# Patient Record
Sex: MALE | Race: WHITE | NOT HISPANIC OR LATINO | Employment: OTHER | RURAL
[De-identification: names, ages, dates, MRNs, and addresses within clinical notes are randomized per-mention and may not be internally consistent; named-entity substitution may affect disease eponyms.]

---

## 2017-10-16 ENCOUNTER — TRANSCRIBE ORDERS (OUTPATIENT)
Dept: PHYSICAL THERAPY | Facility: HOSPITAL | Age: 72
End: 2017-10-16

## 2017-10-16 DIAGNOSIS — R29.6 FALLS FREQUENTLY: Primary | ICD-10-CM

## 2017-10-16 DIAGNOSIS — R26.89 BALANCE PROBLEM: ICD-10-CM

## 2017-10-17 ENCOUNTER — TRANSCRIBE ORDERS (OUTPATIENT)
Dept: OCCUPATIONAL THERAPY | Facility: HOSPITAL | Age: 72
End: 2017-10-17

## 2017-10-17 DIAGNOSIS — R41.9 COGNITIVE COMPLAINTS: ICD-10-CM

## 2017-10-17 DIAGNOSIS — R63.39 FEEDING PROBLEM: Primary | ICD-10-CM

## 2017-10-17 DIAGNOSIS — R29.6 FALLS FREQUENTLY: ICD-10-CM

## 2017-10-17 DIAGNOSIS — R25.1 TREMOR: ICD-10-CM

## 2017-10-24 ENCOUNTER — HOSPITAL ENCOUNTER (OUTPATIENT)
Dept: PHYSICAL THERAPY | Facility: HOSPITAL | Age: 72
Setting detail: THERAPIES SERIES
Discharge: HOME | End: 2017-10-24

## 2017-10-24 ENCOUNTER — APPOINTMENT (OUTPATIENT)
Dept: PHYSICAL THERAPY | Facility: HOSPITAL | Age: 72
End: 2017-10-24

## 2017-10-24 ENCOUNTER — HOSPITAL ENCOUNTER (OUTPATIENT)
Dept: OCCUPATIONAL THERAPY | Facility: HOSPITAL | Age: 72
Setting detail: THERAPIES SERIES
Discharge: HOME | End: 2017-10-24

## 2017-10-24 DIAGNOSIS — IMO0002 LACK OF COORDINATION DUE TO STROKE: ICD-10-CM

## 2017-10-24 DIAGNOSIS — R53.1 WEAKNESS GENERALIZED: ICD-10-CM

## 2017-10-24 DIAGNOSIS — I63.9 CEREBROVASCULAR ACCIDENT (CVA) WITH INVOLVEMENT OF RIGHT SIDE OF BODY (HCC): Primary | ICD-10-CM

## 2017-10-24 DIAGNOSIS — R26.9 ABNORMALITY OF GAIT AND MOBILITY: Primary | ICD-10-CM

## 2017-10-24 PROCEDURE — G8978 MOBILITY CURRENT STATUS: HCPCS | Performed by: PHYSICAL THERAPIST

## 2017-10-24 PROCEDURE — G8985 CARRY GOAL STATUS: HCPCS | Performed by: OCCUPATIONAL THERAPIST

## 2017-10-24 PROCEDURE — G8979 MOBILITY GOAL STATUS: HCPCS | Performed by: PHYSICAL THERAPIST

## 2017-10-24 PROCEDURE — 97166 OT EVAL MOD COMPLEX 45 MIN: CPT | Performed by: OCCUPATIONAL THERAPIST

## 2017-10-24 PROCEDURE — 97162 PT EVAL MOD COMPLEX 30 MIN: CPT | Performed by: PHYSICAL THERAPIST

## 2017-10-24 PROCEDURE — G8984 CARRY CURRENT STATUS: HCPCS | Performed by: OCCUPATIONAL THERAPIST

## 2017-10-24 NOTE — THERAPY EVALUATION
"    Outpatient Physical Therapy Neuro Initial Evaluation  [facility]     Patient Name: Travis Patel  : 1945  MRN: [id number]  Today's Date: 10/24/2017      Visit Date: 10/24/2017    There is no problem list on file for this patient.       Past Medical History:   Diagnosis Date   • Hernia of abdominal wall    • Myocardial infarction 2017   • Sleep apnea    • Stroke 2017        Past Surgical History:   Procedure Laterality Date   • COLOSTOMY  2001    x 3 months   • CORONARY ARTERY BYPASS GRAFT         • HERNIA REPAIR      abdominal hernia x 3; poor healing with multiple revisions   • PACEMAKER IMPLANTATION  2017    w/ defibrillator   • ROTATOR CUFF REPAIR Right 2005         Visit Dx:     ICD-10-CM ICD-9-CM   1. Abnormality of gait and mobility R26.9 781.2             Patient History       10/24/17 1500 10/24/17 1400       History    Chief Complaint Balance Problems;Difficulty Walking;Difficulty with daily activities;Falls/history of falls;Fatigue/poor endurance;Muscle weakness;Pain  -TR Balance Problems;Difficulty Walking;Falls/history of falls  -TB     Type of Pain Other pain   \"tailbone\" pain  -TR      Date Current Problem(s) Began 17  -TR 17  -TB     Brief Description of Current Complaint Pt had a heart attack and CVA in 2017. He completed 2 weeks of rehab in a SNF and cardiac rehab. Since that time he has returned home with his spouse and has had multiple falls per week since 2017. He has recently noticed tremors in R UE and occasionally in L UE affecting his self feeding and daily tasks. He requires Min A at times for ADL and mobility. His spouse is his main caregiver. He was referred to OT to address his UE weakness and tremors.   -TR Pt had a heart attack and CVA in 2017. He completed 2 weeks of rehab in a SNF and cardiac rehab. Since that time he has returned home with his spouse and has had multiple falls per week since 2017. He has recently " noticed tremors in R UE and occasionally in L UE affecting his self feeding and daily tasks. He requires Min A at times for ADL and mobility. His spouse is his main caregiver. He was referred to OT to address his UE weakness and tremors.   -TB     Previous treatment for THIS PROBLEM Rehabilitation  -TR Rehabilitation  -TB     Patient/Caregiver Goals Relieve pain;Return to prior level of function;Improve mobility;Improve strength;Know what to do to help the symptoms  -TR Return to prior level of function;Improve mobility;Know what to do to help the symptoms  -TB     Current Tobacco Use No  -TR      Smoking Status Former smoker  -TR      Patient's Rating of General Health Fair  -TR Fair  -TB     Hand Dominance right-handed  -TR      Occupation/sports/leisure activities Retired  -TR      Patient seeing anyone else for problem(s)? Yes  -TR Yes  -TB     How has patient tried to help current problem? Therapy in SNF following CVA. No recent therapy.   -TR      What clinical tests have you had for this problem? --   Unknown  -TR      Pain     Pain Location Coccyx  -TR Coccyx  -TB     Pain at Present 3  -TR 3  -TB     Pain at Best 3  -TR 3  -TB     Pain at Worst 10  -TR 10  -TB     Pain Frequency Constant/continuous  -TR Constant/continuous  -TB     Pain Description Stabbing  -TR Stabbing  -TB     What Performance Factors Make the Current Problem(s) WORSE? Riding in car.   -TR      What Performance Factors Make the Current Problem(s) BETTER? Movement, standing.   -TR      Is your sleep disturbed? Yes  -TR      Is medication used to assist with sleep? Unspecified  -TR      Total hours of sleep per night Unspecified  -TR      Difficulties with ADL's? Frequent falls, difficulty with dressing and clothing fasteners, impaired self feeding.   -TR      Difficulties with recreational activities? Unable to complete yard work.   -TR      Fall Risk Assessment    Any falls in the past year: Yes  -TR      Number of falls reported in the  last 12 months --   One per week or more.   -TR      Factors that contributed to the fall: Tripped;Lost balance;Low light;Fatigue;Didn't use assistive device  -TR      Does patient have a fear of falling Yes (comment)  -TR      Services    Prior Rehab/Home Health Experiences Yes  -TR Yes  -TB     When was the prior experience with Rehab/Home Health Feb. 2017  -TR      Where was the prior experience with Rehab/Home Health SNF in [place].   -TR      Are you currently receiving Home Health services No  -TR      Do you plan to receive Home Health services in the near future No  -TR      Daily Activities    Primary Language English  -TR English  -TB     How does patient learn best? Listening;Demonstration  -TR Listening;Demonstration  -TB     Teaching needs identified Home Exercise Program;Management of Condition;Falls Prevention;Home Safety  -TR Home Exercise Program;Management of Condition;Falls Prevention  -TB     Patient is concerned about/has problems with Coordination;Difficulty with self care (i.e. bathing, dressing, toileting:;Flexibility;Grasping objects lifting;Performing home management (household chores, shopping, care of dependents);Performing job responsibilities/community activities (work, school,;Performing sports, recreation, and play activities;Reaching over head;Repetitive movements of the hand, arm, shoulder;Standing;Transfers (getting out of a chair, bed);Walking;Writing/grasping items with hand(s)  -TR Performing home management (household chores, shopping, care of dependents)  -TB     Does patient have problems with the following? Depression  -TR Depression  -TB     Barriers to learning Communication;Cognitive   Aphasia  -TR Communication   expressive aphasia  -TB     Action taken for identified issues Will educate spouse.   -TR      Recommended Referrals Speech Therapy  -TR Speech Therapy  -TB     Pt Participated in POC and Goals Yes  -TR Yes  -TB     Safety    Are you being hurt, hit, or  "frightened by anyone at home or in your life? No  -TR No  -TB     Are you being neglected by a caregiver No  -TR No  -TB       User Key  (r) = Recorded By, (t) = Taken By, (c) = Cosigned By    Initials Name Provider Type    TB Geovanny Kruse, PT Physical Therapist    TR Loretta Hastings, OTR/L Occupational Therapist             Hand Therapy (last 24 hours)      Hand Eval       10/24/17 1600          Subjective Comments    Subjective Comments Pt displayed aphasia during tx. Spouse assisted with answering questions.   -TR      Subjective Pain    Able to rate subjective pain? yes  -TR      Pre-Treatment Pain Level 3  -TR      Post-Treatment Pain Level 3  -TR      Subjective Pain Comment \"tailbone\" pain  -TR      Hand ROM Tested?    Hand ROM Tested? --   B hand AROM WFL  -TR      Hand  Strength     Strength Affected Side Bilateral  -TR       Strength Right    Right  Test 1 47  -TR      Right  Test 2 44  -TR      Right  Test 3 40  -TR       Strength Average Right 43.67  -TR       Strength Left    Left  Test 1 43  -TR      Left  Test 2 43  -TR      Left  Test 3 52  -TR       Strength Average Left 46  -TR      Pinch Strength    Affected Side Bilateral  -TR      Right Hand Strength - Pinch (lbs)    Lateral 12 lbs  -TR      Three Jaw Edi 11 lbs  -TR      Tip Pinch - Index Finger 8 lbs  -TR      Left Hand Strength - Pinch (lbs)    Lateral 12 lbs  -TR      Three Jaw Edi 11 lbs  -TR      Tip Pinch - Index Finger 7 lbs  -TR        User Key  (r) = Recorded By, (t) = Taken By, (c) = Cosigned By    Initials Name Provider Type    TR Loretta Hastings, OTR/L Occupational Therapist                PT Neuro       10/24/17 1400          Home Living    Living Arrangements house  -TB      Home Accessibility stairs (1 railing present);grab bars present (bathtub)  -TB      Number of Stairs to Enter Home 5  -TB      Stair Railings at Home outside, present on right side  -TB      Home " Equipment Rollator  -TB      Living Environment Comment recommended a SC  -TB      Cognition    Orientation Level Oriented X4  -TB      Safety Judgment Decreased awareness of need for assistance  -TB      Comments tends to move before his limbs can catch up  -TB      Sensation    Light Touch Partial deficits in the RLE  -TB      DTR- Lower Quarter Clearing    Patellar tendon (L2-4) Bilateral:;2- Normal response   slightly elevated on right  -TB      MMT (Manual Muscle Testing)    General MMT Assessment Detail right LE 5/5 except right hip grossly 4-/5  -TB      Bed Mobility, Assessment/Treatment    Bed Mobility, Roll Left, [place] independent  -TB      Bed Mob, Supine to Sit, [place] contact guard assist  -TB      Bed Mob, Sit to Supine, [place] contact guard assist  -TB      Transfers    Transfers, Sit-Stand [place] upper extremity support  -TB      Gait Assessment/Treatment    Gait, Comment walks with trendelenberg on right midstance with increased lateral lurch yanci; improved some with SC on left; tends to look down at feet  -TB      Balance-Based Torso-Weighting Assessment Performed?    Balance-Based Torso-Weighting Assessment (Trigger) Yes  -TB        User Key  (r) = Recorded By, (t) = Taken By, (c) = Cosigned By    Initials Name Provider Type    TB Geovanny Kruse, RHONA Physical Therapist                  Vestibular Eval       10/24/17 1400          Static    Position Feet together ([place])  -TB      Surface Hard  -TB      Performance Eyes open;Eyes closed  -TB      Level of Assistance Assist Stand-by  -TB      Duration 30 sec  -TB      Standing Static Balance Comments has increased sway but no loss of balance  -TB        User Key  (r) = Recorded By, (t) = Taken By, (c) = Cosigned By    Initials Name Provider Type    TB Geovanny Kruse PT Physical Therapist                  Therapy Education       10/24/17 1600 10/24/17 1400       Therapy Education    Education Details Education provided on  fall prevention, home safety and being more active at home. Will initiate an UE HEP during next tx.   -TR right SLS; gait with SC  -TB     Given Symptoms/condition management;Fall prevention and home safety  -TR Symptoms/condition management;Fall prevention and home safety  -TB     Program New  -TR New  -TB     How Provided Demonstration;Verbal  -TR Demonstration;Verbal  -TB     Provided to Patient;Caregiver   Spouse  -TR Patient;Caregiver   Spouse  -TB     Level of Understanding Verbalized;Demonstrated  -TR Verbalized;Demonstrated  -TB       User Key  (r) = Recorded By, (t) = Taken By, (c) = Cosigned By    Initials Name Provider Type    TB Geovanny Kruse, PT Physical Therapist    TR Loretta Hastings, OTR/L Occupational Therapist                PT OP Goals       10/24/17 1400       PT Short Term Goals    STG Date to Achieve 11/14/17  -TB     STG 1 Improve yanci hip ext to 10 deg passive ext  -TB     STG 1 Progress New  -TB     STG 2 Able to walk with SC in midline with no trendelenberg without looking down  -TB     STG 2 Progress New  -TB     Long Term Goals    LTG Date to Achieve 12/05/17  -TB     LTG 1 No falls for 3 weeks  -TB     LTG 1 Progress New  -TB     LTG 2 Able to SLS on right x 10 secs with no trendelenberg  -TB     LTG 2 Progress New  -TB     LTG 3 Able to walk household distances without asst device with good wt shifting and core stability  -TB     LTG 3 Progress New  -TB     LTG 4 Able to walk short community distances (20 min) with SC without over-fatigue and heart rate in safe range  -TB     LTG 4 Progress New  -TB     LTG 5 Independent with HEP for right hip stability and balance.   -TB     LTG 5 Progress New  -TB     Time Calculation    PT Goal Re-Cert Due Date 11/23/17  -TB       User Key  (r) = Recorded By, (t) = Taken By, (c) = Cosigned By    Initials Name Provider Type    TB Geovanny Kruse, PT Physical Therapist                PT Assessment/Plan       10/24/17 1627 10/24/17 1400    PT  Assessment    Functional Limitations  Decreased safety during functional activities;Impaired gait;Impaired locomotion;Limitation in home management;Performance in leisure activities  -TB    Impairments  Posture;Range of motion;Impaired flexibility;Joint mobility;Pain;Muscle strength;Gait;Endurance  -TB    Assessment Comments  He is doing well on many areas. His right leg is strong except for his right hip which is weak and it shows in his gait with a trendelenberg in midstance. He tends to avoid his right leg which makes his right knee more likely to buckle. We will need to consider his heart damage and not over-fatigue him. His midline orientation is good to if we can improve his stability, his gait should progress well. I would recommend he see SLP for his expressive aphasia.  -TB    Please refer to paper survey for additional self-reported information  Yes  -TB    Rehab Potential  Excellent  -TB    Patient/caregiver participated in establishment of treatment plan and goals  Yes  -TB    Patient would benefit from skilled therapy intervention  Yes  -TB    PT Plan    PT Frequency  2x/week  -TB    Predicted Duration of Therapy Intervention (days/wks) 8 weeks  -TR 6 weeks  -TB    Planned CPT's?  PT EVAL MOD COMPLELITY: 29394;PT THER PROC EA 15 MIN: 62527;PT THER ACT EA 15 MIN: 02961;PT MANUAL THERAPY EA 15 MIN: 06345;PT NEUROMUSC RE-EDUCATION EA 15 MIN: 00358;PT GAIT TRAINING EA 15 MIN: 28980  -TB    PT Plan Comments  We will focus on hip flexibility and hip stabiliity and slowly rebuild his gait technique to emphasize posture and proper wt shifting.   -TB      User Key  (r) = Recorded By, (t) = Taken By, (c) = Cosigned By    Initials Name Provider Type    TB Geovanny Kruse, PT Physical Therapist    TR Loretta Hastings, OTR/L Occupational Therapist                   Exercises       10/24/17 1600          Subjective Comments    Subjective Comments Pt displayed aphasia during tx. Spouse assisted with answering  "questions.   -TR      Subjective Pain    Able to rate subjective pain? yes  -TR      Pre-Treatment Pain Level 3  -TR      Post-Treatment Pain Level 3  -TR      Subjective Pain Comment \"tailbone\" pain  -TR        User Key  (r) = Recorded By, (t) = Taken By, (c) = Cosigned By    Initials Name Provider Type    TR Loretta Hastings, OTR/L Occupational Therapist                            Outcome Measures       10/24/17 1600 10/24/17 1400       9 Hole Peg    9-Hole Peg Left 34.56   B hands impaired for pts age.   -TR      9-Hole Peg Right 30.72  -TR      Quick DASH    Open a tight or new jar. 5  -TR      Do heavy household chores (e.g., wash walls, wash floors) 5  -TR      Carry a shopping bag or briefcase 5  -TR      Wash your back 1  -TR      Use a knife to cut food 2  -TR      Recreational activities in which you take some force or impact through your arm, should or hand (e.g. golf, hammering, tennis, etc.) 4  -TR      During the past week, to what extent has your arm, shoulder, or hand problem interfered with your normal social activites with family, friends, neighbors or groups? 4  -TR      During the past week, were you limited in your work or other regular daily activities as a result of your arm, shoulder or hand problem? 4  -TR      Arm, Shoulder, or hand pain 1  -TR      Tingling (pins and needles) in your arm, shoulder, or hand 1  -TR      During the past week, how much difficulty have you had sleeping because of the pain in your arm, shoulder or hand? 2  -TR      Number of Questions Answered 11  -TR      Quick DASH Score 52.27  -TR      Quick Dash Comments CK  -TR      Tinetti Assessment    Tinetti Assessment  yes  -TB     Sitting Balance  1  -TB     Arises  1  -TB     Attempts to Rise  1  -TB     Immediate Standing Balance (first 5 sec)  1  -TB     Standing Balance  1  -TB     Sternal Nudge (feet close together)  1  -TB     Eyes Closed (feet close together)  1  -TB     Turning 360 Degrees- Steps  1  -TB     " "Turning 360 Degrees- Steadiness  0  -TB     Sitting Down  1  -TB     Tinetti Balance Score  9  -TB     Gait Initiation (immediate after told \"go\")  0  -TB     Step Length- Right Swing  1  -TB     Step Length- Left Swing  0  -TB     Foot Clearance- Right Foot  1  -TB     Foot Clearance- Left Foot  0  -TB     Step Symmetry  0  -TB     Step Continuity  0  -TB     Path (excursion)  1  -TB     Trunk  0  -TB     Base of Support  0  -TB     Gait Score  3  -TB     Tinetti Total Score  12  -TB     Functional Assessment    Outcome Measure Options 9 Hole Peg;Quick DASH  -TR Tinetti  -TB       User Key  (r) = Recorded By, (t) = Taken By, (c) = Cosigned By    Initials Name Provider Type    TB Geovanny Kruse, PT Physical Therapist    TR Loretta Hastings OTR/L Occupational Therapist          Time Calculation:   Start Time: 1400  Stop Time: 1500  Time Calculation (min): 60 min     Therapy Charges for Today     Code Description Service Date Service Provider Modifiers Qty    55230965208 HC PT MOBILITY CURRENT 10/24/2017 Geovanny Kruse, PT GP, CK 1    17426869218 HC PT MOBILITY PROJECTED 10/24/2017 Geovanny Kruse, PT GP, CI 1    [id number] HC PT EVAL MOD COMPLEXITY 4 10/24/2017 Geovanny Kruse, PT GP 1          PT G-Codes  Outcome Measure Options: Tinetti  Score: 12  Functional Limitation: Mobility: Walking and moving around  Mobility: Walking and Moving Around Current Status (): At least 40 percent but less than 60 percent impaired, limited or restricted  Mobility: Walking and Moving Around Goal Status (): At least 1 percent but less than 20 percent impaired, limited or restricted         Geovanny Kruse, PT  10/24/2017       "

## 2017-10-25 NOTE — THERAPY EVALUATION
" Outpatient Occupational Therapy Neuro Initial Evaluation  [facility]     Patient Name: Travis Patel  : 1945  MRN: [id number]  Today's Date: 10/25/2017      Visit Date: 10/24/2017    There is no problem list on file for this patient.       Past Medical History:   Diagnosis Date   • Hernia of abdominal wall    • Myocardial infarction 2017   • Sleep apnea    • Stroke 2017        Past Surgical History:   Procedure Laterality Date   • COLOSTOMY  2001    x 3 months   • CORONARY ARTERY BYPASS GRAFT         • HERNIA REPAIR      abdominal hernia x 3; poor healing with multiple revisions   • PACEMAKER IMPLANTATION  2017    w/ defibrillator   • ROTATOR CUFF REPAIR Right 2005         Visit Dx:      ICD-10-CM ICD-9-CM   1. Cerebrovascular accident (CVA) with involvement of right side of body I63.9 434.91   2. Weakness generalized R53.1 780.79   3. Lack of coordination due to stroke I63.9 434.91    R27.9 781.3             Patient History       10/24/17 1500       History    Chief Complaint Balance Problems;Difficulty Walking;Difficulty with daily activities;Falls/history of falls;Fatigue/poor endurance;Muscle weakness;Pain  -TR     Type of Pain Other pain   \"tailbone\" pain  -TR     Date Current Problem(s) Began 17  -TR     Brief Description of Current Complaint Pt had a heart attack and CVA in 2017. He completed 2 weeks of rehab in a SNF and cardiac rehab. Since that time he has returned home with his spouse and has had multiple falls per week since 2017. He has recently noticed tremors in R UE and occasionally in L UE affecting his self feeding and daily tasks. He requires Min A at times for ADL and mobility. His spouse is his main caregiver. He was referred to OT to address his UE weakness and tremors.   -TR     Previous treatment for THIS PROBLEM Rehabilitation  -TR     Patient/Caregiver Goals Relieve pain;Return to prior level of function;Improve mobility;Improve strength;Know what " to do to help the symptoms  -TR     Current Tobacco Use No  -TR     Smoking Status Former smoker  -TR     Patient's Rating of General Health Fair  -TR     Hand Dominance right-handed  -TR     Occupation/sports/leisure activities Retired  -TR     Patient seeing anyone else for problem(s)? Yes  -TR     How has patient tried to help current problem? Therapy in SNF following CVA. No recent therapy.   -TR     What clinical tests have you had for this problem? --   Unknown  -TR     Pain     Pain Location Coccyx  -TR     Pain at Present 3  -TR     Pain at Best 3  -TR     Pain at Worst 10  -TR     Pain Frequency Constant/continuous  -TR     Pain Description Stabbing  -TR     What Performance Factors Make the Current Problem(s) WORSE? Riding in car.   -TR     What Performance Factors Make the Current Problem(s) BETTER? Movement, standing.   -TR     Is your sleep disturbed? Yes  -TR     Is medication used to assist with sleep? Unspecified  -TR     Total hours of sleep per night Unspecified  -TR     Difficulties with ADL's? Frequent falls, difficulty with dressing and clothing fasteners, impaired self feeding.   -TR     Difficulties with recreational activities? Unable to complete yard work.   -TR     Fall Risk Assessment    Any falls in the past year: Yes  -TR     Number of falls reported in the last 12 months --   One per week or more.   -TR     Factors that contributed to the fall: Tripped;Lost balance;Low light;Fatigue;Didn't use assistive device  -TR     Does patient have a fear of falling Yes (comment)  -TR     Services    Prior Rehab/Home Health Experiences Yes  -TR     When was the prior experience with Rehab/Home Health Feb. 2017  -TR     Where was the prior experience with Rehab/Home Health SNF in [place].   -TR     Are you currently receiving Home Health services No  -TR     Do you plan to receive Home Health services in the near future No  -TR     Daily Activities    Primary Language English  -TR     How does  "patient learn best? Listening;Demonstration  -TR     Teaching needs identified Home Exercise Program;Management of Condition;Falls Prevention;Home Safety  -TR     Patient is concerned about/has problems with Coordination;Difficulty with self care (i.e. bathing, dressing, toileting:;Flexibility;Grasping objects lifting;Performing home management (household chores, shopping, care of dependents);Performing job responsibilities/community activities (work, school,;Performing sports, recreation, and play activities;Reaching over head;Repetitive movements of the hand, arm, shoulder;Standing;Transfers (getting out of a chair, bed);Walking;Writing/grasping items with hand(s)  -TR     Does patient have problems with the following? Depression  -TR     Barriers to learning Communication;Cognitive   Aphasia  -TR     Action taken for identified issues Will educate spouse.   -TR     Recommended Referrals Speech Therapy  -TR     Pt Participated in POC and Goals Yes  -TR     Safety    Are you being hurt, hit, or frightened by anyone at home or in your life? No  -TR     Are you being neglected by a caregiver No  -TR       User Key  (r) = Recorded By, (t) = Taken By, (c) = Cosigned By    Initials Name Provider Type    TB Geovanny Kruse, PT Physical Therapist    TR Loretta Hastings OTR/L Occupational Therapist                OT Neuro       10/24/17 1600          Precautions and Contraindications    Precautions/Limitations fall precautions  -TR      Subjective Pain    Able to rate subjective pain? yes  -TR      Pre-Treatment Pain Level 3  -TR      Post-Treatment Pain Level 3  -TR      Subjective Pain Comment \"tailbone\" pain  -TR      Home Living    Living Arrangements house  -TR      Home Accessibility stairs to enter home;stairs (1 railing present);grab bars present (bathtub)  -TR      Number of Stairs to Enter Home 5  -TR      Stair Railings at Home outside, present on right side  -TR      Home Equipment Rollator  -TR      Living " Environment Comment Planning to purchase a shower seat and ask MD for an order for a straight cane.   -TR      Vision- Basic    Current Vision No visual deficits   Per pt.   -TR      Vision - Complex Assessment    Ocular Range of Motion WFL  -TR      Head Position WDL  -TR      Tracking Able to track stimulus in all quads without difficulty  -TR      Acuity Able to read employee name badge without difficulty  -TR      Cognitive Assessment/Intervention    Current Cognitive/Communication Assessment impaired  -TR      Orientation Status oriented to;person;place;situation;disoriented to;time   Cues for month and year  -TR      Follows Commands/Answers Questions able to follow multi-step instructions;50% of the time;needs repetition;needs cueing;needs increased time  -TR      Personal Safety decreased awareness, need for assist;decreased awareness, need for safety  -TR      Personal Safety Interventions gait belt;fall prevention program maintained;supervised activity  -TR      Cognition Comments Spouse seeking a speech therapy referral from Dr. Calvillo. Reports pt has an appointment with him on 10/25.   -TR      Sensation    Sensation WNL? WFL  -TR      Light Touch No apparent deficits  -TR      Sharp/Dull No apparent deficits  -TR      Proprioception    Proprioception Difficult to fully assess due to cognitive and speech impairments.   -TR      Perception    Inattention/Neglect Appears intact   Plan to test in more detail during next session.   -TR      Coordination    Coordination Observations Intentional Tremor   B UE  -TR      Intentional Tremor Bilteral:;Yes  -TR      Involuntary Movements Bilteral:;Yes  -TR      Coordination Tests Finger to nose eyes open;Rapid Alternating   Difficulty noted with thumb finding task due to directions.   -TR      Rapid Alternating Bilteral:;Intact  -TR      Finger to Nose Eyes Open Bilteral:;Intact  -TR      Gross Motor Training    Gross Motor Skill, Impairments Detail Tremor type  movement noted in B UE with reaching and MMT.   -TR      ROM (Range of Motion)    General ROM upper extremity range of motion deficits identified  -TR      Left Shoulder    Flexion AROM Deficit 170  -TR      ABduction AROM Deficit 127  -TR      Right Shoulder    Flexion AROM Deficit 127  -TR      ABduction AROM Deficit 125  -TR      General UE Assessment    ROM shoulder, left: UE ROM deficit;shoulder, right: UE ROM deficit;wrist, left: UE ROM deficit;wrist, right: UE ROM deficit  -TR      Left Wrist    Flexion AROM Deficit 54  -TR      Extension AROM Deficit 73  -TR      Right Wrist    Flexion AROM Deficit 50  -TR      Extension AROM Deficit 70  -TR      MMT (Manual Muscle Testing)    General MMT Assessment Detail R UE 4/5 within available ROM. L UE 4+/5 within available ROM.   -TR      Tone    UE Tone bilateral:;WNL for age  -TR      Bed Mobility    Bed Mobility, Comment Mod Independent  -TR      Transfers    Transfer, Comment SBA to CGA  -TR      Functional Mobility    Functional Mobility- Comment SBA to CGA  -TR      ADL Assessment/Intervention    ADL's Assessed? Grooming;Upper Body Dressing;Lower Body Dressing  -TR      Upper Body Dressing Assessment/Training    UB Dressing Assess/Train, Comment Occasional Min A  -TR      Lower Body Dressing Assessment/Training    LB Dressing Assess/Train, Comment Occasional Min A  -TR      Grooming Assessment/Training    Grooming Assess/Train, Comment Occasional Min A per spouse.   -TR      Balance Skills Training    Sitting-Level of Assistance Independent  -TR      Sitting-Balance Support Feet supported;No upper extremity supported  -TR      Standing-Level of Assistance Close supervision;Contact guard  -TR      Static Standing Balance Support No upper extremity supported  -TR      Gait Balance-Level of Assistance Contact guard;Minimum assistance  -TR      Gait Balance Support No upper extremity supported  -TR        User Key  (r) = Recorded By, (t) = Taken By, (c) = Cosigned  "By    Initials Name Provider Type    TR Loretta Hastings OTR/L Occupational Therapist             Hand Therapy (last 24 hours)      Hand Eval       10/24/17 1600          Subjective Comments    Subjective Comments Pt displayed aphasia during tx. Spouse assisted with answering questions.   -TR      Subjective Pain    Able to rate subjective pain? yes  -TR      Pre-Treatment Pain Level 3  -TR      Post-Treatment Pain Level 3  -TR      Subjective Pain Comment \"tailbone\" pain  -TR      Hand ROM Tested?    Hand ROM Tested? --   B hand AROM WFL  -TR      Hand  Strength     Strength Affected Side Bilateral  -TR       Strength Right    Right  Test 1 47  -TR      Right  Test 2 44  -TR      Right  Test 3 40  -TR       Strength Average Right 43.67  -TR       Strength Left    Left  Test 1 43  -TR      Left  Test 2 43  -TR      Left  Test 3 52  -TR       Strength Average Left 46  -TR      Pinch Strength    Affected Side Bilateral  -TR      Right Hand Strength - Pinch (lbs)    Lateral 12 lbs  -TR      Three Jaw Edi 11 lbs  -TR      Tip Pinch - Index Finger 8 lbs  -TR      Left Hand Strength - Pinch (lbs)    Lateral 12 lbs  -TR      Three Jaw Edi 11 lbs  -TR      Tip Pinch - Index Finger 7 lbs  -TR        User Key  (r) = Recorded By, (t) = Taken By, (c) = Cosigned By    Initials Name Provider Type    TR NASIMA Hernandez/L Occupational Therapist                    Therapy Education       10/24/17 1600       Therapy Education    Education Details Education provided on fall prevention, home safety and being more active at home. Will initiate an UE HEP during next tx.   -TR     Given Symptoms/condition management;Fall prevention and home safety  -TR     Program New  -TR     How Provided Demonstration;Verbal  -TR     Provided to Patient;Caregiver   Spouse  -TR     Level of Understanding Verbalized;Demonstrated  -TR       User Key  (r) = Recorded By, (t) = Taken By, (c) = " Cosigned By    Initials Name Provider Type    TB Geovanny Kruse, PT Physical Therapist    TR Loretta Hastings, OTR/L Occupational Therapist                OT Goals       10/24/17 1600       OT Short Term Goals    STG Date to Achieve 11/23/17  -TR     STG 1 Pt will be independent with daily completion of an UE HEP to address ROM, strength and coordination deficits.   -TR     STG 1 Progress New  -TR     STG 2 Pt will increase B hand  strength to 50# for improved grasp and reduced dropping of items.   -TR     STG 2 Progress New  -TR     STG 3 Pt will improve B hand FMC to complete the 9 hole peg assessment in 27 seconds or less.  -TR     STG 3 Progress New  -TR     STG 4 Pt will display a functional activity tolerance of 15 minutes with no recovery periods required to improve IADL performance.   -TR     STG 4 Progress New  -TR     Long Term Goals    LTG Date to Achieve 11/23/17  -TR     LTG 1 Pt will complete all dressing and grooming tasks with Modified [place] and good safety awareness.   -TR     LTG 1 Progress New  -TR     LTG 2 Pt will report no falls for 3 tx sessions in a row to demonstrate improved safety with ADL and mobility.   -TR     LTG 2 Progress New  -TR     LTG 3 Pt will complete a mock check writing task with 100% legibility.   -TR     LTG 3 Progress New  -TR     LTG 4 Pt will improve R shoulder flexion and abduction AROM to 150 degrees for improved performance with ADL and IADL tasks.   -TR     LTG 4 Progress New  -TR     Time Calculation    OT Goal Re-Cert Due Date 11/23/17  -TR       User Key  (r) = Recorded By, (t) = Taken By, (c) = Cosigned By    Initials Name Provider Type    TR Loretta Hastings, OTR/L Occupational Therapist                OT Assessment/Plan       10/24/17 1627       Functional Limitations Decreased safety during functional activities;Impaired locomotion;Limitation in home management;Limitations in community activities;Limitations in functional capacity and  performance;Performance in leisure activities;Performance in self-care ADL  -TR    Impairments Balance;Cognition;Coordination;Dexterity;Endurance;Gait;Impaired attention;Impaired flexibility;Impaired muscle endurance;Impaired muscle power;Locomotion;Muscle strength;Pain;Range of motion;Posture  -TR    Assessment Comments This pt displays a great decline in his functional ability and ADL performance since his CVA in Feb. He has had no OT intervention for several months and reports weekly falls at home and requires caregiver assist for dressing, grooming and mobility. He has impaired R UE AROM and strength. B hand strength and coordination is impaired for the pts age and he has poor endurance. He is unable to safely perform any IADL tasks is standing due to risk for falls. Skilled OT is necessary to address these deficits to improve the pts safety and independence, and decrease caregiver burden. This pt displays good rehab potential based on intact R UE sensation, ability to follow commands and strong caregiver support.   -TR    Please refer to paper survey for additional self-reported information Yes  -TR    OT Diagnosis CVA R side affected, generalized weakness, lack of coordination due to stroke.   -TR    OT Rehab Potential Good  -TR    Patient/caregiver participated in establishment of treatment plan and goals Yes  -TR    Patient would benefit from skilled therapy intervention Yes  -TR       OT Frequency 2x/week  -TR    Predicted Duration of Therapy Intervention (days/wks) 8 weeks  -TR    Planned CPT's? OT EVAL MOD COMPLEXITY: 85064;OT THER ACT EA 15 MIN: [id number];OT THER PROC EA 15 MIN: 07898QV;OT NEUROMUSC RE EDUCATION EA 15 MIN: 36722;OT SELF CARE/MGMT/TRAIN 15 MIN: 81212;OT ORTHOTIC MGMT/TRAIN EA 15 MIN: 01287;OT MANUAL THERAPY EA 15 MIN: 62308  -TR    Planned Therapy Interventions (Optional Details) balance training;bed mobility training;home exercise program;manual therapy techniques;motor coordination  training;neuromuscular re-education;patient/family education;ROM (Range of Motion);strengthening;stretching;transfer training   ADL training  -TR    OT Plan Comments Plan to address HEP during next tx session and complete caregiver education.   -TR      User Key  (r) = Recorded By, (t) = Taken By, (c) = Cosigned By    Initials Name Provider Type    TB Geovanny Kruse, PT Physical Therapist    TR Loretta Hastings, OTR/L Occupational Therapist                        Outcome Measures       10/24/17 1600 10/24/17 1400       9 Hole Peg    9-Hole Peg Left 34.56   B hands impaired for pts age.   -TR      9-Hole Peg Right 30.72  -TR      Quick DASH    Open a tight or new jar. 5  -TR      Do heavy household chores (e.g., wash walls, wash floors) 5  -TR      Carry a shopping bag or briefcase 5  -TR      Wash your back 1  -TR      Use a knife to cut food 2  -TR      Recreational activities in which you take some force or impact through your arm, should or hand (e.g. golf, hammering, tennis, etc.) 4  -TR      During the past week, to what extent has your arm, shoulder, or hand problem interfered with your normal social activites with family, friends, neighbors or groups? 4  -TR      During the past week, were you limited in your work or other regular daily activities as a result of your arm, shoulder or hand problem? 4  -TR      Arm, Shoulder, or hand pain 1  -TR      Tingling (pins and needles) in your arm, shoulder, or hand 1  -TR      During the past week, how much difficulty have you had sleeping because of the pain in your arm, shoulder or hand? 2  -TR      Number of Questions Answered 11  -TR      Quick DASH Score 52.27  -TR      Quick Dash Comments CK  -TR      Tinetti Assessment    Tinetti Assessment  yes  -TB     Sitting Balance  1  -TB     Arises  1  -TB     Attempts to Rise  1  -TB     Immediate Standing Balance (first 5 sec)  1  -TB     Standing Balance  1  -TB     Sternal Nudge (feet close together)  1  -TB      "Eyes Closed (feet close together)  1  -TB     Turning 360 Degrees- Steps  1  -TB     Turning 360 Degrees- Steadiness  0  -TB     Sitting Down  1  -TB     Tinetti Balance Score  9  -TB     Gait Initiation (immediate after told \"go\")  0  -TB     Step Length- Right Swing  1  -TB     Step Length- Left Swing  0  -TB     Foot Clearance- Right Foot  1  -TB     Foot Clearance- Left Foot  0  -TB     Step Symmetry  0  -TB     Step Continuity  0  -TB     Path (excursion)  1  -TB     Trunk  0  -TB     Base of Support  0  -TB     Gait Score  3  -TB     Tinetti Total Score  12  -TB     Functional Assessment    Outcome Measure Options 9 Hole Peg;Quick DASH  -TR Tinetti  -TB       User Key  (r) = Recorded By, (t) = Taken By, (c) = Cosigned By    Initials Name Provider Type    TB Geovanny Kruse, PT Physical Therapist    TR NASIMA Hernandez/L Occupational Therapist            Time Calculation:   OT Start Time: 1502  OT Stop Time: 1547  OT Time Calculation (min): 45 min     Therapy Charges for Today     Code Description Service Date Service Provider Modifiers Qty    23471444100 HC OT CARRY MOV HAND OBJ CURRENT 10/24/2017 NASIMA Hernandez/L GO, CK 1    32989230485 HC OT CARRY MOV HAND OBJ PROJECTED 10/24/2017 NASIMA Hernandze/L GO, CJ 1    31659923284 HC OT EVAL MOD COMPLEXITY 3 10/24/2017 NASIMA Hernandez/L GO 1          OT G-codes  OT Professional Judgement Used?: Yes  OT Functional Scales Options: Quick DASH  Score: 52.27% impaired  Functional Limitation: Carrying, moving and handling objects  Carrying, Moving and Handling Objects Current Status (): At least 40 percent but less than 60 percent impaired, limited or restricted  Carrying, Moving and Handling Objects Goal Status (): At least 20 percent but less than 40 percent impaired, limited or restricted          NASIMA He/L  10/25/2017  "

## 2017-10-31 ENCOUNTER — HOSPITAL ENCOUNTER (OUTPATIENT)
Dept: PHYSICAL THERAPY | Facility: HOSPITAL | Age: 72
Setting detail: THERAPIES SERIES
Discharge: HOME | End: 2017-10-31

## 2017-10-31 ENCOUNTER — HOSPITAL ENCOUNTER (OUTPATIENT)
Dept: OCCUPATIONAL THERAPY | Facility: HOSPITAL | Age: 72
Setting detail: THERAPIES SERIES
Discharge: HOME | End: 2017-10-31

## 2017-10-31 DIAGNOSIS — R26.9 ABNORMALITY OF GAIT AND MOBILITY: Primary | ICD-10-CM

## 2017-10-31 DIAGNOSIS — IMO0002 LACK OF COORDINATION DUE TO STROKE: ICD-10-CM

## 2017-10-31 DIAGNOSIS — R53.1 WEAKNESS GENERALIZED: ICD-10-CM

## 2017-10-31 DIAGNOSIS — I63.9 CEREBROVASCULAR ACCIDENT (CVA) WITH INVOLVEMENT OF RIGHT SIDE OF BODY (HCC): Primary | ICD-10-CM

## 2017-10-31 PROCEDURE — 97112 NEUROMUSCULAR REEDUCATION: CPT

## 2017-10-31 PROCEDURE — 97110 THERAPEUTIC EXERCISES: CPT

## 2017-10-31 PROCEDURE — 97110 THERAPEUTIC EXERCISES: CPT | Performed by: OCCUPATIONAL THERAPIST

## 2017-10-31 PROCEDURE — 97530 THERAPEUTIC ACTIVITIES: CPT | Performed by: OCCUPATIONAL THERAPIST

## 2017-11-03 ENCOUNTER — HOSPITAL ENCOUNTER (OUTPATIENT)
Dept: OCCUPATIONAL THERAPY | Facility: HOSPITAL | Age: 72
Setting detail: THERAPIES SERIES
Discharge: HOME | End: 2017-11-03

## 2017-11-03 ENCOUNTER — HOSPITAL ENCOUNTER (OUTPATIENT)
Dept: PHYSICAL THERAPY | Facility: HOSPITAL | Age: 72
Setting detail: THERAPIES SERIES
Discharge: HOME | End: 2017-11-03

## 2017-11-03 DIAGNOSIS — R53.1 WEAKNESS GENERALIZED: ICD-10-CM

## 2017-11-03 DIAGNOSIS — I63.9 CEREBROVASCULAR ACCIDENT (CVA) WITH INVOLVEMENT OF RIGHT SIDE OF BODY (HCC): Primary | ICD-10-CM

## 2017-11-03 DIAGNOSIS — IMO0002 LACK OF COORDINATION DUE TO STROKE: ICD-10-CM

## 2017-11-03 DIAGNOSIS — R26.9 ABNORMALITY OF GAIT AND MOBILITY: Primary | ICD-10-CM

## 2017-11-03 PROCEDURE — 97110 THERAPEUTIC EXERCISES: CPT

## 2017-11-03 NOTE — THERAPY TREATMENT NOTE
Outpatient Physical Therapy Neuro Treatment Note  [facility]     Patient Name: Travis Patel  : 1945  MRN: [id number]  Today's Date: 11/3/2017      Visit Date: 2017    Visit Dx:    ICD-10-CM ICD-9-CM   1. Abnormality of gait and mobility R26.9 781.2       There is no problem list on file for this patient.                            PT Assessment/Plan       17 1305       PT Assessment    Assessment Comments Patient demonstrated decreased comprehension of commands today and needed max cueing and repetition for all activities today.  He did have increased pain into his tailbone today, but only with certain movements.  His gait continues to be unsteady and puts him at an increased risk of falls.  He was educated on the use of straight cane today, however he needed max cueing and had difficulty comprehending the sequencing.  Therefore, we will continue to work with patient to increase his safety with gait and practice using the assistive device.    -BREANN     PT Plan    PT Plan Comments Continue to work on his safety and balance with gait.  Continue to practice using SPC to improve his balance.  -BREANN       User Key  (r) = Recorded By, (t) = Taken By, (c) = Cosigned By    Initials Name Provider Type    BREANN Moore PTA Physical Therapy Assistant                     Exercises       17 1305          Subjective Comments    Subjective Comments Patient reports his tailbone has been hurting him since he had a fall about a week ago.  He reports some days it is better and other days it is hurting worse.  He denies any falls or near falls.  -BREANN      Subjective Pain    Able to rate subjective pain? yes  -BREANN      Pre-Treatment Pain Level 8  -BREANN      Post-Treatment Pain Level 7  -BREANN      Subjective Pain Comment tailbone pain  -BREANN      Exercise 1    Exercise Name 1 clam shells  -BREANN      Cueing 1 Verbal;Tactile;Demo  -BREANN      Sets 1 2  -BREANN      Reps 1 10  -BREANN      Additional Comments no pain into  tailbone  -BREANN      Exercise 2    Exercise Name 2 hooklying BKFO  -BREANN      Cueing 2 Verbal;Tactile  -BREANN      Sets 2 1  -BREANN      Reps 2 3  -BREANN      Additional Comments he had increased LEs tremors, therefore stopped  -BREANN      Exercise 3    Exercise Name 3 seated hip ER  -BREANN      Cueing 3 Verbal;Tactile  -BREANN      Sets 3 2  -BREANN      Reps 3 10  -BREANN      Additional Comments yellow band and progressed to red band  -BREANN      Exercise 4    Exercise Name 4 // bars NBOS EO/EC  -BREANN      Cueing 4 Verbal;Tactile  -BREANN      Time (Minutes) 4 1 minute each  -BREANN      Additional Comments CGA  -BREANN      Exercise 5    Exercise Name 5 // bars M/L weightshifting  -BREANN      Cueing 5 Verbal  -BREANN      Time (Minutes) 5 1 minute  -BREANN      Additional Comments CGA  -BREANN      Exercise 6    Exercise Name 6 // bars AP weightshifting in staggered stance  -BREANN      Cueing 6 Verbal  -BREANN      Time (Minutes) 6 1 minute each leg forward position  -BREANN      Additional Comments CGA  -BREANN      Exercise 7    Exercise Name 7 // bars shoulder width stance on blue therafoam EO/EC  -BREANN      Cueing 7 Verbal  -BREANN      Time (Minutes) 7 30seconds each  -BREANN      Additional Comments CGA  -BREANN      Exercise 8    Exercise Name 8 Practiced ambulating with SPC, however patient needed max cueing and was having difficulty comprehending sequencing.  -BREANN      Time (Minutes) 8 5  -BREANN      Additional Comments CAG/min A  -BREANN      Exercise 9    Exercise Name 9 Walked with patient out to his car.  -BREANN      Additional Comments CGA  -BREANN        User Key  (r) = Recorded By, (t) = Taken By, (c) = Cosigned By    Initials Name Provider Type    BREANN Moore, PTA Physical Therapy Assistant                              PT OP Goals       11/03/17 1305       PT Short Term Goals    STG Date to Achieve 11/14/17  -BREANN     STG 1 Improve yanci hip ext to 10 deg passive ext  -BREANN     STG 1 Progress Ongoing  -BREANN     STG 2 Able to walk with SC in midline with no trendelenberg without looking down  -BREANN      STG 2 Progress Ongoing  -BREANN     STG 2 Progress Comments His family is still waiting on getting prescription from Doctor. However, worked with patient with SPC today in clinic and he required max cues for sequencing  -BREANN     Long Term Goals    LTG Date to Achieve 12/05/17  -BREANN     LTG 1 No falls for 3 weeks  -BREANN     LTG 1 Progress Ongoing  -BREANN     LTG 2 Able to SLS on right x 10 secs with no trendelenberg  -BREANN     LTG 2 Progress New  -BREANN     LTG 3 Able to walk household distances without asst device with good wt shifting and core stability  -BREANN     LTG 3 Progress Ongoing  -BREANN     LTG 4 Able to walk short community distances (20 min) with SC without over-fatigue and heart rate in safe range  -BREANN     LTG 4 Progress Ongoing  -BREANN     LTG 5 Independent with HEP for right hip stability and balance.   -BREANN     LTG 5 Progress Ongoing  -BREANN     Time Calculation    PT Goal Re-Cert Due Date 11/23/17  -BREANN       User Key  (r) = Recorded By, (t) = Taken By, (c) = Cosigned By    Initials Name Provider Type    BREANN Moore PTA Physical Therapy Assistant                Therapy Education       11/03/17 1706 11/03/17 1400       Therapy Education    Education Details Importance of safety during gait to avoid falls  -BREANN added in  strengthening exercises.   -TR (r) SURYAJ (t) TR (c)     Given HEP  -BREANN HEP;Symptoms/condition management;Posture/body mechanics  -TR (r) JJ (t) TR (c)     Program Reinforced  -BREANN New  -TR (r) JJ (t) TR (c)     How Provided Verbal  -BREANN Verbal;Demonstration  -TR (r) YASSINE (t) TR (c)     Provided to Patient   Family  -BREANN Patient;Caregiver  -TR (r) JJ (t) TR (c)     Level of Understanding Verbalized  -BREANN Teach back education performed;Verbalized;Demonstrated  -TR (r) JJ (t) TR (c)       User Key  (r) = Recorded By, (t) = Taken By, (c) = Cosigned By    Initials Name Provider Type    BREANN Moore PTA Physical Therapy Assistant    SARAHY Hastings, OTR/L Occupational Therapist    YASSINE Jones,  OT Student OT Student                Time Calculation:   Start Time: 1305  Stop Time: 1350  Time Calculation (min): 45 min  Total Timed Code Minutes- PT: 45 minute(s)     Therapy Charges for Today     Code Description Service Date Service Provider Modifiers Qty    45949200476 HC PT THER PROC EA 15 MIN 11/3/2017 Rigoberto Moore, PTA GP 3                    Rigoberto Moore, PTA  11/3/2017

## 2017-11-03 NOTE — THERAPY TREATMENT NOTE
Outpatient Occupational Therapy Neuro Treatment Note  [facility]     Patient Name: Travis Patel  : 1945  MRN: [id number]  Today's Date: 11/3/2017       Visit Date: 2017    There is no problem list on file for this patient.       Past Medical History:   Diagnosis Date   • Hernia of abdominal wall    • Myocardial infarction 2017   • Sleep apnea    • Stroke 2017        Past Surgical History:   Procedure Laterality Date   • COLOSTOMY  2001    x 3 months   • CORONARY ARTERY BYPASS GRAFT         • HERNIA REPAIR      abdominal hernia x 3; poor healing with multiple revisions   • PACEMAKER IMPLANTATION  2017    w/ defibrillator   • ROTATOR CUFF REPAIR Right          Visit Dx:    ICD-10-CM ICD-9-CM   1. Cerebrovascular accident (CVA) with involvement of right side of body I63.9 434.91   2. Weakness generalized R53.1 780.79   3. Lack of coordination due to stroke I63.9 434.91    R27.9 781.3                         OT Assessment/Plan       17 1400       OT Assessment    Assessment Comments (P)  UE AROM HEP was reviewed with patient and caregiver education completed. Pt and spouse reported completion of HEP and no new concerns.  strengthening with ball added to HEP. Pt completed BUE strengthening and  strength. Pt required Mod to Max tactile and v.c for UE strengthening and extra time was required for task completion due to his cognitive deficits.   -JJ     OT Plan    OT Plan Comments (P)  Plan to address UE coordination and endurance.   -       User Key  (r) = Recorded By, (t) = Taken By, (c) = Cosigned By    Initials Name Provider Type    YASSINE Jones, OT Student OT Student                OT Goals       17 1432 17 1400    OT Short Term Goals    STG Date to Achieve  (P)  17  -J    STG 1  (P)  Pt will be independent with daily completion of an UE HEP to address ROM, strength and coordination deficits.   -JJ    STG 1 Progress  (P)  Ongoing  -    STG  1 Progress Comments  (P)  HEP reviewed.  strengthening with ball added to HEP with pt and caregiver education.   -JJ    STG 2  (P)  Pt will increase B hand  strength to 50# for improved grasp and reduced dropping of items.   -JJ    STG 2 Progress  (P)  Ongoing  -JJ    STG 2 Progress Comments  (P)   strengthening completed with velcro board and 7# digi-flex.   -JJ    STG 3  (P)  Pt will improve B hand FMC to complete the 9 hole peg assessment in 27 seconds or less.  -JJ    STG 3 Progress  (P)  Ongoing  -JJ    STG 4  (P)  Pt will display a functional activity tolerance of 15 minutes with no recovery periods required to improve IADL performance.   -JJ    STG 4 Progress  (P)  Ongoing  -J    Long Term Goals    LTG Date to Achieve  (P)  11/23/17  -JJ    LTG 1  (P)  Pt will complete all dressing and grooming tasks with Modified [place] and good safety awareness.   -JJ    LTG 1 Progress  (P)  Ongoing  -JJ    LTG 2  (P)  Pt will report no falls for 3 tx sessions in a row to demonstrate improved safety with ADL and mobility.   -JJ    LTG 2 Progress  (P)  Ongoing  -JJ    LTG 2 Progress Comments  (P)  Pt reported no new falls.   -JJ    LTG 3  (P)  Pt will complete a mock check writing task with 100% legibility.   -JJ    LTG 3 Progress  (P)  Ongoing  -JJ    LTG 4  (P)  Pt will improve R shoulder flexion and abduction AROM to 150 degrees for improved performance with ADL and IADL tasks.   -JJ    LTG 4 Progress  (P)  Ongoing  -JJ    LTG 4 Progress Comments  (P)  AROM completed.   -JJ    Time Calculation    OT Goal Re-Cert Due Date (P)  11/23/17  -JJ (P)  11/23/17  -JJ      User Key  (r) = Recorded By, (t) = Taken By, (c) = Cosigned By    Initials Name Provider Type    YASSINE Jones, OT Student OT Student                Therapy Education       11/03/17 1400          Therapy Education    Education Details (P)  added in  strengthening exercises.   -JJ      Given (P)  HEP;Symptoms/condition  "management;Posture/body mechanics  -JJ      Program (P)  New  -JJ      How Provided (P)  Verbal;Demonstration  -JJ      Provided to (P)  Patient;Caregiver  -JJ      Level of Understanding (P)  Teach back education performed;Verbalized;Demonstrated  -JJ        User Key  (r) = Recorded By, (t) = Taken By, (c) = Cosigned By    Initials Name Provider Type    YASSINE Jones, OT Student OT Student                    OT Exercises       11/03/17 1400          Subjective Comments    Subjective Comments (P)  Pt and spouse both reported completion of HEP. Pt reports feeling \"foggy\" since his stroke.   -JJ      Subjective Pain    Able to rate subjective pain? (P)  yes  -JJ      Pre-Treatment Pain Level (P)  4  -JJ      Post-Treatment Pain Level (P)  4  -JJ      Subjective Pain Comment (P)  \"tailbone\"  -JJ      Exercise 1    Exercise Name 1 (P)  B UE AROM was completed while seated in chair in all planes at the shoulder, elbow and wrist with 2# hand weight 20 reps x 1 set. Pt was unable to complete B elbow extension with 2# hand weight, therefore no weights were used. Pt. fatigued quickly as treatment progressed. Mod to Max tactile and v.c. required for proper technique. Pt. attempted to teachback some of the exercises but became confused when trying to explain them. Extra time was required to complete HEP due to pts. cognitive deficits.   -JJ      Exercise 2    Exercise Name 2 (P)  B  strengthening completed with 7# digi-flex. Pt. completed 20 reps x 1 set with min v.c. required for proper technique. Pt participated in velcro board task for LUE distal strengthening. Wrist flexion/extension, radial/ulnar deviation, and forearm supination/pronation 3 reps x 1 set completed. Tremors noted in L hand after completion of strengthening tasks.   -JJ        User Key  (r) = Recorded By, (t) = Taken By, (c) = Cosigned By    Initials Name Provider Type    YASSINE Jones, OT Student OT Student                    Time " Calculation:   OT Start Time: (P) 1223  OT Stop Time: (P) 1305  OT Time Calculation (min): (P) 42 min  Total Timed Code Minutes- OT: (P) 42 minute(s)     Therapy Charges for Today     Code Description Service Date Service Provider Modifiers Qty    10183632452  OT THER PROC EA 15 MIN 11/3/2017 Kimberly Jones, OT Student GO 3                    Kimberly Jones OT Student  11/3/2017

## 2017-11-07 ENCOUNTER — HOSPITAL ENCOUNTER (OUTPATIENT)
Dept: PHYSICAL THERAPY | Facility: HOSPITAL | Age: 72
Setting detail: THERAPIES SERIES
Discharge: HOME | End: 2017-11-07

## 2017-11-07 DIAGNOSIS — R26.9 ABNORMALITY OF GAIT AND MOBILITY: Primary | ICD-10-CM

## 2017-11-07 PROCEDURE — 97110 THERAPEUTIC EXERCISES: CPT

## 2017-11-07 PROCEDURE — 97112 NEUROMUSCULAR REEDUCATION: CPT

## 2017-11-07 NOTE — THERAPY TREATMENT NOTE
Outpatient Physical Therapy Neuro Treatment Note  [facility]     Patient Name: Travis Patel  : 1945  MRN: [id number]  Today's Date: 2017      Visit Date: 2017    Visit Dx:    ICD-10-CM ICD-9-CM   1. Abnormality of gait and mobility R26.9 781.2       There is no problem list on file for this patient.                            PT Assessment/Plan       17 1701       PT Assessment    Assessment Comments He was able to follow simple commands 50-75% of the time today with repetition and cues to remain on task. He tends to favor anterior wt shift somewhat to the left during midline/proprioception activities today. With EC he requires Yoly in order to remain erect on uneven surfaces and still struggles with SLS and coordination of UE and LE w/ use of SPC.   -TC     PT Plan    PT Plan Comments Continue to address core and hip activation and promote carry over to balance training and gait training with SPC.   -TC       User Key  (r) = Recorded By, (t) = Taken By, (c) = Cosigned By    Initials Name Provider Type    TC Claudia Cespedes, PT DPT Physical Therapist                     Exercises       17 1345          Subjective Comments    Subjective Comments His tailbone is painful today but they deny any falls or near falls. Still have not been able to get a cane yet.   -TC      Subjective Pain    Able to rate subjective pain? yes  -TC      Pre-Treatment Pain Level 9  -TC      Exercise 1    Exercise Name 1 SLFO resisted  -TC      Cueing 1 Verbal;Tactile;Demo  -TC      Sets 1 2  -TC      Reps 1 10  -TC      Additional Comments yellow band  -TC      Exercise 2    Exercise Name 2 bridges  -TC      Cueing 2 Verbal;Tactile;Demo  -TC      Sets 2 2  -TC      Reps 2 10  -TC      Exercise 3    Exercise Name 3 // bars NBOs on foam  -TC      Cueing 3 Verbal;Tactile;Demo  -TC      Exercise 4    Exercise Name 4 --  -TC      Cueing 4 --  -TC      Exercise 5    Exercise Name 5 // bars rocker board AP and  ML   Eo/EC, Yoly w/ EC  -TC      Cueing 5 Tactile  -TC      Sets 5 3  -TC      Time (Seconds) 5 30s-1 min ea way  -TC      Exercise 6    Exercise Name 6 // bars foam mini squats; required max cues for posterior wt shift  -TC      Cueing 6 Tactile  -TC      Sets 6 2  -TC      Reps 6 5  -TC      Exercise 7    Exercise Name 7 Practiced ambulating with SPC, however patient needed max cueing and was having difficulty comprehending sequencing.  -TC      Time (Minutes) 7 5  -TC      Exercise 8    Exercise Name 8 // bars on foam; attempted alt toe taps, but unable to properly wt shift at this time for safe SLS  -TC      Cueing 8 Demo  -TC      Time (Minutes) 8 2  -TC        User Key  (r) = Recorded By, (t) = Taken By, (c) = Cosigned By    Initials Name Provider Type    TC Claudia Cespedes, PT DPT Physical Therapist                              PT OP Goals       11/07/17 1345       PT Short Term Goals    STG Date to Achieve 11/14/17  -TC     STG 1 Improve yanci hip ext to 10 deg passive ext  -TC     STG 1 Progress Ongoing  -TC     STG 2 Able to walk with SC in midline with no trendelenberg without looking down  -TC     STG 2 Progress Ongoing  -TC     Long Term Goals    LTG Date to Achieve 12/05/17  -TC     LTG 1 No falls for 3 weeks  -TC     LTG 1 Progress Ongoing  -TC     LTG 1 Progress Comments no falls or near falls per pt and wife over the weekend  -TC     LTG 2 Able to SLS on right x 10 secs with no trendelenberg  -TC     LTG 2 Progress Ongoing  -TC     LTG 3 Able to walk household distances without asst device with good wt shifting and core stability  -TC     LTG 3 Progress Ongoing  -TC     LTG 4 Able to walk short community distances (20 min) with SC without over-fatigue and heart rate in safe range  -TC     LTG 4 Progress Ongoing  -TC     LTG 5 Independent with HEP for right hip stability and balance.   -TC     LTG 5 Progress Ongoing  -TC     Time Calculation    PT Goal Re-Cert Due Date 11/23/17  -TC       User Key   (r) = Recorded By, (t) = Taken By, (c) = Cosigned By    Initials Name Provider Type    TC Claudia Cespedes PT DPT Physical Therapist                Therapy Education       11/07/17 1700          Therapy Education    Education Details balance exercises to continue, may add core/hip strengthening next few sessions  -TC      Given HEP;Symptoms/condition management;Posture/body mechanics  -TC      Program Reinforced  -TC      How Provided Verbal;Demonstration  -TC      Provided to Patient;Caregiver  -TC      Level of Understanding Teach back education performed;Verbalized;Demonstrated  -TC        User Key  (r) = Recorded By, (t) = Taken By, (c) = Cosigned By    Initials Name Provider Type    TC Claudia Cespedes, PT DPT Physical Therapist                Time Calculation:   Start Time: 1345  Stop Time: 1434  Time Calculation (min): 49 min  Total Timed Code Minutes- PT: 49 minute(s)     Therapy Charges for Today     Code Description Service Date Service Provider Modifiers Qty    87159664913 HC PT THER PROC EA 15 MIN 11/7/2017 Claudia Cespedes, PT DPT GP 1    96696727199 HC PT NEUROMUSC RE EDUCATION EA 15 MIN 11/7/2017 Claudia Cespedes, PT DPT GP 2                    Claudia Cespedes, PT DPT  11/7/2017

## 2017-11-09 ENCOUNTER — HOSPITAL ENCOUNTER (OUTPATIENT)
Dept: PHYSICAL THERAPY | Facility: HOSPITAL | Age: 72
Setting detail: THERAPIES SERIES
End: 2017-11-09

## 2017-11-10 ENCOUNTER — APPOINTMENT (OUTPATIENT)
Dept: PHYSICAL THERAPY | Facility: HOSPITAL | Age: 72
End: 2017-11-10

## 2017-11-10 ENCOUNTER — APPOINTMENT (OUTPATIENT)
Dept: OCCUPATIONAL THERAPY | Facility: HOSPITAL | Age: 72
End: 2017-11-10

## 2017-11-14 ENCOUNTER — HOSPITAL ENCOUNTER (OUTPATIENT)
Dept: PHYSICAL THERAPY | Facility: HOSPITAL | Age: 72
Setting detail: THERAPIES SERIES
End: 2017-11-14

## 2017-11-14 ENCOUNTER — APPOINTMENT (OUTPATIENT)
Dept: OCCUPATIONAL THERAPY | Facility: HOSPITAL | Age: 72
End: 2017-11-14

## 2017-11-15 ENCOUNTER — TRANSCRIBE ORDERS (OUTPATIENT)
Dept: SPEECH THERAPY | Facility: HOSPITAL | Age: 72
End: 2017-11-15

## 2017-11-15 DIAGNOSIS — I67.82 CHRONIC CEREBRAL ISCHEMIA: Primary | ICD-10-CM

## 2017-11-16 ENCOUNTER — APPOINTMENT (OUTPATIENT)
Dept: OCCUPATIONAL THERAPY | Facility: HOSPITAL | Age: 72
End: 2017-11-16

## 2017-11-16 ENCOUNTER — APPOINTMENT (OUTPATIENT)
Dept: PHYSICAL THERAPY | Facility: HOSPITAL | Age: 72
End: 2017-11-16

## 2017-11-16 ENCOUNTER — DOCUMENTATION (OUTPATIENT)
Dept: OCCUPATIONAL THERAPY | Facility: HOSPITAL | Age: 72
End: 2017-11-16

## 2017-11-16 DIAGNOSIS — R53.1 WEAKNESS GENERALIZED: ICD-10-CM

## 2017-11-16 DIAGNOSIS — I63.9 CEREBROVASCULAR ACCIDENT (CVA) WITH INVOLVEMENT OF RIGHT SIDE OF BODY (HCC): Primary | ICD-10-CM

## 2017-11-16 DIAGNOSIS — IMO0002 LACK OF COORDINATION DUE TO STROKE: ICD-10-CM

## 2017-11-16 PROCEDURE — G8985 CARRY GOAL STATUS: HCPCS | Performed by: OCCUPATIONAL THERAPIST

## 2017-11-16 PROCEDURE — G8986 CARRY D/C STATUS: HCPCS | Performed by: OCCUPATIONAL THERAPIST

## 2017-11-16 NOTE — THERAPY DISCHARGE NOTE
Outpatient Occupational Therapy Discharge Summary         Patient Name: Travis Patel  : 1945  MRN: [id number]    Today's Date: 2017      Visit Date: 2017            OT Goals       17 1300       OT Short Term Goals    STG Date to Achieve 17  -TR     STG 1 Pt will be independent with daily completion of an UE HEP to address ROM, strength and coordination deficits.   -TR     STG 1 Progress Not Met  -TR     STG 2 Pt will increase B hand  strength to 50# for improved grasp and reduced dropping of items.   -TR     STG 2 Progress Not Met  -TR     STG 3 Pt will improve B hand FMC to complete the 9 hole peg assessment in 27 seconds or less.  -TR     STG 3 Progress Not Met  -TR     STG 4 Pt will display a functional activity tolerance of 15 minutes with no recovery periods required to improve IADL performance.   -TR     STG 4 Progress Not Met  -TR     Long Term Goals    LTG Date to Achieve 17  -TR     LTG 1 Pt will complete all dressing and grooming tasks with Modified [place] and good safety awareness.   -TR     LTG 1 Progress Not Met  -TR     LTG 2 Pt will report no falls for 3 tx sessions in a row to demonstrate improved safety with ADL and mobility.   -TR     LTG 2 Progress Not Met  -TR     LTG 3 Pt will complete a mock check writing task with 100% legibility.   -TR     LTG 3 Progress Not Met  -TR     LTG 4 Pt will improve R shoulder flexion and abduction AROM to 150 degrees for improved performance with ADL and IADL tasks.   -TR     LTG 4 Progress Not Met  -TR       User Key  (r) = Recorded By, (t) = Taken By, (c) = Cosigned By    Initials Name Provider Type    SARAHY Hastings, OTR/L Occupational Therapist           OP OT Discharge Summary  Date of Discharge: 17  Reason for Discharge: Unable to participate  Outcomes Achieved: Refer to plan of care for updates on goals achieved  Discharge Destination: Home health services  Discharge Instructions: D/C per pt request  due to recent hospitilization and pt receiving home health services.       Time Calculation:     No treatment completed. D/C summary only.         OT G-codes  OT Professional Judgement Used?: Yes  OT Functional Scales Options: Quick DASH  Score: 52.27% impaired  Functional Limitation: Carrying, moving and handling objects  Carrying, Moving and Handling Objects Goal Status (): At least 20 percent but less than 40 percent impaired, limited or restricted  Carrying, Moving and Handling Objects Discharge Status (): At least 40 percent but less than 60 percent impaired, limited or restricted           Loretta Hastings, OTR/L   11/16/2017

## 2017-11-21 ENCOUNTER — HOSPITAL ENCOUNTER (OUTPATIENT)
Dept: PHYSICAL THERAPY | Facility: HOSPITAL | Age: 72
Setting detail: THERAPIES SERIES
End: 2017-11-21

## 2017-11-21 ENCOUNTER — APPOINTMENT (OUTPATIENT)
Dept: OCCUPATIONAL THERAPY | Facility: HOSPITAL | Age: 72
End: 2017-11-21

## 2017-11-28 ENCOUNTER — APPOINTMENT (OUTPATIENT)
Dept: OCCUPATIONAL THERAPY | Facility: HOSPITAL | Age: 72
End: 2017-11-28

## 2017-11-28 ENCOUNTER — APPOINTMENT (OUTPATIENT)
Dept: PHYSICAL THERAPY | Facility: HOSPITAL | Age: 72
End: 2017-11-28

## 2017-11-30 ENCOUNTER — APPOINTMENT (OUTPATIENT)
Dept: OCCUPATIONAL THERAPY | Facility: HOSPITAL | Age: 72
End: 2017-11-30

## 2017-11-30 ENCOUNTER — APPOINTMENT (OUTPATIENT)
Dept: PHYSICAL THERAPY | Facility: HOSPITAL | Age: 72
End: 2017-11-30

## 2017-11-30 ENCOUNTER — APPOINTMENT (OUTPATIENT)
Dept: SPEECH THERAPY | Facility: HOSPITAL | Age: 72
End: 2017-11-30

## 2017-12-05 ENCOUNTER — APPOINTMENT (OUTPATIENT)
Dept: OCCUPATIONAL THERAPY | Facility: HOSPITAL | Age: 72
End: 2017-12-05

## 2017-12-05 ENCOUNTER — APPOINTMENT (OUTPATIENT)
Dept: PHYSICAL THERAPY | Facility: HOSPITAL | Age: 72
End: 2017-12-05

## 2017-12-05 ENCOUNTER — TRANSCRIBE ORDERS (OUTPATIENT)
Dept: PHYSICAL THERAPY | Facility: HOSPITAL | Age: 72
End: 2017-12-05

## 2017-12-05 DIAGNOSIS — I25.10 DISEASE OF CARDIOVASCULAR SYSTEM: Primary | ICD-10-CM

## 2017-12-05 DIAGNOSIS — I50.40 COMBINED SYSTOLIC AND DIASTOLIC CONGESTIVE HEART FAILURE, UNSPECIFIED CONGESTIVE HEART FAILURE CHRONICITY: ICD-10-CM

## 2017-12-07 ENCOUNTER — APPOINTMENT (OUTPATIENT)
Dept: OCCUPATIONAL THERAPY | Facility: HOSPITAL | Age: 72
End: 2017-12-07

## 2017-12-07 ENCOUNTER — APPOINTMENT (OUTPATIENT)
Dept: PHYSICAL THERAPY | Facility: HOSPITAL | Age: 72
End: 2017-12-07

## 2017-12-12 ENCOUNTER — DOCUMENTATION (OUTPATIENT)
Dept: PHYSICAL THERAPY | Facility: HOSPITAL | Age: 72
End: 2017-12-12

## 2017-12-12 DIAGNOSIS — R26.9 ABNORMALITY OF GAIT AND MOBILITY: Primary | ICD-10-CM

## 2017-12-12 NOTE — THERAPY DISCHARGE NOTE
Outpatient Physical Therapy Discharge Summary         Patient Name: Travis Patel  : 1945  MRN: [id number]    Today's Date: 2017    Visit Dx:    ICD-10-CM ICD-9-CM   1. Abnormality of gait and mobility R26.9 781.2             PT OP Goals       17 0833       PT Short Term Goals    STG Date to Achieve 17  -TC     STG 1 Improve yanci hip ext to 10 deg passive ext  -TC     STG 1 Progress Not Met  -TC     STG 2 Able to walk with SC in midline with no trendelenberg without looking down  -TC     STG 2 Progress Not Met  -TC     Long Term Goals    LTG Date to Achieve 17  -TC     LTG 1 No falls for 3 weeks  -TC     LTG 1 Progress Not Met  -TC     LTG 2 Able to SLS on right x 10 secs with no trendelenberg  -TC     LTG 2 Progress Not Met  -TC     LTG 3 Able to walk household distances without asst device with good wt shifting and core stability  -TC     LTG 3 Progress Not Met  -TC     LTG 4 Able to walk short community distances (20 min) with SC without over-fatigue and heart rate in safe range  -TC     LTG 4 Progress Not Met  -TC     LTG 5 Independent with HEP for right hip stability and balance.   -TC     LTG 5 Progress Not Met  -TC       User Key  (r) = Recorded By, (t) = Taken By, (c) = Cosigned By    Initials Name Provider Type    TC Claudia Cespedes, PT DPT Physical Therapist          OP PT Discharge Summary  Date of Discharge: 17  Reason for Discharge: Change in medical status  Outcomes Achieved: Patient able to partially acheive established goals  Discharge Destination: Home with home program  Discharge Instructions: Pt was admitted into the hospital since he was last seen which was over 1 month ago. We were treating him s/p a CVA with our focus being primarily on his coordination, balance, and dynamic gait. We will discharge him at this time due to change in medical status.       Time Calculation:                    Claudia Cespedes PT DPT  2017

## 2017-12-19 ENCOUNTER — HOSPITAL ENCOUNTER (OUTPATIENT)
Dept: OCCUPATIONAL THERAPY | Facility: HOSPITAL | Age: 72
Setting detail: THERAPIES SERIES
Discharge: HOME | End: 2017-12-19

## 2017-12-19 ENCOUNTER — HOSPITAL ENCOUNTER (OUTPATIENT)
Dept: PHYSICAL THERAPY | Facility: HOSPITAL | Age: 72
Setting detail: THERAPIES SERIES
Discharge: HOME | End: 2017-12-19

## 2017-12-19 DIAGNOSIS — I63.9 CEREBROVASCULAR ACCIDENT (CVA) WITH INVOLVEMENT OF RIGHT SIDE OF BODY (HCC): Primary | ICD-10-CM

## 2017-12-19 DIAGNOSIS — R26.9 ABNORMALITY OF GAIT AND MOBILITY: Primary | ICD-10-CM

## 2017-12-19 PROCEDURE — 97161 PT EVAL LOW COMPLEX 20 MIN: CPT

## 2017-12-19 PROCEDURE — G8978 MOBILITY CURRENT STATUS: HCPCS

## 2017-12-19 PROCEDURE — G8979 MOBILITY GOAL STATUS: HCPCS

## 2017-12-19 PROCEDURE — G8984 CARRY CURRENT STATUS: HCPCS | Performed by: OCCUPATIONAL THERAPIST

## 2017-12-19 PROCEDURE — G8986 CARRY D/C STATUS: HCPCS | Performed by: OCCUPATIONAL THERAPIST

## 2017-12-19 PROCEDURE — G8985 CARRY GOAL STATUS: HCPCS | Performed by: OCCUPATIONAL THERAPIST

## 2017-12-19 PROCEDURE — 97165 OT EVAL LOW COMPLEX 30 MIN: CPT | Performed by: OCCUPATIONAL THERAPIST

## 2017-12-19 NOTE — THERAPY DISCHARGE NOTE
"Outpatient Occupational Therapy Neuro Initial Evaluation/Discharge  [facility]     Patient Name: Travis Patel  : 1945  MRN: [id number]  Today's Date: 2017      Visit Date: 2017    There is no problem list on file for this patient.       Past Medical History:   Diagnosis Date   • Hernia of abdominal wall    • Myocardial infarction 2017   • Sleep apnea    • Stroke 2017        Past Surgical History:   Procedure Laterality Date   • COLOSTOMY  2001    x 3 months   • CORONARY ARTERY BYPASS GRAFT         • HERNIA REPAIR      abdominal hernia x 3; poor healing with multiple revisions   • PACEMAKER IMPLANTATION  2017    w/ defibrillator   • ROTATOR CUFF REPAIR Right          Visit Dx:    ICD-10-CM ICD-9-CM   1. Cerebrovascular accident (CVA) with involvement of right side of body I63.9 434.91             Patient History       17 1400       History    Chief Complaint Falls/history of falls;Fatigue/poor endurance;Pain  -TR     Type of Pain Shoulder pain  -TR     Date Current Problem(s) Began 17  -TR     Brief Description of Current Complaint Pt had a CVA in 2017. He was evaluated by this therapist 10/24/17 due to multiple falls,UE tremors and a decline in self care. He was D/C from OT in November due to being hospitilized and planning to start home health therapy. Pt reports he did not have home health and 3 weeks ago his strength and balance began to improve. He has not fallen in 3 weeks and no longer requires assist for ADL. His goal is to \"build his body back up.\"  -TR     Previous treatment for THIS PROBLEM Rehabilitation  -TR     Patient/Caregiver Goals Relieve pain;Return to prior level of function;Improve mobility;Improve strength;Know what to do to help the symptoms  -TR     Current Tobacco Use No  -TR     Smoking Status Former smoker  -TR     Patient's Rating of General Health Good  -TR     Hand Dominance right-handed  -TR     Occupation/sports/leisure activities " Retired  -TR     Patient seeing anyone else for problem(s)? Yes  -TR     How has patient tried to help current problem? Therapy in SNF following CVA.   -TR     What clinical tests have you had for this problem? --   Unknown  -TR     Pain     Pain Location Shoulder  -TR     Pain at Present 3  -TR     Pain at Best 3  -TR     Pain at Worst 7  -TR     Pain Frequency Constant/continuous  -TR     Pain Description Pressure;Tightness  -TR     What Performance Factors Make the Current Problem(s) WORSE? Use of R UE.  -TR     What Performance Factors Make the Current Problem(s) BETTER? Rest.   -TR     Is your sleep disturbed? No  -TR     Difficulties with ADL's? Independent with all ADL per pt.   -TR     Difficulties with recreational activities? Has not attempted yard work recently.   -TR     Fall Risk Assessment    Any falls in the past year: Yes  -TR     Number of falls reported in the last 12 months 5   5 falls in the past 6 months per pt.   -TR     Factors that contributed to the fall: Tripped;Lost balance;Low light;Fatigue;Didn't use assistive device  -TR     Does patient have a fear of falling No  -TR     Services    Prior Rehab/Home Health Experiences Yes  -TR     When was the prior experience with Rehab/Home Health Feb. 2017  -TR     Where was the prior experience with Rehab/Home Health SNF in [place].   -TR     Are you currently receiving Home Health services No  -TR     Do you plan to receive Home Health services in the near future No  -TR     Daily Activities    Primary Language English  -TR     How does patient learn best? Listening;Demonstration  -TR     Teaching needs identified Home Exercise Program  -TR     Patient is concerned about/has problems with Repetitive movements of the hand, arm, shoulder  -TR     Does patient have problems with the following? Depression  -TR     Barriers to learning None  -TR     Action taken for identified issues --  -TR     Recommended Referrals --  -TR     Pt Participated in  POC and Goals Yes  -TR     Safety    Are you being hurt, hit, or frightened by anyone at home or in your life? No  -TR     Are you being neglected by a caregiver No  -TR       User Key  (r) = Recorded By, (t) = Taken By, (c) = Cosigned By    Initials Name Provider Type    TC Claudia Cespedes, PT DPT Physical Therapist    TR Loretta Hastings, OTR/L Occupational Therapist                OT Neuro       12/19/17 1400          Subjective Pain    Able to rate subjective pain? yes  -TR      Pre-Treatment Pain Level 3  -TR      Post-Treatment Pain Level 3  -TR      Subjective Pain Comment Chronic R shoulder pain.   -TR      Home Living    Living Arrangements house  -TR      Home Accessibility stairs to enter home;stairs (1 railing present);grab bars present (bathtub)  -TR      Number of Stairs to Enter Home 5  -TR      Stair Railings at Home outside, present on right side  -TR      Home Equipment Rollator   Shower chair  -TR      Vision- Basic    Current Vision No visual deficits  -TR      Vision - Complex Assessment    Additional Comments Star cancellation, Satinder's test, Line Bisection test and clock drawing task all WNL. No signs of neglect or inattention.   -TR      Cognitive Assessment/Intervention    Current Cognitive/Communication Assessment functional  -TR      Orientation Status oriented x 4  -TR      Follows Commands/Answers Questions able to follow multi-step instructions;100% of the time  -TR      Personal Safety WNL/WFL  -TR      Personal Safety Interventions fall prevention program maintained  -TR      Cognition Comments No speech difficulty noted.   -TR      Sensation    Sensation WNL? WFL  -TR      Light Touch No apparent deficits  -TR      Sharp/Dull No apparent deficits  -TR      Proprioception    Proprioception WNL  -TR      Perception    Perception WNL? WNL  -TR      Inattention/Neglect Appears intact  -TR      Coordination    Other Coordination Observations No tremors noted. Handwriting WFL.   -TR       Coordination Tests Rapid Alternating;Finger to nose eyes closed   Thumb finding task WNL.   -TR      Rapid Alternating Bilteral:;Intact  -TR      Finger to Nose Eyes Closed Bilteral:;Intact  -TR      Gross Motor Training    Gross Motor Skill, Impairments Detail WNL.   -TR      Left Shoulder    Flexion AROM Deficit WFL  -TR      ABduction AROM Deficit WFL  -TR      Right Shoulder    Flexion AROM Deficit 135   Reports chronic rotator cuff tear  -TR      ABduction AROM Deficit 123  -TR      General UE Assessment    ROM Detail All AROM WFL except R shoulder.   -TR      MMT (Manual Muscle Testing)    General MMT Assessment Detail R UE 4/5 within available ROM. L UE 5/5.   -TR      Tone    UE Tone bilateral:;WNL for age  -TR      Bed Mobility    Bed Mobility, Comment Independent  -TR      Transfers    Transfer, Comment Independent  -TR      Functional Mobility    Functional Mobility- Comment Independent  -TR      ADL Assessment/Intervention    ADL's Assessed? --   Independent with all ADL.   -TR        User Key  (r) = Recorded By, (t) = Taken By, (c) = Cosigned By    Initials Name Provider Type    TR Loretta Hastings, OTR/L Occupational Therapist             Hand Therapy (last 24 hours)      Hand Eval       12/19/17 1400          Subjective Pain    Able to rate subjective pain? yes  -TR      Pre-Treatment Pain Level 3  -TR      Post-Treatment Pain Level 3  -TR      Subjective Pain Comment Chronic R shoulder pain.   -TR      Hand ROM Tested?    Hand ROM Tested? --   B hand AROM WFL.   -TR      Hand  Strength     Strength Affected Side Bilateral  -TR       Strength Right    Right  Test 1 61  -TR      Right  Test 2 60  -TR       Strength Average Right 60.5  -TR       Strength Left    Left  Test 1 50  -TR      Left  Test 2 55  -TR       Strength Average Left 52.5  -TR      Pinch Strength    Affected Side Bilateral  -TR      Right Hand Strength - Pinch (lbs)    Lateral 14 lbs  -TR       Three Jaw Edi 12 lbs  -TR      Tip Pinch - Index Finger 10 lbs  -TR      Left Hand Strength - Pinch (lbs)    Lateral 14 lbs  -TR      Three Jaw Edi 17 lbs  -TR      Tip Pinch - Index Finger 10 lbs  -TR        User Key  (r) = Recorded By, (t) = Taken By, (c) = Cosigned By    Initials Name Provider Type    TR Loretta Hastings, OTR/L Occupational Therapist              Therapy Education  Education Details: An additional handout of pts HEP was provided. Pt reports no concerns with UE AROM HEP. A foam ball was issued to pt and he completed B hand  and pinch strengthening using foam with no concerns.   Given: HEP, Symptoms/condition management  Program: Reinforced  How Provided: Verbal, Demonstration, Written  Provided to: Patient  Level of Understanding: Verbalized, Demonstrated              OT Assessment/Plan       12/19/17 1400       OT Assessment    Impairments Balance;Impaired muscle endurance;Pain  -TR     Assessment Comments OT Eval completed. Pt is now independent with all ADL tasks and driving. No deficits noted with UE strength, coordination or sensation. Pt is at his baseline with shoulder AROM and has chronic R shoulder pain. He no longer has UE tremors and has not dropped items in several weeks. He reports he has not fallen in the past 3 weeks. HEP was progressed and pt reports no concerns. OT will d/c with HEP in place. Advised pt to contact this therapist if he has any concerns with HEP. Skilled intervention is not required.   -TR     Please refer to paper survey for additional self-reported information Yes  -TR     OT Diagnosis CVA R side affected.   -TR     OT Rehab Potential Excellent  -TR     Patient/caregiver participated in establishment of treatment plan and goals Yes  -TR     Patient would benefit from skilled therapy intervention No  -TR     OT Plan    OT Frequency One time visit  -TR     Predicted Duration of Therapy Intervention (days/wks) Eval Only  -TR     Planned CPT's? OT EVAL LOW  COMPLEXITY: 94316  -TR     Planned Therapy Interventions (Optional Details) patient/family education  -TR     OT Plan Comments Eval only. Recommend pt continue daily completion of current UE HEP.   -TR       User Key  (r) = Recorded By, (t) = Taken By, (c) = Cosigned By    Initials Name Provider Type    TR Loretta Hastings OTR/L Occupational Therapist                   Outcome Measure Options: 9 Hole Peg, Quick DASH  9 Hole Peg  9-Hole Peg Left: 20.65  9-Hole Peg Right: 22.35  Quick DASH  Open a tight or new jar.: Mild Difficulty  Do heavy household chores (e.g., wash walls, wash floors): Mild Difficulty  Carry a shopping bag or briefcase: No Difficulty  Wash your back: No Difficulty  Use a knife to cut food: No Difficulty  Recreational activities in which you take some force or impact through your arm, should or hand (e.g. golf, hammering, tennis, etc.): Mild Difficulty  During the past week, to what extent has your arm, shoulder, or hand problem interfered with your normal social activites with family, friends, neighbors or groups?: Not at all  During the past week, were you limited in your work or other regular daily activities as a result of your arm, shoulder or hand problem?: Slightly Limited  Arm, Shoulder, or hand pain: Moderate  Tingling (pins and needles) in your arm, shoulder, or hand: None  During the past week, how much difficulty have you had sleeping because of the pain in your arm, shoulder or hand?: No difficulty  Number of Questions Answered: 11  Quick DASH Score: 13.64  Quick Dash Comments: CI         Time Calculation:   OT Start Time: 1320  OT Stop Time: 1400  OT Time Calculation (min): 40 min       Therapy Charges for Today     Code Description Service Date Service Provider Modifiers Qty    92125112088 HC OT CARRY MOV HAND OBJ CURRENT 12/19/2017 Loretta Hastings OTR/L GO, CI 1    87069583909 HC OT CARRY MOV HAND OBJ PROJECTED 12/19/2017 Loretta Hastings OTR/L GO, CI 1    [id number] HC OT  CARRY MOV HAND OBJ DISCHARGE 12/19/2017 Loretta Hastings OTR/L GO, CI 1    [id number] HC OT EVAL LOW COMPLEXITY 3 12/19/2017 NASIMA Hernandez/L GO, KX 1          OT G-codes  OT Professional Judgement Used?: Yes  OT Functional Scales Options: Quick DASH  Score: 13.64% impaired  Functional Limitation: Carrying, moving and handling objects  Carrying, Moving and Handling Objects Current Status (): At least 1 percent but less than 20 percent impaired, limited or restricted  Carrying, Moving and Handling Objects Goal Status (): At least 1 percent but less than 20 percent impaired, limited or restricted  Carrying, Moving and Handling Objects Discharge Status (): At least 1 percent but less than 20 percent impaired, limited or restricted     OP OT Discharge Summary  Date of Discharge: 12/19/17  Reason for Discharge: Independent, At baseline function  Outcomes Achieved: Able to achieve all goals within established timeline  Discharge Destination: Home with home program, Home with caregiver assist  Discharge Instructions: Eval only due to pt being independent with all ADL tasks and at his baseline level of function.       NASIMA He/KAVYA  12/19/2017

## 2017-12-19 NOTE — THERAPY EVALUATION
"    .Outpatient Physical Therapy Neuro Initial Evaluation  [facility]     Patient Name: Travis Patel  : 1945  MRN: [id number]  Today's Date: 2017      Visit Date: 2017    There is no problem list on file for this patient.       Past Medical History:   Diagnosis Date   • Hernia of abdominal wall    • Myocardial infarction 2017   • Sleep apnea    • Stroke 2017        Past Surgical History:   Procedure Laterality Date   • COLOSTOMY  2001    x 3 months   • CORONARY ARTERY BYPASS GRAFT         • HERNIA REPAIR      abdominal hernia x 3; poor healing with multiple revisions   • PACEMAKER IMPLANTATION      w/ defibrillator   • ROTATOR CUFF REPAIR Right 2005         Visit Dx:     ICD-10-CM ICD-9-CM   1. Abnormality of gait and mobility R26.9 781.2             Patient History       17 1405 17 1400       History    Chief Complaint Balance Problems  -TC Falls/history of falls;Fatigue/poor endurance;Pain  -TR     Type of Pain  Shoulder pain  -TR     Date Current Problem(s) Began  17  -TR     Brief Description of Current Complaint Pt reported that he fell here and since that time he went into the hospital. He spent about a week at the hospital and he said that. Still having dizzy and lightheaded spells when going from sitting to standing.    -TC Pt had a CVA in 2017. He was evaluated by this therapist 10/24/17 due to multiple falls,UE tremors and a decline in self care. He was D/C from OT in November due to being hospitilized and planning to start home health therapy. Pt reports he did not have home health and 3 weeks ago his strength and balance began to improve. He has not fallen in 3 weeks and no longer requires assist for ADL. His goal is to \"build his body back up.\"  -TR     Previous treatment for THIS PROBLEM Rehabilitation  -TC Rehabilitation  -TR     Patient/Caregiver Goals  Relieve pain;Return to prior level of function;Improve mobility;Improve strength;Know " what to do to help the symptoms  -TR     Current Tobacco Use  No  -TR     Smoking Status  Former smoker  -TR     Patient's Rating of General Health  Good  -TR     Hand Dominance  right-handed  -TR     Occupation/sports/leisure activities  Retired  -TR     Patient seeing anyone else for problem(s)?  Yes  -TR     How has patient tried to help current problem?  Therapy in SNF following CVA.   -TR     What clinical tests have you had for this problem?  --   Unknown  -TR     Pain     Pain Location Coccyx  -TC Shoulder  -TR     Pain at Present 0  -TC 3  -TR     Pain at Best 3  -TC 3  -TR     Pain at Worst 7  -TC 7  -TR     Pain Frequency Intermittent  -TC Constant/continuous  -TR     Pain Description  Pressure;Tightness  -TR     What Performance Factors Make the Current Problem(s) WORSE?  Use of R UE.  -TR     What Performance Factors Make the Current Problem(s) BETTER?  Rest.   -TR     Is your sleep disturbed?  No  -TR     Difficulties with ADL's?  Independent with all ADL per pt.   -TR     Difficulties with recreational activities? yard work is still difficult  -TC Has not attempted yard work recently.   -TR     Fall Risk Assessment    Any falls in the past year: Yes  -TC Yes  -TR     Number of falls reported in the last 12 months  5   5 falls in the past 6 months per pt.   -TR     Factors that contributed to the fall:  Tripped;Lost balance;Low light;Fatigue;Didn't use assistive device  -TR     Does patient have a fear of falling  No  -TR     Services    Prior Rehab/Home Health Experiences  Yes  -TR     When was the prior experience with Rehab/Home Health  Feb. 2017  -TR     Where was the prior experience with Rehab/Home Health  SNF in [place].   -TR     Are you currently receiving Home Health services  No  -TR     Do you plan to receive Home Health services in the near future  No  -TR     Daily Activities    Primary Language  English  -TR     How does patient learn best?  Listening;Demonstration  -TR     Teaching  needs identified  Home Exercise Program  -TR     Patient is concerned about/has problems with  Repetitive movements of the hand, arm, shoulder  -TR     Does patient have problems with the following?  Depression  -TR     Barriers to learning  None  -TR     Action taken for identified issues  --  -TR     Recommended Referrals  --  -TR     Pt Participated in POC and Goals  Yes  -TR     Safety    Are you being hurt, hit, or frightened by anyone at home or in your life?  No  -TR     Are you being neglected by a caregiver  No  -TR       User Key  (r) = Recorded By, (t) = Taken By, (c) = Cosigned By    Initials Name Provider Type    TC Claudia Cespedes, PT DPT Physical Therapist    TR Loretta Hastings, OTR/L Occupational Therapist             Hand Therapy (last 24 hours)      Hand Eval       12/19/17 1400          Subjective Pain    Able to rate subjective pain? yes  -TR      Pre-Treatment Pain Level 3  -TR      Post-Treatment Pain Level 3  -TR      Subjective Pain Comment Chronic R shoulder pain.   -TR      Hand ROM Tested?    Hand ROM Tested? --   B hand AROM WFL.   -TR      Hand  Strength     Strength Affected Side Bilateral  -TR       Strength Right    Right  Test 1 61  -TR      Right  Test 2 60  -TR       Strength Average Right 60.5  -TR       Strength Left    Left  Test 1 50  -TR      Left  Test 2 55  -TR       Strength Average Left 52.5  -TR      Pinch Strength    Affected Side Bilateral  -TR      Right Hand Strength - Pinch (lbs)    Lateral 14 lbs  -TR      Three Jaw Edi 12 lbs  -TR      Tip Pinch - Index Finger 10 lbs  -TR      Left Hand Strength - Pinch (lbs)    Lateral 14 lbs  -TR      Three Jaw Edi 17 lbs  -TR      Tip Pinch - Index Finger 10 lbs  -TR        User Key  (r) = Recorded By, (t) = Taken By, (c) = Cosigned By    Initials Name Provider Type    TR Loretta Hastings, OTR/L Occupational Therapist                PT Neuro       12/19/17 1400          Home  Living    Living Arrangements house  -TC      Home Accessibility stairs to enter home;stairs (1 railing present);grab bars present (bathtub)   pt also reports he has a ramp and actually difficulty with  -TC      Number of Stairs to Enter Home 5  -TC      Stair Railings at Home outside, present on right side  -TC      Home Equipment Rollator   Shower chair  -TC      Living Environment Comment He has not been using an AD since the hospital stay and his cognition and balance/abilities have improved. He has even been cleared to drive  -TC      Cognition    Orientation Level Oriented X4  -TC      Safety Judgment --   intermittent impulsivitiy  -TC      Deficits Fully aware of deficits  -TC      Sensation    Light Touch --   no deficits noted today upon testing   -TC      Proprioception    Proprioception slightly slower to react but intact  -TC      DTR- Lower Quarter Clearing    Patellar tendon (L2-4) 2- Normal response  -TC      Achilles tendon (S1-2) 2- Normal response  -TC      DTR- Upper Quarter Clearing    Biceps (C5/6) 2- Normal response  -TC      Brachioradialis (C6) 2- Normal response  -TC      Triceps (C7) 2- Normal response  -TC      Posture/Observations    Alignment Options Thoracic kyphosis  -TC      Thoracic Kyphosis Moderate  -TC      Coordination    Coordination Tests Rapid Alternating;Finger to nose eyes open;Finger to nose eyes closed  -TC      Rapid Alternating Intact  -TC      Finger to Nose Eyes Open Intact  -TC      Finger to Nose Eyes Closed Impaired;Right:   slow to react and slight overshooting noted   -TC      Gross Motor Coordination Training    Gross Motor Skill, Impairments Detail heel to shin WNLs just slower to perform on the RLE  -TC      ROM (Range of Motion)    General ROM Detail BLE WNLs except B hip extension limited by 25%  -TC      MMT (Manual Muscle Testing)    General MMT Assessment Detail B hip extension 4-/5, R hip abduction 3+/5 Lt 4-/5; R quad 3+ to 4-/5; lower abdominals 3+/5   -TC      Gait Assessment/Treatment    Gait, Comment Pt ambualtes with slight trendelenberg on right midstance with increased lateral lurch yanci, tends to veer to the right and look down towards the ground.   -TC      Stairs Assessment/Treatment    Stairs, Comment Pt able to perform with step through pattern and use of R HR but he did demonstrate one LOB he was able to self correct. Slightly unsteady.   -TC      Balance Skills Training    Standing-Level of Assistance Contact guard  -TC      Static Standing Balance Support parallel bars  -TC      Standing-Balance Activities Weight Shift A-P;Weight Shift R-L;Tandem Stance;Single Limb Stance  -TC      Gait Balance-Level of Assistance Contact guard;Minimum assistance  -TC      Gait Balance Activities scanning environment R/L;stepping over object;tandem   Yoly required with tandem and scanning to R; LOB to R  -TC      SLS 12s on R; >20s on L before LOB  -TC      Rhomberg good balance EO on firm and uneven surfaces.   -TC      Sharpened Rhomberg >30s with mild sway  -TC        User Key  (r) = Recorded By, (t) = Taken By, (c) = Cosigned By    Initials Name Provider Type    TC Claudia Cespedes, PT DPT Physical Therapist                  Therapy Education  Education Details: posture, breathing techniques, avoid holding breath, tandem in doorway EO; POC  Given: HEP, Symptoms/condition management, Posture/body mechanics  Program: Reinforced  How Provided: Verbal  Provided to: Patient  Level of Understanding: Teach back education performed, Verbalized, Demonstrated          PT OP Goals       12/19/17 1400       PT Short Term Goals    STG Date to Achieve 01/09/18  -TC     STG 1 Improve yanci hip ext to 10 deg passive ext  -TC     STG 1 Progress New  -TC     STG 2 Able to walk in midline with no trendelenberg without looking down and avoid veering to the Left.   -TC     STG 2 Progress New  -TC     Long Term Goals    LTG Date to Achieve 01/30/18  -TC     LTG 1 No falls for 3 weeks   -TC     LTG 1 Progress New  -TC     LTG 2 Able to SLS on right x 20 secs with no trendelenberg  -TC     LTG 2 Progress New  -TC     LTG 3 Able to walk for >30 mins without an AD with good wt shifting and core stability and no SOB  -TC     LTG 3 Progress New  -TC     LTG 4 Able to walk short community distances (20 min) with heart rate in safe range  -TC     LTG 4 Progress New  -TC     LTG 5 Independent with HEP for right hip stability and balance.   -TC     LTG 5 Progress New  -TC     LTG 6 Illustrate improved hip extension and abduction strength to 4/5 especially on the R side.   -TC     LTG 6 Progress New  -TC     Time Calculation    PT Goal Re-Cert Due Date 01/19/18  -TC       User Key  (r) = Recorded By, (t) = Taken By, (c) = Cosigned By    Initials Name Provider Type    TC Claudia Cespedes, PT DPT Physical Therapist                PT Assessment/Plan       12/19/17 1703 12/19/17 1400    PT Assessment    Functional Limitations Decreased safety during functional activities;Impaired gait;Impaired locomotion;Limitation in home management;Performance in leisure activities  -TC     Impairments Posture;Range of motion;Impaired flexibility;Joint mobility;Pain;Muscle strength;Gait;Endurance  -TC     Assessment Comments Patient returns to us s/p 4 weeks after a fall here and a change in cognitive status. He was in the hospital for a few days and he reports that upon his return home, after a few days he all of a sudden felt like his old self. Upon assessment today, he demonstrates significant improvements in strength, coordination, muscle control and balance along with cognition. Although he is improved, his fall risk continues to be high due to his poor balance strategies and proximal weakness coupled with deconditioned cardiopulmonary system. We will focus primarily on regaining functional strength, balance retraining and working to improve his aerobic capacitiy all in order to improve his functional ability. Thank you  for this referral.   -TC     Please refer to paper survey for additional self-reported information Yes  -TC     Rehab Potential Good  -TC     Patient/caregiver participated in establishment of treatment plan and goals Yes  -TC     Patient would benefit from skilled therapy intervention Yes  -TC     PT Plan    PT Frequency 2x/week;1x/week  -TC     Predicted Duration of Therapy Intervention (days/wks) 4-6 weeks   -TC Eval Only  -TR    Planned CPT's? PT EVAL LOW COMPLEXITY: 00658;PT THER PROC EA 15 MIN: 26174;PT THER ACT EA 15 MIN: 33786;PT GAIT TRAINING EA 15 MIN: 20472;PT MANUAL THERAPY EA 15 MIN: 93215;PT NEUROMUSC RE-EDUCATION EA 15 MIN: 46765  -TC     Physical Therapy Interventions (Optional Details) balance training;home exercise program;gait training;motor coordination training;ROM (Range of Motion);stair training;strengthening;postural re-education;patient/family education;stretching;taping  -TC     PT Plan Comments We will work to address his proximal weakness R>L, balance training and gait training with progression towards functional strength and functional static and dynamic balance challenges. He really wishes to return to safely being able to perform yardwork.   -TC       User Key  (r) = Recorded By, (t) = Taken By, (c) = Cosigned By    Initials Name Provider Type    TC Claudia Cespedes, PT DPT Physical Therapist    TR Loretta Hastings, OTR/L Occupational Therapist                   Exercises       12/19/17 1400          Subjective Pain    Able to rate subjective pain? yes  -TR      Pre-Treatment Pain Level 3  -TR      Post-Treatment Pain Level 3  -TR      Subjective Pain Comment Chronic R shoulder pain.   -TR        User Key  (r) = Recorded By, (t) = Taken By, (c) = Cosigned By    Initials Name Provider Type    TR Loretta Hastings, OTR/L Occupational Therapist                      Outcome Measure Options: Tinetti  Tinetti Assessment  Tinetti Assessment: yes  Sitting Balance: Steady,safe  Arises: Able  "in 1 attempt  Attempts to Rise: Able in 1 attempt  Immediate Standing Balance (first 5 sec): Steady without support  Standing Balance: Steady, stance > 4 inch JOSE & requires support  Sternal Nudge (feet close together): Steady  Eyes Closed (feet close together): Unsteady  Turning 360 Degrees- Steps: Discontinuous steps  Turning 360 Degrees- Steadiness: Unsteady (staggers, grabs)  Sitting Down: Safe, smooth motion  Tinetti Balance Score: 12  Gait Initiation (immediate after told \"go\"): No hesitancy  Step Length- Right Swing: Right swing foot passes Left stance leg  Step Length- Left Swing: Left swing foot passes Right  Foot Clearance- Right Foot: Right foot does not completely clear floor  Foot Clearance- Left Foot: Left foot completely clears floor  Step Symmetry: Right and Left step length unequal  Step Continuity: Stop/discontinuity between steps  Path (excursion): Mild/moderate deviation or use of aid  Trunk: No sway but knee or trunk flexion or spread arms while walking  Base of Support: Heels apart  Gait Score: 6  Tinetti Total Score: 18    Time Calculation:   Start Time: 1400  Stop Time: 1440  Time Calculation (min): 40 min     Therapy Charges for Today     Code Description Service Date Service Provider Modifiers Qty    11844706089 HC PT EVAL LOW COMPLEXITY 3 12/19/2017 Claudia Cespedes, PT DPT GP 1    56171624181 HC PT MOBILITY CURRENT 12/19/2017 Claudia Cespedes, PT DPT GP, CJ 1    80588987243 HC PT MOBILITY PROJECTED 12/19/2017 Claudia Cespedes, PT DPT GP, CI 1          PT G-Codes  Outcome Measure Options: Tinetti  Score: 18  Functional Limitation: Mobility: Walking and moving around  Mobility: Walking and Moving Around Current Status (): At least 20 percent but less than 40 percent impaired, limited or restricted  Mobility: Walking and Moving Around Goal Status (): At least 1 percent but less than 20 percent impaired, limited or restricted         Claudia Cespedes, PT " DPT  12/19/2017

## 2018-02-06 ENCOUNTER — DOCUMENTATION (OUTPATIENT)
Dept: PHYSICAL THERAPY | Facility: HOSPITAL | Age: 73
End: 2018-02-06

## 2018-02-06 DIAGNOSIS — R26.9 ABNORMALITY OF GAIT AND MOBILITY: Primary | ICD-10-CM

## 2018-02-06 NOTE — THERAPY DISCHARGE NOTE
Outpatient Physical Therapy Discharge Summary         Patient Name: Travis Patel  : 1945  MRN: [id number]    Today's Date: 2018    Visit Dx:    ICD-10-CM ICD-9-CM   1. Abnormality of gait and mobility R26.9 781.2             PT OP Goals       18 1248       PT Short Term Goals    STG Date to Achieve 18  -KR     STG 1 Improve yanci hip ext to 10 deg passive ext  -KR     STG 1 Progress Not Met  -KR     STG 2 Able to walk in midline with no trendelenberg without looking down and avoid veering to the Left.   -KR     STG 2 Progress Not Met  -KR     Long Term Goals    LTG Date to Achieve 18  -KR     LTG 1 No falls for 3 weeks  -KR     LTG 1 Progress Not Met  -KR     LTG 2 Able to SLS on right x 20 secs with no trendelenberg  -KR     LTG 2 Progress Not Met  -KR     LTG 3 Able to walk for >30 mins without an AD with good wt shifting and core stability and no SOB  -KR     LTG 3 Progress Not Met  -KR     LTG 4 Able to walk short community distances (20 min) with heart rate in safe range  -KR     LTG 4 Progress Not Met  -KR     LTG 5 Independent with HEP for right hip stability and balance.   -KR     LTG 5 Progress Not Met  -KR     LTG 6 Illustrate improved hip extension and abduction strength to 4/5 especially on the R side.   -KR     LTG 6 Progress New  -KR       User Key  (r) = Recorded By, (t) = Taken By, (c) = Cosigned By    Initials Name Provider Type    PASCUAL Delgadillo PT DPT Physical Therapist          OP PT Discharge Summary  Date of Discharge: 18  Reason for Discharge: Unable to participate  Outcomes Achieved: Unable to make functional progress toward goals at this time  Discharge Destination: Home with home program  Discharge Instructions: Pt was evaluated on 2017. He did not return for any follow up visits, so we were unable to make any progress towards goals.       Time Calculation:                    Nargis Delgadillo PT DPT  2018

## 2018-05-10 ENCOUNTER — TELEPHONE (OUTPATIENT)
Dept: CARDIOLOGY | Age: 73
End: 2018-05-10

## 2018-05-11 ENCOUNTER — TELEPHONE (OUTPATIENT)
Dept: CARDIOLOGY | Age: 73
End: 2018-05-11

## 2018-05-15 ENCOUNTER — TELEPHONE (OUTPATIENT)
Dept: CARDIOLOGY | Age: 73
End: 2018-05-15

## 2018-07-11 ENCOUNTER — HOSPITAL ENCOUNTER (EMERGENCY)
Age: 73
Discharge: HOME | End: 2018-07-11
Attending: EMERGENCY MEDICINE
Payer: MEDICARE

## 2018-07-11 ENCOUNTER — APPOINTMENT (OUTPATIENT)
Dept: CT IMAGING | Age: 73
End: 2018-07-11
Payer: MEDICARE

## 2018-07-11 VITALS
OXYGEN SATURATION: 97 % | HEART RATE: 82 BPM | SYSTOLIC BLOOD PRESSURE: 116 MMHG | TEMPERATURE: 98.2 F | DIASTOLIC BLOOD PRESSURE: 78 MMHG | BODY MASS INDEX: 25.9 KG/M2 | HEIGHT: 67 IN | RESPIRATION RATE: 15 BRPM | WEIGHT: 165 LBS

## 2018-07-11 DIAGNOSIS — F41.9 ANXIETY: ICD-10-CM

## 2018-07-11 DIAGNOSIS — F32.A DEPRESSION, UNSPECIFIED DEPRESSION TYPE: Primary | ICD-10-CM

## 2018-07-11 DIAGNOSIS — R63.0 DECREASED APPETITE: ICD-10-CM

## 2018-07-11 LAB
ACETAMINOPHEN LEVEL: <15 UG/ML
ALBUMIN SERPL-MCNC: 4.3 G/DL (ref 3.5–5.2)
ALP BLD-CCNC: 129 U/L (ref 40–130)
ALT SERPL-CCNC: 39 U/L (ref 5–41)
AMPHETAMINE SCREEN, URINE: NEGATIVE
ANION GAP SERPL CALCULATED.3IONS-SCNC: 16 MMOL/L (ref 7–19)
AST SERPL-CCNC: 96 U/L (ref 5–40)
BARBITURATE SCREEN URINE: NEGATIVE
BASOPHILS ABSOLUTE: 0 K/UL (ref 0–0.2)
BASOPHILS RELATIVE PERCENT: 0.2 % (ref 0–1)
BENZODIAZEPINE SCREEN, URINE: NEGATIVE
BILIRUB SERPL-MCNC: 1 MG/DL (ref 0.2–1.2)
BILIRUBIN URINE: NEGATIVE
BLOOD, URINE: NEGATIVE
BUN BLDV-MCNC: 29 MG/DL (ref 8–23)
CALCIUM SERPL-MCNC: 9.6 MG/DL (ref 8.8–10.2)
CANNABINOID SCREEN URINE: NEGATIVE
CHLORIDE BLD-SCNC: 96 MMOL/L (ref 98–111)
CLARITY: CLEAR
CO2: 25 MMOL/L (ref 22–29)
COCAINE METABOLITE SCREEN URINE: NEGATIVE
COLOR: YELLOW
CREAT SERPL-MCNC: 1.4 MG/DL (ref 0.5–1.2)
EOSINOPHILS ABSOLUTE: 0 K/UL (ref 0–0.6)
EOSINOPHILS RELATIVE PERCENT: 0.3 % (ref 0–5)
ETHANOL: <10 MG/DL (ref 0–0.08)
GFR NON-AFRICAN AMERICAN: 50
GLUCOSE BLD-MCNC: 137 MG/DL (ref 74–109)
GLUCOSE URINE: NEGATIVE MG/DL
HCT VFR BLD CALC: 46.3 % (ref 42–52)
HEMOGLOBIN: 15.8 G/DL (ref 14–18)
KETONES, URINE: 15 MG/DL
LEUKOCYTE ESTERASE, URINE: NEGATIVE
LITHIUM LEVEL: 0.1 MMOL/L (ref 0.6–1.2)
LYMPHOCYTES ABSOLUTE: 1.2 K/UL (ref 1.1–4.5)
LYMPHOCYTES RELATIVE PERCENT: 12.6 % (ref 20–40)
Lab: NORMAL
MCH RBC QN AUTO: 29.3 PG (ref 27–31)
MCHC RBC AUTO-ENTMCNC: 34.1 G/DL (ref 33–37)
MCV RBC AUTO: 85.9 FL (ref 80–94)
MONOCYTES ABSOLUTE: 0.9 K/UL (ref 0–0.9)
MONOCYTES RELATIVE PERCENT: 9.2 % (ref 0–10)
NEUTROPHILS ABSOLUTE: 7.6 K/UL (ref 1.5–7.5)
NEUTROPHILS RELATIVE PERCENT: 77.3 % (ref 50–65)
NITRITE, URINE: NEGATIVE
OPIATE SCREEN URINE: NEGATIVE
PDW BLD-RTO: 14.1 % (ref 11.5–14.5)
PH UA: 6
PLATELET # BLD: 214 K/UL (ref 130–400)
PMV BLD AUTO: 10.8 FL (ref 9.4–12.4)
POTASSIUM SERPL-SCNC: 3.9 MMOL/L (ref 3.5–5)
PROTEIN UA: NEGATIVE MG/DL
RBC # BLD: 5.39 M/UL (ref 4.7–6.1)
SALICYLATE, SERUM: <3 MG/DL (ref 3–10)
SODIUM BLD-SCNC: 137 MMOL/L (ref 136–145)
SPECIFIC GRAVITY UA: 1.02
TOTAL PROTEIN: 7.2 G/DL (ref 6.6–8.7)
TSH SERPL DL<=0.05 MIU/L-ACNC: 1.04 UIU/ML (ref 0.27–4.2)
URINE REFLEX TO CULTURE: ABNORMAL
UROBILINOGEN, URINE: 0.2 E.U./DL
WBC # BLD: 9.9 K/UL (ref 4.8–10.8)

## 2018-07-11 PROCEDURE — 85025 COMPLETE CBC W/AUTO DIFF WBC: CPT

## 2018-07-11 PROCEDURE — 2580000003 HC RX 258: Performed by: EMERGENCY MEDICINE

## 2018-07-11 PROCEDURE — 80178 ASSAY OF LITHIUM: CPT

## 2018-07-11 PROCEDURE — 81003 URINALYSIS AUTO W/O SCOPE: CPT

## 2018-07-11 PROCEDURE — 70450 CT HEAD/BRAIN W/O DYE: CPT

## 2018-07-11 PROCEDURE — 99284 EMERGENCY DEPT VISIT MOD MDM: CPT

## 2018-07-11 PROCEDURE — 80053 COMPREHEN METABOLIC PANEL: CPT

## 2018-07-11 PROCEDURE — 99284 EMERGENCY DEPT VISIT MOD MDM: CPT | Performed by: EMERGENCY MEDICINE

## 2018-07-11 PROCEDURE — 74177 CT ABD & PELVIS W/CONTRAST: CPT

## 2018-07-11 PROCEDURE — 80307 DRUG TEST PRSMV CHEM ANLYZR: CPT

## 2018-07-11 PROCEDURE — G0480 DRUG TEST DEF 1-7 CLASSES: HCPCS

## 2018-07-11 PROCEDURE — 84443 ASSAY THYROID STIM HORMONE: CPT

## 2018-07-11 PROCEDURE — 36415 COLL VENOUS BLD VENIPUNCTURE: CPT

## 2018-07-11 PROCEDURE — 6360000004 HC RX CONTRAST MEDICATION: Performed by: EMERGENCY MEDICINE

## 2018-07-11 RX ORDER — CLOPIDOGREL BISULFATE 75 MG/1
75 TABLET ORAL DAILY
COMMUNITY

## 2018-07-11 RX ORDER — DIVALPROEX SODIUM 500 MG/1
500 TABLET, EXTENDED RELEASE ORAL 2 TIMES DAILY
COMMUNITY

## 2018-07-11 RX ORDER — ATORVASTATIN CALCIUM 80 MG/1
80 TABLET, FILM COATED ORAL NIGHTLY
COMMUNITY

## 2018-07-11 RX ORDER — METOPROLOL SUCCINATE 25 MG/1
12.5 TABLET, EXTENDED RELEASE ORAL DAILY
COMMUNITY

## 2018-07-11 RX ORDER — 0.9 % SODIUM CHLORIDE 0.9 %
500 INTRAVENOUS SOLUTION INTRAVENOUS ONCE
Status: COMPLETED | OUTPATIENT
Start: 2018-07-11 | End: 2018-07-11

## 2018-07-11 RX ORDER — BUSPIRONE HYDROCHLORIDE 10 MG/1
10 TABLET ORAL 2 TIMES DAILY
COMMUNITY

## 2018-07-11 RX ORDER — RANITIDINE 150 MG/1
150 CAPSULE ORAL 2 TIMES DAILY
COMMUNITY

## 2018-07-11 RX ORDER — BUMETANIDE 1 MG/1
2 TABLET ORAL 2 TIMES DAILY
COMMUNITY

## 2018-07-11 RX ORDER — SPIRONOLACTONE 25 MG/1
25 TABLET ORAL DAILY
COMMUNITY

## 2018-07-11 RX ORDER — DULOXETIN HYDROCHLORIDE 60 MG/1
60 CAPSULE, DELAYED RELEASE ORAL DAILY
COMMUNITY

## 2018-07-11 RX ORDER — LISINOPRIL 2.5 MG/1
2.5 TABLET ORAL DAILY
COMMUNITY

## 2018-07-11 RX ORDER — FOLIC ACID 1 MG/1
1 TABLET ORAL DAILY
COMMUNITY

## 2018-07-11 RX ORDER — QUETIAPINE FUMARATE 200 MG/1
400 TABLET, FILM COATED ORAL NIGHTLY
COMMUNITY

## 2018-07-11 RX ADMIN — IOPAMIDOL 90 ML: 755 INJECTION, SOLUTION INTRAVENOUS at 18:31

## 2018-07-11 RX ADMIN — SODIUM CHLORIDE 500 ML: 9 INJECTION, SOLUTION INTRAVENOUS at 17:02

## 2018-07-11 ASSESSMENT — ENCOUNTER SYMPTOMS
SHORTNESS OF BREATH: 0
NAUSEA: 0
COUGH: 0
BLOOD IN STOOL: 0
ABDOMINAL PAIN: 0
BACK PAIN: 0
ABDOMINAL DISTENTION: 0
TROUBLE SWALLOWING: 0
DIARRHEA: 0
CHEST TIGHTNESS: 0
VOMITING: 0
SORE THROAT: 0
RHINORRHEA: 0
CONSTIPATION: 0

## 2018-07-11 ASSESSMENT — LIFESTYLE VARIABLES: HISTORY_ALCOHOL_USE: NO

## 2018-07-11 NOTE — ED TRIAGE NOTES
Pt sent by [place] for bipolar disorder. Taken off lithium r/t toxicity. Wife states not eating or drinking.

## 2018-07-11 NOTE — ED PROVIDER NOTES
Review of Systems   Constitutional: Positive for appetite change (Decreased by mouth intake). Negative for activity change, chills and fever. HENT: Negative for congestion, ear pain, nosebleeds, rhinorrhea, sore throat and trouble swallowing. Respiratory: Negative for cough, chest tightness and shortness of breath. Cardiovascular: Negative for chest pain and palpitations. Gastrointestinal: Negative for abdominal distention, abdominal pain, blood in stool, constipation, diarrhea, nausea and vomiting. Genitourinary: Negative for difficulty urinating, dysuria and hematuria. Musculoskeletal: Negative for arthralgias, back pain, joint swelling, myalgias and neck pain. Skin: Negative for rash. Neurological: Negative for dizziness, weakness, light-headedness and headaches. Difficulty with ambulating. Psychiatric/Behavioral: Negative for confusion, hallucinations, self-injury and suicidal ideas. PAST MEDICAL HISTORY   History reviewed. No pertinent past medical history. SURGICAL HISTORY     History reviewed. No pertinent surgical history.     CURRENT MEDICATIONS       Discharge Medication List as of 7/11/2018  8:36 PM      CONTINUE these medications which have NOT CHANGED    Details   ranitidine (ZANTAC) 150 MG capsule Take 150 mg by mouth 2 times dailyHistorical Med      DULoxetine (CYMBALTA) 60 MG extended release capsule Take 60 mg by mouth dailyHistorical Med      bumetanide (BUMEX) 1 MG tablet Take 2 mg by mouth 2 times daily BID every other dayHistorical Med      QUEtiapine (SEROQUEL) 200 MG tablet Take 400 mg by mouth nightlyHistorical Med      folic acid (FOLVITE) 1 MG tablet Take 1 mg by mouth dailyHistorical Med      metoprolol succinate (TOPROL XL) 25 MG extended release tablet Take 12.5 mg by mouth dailyHistorical Med      busPIRone (BUSPAR) 10 MG tablet Take 10 mg by mouth 2 times dailyHistorical Med      atorvastatin (LIPITOR) 80 MG tablet Take 80 mg by mouth nightlyHistorical Med      clopidogrel (PLAVIX) 75 MG tablet Take 75 mg by mouth dailyHistorical Med      lisinopril (PRINIVIL;ZESTRIL) 2.5 MG tablet Take 2.5 mg by mouth dailyHistorical Med      aspirin 81 MG tablet Take 81 mg by mouth dailyHistorical Med      spironolactone (ALDACTONE) 25 MG tablet Take 25 mg by mouth dailyHistorical Med      divalproex (DEPAKOTE ER) 500 MG extended release tablet Take 500 mg by mouth 2 times dailyHistorical Med             ALLERGIES     Sulfa antibiotics    FAMILY HISTORY     History reviewed. No pertinent family history. SOCIAL HISTORY       Social History     Social History    Marital status:      Spouse name: N/A    Number of children: N/A    Years of education: N/A     Social History Main Topics    Smoking status: Former Smoker    Smokeless tobacco: Never Used    Alcohol use No    Drug use: No    Sexual activity: Not Asked     Other Topics Concern    None     Social History Narrative    None       SCREENINGS           PHYSICAL EXAM    (up to 7 for level 4, 8 or more for level 5)     ED Triage Vitals [07/11/18 1511]   BP Temp Temp src Pulse Resp SpO2 Height Weight   110/81 98.2 °F (36.8 °C) -- 90 15 96 % 5' 7\" (1.702 m) 165 lb (74.8 kg)       Physical Exam   Constitutional: He is oriented to person, place, and time. He appears well-developed and well-nourished. No distress. HENT:   Head: Normocephalic and atraumatic. Mouth/Throat: Oropharynx is clear and moist.   Eyes: EOM are normal. Pupils are equal, round, and reactive to light. No scleral icterus. Neck: Normal range of motion. Neck supple. No tracheal deviation present. Cardiovascular: Normal rate, regular rhythm, normal heart sounds and intact distal pulses. Exam reveals no gallop and no friction rub. No murmur heard. Pulmonary/Chest: Effort normal and breath sounds normal. No respiratory distress. He has no wheezes. He has no rales. He exhibits no tenderness. Abdominal: Soft.  He exhibits no distension and no mass. There is no tenderness. There is no rebound and no guarding. Musculoskeletal: Normal range of motion. He exhibits no edema, tenderness or deformity. Neurological: He is alert and oriented to person, place, and time. No cranial nerve deficit. He exhibits normal muscle tone. Coordination normal.   Skin: Skin is warm and dry. No rash noted. Psychiatric:   Patient seems mildly withdrawn, also guarded and not forthcoming with a lot of information. Down playing severity of symptoms and condition. Nursing note and vitals reviewed. DIAGNOSTIC RESULTS     EKG: All EKG's are interpreted by the Emergency Department Physician who either signs or Co-signs this chart in the absence of a cardiologist.    RADIOLOGY:   Non-plain film images such as CT, Ultrasound and MRI are read by the radiologist. Plain radiographic images are visualized and preliminarily interpreted by the emergency physician with the below findings:    CT ABDOMEN PELVIS W IV CONTRAST Additional Contrast? None   Final Result   1. Stool is noted throughout the colon most marked in the sigmoid   colon and rectum. Colonic interstitium may be considered. 2. Multiple low-density lesions in the liver. It cannot be determined   if these are solid or cystic. 3. Small small adrenal lesions possibly adenomas. .        Signed by Dr Gui Perales on 7/11/2018 7:00 PM      CT Head WO Contrast   Final Result   Changes of aging with no acute intracranial abnormality       Signed by Dr Gui Perales on 7/11/2018 6:55 PM          LABS:  Labs Reviewed   CBC WITH AUTO DIFFERENTIAL - Abnormal; Notable for the following:        Result Value    Neutrophils % 77.3 (*)     Lymphocytes % 12.6 (*)     Neutrophils # 7.6 (*)     All other components within normal limits   URINE RT REFLEX TO CULTURE - Abnormal; Notable for the following:     Ketones, Urine 15 (*)     All other components within normal limits   LITHIUM LEVEL - Abnormal; see a therapist at the [address] [place] [address], was elucidated that the patient's switch over from lithium to Depakote was not seamless due to mail order of medications. Plan is for the patient to continue his care with his [address] [place] [address] doctors and continue Depakote and return for any new, worsening, or concerning symptoms. I feel it is safe for the patient to go home at this point in time and continue his workup for his apparently subacute/chronic symptoms. Return precautions reviewed. CONSULTS:  None    PROCEDURES:  Unless otherwise noted below, none     Procedures    FINAL IMPRESSION      1. Depression, unspecified depression type    2. Anxiety    3.  Decreased appetite          DISPOSITION/PLAN   DISPOSITION Decision To Discharge 07/11/2018 08:34:27 PM      PATIENT REFERRED TO:  Kevin Skinner MD  [address]  [phone]    Schedule an appointment as soon as possible for a visit in 3 days        DISCHARGE MEDICATIONS:  Discharge Medication List as of 7/11/2018  8:36 PM             (Please note that portions of this note were completed with a voice recognition program.  Efforts were made to edit the dictations but occasionally words are mis-transcribed.)    Natalie Black MD (electronically signed)  Attending Emergency Physician          Natalie Black MD  07/11/18 3157

## 2018-07-12 NOTE — BH NOTE
Psychiatry Initial Intake    7/11/18    Carol Reagan ,a 81 y.o. male, presents to the ED for a psychiatric assessment. ED Arrival time:1508  ED physician: Alona Seip BAC Notification time: 1  LISA Assess time: 1942  Psychiatrist call time: 2010  Spoke with Dr. Karina Moraes    Patient is referred by: brought by wife     Reason for visit to ED - Presenting problem:     PT states reason for ED visit, I guess that I am  pretty well down in the dumps. I can't eat it feels like it just goes down to my stomach and quits the patient has not eaten in seven days. Pt denies SI ,HI,is not delusional and denies AVH. Pt thinks he is dying ,he does not want to die but thinks he is dying. Pt does not want to get out of bed ,has been refusing his medications. Pt recently 6 weeks ago fell and broke his ankle, this seems to play a large part in the way he currently feels. Pt also has a c-pap that he is not using at home. Pt had a heart attack and stroke last feb.2017. Pt is also refusing to drink. Pt pointed to his wife and called her his brother. Pt has also forgotten how to use his phone. Pt had Lithium D'cd two weeks ago and started on Depakote there was a delay in starting the Depakote and he has only started about four days ago. Duration of symptoms: 6 weeks     Current Stressors: health    SI:  denies   Plan: no   If yes describe:   Past SI attempts: no   If yes describe:    How many: 0  Dates or Ages:   Currently able to contract for safety outside hospital: yes   Describe:     C-SSRS Completed: yes    HI: denies  If yes describe:   Delusions: denies  If yes describe:   Hallucinations: denies   If yes describe:   Risk of Harm to self: yes   If yes explain:not using his c  -pap ambulating without a walker  Was it within the past 6 months: yes   Risk of Harm to others: no   If yes explain:   Was it within the past 6 months: no   Anxiety 1-10:  6  Explain if increased:   Depression 1-10:  5  Explain if increased:   Risk taking Psychiatric Review Of Systems:     Recent Sleep changes: yes   Average hours per night: 8  With sleep aid: yes   Restful sleep: no   Difficulty falling asleep: yes   Difficulty staying asleep: yes   Difficulty awakening: yes     Recent appetite changes: yes   Recent weight changes/Pounds gained (+) or lost (-): yes 10 pounds in the last 6weeks       Energy level changes:  decreased   Interest/pleasure/anhedonia:  yes     Medical History:     Medical Diagnosis/Issues:   CT today in ED:yes  Use of 02 or CPAP: yes  Ambulatory: yes with assistance of walker   Independent Self Care: no  Use of OTC: no   Somatic symptoms: yes      PCP: Johanna Arce MD     Current Medications:   Scheduled Meds: No current facility-administered medications for this encounter.      Current Outpatient Prescriptions:     ranitidine (ZANTAC) 150 MG capsule, Take 150 mg by mouth 2 times daily, Disp: , Rfl:     DULoxetine (CYMBALTA) 60 MG extended release capsule, Take 60 mg by mouth daily, Disp: , Rfl:     bumetanide (BUMEX) 1 MG tablet, Take 2 mg by mouth 2 times daily BID every other day, Disp: , Rfl:     QUEtiapine (SEROQUEL) 200 MG tablet, Take 400 mg by mouth nightly, Disp: , Rfl:     folic acid (FOLVITE) 1 MG tablet, Take 1 mg by mouth daily, Disp: , Rfl:     metoprolol succinate (TOPROL XL) 25 MG extended release tablet, Take 12.5 mg by mouth daily, Disp: , Rfl:     busPIRone (BUSPAR) 10 MG tablet, Take 10 mg by mouth 2 times daily, Disp: , Rfl:     atorvastatin (LIPITOR) 80 MG tablet, Take 80 mg by mouth nightly, Disp: , Rfl:     clopidogrel (PLAVIX) 75 MG tablet, Take 75 mg by mouth daily, Disp: , Rfl:     lisinopril (PRINIVIL;ZESTRIL) 2.5 MG tablet, Take 2.5 mg by mouth daily, Disp: , Rfl:     aspirin 81 MG tablet, Take 81 mg by mouth daily, Disp: , Rfl:     spironolactone (ALDACTONE) 25 MG tablet, Take 25 mg by mouth daily, Disp: , Rfl:     divalproex (DEPAKOTE ER) 500 MG extended release tablet, Take 500 mg by mouth 2 times daily, Disp: , Rfl:     Chlorpheniramine-Phenylephrine (ED A-HIST) 4-10 MG TABS, Take 1 tablet by mouth 2 times daily, Disp: , Rfl:        Mental Status Evaluation:     Appearance:  age appropriate, casually dressed and tattooed   Behavior:  Psychomotor Retardation   Speech:  delayed and soft   Mood:  constricted and decreased range   Affect:  constricted   Thought Process:  circumstantial   Thought Content:     Sensorium:  person, place, situation, day of week and year   Cognition:  grossly intact   Insight:  impaired due to mental status    Judgment:  impaired due to mental status      Social Information:    Education: high school diploma/ged  Employment where   Retired    Positive support system: Yes  Social Supports: yes, Family and Friends    Collateral Information:     Name: Aleida Coello   Relationship: wife   Phone Number: [phone]  Collateral:        Pt's daughter states that the pt knows how to manipulate his wife ,into doing everything for him . She says when she ask to do something he just does it. Pt stated he does not want to be inpatient. Disposition:     Choose one of the four options below for   disposition:     1. Decision to admit to :no        2. Referral to IOP/PHP: yes  Pt to follow up with his therapist and Doctor at the [place]    3. Decision to Discharge:   Does not meet criteria for acceptance to   unit due to: no SI,HI AVH and pt is not delusional      Other follow up information provided:  Pt's lab are not an indication of someone who has not been eating or drinking ,he is well groomed and cooperative. Tiny Austin felt he should be treated out patient by the Elaina Mckinley RN

## 2018-09-10 ENCOUNTER — APPOINTMENT (OUTPATIENT)
Dept: CT IMAGING | Facility: HOSPITAL | Age: 73
End: 2018-09-10

## 2018-09-10 ENCOUNTER — HOSPITAL ENCOUNTER (INPATIENT)
Facility: HOSPITAL | Age: 73
LOS: 10 days | Discharge: HOME HEALTH | End: 2018-09-20
Attending: FAMILY MEDICINE | Admitting: INTERNAL MEDICINE

## 2018-09-10 ENCOUNTER — APPOINTMENT (OUTPATIENT)
Dept: GENERAL RADIOLOGY | Facility: HOSPITAL | Age: 73
End: 2018-09-10

## 2018-09-10 DIAGNOSIS — N17.9 AKI (ACUTE KIDNEY INJURY) (HCC): ICD-10-CM

## 2018-09-10 DIAGNOSIS — I60.9 SUBARACHNOID HEMORRHAGE (HCC): Primary | ICD-10-CM

## 2018-09-10 DIAGNOSIS — R74.8 ELEVATED LIVER ENZYMES: ICD-10-CM

## 2018-09-10 DIAGNOSIS — R41.89 IMPAIRED COGNITION: ICD-10-CM

## 2018-09-10 DIAGNOSIS — N28.1 RENAL CYST: ICD-10-CM

## 2018-09-10 DIAGNOSIS — E27.8 ADRENAL NODULE (HCC): ICD-10-CM

## 2018-09-10 DIAGNOSIS — R77.8 ELEVATED TROPONIN: ICD-10-CM

## 2018-09-10 DIAGNOSIS — K59.00 CONSTIPATION, UNSPECIFIED CONSTIPATION TYPE: ICD-10-CM

## 2018-09-10 DIAGNOSIS — Z74.09 IMPAIRED MOBILITY AND ADLS: ICD-10-CM

## 2018-09-10 DIAGNOSIS — I50.22 CHRONIC SYSTOLIC CONGESTIVE HEART FAILURE (HCC): ICD-10-CM

## 2018-09-10 DIAGNOSIS — Z79.01 CHRONIC ANTICOAGULATION: ICD-10-CM

## 2018-09-10 DIAGNOSIS — R55 SYNCOPE, UNSPECIFIED SYNCOPE TYPE: ICD-10-CM

## 2018-09-10 DIAGNOSIS — R13.10 DYSPHAGIA, UNSPECIFIED TYPE: ICD-10-CM

## 2018-09-10 DIAGNOSIS — I95.1 ORTHOSTATIC HYPOTENSION: ICD-10-CM

## 2018-09-10 DIAGNOSIS — R26.9 GAIT ABNORMALITY: ICD-10-CM

## 2018-09-10 DIAGNOSIS — Z78.9 IMPAIRED MOBILITY AND ADLS: ICD-10-CM

## 2018-09-10 LAB
ALBUMIN SERPL-MCNC: 5 G/DL (ref 3.5–5)
ALBUMIN/GLOB SERPL: 1.4 G/DL (ref 1.1–2.5)
ALP SERPL-CCNC: 172 U/L (ref 24–120)
ALT SERPL W P-5'-P-CCNC: 33 U/L (ref 0–54)
ANION GAP SERPL CALCULATED.3IONS-SCNC: 19 MMOL/L (ref 4–13)
APTT PPP: 28.3 SECONDS (ref 24.1–34.8)
AST SERPL-CCNC: 72 U/L (ref 7–45)
BASOPHILS # BLD AUTO: 0.03 10*3/MM3 (ref 0–0.2)
BASOPHILS NFR BLD AUTO: 0.2 % (ref 0–2)
BILIRUB SERPL-MCNC: 1.9 MG/DL (ref 0.1–1)
BILIRUB UR QL STRIP: NEGATIVE
BUN BLD-MCNC: 41 MG/DL (ref 5–21)
BUN/CREAT SERPL: 23.4 (ref 7–25)
CALCIUM SPEC-SCNC: 10.7 MG/DL (ref 8.4–10.4)
CHLORIDE SERPL-SCNC: 105 MMOL/L (ref 98–110)
CLARITY UR: CLEAR
CO2 SERPL-SCNC: 24 MMOL/L (ref 24–31)
COLOR UR: YELLOW
CREAT BLD-MCNC: 1.75 MG/DL (ref 0.5–1.4)
D-LACTATE SERPL-SCNC: 2.9 MMOL/L (ref 0.5–2)
D-LACTATE SERPL-SCNC: 4.6 MMOL/L (ref 0.5–2)
DEPRECATED RDW RBC AUTO: 50.5 FL (ref 40–54)
EOSINOPHIL # BLD AUTO: 0.01 10*3/MM3 (ref 0–0.7)
EOSINOPHIL NFR BLD AUTO: 0.1 % (ref 0–4)
ERYTHROCYTE [DISTWIDTH] IN BLOOD BY AUTOMATED COUNT: 14.6 % (ref 12–15)
GFR SERPL CREATININE-BSD FRML MDRD: 39 ML/MIN/1.73
GLOBULIN UR ELPH-MCNC: 3.6 GM/DL
GLUCOSE BLD-MCNC: 121 MG/DL (ref 70–100)
GLUCOSE UR STRIP-MCNC: NEGATIVE MG/DL
HCT VFR BLD AUTO: 43.9 % (ref 40–52)
HGB BLD-MCNC: 15.2 G/DL (ref 14–18)
HGB UR QL STRIP.AUTO: NEGATIVE
HOLD SPECIMEN: NORMAL
IMM GRANULOCYTES # BLD: 0.07 10*3/MM3 (ref 0–0.03)
IMM GRANULOCYTES NFR BLD: 0.4 % (ref 0–5)
INR PPP: 0.99 (ref 0.91–1.09)
KETONES UR QL STRIP: ABNORMAL
LEUKOCYTE ESTERASE UR QL STRIP.AUTO: NEGATIVE
LITHIUM SERPL-SCNC: 0.8 MMOL/L (ref 0.6–1.2)
LYMPHOCYTES # BLD AUTO: 1.73 10*3/MM3 (ref 0.72–4.86)
LYMPHOCYTES NFR BLD AUTO: 11.1 % (ref 15–45)
MCH RBC QN AUTO: 32.6 PG (ref 28–32)
MCHC RBC AUTO-ENTMCNC: 34.6 G/DL (ref 33–36)
MCV RBC AUTO: 94.2 FL (ref 82–95)
MONOCYTES # BLD AUTO: 0.83 10*3/MM3 (ref 0.19–1.3)
MONOCYTES NFR BLD AUTO: 5.3 % (ref 4–12)
NEUTROPHILS # BLD AUTO: 12.93 10*3/MM3 (ref 1.87–8.4)
NEUTROPHILS NFR BLD AUTO: 82.9 % (ref 39–78)
NITRITE UR QL STRIP: NEGATIVE
NRBC BLD MANUAL-RTO: 0 /100 WBC (ref 0–0)
NT-PROBNP SERPL-MCNC: ABNORMAL PG/ML (ref 0–900)
PH UR STRIP.AUTO: 6.5 [PH] (ref 5–8)
PLATELET # BLD AUTO: 313 10*3/MM3 (ref 130–400)
PMV BLD AUTO: 10.3 FL (ref 6–12)
POTASSIUM BLD-SCNC: 4.4 MMOL/L (ref 3.5–5.3)
PROT SERPL-MCNC: 8.6 G/DL (ref 6.3–8.7)
PROT UR QL STRIP: NEGATIVE
PROTHROMBIN TIME: 13.4 SECONDS (ref 11.9–14.6)
RBC # BLD AUTO: 4.66 10*6/MM3 (ref 4.8–5.9)
SODIUM BLD-SCNC: 148 MMOL/L (ref 135–145)
SP GR UR STRIP: 1.03 (ref 1–1.03)
TROPONIN I SERPL-MCNC: 0.15 NG/ML (ref 0–0.03)
UROBILINOGEN UR QL STRIP: ABNORMAL
WBC NRBC COR # BLD: 15.6 10*3/MM3 (ref 4.8–10.8)

## 2018-09-10 PROCEDURE — 83605 ASSAY OF LACTIC ACID: CPT | Performed by: NURSE PRACTITIONER

## 2018-09-10 PROCEDURE — 84484 ASSAY OF TROPONIN QUANT: CPT | Performed by: FAMILY MEDICINE

## 2018-09-10 PROCEDURE — G8997 SWALLOW GOAL STATUS: HCPCS

## 2018-09-10 PROCEDURE — 83880 ASSAY OF NATRIURETIC PEPTIDE: CPT | Performed by: NURSE PRACTITIONER

## 2018-09-10 PROCEDURE — 85610 PROTHROMBIN TIME: CPT | Performed by: NURSE PRACTITIONER

## 2018-09-10 PROCEDURE — 81003 URINALYSIS AUTO W/O SCOPE: CPT | Performed by: NURSE PRACTITIONER

## 2018-09-10 PROCEDURE — 71275 CT ANGIOGRAPHY CHEST: CPT

## 2018-09-10 PROCEDURE — 80053 COMPREHEN METABOLIC PANEL: CPT | Performed by: NURSE PRACTITIONER

## 2018-09-10 PROCEDURE — 25010000002 ONDANSETRON PER 1 MG: Performed by: FAMILY MEDICINE

## 2018-09-10 PROCEDURE — 84484 ASSAY OF TROPONIN QUANT: CPT | Performed by: NURSE PRACTITIONER

## 2018-09-10 PROCEDURE — 87040 BLOOD CULTURE FOR BACTERIA: CPT | Performed by: FAMILY MEDICINE

## 2018-09-10 PROCEDURE — 85730 THROMBOPLASTIN TIME PARTIAL: CPT | Performed by: NURSE PRACTITIONER

## 2018-09-10 PROCEDURE — 73080 X-RAY EXAM OF ELBOW: CPT

## 2018-09-10 PROCEDURE — 99223 1ST HOSP IP/OBS HIGH 75: CPT | Performed by: NEUROLOGICAL SURGERY

## 2018-09-10 PROCEDURE — 72125 CT NECK SPINE W/O DYE: CPT

## 2018-09-10 PROCEDURE — 85025 COMPLETE CBC W/AUTO DIFF WBC: CPT | Performed by: NURSE PRACTITIONER

## 2018-09-10 PROCEDURE — 93010 ELECTROCARDIOGRAM REPORT: CPT | Performed by: INTERNAL MEDICINE

## 2018-09-10 PROCEDURE — G8996 SWALLOW CURRENT STATUS: HCPCS

## 2018-09-10 PROCEDURE — 93005 ELECTROCARDIOGRAM TRACING: CPT

## 2018-09-10 PROCEDURE — 74177 CT ABD & PELVIS W/CONTRAST: CPT

## 2018-09-10 PROCEDURE — 70450 CT HEAD/BRAIN W/O DYE: CPT

## 2018-09-10 PROCEDURE — 83540 ASSAY OF IRON: CPT | Performed by: INTERNAL MEDICINE

## 2018-09-10 PROCEDURE — G8998 SWALLOW D/C STATUS: HCPCS

## 2018-09-10 PROCEDURE — 80178 ASSAY OF LITHIUM: CPT | Performed by: NURSE PRACTITIONER

## 2018-09-10 PROCEDURE — 93005 ELECTROCARDIOGRAM TRACING: CPT | Performed by: FAMILY MEDICINE

## 2018-09-10 PROCEDURE — 71045 X-RAY EXAM CHEST 1 VIEW: CPT

## 2018-09-10 PROCEDURE — 99285 EMERGENCY DEPT VISIT HI MDM: CPT

## 2018-09-10 PROCEDURE — 83550 IRON BINDING TEST: CPT | Performed by: INTERNAL MEDICINE

## 2018-09-10 PROCEDURE — 92610 EVALUATE SWALLOWING FUNCTION: CPT

## 2018-09-10 PROCEDURE — 25010000002 PIPERACILLIN SOD-TAZOBACTAM PER 1 G: Performed by: NURSE PRACTITIONER

## 2018-09-10 PROCEDURE — 0 IOPAMIDOL PER 1 ML: Performed by: NURSE PRACTITIONER

## 2018-09-10 PROCEDURE — 84550 ASSAY OF BLOOD/URIC ACID: CPT | Performed by: INTERNAL MEDICINE

## 2018-09-10 RX ORDER — FAMOTIDINE 20 MG/1
40 TABLET, FILM COATED ORAL DAILY
Status: DISCONTINUED | OUTPATIENT
Start: 2018-09-11 | End: 2018-09-11

## 2018-09-10 RX ORDER — SODIUM CHLORIDE 9 MG/ML
75 INJECTION, SOLUTION INTRAVENOUS CONTINUOUS
Status: DISCONTINUED | OUTPATIENT
Start: 2018-09-10 | End: 2018-09-11

## 2018-09-10 RX ORDER — ONDANSETRON 2 MG/ML
4 INJECTION INTRAMUSCULAR; INTRAVENOUS EVERY 6 HOURS PRN
Status: DISCONTINUED | OUTPATIENT
Start: 2018-09-10 | End: 2018-09-20 | Stop reason: HOSPADM

## 2018-09-10 RX ORDER — QUETIAPINE FUMARATE 100 MG/1
200 TABLET, FILM COATED ORAL NIGHTLY
COMMUNITY
End: 2022-01-01 | Stop reason: HOSPADM

## 2018-09-10 RX ORDER — SODIUM CHLORIDE 0.9 % (FLUSH) 0.9 %
1-10 SYRINGE (ML) INJECTION AS NEEDED
Status: DISCONTINUED | OUTPATIENT
Start: 2018-09-10 | End: 2018-09-20 | Stop reason: HOSPADM

## 2018-09-10 RX ORDER — BUMETANIDE 2 MG/1
2 TABLET ORAL 2 TIMES DAILY
Status: ON HOLD | COMMUNITY
End: 2018-09-11

## 2018-09-10 RX ORDER — POTASSIUM CHLORIDE 750 MG/1
10 TABLET, FILM COATED, EXTENDED RELEASE ORAL DAILY
COMMUNITY
End: 2018-09-20 | Stop reason: HOSPADM

## 2018-09-10 RX ORDER — MIDODRINE HYDROCHLORIDE 5 MG/1
5 TABLET ORAL 2 TIMES DAILY
COMMUNITY
End: 2018-09-20 | Stop reason: HOSPADM

## 2018-09-10 RX ORDER — BUSPIRONE HYDROCHLORIDE 10 MG/1
10 TABLET ORAL 2 TIMES DAILY
Status: ON HOLD | COMMUNITY
End: 2022-01-01

## 2018-09-10 RX ORDER — ACETAMINOPHEN 325 MG/1
650 TABLET ORAL EVERY 4 HOURS PRN
Status: DISCONTINUED | OUTPATIENT
Start: 2018-09-10 | End: 2018-09-20 | Stop reason: HOSPADM

## 2018-09-10 RX ORDER — BUMETANIDE 2 MG/1
2 TABLET ORAL EVERY OTHER DAY
COMMUNITY
End: 2018-09-20 | Stop reason: HOSPADM

## 2018-09-10 RX ORDER — SPIRONOLACTONE 25 MG/1
25 TABLET ORAL DAILY
COMMUNITY
End: 2018-09-20 | Stop reason: HOSPADM

## 2018-09-10 RX ORDER — METOPROLOL SUCCINATE 25 MG/1
37.5 TABLET, EXTENDED RELEASE ORAL DAILY
COMMUNITY
End: 2018-09-20 | Stop reason: HOSPADM

## 2018-09-10 RX ORDER — SODIUM CHLORIDE 0.9 % (FLUSH) 0.9 %
10 SYRINGE (ML) INJECTION AS NEEDED
Status: DISCONTINUED | OUTPATIENT
Start: 2018-09-10 | End: 2018-09-20 | Stop reason: HOSPADM

## 2018-09-10 RX ORDER — LACTULOSE 20 G/30ML
20 SOLUTION ORAL 3 TIMES DAILY
Status: DISCONTINUED | OUTPATIENT
Start: 2018-09-10 | End: 2018-09-20 | Stop reason: HOSPADM

## 2018-09-10 RX ORDER — QUETIAPINE FUMARATE 200 MG/1
200 TABLET, FILM COATED ORAL ONCE
Status: COMPLETED | OUTPATIENT
Start: 2018-09-10 | End: 2018-09-10

## 2018-09-10 RX ORDER — HYDROCODONE BITARTRATE AND ACETAMINOPHEN 5; 325 MG/1; MG/1
1 TABLET ORAL EVERY 4 HOURS PRN
Status: DISPENSED | OUTPATIENT
Start: 2018-09-10 | End: 2018-09-20

## 2018-09-10 RX ADMIN — LACTULOSE 20 G: 20 SOLUTION ORAL at 16:58

## 2018-09-10 RX ADMIN — PIPERACILLIN SODIUM AND TAZOBACTAM SODIUM 3.38 G: 3; .375 INJECTION, POWDER, LYOPHILIZED, FOR SOLUTION INTRAVENOUS at 13:28

## 2018-09-10 RX ADMIN — LACTULOSE 20 G: 20 SOLUTION ORAL at 20:40

## 2018-09-10 RX ADMIN — SODIUM CHLORIDE 500 ML: 9 INJECTION, SOLUTION INTRAVENOUS at 11:13

## 2018-09-10 RX ADMIN — SODIUM CHLORIDE 75 ML/HR: 9 INJECTION, SOLUTION INTRAVENOUS at 20:40

## 2018-09-10 RX ADMIN — QUETIAPINE 200 MG: 200 TABLET ORAL at 23:27

## 2018-09-10 RX ADMIN — IOPAMIDOL 150 ML: 755 INJECTION, SOLUTION INTRAVENOUS at 12:22

## 2018-09-10 RX ADMIN — ONDANSETRON HYDROCHLORIDE 4 MG: 2 INJECTION, SOLUTION INTRAMUSCULAR; INTRAVENOUS at 21:44

## 2018-09-10 NOTE — H&P
[facility]  HISTORY AND PHYSICAL    Date of Admission: 9/10/2018  Primary Care Physician: Shant Hayes MD    Subjective     Chief Complaint: passed out.    History of Present Illness  Patient has been having issues with falling a lot over the last six months.    Gets up, gets light headed.  Passes out on occasion.  Today got up and passed out.  Unknown how long he was out but was alert when he woke up.  Relates no trauma.  No nausea   No chest pain.          Review of Systems   Constitutional: Positive for activity change. Negative for appetite change, chills and fever.   HENT: Negative for hearing loss, nosebleeds, tinnitus and trouble swallowing.    Eyes: Negative for visual disturbance.   Respiratory: Negative for cough, chest tightness, shortness of breath and wheezing.    Cardiovascular: Negative for chest pain, palpitations and leg swelling.   Gastrointestinal: Negative for abdominal distention, abdominal pain, blood in stool, constipation, diarrhea, nausea and vomiting.   Endocrine: Negative for cold intolerance, heat intolerance, polydipsia, polyphagia and polyuria.   Genitourinary: Negative for decreased urine volume, difficulty urinating, dysuria, flank pain, frequency and hematuria.   Musculoskeletal: Positive for arthralgias. Negative for joint swelling and myalgias.   Skin: Negative for rash.   Allergic/Immunologic: Negative for immunocompromised state.   Neurological: Positive for syncope, weakness and light-headedness. Negative for dizziness and headaches.   Hematological: Negative for adenopathy. Does not bruise/bleed easily.   Psychiatric/Behavioral: Negative for confusion and sleep disturbance. The patient is not nervous/anxious.             Past Medical History:   Past Medical History:   Diagnosis Date   • Hernia of abdominal wall    • Myocardial infarction 02/2017   • Sleep apnea    • Stroke (CMS/[facility]) 02/2017     Past Surgical  "History:  Past Surgical History:   Procedure Laterality Date   • COLOSTOMY  2001    x 3 months   • COLOSTOMY REVISION     • CORONARY ARTERY BYPASS GRAFT      1991/1996   • HERNIA REPAIR      abdominal hernia x 3; poor healing with multiple revisions   • PACEMAKER IMPLANTATION  2017    w/ defibrillator   • ROTATOR CUFF REPAIR Right 2005     Social History:  reports that he has quit smoking. His smoking use included Cigarettes. He has a 40.00 pack-year smoking history. He has never used smokeless tobacco. He reports that he does not drink alcohol or use drugs.    Retired  No alcohol, drugs or tobacco.  DPOA wife  Living will yes  Code status full  PCP [facility]  Cardiology [place]    Family History: family history includes Diabetes in his brother; Heart disease in his brother; Sleep apnea in his brother.       Allergies:  Allergies   Allergen Reactions   • Sulfa Antibiotics      Medications:  Buspar 10 bid  Aldactone 25 mg daily  Lipitor 80 mg daily  Zantac 150 bid  Folkic acid 1 mg daiuly  Bumex 1mg alternating with 2 mg daily  Cymbalta 60 mg daily  Plavix 75 mg daily  Lisinopril 2.5 mg daiuly  Seroquel 200 mg at hs  Metoprolol ER 25 mg 1.5 tab daily  Lithium 300 mg tid  Midodrine 5 mb bid  ASA 81 mg daily  Medication bottles reviewed with patient and daughter at bedside.    Objective     Vital Signs: /80   Pulse 79   Temp 98.2 °F (36.8 °C) (Oral)   Resp 14   Ht 170.2 cm (67\")   Wt 69.1 kg (152 lb 4.8 oz)   SpO2 100%   BMI 23.85 kg/m²   Physical Exam   Constitutional: He is oriented to person, place, and time. He appears well-developed and well-nourished.   HENT:   Head: Normocephalic and atraumatic.   Eyes: Pupils are equal, round, and reactive to light. Conjunctivae and EOM are normal.   Neck: Normal range of motion. Neck supple. No JVD present.   Cardiovascular: Normal rate, regular rhythm and intact distal pulses.    Murmur heard.  Pulmonary/Chest: Effort normal and breath sounds normal. "   Abdominal: Bowel sounds are normal. He exhibits distension. There is tenderness (generalized. ).   Musculoskeletal: Normal range of motion.   Neurological: He is alert and oriented to person, place, and time.   Skin: Skin is warm and dry.   Psychiatric: He has a normal mood and affect. His behavior is normal.   Nursing note and vitals reviewed.          Results Reviewed:  Lab Results (last 24 hours)     Procedure Component Value Units Date/Time    Lactic Acid, Reflex [[id number]]  (Abnormal) Collected:  09/10/18 1600    Specimen:  Blood Updated:  09/10/18 1620     Lactate 2.9 (C) mmol/L     Lactic Acid, Reflex Timer (This will reflex a repeat order 3-3:15 hours after ordered.) [[id number]] Collected:  09/10/18 1110    Specimen:  Blood Updated:  09/10/18 1517     Extra Tube Hold for add-ons.     Comment: Auto resulted.       Urinalysis With Microscopic If Indicated (No Culture) - Urine, Clean Catch [[id number]]  (Abnormal) Collected:  09/10/18 1407    Specimen:  Urine from Urine, Clean Catch Updated:  09/10/18 1415     Color, UA Yellow     Appearance, UA Clear     pH, UA 6.5     Specific Gravity, UA 1.026     Glucose, UA Negative     Ketones, UA Trace (A)     Bilirubin, UA Negative     Blood, UA Negative     Protein, UA Negative     Leuk Esterase, UA Negative     Nitrite, UA Negative     Urobilinogen, UA 1.0 E.U./dL    Narrative:       Urine microscopic not indicated.    Blood Culture - Blood, [[id number]] Collected:  09/10/18 1315    Specimen:  Blood from Arm, Left Updated:  09/10/18 1336    Blood Culture - Blood, [[id number]] Collected:  09/10/18 1325    Specimen:  Blood from Arm, Right Updated:  09/10/18 1336    Lactic Acid, Plasma [[id number]]  (Abnormal) Collected:  09/10/18 1110    Specimen:  Blood Updated:  09/10/18 1205     Lactate 4.6 (C) mmol/L     Troponin [[id number]]  (Abnormal) Collected:  09/10/18 1110    Specimen:  Blood Updated:  09/10/18 1140     Troponin I 0.155 (H) ng/mL     BNP [[id number]]  (Abnormal)  Collected:  09/10/18 1110    Specimen:  Blood Updated:  09/10/18 1140     proBNP 11,100.0 (H) pg/mL     Comprehensive Metabolic Panel [[id number]]  (Abnormal) Collected:  09/10/18 1110    Specimen:  Blood Updated:  09/10/18 1128     Glucose 121 (H) mg/dL      BUN 41 (H) mg/dL      Creatinine 1.75 (H) mg/dL      Sodium 148 (H) mmol/L      Potassium 4.4 mmol/L      Chloride 105 mmol/L      CO2 24.0 mmol/L      Calcium 10.7 (H) mg/dL      Total Protein 8.6 g/dL      Albumin 5.00 g/dL      ALT (SGPT) 33 U/L      AST (SGOT) 72 (H) U/L      Alkaline Phosphatase 172 (H) U/L      Total Bilirubin 1.9 (H) mg/dL      eGFR Non African Amer 39 (L) mL/min/1.73      Globulin 3.6 gm/dL      A/G Ratio 1.4 g/dL      BUN/Creatinine Ratio 23.4     Anion Gap 19.0 (H) mmol/L     Narrative:       The MDRD GFR formula is only valid for adults with stable renal function between ages 18 and 70.    Protime-INR [[id number]]  (Normal) Collected:  09/10/18 1110    Specimen:  Blood Updated:  09/10/18 1127     Protime 13.4 Seconds      INR 0.99    aPTT [[id number]]  (Normal) Collected:  09/10/18 1110    Specimen:  Blood Updated:  09/10/18 1127     PTT 28.3 seconds     Lithium Level [[id number]]  (Normal) Collected:  09/10/18 1110    Specimen:  Blood Updated:  09/10/18 1126     Lithium 0.8 mmol/L     CBC & Differential [[id number]] Collected:  09/10/18 1110    Specimen:  Blood Updated:  09/10/18 1118    Narrative:       The following orders were created for panel order CBC & Differential.  Procedure                               Abnormality         Status                     ---------                               -----------         ------                     CBC Auto Differential[[id number]]        Abnormal            Final result                 Please view results for these tests on the individual orders.    CBC Auto Differential [[id number]]  (Abnormal) Collected:  09/10/18 1110    Specimen:  Blood Updated:  09/10/18 1118     WBC 15.60 (H) 10*3/mm3       RBC 4.66 (L) 10*6/mm3      Hemoglobin 15.2 g/dL      Hematocrit 43.9 %      MCV 94.2 fL      MCH 32.6 (H) pg      MCHC 34.6 g/dL      RDW 14.6 %      RDW-SD 50.5 fl      MPV 10.3 fL      Platelets 313 10*3/mm3      Neutrophil % 82.9 (H) %      Lymphocyte % 11.1 (L) %      Monocyte % 5.3 %      Eosinophil % 0.1 %      Basophil % 0.2 %      Immature Grans % 0.4 %      Neutrophils, Absolute 12.93 (H) 10*3/mm3      Lymphocytes, Absolute 1.73 10*3/mm3      Monocytes, Absolute 0.83 10*3/mm3      Eosinophils, Absolute 0.01 10*3/mm3      Basophils, Absolute 0.03 10*3/mm3      Immature Grans, Absolute 0.07 (H) 10*3/mm3      nRBC 0.0 /100 WBC         Imaging Results (last 24 hours)     Procedure Component Value Units Date/Time    CT Abdomen Pelvis With Contrast [[id number]] Collected:  09/10/18 1243     Updated:  09/10/18 1252    Narrative:       CT ABDOMEN PELVIS W CONTRAST- 9/10/2018 12:06 PM CDT     HISTORY: abd pain, syncope      COMPARISON: None     DOSE LENGTH PRODUCT: 221 mGy cm. Automated exposure control was also  utilized to decrease patient radiation dose.     TECHNIQUE: Axial images of the abdomen pelvis are obtained following IV  contrast. No oral contrast administered.     FINDINGS:  There are scattered hypodense liver lesions favorable for  cysts. The spleen, pancreas are unremarkable. Small amount of biliary  sludge versus stones considered in the dependent portion the gallbladder  (image 24, series 4). There are bilateral nonspecific adrenal nodules  measuring up to 1.8 cm. There is an inferior left renal cyst. There is  no enhancing renal mass. There is no hydronephrosis. Bladder is  distended. There is no prostate enlargement.      A large amount of fecal material is contained within the rectum. Rectum  measures 7.5 cm in width. Moderate stool seen throughout the remaining  colon. There are postoperative changes of the cecum. There is no  evidence for small bowel obstruction. The stomach is  underdistended.  There is scarring along the region of the umbilicus.     There is dense vascular calcification with ectasia of the aorta to  maximum measurement of 2.4 cm.     Please refer to CT chest report for findings below the hemidiaphragms.     There are degenerative changes of the regional skeleton. There is a mild  rotoscoliotic curvature of the lumbar spine.       Impression:       1. Large volume of stool within the rectum may indicate constipation  and/or impaction. Moderate volume stool seen in the remaining colon. No  evidence of bowel obstruction.  2. Hypodense liver lesions favorable for cysts. Inferior left renal  cyst.  3. Nonspecific bilateral adrenal nodules can be followed on subsequent  imaging. Adrenal protocol CT versus MRI should be considered  nonemergently if there are no outside films for comparison.    4. Distended bladder. .  This report was finalized on 09/10/2018 12:49 by Dr. Kimberly Doll MD.    CT Angiogram Chest With Contrast [[id number]] Collected:  09/10/18 1239     Updated:  09/10/18 1246    Narrative:       CT ANGIOGRAM CHEST W CONTRAST- 9/10/2018 12:06 PM CDT     HISTORY: mid back pain, syncope      COMPARISON: None     DOSE LENGTH PRODUCT: 268 mGy cm. Automated exposure control was also  utilized to decrease patient radiation dose.     TECHNIQUE: Axial images of the chest are obtained following IV contrast.  2-D and maximal intensity projection images are reconstructed and  reviewed.     FINDINGS:  There are no pulmonary emboli. There is evidence of prior  mediastinal surgery. There is dense coronary artery calcification. There  is no thoracic aortic aneurysm or dissection. There is moderate  atherosclerotic change of the thoracic aorta. A left subclavian cardiac  pacer device is in place. Heart is upper limits of normal in size. There  is no pericardial or pleural effusion. No pathologic intrathoracic or  axillary lymphadenopathy visualized.     Hypoechoic thin liver  lesions are favorable for cysts. The adrenal  glands are prominent and hypodense. Please refer to CT abdomen report.  There is moderate stool within the visible colon.     There is a calcified granuloma within the left upper lobe. There is no  pulmonary consolidation. There is minimal dependent atelectasis. There  are no spiculated nodules. There is no pneumothorax.     No focal destructive osseous lesions are visualized.       Impression:       1. No pulmonary emboli.  2. Prior mediastinal surgery, likely coronary bypass. Left subclavian  cardiac pacer device.  3. Left upper lobe benign calcified granuloma. Minimal dependent  atelectasis. No consolidation or suspicious nodularity.  4. Hypodense liver lesions favorable for cysts. Please refer to CT  abdomen report.  This report was finalized on 09/10/2018 12:43 by Dr. Kimberly Doll MD.    CT Cervical Spine Without Contrast [[id number]] Collected:  09/10/18 1237     Updated:  09/10/18 1242    Narrative:       CT CERVICAL SPINE WO CONTRAST- 9/10/2018 12:06 PM CDT     HISTORY: neck pain, syncope, fall      COMPARISON: None     DOSE LENGTH PRODUCT: 241 mGy cm. Automated exposure control was also  utilized to decrease patient radiation dose.     TECHNIQUE: Axial images of cervical spine obtained with sagittal coronal  reconstructions.     FINDINGS:  There is minimal retrolisthesis of C5 in reference to C4  measuring 1 to 2 mm. There is moderate to advanced degenerative disc  change at C6/C7 and C7-T1 with moderate degenerative disc changes above  the C5 level. There is moderate facet osteoarthropathy. There is mild  uncinate process hypertrophy.     No cervical vertebral fracture is identified.     The degenerative changes result in only mild cervical spinal canal  stenosis with scattered bilateral neural foramen narrowing. At least  moderate in severity.     There is carotid bulb calcification. There is no apical pneumothorax.       Impression:       1. Degenerative  "changes of the cervical spine with no acute cervical  vertebral fracture. Minimal retrolisthesis of C5 likely related to facet  osteoarthropathy.  This report was finalized on 09/10/2018 12:39 by Dr. Kimberly Doll MD.    CT Head Without Contrast [[id number]] Collected:  09/10/18 1233     Updated:  09/10/18 1240    Narrative:       CT HEAD WO CONTRAST- 9/10/2018 12:06 PM CDT     HISTORY: head injury, fall, syncope      COMPARISON: None     DOSE LENGTH PRODUCT: 696 mGy cm. Automated exposure control was also  utilized to decrease patient radiation dose.     TECHNIQUE: Axial images of brain obtained without IV contrast.     FINDINGS:  There is subarachnoid hemorrhage along the convexities of the  right frontal lobe. No intraparenchymal hematoma or contusion  visualized. There is moderate cerebral volume loss with chronic vessel  vessel ischemia. There are no convincing acute signs of ischemia. No  subdural or epidural hematomas visualized.     The visible paranasal sinuses and mastoid air cells are well-aerated.  The bony calvarium appears intact. No skull fracture identified.       Impression:       1. Subarachnoid hemorrhage along the convexities of the right frontal  lobe. No intraparenchymal hemorrhage visualized. This finding called to  Karol Berrios in the ER at 12:40 PM on 9/10/2018.  2. Moderate cerebral volume loss with chronic vessel vessel ischemia.  This report was finalized on 09/10/2018 12:37 by Dr. Kimberly Doll MD.    XR Elbow 3+ View Left [[id number]] Collected:  09/10/18 1056     Updated:  09/10/18 1100    Narrative:       HISTORY: Fall with left elbow pain     Left elbow: 3 views of left elbow are obtained.     There is an IV within the left antecubital fossa. There is no  appreciable joint effusion. There is mild bony spurring of the a \"knot  on and humeral epicondyles. No bony fracture or dislocation is  identified.       Impression:       1. Mild arthritic change of the left elbow with no " acute bony pathology.  This report was finalized on 09/10/2018 10:57 by Dr. Kimberly Doll MD.    XR Chest 1 View [[id number]] Collected:  09/10/18 1055     Updated:  09/10/18 1059    Narrative:       HISTORY: Syncope     CXR: Frontal view the chest is obtained.     COMPARISON: None     FINDINGS: Patient has undergone prior mediastinal surgery. There is a  left subclavian cardiac pacer device with the lead projecting over the  right ventricle. There is a left upper lobe calcified granuloma. There  is no consolidation or edema. There is no pleural effusion or  pneumothorax. There is no acute bony pathology.       Impression:       1. Left upper lobe calcified granuloma with calcified left hilar lymph  nodes. Prior mediastinal surgery with left subclavian cardiac pacer  device. No acute pulmonary process identified.  This report was finalized on 09/10/2018 10:56 by Dr. Kimberly Doll MD.        I have personally reviewed and interpreted the radiology studies and ECG obtained at time of admission.     Assessment / Plan     Assessment:   Syncope  Orthostatic hypotension, chronic recurrent by history.  Subarrachnoid hemorrhage.   CAD   Bipolar disease  Constipation with possible rectal impaction.  Renal failure. Acute on chronic.      Plan:      Admit   ICU monitor overnight  Consult neurosurgery  Check orthostatic blood pressure  Milk and molasses enema  Lactulose tid  Lab in AM  cmp cbc  Neuro checks.   Consult Dr Reese.  Acute on chronic renal failure, patient received dye study.  Gentle hydration in patient with hx of CHF  Code Status: full  Echocardiogram  Ideally we would also place patient in support/ZEE hose but these are no longer available at this institution.    I discussed the patient's findings and my recommendations with the patient and daughter    Estimated length of stay 2-3 days    Kenya Gomez DO   09/10/18   5:21 PM

## 2018-09-10 NOTE — PLAN OF CARE
Problem: Patient Care Overview  Goal: Plan of Care Review  Outcome: Ongoing (interventions implemented as appropriate)   09/10/18 9128   Coping/Psychosocial   Plan of Care Reviewed With patient;daughter   Plan of Care Review   Progress (Eval)   OTHER   Outcome Summary Bedside swallow eval completed. Pt was presented a full range of consistencies excluding mechanical soft. Pt was noted to have prolonged oral prep with decreased rotary chew with regular solid. Oral residue WFL throughout eval. Hoarse vocal quality at baseline, even prior to PO intake; however, this did persistent during trials. Daughter stated pt's vocal quality has been hoarse for approximately six months, yet stated pt also had difficulty with proper voicing following 2x intubation in Feb 2017. Pt showed no overt s/s of aspiration throughout eval. Cannot fully r/o aspiration at this time. RECOMMENDATIONS: Continue current diet of regular solid diet consistency with regular/thin liquid consistency; general feeding and aspiration precautions; meds whole with thin liquids; RN to monitor for increased congestion. Continued ST services for dysphagia are not warranted at this time; however, ST will follow with plan to complete cognitive eval on 09/11/2018. Thanks!

## 2018-09-10 NOTE — CONSULTS
Patient Care Team:  Shant Hayes MD as PCP - General (Internal Medicine)    Chief complaint syncope    Subjective     HPI: HPI    This patient is a 73-year-old male who has a history of multiple syncopal events.  He presented to the emergency room today after falling and striking his head.  He does have positive loss of consciousness of unknown duration.  He is currently resting comfortably with no complaints, eating a meal.    Review of Systems     12 point review of systems is obtained and is negative except for as described in HPI    Past Medical History:   Diagnosis Date   • Hernia of abdominal wall    • Myocardial infarction 02/2017   • Sleep apnea    • Stroke (CMS/[facility]) 02/2017     Past Surgical History:   Procedure Laterality Date   • COLOSTOMY  2001    x 3 months   • COLOSTOMY REVISION     • CORONARY ARTERY BYPASS GRAFT      1991/1996   • HERNIA REPAIR      abdominal hernia x 3; poor healing with multiple revisions   • PACEMAKER IMPLANTATION  2017    w/ defibrillator   • ROTATOR CUFF REPAIR Right 2005     Family History   Problem Relation Age of Onset   • Heart disease Brother    • Diabetes Brother    • Sleep apnea Brother      Social History   Substance Use Topics   • Smoking status: Former Smoker     Packs/day: 1.00     Years: 40.00     Types: Cigarettes   • Smokeless tobacco: Never Used   • Alcohol use No     No prescriptions prior to admission.     Allergies:  Sulfa antibiotics      Objective    Neurologic Exam  Vital Signs  Temp:  [96.9 °F (36.1 °C)-98.2 °F (36.8 °C)] 98.2 °F (36.8 °C)  Heart Rate:  [] 79  Resp:  [14-17] 14  BP: ()/(53-96) 112/80    Physical Exam    No acute distress  Awake alert oriented ×3  HEENT atraumatic also found  Neck supple  Abdomen soft nontender  Extremities no clubbing, edema, cyanosis  Neurologic exam  Cranial nerves II through XII grossly intact  No pronator drift  Patient moves all extremities well    Results Review:   I reviewed the patient's new clinical  "results.  I reviewed the patient's new imaging results and agree with the interpretation.  I reviewed the patient's other test results and agree with the interpretation    Xr Elbow 3+ View Left    Result Date: 9/10/2018  Narrative: HISTORY: Fall with left elbow pain  Left elbow: 3 views of left elbow are obtained.  There is an IV within the left antecubital fossa. There is no appreciable joint effusion. There is mild bony spurring of the a \"knot on and humeral epicondyles. No bony fracture or dislocation is identified.      Impression: 1. Mild arthritic change of the left elbow with no acute bony pathology. This report was finalized on 09/10/2018 10:57 by Dr. Kimberly Doll MD.    Ct Head Without Contrast    Result Date: 9/10/2018  Narrative: CT HEAD WO CONTRAST- 9/10/2018 12:06 PM CDT  HISTORY: head injury, fall, syncope  COMPARISON: None  DOSE LENGTH PRODUCT: 696 mGy cm. Automated exposure control was also utilized to decrease patient radiation dose.  TECHNIQUE: Axial images of brain obtained without IV contrast.  FINDINGS:  There is subarachnoid hemorrhage along the convexities of the right frontal lobe. No intraparenchymal hematoma or contusion visualized. There is moderate cerebral volume loss with chronic vessel vessel ischemia. There are no convincing acute signs of ischemia. No subdural or epidural hematomas visualized.  The visible paranasal sinuses and mastoid air cells are well-aerated. The bony calvarium appears intact. No skull fracture identified.      Impression: 1. Subarachnoid hemorrhage along the convexities of the right frontal lobe. No intraparenchymal hemorrhage visualized. This finding called to Karol Berrios in the ER at 12:40 PM on 9/10/2018. 2. Moderate cerebral volume loss with chronic vessel vessel ischemia. This report was finalized on 09/10/2018 12:37 by Dr. Kimberly Doll MD.    Ct Cervical Spine Without Contrast    Result Date: 9/10/2018  Narrative: CT CERVICAL SPINE WO CONTRAST- " 9/10/2018 12:06 PM CDT  HISTORY: neck pain, syncope, fall  COMPARISON: None  DOSE LENGTH PRODUCT: 241 mGy cm. Automated exposure control was also utilized to decrease patient radiation dose.  TECHNIQUE: Axial images of cervical spine obtained with sagittal coronal reconstructions.  FINDINGS:  There is minimal retrolisthesis of C5 in reference to C4 measuring 1 to 2 mm. There is moderate to advanced degenerative disc change at C6/C7 and C7-T1 with moderate degenerative disc changes above the C5 level. There is moderate facet osteoarthropathy. There is mild uncinate process hypertrophy.  No cervical vertebral fracture is identified.  The degenerative changes result in only mild cervical spinal canal stenosis with scattered bilateral neural foramen narrowing. At least moderate in severity.  There is carotid bulb calcification. There is no apical pneumothorax.      Impression: 1. Degenerative changes of the cervical spine with no acute cervical vertebral fracture. Minimal retrolisthesis of C5 likely related to facet osteoarthropathy. This report was finalized on 09/10/2018 12:39 by Dr. Kimberly Doll MD.    Ct Abdomen Pelvis With Contrast    Result Date: 9/10/2018  Narrative: CT ABDOMEN PELVIS W CONTRAST- 9/10/2018 12:06 PM CDT  HISTORY: abd pain, syncope  COMPARISON: None  DOSE LENGTH PRODUCT: 221 mGy cm. Automated exposure control was also utilized to decrease patient radiation dose.  TECHNIQUE: Axial images of the abdomen pelvis are obtained following IV contrast. No oral contrast administered.  FINDINGS:  There are scattered hypodense liver lesions favorable for cysts. The spleen, pancreas are unremarkable. Small amount of biliary sludge versus stones considered in the dependent portion the gallbladder (image 24, series 4). There are bilateral nonspecific adrenal nodules measuring up to 1.8 cm. There is an inferior left renal cyst. There is no enhancing renal mass. There is no hydronephrosis. Bladder is distended.  There is no prostate enlargement.  A large amount of fecal material is contained within the rectum. Rectum measures 7.5 cm in width. Moderate stool seen throughout the remaining colon. There are postoperative changes of the cecum. There is no evidence for small bowel obstruction. The stomach is underdistended. There is scarring along the region of the umbilicus.  There is dense vascular calcification with ectasia of the aorta to maximum measurement of 2.4 cm.  Please refer to CT chest report for findings below the hemidiaphragms.  There are degenerative changes of the regional skeleton. There is a mild rotoscoliotic curvature of the lumbar spine.      Impression: 1. Large volume of stool within the rectum may indicate constipation and/or impaction. Moderate volume stool seen in the remaining colon. No evidence of bowel obstruction. 2. Hypodense liver lesions favorable for cysts. Inferior left renal cyst. 3. Nonspecific bilateral adrenal nodules can be followed on subsequent imaging. Adrenal protocol CT versus MRI should be considered nonemergently if there are no outside films for comparison.  4. Distended bladder. . This report was finalized on 09/10/2018 12:49 by Dr. Kimberly Doll MD.    Xr Chest 1 View    Result Date: 9/10/2018  Narrative: HISTORY: Syncope  CXR: Frontal view the chest is obtained.  COMPARISON: None  FINDINGS: Patient has undergone prior mediastinal surgery. There is a left subclavian cardiac pacer device with the lead projecting over the right ventricle. There is a left upper lobe calcified granuloma. There is no consolidation or edema. There is no pleural effusion or pneumothorax. There is no acute bony pathology.      Impression: 1. Left upper lobe calcified granuloma with calcified left hilar lymph nodes. Prior mediastinal surgery with left subclavian cardiac pacer device. No acute pulmonary process identified. This report was finalized on 09/10/2018 10:56 by Dr. Kimberly Doll MD.    Ct  Angiogram Chest With Contrast    Result Date: 9/10/2018  Narrative: CT ANGIOGRAM CHEST W CONTRAST- 9/10/2018 12:06 PM CDT  HISTORY: mid back pain, syncope  COMPARISON: None  DOSE LENGTH PRODUCT: 268 mGy cm. Automated exposure control was also utilized to decrease patient radiation dose.  TECHNIQUE: Axial images of the chest are obtained following IV contrast. 2-D and maximal intensity projection images are reconstructed and reviewed.  FINDINGS:  There are no pulmonary emboli. There is evidence of prior mediastinal surgery. There is dense coronary artery calcification. There is no thoracic aortic aneurysm or dissection. There is moderate atherosclerotic change of the thoracic aorta. A left subclavian cardiac pacer device is in place. Heart is upper limits of normal in size. There is no pericardial or pleural effusion. No pathologic intrathoracic or axillary lymphadenopathy visualized.  Hypoechoic thin liver lesions are favorable for cysts. The adrenal glands are prominent and hypodense. Please refer to CT abdomen report. There is moderate stool within the visible colon.  There is a calcified granuloma within the left upper lobe. There is no pulmonary consolidation. There is minimal dependent atelectasis. There are no spiculated nodules. There is no pneumothorax.  No focal destructive osseous lesions are visualized.      Impression: 1. No pulmonary emboli. 2. Prior mediastinal surgery, likely coronary bypass. Left subclavian cardiac pacer device. 3. Left upper lobe benign calcified granuloma. Minimal dependent atelectasis. No consolidation or suspicious nodularity. 4. Hypodense liver lesions favorable for cysts. Please refer to CT abdomen report. This report was finalized on 09/10/2018 12:43 by Dr. Kimberly Doll MD.        Lab Results (last 24 hours)     Procedure Component Value Units Date/Time    Lactic Acid, Reflex [[id number]]  (Abnormal) Collected:  09/10/18 1600    Specimen:  Blood Updated:  09/10/18 162      Lactate 2.9 (C) mmol/L     Lactic Acid, Reflex Timer (This will reflex a repeat order 3-3:15 hours after ordered.) [[id number]] Collected:  09/10/18 1110    Specimen:  Blood Updated:  09/10/18 1517     Extra Tube Hold for add-ons.     Comment: Auto resulted.       Urinalysis With Microscopic If Indicated (No Culture) - Urine, Clean Catch [[id number]]  (Abnormal) Collected:  09/10/18 1407    Specimen:  Urine from Urine, Clean Catch Updated:  09/10/18 1415     Color, UA Yellow     Appearance, UA Clear     pH, UA 6.5     Specific Gravity, UA 1.026     Glucose, UA Negative     Ketones, UA Trace (A)     Bilirubin, UA Negative     Blood, UA Negative     Protein, UA Negative     Leuk Esterase, UA Negative     Nitrite, UA Negative     Urobilinogen, UA 1.0 E.U./dL    Narrative:       Urine microscopic not indicated.    Blood Culture - Blood, [[id number]] Collected:  09/10/18 1315    Specimen:  Blood from Arm, Left Updated:  09/10/18 1336    Blood Culture - Blood, [[id number]] Collected:  09/10/18 1325    Specimen:  Blood from Arm, Right Updated:  09/10/18 1336    Lactic Acid, Plasma [[id number]]  (Abnormal) Collected:  09/10/18 1110    Specimen:  Blood Updated:  09/10/18 1205     Lactate 4.6 (C) mmol/L     Troponin [[id number]]  (Abnormal) Collected:  09/10/18 1110    Specimen:  Blood Updated:  09/10/18 1140     Troponin I 0.155 (H) ng/mL     BNP [[id number]]  (Abnormal) Collected:  09/10/18 1110    Specimen:  Blood Updated:  09/10/18 1140     proBNP 11,100.0 (H) pg/mL     Comprehensive Metabolic Panel [[id number]]  (Abnormal) Collected:  09/10/18 1110    Specimen:  Blood Updated:  09/10/18 1128     Glucose 121 (H) mg/dL      BUN 41 (H) mg/dL      Creatinine 1.75 (H) mg/dL      Sodium 148 (H) mmol/L      Potassium 4.4 mmol/L      Chloride 105 mmol/L      CO2 24.0 mmol/L      Calcium 10.7 (H) mg/dL      Total Protein 8.6 g/dL      Albumin 5.00 g/dL      ALT (SGPT) 33 U/L      AST (SGOT) 72 (H) U/L      Alkaline Phosphatase 172 (H)  U/L      Total Bilirubin 1.9 (H) mg/dL      eGFR Non African Amer 39 (L) mL/min/1.73      Globulin 3.6 gm/dL      A/G Ratio 1.4 g/dL      BUN/Creatinine Ratio 23.4     Anion Gap 19.0 (H) mmol/L     Narrative:       The MDRD GFR formula is only valid for adults with stable renal function between ages 18 and 70.    Protime-INR [[id number]]  (Normal) Collected:  09/10/18 1110    Specimen:  Blood Updated:  09/10/18 1127     Protime 13.4 Seconds      INR 0.99    aPTT [[id number]]  (Normal) Collected:  09/10/18 1110    Specimen:  Blood Updated:  09/10/18 1127     PTT 28.3 seconds     Lithium Level [[id number]]  (Normal) Collected:  09/10/18 1110    Specimen:  Blood Updated:  09/10/18 1126     Lithium 0.8 mmol/L     CBC & Differential [[id number]] Collected:  09/10/18 1110    Specimen:  Blood Updated:  09/10/18 1118    Narrative:       The following orders were created for panel order CBC & Differential.  Procedure                               Abnormality         Status                     ---------                               -----------         ------                     CBC Auto Differential[[id number]]        Abnormal            Final result                 Please view results for these tests on the individual orders.    CBC Auto Differential [[id number]]  (Abnormal) Collected:  09/10/18 1110    Specimen:  Blood Updated:  09/10/18 1118     WBC 15.60 (H) 10*3/mm3      RBC 4.66 (L) 10*6/mm3      Hemoglobin 15.2 g/dL      Hematocrit 43.9 %      MCV 94.2 fL      MCH 32.6 (H) pg      MCHC 34.6 g/dL      RDW 14.6 %      RDW-SD 50.5 fl      MPV 10.3 fL      Platelets 313 10*3/mm3      Neutrophil % 82.9 (H) %      Lymphocyte % 11.1 (L) %      Monocyte % 5.3 %      Eosinophil % 0.1 %      Basophil % 0.2 %      Immature Grans % 0.4 %      Neutrophils, Absolute 12.93 (H) 10*3/mm3      Lymphocytes, Absolute 1.73 10*3/mm3      Monocytes, Absolute 0.83 10*3/mm3      Eosinophils, Absolute 0.01 10*3/mm3      Basophils, Absolute 0.03  10*3/mm3      Immature Grans, Absolute 0.07 (H) 10*3/mm3      nRBC 0.0 /100 WBC             Patient Active Problem List   Diagnosis   • Subarachnoid hemorrhage (CMS/HCC)       Assessment/Plan     73-year-old male with significant cardiac history and multiple syncopal events.  He fell yesterday and struck his head with positive loss of consciousness of unknown duration.  He has a small amount of trace subarachnoid hemorrhage.  Likely this will resolve on its own without surgery.  Given his significant cardiac history, we will continue his antiplatelets.  Recommend cardiology evaluation for syncope.  We will follow along.  Thank you for this consultation.    I discussed the patients findings and my recommendations with patient and family    Hu Calvillo MD  09/10/18  4:59 PM

## 2018-09-10 NOTE — ED PROVIDER NOTES
"Subjective   Patient is a 73-year-old  male who presents to the ER today per EMS with complaint of syncope.  The patient reports he was walking to the bathroom this morning when he had a syncopal episode.  He states that he had no dizziness, no chest pain or shortness of breath prior to this.  The patient states that he passed out did fall hitting his head. The pt is unsure how long he had LOC, he states that this was witnessed by his wife; denies any loss of bowel or bladder control, denies any seizure like activity. He has no previous hx of seizures. He is on Plavix.  The patient sustained an abrasion to the left elbow.  The patient reports that he is having mid back pain as well as abdominal pain.  Patient reports to me that he has not had a bowel movement in over one week.  The patient states that he did try to disimpact himself and felt a large amt of hard stool.  The patient reports that he has had decreased by mouth intake over the past one week.  The patient states that he has no chest pain or shortness of breath.  He is awake, alert and oriented at this time.    She does have a significant medical history.  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  He has a pacemaker defibrillator that was placed due to ischemic cardiomyopathy.  The patient does follow with a cardiologist at [place] currently.  Patient also has a history of perforated bowel due to diverticulitis.  He had a colostomy with revision in the past.  He states that this was \"many years ago \".  The patient presents to the ER today for further evaluation.         History provided by:  Patient   used: No    Syncope   Episode history:  Single  Most recent episode:  Today  Duration: unable to specify.  Timing:  Rare  Progression:  Resolved  Chronicity:  New  Context: standing up    Context: not blood draw, not bowel movement, not dehydration, not exertion, not inactivity, not medication change, not with normal activity, " not sight of blood, not sitting down and not urination    Witnessed: yes    Relieved by:  Nothing  Worsened by:  Nothing  Ineffective treatments:  None tried  Associated symptoms: no anxiety, no chest pain, no confusion, no diaphoresis, no difficulty breathing, no dizziness, no fever, no focal sensory loss, no focal weakness, no headaches, no malaise/fatigue, no nausea, no palpitations, no recent fall, no recent injury, no recent surgery, no rectal bleeding, no seizures, no shortness of breath, no visual change, no vomiting and no weakness    Risk factors: coronary artery disease    Risk factors: no congenital heart disease, no seizures and no vascular disease        Review of Systems   Constitutional: Negative for diaphoresis, fever and malaise/fatigue.   Respiratory: Negative for shortness of breath.    Cardiovascular: Positive for syncope. Negative for chest pain and palpitations.   Gastrointestinal: Negative for nausea and vomiting.   Neurological: Negative for dizziness, focal weakness, seizures, weakness and headaches.   Psychiatric/Behavioral: Negative for confusion.   All other systems reviewed and are negative.      Past Medical History:   Diagnosis Date   • Hernia of abdominal wall    • Myocardial infarction 02/2017   • Sleep apnea    • Stroke (CMS/[facility]) 02/2017       Allergies   Allergen Reactions   • Sulfa Antibiotics        Past Surgical History:   Procedure Laterality Date   • COLOSTOMY  2001    x 3 months   • COLOSTOMY REVISION     • CORONARY ARTERY BYPASS GRAFT      1991/1996   • HERNIA REPAIR      abdominal hernia x 3; poor healing with multiple revisions   • PACEMAKER IMPLANTATION  2017    w/ defibrillator   • ROTATOR CUFF REPAIR Right 2005       Family History   Problem Relation Age of Onset   • Heart disease Brother    • Diabetes Brother    • Sleep apnea Brother        Social History     Social History   • Marital status:      Social History Main Topics   • Smoking status: Former Smoker      Packs/day: 1.00     Years: 40.00     Types: Cigarettes   • Smokeless tobacco: Never Used   • Alcohol use No   • Drug use: No   • Sexual activity: Defer     Other Topics Concern   • Not on file           Objective   Physical Exam   Constitutional: He is oriented to person, place, and time. He appears well-developed and well-nourished.   HENT:   Head: Normocephalic and atraumatic.   Eyes: Pupils are equal, round, and reactive to light. Conjunctivae are normal.   Neck: Normal range of motion. Neck supple.       Cardiovascular: Normal rate, regular rhythm and normal heart sounds.    Pulmonary/Chest: Effort normal and breath sounds normal.   Abdominal: Soft. Bowel sounds are normal.   Musculoskeletal:        Arms:  Neurological: He is alert and oriented to person, place, and time.   Skin: Skin is warm and dry. Capillary refill takes less than 2 seconds.   Psychiatric: He has a normal mood and affect.   Nursing note and vitals reviewed.      Procedures           ED Course  ED Course as of Sep 10 1315   Mon Sep 10, 2018   1158 Pt is UTD on his Td vaccine.   [LF]   1208 Pt lactate 4.6; BNP 11,000, pt has a hx of ischemic cardiomyopathy and CHF. Pt VSS at this time, he is afebrile, will hold on sepsis fluid bolus at this time.   [LF]   1307 The patient's pacemaker/defibrillator was interrogated.  The report was sent over from [employer].  Reported to show the patient had several runs of V. tach.  The [employer] technician did call and talk to the nursing staff and reported that they did look at the tracings and did not believe that this was actually runs of V. tach.  He stated that because it is a one chamber pacemaker that sometimes it will appear like this on the reports.  Patient has had no episodes of V. tach while in the ER or any ectopy.  [LF]   1308 The patient's initial troponin was 0.155.  EKG was reviewed.  There are some abnormalities noted in V2, V3, V4, I did try to obtain a previous EKG from [place] on the  patient however was unable to obtain this.  [LF]   1309 The patient CMP shows a glucose of 121, creatinine 1.7, .  His sodium was 148.  Liver enzymes were also elevated with an AST of 72, and alkaline phosphatase of 172, total bilirubin 1.9.  He did have an anion gap of 19.  [LF]   1310 CBC shows a white blood cell count 15, hemoglobin 15, hematocrit 43, platelets of 313.  Chest exertion is no acute findings.  [LF]   1310 CT scan of the cervical spine showed no acute findings.  CT scan of the head did show a subarachnoid hemorrhage.  I have discussed the findings with Dr. Hu Calvillo.  Consult on the patient.  [LF]   1311 Skin of the cervical spine showed no acute findings.  CTA of the chest did show some liver lesions likely cyst.  There is no PE dissection noted.  [LF]   1311 CT scan of the abdomen and pelvis did show constipation versus impaction, a renal cyst as well as some adrenal nodules.  Patient advised follow-up on this.  [LF]   1311 Patient did become orthostatic when he stood up.  His blood pressure dropped from 102/82-80/53. Pt was given approximately 250 mL normal saline bolus.  [LF]   1312 At this time the patient's case has been discussed with Dr. Collins, the hospitalist on call.  He will be admitted to his services.  Patient will be admitted in guarded condition to the intensive care unit at this time.  [LF]      ED Course User Index  [LF] Karol Berrios, APRN                  MDM  Number of Diagnoses or Management Options  Adrenal nodule (CMS/HCC): new and requires workup  VANI (acute kidney injury) (CMS/HCC): new and requires workup  Chronic anticoagulation: new and requires workup  Constipation, unspecified constipation type: new and requires workup  Elevated liver enzymes: new and requires workup  Elevated troponin: new and requires workup  Renal cyst: new and requires workup  Subarachnoid hemorrhage (CMS/HCC): new and requires workup  Syncope, unspecified syncope type: new and requires  workup     Amount and/or Complexity of Data Reviewed  Clinical lab tests: ordered and reviewed  Tests in the radiology section of CPT®: ordered and reviewed  Tests in the medicine section of CPT®: ordered and reviewed  Discuss the patient with other providers: yes    Risk of Complications, Morbidity, and/or Mortality  General comments: Total critical care time: 40 mins    Critical Care  Total time providing critical care: 30-74 minutes    Patient Progress  Patient progress: other (comment) (Guarded condition)        Final diagnoses:   Subarachnoid hemorrhage (CMS/HCC)   Syncope, unspecified syncope type   Elevated liver enzymes   Constipation, unspecified constipation type   VANI (acute kidney injury) (CMS/HCC)   Elevated troponin   Renal cyst   Adrenal nodule (CMS/HCC)   Chronic anticoagulation            Karol Berrios, APRN  09/10/18 [id number]

## 2018-09-10 NOTE — THERAPY EVALUATION
Acute Care - Speech Language Pathology   Swallow Initial Evaluation [facility]     Patient Name: Travis Patel  : 1945  MRN: [id number]  Today's Date: 9/10/2018               Admit Date: 9/10/2018     SPEECH-LANGUAGE PATHOLOGY EVALUATION - SWALLOW  Subjective: The patient was seen on this date for a Clinical Swallow evaluation.  Patient was alert and cooperative.  Significant history: Prior CABG x2, prior intubation x2, prior dysphagia requiring of modified diet   Objective: Textures given included thin liquid, nectar thick liquid, honey thick liquid, puree consistency and regular consistency.  Assessment: Pt was presented a full range of consistencies excluding mechanical soft. Pt was noted to have prolonged oral prep with decreased rotary chew with regular solid. Oral residue WFL throughout eval. Hoarse vocal quality at baseline, even prior to PO intake; however, this did persistent during trials. Daughter stated pt's vocal quality has been hoarse for approximately six months, yet stated pt also had difficulty with proper voicing following 2x intubation in 2017. Pt showed no overt s/s of aspiration throughout eval. Cannot fully r/o aspiration at this time.   SLP Findings:  Patient presents with functional swallow, without esophageal component.   Recommendations: Diet Textures: thin liquid, regular consistency food.  Medications should be taken whole with thin liquids. May have water and ice between meals after oral care, under staff or family supervision and with the recommended strategies for safe swallowing.   Recommended Strategies: Upright for PO and small bites and sips. Oral care before breakfast, after all meals and PRN.  Other Recommended Evaluations: Re-evaluation at bedside to be ordered per MD if change or new concerns with swallow fx. ST to complete cognitive eval, as daughter reported cognitive decline over the past few months.  Dysphagia therapy is not recommended. Rationale: See above.  Thanks!   Tonya Hunter, CCC-SLP 9/10/2018 4:14 PM    Visit Dx:     ICD-10-CM ICD-9-CM   1. Subarachnoid hemorrhage (CMS/HCC) I60.9 430   2. Syncope, unspecified syncope type R55 780.2   3. Elevated liver enzymes R74.8 790.5   4. Constipation, unspecified constipation type K59.00 564.00   5. VANI (acute kidney injury) (CMS/HCC) N17.9 584.9   6. Elevated troponin R74.8 790.6   7. Renal cyst N28.1 753.10   8. Adrenal nodule (CMS/HCC) E27.9 255.8   9. Chronic anticoagulation Z79.01 V58.61   10. Dysphagia, unspecified type R13.10 787.20     Patient Active Problem List   Diagnosis   • Subarachnoid hemorrhage (CMS/HCC)     Past Medical History:   Diagnosis Date   • Hernia of abdominal wall    • Myocardial infarction 02/2017   • Sleep apnea    • Stroke (CMS/HCC) 02/2017     Past Surgical History:   Procedure Laterality Date   • COLOSTOMY  2001    x 3 months   • COLOSTOMY REVISION     • CORONARY ARTERY BYPASS GRAFT      1991/1996   • HERNIA REPAIR      abdominal hernia x 3; poor healing with multiple revisions   • PACEMAKER IMPLANTATION  2017    w/ defibrillator   • ROTATOR CUFF REPAIR Right 2005          SWALLOW EVALUATION (last 72 hours)      SLP Adult Swallow Evaluation     Row Name 09/10/18 1525                   Rehab Evaluation    Document Type evaluation  -TM        Subjective Information pain  -TM        Patient Observations alert;cooperative  -TM        Patient/Family Observations Daughter present during eval.  -TM        Patient Effort adequate  -TM           General Information    Patient Profile Reviewed yes  -TM        Pertinent History Of Current Problem CXR 09/10/2018 showed left upper lobe calcified granuloma, yet no active disease  -TM        Current Method of Nutrition regular textures;thin liquids  -TM        Precautions/Limitations, Vision WFL;for purposes of eval  -TM        Precautions/Limitations, Hearing WFL;for purposes of eval  -TM        Prior Level of Function-Communication cognitive-linguistic  impairment  -TM        Prior Level of Function-Swallowing dietary restrictions (e.g. consistent carb, low salt, etc.)   Cardiac restriction, hx of dysphagia and modified diet   -TM        Plans/Goals Discussed with patient;family  -TM        Barriers to Rehab cognitive status;previous functional deficit  -TM        Patient's Goals for Discharge patient did not state  -TM        Family Goals for Discharge family did not state  -TM           Pain Assessment    Additional Documentation Pain Scale: Numbers Pre/Post-Treatment (Group)  -TM           Pain Scale: Numbers Pre/Post-Treatment    Pain Scale: Numbers, Post-Treatment 6/10  -TM        Pain Location - Orientation lower  -TM        Pain Location abdomen  -TM        Pre/Post Treatment Pain Comment Pt stated RN is aware of pain at this time.  -TM           Oral Motor and Function    Dentition Assessment natural, present and adequate  -TM        Secretion Management WNL/WFL  -TM        Mucosal Quality moist, healthy  -TM        Volitional Swallow WFL  -TM           Oral Musculature and Cranial Nerve Assessment    Oral Motor General Assessment oral labial or buccal impairment  -TM        Oral Labial or Buccal Impairment, Detail, Cranial Nerve VII (Facial): reduced ROM;reduced strength bilaterally  -TM           General Eating/Swallowing Observations    Respiratory Support Currently in Use nasal cannula  -TM        Eating/Swallowing Skills fed by SLP  -TM        Positioning During Eating upright 90 degree;upright in bed  -TM        Utensils Used spoon;straw  -TM        Consistencies Trialed pudding thick;honey-thick liquids;nectar/syrup-thick liquids;thin liquids;regular textures  -TM           Clinical Swallow Eval    Oral Prep Phase impaired  -TM        Oral Transit WFL  -TM        Oral Residue WFL  -TM        Pharyngeal Phase WFL  -TM        Esophageal Phase unremarkable  -TM        Clinical Swallow Evaluation Summary Bedside swallow eval completed. Pt was presented a  full range of consistencies excluding mechanical soft. Pt was noted to have prolonged oral prep with decreased rotary chew with regular solid. Oral residue WFL throughout eval. Hoarse vocal quality at baseline, even prior to PO intake; however, this did persistent during trials. Daughter stated pt's vocal quality has been hoarse for approximately six months, yet stated pt also had difficulty with proper voicing following 2x intubation in Feb 2017. Pt showed no overt s/s of aspiration throughout eval. Cannot fully r/o aspiration at this time.   -TM           Oral Prep Concerns    Oral Prep Concerns prolonged mastication;other (see comments)   Decreased rotary chew, daughter feels this is baseline   -TM        Prolonged Mastication regular consistencies  -TM           Clinical Impression    SLP Swallowing Diagnosis functional oral phase;functional pharyngeal phase  -TM        Functional Impact no impact on function  -TM        Swallow Criteria for Skilled Therapeutic Interventions Met baseline status;no problems identified which require skilled intervention  -TM           Recommendations    Therapy Frequency (Swallow) evaluation only  -TM        SLP Diet Recommendation regular textures;thin liquids  -TM        Recommended Precautions and Strategies upright posture during/after eating;small bites of food and sips of liquid  -TM        SLP Rec. for Method of Medication Administration meds whole;with thin liquids  -TM        Monitor for Signs of Aspiration yes;cough;gurgly voice;throat clearing;pneumonia;right lower lobe infiltrates  -TM        Anticipated Dischage Disposition unknown  -TM          User Key  (r) = Recorded By, (t) = Taken By, (c) = Cosigned By    Initials Name Effective Dates    TM Tonya Hunter CCC-SLP 08/02/16 -         EDUCATION  The patient has been educated in the following areas:   Dysphagia (Swallowing Impairment).    SLP Recommendation and Plan  SLP Swallowing Diagnosis: functional oral phase,  functional pharyngeal phase  SLP Diet Recommendation: regular textures, thin liquids  Recommended Precautions and Strategies: upright posture during/after eating, small bites of food and sips of liquid     Monitor for Signs of Aspiration: yes, cough, gurgly voice, throat clearing, pneumonia, right lower lobe infiltrates     Swallow Criteria for Skilled Therapeutic Interventions Met: baseline status, no problems identified which require skilled intervention  Anticipated Dischage Disposition: unknown     Therapy Frequency (Swallow): evaluation only          Plan of Care Reviewed With: patient, daughter  Plan of Care Review  Plan of Care Reviewed With: patient, daughter  Progress:  (Eval)  Outcome Summary: Bedside swallow eval completed. Pt was presented a full range of consistencies excluding mechanical soft. Pt was noted to have prolonged oral prep with decreased rotary chew with regular solid. Oral residue WFL throughout eval. Hoarse vocal quality at baseline, even prior to PO intake; however, this did persistent during trials. Daughter stated pt's vocal quality has been hoarse for approximately six months, yet stated pt also had difficulty with proper voicing following 2x intubation in Feb 2017. Pt showed no overt s/s of aspiration throughout eval. Cannot fully r/o aspiration at this time. RECOMMENDATIONS: Continue current diet of regular solid diet consistency with regular/thin liquid consistency; general feeding and aspiration precautions; meds whole with thin liquids; RN to monitor for increased congestion. Continued ST services for dysphagia are not warranted at this time; however, ST will follow with plan to complete cognitive eval on 09/11/2018. Thanks!            SLP Outcome Measures (last 72 hours)      SLP Outcome Measures     Row Name 09/10/18 3877             SLP Outcome Measures    Outcome Measure Used? Adult NOMS  -TM         Adult FCM Scores    FCM Chosen Swallowing  -TM      Swallowing FCM Score 6   -TM        User Key  (r) = Recorded By, (t) = Taken By, (c) = Cosigned By    Initials Name Effective Dates    TM Tonya Hunter CCC-SLP 08/02/16 -            Time Calculation:         Time Calculation- SLP     Row Name 09/10/18 1610             Time Calculation- SLP    SLP Start Time 1525  -TM      SLP Stop Time 1620  -TM      SLP Time Calculation (min) 55 min  -TM      SLP Received On 09/10/18  -TM        User Key  (r) = Recorded By, (t) = Taken By, (c) = Cosigned By    Initials Name Provider Type     Tonya Hunter CCC-SLP Speech and Language Pathologist          Therapy Charges for Today     Code Description Service Date Service Provider Modifiers Qty    24757744465 HC ST SWALLOWING CURRENT STATUS 9/10/2018 Tonya Hunter CCC-SLP GN, CI 1    75455804069 HC ST SWALLOWING PROJECTED 9/10/2018 Tonya Hunter CCC-SLP GN, CI 1    33073431939 HC ST SWALLOWING DISCHARGE 9/10/2018 Tonya Hunter CCC-SLP GN, CI 1    87916331544 HC ST EVAL ORAL PHARYNG SWALLOW 4 9/10/2018 Tonya Hunter CCC-SLP GN 1          SLP G-Codes  SLP NOMS Used?: Yes  Functional Limitations: Swallowing  Swallow Current Status (): At least 1 percent but less than 20 percent impaired, limited or restricted  Swallow Goal Status (): At least 1 percent but less than 20 percent impaired, limited or restricted  Swallow Discharge Status (): At least 1 percent but less than 20 percent impaired, limited or restricted    BYRON Estes  9/10/2018

## 2018-09-10 NOTE — PROGRESS NOTES
Discharge Planning Assessment  [facility]     Patient Name: Travis Patel  MRN: [id number]  Today's Date: 9/10/2018    Admit Date: 9/10/2018          Discharge Needs Assessment     Row Name 09/10/18 1314       Living Environment    Lives With spouse    Name(s) of Who Lives With Patient Fransisca Patel    Current Living Arrangements home/apartment/condo    Primary Care Provided by spouse/significant other;child(luan)    Provides Primary Care For no one    Family Caregiver if Needed child(luan), adult;spouse    Family Caregiver Names Fransisca Patel, wife     Jennifer Turcios, daughter    Quality of Family Relationships helpful;supportive;involved    Able to Return to Prior Arrangements other (see comments)    Living Arrangement Comments Daughter thinks he may need SNF at discharge. Patient wants to go home       Resource/Environmental Concerns    Resource/Environmental Concerns none       Transition Planning    Patient/Family Anticipates Transition to home;long term care facility    Patient/Family Anticipated Services at Transition home health care    Transportation Anticipated family or friend will provide       Discharge Needs Assessment    Equipment Currently Used at Home shower chair;walker, rolling    Anticipated Changes Related to Illness inability to care for self    Equipment Needed After Discharge none    Offered/Gave Vendor List other (see comments)    Patient's Choice of Community Agency(s) Patient already has home health but doesn't remember who it is            Discharge Plan    No documentation.       Destination     No service coordination in this encounter.      Durable Medical Equipment     No service coordination in this encounter.      Dialysis/Infusion     No service coordination in this encounter.      Home Medical Care     No service coordination in this encounter.      Social Care     No service coordination in this encounter.                Demographic Summary    No documentation.           Functional  Status    No documentation.           Psychosocial    No documentation.           Abuse/Neglect    No documentation.           Legal    No documentation.           Substance Abuse    No documentation.           Patient Forms    No documentation.         Mariposa Sorto LPN

## 2018-09-11 ENCOUNTER — APPOINTMENT (OUTPATIENT)
Dept: CT IMAGING | Facility: HOSPITAL | Age: 73
End: 2018-09-11

## 2018-09-11 ENCOUNTER — APPOINTMENT (OUTPATIENT)
Dept: CARDIOLOGY | Facility: HOSPITAL | Age: 73
End: 2018-09-11
Attending: FAMILY MEDICINE

## 2018-09-11 ENCOUNTER — APPOINTMENT (OUTPATIENT)
Dept: ULTRASOUND IMAGING | Facility: HOSPITAL | Age: 73
End: 2018-09-11

## 2018-09-11 LAB
ALBUMIN SERPL-MCNC: 4 G/DL (ref 3.5–5)
ALBUMIN/GLOB SERPL: 1.5 G/DL (ref 1.1–2.5)
ALP SERPL-CCNC: 121 U/L (ref 24–120)
ALT SERPL W P-5'-P-CCNC: 31 U/L (ref 0–54)
ANION GAP SERPL CALCULATED.3IONS-SCNC: 16 MMOL/L (ref 4–13)
AST SERPL-CCNC: 60 U/L (ref 7–45)
BASOPHILS # BLD AUTO: 0.03 10*3/MM3 (ref 0–0.2)
BASOPHILS NFR BLD AUTO: 0.2 % (ref 0–2)
BH CV ECHO MEAS - AO MAX PG (FULL): 1.1 MMHG
BH CV ECHO MEAS - AO MAX PG: 3.9 MMHG
BH CV ECHO MEAS - AO MEAN PG (FULL): 1 MMHG
BH CV ECHO MEAS - AO MEAN PG: 3 MMHG
BH CV ECHO MEAS - AO ROOT AREA (BSA CORRECTED): 1.7
BH CV ECHO MEAS - AO ROOT AREA: 7.1 CM^2
BH CV ECHO MEAS - AO ROOT DIAM: 3 CM
BH CV ECHO MEAS - AO V2 MAX: 98.3 CM/SEC
BH CV ECHO MEAS - AO V2 MEAN: 79.4 CM/SEC
BH CV ECHO MEAS - AO V2 VTI: 18.7 CM
BH CV ECHO MEAS - AVA(I,A): 2.6 CM^2
BH CV ECHO MEAS - AVA(I,D): 2.6 CM^2
BH CV ECHO MEAS - AVA(V,A): 2.6 CM^2
BH CV ECHO MEAS - AVA(V,D): 2.6 CM^2
BH CV ECHO MEAS - BSA(HAYCOCK): 1.8 M^2
BH CV ECHO MEAS - BSA: 1.8 M^2
BH CV ECHO MEAS - BZI_BMI: 23.8 KILOGRAMS/M^2
BH CV ECHO MEAS - BZI_METRIC_HEIGHT: 170.2 CM
BH CV ECHO MEAS - BZI_METRIC_WEIGHT: 68.9 KG
BH CV ECHO MEAS - EDV(CUBED): 109.2 ML
BH CV ECHO MEAS - EDV(MOD-SP4): 90.4 ML
BH CV ECHO MEAS - EDV(TEICH): 106.5 ML
BH CV ECHO MEAS - EF(CUBED): 47.3 %
BH CV ECHO MEAS - EF(MOD-SP4): 49.1 %
BH CV ECHO MEAS - EF(TEICH): 39.6 %
BH CV ECHO MEAS - ESV(CUBED): 57.5 ML
BH CV ECHO MEAS - ESV(MOD-SP4): 46 ML
BH CV ECHO MEAS - ESV(TEICH): 64.3 ML
BH CV ECHO MEAS - FS: 19.2 %
BH CV ECHO MEAS - IVS/LVPW: 1.2
BH CV ECHO MEAS - IVSD: 1.3 CM
BH CV ECHO MEAS - LA DIMENSION: 3.5 CM
BH CV ECHO MEAS - LA/AO: 1.2
BH CV ECHO MEAS - LAT PEAK E' VEL: 8.3 CM/SEC
BH CV ECHO MEAS - LV DIASTOLIC VOL/BSA (35-75): 50.2 ML/M^2
BH CV ECHO MEAS - LV MASS(C)D: 208.8 GRAMS
BH CV ECHO MEAS - LV MASS(C)DI: 116 GRAMS/M^2
BH CV ECHO MEAS - LV MAX PG: 2.7 MMHG
BH CV ECHO MEAS - LV MEAN PG: 2 MMHG
BH CV ECHO MEAS - LV SYSTOLIC VOL/BSA (12-30): 25.6 ML/M^2
BH CV ECHO MEAS - LV V1 MAX: 82.8 CM/SEC
BH CV ECHO MEAS - LV V1 MEAN: 61.4 CM/SEC
BH CV ECHO MEAS - LV V1 VTI: 15.5 CM
BH CV ECHO MEAS - LVIDD: 4.8 CM
BH CV ECHO MEAS - LVIDS: 3.9 CM
BH CV ECHO MEAS - LVLD AP4: 7.7 CM
BH CV ECHO MEAS - LVLS AP4: 6.9 CM
BH CV ECHO MEAS - LVOT AREA (M): 3.1 CM^2
BH CV ECHO MEAS - LVOT AREA: 3.1 CM^2
BH CV ECHO MEAS - LVOT DIAM: 2 CM
BH CV ECHO MEAS - LVPWD: 1 CM
BH CV ECHO MEAS - MED PEAK E' VEL: 4.03 CM/SEC
BH CV ECHO MEAS - MR ALIAS VEL: 30.8 CM/SEC
BH CV ECHO MEAS - MR ERO: 0.06 CM^2
BH CV ECHO MEAS - MR FLOW RATE: 31 CM^3/SEC
BH CV ECHO MEAS - MR MAX PG: 102.4 MMHG
BH CV ECHO MEAS - MR MAX VEL: 506 CM/SEC
BH CV ECHO MEAS - MR MEAN PG: 57 MMHG
BH CV ECHO MEAS - MR MEAN VEL: 346 CM/SEC
BH CV ECHO MEAS - MR PISA RADIUS: 0.4 CM
BH CV ECHO MEAS - MR PISA: 1 CM^2
BH CV ECHO MEAS - MR VOLUME: 10 ML
BH CV ECHO MEAS - MR VTI: 164 CM
BH CV ECHO MEAS - MV A MAX VEL: 76.2 CM/SEC
BH CV ECHO MEAS - MV DEC TIME: 0.3 SEC
BH CV ECHO MEAS - MV E MAX VEL: 38.3 CM/SEC
BH CV ECHO MEAS - MV E/A: 0.5
BH CV ECHO MEAS - PA MAX PG: 2.3 MMHG
BH CV ECHO MEAS - PA V2 MAX: 75.2 CM/SEC
BH CV ECHO MEAS - RAP SYSTOLE: 5 MMHG
BH CV ECHO MEAS - RVSP: 18.2 MMHG
BH CV ECHO MEAS - SI(AO): 73.5 ML/M^2
BH CV ECHO MEAS - SI(CUBED): 28.7 ML/M^2
BH CV ECHO MEAS - SI(LVOT): 27.1 ML/M^2
BH CV ECHO MEAS - SI(MOD-SP4): 24.7 ML/M^2
BH CV ECHO MEAS - SI(TEICH): 23.4 ML/M^2
BH CV ECHO MEAS - SV(AO): 132.2 ML
BH CV ECHO MEAS - SV(CUBED): 51.7 ML
BH CV ECHO MEAS - SV(LVOT): 48.7 ML
BH CV ECHO MEAS - SV(MOD-SP4): 44.4 ML
BH CV ECHO MEAS - SV(TEICH): 42.2 ML
BH CV ECHO MEAS - TR MAX VEL: 182 CM/SEC
BH CV ECHO MEASUREMENTS AVERAGE E/E' RATIO: 6.21
BILIRUB SERPL-MCNC: 1.1 MG/DL (ref 0.1–1)
BUN BLD-MCNC: 36 MG/DL (ref 5–21)
BUN/CREAT SERPL: 19.1 (ref 7–25)
CALCIUM SPEC-SCNC: 9.2 MG/DL (ref 8.4–10.4)
CHLORIDE SERPL-SCNC: 105 MMOL/L (ref 98–110)
CO2 SERPL-SCNC: 21 MMOL/L (ref 24–31)
CREAT BLD-MCNC: 1.88 MG/DL (ref 0.5–1.4)
CREAT UR-MCNC: 96.5 MG/DL
D-LACTATE SERPL-SCNC: 3.3 MMOL/L (ref 0.5–2)
DEPRECATED RDW RBC AUTO: 53.1 FL (ref 40–54)
EOSINOPHIL # BLD AUTO: 0.13 10*3/MM3 (ref 0–0.7)
EOSINOPHIL NFR BLD AUTO: 1 % (ref 0–4)
ERYTHROCYTE [DISTWIDTH] IN BLOOD BY AUTOMATED COUNT: 14.7 % (ref 12–15)
GFR SERPL CREATININE-BSD FRML MDRD: 36 ML/MIN/1.73
GLOBULIN UR ELPH-MCNC: 2.7 GM/DL
GLUCOSE BLD-MCNC: 112 MG/DL (ref 70–100)
HCT VFR BLD AUTO: 39 % (ref 40–52)
HGB BLD-MCNC: 13.3 G/DL (ref 14–18)
IMM GRANULOCYTES # BLD: 0.07 10*3/MM3 (ref 0–0.03)
IMM GRANULOCYTES NFR BLD: 0.6 % (ref 0–5)
IRON 24H UR-MRATE: 116 MCG/DL (ref 42–180)
IRON SATN MFR SERPL: 30 % (ref 20–45)
LEFT ATRIUM VOLUME INDEX: 30.8 ML/M2
LEFT ATRIUM VOLUME: 8.3 CM3
LV EF 2D ECHO EST: 40 %
LYMPHOCYTES # BLD AUTO: 1.81 10*3/MM3 (ref 0.72–4.86)
LYMPHOCYTES NFR BLD AUTO: 14.5 % (ref 15–45)
MAXIMAL PREDICTED HEART RATE: 149 BPM
MCH RBC QN AUTO: 33.2 PG (ref 28–32)
MCHC RBC AUTO-ENTMCNC: 34.1 G/DL (ref 33–36)
MCV RBC AUTO: 97.3 FL (ref 82–95)
MONOCYTES # BLD AUTO: 0.86 10*3/MM3 (ref 0.19–1.3)
MONOCYTES NFR BLD AUTO: 6.9 % (ref 4–12)
NEUTROPHILS # BLD AUTO: 9.57 10*3/MM3 (ref 1.87–8.4)
NEUTROPHILS NFR BLD AUTO: 76.8 % (ref 39–78)
NRBC BLD MANUAL-RTO: 0 /100 WBC (ref 0–0)
PLATELET # BLD AUTO: 206 10*3/MM3 (ref 130–400)
PMV BLD AUTO: 10.4 FL (ref 6–12)
POTASSIUM BLD-SCNC: 3.2 MMOL/L (ref 3.5–5.3)
PROT SERPL-MCNC: 6.7 G/DL (ref 6.3–8.7)
PROT UR-MCNC: 11 MG/DL (ref 0–13.5)
RBC # BLD AUTO: 4.01 10*6/MM3 (ref 4.8–5.9)
SODIUM BLD-SCNC: 142 MMOL/L (ref 135–145)
SODIUM UR-SCNC: 18 MMOL/L (ref 30–90)
STRESS TARGET HR: 127 BPM
TIBC SERPL-MCNC: 390 MCG/DL (ref 225–420)
TROPONIN I SERPL-MCNC: 0.11 NG/ML (ref 0–0.03)
TROPONIN I SERPL-MCNC: 0.18 NG/ML (ref 0–0.03)
TROPONIN I SERPL-MCNC: 0.2 NG/ML (ref 0–0.03)
URATE SERPL-MCNC: 10.9 MG/DL (ref 3.5–8.5)
UUN 24H UR-MCNC: 951 MG/DL
WBC NRBC COR # BLD: 12.47 10*3/MM3 (ref 4.8–10.8)

## 2018-09-11 PROCEDURE — G9175 SPEECH LANG GOAL STATUS: HCPCS

## 2018-09-11 PROCEDURE — G9174 SPEECH LANG CURRENT STATUS: HCPCS

## 2018-09-11 PROCEDURE — 92523 SPEECH SOUND LANG COMPREHEN: CPT

## 2018-09-11 PROCEDURE — 84300 ASSAY OF URINE SODIUM: CPT | Performed by: NURSE PRACTITIONER

## 2018-09-11 PROCEDURE — 76775 US EXAM ABDO BACK WALL LIM: CPT

## 2018-09-11 PROCEDURE — 25810000003 SODIUM CHLORIDE 0.9 % WITH KCL 20 MEQ 20-0.9 MEQ/L-% SOLUTION: Performed by: FAMILY MEDICINE

## 2018-09-11 PROCEDURE — 93010 ELECTROCARDIOGRAM REPORT: CPT | Performed by: INTERNAL MEDICINE

## 2018-09-11 PROCEDURE — 85025 COMPLETE CBC W/AUTO DIFF WBC: CPT | Performed by: FAMILY MEDICINE

## 2018-09-11 PROCEDURE — 84156 ASSAY OF PROTEIN URINE: CPT | Performed by: NURSE PRACTITIONER

## 2018-09-11 PROCEDURE — 99231 SBSQ HOSP IP/OBS SF/LOW 25: CPT | Performed by: NEUROLOGICAL SURGERY

## 2018-09-11 PROCEDURE — 93306 TTE W/DOPPLER COMPLETE: CPT | Performed by: INTERNAL MEDICINE

## 2018-09-11 PROCEDURE — 86334 IMMUNOFIX E-PHORESIS SERUM: CPT | Performed by: INTERNAL MEDICINE

## 2018-09-11 PROCEDURE — 82784 ASSAY IGA/IGD/IGG/IGM EACH: CPT | Performed by: INTERNAL MEDICINE

## 2018-09-11 PROCEDURE — 25010000002 PERFLUTREN 6.52 MG/ML SUSPENSION: Performed by: FAMILY MEDICINE

## 2018-09-11 PROCEDURE — 93306 TTE W/DOPPLER COMPLETE: CPT

## 2018-09-11 PROCEDURE — 70450 CT HEAD/BRAIN W/O DYE: CPT

## 2018-09-11 PROCEDURE — 83605 ASSAY OF LACTIC ACID: CPT | Performed by: FAMILY MEDICINE

## 2018-09-11 PROCEDURE — 93005 ELECTROCARDIOGRAM TRACING: CPT | Performed by: FAMILY MEDICINE

## 2018-09-11 PROCEDURE — 80053 COMPREHEN METABOLIC PANEL: CPT | Performed by: FAMILY MEDICINE

## 2018-09-11 PROCEDURE — 84484 ASSAY OF TROPONIN QUANT: CPT | Performed by: FAMILY MEDICINE

## 2018-09-11 PROCEDURE — 82570 ASSAY OF URINE CREATININE: CPT | Performed by: NURSE PRACTITIONER

## 2018-09-11 PROCEDURE — 84540 ASSAY OF URINE/UREA-N: CPT | Performed by: NURSE PRACTITIONER

## 2018-09-11 RX ORDER — MIDODRINE HYDROCHLORIDE 2.5 MG/1
10 TABLET ORAL
Status: DISCONTINUED | OUTPATIENT
Start: 2018-09-11 | End: 2018-09-20 | Stop reason: HOSPADM

## 2018-09-11 RX ORDER — DULOXETIN HYDROCHLORIDE 30 MG/1
60 CAPSULE, DELAYED RELEASE ORAL DAILY
Status: DISCONTINUED | OUTPATIENT
Start: 2018-09-11 | End: 2018-09-20 | Stop reason: HOSPADM

## 2018-09-11 RX ORDER — FOLIC ACID 1 MG/1
1 TABLET ORAL DAILY
Status: DISCONTINUED | OUTPATIENT
Start: 2018-09-11 | End: 2018-09-20 | Stop reason: HOSPADM

## 2018-09-11 RX ORDER — QUETIAPINE FUMARATE 100 MG/1
100 TABLET, FILM COATED ORAL NIGHTLY
Status: DISCONTINUED | OUTPATIENT
Start: 2018-09-11 | End: 2018-09-20 | Stop reason: HOSPADM

## 2018-09-11 RX ORDER — LISINOPRIL 2.5 MG/1
2.5 TABLET ORAL DAILY
COMMUNITY
End: 2018-09-20 | Stop reason: HOSPADM

## 2018-09-11 RX ORDER — ATORVASTATIN CALCIUM 40 MG/1
80 TABLET, FILM COATED ORAL NIGHTLY
Status: DISCONTINUED | OUTPATIENT
Start: 2018-09-11 | End: 2018-09-20 | Stop reason: HOSPADM

## 2018-09-11 RX ORDER — LITHIUM CARBONATE 150 MG/1
150 CAPSULE ORAL 2 TIMES DAILY WITH MEALS
COMMUNITY

## 2018-09-11 RX ORDER — HYDROCODONE BITARTRATE AND ACETAMINOPHEN 10; 325 MG/1; MG/1
1 TABLET ORAL EVERY 6 HOURS PRN
Status: ON HOLD | COMMUNITY
End: 2022-01-01

## 2018-09-11 RX ORDER — POTASSIUM CHLORIDE 750 MG/1
20 CAPSULE, EXTENDED RELEASE ORAL DAILY
Status: DISCONTINUED | OUTPATIENT
Start: 2018-09-11 | End: 2018-09-20 | Stop reason: HOSPADM

## 2018-09-11 RX ORDER — ASPIRIN 81 MG/1
81 TABLET, CHEWABLE ORAL DAILY
Status: DISCONTINUED | OUTPATIENT
Start: 2018-09-11 | End: 2018-09-11

## 2018-09-11 RX ORDER — FOLIC ACID 1 MG/1
1 TABLET ORAL DAILY
COMMUNITY

## 2018-09-11 RX ORDER — CLOPIDOGREL BISULFATE 75 MG/1
75 TABLET ORAL DAILY
Status: DISCONTINUED | OUTPATIENT
Start: 2018-09-11 | End: 2018-09-11

## 2018-09-11 RX ORDER — CLOPIDOGREL BISULFATE 75 MG/1
75 TABLET ORAL DAILY
COMMUNITY
End: 2018-09-20 | Stop reason: HOSPADM

## 2018-09-11 RX ORDER — SODIUM CHLORIDE AND POTASSIUM CHLORIDE 150; 900 MG/100ML; MG/100ML
50 INJECTION, SOLUTION INTRAVENOUS CONTINUOUS
Status: DISCONTINUED | OUTPATIENT
Start: 2018-09-11 | End: 2018-09-12

## 2018-09-11 RX ORDER — BUSPIRONE HYDROCHLORIDE 10 MG/1
10 TABLET ORAL 2 TIMES DAILY
Status: DISCONTINUED | OUTPATIENT
Start: 2018-09-11 | End: 2018-09-20 | Stop reason: HOSPADM

## 2018-09-11 RX ORDER — RANITIDINE 150 MG/1
150 TABLET ORAL 2 TIMES DAILY
Status: ON HOLD | COMMUNITY
End: 2022-01-01

## 2018-09-11 RX ORDER — MIDODRINE HYDROCHLORIDE 2.5 MG/1
5 TABLET ORAL 2 TIMES DAILY
Status: DISCONTINUED | OUTPATIENT
Start: 2018-09-11 | End: 2018-09-11

## 2018-09-11 RX ORDER — FAMOTIDINE 20 MG/1
20 TABLET, FILM COATED ORAL DAILY
Status: DISCONTINUED | OUTPATIENT
Start: 2018-09-11 | End: 2018-09-20 | Stop reason: HOSPADM

## 2018-09-11 RX ORDER — ASPIRIN 81 MG/1
81 TABLET, CHEWABLE ORAL DAILY
COMMUNITY
End: 2018-09-20 | Stop reason: HOSPADM

## 2018-09-11 RX ORDER — ATORVASTATIN CALCIUM 80 MG/1
80 TABLET, FILM COATED ORAL NIGHTLY
COMMUNITY
End: 2022-01-01 | Stop reason: HOSPADM

## 2018-09-11 RX ORDER — DULOXETIN HYDROCHLORIDE 60 MG/1
60 CAPSULE, DELAYED RELEASE ORAL NIGHTLY
COMMUNITY

## 2018-09-11 RX ADMIN — DULOXETINE HYDROCHLORIDE 60 MG: 30 CAPSULE, DELAYED RELEASE ORAL at 08:43

## 2018-09-11 RX ADMIN — BUSPIRONE HYDROCHLORIDE 10 MG: 10 TABLET ORAL at 08:43

## 2018-09-11 RX ADMIN — FAMOTIDINE 20 MG: 20 TABLET, FILM COATED ORAL at 08:43

## 2018-09-11 RX ADMIN — FOLIC ACID 1 MG: 1 TABLET ORAL at 08:43

## 2018-09-11 RX ADMIN — LACTULOSE 20 G: 20 SOLUTION ORAL at 20:20

## 2018-09-11 RX ADMIN — MIDODRINE HYDROCHLORIDE 10 MG: 2.5 TABLET ORAL at 17:38

## 2018-09-11 RX ADMIN — MIDODRINE HYDROCHLORIDE 10 MG: 2.5 TABLET ORAL at 08:43

## 2018-09-11 RX ADMIN — POTASSIUM CHLORIDE AND SODIUM CHLORIDE 75 ML/HR: 900; 150 INJECTION, SOLUTION INTRAVENOUS at 20:20

## 2018-09-11 RX ADMIN — MIDODRINE HYDROCHLORIDE 10 MG: 2.5 TABLET ORAL at 11:23

## 2018-09-11 RX ADMIN — QUETIAPINE FUMARATE 100 MG: 100 TABLET ORAL at 20:19

## 2018-09-11 RX ADMIN — LACTULOSE 20 G: 20 SOLUTION ORAL at 08:44

## 2018-09-11 RX ADMIN — POTASSIUM CHLORIDE AND SODIUM CHLORIDE 75 ML/HR: 900; 150 INJECTION, SOLUTION INTRAVENOUS at 08:45

## 2018-09-11 RX ADMIN — SODIUM CHLORIDE 500 ML: 9 INJECTION, SOLUTION INTRAVENOUS at 05:31

## 2018-09-11 RX ADMIN — ATORVASTATIN CALCIUM 80 MG: 40 TABLET, FILM COATED ORAL at 20:20

## 2018-09-11 RX ADMIN — POTASSIUM CHLORIDE 20 MEQ: 750 CAPSULE, EXTENDED RELEASE ORAL at 08:43

## 2018-09-11 RX ADMIN — BUSPIRONE HYDROCHLORIDE 10 MG: 10 TABLET ORAL at 20:19

## 2018-09-11 RX ADMIN — LACTULOSE 20 G: 20 SOLUTION ORAL at 16:31

## 2018-09-11 RX ADMIN — PERFLUTREN 9.78 MG: 6.52 INJECTION, SUSPENSION INTRAVENOUS at 07:34

## 2018-09-11 RX ADMIN — SODIUM CHLORIDE 500 ML: 9 INJECTION, SOLUTION INTRAVENOUS at 04:14

## 2018-09-11 NOTE — CONSULTS
Travis ABHI Patel  73 y.o.    Subjective  No events    Temp:  [97.4 °F (36.3 °C)-98.4 °F (36.9 °C)] 97.7 °F (36.5 °C)  Heart Rate:  [] 97  Resp:  [14-21] 21  BP: ()/(41-96) 69/51      Objective    Neurologic Exam    NAD  AAox3  MAEW    Active Problems:    Subarachnoid hemorrhage (CMS/HCC)      Lab Results (last 24 hours)     Procedure Component Value Units Date/Time    Comprehensive Metabolic Panel [[id number]]  (Abnormal) Collected:  09/11/18 0448    Specimen:  Blood Updated:  09/11/18 0528     Glucose 112 (H) mg/dL      BUN 36 (H) mg/dL      Creatinine 1.88 (H) mg/dL      Sodium 142 mmol/L      Potassium 3.2 (L) mmol/L      Chloride 105 mmol/L      CO2 21.0 (L) mmol/L      Calcium 9.2 mg/dL      Total Protein 6.7 g/dL      Albumin 4.00 g/dL      ALT (SGPT) 31 U/L      AST (SGOT) 60 (H) U/L      Alkaline Phosphatase 121 (H) U/L      Total Bilirubin 1.1 (H) mg/dL      eGFR Non African Amer 36 (L) mL/min/1.73      Globulin 2.7 gm/dL      A/G Ratio 1.5 g/dL      BUN/Creatinine Ratio 19.1     Anion Gap 16.0 (H) mmol/L     Narrative:       The MDRD GFR formula is only valid for adults with stable renal function between ages 18 and 70.    Troponin [[id number]]  (Abnormal) Collected:  09/11/18 0448    Specimen:  Blood Updated:  09/11/18 0520     Troponin I 0.183 (H) ng/mL     Lactic Acid, Plasma [[id number]]  (Abnormal) Collected:  09/11/18 0448    Specimen:  Blood Updated:  09/11/18 0510     Lactate 3.3 (C) mmol/L     CBC & Differential [[id number]] Collected:  09/11/18 0448    Specimen:  Blood Updated:  09/11/18 0503    Narrative:       The following orders were created for panel order CBC & Differential.  Procedure                               Abnormality         Status                     ---------                               -----------         ------                     CBC Auto Differential[[id number]]        Abnormal            Final result                 Please view results for these tests on the  individual orders.    CBC Auto Differential [[id number]]  (Abnormal) Collected:  09/11/18 0448    Specimen:  Blood Updated:  09/11/18 0503     WBC 12.47 (H) 10*3/mm3      RBC 4.01 (L) 10*6/mm3      Hemoglobin 13.3 (L) g/dL      Hematocrit 39.0 (L) %      MCV 97.3 (H) fL      MCH 33.2 (H) pg      MCHC 34.1 g/dL      RDW 14.7 %      RDW-SD 53.1 fl      MPV 10.4 fL      Platelets 206 10*3/mm3      Neutrophil % 76.8 %      Lymphocyte % 14.5 (L) %      Monocyte % 6.9 %      Eosinophil % 1.0 %      Basophil % 0.2 %      Immature Grans % 0.6 %      Neutrophils, Absolute 9.57 (H) 10*3/mm3      Lymphocytes, Absolute 1.81 10*3/mm3      Monocytes, Absolute 0.86 10*3/mm3      Eosinophils, Absolute 0.13 10*3/mm3      Basophils, Absolute 0.03 10*3/mm3      Immature Grans, Absolute 0.07 (H) 10*3/mm3      nRBC 0.0 /100 WBC     Blood Culture - Blood, [[id number]]  (Normal) Collected:  09/10/18 1325    Specimen:  Blood from Arm, Right Updated:  09/11/18 0145     Blood Culture No growth at less than 24 hours    Blood Culture - Blood, [[id number]]  (Normal) Collected:  09/10/18 1315    Specimen:  Blood from Arm, Left Updated:  09/11/18 0145     Blood Culture No growth at less than 24 hours    Troponin [[id number]]  (Abnormal) Collected:  09/10/18 2318    Specimen:  Blood Updated:  09/11/18 0013     Troponin I 0.196 (H) ng/mL     Lactic Acid, Reflex [[id number]]  (Abnormal) Collected:  09/10/18 1600    Specimen:  Blood Updated:  09/10/18 1620     Lactate 2.9 (C) mmol/L     Lactic Acid, Reflex Timer (This will reflex a repeat order 3-3:15 hours after ordered.) [[id number]] Collected:  09/10/18 1110    Specimen:  Blood Updated:  09/10/18 1517     Extra Tube Hold for add-ons.     Comment: Auto resulted.       Urinalysis With Microscopic If Indicated (No Culture) - Urine, Clean Catch [[id number]]  (Abnormal) Collected:  09/10/18 1407    Specimen:  Urine from Urine, Clean Catch Updated:  09/10/18 1415     Color, UA Yellow     Appearance, UA Clear      pH, UA 6.5     Specific Gravity, UA 1.026     Glucose, UA Negative     Ketones, UA Trace (A)     Bilirubin, UA Negative     Blood, UA Negative     Protein, UA Negative     Leuk Esterase, UA Negative     Nitrite, UA Negative     Urobilinogen, UA 1.0 E.U./dL    Narrative:       Urine microscopic not indicated.    Lactic Acid, Plasma [[id number]]  (Abnormal) Collected:  09/10/18 1110    Specimen:  Blood Updated:  09/10/18 1205     Lactate 4.6 (C) mmol/L     Troponin [[id number]]  (Abnormal) Collected:  09/10/18 1110    Specimen:  Blood Updated:  09/10/18 1140     Troponin I 0.155 (H) ng/mL     BNP [[id number]]  (Abnormal) Collected:  09/10/18 1110    Specimen:  Blood Updated:  09/10/18 1140     proBNP 11,100.0 (H) pg/mL     Comprehensive Metabolic Panel [[id number]]  (Abnormal) Collected:  09/10/18 1110    Specimen:  Blood Updated:  09/10/18 1128     Glucose 121 (H) mg/dL      BUN 41 (H) mg/dL      Creatinine 1.75 (H) mg/dL      Sodium 148 (H) mmol/L      Potassium 4.4 mmol/L      Chloride 105 mmol/L      CO2 24.0 mmol/L      Calcium 10.7 (H) mg/dL      Total Protein 8.6 g/dL      Albumin 5.00 g/dL      ALT (SGPT) 33 U/L      AST (SGOT) 72 (H) U/L      Alkaline Phosphatase 172 (H) U/L      Total Bilirubin 1.9 (H) mg/dL      eGFR Non African Amer 39 (L) mL/min/1.73      Globulin 3.6 gm/dL      A/G Ratio 1.4 g/dL      BUN/Creatinine Ratio 23.4     Anion Gap 19.0 (H) mmol/L     Narrative:       The MDRD GFR formula is only valid for adults with stable renal function between ages 18 and 70.    Protime-INR [[id number]]  (Normal) Collected:  09/10/18 1110    Specimen:  Blood Updated:  09/10/18 1127     Protime 13.4 Seconds      INR 0.99    aPTT [[id number]]  (Normal) Collected:  09/10/18 1110    Specimen:  Blood Updated:  09/10/18 1127     PTT 28.3 seconds     Lithium Level [[id number]]  (Normal) Collected:  09/10/18 1110    Specimen:  Blood Updated:  09/10/18 1126     Lithium 0.8 mmol/L     CBC & Differential [[id number]]  Collected:  09/10/18 1110    Specimen:  Blood Updated:  09/10/18 1118    Narrative:       The following orders were created for panel order CBC & Differential.  Procedure                               Abnormality         Status                     ---------                               -----------         ------                     CBC Auto Differential[[id number]]        Abnormal            Final result                 Please view results for these tests on the individual orders.    CBC Auto Differential [[id number]]  (Abnormal) Collected:  09/10/18 1110    Specimen:  Blood Updated:  09/10/18 1118     WBC 15.60 (H) 10*3/mm3      RBC 4.66 (L) 10*6/mm3      Hemoglobin 15.2 g/dL      Hematocrit 43.9 %      MCV 94.2 fL      MCH 32.6 (H) pg      MCHC 34.6 g/dL      RDW 14.6 %      RDW-SD 50.5 fl      MPV 10.3 fL      Platelets 313 10*3/mm3      Neutrophil % 82.9 (H) %      Lymphocyte % 11.1 (L) %      Monocyte % 5.3 %      Eosinophil % 0.1 %      Basophil % 0.2 %      Immature Grans % 0.4 %      Neutrophils, Absolute 12.93 (H) 10*3/mm3      Lymphocytes, Absolute 1.73 10*3/mm3      Monocytes, Absolute 0.83 10*3/mm3      Eosinophils, Absolute 0.01 10*3/mm3      Basophils, Absolute 0.03 10*3/mm3      Immature Grans, Absolute 0.07 (H) 10*3/mm3      nRBC 0.0 /100 WBC               Plan:   Syncope with fall    1) Subarachnoid hemorrhage - likely traumatic, no headache or nuchal rigidity, neuro exam stable, monitor with serial imaging/neuro checks.  Ok for floor from NS standpoint.    Hu Calvillo MD

## 2018-09-11 NOTE — PAYOR COMM NOTE
"FROM: WENDI LROENZANA  PHONE: [phone]  FAX: [phone]    ID: [id number]    Travis Patel (73 y.o. Male)     Date of Birth Social Security Number Address Home Phone MRN    1945  [id number]  [facility] [address] [phone] [id number]    Orthodoxy Marital Status          Rastafarian        Admission Date Admission Type Admitting Provider Attending Provider Department, Room/Bed    9/10/18 Emergency Kenya Gomez DO Horn, Frances Marie, DO [facility] INTENSIVE CARE, I004/1    Discharge Date Discharge Disposition Discharge Destination                       Attending Provider:  Kenya Gomez DO    Allergies:  Sulfa Antibiotics    Isolation:  None   Infection:  None   Code Status:  CPR    Ht:  170.2 cm (67\")   Wt:  69.1 kg (152 lb 4.8 oz)    Admission Cmt:  None   Principal Problem:  None                Active Insurance as of 9/10/2018     Primary Coverage     Payor Plan Insurance Group Employer/Plan Group    HUMANA MEDICARE REPLACEMENT HUMANA MEDICARE REPL [id number]     Payor Plan Address Payor Plan Phone Number Effective From Effective To    [address] [address] [phone] 1/1/2014     [facility] [id number]       Subscriber Name Subscriber Birth Date Member ID       TRAVIS PATEL 1945 [id number]                 Emergency Contacts      (Rel.) Home Phone Work Phone Mobile Phone    Jennifer Turcios (Other) [phone] -- --    Fransisca Patel (Spouse) -- -- [phone]               History & Physical      Kenya Gomez DO at 9/10/2018  5:21 PM              [facility]  HISTORY AND PHYSICAL    Date of Admission: 9/10/2018  Primary Care Physician: Shant Hayes MD    Subjective     Chief Complaint: passed out.    History of Present Illness  Patient has been having issues with falling a lot over the last six months.    Gets up, gets light headed.  Passes out on occasion.  Today got up and passed out.  " Unknown how long he was out but was alert when he woke up.  Relates no trauma.  No nausea   No chest pain.          Review of Systems   Constitutional: Positive for activity change. Negative for appetite change, chills and fever.   HENT: Negative for hearing loss, nosebleeds, tinnitus and trouble swallowing.    Eyes: Negative for visual disturbance.   Respiratory: Negative for cough, chest tightness, shortness of breath and wheezing.    Cardiovascular: Negative for chest pain, palpitations and leg swelling.   Gastrointestinal: Negative for abdominal distention, abdominal pain, blood in stool, constipation, diarrhea, nausea and vomiting.   Endocrine: Negative for cold intolerance, heat intolerance, polydipsia, polyphagia and polyuria.   Genitourinary: Negative for decreased urine volume, difficulty urinating, dysuria, flank pain, frequency and hematuria.   Musculoskeletal: Positive for arthralgias. Negative for joint swelling and myalgias.   Skin: Negative for rash.   Allergic/Immunologic: Negative for immunocompromised state.   Neurological: Positive for syncope, weakness and light-headedness. Negative for dizziness and headaches.   Hematological: Negative for adenopathy. Does not bruise/bleed easily.   Psychiatric/Behavioral: Negative for confusion and sleep disturbance. The patient is not nervous/anxious.             Past Medical History:   Past Medical History:   Diagnosis Date   • Hernia of abdominal wall    • Myocardial infarction 02/2017   • Sleep apnea    • Stroke (CMS/[facility]) 02/2017     Past Surgical History:  Past Surgical History:   Procedure Laterality Date   • COLOSTOMY  2001    x 3 months   • COLOSTOMY REVISION     • CORONARY ARTERY BYPASS GRAFT      1991/1996   • HERNIA REPAIR      abdominal hernia x 3; poor healing with multiple revisions   • PACEMAKER IMPLANTATION  2017    w/ defibrillator   • ROTATOR CUFF REPAIR Right 2005     Social History:  reports that he has quit smoking. His smoking use included  "Cigarettes. He has a 40.00 pack-year smoking history. He has never used smokeless tobacco. He reports that he does not drink alcohol or use drugs.    Retired  No alcohol, drugs or tobacco.  DPOA wife  Living will yes  Code status full  PCP [facility]  Cardiology [place]    Family History: family history includes Diabetes in his brother; Heart disease in his brother; Sleep apnea in his brother.       Allergies:  Allergies   Allergen Reactions   • Sulfa Antibiotics      Medications:  Buspar 10 bid  Aldactone 25 mg daily  Lipitor 80 mg daily  Zantac 150 bid  Folkic acid 1 mg daiuly  Bumex 1mg alternating with 2 mg daily  Cymbalta 60 mg daily  Plavix 75 mg daily  Lisinopril 2.5 mg daiuly  Seroquel 200 mg at hs  Metoprolol ER 25 mg 1.5 tab daily  Lithium 300 mg tid  Midodrine 5 mb bid  ASA 81 mg daily  Medication bottles reviewed with patient and daughter at bedside.    Objective     Vital Signs: /80   Pulse 79   Temp 98.2 °F (36.8 °C) (Oral)   Resp 14   Ht 170.2 cm (67\")   Wt 69.1 kg (152 lb 4.8 oz)   SpO2 100%   BMI 23.85 kg/m²    Physical Exam   Constitutional: He is oriented to person, place, and time. He appears well-developed and well-nourished.   HENT:   Head: Normocephalic and atraumatic.   Eyes: Pupils are equal, round, and reactive to light. Conjunctivae and EOM are normal.   Neck: Normal range of motion. Neck supple. No JVD present.   Cardiovascular: Normal rate, regular rhythm and intact distal pulses.    Murmur heard.  Pulmonary/Chest: Effort normal and breath sounds normal.   Abdominal: Bowel sounds are normal. He exhibits distension. There is tenderness (generalized. ).   Musculoskeletal: Normal range of motion.   Neurological: He is alert and oriented to person, place, and time.   Skin: Skin is warm and dry.   Psychiatric: He has a normal mood and affect. His behavior is normal.   Nursing note and vitals reviewed.          Results Reviewed:  Lab Results (last 24 hours)     Procedure Component " Value Units Date/Time    Lactic Acid, Reflex [[id number]]  (Abnormal) Collected:  09/10/18 1600    Specimen:  Blood Updated:  09/10/18 1620     Lactate 2.9 (C) mmol/L     Lactic Acid, Reflex Timer (This will reflex a repeat order 3-3:15 hours after ordered.) [[id number]] Collected:  09/10/18 1110    Specimen:  Blood Updated:  09/10/18 1517     Extra Tube Hold for add-ons.     Comment: Auto resulted.       Urinalysis With Microscopic If Indicated (No Culture) - Urine, Clean Catch [[id number]]  (Abnormal) Collected:  09/10/18 1407    Specimen:  Urine from Urine, Clean Catch Updated:  09/10/18 1415     Color, UA Yellow     Appearance, UA Clear     pH, UA 6.5     Specific Gravity, UA 1.026     Glucose, UA Negative     Ketones, UA Trace (A)     Bilirubin, UA Negative     Blood, UA Negative     Protein, UA Negative     Leuk Esterase, UA Negative     Nitrite, UA Negative     Urobilinogen, UA 1.0 E.U./dL    Narrative:       Urine microscopic not indicated.    Blood Culture - Blood, [[id number]] Collected:  09/10/18 1315    Specimen:  Blood from Arm, Left Updated:  09/10/18 1336    Blood Culture - Blood, [[id number]] Collected:  09/10/18 1325    Specimen:  Blood from Arm, Right Updated:  09/10/18 1336    Lactic Acid, Plasma [[id number]]  (Abnormal) Collected:  09/10/18 1110    Specimen:  Blood Updated:  09/10/18 1205     Lactate 4.6 (C) mmol/L     Troponin [[id number]]  (Abnormal) Collected:  09/10/18 1110    Specimen:  Blood Updated:  09/10/18 1140     Troponin I 0.155 (H) ng/mL     BNP [[id number]]  (Abnormal) Collected:  09/10/18 1110    Specimen:  Blood Updated:  09/10/18 1140     proBNP 11,100.0 (H) pg/mL     Comprehensive Metabolic Panel [[id number]]  (Abnormal) Collected:  09/10/18 1110    Specimen:  Blood Updated:  09/10/18 1128     Glucose 121 (H) mg/dL      BUN 41 (H) mg/dL      Creatinine 1.75 (H) mg/dL      Sodium 148 (H) mmol/L      Potassium 4.4 mmol/L      Chloride 105 mmol/L      CO2 24.0 mmol/L      Calcium 10.7 (H)  mg/dL      Total Protein 8.6 g/dL      Albumin 5.00 g/dL      ALT (SGPT) 33 U/L      AST (SGOT) 72 (H) U/L      Alkaline Phosphatase 172 (H) U/L      Total Bilirubin 1.9 (H) mg/dL      eGFR Non African Amer 39 (L) mL/min/1.73      Globulin 3.6 gm/dL      A/G Ratio 1.4 g/dL      BUN/Creatinine Ratio 23.4     Anion Gap 19.0 (H) mmol/L     Narrative:       The MDRD GFR formula is only valid for adults with stable renal function between ages 18 and 70.    Protime-INR [[id number]]  (Normal) Collected:  09/10/18 1110    Specimen:  Blood Updated:  09/10/18 1127     Protime 13.4 Seconds      INR 0.99    aPTT [[id number]]  (Normal) Collected:  09/10/18 1110    Specimen:  Blood Updated:  09/10/18 1127     PTT 28.3 seconds     Lithium Level [[id number]]  (Normal) Collected:  09/10/18 1110    Specimen:  Blood Updated:  09/10/18 1126     Lithium 0.8 mmol/L     CBC & Differential [[id number]] Collected:  09/10/18 1110    Specimen:  Blood Updated:  09/10/18 1118    Narrative:       The following orders were created for panel order CBC & Differential.  Procedure                               Abnormality         Status                     ---------                               -----------         ------                     CBC Auto Differential[[id number]]        Abnormal            Final result                 Please view results for these tests on the individual orders.    CBC Auto Differential [[id number]]  (Abnormal) Collected:  09/10/18 1110    Specimen:  Blood Updated:  09/10/18 1118     WBC 15.60 (H) 10*3/mm3      RBC 4.66 (L) 10*6/mm3      Hemoglobin 15.2 g/dL      Hematocrit 43.9 %      MCV 94.2 fL      MCH 32.6 (H) pg      MCHC 34.6 g/dL      RDW 14.6 %      RDW-SD 50.5 fl      MPV 10.3 fL      Platelets 313 10*3/mm3      Neutrophil % 82.9 (H) %      Lymphocyte % 11.1 (L) %      Monocyte % 5.3 %      Eosinophil % 0.1 %      Basophil % 0.2 %      Immature Grans % 0.4 %      Neutrophils, Absolute 12.93 (H) 10*3/mm3       Lymphocytes, Absolute 1.73 10*3/mm3      Monocytes, Absolute 0.83 10*3/mm3      Eosinophils, Absolute 0.01 10*3/mm3      Basophils, Absolute 0.03 10*3/mm3      Immature Grans, Absolute 0.07 (H) 10*3/mm3      nRBC 0.0 /100 WBC         Imaging Results (last 24 hours)     Procedure Component Value Units Date/Time    CT Abdomen Pelvis With Contrast [[id number]] Collected:  09/10/18 1243     Updated:  09/10/18 1252    Narrative:       CT ABDOMEN PELVIS W CONTRAST- 9/10/2018 12:06 PM CDT     HISTORY: abd pain, syncope      COMPARISON: None     DOSE LENGTH PRODUCT: 221 mGy cm. Automated exposure control was also  utilized to decrease patient radiation dose.     TECHNIQUE: Axial images of the abdomen pelvis are obtained following IV  contrast. No oral contrast administered.     FINDINGS:  There are scattered hypodense liver lesions favorable for  cysts. The spleen, pancreas are unremarkable. Small amount of biliary  sludge versus stones considered in the dependent portion the gallbladder  (image 24, series 4). There are bilateral nonspecific adrenal nodules  measuring up to 1.8 cm. There is an inferior left renal cyst. There is  no enhancing renal mass. There is no hydronephrosis. Bladder is  distended. There is no prostate enlargement.      A large amount of fecal material is contained within the rectum. Rectum  measures 7.5 cm in width. Moderate stool seen throughout the remaining  colon. There are postoperative changes of the cecum. There is no  evidence for small bowel obstruction. The stomach is underdistended.  There is scarring along the region of the umbilicus.     There is dense vascular calcification with ectasia of the aorta to  maximum measurement of 2.4 cm.     Please refer to CT chest report for findings below the hemidiaphragms.     There are degenerative changes of the regional skeleton. There is a mild  rotoscoliotic curvature of the lumbar spine.       Impression:       1. Large volume of stool within the  rectum may indicate constipation  and/or impaction. Moderate volume stool seen in the remaining colon. No  evidence of bowel obstruction.  2. Hypodense liver lesions favorable for cysts. Inferior left renal  cyst.  3. Nonspecific bilateral adrenal nodules can be followed on subsequent  imaging. Adrenal protocol CT versus MRI should be considered  nonemergently if there are no outside films for comparison.    4. Distended bladder. .  This report was finalized on 09/10/2018 12:49 by Dr. Kimberly Doll MD.    CT Angiogram Chest With Contrast [[id number]] Collected:  09/10/18 1239     Updated:  09/10/18 1246    Narrative:       CT ANGIOGRAM CHEST W CONTRAST- 9/10/2018 12:06 PM CDT     HISTORY: mid back pain, syncope      COMPARISON: None     DOSE LENGTH PRODUCT: 268 mGy cm. Automated exposure control was also  utilized to decrease patient radiation dose.     TECHNIQUE: Axial images of the chest are obtained following IV contrast.  2-D and maximal intensity projection images are reconstructed and  reviewed.     FINDINGS:  There are no pulmonary emboli. There is evidence of prior  mediastinal surgery. There is dense coronary artery calcification. There  is no thoracic aortic aneurysm or dissection. There is moderate  atherosclerotic change of the thoracic aorta. A left subclavian cardiac  pacer device is in place. Heart is upper limits of normal in size. There  is no pericardial or pleural effusion. No pathologic intrathoracic or  axillary lymphadenopathy visualized.     Hypoechoic thin liver lesions are favorable for cysts. The adrenal  glands are prominent and hypodense. Please refer to CT abdomen report.  There is moderate stool within the visible colon.     There is a calcified granuloma within the left upper lobe. There is no  pulmonary consolidation. There is minimal dependent atelectasis. There  are no spiculated nodules. There is no pneumothorax.     No focal destructive osseous lesions are visualized.        Impression:       1. No pulmonary emboli.  2. Prior mediastinal surgery, likely coronary bypass. Left subclavian  cardiac pacer device.  3. Left upper lobe benign calcified granuloma. Minimal dependent  atelectasis. No consolidation or suspicious nodularity.  4. Hypodense liver lesions favorable for cysts. Please refer to CT  abdomen report.  This report was finalized on 09/10/2018 12:43 by Dr. Kimberly Doll MD.    CT Cervical Spine Without Contrast [[id number]] Collected:  09/10/18 1237     Updated:  09/10/18 1242    Narrative:       CT CERVICAL SPINE WO CONTRAST- 9/10/2018 12:06 PM CDT     HISTORY: neck pain, syncope, fall      COMPARISON: None     DOSE LENGTH PRODUCT: 241 mGy cm. Automated exposure control was also  utilized to decrease patient radiation dose.     TECHNIQUE: Axial images of cervical spine obtained with sagittal coronal  reconstructions.     FINDINGS:  There is minimal retrolisthesis of C5 in reference to C4  measuring 1 to 2 mm. There is moderate to advanced degenerative disc  change at C6/C7 and C7-T1 with moderate degenerative disc changes above  the C5 level. There is moderate facet osteoarthropathy. There is mild  uncinate process hypertrophy.     No cervical vertebral fracture is identified.     The degenerative changes result in only mild cervical spinal canal  stenosis with scattered bilateral neural foramen narrowing. At least  moderate in severity.     There is carotid bulb calcification. There is no apical pneumothorax.       Impression:       1. Degenerative changes of the cervical spine with no acute cervical  vertebral fracture. Minimal retrolisthesis of C5 likely related to facet  osteoarthropathy.  This report was finalized on 09/10/2018 12:39 by Dr. Kimberly Doll MD.    CT Head Without Contrast [[id number]] Collected:  09/10/18 1233     Updated:  09/10/18 1240    Narrative:       CT HEAD WO CONTRAST- 9/10/2018 12:06 PM CDT     HISTORY: head injury, fall, syncope      COMPARISON:  "None     DOSE LENGTH PRODUCT: 696 mGy cm. Automated exposure control was also  utilized to decrease patient radiation dose.     TECHNIQUE: Axial images of brain obtained without IV contrast.     FINDINGS:  There is subarachnoid hemorrhage along the convexities of the  right frontal lobe. No intraparenchymal hematoma or contusion  visualized. There is moderate cerebral volume loss with chronic vessel  vessel ischemia. There are no convincing acute signs of ischemia. No  subdural or epidural hematomas visualized.     The visible paranasal sinuses and mastoid air cells are well-aerated.  The bony calvarium appears intact. No skull fracture identified.       Impression:       1. Subarachnoid hemorrhage along the convexities of the right frontal  lobe. No intraparenchymal hemorrhage visualized. This finding called to  Karol Berrios in the ER at 12:40 PM on 9/10/2018.  2. Moderate cerebral volume loss with chronic vessel vessel ischemia.  This report was finalized on 09/10/2018 12:37 by Dr. Kimberly Doll MD.    XR Elbow 3+ View Left [[id number]] Collected:  09/10/18 1056     Updated:  09/10/18 1100    Narrative:       HISTORY: Fall with left elbow pain     Left elbow: 3 views of left elbow are obtained.     There is an IV within the left antecubital fossa. There is no  appreciable joint effusion. There is mild bony spurring of the a \"knot  on and humeral epicondyles. No bony fracture or dislocation is  identified.       Impression:       1. Mild arthritic change of the left elbow with no acute bony pathology.  This report was finalized on 09/10/2018 10:57 by Dr. Kimberly Doll MD.    XR Chest 1 View [[id number]] Collected:  09/10/18 1055     Updated:  09/10/18 1059    Narrative:       HISTORY: Syncope     CXR: Frontal view the chest is obtained.     COMPARISON: None     FINDINGS: Patient has undergone prior mediastinal surgery. There is a  left subclavian cardiac pacer device with the lead projecting over the  right " ventricle. There is a left upper lobe calcified granuloma. There  is no consolidation or edema. There is no pleural effusion or  pneumothorax. There is no acute bony pathology.       Impression:       1. Left upper lobe calcified granuloma with calcified left hilar lymph  nodes. Prior mediastinal surgery with left subclavian cardiac pacer  device. No acute pulmonary process identified.  This report was finalized on 09/10/2018 10:56 by Dr. Kimberly Doll MD.        I have personally reviewed and interpreted the radiology studies and ECG obtained at time of admission.     Assessment / Plan     Assessment:   Syncope  Orthostatic hypotension, chronic recurrent by history.  Subarrachnoid hemorrhage.   CAD   Bipolar disease  Constipation with possible rectal impaction.  Renal failure. Acute on chronic.      Plan:      Admit   ICU monitor overnight  Consult neurosurgery  Check orthostatic blood pressure  Milk and molasses enema  Lactulose tid  Lab in AM  cmp cbc  Neuro checks.   Consult Dr Reese.  Acute on chronic renal failure, patient received dye study.  Gentle hydration in patient with hx of CHF  Code Status: full  Echocardiogram  Ideally we would also place patient in support/ZEE hose but these are no longer available at this institution.    I discussed the patient's findings and my recommendations with the patient and daughter    Estimated length of stay 2-3 days    Kenya Gomez DO   09/10/18   5:21 PM              Electronically signed by Kenya Gomez DO at 9/10/2018  6:00 PM     Hu Calvillo MD at 9/10/2018  4:59 PM          Patient Care Team:  Shant Hayes MD as PCP - General (Internal Medicine)    Chief complaint syncope    Subjective     HPI: HPI    This patient is a 73-year-old male who has a history of multiple syncopal events.  He presented to the emergency room today after falling and striking his head.  He does have positive loss of consciousness of unknown duration.  He is  currently resting comfortably with no complaints, eating a meal.    Review of Systems     12 point review of systems is obtained and is negative except for as described in HPI    Past Medical History:   Diagnosis Date   • Hernia of abdominal wall    • Myocardial infarction 02/2017   • Sleep apnea    • Stroke (CMS/HCC) 02/2017     Past Surgical History:   Procedure Laterality Date   • COLOSTOMY  2001    x 3 months   • COLOSTOMY REVISION     • CORONARY ARTERY BYPASS GRAFT      1991/1996   • HERNIA REPAIR      abdominal hernia x 3; poor healing with multiple revisions   • PACEMAKER IMPLANTATION  2017    w/ defibrillator   • ROTATOR CUFF REPAIR Right 2005     Family History   Problem Relation Age of Onset   • Heart disease Brother    • Diabetes Brother    • Sleep apnea Brother      Social History   Substance Use Topics   • Smoking status: Former Smoker     Packs/day: 1.00     Years: 40.00     Types: Cigarettes   • Smokeless tobacco: Never Used   • Alcohol use No     No prescriptions prior to admission.     Allergies:  Sulfa antibiotics      Objective    Neurologic Exam  Vital Signs  Temp:  [96.9 °F (36.1 °C)-98.2 °F (36.8 °C)] 98.2 °F (36.8 °C)  Heart Rate:  [] 79  Resp:  [14-17] 14  BP: ()/(53-96) 112/80    Physical Exam    No acute distress  Awake alert oriented ×3  HEENT atraumatic also found  Neck supple  Abdomen soft nontender  Extremities no clubbing, edema, cyanosis  Neurologic exam  Cranial nerves II through XII grossly intact  No pronator drift  Patient moves all extremities well    Results Review:   I reviewed the patient's new clinical results.  I reviewed the patient's new imaging results and agree with the interpretation.  I reviewed the patient's other test results and agree with the interpretation    Xr Elbow 3+ View Left    Result Date: 9/10/2018  Narrative: HISTORY: Fall with left elbow pain  Left elbow: 3 views of left elbow are obtained.  There is an IV within the left antecubital fossa.  "There is no appreciable joint effusion. There is mild bony spurring of the a \"knot on and humeral epicondyles. No bony fracture or dislocation is identified.      Impression: 1. Mild arthritic change of the left elbow with no acute bony pathology. This report was finalized on 09/10/2018 10:57 by Dr. Kimberly Doll MD.    Ct Head Without Contrast    Result Date: 9/10/2018  Narrative: CT HEAD WO CONTRAST- 9/10/2018 12:06 PM CDT  HISTORY: head injury, fall, syncope  COMPARISON: None  DOSE LENGTH PRODUCT: 696 mGy cm. Automated exposure control was also utilized to decrease patient radiation dose.  TECHNIQUE: Axial images of brain obtained without IV contrast.  FINDINGS:  There is subarachnoid hemorrhage along the convexities of the right frontal lobe. No intraparenchymal hematoma or contusion visualized. There is moderate cerebral volume loss with chronic vessel vessel ischemia. There are no convincing acute signs of ischemia. No subdural or epidural hematomas visualized.  The visible paranasal sinuses and mastoid air cells are well-aerated. The bony calvarium appears intact. No skull fracture identified.      Impression: 1. Subarachnoid hemorrhage along the convexities of the right frontal lobe. No intraparenchymal hemorrhage visualized. This finding called to Karol Berrios in the ER at 12:40 PM on 9/10/2018. 2. Moderate cerebral volume loss with chronic vessel vessel ischemia. This report was finalized on 09/10/2018 12:37 by Dr. Kimberly Doll MD.    Ct Cervical Spine Without Contrast    Result Date: 9/10/2018  Narrative: CT CERVICAL SPINE WO CONTRAST- 9/10/2018 12:06 PM CDT  HISTORY: neck pain, syncope, fall  COMPARISON: None  DOSE LENGTH PRODUCT: 241 mGy cm. Automated exposure control was also utilized to decrease patient radiation dose.  TECHNIQUE: Axial images of cervical spine obtained with sagittal coronal reconstructions.  FINDINGS:  There is minimal retrolisthesis of C5 in reference to C4 measuring 1 to 2 " mm. There is moderate to advanced degenerative disc change at C6/C7 and C7-T1 with moderate degenerative disc changes above the C5 level. There is moderate facet osteoarthropathy. There is mild uncinate process hypertrophy.  No cervical vertebral fracture is identified.  The degenerative changes result in only mild cervical spinal canal stenosis with scattered bilateral neural foramen narrowing. At least moderate in severity.  There is carotid bulb calcification. There is no apical pneumothorax.      Impression: 1. Degenerative changes of the cervical spine with no acute cervical vertebral fracture. Minimal retrolisthesis of C5 likely related to facet osteoarthropathy. This report was finalized on 09/10/2018 12:39 by Dr. Kimebrly Doll MD.    Ct Abdomen Pelvis With Contrast    Result Date: 9/10/2018  Narrative: CT ABDOMEN PELVIS W CONTRAST- 9/10/2018 12:06 PM CDT  HISTORY: abd pain, syncope  COMPARISON: None  DOSE LENGTH PRODUCT: 221 mGy cm. Automated exposure control was also utilized to decrease patient radiation dose.  TECHNIQUE: Axial images of the abdomen pelvis are obtained following IV contrast. No oral contrast administered.  FINDINGS:  There are scattered hypodense liver lesions favorable for cysts. The spleen, pancreas are unremarkable. Small amount of biliary sludge versus stones considered in the dependent portion the gallbladder (image 24, series 4). There are bilateral nonspecific adrenal nodules measuring up to 1.8 cm. There is an inferior left renal cyst. There is no enhancing renal mass. There is no hydronephrosis. Bladder is distended. There is no prostate enlargement.  A large amount of fecal material is contained within the rectum. Rectum measures 7.5 cm in width. Moderate stool seen throughout the remaining colon. There are postoperative changes of the cecum. There is no evidence for small bowel obstruction. The stomach is underdistended. There is scarring along the region of the umbilicus.   There is dense vascular calcification with ectasia of the aorta to maximum measurement of 2.4 cm.  Please refer to CT chest report for findings below the hemidiaphragms.  There are degenerative changes of the regional skeleton. There is a mild rotoscoliotic curvature of the lumbar spine.      Impression: 1. Large volume of stool within the rectum may indicate constipation and/or impaction. Moderate volume stool seen in the remaining colon. No evidence of bowel obstruction. 2. Hypodense liver lesions favorable for cysts. Inferior left renal cyst. 3. Nonspecific bilateral adrenal nodules can be followed on subsequent imaging. Adrenal protocol CT versus MRI should be considered nonemergently if there are no outside films for comparison.  4. Distended bladder. . This report was finalized on 09/10/2018 12:49 by Dr. Kimberly Doll MD.    Xr Chest 1 View    Result Date: 9/10/2018  Narrative: HISTORY: Syncope  CXR: Frontal view the chest is obtained.  COMPARISON: None  FINDINGS: Patient has undergone prior mediastinal surgery. There is a left subclavian cardiac pacer device with the lead projecting over the right ventricle. There is a left upper lobe calcified granuloma. There is no consolidation or edema. There is no pleural effusion or pneumothorax. There is no acute bony pathology.      Impression: 1. Left upper lobe calcified granuloma with calcified left hilar lymph nodes. Prior mediastinal surgery with left subclavian cardiac pacer device. No acute pulmonary process identified. This report was finalized on 09/10/2018 10:56 by Dr. Kimberly Doll MD.    Ct Angiogram Chest With Contrast    Result Date: 9/10/2018  Narrative: CT ANGIOGRAM CHEST W CONTRAST- 9/10/2018 12:06 PM CDT  HISTORY: mid back pain, syncope  COMPARISON: None  DOSE LENGTH PRODUCT: 268 mGy cm. Automated exposure control was also utilized to decrease patient radiation dose.  TECHNIQUE: Axial images of the chest are obtained following IV contrast. 2-D  and maximal intensity projection images are reconstructed and reviewed.  FINDINGS:  There are no pulmonary emboli. There is evidence of prior mediastinal surgery. There is dense coronary artery calcification. There is no thoracic aortic aneurysm or dissection. There is moderate atherosclerotic change of the thoracic aorta. A left subclavian cardiac pacer device is in place. Heart is upper limits of normal in size. There is no pericardial or pleural effusion. No pathologic intrathoracic or axillary lymphadenopathy visualized.  Hypoechoic thin liver lesions are favorable for cysts. The adrenal glands are prominent and hypodense. Please refer to CT abdomen report. There is moderate stool within the visible colon.  There is a calcified granuloma within the left upper lobe. There is no pulmonary consolidation. There is minimal dependent atelectasis. There are no spiculated nodules. There is no pneumothorax.  No focal destructive osseous lesions are visualized.      Impression: 1. No pulmonary emboli. 2. Prior mediastinal surgery, likely coronary bypass. Left subclavian cardiac pacer device. 3. Left upper lobe benign calcified granuloma. Minimal dependent atelectasis. No consolidation or suspicious nodularity. 4. Hypodense liver lesions favorable for cysts. Please refer to CT abdomen report. This report was finalized on 09/10/2018 12:43 by Dr. Kimberly Doll MD.        Lab Results (last 24 hours)     Procedure Component Value Units Date/Time    Lactic Acid, Reflex [[id number]]  (Abnormal) Collected:  09/10/18 1600    Specimen:  Blood Updated:  09/10/18 1620     Lactate 2.9 (C) mmol/L     Lactic Acid, Reflex Timer (This will reflex a repeat order 3-3:15 hours after ordered.) [[id number]] Collected:  09/10/18 1110    Specimen:  Blood Updated:  09/10/18 1517     Extra Tube Hold for add-ons.     Comment: Auto resulted.       Urinalysis With Microscopic If Indicated (No Culture) - Urine, Clean Catch [[id number]]  (Abnormal)  Collected:  09/10/18 1407    Specimen:  Urine from Urine, Clean Catch Updated:  09/10/18 1415     Color, UA Yellow     Appearance, UA Clear     pH, UA 6.5     Specific Gravity, UA 1.026     Glucose, UA Negative     Ketones, UA Trace (A)     Bilirubin, UA Negative     Blood, UA Negative     Protein, UA Negative     Leuk Esterase, UA Negative     Nitrite, UA Negative     Urobilinogen, UA 1.0 E.U./dL    Narrative:       Urine microscopic not indicated.    Blood Culture - Blood, [[id number]] Collected:  09/10/18 1315    Specimen:  Blood from Arm, Left Updated:  09/10/18 1336    Blood Culture - Blood, [[id number]] Collected:  09/10/18 1325    Specimen:  Blood from Arm, Right Updated:  09/10/18 1336    Lactic Acid, Plasma [[id number]]  (Abnormal) Collected:  09/10/18 1110    Specimen:  Blood Updated:  09/10/18 1205     Lactate 4.6 (C) mmol/L     Troponin [[id number]]  (Abnormal) Collected:  09/10/18 1110    Specimen:  Blood Updated:  09/10/18 1140     Troponin I 0.155 (H) ng/mL     BNP [[id number]]  (Abnormal) Collected:  09/10/18 1110    Specimen:  Blood Updated:  09/10/18 1140     proBNP 11,100.0 (H) pg/mL     Comprehensive Metabolic Panel [[id number]]  (Abnormal) Collected:  09/10/18 1110    Specimen:  Blood Updated:  09/10/18 1128     Glucose 121 (H) mg/dL      BUN 41 (H) mg/dL      Creatinine 1.75 (H) mg/dL      Sodium 148 (H) mmol/L      Potassium 4.4 mmol/L      Chloride 105 mmol/L      CO2 24.0 mmol/L      Calcium 10.7 (H) mg/dL      Total Protein 8.6 g/dL      Albumin 5.00 g/dL      ALT (SGPT) 33 U/L      AST (SGOT) 72 (H) U/L      Alkaline Phosphatase 172 (H) U/L      Total Bilirubin 1.9 (H) mg/dL      eGFR Non African Amer 39 (L) mL/min/1.73      Globulin 3.6 gm/dL      A/G Ratio 1.4 g/dL      BUN/Creatinine Ratio 23.4     Anion Gap 19.0 (H) mmol/L     Narrative:       The MDRD GFR formula is only valid for adults with stable renal function between ages 18 and 70.    Protime-INR [[id number]]  (Normal) Collected:   09/10/18 1110    Specimen:  Blood Updated:  09/10/18 1127     Protime 13.4 Seconds      INR 0.99    aPTT [[id number]]  (Normal) Collected:  09/10/18 1110    Specimen:  Blood Updated:  09/10/18 1127     PTT 28.3 seconds     Lithium Level [[id number]]  (Normal) Collected:  09/10/18 1110    Specimen:  Blood Updated:  09/10/18 1126     Lithium 0.8 mmol/L     CBC & Differential [[id number]] Collected:  09/10/18 1110    Specimen:  Blood Updated:  09/10/18 1118    Narrative:       The following orders were created for panel order CBC & Differential.  Procedure                               Abnormality         Status                     ---------                               -----------         ------                     CBC Auto Differential[[id number]]        Abnormal            Final result                 Please view results for these tests on the individual orders.    CBC Auto Differential [[id number]]  (Abnormal) Collected:  09/10/18 1110    Specimen:  Blood Updated:  09/10/18 1118     WBC 15.60 (H) 10*3/mm3      RBC 4.66 (L) 10*6/mm3      Hemoglobin 15.2 g/dL      Hematocrit 43.9 %      MCV 94.2 fL      MCH 32.6 (H) pg      MCHC 34.6 g/dL      RDW 14.6 %      RDW-SD 50.5 fl      MPV 10.3 fL      Platelets 313 10*3/mm3      Neutrophil % 82.9 (H) %      Lymphocyte % 11.1 (L) %      Monocyte % 5.3 %      Eosinophil % 0.1 %      Basophil % 0.2 %      Immature Grans % 0.4 %      Neutrophils, Absolute 12.93 (H) 10*3/mm3      Lymphocytes, Absolute 1.73 10*3/mm3      Monocytes, Absolute 0.83 10*3/mm3      Eosinophils, Absolute 0.01 10*3/mm3      Basophils, Absolute 0.03 10*3/mm3      Immature Grans, Absolute 0.07 (H) 10*3/mm3      nRBC 0.0 /100 WBC             Patient Active Problem List   Diagnosis   • Subarachnoid hemorrhage (CMS/HCC)       Assessment/Plan     73-year-old male with significant cardiac history and multiple syncopal events.  He fell yesterday and struck his head with positive loss of consciousness of unknown  duration.  He has a small amount of trace subarachnoid hemorrhage.  Likely this will resolve on its own without surgery.  Given his significant cardiac history, we will continue his antiplatelets.  Recommend cardiology evaluation for syncope.  We will follow along.  Thank you for this consultation.    I discussed the patients findings and my recommendations with patient and family    Hu Calvillo MD  09/10/18  4:59 PM    Electronically signed by Hu Calvillo MD at 9/10/2018  5:08 PM          Emergency Department Notes      Karol Berrios, APRN at 9/10/2018 10:08 AM     Attestation signed by Francisco Mei MD at 9/11/2018  7:38 AM          For this patient encounter, I reviewed the NP or PA documentation, treatment plan, and medical decision making. Francisco Mei MD 9/11/2018 7:38 AM                  Subjective   Patient is a 73-year-old  male who presents to the ER today per EMS with complaint of syncope.  The patient reports he was walking to the bathroom this morning when he had a syncopal episode.  He states that he had no dizziness, no chest pain or shortness of breath prior to this.  The patient states that he passed out did fall hitting his head. The pt is unsure how long he had LOC, he states that this was witnessed by his wife; denies any loss of bowel or bladder control, denies any seizure like activity. He has no previous hx of seizures. He is on Plavix.  The patient sustained an abrasion to the left elbow.  The patient reports that he is having mid back pain as well as abdominal pain.  Patient reports to me that he has not had a bowel movement in over one week.  The patient states that he did try to disimpact himself and felt a large amt of hard stool.  The patient reports that he has had decreased by mouth intake over the past one week.  The patient states that he has no chest pain or shortness of breath.  He is awake, alert and oriented at this time.    She does have a  "significant medical history.  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  He has a pacemaker defibrillator that was placed due to ischemic cardiomyopathy.  The patient does follow with a cardiologist at [place] currently.  Patient also has a history of perforated bowel due to diverticulitis.  He had a colostomy with revision in the past.  He states that this was \"many years ago \".  The patient presents to the ER today for further evaluation.         History provided by:  Patient   used: No    Syncope   Episode history:  Single  Most recent episode:  Today  Duration: unable to specify.  Timing:  Rare  Progression:  Resolved  Chronicity:  New  Context: standing up    Context: not blood draw, not bowel movement, not dehydration, not exertion, not inactivity, not medication change, not with normal activity, not sight of blood, not sitting down and not urination    Witnessed: yes    Relieved by:  Nothing  Worsened by:  Nothing  Ineffective treatments:  None tried  Associated symptoms: no anxiety, no chest pain, no confusion, no diaphoresis, no difficulty breathing, no dizziness, no fever, no focal sensory loss, no focal weakness, no headaches, no malaise/fatigue, no nausea, no palpitations, no recent fall, no recent injury, no recent surgery, no rectal bleeding, no seizures, no shortness of breath, no visual change, no vomiting and no weakness    Risk factors: coronary artery disease    Risk factors: no congenital heart disease, no seizures and no vascular disease        Review of Systems   Constitutional: Negative for diaphoresis, fever and malaise/fatigue.   Respiratory: Negative for shortness of breath.    Cardiovascular: Positive for syncope. Negative for chest pain and palpitations.   Gastrointestinal: Negative for nausea and vomiting.   Neurological: Negative for dizziness, focal weakness, seizures, weakness and headaches.   Psychiatric/Behavioral: Negative for confusion.   All other systems " reviewed and are negative.      Past Medical History:   Diagnosis Date   • Hernia of abdominal wall    • Myocardial infarction 02/2017   • Sleep apnea    • Stroke (CMS/HCC) 02/2017       Allergies   Allergen Reactions   • Sulfa Antibiotics        Past Surgical History:   Procedure Laterality Date   • COLOSTOMY  2001    x 3 months   • COLOSTOMY REVISION     • CORONARY ARTERY BYPASS GRAFT      1991/1996   • HERNIA REPAIR      abdominal hernia x 3; poor healing with multiple revisions   • PACEMAKER IMPLANTATION  2017    w/ defibrillator   • ROTATOR CUFF REPAIR Right 2005       Family History   Problem Relation Age of Onset   • Heart disease Brother    • Diabetes Brother    • Sleep apnea Brother        Social History     Social History   • Marital status:      Social History Main Topics   • Smoking status: Former Smoker     Packs/day: 1.00     Years: 40.00     Types: Cigarettes   • Smokeless tobacco: Never Used   • Alcohol use No   • Drug use: No   • Sexual activity: Defer     Other Topics Concern   • Not on file           Objective   Physical Exam   Constitutional: He is oriented to person, place, and time. He appears well-developed and well-nourished.   HENT:   Head: Normocephalic and atraumatic.   Eyes: Pupils are equal, round, and reactive to light. Conjunctivae are normal.   Neck: Normal range of motion. Neck supple.       Cardiovascular: Normal rate, regular rhythm and normal heart sounds.    Pulmonary/Chest: Effort normal and breath sounds normal.   Abdominal: Soft. Bowel sounds are normal.   Musculoskeletal:        Arms:  Neurological: He is alert and oriented to person, place, and time.   Skin: Skin is warm and dry. Capillary refill takes less than 2 seconds.   Psychiatric: He has a normal mood and affect.   Nursing note and vitals reviewed.      Procedures          ED Course  ED Course as of Sep 10 1315   Mon Sep 10, 2018   1158 Pt is UTD on his Td vaccine.   [LF]   1208 Pt lactate 4.6; BNP 11,000, pt  has a hx of ischemic cardiomyopathy and CHF. Pt VSS at this time, he is afebrile, will hold on sepsis fluid bolus at this time.   [LF]   1307 The patient's pacemaker/defibrillator was interrogated.  The report was sent over from [employer].  Reported to show the patient had several runs of V. tach.  The [employer] technician did call and talk to the nursing staff and reported that they did look at the tracings and did not believe that this was actually runs of V. tach.  He stated that because it is a one chamber pacemaker that sometimes it will appear like this on the reports.  Patient has had no episodes of V. tach while in the ER or any ectopy.  [LF]   1308 The patient's initial troponin was 0.155.  EKG was reviewed.  There are some abnormalities noted in V2, V3, V4, I did try to obtain a previous EKG from [place] on the patient however was unable to obtain this.  [LF]   1309 The patient CMP shows a glucose of 121, creatinine 1.7, .  His sodium was 148.  Liver enzymes were also elevated with an AST of 72, and alkaline phosphatase of 172, total bilirubin 1.9.  He did have an anion gap of 19.  [LF]   1310 CBC shows a white blood cell count 15, hemoglobin 15, hematocrit 43, platelets of 313.  Chest exertion is no acute findings.  [LF]   1310 CT scan of the cervical spine showed no acute findings.  CT scan of the head did show a subarachnoid hemorrhage.  I have discussed the findings with Dr. Hu Calvillo.  Consult on the patient.  [LF]   1311 Skin of the cervical spine showed no acute findings.  CTA of the chest did show some liver lesions likely cyst.  There is no PE dissection noted.  [LF]   1311 CT scan of the abdomen and pelvis did show constipation versus impaction, a renal cyst as well as some adrenal nodules.  Patient advised follow-up on this.  [LF]   1311 Patient did become orthostatic when he stood up.  His blood pressure dropped from 102/82-80/53. Pt was given approximately 250 mL normal saline  bolus.  [LF]   1312 At this time the patient's case has been discussed with Dr. Collins, the hospitalist on call.  He will be admitted to his services.  Patient will be admitted in guarded condition to the intensive care unit at this time.  [LF]      ED Course User Index  [LF] Karol Berrios, APRN                  MDM  Number of Diagnoses or Management Options  Adrenal nodule (CMS/HCC): new and requires workup  VANI (acute kidney injury) (CMS/HCC): new and requires workup  Chronic anticoagulation: new and requires workup  Constipation, unspecified constipation type: new and requires workup  Elevated liver enzymes: new and requires workup  Elevated troponin: new and requires workup  Renal cyst: new and requires workup  Subarachnoid hemorrhage (CMS/HCC): new and requires workup  Syncope, unspecified syncope type: new and requires workup     Amount and/or Complexity of Data Reviewed  Clinical lab tests: ordered and reviewed  Tests in the radiology section of CPT®:  ordered and reviewed  Tests in the medicine section of CPT®:  ordered and reviewed  Discuss the patient with other providers: yes    Risk of Complications, Morbidity, and/or Mortality  General comments: Total critical care time: 40 mins    Critical Care  Total time providing critical care: 30-74 minutes    Patient Progress  Patient progress: other (comment) (Guarded condition)        Final diagnoses:   Subarachnoid hemorrhage (CMS/HCC)   Syncope, unspecified syncope type   Elevated liver enzymes   Constipation, unspecified constipation type   VANI (acute kidney injury) (CMS/HCC)   Elevated troponin   Renal cyst   Adrenal nodule (CMS/HCC)   Chronic anticoagulation            Karol Berrios, APRN  09/10/18 1315      Electronically signed by Francisco Mei MD at 9/11/2018  7:38 AM             ICU Vital Signs     Row Name 09/11/18 0600 09/11/18 0545 09/11/18 0530 09/11/18 0500 09/11/18 0445       Vitals    Pulse 97 77 83 82 82    Resp  --  --  -- 21  --     Resp Rate Source  --  --  -- Monitor  --    BP (!)  69/51 (!)  76/56 (!)  79/58 90/66 (!)  78/59    Noninvasive MAP (mmHg) 58 64 66 73 66       Oxygen Therapy    SpO2 98 %  --  --  --  --    Row Name 09/11/18 0430 09/11/18 0415 09/11/18 0400 09/11/18 0345 09/11/18 0315       Vitals    Pulse 82 96 74 73 87    BP (!)  63/42 (!)  76/55 (!)  56/41 (!)  65/48 (!)  89/66    Noninvasive MAP (mmHg) 51 62 48 54 75       Oxygen Therapy    SpO2  --  -- 100 %  -- 99 %    Row Name 09/11/18 0307 09/11/18 0115 09/11/18 0100 09/11/18 0045 09/11/18 0030       Vitals    Temp 97.7 °F (36.5 °C)  --  --  --  --    Temp src Oral  --  --  --  --    Pulse 76 71 71 71 74    BP (!)  73/56 (!)  85/70 (!)  88/67 97/72 94/71    Noninvasive MAP (mmHg) 61 77 75 82 79       Oxygen Therapy    SpO2  -- 97 % 96 % 95 %  --    Row Name 09/11/18 0015 09/11/18 0000 09/10/18 2345 09/10/18 2330 09/10/18 2327       Vitals    Temp  --  --  --  -- 98.4 °F (36.9 °C)    Temp src  --  --  --  -- Oral    Pulse 76 77 77 83  --    BP 95/70 (!)  85/64 93/70 (!)  82/68  --    Noninvasive MAP (mmHg) 80 72 79 75  --       Oxygen Therapy    SpO2  -- 100 %  --  --  --    Row Name 09/10/18 2315 09/10/18 2301 09/10/18 2247 09/10/18 2231 09/10/18 2216       Vitals    Pulse 90 92 86 82 80    BP (!)  83/67 103/80 113/85 105/79 113/82    Noninvasive MAP (mmHg) 73 87 95 89 91    Row Name 09/10/18 2201 09/10/18 2103 09/10/18 2045 09/10/18 2032 09/10/18 2002       Vitals    Pulse 79 72 78 72 76    Resp  --  --  --  -- 17    Resp Rate Source  --  --  --  -- Monitor    /71 107/75 (!)  88/69 102/67 101/76    Noninvasive MAP (mmHg) 77 86 76 79 85    Row Name 09/10/18 1940 09/10/18 1931 09/10/18 1916 09/10/18 1903 09/10/18 1700       Vitals    Temp 97.4 °F (36.3 °C)  --  --  --  --    Temp src Axillary  --  --  --  --    Pulse  -- 80 88 86 81    BP  -- 96/72 91/72 (!)  83/63 107/78    Noninvasive MAP (mmHg)  -- 80 80 70 89    Row Name 09/10/18 1500 09/10/18 1420 09/10/18 1400  "09/10/18 1334 09/10/18 1302       Height and Weight    Height  -- 170.2 cm (67\")  --  --  --    Height Method  -- Actual  --  --  --    Weight  -- 69.1 kg (152 lb 4.8 oz)  --  --  --    Weight Method  -- Bed scale  --  --  --    Ideal Body Weight (IBW) (kg)  -- 68.1  --  --  --    BSA (Calculated - sq m)  -- 1.8 sq meters  --  --  --    BMI (Calculated)  -- 23.8  --  --  --    Weight in (lb) to have BMI = 25  -- 159.3  --  --  --       Vitals    Temp  -- 98.2 °F (36.8 °C)  --  --  --    Temp src  -- Oral  --  --  --    Pulse 79 81 84 82 81    Heart Rate Source  -- Monitor  --  --  --    Resp  -- 14  -- 15 16    Resp Rate Source  -- Monitor  --  --  --    /80 104/75 100/80 110/74 104/66    Noninvasive MAP (mmHg) 91 85 89  --  --       Oxygen Therapy    SpO2  -- 100 %  -- 100 % 100 %    Device (Oxygen Therapy)  -- room air  --  --  --    Row Name 09/10/18 1246 09/10/18 1231 09/10/18 1229 09/10/18 1201 09/10/18 1146       Vitals    Pulse 75 79 80 79 82    /83 122/79 123/77 108/82 111/84    Row Name 09/10/18 1131 09/10/18 1128 09/10/18 1125 09/10/18 1121 09/10/18 1120       Vitals    Pulse 88 82 79 99 103    Resp  -- 15  --  --  --    /96 123/84  -- 102/82 (!)  80/53    Patient Position  --  --  --  -- Standing   pt unable to tolerate standing position to the completion of the BP reading       Oxygen Therapy    SpO2  -- 100 %  -- 100 %  --    Row Name 09/10/18 1118 09/10/18 1117 09/10/18 1116 09/10/18 1046 09/10/18 1031       Vitals    Pulse 90 83 85  -- 82    Resp  --  --  --  -- 16    /82 109/82 109/82 104/76 101/77    Patient Position Sitting  -- Lying  --  --       Oxygen Therapy    SpO2  --  --  --  -- 100 %    Row Name 09/10/18 1001 09/10/18 0945                Height and Weight    Height  -- 170.2 cm (67\")       Height Method  -- Stated       Weight  -- 75.3 kg (166 lb 1.6 oz)       Weight Method  -- Bed scale       Ideal Body Weight (IBW) (kg)  -- 68.1       BSA (Calculated - sq m)  -- " 1.87 sq meters       BMI (Calculated)  -- 26       Weight in (lb) to have BMI = 25  -- 159.3          Vitals    Temp  -- 96.9 °F (36.1 °C)       Temp src  -- Temporal Artery        Pulse 76 78       Heart Rate Source  -- Monitor       Resp  -- 17       Resp Rate Source  -- Monitor       /77 108/87       BP Location  -- Right arm       BP Method  -- Automatic       Patient Position  -- Sitting          Oxygen Therapy    SpO2  -- 100 %       Device (Oxygen Therapy)  -- room air           Hospital Medications (all)       Dose Frequency Start End    acetaminophen (TYLENOL) tablet 650 mg 650 mg Every 4 Hours PRN 9/10/2018     Sig - Route: Take 2 tablets by mouth Every 4 (Four) Hours As Needed for Mild Pain . - Oral    atorvastatin (LIPITOR) tablet 80 mg 80 mg Nightly 9/11/2018     Sig - Route: Take 2 tablets by mouth Every Night. - Oral    busPIRone (BUSPAR) tablet 10 mg 10 mg 2 Times Daily 9/11/2018     Sig - Route: Take 1 tablet by mouth 2 (Two) Times a Day. - Oral    DULoxetine (CYMBALTA) DR capsule 60 mg 60 mg Daily 9/11/2018     Sig - Route: Take 2 capsules by mouth Daily. - Oral    famotidine (PEPCID) tablet 20 mg 20 mg Daily 9/11/2018     Sig - Route: Take 1 tablet by mouth Daily. - Oral    folic acid (FOLVITE) tablet 1 mg 1 mg Daily 9/11/2018     Sig - Route: Take 1 tablet by mouth Daily. - Oral    HYDROcodone-acetaminophen (NORCO) 5-325 MG per tablet 1 tablet 1 tablet Every 4 Hours PRN 9/10/2018 9/20/2018    Sig - Route: Take 1 tablet by mouth Every 4 (Four) Hours As Needed for Moderate Pain . - Oral    iopamidol (ISOVUE-370) 76 % injection 150 mL 150 mL Once in Imaging 9/10/2018 9/10/2018    Sig - Route: Infuse 150 mL into a venous catheter Once. - Intravenous    lactulose solution 20 g 20 g 3 Times Daily 9/10/2018     Sig - Route: Take 30 mL by mouth 3 (Three) Times a Day. - Oral    midodrine (PROAMATINE) tablet 10 mg 10 mg 3 Times Daily Before Meals 9/11/2018     Sig - Route: Take 4 tablets by mouth 3  (Three) Times a Day Before Meals. - Oral    ondansetron (ZOFRAN) injection 4 mg 4 mg Every 6 Hours PRN 9/10/2018     Sig - Route: Infuse 2 mL into a venous catheter Every 6 (Six) Hours As Needed for Nausea or Vomiting. - Intravenous    perflutren (DEFINITY) lipid microspheres injection 9.78 mg 1.5 mL Once 9/11/2018 9/11/2018    Sig - Route: Infuse 1.5 mL into a venous catheter 1 (One) Time. - Intravenous    piperacillin-tazobactam (ZOSYN) 3.375 g/100 mL 0.9% NS IVPB (mbp) 3.375 g Once 9/10/2018 9/10/2018    Sig - Route: Infuse 100 mL into a venous catheter 1 (One) Time. - Intravenous    Cosign for Ordering: Accepted by Francisco Mei MD on 9/11/2018  7:37 AM    potassium chloride (MICRO-K) CR capsule 20 mEq 20 mEq Daily 9/11/2018     Sig - Route: Take 2 capsules by mouth Daily. - Oral    QUEtiapine (SEROquel) tablet 100 mg 100 mg Nightly 9/11/2018     Sig - Route: Take 1 tablet by mouth Every Night. - Oral    QUEtiapine (SEROquel) tablet 200 mg 200 mg Once 9/10/2018 9/10/2018    Sig - Route: Take 1 tablet by mouth 1 (One) Time. - Oral    sodium chloride 0.9 % bolus 500 mL 500 mL Once 9/10/2018 9/10/2018    Sig - Route: Infuse 500 mL into a venous catheter 1 (One) Time. - Intravenous    Cosign for Ordering: Accepted by Francisco Mei MD on 9/11/2018  7:37 AM    sodium chloride 0.9 % bolus 500 mL 500 mL Once 9/11/2018 9/11/2018    Sig - Route: Infuse 500 mL into a venous catheter 1 (One) Time. - Intravenous    sodium chloride 0.9 % bolus 500 mL 500 mL Once 9/11/2018 9/11/2018    Sig - Route: Infuse 500 mL into a venous catheter 1 (One) Time. - Intravenous    sodium chloride 0.9 % flush 1-10 mL 1-10 mL As Needed 9/10/2018     Sig - Route: Infuse 1-10 mL into a venous catheter As Needed for Line Care. - Intravenous    sodium chloride 0.9 % flush 10 mL 10 mL As Needed 9/10/2018     Sig - Route: Infuse 10 mL into a venous catheter As Needed for Line Care. - Intravenous    Cosign for Ordering: Accepted by Francisco Mei MD  "on 9/11/2018  7:37 AM    Linked Group 1:  \"And\" Linked Group Details        sodium chloride 0.9 % with KCl 20 mEq/L infusion 75 mL/hr Continuous 9/11/2018     Sig - Route: Infuse 75 mL/hr into a venous catheter Continuous. - Intravenous    aspirin chewable tablet 81 mg (Discontinued) 81 mg Daily 9/11/2018 9/11/2018    Sig - Route: Chew 1 tablet Daily. - Oral    clopidogrel (PLAVIX) tablet 75 mg (Discontinued) 75 mg Daily 9/11/2018 9/11/2018    Sig - Route: Take 1 tablet by mouth Daily. - Oral    famotidine (PEPCID) tablet 40 mg (Discontinued) 40 mg Daily 9/11/2018 9/11/2018    Sig - Route: Take 2 tablets by mouth Daily. - Oral    midodrine (PROAMATINE) tablet 5 mg (Discontinued) 5 mg 2 Times Daily 9/11/2018 9/11/2018    Sig - Route: Take 2 tablets by mouth 2 (Two) Times a Day. - Oral    sodium chloride 0.9 % infusion (Discontinued) 75 mL/hr Continuous 9/10/2018 9/11/2018    Sig - Route: Infuse 75 mL/hr into a venous catheter Continuous. - Intravenous            Lab Results (last 24 hours)     Procedure Component Value Units Date/Time    Comprehensive Metabolic Panel [[id number]]  (Abnormal) Collected:  09/11/18 0448    Specimen:  Blood Updated:  09/11/18 0528     Glucose 112 (H) mg/dL      BUN 36 (H) mg/dL      Creatinine 1.88 (H) mg/dL      Sodium 142 mmol/L      Potassium 3.2 (L) mmol/L      Chloride 105 mmol/L      CO2 21.0 (L) mmol/L      Calcium 9.2 mg/dL      Total Protein 6.7 g/dL      Albumin 4.00 g/dL      ALT (SGPT) 31 U/L      AST (SGOT) 60 (H) U/L      Alkaline Phosphatase 121 (H) U/L      Total Bilirubin 1.1 (H) mg/dL      eGFR Non African Amer 36 (L) mL/min/1.73      Globulin 2.7 gm/dL      A/G Ratio 1.5 g/dL      BUN/Creatinine Ratio 19.1     Anion Gap 16.0 (H) mmol/L     Narrative:       The MDRD GFR formula is only valid for adults with stable renal function between ages 18 and 70.    Troponin [[id number]]  (Abnormal) Collected:  09/11/18 0448    Specimen:  Blood Updated:  09/11/18 0520     Troponin I " 0.183 (H) ng/mL     Lactic Acid, Plasma [[id number]]  (Abnormal) Collected:  09/11/18 0448    Specimen:  Blood Updated:  09/11/18 0510     Lactate 3.3 (C) mmol/L     CBC & Differential [[id number]] Collected:  09/11/18 0448    Specimen:  Blood Updated:  09/11/18 0503    Narrative:       The following orders were created for panel order CBC & Differential.  Procedure                               Abnormality         Status                     ---------                               -----------         ------                     CBC Auto Differential[[id number]]        Abnormal            Final result                 Please view results for these tests on the individual orders.    CBC Auto Differential [[id number]]  (Abnormal) Collected:  09/11/18 0448    Specimen:  Blood Updated:  09/11/18 0503     WBC 12.47 (H) 10*3/mm3      RBC 4.01 (L) 10*6/mm3      Hemoglobin 13.3 (L) g/dL      Hematocrit 39.0 (L) %      MCV 97.3 (H) fL      MCH 33.2 (H) pg      MCHC 34.1 g/dL      RDW 14.7 %      RDW-SD 53.1 fl      MPV 10.4 fL      Platelets 206 10*3/mm3      Neutrophil % 76.8 %      Lymphocyte % 14.5 (L) %      Monocyte % 6.9 %      Eosinophil % 1.0 %      Basophil % 0.2 %      Immature Grans % 0.6 %      Neutrophils, Absolute 9.57 (H) 10*3/mm3      Lymphocytes, Absolute 1.81 10*3/mm3      Monocytes, Absolute 0.86 10*3/mm3      Eosinophils, Absolute 0.13 10*3/mm3      Basophils, Absolute 0.03 10*3/mm3      Immature Grans, Absolute 0.07 (H) 10*3/mm3      nRBC 0.0 /100 WBC     Blood Culture - Blood, [[id number]]  (Normal) Collected:  09/10/18 1325    Specimen:  Blood from Arm, Right Updated:  09/11/18 0145     Blood Culture No growth at less than 24 hours    Blood Culture - Blood, [[id number]]  (Normal) Collected:  09/10/18 1315    Specimen:  Blood from Arm, Left Updated:  09/11/18 0145     Blood Culture No growth at less than 24 hours    Troponin [[id number]]  (Abnormal) Collected:  09/10/18 2318    Specimen:  Blood Updated:  09/11/18  0013     Troponin I 0.196 (H) ng/mL     Lactic Acid, Reflex [[id number]]  (Abnormal) Collected:  09/10/18 1600    Specimen:  Blood Updated:  09/10/18 1620     Lactate 2.9 (C) mmol/L     Lactic Acid, Reflex Timer (This will reflex a repeat order 3-3:15 hours after ordered.) [[id number]] Collected:  09/10/18 1110    Specimen:  Blood Updated:  09/10/18 1517     Extra Tube Hold for add-ons.     Comment: Auto resulted.       Urinalysis With Microscopic If Indicated (No Culture) - Urine, Clean Catch [[id number]]  (Abnormal) Collected:  09/10/18 1407    Specimen:  Urine from Urine, Clean Catch Updated:  09/10/18 1415     Color, UA Yellow     Appearance, UA Clear     pH, UA 6.5     Specific Gravity, UA 1.026     Glucose, UA Negative     Ketones, UA Trace (A)     Bilirubin, UA Negative     Blood, UA Negative     Protein, UA Negative     Leuk Esterase, UA Negative     Nitrite, UA Negative     Urobilinogen, UA 1.0 E.U./dL    Narrative:       Urine microscopic not indicated.    Lactic Acid, Plasma [[id number]]  (Abnormal) Collected:  09/10/18 1110    Specimen:  Blood Updated:  09/10/18 1205     Lactate 4.6 (C) mmol/L     Troponin [[id number]]  (Abnormal) Collected:  09/10/18 1110    Specimen:  Blood Updated:  09/10/18 1140     Troponin I 0.155 (H) ng/mL     BNP [[id number]]  (Abnormal) Collected:  09/10/18 1110    Specimen:  Blood Updated:  09/10/18 1140     proBNP 11,100.0 (H) pg/mL     Comprehensive Metabolic Panel [[id number]]  (Abnormal) Collected:  09/10/18 1110    Specimen:  Blood Updated:  09/10/18 1128     Glucose 121 (H) mg/dL      BUN 41 (H) mg/dL      Creatinine 1.75 (H) mg/dL      Sodium 148 (H) mmol/L      Potassium 4.4 mmol/L      Chloride 105 mmol/L      CO2 24.0 mmol/L      Calcium 10.7 (H) mg/dL      Total Protein 8.6 g/dL      Albumin 5.00 g/dL      ALT (SGPT) 33 U/L      AST (SGOT) 72 (H) U/L      Alkaline Phosphatase 172 (H) U/L      Total Bilirubin 1.9 (H) mg/dL      eGFR Non African Amer 39 (L) mL/min/1.73       Globulin 3.6 gm/dL      A/G Ratio 1.4 g/dL      BUN/Creatinine Ratio 23.4     Anion Gap 19.0 (H) mmol/L     Narrative:       The MDRD GFR formula is only valid for adults with stable renal function between ages 18 and 70.    Protime-INR [[id number]]  (Normal) Collected:  09/10/18 1110    Specimen:  Blood Updated:  09/10/18 1127     Protime 13.4 Seconds      INR 0.99    aPTT [[id number]]  (Normal) Collected:  09/10/18 1110    Specimen:  Blood Updated:  09/10/18 1127     PTT 28.3 seconds     Lithium Level [[id number]]  (Normal) Collected:  09/10/18 1110    Specimen:  Blood Updated:  09/10/18 1126     Lithium 0.8 mmol/L     CBC & Differential [[id number]] Collected:  09/10/18 1110    Specimen:  Blood Updated:  09/10/18 1118    Narrative:       The following orders were created for panel order CBC & Differential.  Procedure                               Abnormality         Status                     ---------                               -----------         ------                     CBC Auto Differential[[id number]]        Abnormal            Final result                 Please view results for these tests on the individual orders.    CBC Auto Differential [[id number]]  (Abnormal) Collected:  09/10/18 1110    Specimen:  Blood Updated:  09/10/18 1118     WBC 15.60 (H) 10*3/mm3      RBC 4.66 (L) 10*6/mm3      Hemoglobin 15.2 g/dL      Hematocrit 43.9 %      MCV 94.2 fL      MCH 32.6 (H) pg      MCHC 34.6 g/dL      RDW 14.6 %      RDW-SD 50.5 fl      MPV 10.3 fL      Platelets 313 10*3/mm3      Neutrophil % 82.9 (H) %      Lymphocyte % 11.1 (L) %      Monocyte % 5.3 %      Eosinophil % 0.1 %      Basophil % 0.2 %      Immature Grans % 0.4 %      Neutrophils, Absolute 12.93 (H) 10*3/mm3      Lymphocytes, Absolute 1.73 10*3/mm3      Monocytes, Absolute 0.83 10*3/mm3      Eosinophils, Absolute 0.01 10*3/mm3      Basophils, Absolute 0.03 10*3/mm3      Immature Grans, Absolute 0.07 (H) 10*3/mm3      nRBC 0.0 /100 WBC          Imaging Results (last 24 hours)     Procedure Component Value Units Date/Time    CT Abdomen Pelvis With Contrast [[id number]] Collected:  09/10/18 1243     Updated:  09/10/18 1252    Narrative:       CT ABDOMEN PELVIS W CONTRAST- 9/10/2018 12:06 PM CDT     HISTORY: abd pain, syncope      COMPARISON: None     DOSE LENGTH PRODUCT: 221 mGy cm. Automated exposure control was also  utilized to decrease patient radiation dose.     TECHNIQUE: Axial images of the abdomen pelvis are obtained following IV  contrast. No oral contrast administered.     FINDINGS:  There are scattered hypodense liver lesions favorable for  cysts. The spleen, pancreas are unremarkable. Small amount of biliary  sludge versus stones considered in the dependent portion the gallbladder  (image 24, series 4). There are bilateral nonspecific adrenal nodules  measuring up to 1.8 cm. There is an inferior left renal cyst. There is  no enhancing renal mass. There is no hydronephrosis. Bladder is  distended. There is no prostate enlargement.      A large amount of fecal material is contained within the rectum. Rectum  measures 7.5 cm in width. Moderate stool seen throughout the remaining  colon. There are postoperative changes of the cecum. There is no  evidence for small bowel obstruction. The stomach is underdistended.  There is scarring along the region of the umbilicus.     There is dense vascular calcification with ectasia of the aorta to  maximum measurement of 2.4 cm.     Please refer to CT chest report for findings below the hemidiaphragms.     There are degenerative changes of the regional skeleton. There is a mild  rotoscoliotic curvature of the lumbar spine.       Impression:       1. Large volume of stool within the rectum may indicate constipation  and/or impaction. Moderate volume stool seen in the remaining colon. No  evidence of bowel obstruction.  2. Hypodense liver lesions favorable for cysts. Inferior left renal  cyst.  3. Nonspecific  bilateral adrenal nodules can be followed on subsequent  imaging. Adrenal protocol CT versus MRI should be considered  nonemergently if there are no outside films for comparison.    4. Distended bladder. .  This report was finalized on 09/10/2018 12:49 by Dr. Kimberly Doll MD.    CT Angiogram Chest With Contrast [[id number]] Collected:  09/10/18 1239     Updated:  09/10/18 1246    Narrative:       CT ANGIOGRAM CHEST W CONTRAST- 9/10/2018 12:06 PM CDT     HISTORY: mid back pain, syncope      COMPARISON: None     DOSE LENGTH PRODUCT: 268 mGy cm. Automated exposure control was also  utilized to decrease patient radiation dose.     TECHNIQUE: Axial images of the chest are obtained following IV contrast.  2-D and maximal intensity projection images are reconstructed and  reviewed.     FINDINGS:  There are no pulmonary emboli. There is evidence of prior  mediastinal surgery. There is dense coronary artery calcification. There  is no thoracic aortic aneurysm or dissection. There is moderate  atherosclerotic change of the thoracic aorta. A left subclavian cardiac  pacer device is in place. Heart is upper limits of normal in size. There  is no pericardial or pleural effusion. No pathologic intrathoracic or  axillary lymphadenopathy visualized.     Hypoechoic thin liver lesions are favorable for cysts. The adrenal  glands are prominent and hypodense. Please refer to CT abdomen report.  There is moderate stool within the visible colon.     There is a calcified granuloma within the left upper lobe. There is no  pulmonary consolidation. There is minimal dependent atelectasis. There  are no spiculated nodules. There is no pneumothorax.     No focal destructive osseous lesions are visualized.       Impression:       1. No pulmonary emboli.  2. Prior mediastinal surgery, likely coronary bypass. Left subclavian  cardiac pacer device.  3. Left upper lobe benign calcified granuloma. Minimal dependent  atelectasis. No consolidation or  suspicious nodularity.  4. Hypodense liver lesions favorable for cysts. Please refer to CT  abdomen report.  This report was finalized on 09/10/2018 12:43 by Dr. Kimberly Doll MD.    CT Cervical Spine Without Contrast [[id number]] Collected:  09/10/18 1237     Updated:  09/10/18 1242    Narrative:       CT CERVICAL SPINE WO CONTRAST- 9/10/2018 12:06 PM CDT     HISTORY: neck pain, syncope, fall      COMPARISON: None     DOSE LENGTH PRODUCT: 241 mGy cm. Automated exposure control was also  utilized to decrease patient radiation dose.     TECHNIQUE: Axial images of cervical spine obtained with sagittal coronal  reconstructions.     FINDINGS:  There is minimal retrolisthesis of C5 in reference to C4  measuring 1 to 2 mm. There is moderate to advanced degenerative disc  change at C6/C7 and C7-T1 with moderate degenerative disc changes above  the C5 level. There is moderate facet osteoarthropathy. There is mild  uncinate process hypertrophy.     No cervical vertebral fracture is identified.     The degenerative changes result in only mild cervical spinal canal  stenosis with scattered bilateral neural foramen narrowing. At least  moderate in severity.     There is carotid bulb calcification. There is no apical pneumothorax.       Impression:       1. Degenerative changes of the cervical spine with no acute cervical  vertebral fracture. Minimal retrolisthesis of C5 likely related to facet  osteoarthropathy.  This report was finalized on 09/10/2018 12:39 by Dr. Kimberly Doll MD.    CT Head Without Contrast [[id number]] Collected:  09/10/18 1233     Updated:  09/10/18 1240    Narrative:       CT HEAD WO CONTRAST- 9/10/2018 12:06 PM CDT     HISTORY: head injury, fall, syncope      COMPARISON: None     DOSE LENGTH PRODUCT: 696 mGy cm. Automated exposure control was also  utilized to decrease patient radiation dose.     TECHNIQUE: Axial images of brain obtained without IV contrast.     FINDINGS:  There is subarachnoid  "hemorrhage along the convexities of the  right frontal lobe. No intraparenchymal hematoma or contusion  visualized. There is moderate cerebral volume loss with chronic vessel  vessel ischemia. There are no convincing acute signs of ischemia. No  subdural or epidural hematomas visualized.     The visible paranasal sinuses and mastoid air cells are well-aerated.  The bony calvarium appears intact. No skull fracture identified.       Impression:       1. Subarachnoid hemorrhage along the convexities of the right frontal  lobe. No intraparenchymal hemorrhage visualized. This finding called to  Karol Berrios in the ER at 12:40 PM on 9/10/2018.  2. Moderate cerebral volume loss with chronic vessel vessel ischemia.  This report was finalized on 09/10/2018 12:37 by Dr. Kimberly Doll MD.    XR Elbow 3+ View Left [[id number]] Collected:  09/10/18 1056     Updated:  09/10/18 1100    Narrative:       HISTORY: Fall with left elbow pain     Left elbow: 3 views of left elbow are obtained.     There is an IV within the left antecubital fossa. There is no  appreciable joint effusion. There is mild bony spurring of the a \"knot  on and humeral epicondyles. No bony fracture or dislocation is  identified.       Impression:       1. Mild arthritic change of the left elbow with no acute bony pathology.  This report was finalized on 09/10/2018 10:57 by Dr. Kimberly Doll MD.    XR Chest 1 View [[id number]] Collected:  09/10/18 1055     Updated:  09/10/18 1059    Narrative:       HISTORY: Syncope     CXR: Frontal view the chest is obtained.     COMPARISON: None     FINDINGS: Patient has undergone prior mediastinal surgery. There is a  left subclavian cardiac pacer device with the lead projecting over the  right ventricle. There is a left upper lobe calcified granuloma. There  is no consolidation or edema. There is no pleural effusion or  pneumothorax. There is no acute bony pathology.       Impression:       1. Left upper lobe calcified " granuloma with calcified left hilar lymph  nodes. Prior mediastinal surgery with left subclavian cardiac pacer  device. No acute pulmonary process identified.  This report was finalized on 09/10/2018 10:56 by Dr. Kimberly Doll MD.        ECG/EMG Results (last 24 hours)     Procedure Component Value Units Date/Time    ECG 12 Lead [[id number]] Collected:  09/10/18 1003     Updated:  09/10/18 1003    ECG 12 Lead [[id number]] Collected:  09/11/18 0413     Updated:  09/11/18 0414    Narrative:       Test Reason : rhythm change  Blood Pressure : **/** mmHG  Vent. Rate : 071 BPM     Atrial Rate : 071 BPM     P-R Int : 238 ms          QRS Dur : 078 ms      QT Int : 434 ms       P-R-T Axes : 110 079 233 degrees     QTc Int : 471 ms    Sinus rhythm with marked sinus arrythmia with 1st degree A-V block with  occasional Premature ventricular complexes  Possible Inferior infarct , age undetermined  ST and T wave abnormality, consider anterior ischemia  Abnormal ECG  When compared with ECG of 10-SEP-2018 10:03,  Premature ventricular complexes are now Present    Referred By:  MARIA LUISA           Confirmed By:           Orders (last 24 hrs)     Start     Ordered    09/12/18 0600  Comprehensive Metabolic Panel  Morning Draw      09/11/18 0741    09/12/18 0600  CBC & Differential  Morning Draw      09/11/18 0741    09/11/18 2100  atorvastatin (LIPITOR) tablet 80 mg  Nightly      09/11/18 0738    09/11/18 2100  QUEtiapine (SEROquel) tablet 100 mg  Nightly      09/11/18 0738 09/11/18 0900  famotidine (PEPCID) tablet 40 mg  Daily,   Status:  Discontinued      09/10/18 1502    09/11/18 0900  aspirin chewable tablet 81 mg  Daily,   Status:  Discontinued      09/11/18 0738 09/11/18 0900  busPIRone (BUSPAR) tablet 10 mg  2 Times Daily      09/11/18 0738 09/11/18 0900  clopidogrel (PLAVIX) tablet 75 mg  Daily,   Status:  Discontinued      09/11/18 0738 09/11/18 0900  DULoxetine (CYMBALTA) DR capsule 60 mg  Daily      09/11/18 0738     09/11/18 0900  folic acid (FOLVITE) tablet 1 mg  Daily      09/11/18 0738    09/11/18 0900  midodrine (PROAMATINE) tablet 5 mg  2 Times Daily,   Status:  Discontinued      09/11/18 0738    09/11/18 0900  potassium chloride (MICRO-K) CR capsule 20 mEq  Daily      09/11/18 0738    09/11/18 0900  famotidine (PEPCID) tablet 20 mg  Daily      09/11/18 0808    09/11/18 0830  perflutren (DEFINITY) lipid microspheres injection 9.78 mg  Once      09/11/18 0733    09/11/18 0830  midodrine (PROAMATINE) tablet 10 mg  3 Times Daily Before Meals      09/11/18 0741    09/11/18 0830  sodium chloride 0.9 % with KCl 20 mEq/L infusion  Continuous      09/11/18 0742    09/11/18 0800  CT Head Without Contrast  1 Time Imaging      09/11/18 0759    09/11/18 0740  Support hose/ZEE hose on q AM off qPM  Nursing Communication  Once     Comments:  Support hose/ZEE hose on q AM off qPM    09/11/18 0740    09/11/18 0630  sodium chloride 0.9 % bolus 500 mL  Once      09/11/18 0530    09/11/18 0600  CBC Auto Differential  Morning Draw,   Status:  Canceled      09/10/18 1502    09/11/18 0600  Comprehensive Metabolic Panel  Morning Draw      09/10/18 1502    09/11/18 0500  sodium chloride 0.9 % bolus 500 mL  Once      09/11/18 0408    09/11/18 0412  ECG 12 Lead  STAT      09/11/18 0411    09/11/18 0408  CBC & Differential  STAT      09/11/18 0408    09/11/18 0408  Lactic Acid, Plasma  STAT      09/11/18 0408    09/11/18 0408  CBC Auto Differential  PROCEDURE ONCE      09/11/18 0408    09/11/18 0000  Troponin  Every 6 Hours      09/10/18 2208    09/10/18 2300  QUEtiapine (SEROquel) tablet 200 mg  Once      09/10/18 2208    09/10/18 2000  Neuro Checks  Every 4 Hours      09/10/18 1749    09/10/18 1900  sodium chloride 0.9 % infusion  Continuous,   Status:  Discontinued      09/10/18 1801    09/10/18 1800  Orthostatic Blood Pressure  4 Times Daily      09/10/18 1749    09/10/18 1759  Monitor closely for fluid overload  Nursing Communication  Once      Comments:  Monitor closely for fluid overload    09/10/18 1801    09/10/18 1758  Inpatient Nephrology Consult  Once     Specialty:  Nephrology  Provider:  Chauncey Reese MD    09/10/18 1801    09/10/18 175  Adult Transthoracic Echo Complete W/ Cont if Necessary Per Protocol  Once      09/10/18 1801    09/10/18 1615  SLP Plan of Care Cert / Re-Cert  Once     Comments:  Speech Language Pathology Plan of Care  Initial Certification  Certification Period: 9/10/2018 - 2018    Patient Name: Travis Patel  : 1945    (I60.9) Subarachnoid hemorrhage (CMS/HCC)  (primary encounter diagnosis)  (R55) Syncope, unspecified syncope type  (R74.8) Elevated liver enzymes  (K59.00) Constipation, unspecified constipation type  (N17.9) VANI (acute kidney injury) (CMS/HCC)  (R74.8) Elevated troponin  (N28.1) Renal cyst  (E27.9) Adrenal nodule (CMS/[facility])  (Z79.01) Chronic anticoagulation  (R13.10) Dysphagia, unspecified type                Assessment  SLP Assessment  Prior Level of Function-Communication: cognitive-linguistic impairment  Prior Level of Function-Swallowing: dietary restrictions (e.g. consistent carb, low salt, etc.) (Cardiac restriction, hx of dysphagia and modified diet )  SLP Swallowing Diagnosis: functional oral phase, functional pharyngeal phase  Plan of Care Reviewed With: patient, daughter  Swallow Criteria for Skilled Therapeutic Interventions Met: baseline status, no problems identified which require skilled intervention                    Plan    SLP Plan  Therapy Frequency (Swallow): evaluation only        Tonya Hunter CCC-SLP  9/10/2018      By cosigning this order, either electronically or physically, I certify that the therapy services are furnished while this patient is under my care, the services outline above are required by this patient, and will be reviewed every 90 days.        M.D.:__________________________________________ Date: ______________    09/10/18 1615    09/10/18 1600  lactulose  solution 20 g  3 Times Daily      09/10/18 1502    09/10/18 1518  Lactic Acid, Reflex  STAT      09/10/18 1517    09/10/18 1503  Vital Signs  Every 15 Minutes      09/10/18 1502    09/10/18 1503  Intake & Output  Every Shift      09/10/18 1502    09/10/18 1503  Weigh Patient  Daily      09/10/18 1502    09/10/18 1503  Insert Peripheral IV  Once      09/10/18 1502    09/10/18 1503  Saline Lock & Maintain IV Access  Continuous      09/10/18 1502    09/10/18 1503  Place Sequential Compression Device  Once      09/10/18 1502    09/10/18 1503  Maintain Sequential Compression Device  Continuous      09/10/18 1502    09/10/18 1503  Diet Regular; Cardiac  Diet Effective Now      09/10/18 1502    09/10/18 1503  Inpatient Neurosurgery Consult  Once     Specialty:  Neurosurgery  Provider:  Hu Calvillo MD    09/10/18 1502    09/10/18 1503  MILK AND MOLASSES ENEMA PER RECTUM TODAY  Nursing Communication  Once     Comments:  MILK AND MOLASSES ENEMA PER RECTUM TODAY    09/10/18 1502    09/10/18 1502  acetaminophen (TYLENOL) tablet 650 mg  Every 4 Hours PRN      09/10/18 1502    09/10/18 1502  HYDROcodone-acetaminophen (NORCO) 5-325 MG per tablet 1 tablet  Every 4 Hours PRN      09/10/18 1502    09/10/18 1502  ondansetron (ZOFRAN) injection 4 mg  Every 6 Hours PRN      09/10/18 1502    09/10/18 1502  sodium chloride 0.9 % flush 1-10 mL  As Needed      09/10/18 1502    09/10/18 1355  Code Status and Medical Interventions:  Continuous      09/10/18 1402    09/10/18 1315  Inpatient Admission  Once      09/10/18 1314    09/10/18 1249  SLP Consult: Eval & Treat  Once      09/10/18 1248    09/10/18 1249  SLP Evaluation - Failed Bedside Dysphagia Screening  Once      09/10/18 1248    09/10/18 1249  SLP Consult: Eval & Treat  Once      09/10/18 1248    09/10/18 1249  SLP Evaluation - Failed Bedside Dysphagia Screening  Once      09/10/18 1248    09/10/18 1242  Neurosurgery (on-call MD unless specified)  Once     Comments:    Rajinder aware of pt   Specialty:  Neurosurgery  Provider:  Hu Calvillo MD    09/10/18 1242    09/10/18 1221  piperacillin-tazobactam (ZOSYN) 3.375 g/100 mL 0.9% NS IVPB (mbp)  Once      09/10/18 1219    09/10/18 1213  Blood Culture - Blood,  Once      09/10/18 1213    09/10/18 1213  Blood Culture - Blood,  Once      09/10/18 1213    09/10/18 1208  iopamidol (ISOVUE-370) 76 % injection 150 mL  Once in Imaging      09/10/18 1206    09/10/18 1206  Lactic Acid, Reflex Timer (This will reflex a repeat order 3-3:15 hours after ordered.)  Once      09/10/18 1205    09/10/18 1014  Orthostatic Vitals  Once      09/10/18 1013    09/10/18 1008  sodium chloride 0.9 % bolus 500 mL  Once      09/10/18 1006    09/10/18 1008  BNP  Once      09/10/18 1007    09/10/18 1007  CT Angiogram Chest With Contrast  1 Time Imaging      09/10/18 1006    09/10/18 1007  Cardiac Monitoring  Once      09/10/18 1006    09/10/18 1007  Pulse Oximetry, Continuous  Per Hospital Policy      09/10/18 1006    09/10/18 1007  NPO Diet  Diet Effective Now,   Status:  Canceled      09/10/18 1006    09/10/18 1007  Insert peripheral IV  Once      09/10/18 1006    09/10/18 1007  Device Interrogation. Company to contact? Unknown (Staff to obtain from patient); Device company contacted already? No  Once      09/10/18 1007    09/10/18 1006  sodium chloride 0.9 % flush 10 mL  As Needed      09/10/18 1006    09/10/18 1006  CT Cervical Spine Without Contrast  1 Time Imaging      09/10/18 1006    09/10/18 1006  Apply collar  Once      09/10/18 1006    09/10/18 1006  CT Abdomen Pelvis With Contrast  1 Time Imaging     Comments:  IV CONTRAST ONLY      09/10/18 1006    09/10/18 1005  XR Elbow 3+ View Left  1 Time Imaging      09/10/18 1006    09/10/18 1005  CT Head Without Contrast  1 Time Imaging      09/10/18 1006    09/10/18 1004  CBC & Differential  Once      09/10/18 1006    09/10/18 1004  Comprehensive Metabolic Panel  Once      09/10/18 1006    09/10/18 1004   Protime-INR  Once      09/10/18 1006    09/10/18 1004  aPTT  Once      09/10/18 1006    09/10/18 1004  Lithium Level  STAT      09/10/18 1006    09/10/18 1004  Urinalysis With Microscopic If Indicated (No Culture) - Urine, Clean Catch  Once      09/10/18 1006    09/10/18 1004  Troponin  Once      09/10/18 1006    09/10/18 1004  Lactic Acid, Plasma  Once      09/10/18 1006    09/10/18 1004  ECG 12 Lead  Once,   Status:  Canceled      09/10/18 1006    09/10/18 1004  XR Chest 1 View  1 Time Imaging      09/10/18 1006    09/10/18 1004  CBC Auto Differential  PROCEDURE ONCE      09/10/18 1006    09/10/18 0949  ECG 12 Lead  Once      09/10/18 0948    Unscheduled  Wound care  As Needed      09/10/18 1006    Unscheduled  Up With Assistance  As Needed      09/10/18 1502    --  busPIRone (BUSPAR) 10 MG tablet  2 Times Daily      09/10/18 1850    --  midodrine (PROAMATINE) 5 MG tablet  2 Times Daily      09/10/18 1850    --  QUEtiapine (SEROquel) 100 MG tablet  Nightly      09/10/18 1850    --  potassium chloride (K-DUR) 10 MEQ CR tablet  Daily      09/10/18 1850    --  spironolactone (ALDACTONE) 25 MG tablet  2 Times Daily      09/10/18 1850    --  bumetanide (BUMEX) 2 MG tablet  Every Other Day      09/10/18 1850    --  bumetanide (BUMEX) 2 MG tablet  2 Times Daily      09/10/18 1850    --  metoprolol succinate XL (TOPROL-XL) 25 MG 24 hr tablet  Daily      09/10/18 1850    Pending  Inpatient Case Management  Consult  Once     Provider:  (Not yet assigned)    Pending    --  atorvastatin (LIPITOR) 80 MG tablet  Nightly      09/11/18 0727    --  clopidogrel (PLAVIX) 75 MG tablet  Daily      09/11/18 0727    --  lisinopril (PRINIVIL,ZESTRIL) 2.5 MG tablet  Daily      09/11/18 0727    --  raNITIdine (ZANTAC) 150 MG tablet  2 Times Daily      09/11/18 0727    --  DULoxetine (CYMBALTA) 60 MG capsule  Daily      09/11/18 0727    --  aspirin 81 MG chewable tablet  Daily      09/11/18 0727    --  folic acid (FOLVITE) 1  MG tablet  Daily      09/11/18 0727    --  lithium carbonate 300 MG capsule  3 Times Daily With Meals      09/11/18 0727          Operative/Procedure Notes (last 24 hours) (Notes from 9/10/2018  8:19 AM through 9/11/2018  8:19 AM)     No notes of this type exist for this encounter.           Physician Progress Notes (last 24 hours) (Notes from 9/10/2018  8:19 AM through 9/11/2018  8:19 AM)      Kenya Gomez DO at 9/11/2018  7:42 AM              [facility] Medicine Services  INPATIENT PROGRESS NOTE    Patient Name: Travis Patel  Date of Admission: 9/10/2018  Today's Date: 09/11/18  Length of Stay: 1  Primary Care Physician: Shant Hayes MD    Subjective   Chief Complaint: Has some nausea.    HPI   Patient still feels tired.  Has had some nausea during night.  Per nursing no orthostatic BP documented.   Echo currently at bedside.   No ZEE hose.  Home meds have been input this AM by nursing.    Review of Systems     All pertinent negatives and positives are as above. All other systems have been reviewed and are negative unless otherwise stated.     Objective    Temp:  [96.9 °F (36.1 °C)-98.4 °F (36.9 °C)] 97.7 °F (36.5 °C)  Heart Rate:  [] 97  Resp:  [14-21] 21  BP: ()/(41-96) 69/51  Physical Exam   Constitutional: He is oriented to person, place, and time. He appears well-developed and well-nourished.   HENT:   Head: Normocephalic and atraumatic.   Eyes: Pupils are equal, round, and reactive to light. Conjunctivae and EOM are normal.   Neck: Normal range of motion. Neck supple. No JVD present.   Cardiovascular: Normal rate, regular rhythm, normal heart sounds and intact distal pulses.    Pulmonary/Chest: Effort normal and breath sounds normal.   Abdominal: Soft. Bowel sounds are normal.   Musculoskeletal: Normal range of motion.   Neurological: He is alert and oriented to person, place, and time.   Skin: Skin is warm and dry.   Psychiatric: He has a normal mood  and affect. His behavior is normal.   Nursing note and vitals reviewed.        Results Review:  I have reviewed the labs, radiology results, and diagnostic studies.    Laboratory Data:     Results from last 7 days  Lab Units 09/11/18  0448 09/10/18  1110   WBC 10*3/mm3 12.47* 15.60*   HEMOGLOBIN g/dL 13.3* 15.2   HEMATOCRIT % 39.0* 43.9   PLATELETS 10*3/mm3 206 313          Results from last 7 days  Lab Units 09/11/18  0448 09/10/18  1110   SODIUM mmol/L 142 148*   POTASSIUM mmol/L 3.2* 4.4   CHLORIDE mmol/L 105 105   CO2 mmol/L 21.0* 24.0   BUN mg/dL 36* 41*   CREATININE mg/dL 1.88* 1.75*   CALCIUM mg/dL 9.2 10.7*   BILIRUBIN mg/dL 1.1* 1.9*   ALK PHOS U/L 121* 172*   ALT (SGPT) U/L 31 33   AST (SGOT) U/L 60* 72*   GLUCOSE mg/dL 112* 121*       Culture Data:   Blood Culture   Date Value Ref Range Status   09/10/2018 No growth at less than 24 hours  Preliminary   09/10/2018 No growth at less than 24 hours  Preliminary       Radiology Data:   Imaging Results (last 24 hours)     Procedure Component Value Units Date/Time    CT Abdomen Pelvis With Contrast [[id number]] Collected:  09/10/18 1243     Updated:  09/10/18 1252    Narrative:       CT ABDOMEN PELVIS W CONTRAST- 9/10/2018 12:06 PM CDT     HISTORY: abd pain, syncope      COMPARISON: None     DOSE LENGTH PRODUCT: 221 mGy cm. Automated exposure control was also  utilized to decrease patient radiation dose.     TECHNIQUE: Axial images of the abdomen pelvis are obtained following IV  contrast. No oral contrast administered.     FINDINGS:  There are scattered hypodense liver lesions favorable for  cysts. The spleen, pancreas are unremarkable. Small amount of biliary  sludge versus stones considered in the dependent portion the gallbladder  (image 24, series 4). There are bilateral nonspecific adrenal nodules  measuring up to 1.8 cm. There is an inferior left renal cyst. There is  no enhancing renal mass. There is no hydronephrosis. Bladder is  distended. There is no  prostate enlargement.      A large amount of fecal material is contained within the rectum. Rectum  measures 7.5 cm in width. Moderate stool seen throughout the remaining  colon. There are postoperative changes of the cecum. There is no  evidence for small bowel obstruction. The stomach is underdistended.  There is scarring along the region of the umbilicus.     There is dense vascular calcification with ectasia of the aorta to  maximum measurement of 2.4 cm.     Please refer to CT chest report for findings below the hemidiaphragms.     There are degenerative changes of the regional skeleton. There is a mild  rotoscoliotic curvature of the lumbar spine.       Impression:       1. Large volume of stool within the rectum may indicate constipation  and/or impaction. Moderate volume stool seen in the remaining colon. No  evidence of bowel obstruction.  2. Hypodense liver lesions favorable for cysts. Inferior left renal  cyst.  3. Nonspecific bilateral adrenal nodules can be followed on subsequent  imaging. Adrenal protocol CT versus MRI should be considered  nonemergently if there are no outside films for comparison.    4. Distended bladder. .  This report was finalized on 09/10/2018 12:49 by Dr. Kimberly Doll MD.    CT Angiogram Chest With Contrast [[id number]] Collected:  09/10/18 1239     Updated:  09/10/18 1246    Narrative:       CT ANGIOGRAM CHEST W CONTRAST- 9/10/2018 12:06 PM CDT     HISTORY: mid back pain, syncope      COMPARISON: None     DOSE LENGTH PRODUCT: 268 mGy cm. Automated exposure control was also  utilized to decrease patient radiation dose.     TECHNIQUE: Axial images of the chest are obtained following IV contrast.  2-D and maximal intensity projection images are reconstructed and  reviewed.     FINDINGS:  There are no pulmonary emboli. There is evidence of prior  mediastinal surgery. There is dense coronary artery calcification. There  is no thoracic aortic aneurysm or dissection. There is  moderate  atherosclerotic change of the thoracic aorta. A left subclavian cardiac  pacer device is in place. Heart is upper limits of normal in size. There  is no pericardial or pleural effusion. No pathologic intrathoracic or  axillary lymphadenopathy visualized.     Hypoechoic thin liver lesions are favorable for cysts. The adrenal  glands are prominent and hypodense. Please refer to CT abdomen report.  There is moderate stool within the visible colon.     There is a calcified granuloma within the left upper lobe. There is no  pulmonary consolidation. There is minimal dependent atelectasis. There  are no spiculated nodules. There is no pneumothorax.     No focal destructive osseous lesions are visualized.       Impression:       1. No pulmonary emboli.  2. Prior mediastinal surgery, likely coronary bypass. Left subclavian  cardiac pacer device.  3. Left upper lobe benign calcified granuloma. Minimal dependent  atelectasis. No consolidation or suspicious nodularity.  4. Hypodense liver lesions favorable for cysts. Please refer to CT  abdomen report.  This report was finalized on 09/10/2018 12:43 by Dr. Kimberly Doll MD.    CT Cervical Spine Without Contrast [[id number]] Collected:  09/10/18 1237     Updated:  09/10/18 1242    Narrative:       CT CERVICAL SPINE WO CONTRAST- 9/10/2018 12:06 PM CDT     HISTORY: neck pain, syncope, fall      COMPARISON: None     DOSE LENGTH PRODUCT: 241 mGy cm. Automated exposure control was also  utilized to decrease patient radiation dose.     TECHNIQUE: Axial images of cervical spine obtained with sagittal coronal  reconstructions.     FINDINGS:  There is minimal retrolisthesis of C5 in reference to C4  measuring 1 to 2 mm. There is moderate to advanced degenerative disc  change at C6/C7 and C7-T1 with moderate degenerative disc changes above  the C5 level. There is moderate facet osteoarthropathy. There is mild  uncinate process hypertrophy.     No cervical vertebral fracture is  identified.     The degenerative changes result in only mild cervical spinal canal  stenosis with scattered bilateral neural foramen narrowing. At least  moderate in severity.     There is carotid bulb calcification. There is no apical pneumothorax.       Impression:       1. Degenerative changes of the cervical spine with no acute cervical  vertebral fracture. Minimal retrolisthesis of C5 likely related to facet  osteoarthropathy.  This report was finalized on 09/10/2018 12:39 by Dr. Kimberly Doll MD.    CT Head Without Contrast [[id number]] Collected:  09/10/18 1233     Updated:  09/10/18 1240    Narrative:       CT HEAD WO CONTRAST- 9/10/2018 12:06 PM CDT     HISTORY: head injury, fall, syncope      COMPARISON: None     DOSE LENGTH PRODUCT: 696 mGy cm. Automated exposure control was also  utilized to decrease patient radiation dose.     TECHNIQUE: Axial images of brain obtained without IV contrast.     FINDINGS:  There is subarachnoid hemorrhage along the convexities of the  right frontal lobe. No intraparenchymal hematoma or contusion  visualized. There is moderate cerebral volume loss with chronic vessel  vessel ischemia. There are no convincing acute signs of ischemia. No  subdural or epidural hematomas visualized.     The visible paranasal sinuses and mastoid air cells are well-aerated.  The bony calvarium appears intact. No skull fracture identified.       Impression:       1. Subarachnoid hemorrhage along the convexities of the right frontal  lobe. No intraparenchymal hemorrhage visualized. This finding called to  Karol Berrios in the ER at 12:40 PM on 9/10/2018.  2. Moderate cerebral volume loss with chronic vessel vessel ischemia.  This report was finalized on 09/10/2018 12:37 by Dr. Kimberly Doll MD.    XR Elbow 3+ View Left [[id number]] Collected:  09/10/18 1056     Updated:  09/10/18 1100    Narrative:       HISTORY: Fall with left elbow pain     Left elbow: 3 views of left elbow are obtained.    "  There is an IV within the left antecubital fossa. There is no  appreciable joint effusion. There is mild bony spurring of the a \"knot  on and humeral epicondyles. No bony fracture or dislocation is  identified.       Impression:       1. Mild arthritic change of the left elbow with no acute bony pathology.  This report was finalized on 09/10/2018 10:57 by Dr. Kimberly Doll MD.    XR Chest 1 View [[id number]] Collected:  09/10/18 1055     Updated:  09/10/18 1059    Narrative:       HISTORY: Syncope     CXR: Frontal view the chest is obtained.     COMPARISON: None     FINDINGS: Patient has undergone prior mediastinal surgery. There is a  left subclavian cardiac pacer device with the lead projecting over the  right ventricle. There is a left upper lobe calcified granuloma. There  is no consolidation or edema. There is no pleural effusion or  pneumothorax. There is no acute bony pathology.       Impression:       1. Left upper lobe calcified granuloma with calcified left hilar lymph  nodes. Prior mediastinal surgery with left subclavian cardiac pacer  device. No acute pulmonary process identified.  This report was finalized on 09/10/2018 10:56 by Dr. Kimberly Doll MD.          I have reviewed the patient's current medications.     Assessment/Plan     Hospital Problem List     Subarachnoid hemorrhage (CMS/HCC)        Assessment     Syncope  Orthostatic hypotension, chronic recurrent by history.  Subarrachnoid hemorrhage.   CAD   Bipolar disease  Constipation with possible rectal impaction.  Renal failure. Acute on chronic.  CHF by history, type unknown, echo in process.    Plan    ZEE Hose/support hose.  Orthostatic BP  Continue gentle hydration, attention to fluid overload.   Digitally disimpacted yesterday per nursing. Monitor for stooling, lactulose tid until stooling then will readjust.   Neurosurgery following head bleed.   Will continue in the ICU today  Nephrology consult pending.  Morning lab  CMP CBC  Chart " reviewed.   Discussed with  Nursing , plan of care.               Discharge Planning: I expect the patient to be discharged to home in 4-7 days.    Kenya Gomez DO   09/11/18   7:42 AM      Electronically signed by Kenya Gomez DO at 9/11/2018  7:49 AM       Consult Notes (last 24 hours) (Notes from 9/10/2018  8:20 AM through 9/11/2018  8:20 AM)     No notes of this type exist for this encounter.

## 2018-09-11 NOTE — PLAN OF CARE
Problem: Patient Care Overview  Goal: Plan of Care Review  Outcome: Outcome(s) achieved Date Met: 09/11/18 09/11/18 7992   Coping/Psychosocial   Plan of Care Reviewed With patient   OTHER   Outcome Summary Cardiac diet, Oral intake insuffient at this time. Starting Ensure Enlive BID. Con to follow.

## 2018-09-11 NOTE — PLAN OF CARE
Problem: Fall Risk (Adult)  Goal: Absence of Fall  Outcome: Ongoing (interventions implemented as appropriate)      Problem: Patient Care Overview  Goal: Plan of Care Review   09/11/18 0547   Coping/Psychosocial   Plan of Care Reviewed With patient   Plan of Care Review   Progress declining       Problem: Sepsis/Septic Shock (Adult)  Goal: Signs and Symptoms of Listed Potential Problems Will be Absent, Minimized or Managed (Sepsis/Septic Shock)  Outcome: Ongoing (interventions implemented as appropriate)      Problem: Syncope (Adult)  Goal: Optimal Emotional/Functional [place]  Outcome: Ongoing (interventions implemented as appropriate)      Problem: Renal Failure/Kidney Injury, Acute (Adult)  Goal: Signs and Symptoms of Listed Potential Problems Will be Absent, Minimized or Managed (Renal Failure/Kidney Injury, Acute)  Outcome: Ongoing (interventions implemented as appropriate)

## 2018-09-11 NOTE — PLAN OF CARE
Problem: Patient Care Overview  Goal: Plan of Care Review  Outcome: Ongoing (interventions implemented as appropriate)   09/11/18 1104   Coping/Psychosocial   Plan of Care Reviewed With patient   Plan of Care Review   Progress no change   OTHER   Outcome Summary Completed cognitive eval. Pt demonstrated moderate difficulty with abstract reasoning skills. Pt had difficulty fully explaining his reasoning for tasks completed. Pt demonstrated mild impairment with divergent naming tasks. Pt was noted to have difficulty with executive function, pt needs assistance with initiation and turn taking. Pt is not self monitoring. ST to follow and treat.

## 2018-09-11 NOTE — PROGRESS NOTES
[facility] Medicine Services  INPATIENT PROGRESS NOTE    Patient Name: Travis Patel  Date of Admission: 9/10/2018  Today's Date: 09/11/18  Length of Stay: 1  Primary Care Physician: Shant Hayes MD    Subjective   Chief Complaint: Has some nausea.    HPI   Patient still feels tired.  Has had some nausea during night.  Per nursing no orthostatic BP documented.   Echo currently at bedside.   No ZEE hose.  Home meds have been input this AM by nursing.    Review of Systems     All pertinent negatives and positives are as above. All other systems have been reviewed and are negative unless otherwise stated.     Objective    Temp:  [96.9 °F (36.1 °C)-98.4 °F (36.9 °C)] 97.7 °F (36.5 °C)  Heart Rate:  [] 97  Resp:  [14-21] 21  BP: ()/(41-96) 69/51  Physical Exam   Constitutional: He is oriented to person, place, and time. He appears well-developed and well-nourished.   HENT:   Head: Normocephalic and atraumatic.   Eyes: Pupils are equal, round, and reactive to light. Conjunctivae and EOM are normal.   Neck: Normal range of motion. Neck supple. No JVD present.   Cardiovascular: Normal rate, regular rhythm, normal heart sounds and intact distal pulses.    Pulmonary/Chest: Effort normal and breath sounds normal.   Abdominal: Soft. Bowel sounds are normal.   Musculoskeletal: Normal range of motion.   Neurological: He is alert and oriented to person, place, and time.   Skin: Skin is warm and dry.   Psychiatric: He has a normal mood and affect. His behavior is normal.   Nursing note and vitals reviewed.        Results Review:  I have reviewed the labs, radiology results, and diagnostic studies.    Laboratory Data:     Results from last 7 days  Lab Units 09/11/18  0448 09/10/18  1110   WBC 10*3/mm3 12.47* 15.60*   HEMOGLOBIN g/dL 13.3* 15.2   HEMATOCRIT % 39.0* 43.9   PLATELETS 10*3/mm3 206 313          Results from last 7 days  Lab Units 09/11/18  0448 09/10/18  1110   SODIUM  mmol/L 142 148*   POTASSIUM mmol/L 3.2* 4.4   CHLORIDE mmol/L 105 105   CO2 mmol/L 21.0* 24.0   BUN mg/dL 36* 41*   CREATININE mg/dL 1.88* 1.75*   CALCIUM mg/dL 9.2 10.7*   BILIRUBIN mg/dL 1.1* 1.9*   ALK PHOS U/L 121* 172*   ALT (SGPT) U/L 31 33   AST (SGOT) U/L 60* 72*   GLUCOSE mg/dL 112* 121*       Culture Data:   Blood Culture   Date Value Ref Range Status   09/10/2018 No growth at less than 24 hours  Preliminary   09/10/2018 No growth at less than 24 hours  Preliminary       Radiology Data:   Imaging Results (last 24 hours)     Procedure Component Value Units Date/Time    CT Abdomen Pelvis With Contrast [[id number]] Collected:  09/10/18 1243     Updated:  09/10/18 1252    Narrative:       CT ABDOMEN PELVIS W CONTRAST- 9/10/2018 12:06 PM CDT     HISTORY: abd pain, syncope      COMPARISON: None     DOSE LENGTH PRODUCT: 221 mGy cm. Automated exposure control was also  utilized to decrease patient radiation dose.     TECHNIQUE: Axial images of the abdomen pelvis are obtained following IV  contrast. No oral contrast administered.     FINDINGS:  There are scattered hypodense liver lesions favorable for  cysts. The spleen, pancreas are unremarkable. Small amount of biliary  sludge versus stones considered in the dependent portion the gallbladder  (image 24, series 4). There are bilateral nonspecific adrenal nodules  measuring up to 1.8 cm. There is an inferior left renal cyst. There is  no enhancing renal mass. There is no hydronephrosis. Bladder is  distended. There is no prostate enlargement.      A large amount of fecal material is contained within the rectum. Rectum  measures 7.5 cm in width. Moderate stool seen throughout the remaining  colon. There are postoperative changes of the cecum. There is no  evidence for small bowel obstruction. The stomach is underdistended.  There is scarring along the region of the umbilicus.     There is dense vascular calcification with ectasia of the aorta to  maximum measurement  of 2.4 cm.     Please refer to CT chest report for findings below the hemidiaphragms.     There are degenerative changes of the regional skeleton. There is a mild  rotoscoliotic curvature of the lumbar spine.       Impression:       1. Large volume of stool within the rectum may indicate constipation  and/or impaction. Moderate volume stool seen in the remaining colon. No  evidence of bowel obstruction.  2. Hypodense liver lesions favorable for cysts. Inferior left renal  cyst.  3. Nonspecific bilateral adrenal nodules can be followed on subsequent  imaging. Adrenal protocol CT versus MRI should be considered  nonemergently if there are no outside films for comparison.    4. Distended bladder. .  This report was finalized on 09/10/2018 12:49 by Dr. Kimberly Doll MD.    CT Angiogram Chest With Contrast [[id number]] Collected:  09/10/18 1239     Updated:  09/10/18 1246    Narrative:       CT ANGIOGRAM CHEST W CONTRAST- 9/10/2018 12:06 PM CDT     HISTORY: mid back pain, syncope      COMPARISON: None     DOSE LENGTH PRODUCT: 268 mGy cm. Automated exposure control was also  utilized to decrease patient radiation dose.     TECHNIQUE: Axial images of the chest are obtained following IV contrast.  2-D and maximal intensity projection images are reconstructed and  reviewed.     FINDINGS:  There are no pulmonary emboli. There is evidence of prior  mediastinal surgery. There is dense coronary artery calcification. There  is no thoracic aortic aneurysm or dissection. There is moderate  atherosclerotic change of the thoracic aorta. A left subclavian cardiac  pacer device is in place. Heart is upper limits of normal in size. There  is no pericardial or pleural effusion. No pathologic intrathoracic or  axillary lymphadenopathy visualized.     Hypoechoic thin liver lesions are favorable for cysts. The adrenal  glands are prominent and hypodense. Please refer to CT abdomen report.  There is moderate stool within the visible  colon.     There is a calcified granuloma within the left upper lobe. There is no  pulmonary consolidation. There is minimal dependent atelectasis. There  are no spiculated nodules. There is no pneumothorax.     No focal destructive osseous lesions are visualized.       Impression:       1. No pulmonary emboli.  2. Prior mediastinal surgery, likely coronary bypass. Left subclavian  cardiac pacer device.  3. Left upper lobe benign calcified granuloma. Minimal dependent  atelectasis. No consolidation or suspicious nodularity.  4. Hypodense liver lesions favorable for cysts. Please refer to CT  abdomen report.  This report was finalized on 09/10/2018 12:43 by Dr. Kimberly Doll MD.    CT Cervical Spine Without Contrast [[id number]] Collected:  09/10/18 1237     Updated:  09/10/18 1242    Narrative:       CT CERVICAL SPINE WO CONTRAST- 9/10/2018 12:06 PM CDT     HISTORY: neck pain, syncope, fall      COMPARISON: None     DOSE LENGTH PRODUCT: 241 mGy cm. Automated exposure control was also  utilized to decrease patient radiation dose.     TECHNIQUE: Axial images of cervical spine obtained with sagittal coronal  reconstructions.     FINDINGS:  There is minimal retrolisthesis of C5 in reference to C4  measuring 1 to 2 mm. There is moderate to advanced degenerative disc  change at C6/C7 and C7-T1 with moderate degenerative disc changes above  the C5 level. There is moderate facet osteoarthropathy. There is mild  uncinate process hypertrophy.     No cervical vertebral fracture is identified.     The degenerative changes result in only mild cervical spinal canal  stenosis with scattered bilateral neural foramen narrowing. At least  moderate in severity.     There is carotid bulb calcification. There is no apical pneumothorax.       Impression:       1. Degenerative changes of the cervical spine with no acute cervical  vertebral fracture. Minimal retrolisthesis of C5 likely related to facet  osteoarthropathy.  This report was  "finalized on 09/10/2018 12:39 by Dr. Kimberly Doll MD.    CT Head Without Contrast [[id number]] Collected:  09/10/18 1233     Updated:  09/10/18 1240    Narrative:       CT HEAD WO CONTRAST- 9/10/2018 12:06 PM CDT     HISTORY: head injury, fall, syncope      COMPARISON: None     DOSE LENGTH PRODUCT: 696 mGy cm. Automated exposure control was also  utilized to decrease patient radiation dose.     TECHNIQUE: Axial images of brain obtained without IV contrast.     FINDINGS:  There is subarachnoid hemorrhage along the convexities of the  right frontal lobe. No intraparenchymal hematoma or contusion  visualized. There is moderate cerebral volume loss with chronic vessel  vessel ischemia. There are no convincing acute signs of ischemia. No  subdural or epidural hematomas visualized.     The visible paranasal sinuses and mastoid air cells are well-aerated.  The bony calvarium appears intact. No skull fracture identified.       Impression:       1. Subarachnoid hemorrhage along the convexities of the right frontal  lobe. No intraparenchymal hemorrhage visualized. This finding called to  Karol Berrios in the ER at 12:40 PM on 9/10/2018.  2. Moderate cerebral volume loss with chronic vessel vessel ischemia.  This report was finalized on 09/10/2018 12:37 by Dr. Kimberly Doll MD.    XR Elbow 3+ View Left [[id number]] Collected:  09/10/18 1056     Updated:  09/10/18 1100    Narrative:       HISTORY: Fall with left elbow pain     Left elbow: 3 views of left elbow are obtained.     There is an IV within the left antecubital fossa. There is no  appreciable joint effusion. There is mild bony spurring of the a \"knot  on and humeral epicondyles. No bony fracture or dislocation is  identified.       Impression:       1. Mild arthritic change of the left elbow with no acute bony pathology.  This report was finalized on 09/10/2018 10:57 by Dr. Kimberly Doll MD.    XR Chest 1 View [[id number]] Collected:  09/10/18 1055     Updated:  " 09/10/18 1059    Narrative:       HISTORY: Syncope     CXR: Frontal view the chest is obtained.     COMPARISON: None     FINDINGS: Patient has undergone prior mediastinal surgery. There is a  left subclavian cardiac pacer device with the lead projecting over the  right ventricle. There is a left upper lobe calcified granuloma. There  is no consolidation or edema. There is no pleural effusion or  pneumothorax. There is no acute bony pathology.       Impression:       1. Left upper lobe calcified granuloma with calcified left hilar lymph  nodes. Prior mediastinal surgery with left subclavian cardiac pacer  device. No acute pulmonary process identified.  This report was finalized on 09/10/2018 10:56 by Dr. Kimberly Doll MD.          I have reviewed the patient's current medications.     Assessment/Plan     Hospital Problem List     Subarachnoid hemorrhage (CMS/HCC)        Assessment     Syncope  Orthostatic hypotension, chronic recurrent by history.  Subarrachnoid hemorrhage.   CAD   Bipolar disease  Constipation with possible rectal impaction.  Renal failure. Acute on chronic.  CHF by history, type unknown, echo in process.    Plan    ZEE Hose/support hose.  Orthostatic BP  Continue gentle hydration, attention to fluid overload.   Digitally disimpacted yesterday per nursing. Monitor for stooling, lactulose tid until stooling then will readjust.   Neurosurgery following head bleed.   Will continue in the ICU today  Nephrology consult pending.  Morning lab  CMP CBC  Chart reviewed.   Discussed with  Nursing , plan of care.               Discharge Planning: I expect the patient to be discharged to home in 4-7 days.    Kenya Gomez DO   09/11/18   7:42 AM

## 2018-09-11 NOTE — THERAPY EVALUATION
Acute Care - Speech Language Pathology Initial Evaluation  [facility]     Patient Name: Travis Patel  : 1945  MRN: [id number]  Today's Date: 2018               Admit Date: 9/10/2018   Completed cognitive eval. Pt demonstrated moderate difficulty with abstract reasoning skills. Pt had difficulty fully explaining his reasoning for tasks completed. Pt demonstrated mild impairment with divergent naming tasks. Pt was noted to have difficulty with executive function, pt needs assistance with initiation and turn taking. Pt is not self monitoring. ST to follow and treat. Thanks.  Donna Frances, Speech Therapy Student 2018 1:34 PM       Visit Dx:    ICD-10-CM ICD-9-CM   1. Subarachnoid hemorrhage (CMS/HCC) I60.9 430   2. Syncope, unspecified syncope type R55 780.2   3. Elevated liver enzymes R74.8 790.5   4. Constipation, unspecified constipation type K59.00 564.00   5. VANI (acute kidney injury) (CMS/HCC) N17.9 584.9   6. Elevated troponin R74.8 790.6   7. Renal cyst N28.1 753.10   8. Adrenal nodule (CMS/HCC) E27.9 255.8   9. Chronic anticoagulation Z79.01 V58.61   10. Dysphagia, unspecified type R13.10 787.20   11. Impaired cognition R41.89 294.9     Patient Active Problem List   Diagnosis   • Subarachnoid hemorrhage (CMS/HCC)     Past Medical History:   Diagnosis Date   • Hernia of abdominal wall    • Myocardial infarction 2017   • Sleep apnea    • Stroke (CMS/HCC) 2017     Past Surgical History:   Procedure Laterality Date   • COLOSTOMY  2001    x 3 months   • COLOSTOMY REVISION     • CORONARY ARTERY BYPASS GRAFT         • HERNIA REPAIR      abdominal hernia x 3; poor healing with multiple revisions   • PACEMAKER IMPLANTATION  2017    w/ defibrillator   • ROTATOR CUFF REPAIR Right           SLP EVALUATION (last 72 hours)      SLP SLC Evaluation     Row Name 18 0950                   Communication Assessment/Intervention    Document Type (P)  evaluation  -AH        Subjective  Information (P)  no complaints  -        Patient Observations (P)  alert;cooperative  -        Patient Effort (P)  good  -           General Information    Patient Profile Reviewed (P)  yes  -        Pertinent History Of Current Problem (P)  CT of head on 09/10/18. showed Subarachnoid hemorrhage in the right frontal lobe  -        Precautions/Limitations, Vision (P)  WFL;for purposes of eval  -        Precautions/Limitations, Hearing (P)  WFL;for purposes of eval  -        Prior Level of Function-Communication (P)  cognitive-linguistic impairment  -        Plans/Goals Discussed with (P)  patient  -        Barriers to Rehab (P)  cognitive status;previous functional deficit  -        Patient's Goals for Discharge (P)  patient did not state  -        Family Goals for Discharge (P)  --   no family present  -           Comprehension Assessment/Intervention    Comprehension Assessment/Intervention (P)  Auditory Comprehension  -           Auditory Comprehension Assessment/Intervention    Auditory Comprehension (Communication) (P)  mild impairment  -        Answers Questions (Communication) (P)  yes/no;personal;simple;concrete;WFL;abstract;mild impairment  -        Able to Follow Commands (Communication) (P)  1-step;WFL;2-step;mild impairment  -        Narrative Discourse (P)  conversational level;moderate impairment  -        Successful Auditory Strategies (Communication) (P)  repetition  -           Expression Assessment/Intervention    Expression Assessment/Intervention --  -TM           Oral Motor Structure and Function    Oral Motor Structure and Function (P)  WFL  -        Dentition Assessment (P)  natural, present and adequate  -        Mucosal Quality (P)  moist, healthy  -           Oral Musculature and Cranial Nerve Assessment    Oral Motor General Assessment (P)  oral labial or buccal impairment  -        Oral Labial or Buccal Impairment, Detail, Cranial Nerve VII  (Facial): (P)  reduced ROM;reduced strength bilaterally  -           Motor Speech Assessment/Intervention    Motor Speech Function (P)  WNL  -           Cursory Voice Assessment/Intervention    Quality and Resonance (Voice) (P)  WNL  -           Cognitive Assessment Intervention- SLP    Cognitive Function (Cognition) (P)  moderate impairment  -        Orientation Status (Cognition) (P)  WFL  -        Memory (Cognitive) (P)  moderate impairment  -        Attention (Cognitive) (P)  sustained;WFL  -        Thought Organization (Cognitive) (P)  concrete divergent;concrete convergent;WFL;abstract divergent;abstract convergent;drawing conclusions;moderate impairment  -        Reasoning (Cognitive) (P)  moderate impairment  -        Problem Solving (Cognitive) (P)  moderate impairment  -        Functional Math (Cognitive) (P)  moderate impairment  -        Executive Function (Cognition) (P)  moderate impairment  -        Pragmatics (Communication) (P)  initiation;affect;moderate impairment  -           SLP Clinical Impressions    SLP Diagnosis (P)  moderate cognitive impairment  -        Rehab Potential/Prognosis (P)  fair  -        SLC Criteria for Skilled Therapy Interventions Met (P)  yes  -        Functional Impact (P)  functional impact in ADLs  -           Recommendations    Therapy Frequency (SLP SLC) (P)  3 days per week  -        Predicted Duration Therapy Intervention (Days) (P)  until discharge  -        Anticipated Dischage Disposition (P)  unknown  -           Communication Treatment Objective and Progress Goals (SLP)    Auditory Comprehension Treatment Objectives (P)  Auditory Comprehension Treatment Objectives (Group)  -        Cognitive Linguistic Treatment Objectives (P)  Cognitive Linguistic Treatment Objectives (Group)  -           Auditory Comprehension Treatment Objectives    Comprehend Questions Selection (P)  comprehend questions, SLP goal 1  -            "Comprehend Questions Goal 1 (SLP)    Improve Ability to Comprehend Questions Goal 1 (SLP) (P)  complex yes/no questions;independently (over 90% accuracy)  -        Time Frame (Comprehend Questions Goal 1, SLP) (P)  by discharge  -        Progress/Outcomes (Comprehend Questions Goal 1, SLP) (P)  goal ongoing  -           Cognitive Linguistic Treatment Objectives    Organizational Skills Selection (P)  organizational skills, SLP goal 1  -        Reasoning Selection (P)  reasoning, SLP goal 1  -        Problem Solving Selection (P)  problem solving, SLP goal 1  -           Organizational Skills Goal 1 (SLP)    Improve Thought Organization Through Goal 1 (SLP) (P)  completing a divergent naming task;concrete;completing a convergent naming task;abstract;independently (over 90% accuracy)  -        Time Frame (Thought Organization Skills Goal 1, SLP) (P)  by discharge  -        Progress/Outcomes (Thought Organization Skills Goal 1, SLP) (P)  goal ongoing  -           Reasoning Goal 1 (SLP)    Improve Reasoning Through Goal 1 (SLP) (P)  complete basic reasoning task;complete mental flexibility task;independently (over 90% accuracy)  -        Time Frame (Reasoning Goal 1, SLP) (P)  by discharge  -        Progress/Outcomes (Reasoning Goal 1, SLP) (P)  goal ongoing  -           Functional Problem Solving Skills Goal 1 (SLP)    Improve Problem Solving Through Goal 1 (SLP) (P)  answer questions about ADL problems;answer \"what if\" questions;tell similarity between items;independently (over 90% accuracy)  -        Time Frame (Problem Solving Goal 1, SLP) (P)  by discharge  -        Progress/Outcomes (Problem Solving Goal 1, SLP) (P)  goal ongoing  -          User Key  (r) = Recorded By, (t) = Taken By, (c) = Cosigned By    Initials Name Effective Dates    TM Tonya Hunter, CCC-SLP 08/02/16 -      Donna Frances, Speech Therapy Student 08/10/18 -                EDUCATION  The patient has been " educated in the following areas:     Cognitive Impairment.    SLP Recommendation and Plan    SLP Diagnosis: (P) moderate cognitive impairment    Rehab Potential/Prognosis: (P) fair    SLC Criteria for Skilled Therapy Interventions Met: (P) yes    Anticipated Dischage Disposition: (P) unknown              Predicted Duration Therapy Intervention (Days): (P) until discharge          Plan of Care Review  Plan of Care Reviewed With: (P) patient  Plan of Care Reviewed With: (P) patient  Progress: (P) no change  Outcome Summary: (P) Completed cognitive eval. Pt demonstrated moderate difficulty with abstract reasoning skills. Pt had difficulty fully explaining his reasoning for tasks completed. Pt demonstrated mild impairment with divergent naming tasks. Pt was noted to have difficulty with executive function, pt needs assistance with initiation and turn taking. Pt is not self monitoring. ST to follow and treat.              SLP GOALS     Row Name 09/11/18 0950             Comprehend Questions Goal 1 (SLP)    Improve Ability to Comprehend Questions Goal 1 (SLP) (P)  complex yes/no questions;independently (over 90% accuracy)  -      Time Frame (Comprehend Questions Goal 1, SLP) (P)  by discharge  -      Progress/Outcomes (Comprehend Questions Goal 1, SLP) (P)  goal ongoing  -         Organizational Skills Goal 1 (SLP)    Improve Thought Organization Through Goal 1 (SLP) (P)  completing a divergent naming task;concrete;completing a convergent naming task;abstract;independently (over 90% accuracy)  -      Time Frame (Thought Organization Skills Goal 1, SLP) (P)  by discharge  -      Progress/Outcomes (Thought Organization Skills Goal 1, SLP) (P)  goal ongoing  -         Reasoning Goal 1 (SLP)    Improve Reasoning Through Goal 1 (SLP) (P)  complete basic reasoning task;complete mental flexibility task;independently (over 90% accuracy)  -      Time Frame (Reasoning Goal 1, SLP) (P)  by discharge  -       "Progress/Outcomes (Reasoning Goal 1, SLP) (P)  goal ongoing  -         Functional Problem Solving Skills Goal 1 (SLP)    Improve Problem Solving Through Goal 1 (SLP) (P)  answer questions about ADL problems;answer \"what if\" questions;tell similarity between items;independently (over 90% accuracy)  -      Time Frame (Problem Solving Goal 1, SLP) (P)  by discharge  -      Progress/Outcomes (Problem Solving Goal 1, SLP) (P)  goal ongoing  -        User Key  (r) = Recorded By, (t) = Taken By, (c) = Cosigned By    Initials Name Provider Type     Donna Frances, Speech Therapy Student Speech Therapy Student                 SLP Outcome Measures (last 72 hours)      SLP Outcome Measures     Row Name 09/11/18 1300 09/10/18 1610          SLP Outcome Measures    Outcome Measure Used? (P)  Adult NOMS  - Adult NOMS  -        Adult FCM Scores    FCM Chosen (P)  Problem Solving  - Swallowing  -     Swallowing FCM Score  -- 6  -     Problem Solving Score FCM (P)  3  -  --       User Key  (r) = Recorded By, (t) = Taken By, (c) = Cosigned By    Initials Name Effective Dates     Tonya Hunter CCC-SLP 08/02/16 -      Donna Frances, Speech Therapy Student 08/10/18 -               Time Calculation:           Time Calculation- SLP     Row Name 09/11/18 1111             Time Calculation- [facility]    SLP Start Time (P)  0950  -      SLP Stop Time (P)  1101  -      SLP Time Calculation (min) (P)  71 min  -      SLP Received On (P)  09/11/18  -      SLP Goal Re-Cert Due Date (P)  09/21/18  -        User Key  (r) = Recorded By, (t) = Taken By, (c) = Cosigned By    Initials Name Provider Type     Donna Frances, Speech Therapy Student Speech Therapy Student          Therapy Charges for Today     Code Description Service Date Service Provider Modifiers Qty    94853932701 HC ST EVAL SPEECH AND PROD W LANG  5 9/11/2018 Donna Frances, Speech Therapy Student GN 1             ADULT NOMS (last 72 hours)      Adult " NOMS     Row Name 09/10/18 1610                   Adult FCM Scores    FCM Chosen Swallowing  -TM        Swallowing FCM Score 6  -TM          User Key  (r) = Recorded By, (t) = Taken By, (c) = Cosigned By    Initials Name Effective Dates    TM Tonya Hunter CCC-SLP 08/02/16 -         SLP G-Codes  SLP NOMS Used?: (P) Yes (problem solving)  Functional Limitations: (P) Other Speech Language Pathology (problem solving)  Swallow Current Status (): At least 1 percent but less than 20 percent impaired, limited or restricted  Swallow Goal Status (): At least 1 percent but less than 20 percent impaired, limited or restricted  Swallow Discharge Status (): At least 1 percent but less than 20 percent impaired, limited or restricted  Other Speech-Language Pathology Functional Limitation Current Status (): (P) At least 60 percent but less than 80 percent impaired, limited or restricted  Other Speech-Language Pathology Functional Limitation Goal Status (): (P) At least 40 percent but less than 60 percent impaired, limited or restricted      Donna Frances, Speech Therapy Student  9/11/2018

## 2018-09-11 NOTE — PLAN OF CARE
Problem: Fall Risk (Adult)  Goal: Identify Related Risk Factors and Signs and Symptoms  Outcome: Ongoing (interventions implemented as appropriate)    Goal: Absence of Fall  Outcome: Ongoing (interventions implemented as appropriate)      Problem: Patient Care Overview  Goal: Plan of Care Review  Outcome: Ongoing (interventions implemented as appropriate)   09/11/18 5088   Coping/Psychosocial   Plan of Care Reviewed With patient;family   Plan of Care Review   Progress no change   OTHER   Outcome Summary Patient had a potassium of 3.2 and patient was ordered Potassium 20 mEq PO and 20 mEq in Normal Saline. Patient has started to eat a little bit this afternoon. He did not eat breakfast or lunch this AM. Tested patient for orthostatic hypotension. Results were : lying 135/79 HR 77; sitting 109/82 HR 80; standing 76/61 HR 90       Problem: Constipation (Adult)  Goal: Identify Related Risk Factors and Signs and Symptoms  Outcome: Ongoing (interventions implemented as appropriate)    Goal: Effective Bowel Elimination  Outcome: Ongoing (interventions implemented as appropriate)    Goal: Comfort  Outcome: Ongoing (interventions implemented as appropriate)      Problem: Sepsis/Septic Shock (Adult)  Goal: Signs and Symptoms of Listed Potential Problems Will be Absent, Minimized or Managed (Sepsis/Septic Shock)  Outcome: Ongoing (interventions implemented as appropriate)      Problem: Syncope (Adult)  Goal: Identify Related Risk Factors and Signs and Symptoms  Outcome: Ongoing (interventions implemented as appropriate)    Goal: Physical Safety/Health Maintenance  Outcome: Ongoing (interventions implemented as appropriate)    Goal: Optimal Emotional/Functional [place]  Outcome: Ongoing (interventions implemented as appropriate)      Problem: Renal Failure/Kidney Injury, Acute (Adult)  Goal: Signs and Symptoms of Listed Potential Problems Will be Absent, Minimized or Managed (Renal Failure/Kidney Injury, Acute)  Outcome:  Ongoing (interventions implemented as appropriate)      Problem: Brain Injury, Moderate Traumatic (GCS 9-12) (Adult)  Goal: Signs and Symptoms of Listed Potential Problems Will be Absent, Minimized or Managed (Brain Injury, Moderate Traumatic)  Outcome: Ongoing (interventions implemented as appropriate)

## 2018-09-11 NOTE — CONSULTS
Nephrology ([facility]) Consult Note      Patient:  Travis Patel  YOB: 1945  Date of Service: 9/11/2018  MRN: [id number]   Acct: [id number]   Primary Care Physician: Shant Hayes MD  Advance Directive:   Code Status and Medical Interventions:   Ordered at: 09/10/18 1402     Level Of Support Discussed With:    Patient     Code Status:    CPR     Medical Interventions (Level of Support Prior to Arrest):    Full     Admit Date: 9/10/2018       Hospital Day: 1  Referring Provider: Russell Collins MD      Patient personally seen and examined.  Complete chart including Consults, Notes, Operative Reports, Labs, Cardiology, and Radiology studies reviewed as able.        Subjective:  Travis Patel is a 73 y.o. male  whom we were consulted for chronic kidney disease stage 3.  Patient has never seen nephrologist.  Review of old labs shows baseline creatinine 1.2--1.4 mg.  History of Hypertension, congestive heart failure, coronary artery disease with prior CABG, bipolar.  Presented after a syncopal episode at home and struck his head.  Has fallen or passed out several times recently.  patient has not been eating well recently.  Takes Bumex and Aldactone at home.  Denies n/v/d.  No recent change in urine output, denies dysuria or hematuria.  Had CT angiogram as part of workup in Emergency department.  Admitted with small subarachnoid hemorrhage.  Currently patient is awake and alert, denies pain or dyspnea. No peripheral edema. Urine output nonoliguric    Allergies:  Sulfa antibiotics    Home Meds:  Prescriptions Prior to Admission   Medication Sig Dispense Refill Last Dose   • aspirin 81 MG chewable tablet Chew 81 mg Daily.      • atorvastatin (LIPITOR) 80 MG tablet Take 80 mg by mouth Every Night.      • bumetanide (BUMEX) 2 MG tablet Take 2 mg by mouth Every Other Day.      • bumetanide (BUMEX) 2 MG tablet Take 2 mg by mouth 2 (Two) Times a Day. Takes BID every other day  alternating with daily      • busPIRone (BUSPAR) 10 MG tablet Take 10 mg by mouth 2 (Two) Times a Day.      • clopidogrel (PLAVIX) 75 MG tablet Take 75 mg by mouth Daily.      • DULoxetine (CYMBALTA) 60 MG capsule Take 60 mg by mouth Daily.      • folic acid (FOLVITE) 1 MG tablet Take 1 mg by mouth Daily.      • lisinopril (PRINIVIL,ZESTRIL) 2.5 MG tablet Take 2.5 mg by mouth Daily.      • lithium carbonate 300 MG capsule Take 300 mg by mouth 3 (Three) Times a Day With Meals.      • metoprolol succinate XL (TOPROL-XL) 25 MG 24 hr tablet Take 37.5 mg by mouth Daily.      • midodrine (PROAMATINE) 5 MG tablet Take 5 mg by mouth 2 (Two) Times a Day.      • QUEtiapine (SEROquel) 100 MG tablet Take 200 mg by mouth Every Night.      • raNITIdine (ZANTAC) 150 MG tablet Take 150 mg by mouth 2 (Two) Times a Day.      • spironolactone (ALDACTONE) 25 MG tablet Take 25 mg by mouth 2 (Two) Times a Day.      • potassium chloride (K-DUR) 10 MEQ CR tablet Take 10 mEq by mouth Daily.          Medicines:  Current Facility-Administered Medications   Medication Dose Route Frequency Provider Last Rate Last Dose   • acetaminophen (TYLENOL) tablet 650 mg  650 mg Oral Q4H PRN Kenya Gomez,        • atorvastatin (LIPITOR) tablet 80 mg  80 mg Oral Nightly Kenya Gomez DO       • busPIRone (BUSPAR) tablet 10 mg  10 mg Oral BID Kenya Gomez DO   10 mg at 09/11/18 0843   • DULoxetine (CYMBALTA) DR capsule 60 mg  60 mg Oral Daily Kenya Gomez DO   60 mg at 09/11/18 0843   • famotidine (PEPCID) tablet 20 mg  20 mg Oral Daily Kenya Gomez DO   20 mg at 09/11/18 0843   • folic acid (FOLVITE) tablet 1 mg  1 mg Oral Daily Kenya Gomez DO   1 mg at 09/11/18 0843   • HYDROcodone-acetaminophen (NORCO) 5-325 MG per tablet 1 tablet  1 tablet Oral Q4H PRN Kenya Gomez DO       • lactulose solution 20 g  20 g Oral TID Kenya Gomez DO   20 g at 09/11/18 0844   • midodrine (PROAMATINE) tablet 10  mg  10 mg Oral TID AC Kenya Gomez DO   10 mg at 09/11/18 0843   • ondansetron (ZOFRAN) injection 4 mg  4 mg Intravenous Q6H PRN Kenya Gomez DO   4 mg at 09/10/18 2144   • potassium chloride (MICRO-K) CR capsule 20 mEq  20 mEq Oral Daily Kenya Gomez DO   20 mEq at 09/11/18 0843   • QUEtiapine (SEROquel) tablet 100 mg  100 mg Oral Nightly Kenya Gomez DO       • sodium chloride 0.9 % flush 1-10 mL  1-10 mL Intravenous PRN Kenya Gomez DO       • sodium chloride 0.9 % flush 10 mL  10 mL Intravenous PRN Karol Berrios APRN       • sodium chloride 0.9 % with KCl 20 mEq/L infusion  75 mL/hr Intravenous Continuous Kenya Gomez DO 75 mL/hr at 09/11/18 0845 75 mL/hr at 09/11/18 0845       Past Medical History:  Past Medical History:   Diagnosis Date   • Hernia of abdominal wall    • Myocardial infarction 02/2017   • Sleep apnea    • Stroke (CMS/[facility]) 02/2017       Past Surgical History:  Past Surgical History:   Procedure Laterality Date   • COLOSTOMY  2001    x 3 months   • COLOSTOMY REVISION     • CORONARY ARTERY BYPASS GRAFT      1991/1996   • HERNIA REPAIR      abdominal hernia x 3; poor healing with multiple revisions   • PACEMAKER IMPLANTATION  2017    w/ defibrillator   • ROTATOR CUFF REPAIR Right 2005       Family History  Family History   Problem Relation Age of Onset   • Heart disease Brother    • Diabetes Brother    • Sleep apnea Brother        Social History  Social History     Social History   • Marital status:      Spouse name: N/A   • Number of children: N/A   • Years of education: N/A     Occupational History   • Not on file.     Social History Main Topics   • Smoking status: Former Smoker     Packs/day: 1.00     Years: 40.00     Types: Cigarettes   • Smokeless tobacco: Never Used   • Alcohol use No   • Drug use: No   • Sexual activity: Defer     Other Topics Concern   • Not on file     Social History Narrative   • No narrative on file  "        Review of Systems:  History obtained from chart review and the patient  General ROS: No fever or chills  Respiratory ROS: No cough, shortness of breath, wheezing  Cardiovascular ROS: No chest pain or palpitations  Gastrointestinal ROS: No abdominal pain or melena  Genito-Urinary ROS: No dysuria or hematuria  Neurological ROS: no headache or dizziness  14 point ROS reviewed with the patient and negative except as noted above and in the HPI unless unable to obtain.    Objective:  BP (!) 69/51   Pulse 97   Temp 97.7 °F (36.5 °C) (Oral)   Resp 21   Ht 170.2 cm (67\")   Wt 69.1 kg (152 lb 4.8 oz)   SpO2 98%   BMI 23.85 kg/m²     Intake/Output Summary (Last 24 hours) at 09/11/18 0856  Last data filed at 09/11/18 0851   Gross per 24 hour   Intake          3347.35 ml   Output              450 ml   Net          2897.35 ml     General: awake/alert    Neck: supple, no JVD  Chest:  clear to auscultation bilaterally without respiratory distress  CVS: regular rate and rhythm  Abdominal: soft, nontender, normal bowel sounds  Extremities: no cyanosis or edema  Skin: warm and dry without rash   Neuro: no focal motor deficits      Labs:    Results from last 7 days  Lab Units 09/11/18  0448 09/10/18  1110   WBC 10*3/mm3 12.47* 15.60*   HEMOGLOBIN g/dL 13.3* 15.2   HEMATOCRIT % 39.0* 43.9   PLATELETS 10*3/mm3 206 313           Results from last 7 days  Lab Units 09/11/18  0448 09/10/18  1110   SODIUM mmol/L 142 148*   POTASSIUM mmol/L 3.2* 4.4   CHLORIDE mmol/L 105 105   CO2 mmol/L 21.0* 24.0   BUN mg/dL 36* 41*   CREATININE mg/dL 1.88* 1.75*   CALCIUM mg/dL 9.2 10.7*   BILIRUBIN mg/dL 1.1* 1.9*   ALK PHOS U/L 121* 172*   ALT (SGPT) U/L 31 33   AST (SGOT) U/L 60* 72*   GLUCOSE mg/dL 112* 121*       Radiology:   Imaging Results (last 72 hours)     Procedure Component Value Units Date/Time    CT Abdomen Pelvis With Contrast [[id number]] Collected:  09/10/18 1243     Updated:  09/10/18 1251    Narrative:       CT ABDOMEN " PELVIS W CONTRAST- 9/10/2018 12:06 PM CDT     HISTORY: abd pain, syncope      COMPARISON: None     DOSE LENGTH PRODUCT: 221 mGy cm. Automated exposure control was also  utilized to decrease patient radiation dose.     TECHNIQUE: Axial images of the abdomen pelvis are obtained following IV  contrast. No oral contrast administered.     FINDINGS:  There are scattered hypodense liver lesions favorable for  cysts. The spleen, pancreas are unremarkable. Small amount of biliary  sludge versus stones considered in the dependent portion the gallbladder  (image 24, series 4). There are bilateral nonspecific adrenal nodules  measuring up to 1.8 cm. There is an inferior left renal cyst. There is  no enhancing renal mass. There is no hydronephrosis. Bladder is  distended. There is no prostate enlargement.      A large amount of fecal material is contained within the rectum. Rectum  measures 7.5 cm in width. Moderate stool seen throughout the remaining  colon. There are postoperative changes of the cecum. There is no  evidence for small bowel obstruction. The stomach is underdistended.  There is scarring along the region of the umbilicus.     There is dense vascular calcification with ectasia of the aorta to  maximum measurement of 2.4 cm.     Please refer to CT chest report for findings below the hemidiaphragms.     There are degenerative changes of the regional skeleton. There is a mild  rotoscoliotic curvature of the lumbar spine.       Impression:       1. Large volume of stool within the rectum may indicate constipation  and/or impaction. Moderate volume stool seen in the remaining colon. No  evidence of bowel obstruction.  2. Hypodense liver lesions favorable for cysts. Inferior left renal  cyst.  3. Nonspecific bilateral adrenal nodules can be followed on subsequent  imaging. Adrenal protocol CT versus MRI should be considered  nonemergently if there are no outside films for comparison.    4. Distended bladder. .  This  report was finalized on 09/10/2018 12:49 by Dr. Kimberly Doll MD.    CT Angiogram Chest With Contrast [[id number]] Collected:  09/10/18 1239     Updated:  09/10/18 1246    Narrative:       CT ANGIOGRAM CHEST W CONTRAST- 9/10/2018 12:06 PM CDT     HISTORY: mid back pain, syncope      COMPARISON: None     DOSE LENGTH PRODUCT: 268 mGy cm. Automated exposure control was also  utilized to decrease patient radiation dose.     TECHNIQUE: Axial images of the chest are obtained following IV contrast.  2-D and maximal intensity projection images are reconstructed and  reviewed.     FINDINGS:  There are no pulmonary emboli. There is evidence of prior  mediastinal surgery. There is dense coronary artery calcification. There  is no thoracic aortic aneurysm or dissection. There is moderate  atherosclerotic change of the thoracic aorta. A left subclavian cardiac  pacer device is in place. Heart is upper limits of normal in size. There  is no pericardial or pleural effusion. No pathologic intrathoracic or  axillary lymphadenopathy visualized.     Hypoechoic thin liver lesions are favorable for cysts. The adrenal  glands are prominent and hypodense. Please refer to CT abdomen report.  There is moderate stool within the visible colon.     There is a calcified granuloma within the left upper lobe. There is no  pulmonary consolidation. There is minimal dependent atelectasis. There  are no spiculated nodules. There is no pneumothorax.     No focal destructive osseous lesions are visualized.       Impression:       1. No pulmonary emboli.  2. Prior mediastinal surgery, likely coronary bypass. Left subclavian  cardiac pacer device.  3. Left upper lobe benign calcified granuloma. Minimal dependent  atelectasis. No consolidation or suspicious nodularity.  4. Hypodense liver lesions favorable for cysts. Please refer to CT  abdomen report.  This report was finalized on 09/10/2018 12:43 by Dr. Kimberly Doll MD.    CT Cervical Spine Without  Contrast [[id number]] Collected:  09/10/18 1237     Updated:  09/10/18 1242    Narrative:       CT CERVICAL SPINE WO CONTRAST- 9/10/2018 12:06 PM CDT     HISTORY: neck pain, syncope, fall      COMPARISON: None     DOSE LENGTH PRODUCT: 241 mGy cm. Automated exposure control was also  utilized to decrease patient radiation dose.     TECHNIQUE: Axial images of cervical spine obtained with sagittal coronal  reconstructions.     FINDINGS:  There is minimal retrolisthesis of C5 in reference to C4  measuring 1 to 2 mm. There is moderate to advanced degenerative disc  change at C6/C7 and C7-T1 with moderate degenerative disc changes above  the C5 level. There is moderate facet osteoarthropathy. There is mild  uncinate process hypertrophy.     No cervical vertebral fracture is identified.     The degenerative changes result in only mild cervical spinal canal  stenosis with scattered bilateral neural foramen narrowing. At least  moderate in severity.     There is carotid bulb calcification. There is no apical pneumothorax.       Impression:       1. Degenerative changes of the cervical spine with no acute cervical  vertebral fracture. Minimal retrolisthesis of C5 likely related to facet  osteoarthropathy.  This report was finalized on 09/10/2018 12:39 by Dr. Kimberly Doll MD.    CT Head Without Contrast [[id number]] Collected:  09/10/18 1233     Updated:  09/10/18 1240    Narrative:       CT HEAD WO CONTRAST- 9/10/2018 12:06 PM CDT     HISTORY: head injury, fall, syncope      COMPARISON: None     DOSE LENGTH PRODUCT: 696 mGy cm. Automated exposure control was also  utilized to decrease patient radiation dose.     TECHNIQUE: Axial images of brain obtained without IV contrast.     FINDINGS:  There is subarachnoid hemorrhage along the convexities of the  right frontal lobe. No intraparenchymal hematoma or contusion  visualized. There is moderate cerebral volume loss with chronic vessel  vessel ischemia. There are no convincing  "acute signs of ischemia. No  subdural or epidural hematomas visualized.     The visible paranasal sinuses and mastoid air cells are well-aerated.  The bony calvarium appears intact. No skull fracture identified.       Impression:       1. Subarachnoid hemorrhage along the convexities of the right frontal  lobe. No intraparenchymal hemorrhage visualized. This finding called to  Karol Berrios in the ER at 12:40 PM on 9/10/2018.  2. Moderate cerebral volume loss with chronic vessel vessel ischemia.  This report was finalized on 09/10/2018 12:37 by Dr. Kimberly Doll MD.    XR Elbow 3+ View Left [[id number]] Collected:  09/10/18 1056     Updated:  09/10/18 1100    Narrative:       HISTORY: Fall with left elbow pain     Left elbow: 3 views of left elbow are obtained.     There is an IV within the left antecubital fossa. There is no  appreciable joint effusion. There is mild bony spurring of the a \"knot  on and humeral epicondyles. No bony fracture or dislocation is  identified.       Impression:       1. Mild arthritic change of the left elbow with no acute bony pathology.  This report was finalized on 09/10/2018 10:57 by Dr. Kimberly Doll MD.    XR Chest 1 View [[id number]] Collected:  09/10/18 1055     Updated:  09/10/18 1059    Narrative:       HISTORY: Syncope     CXR: Frontal view the chest is obtained.     COMPARISON: None     FINDINGS: Patient has undergone prior mediastinal surgery. There is a  left subclavian cardiac pacer device with the lead projecting over the  right ventricle. There is a left upper lobe calcified granuloma. There  is no consolidation or edema. There is no pleural effusion or  pneumothorax. There is no acute bony pathology.       Impression:       1. Left upper lobe calcified granuloma with calcified left hilar lymph  nodes. Prior mediastinal surgery with left subclavian cardiac pacer  device. No acute pulmonary process identified.  This report was finalized on 09/10/2018 10:56 by Dr." Kimberly Doll MD.          Culture:  Blood Culture   Date Value Ref Range Status   09/10/2018 No growth at less than 24 hours  Preliminary   09/10/2018 No growth at less than 24 hours  Preliminary         Assessment   1.  Acute kidney injury--likely prerenal due to volume depletion  2.  Baseline of chronic kidney disease stage 3  3.  Essential hypertension  4.  Syncope  5.  Subdural hematoma  6.  Bipolar disease  7.  Hypokalemia  8.  Anemia     Plan:  1.  Workup ongoing; renal US and urine studies pending  2.  Replace potassium  3.  Continue IV fluids for volume replacement and for post-contrast dye exposure  4.  Hold diuretics for now, monitor closely given history of CHF  5.  Further assessment and plan pending Dr Reese's evaluation of patient      Thank you for the consult, we appreciate the opportunity to provide care to your patients.  Feel free to contact me if I can be of any further assistance.      Ciro Herrera, APRN  9/11/2018  8:56 AM

## 2018-09-12 ENCOUNTER — APPOINTMENT (OUTPATIENT)
Dept: CT IMAGING | Facility: HOSPITAL | Age: 73
End: 2018-09-12

## 2018-09-12 PROBLEM — E78.5 HYPERLIPIDEMIA: Status: ACTIVE | Noted: 2018-09-12

## 2018-09-12 PROBLEM — I25.10 CHRONIC CORONARY ARTERY DISEASE: Status: ACTIVE | Noted: 2018-09-12

## 2018-09-12 PROBLEM — R55 SYNCOPE: Status: ACTIVE | Noted: 2018-09-12

## 2018-09-12 PROBLEM — I50.9 CONGESTIVE HEART FAILURE (HCC): Status: ACTIVE | Noted: 2018-08-08

## 2018-09-12 PROBLEM — I95.1 ORTHOSTATIC HYPOTENSION: Status: ACTIVE | Noted: 2018-09-03

## 2018-09-12 PROBLEM — Z95.810 ICD (IMPLANTABLE CARDIOVERTER-DEFIBRILLATOR) IN PLACE: Status: ACTIVE | Noted: 2018-02-02

## 2018-09-12 LAB
ALBUMIN SERPL-MCNC: 3.2 G/DL (ref 3.5–5)
ALBUMIN/GLOB SERPL: 1.3 G/DL (ref 1.1–2.5)
ALP SERPL-CCNC: 119 U/L (ref 24–120)
ALT SERPL W P-5'-P-CCNC: 30 U/L (ref 0–54)
ANION GAP SERPL CALCULATED.3IONS-SCNC: 10 MMOL/L (ref 4–13)
AST SERPL-CCNC: 53 U/L (ref 7–45)
BASOPHILS # BLD AUTO: 0.03 10*3/MM3 (ref 0–0.2)
BASOPHILS NFR BLD AUTO: 0.2 % (ref 0–2)
BILIRUB SERPL-MCNC: 1 MG/DL (ref 0.1–1)
BUN BLD-MCNC: 20 MG/DL (ref 5–21)
BUN/CREAT SERPL: 18 (ref 7–25)
CALCIUM SPEC-SCNC: 8.8 MG/DL (ref 8.4–10.4)
CHLORIDE SERPL-SCNC: 110 MMOL/L (ref 98–110)
CO2 SERPL-SCNC: 21 MMOL/L (ref 24–31)
CREAT BLD-MCNC: 1.11 MG/DL (ref 0.5–1.4)
DEPRECATED RDW RBC AUTO: 50.7 FL (ref 40–54)
EOSINOPHIL # BLD AUTO: 0.25 10*3/MM3 (ref 0–0.7)
EOSINOPHIL NFR BLD AUTO: 2.1 % (ref 0–4)
ERYTHROCYTE [DISTWIDTH] IN BLOOD BY AUTOMATED COUNT: 14.6 % (ref 12–15)
GFR SERPL CREATININE-BSD FRML MDRD: 65 ML/MIN/1.73
GLOBULIN UR ELPH-MCNC: 2.5 GM/DL
GLUCOSE BLD-MCNC: 107 MG/DL (ref 70–100)
HCT VFR BLD AUTO: 34.1 % (ref 40–52)
HGB BLD-MCNC: 11.9 G/DL (ref 14–18)
IGA SERPL-MCNC: 202 MG/DL (ref 61–437)
IGG SERPL-MCNC: 823 MG/DL (ref 700–1600)
IGM SERPL-MCNC: 86 MG/DL (ref 15–143)
IMM GRANULOCYTES # BLD: 0.05 10*3/MM3 (ref 0–0.03)
IMM GRANULOCYTES NFR BLD: 0.4 % (ref 0–5)
LYMPHOCYTES # BLD AUTO: 1.09 10*3/MM3 (ref 0.72–4.86)
LYMPHOCYTES NFR BLD AUTO: 9 % (ref 15–45)
MCH RBC QN AUTO: 32.9 PG (ref 28–32)
MCHC RBC AUTO-ENTMCNC: 34.9 G/DL (ref 33–36)
MCV RBC AUTO: 94.2 FL (ref 82–95)
MONOCYTES # BLD AUTO: 0.55 10*3/MM3 (ref 0.19–1.3)
MONOCYTES NFR BLD AUTO: 4.5 % (ref 4–12)
NEUTROPHILS # BLD AUTO: 10.13 10*3/MM3 (ref 1.87–8.4)
NEUTROPHILS NFR BLD AUTO: 83.8 % (ref 39–78)
NRBC BLD MANUAL-RTO: 0 /100 WBC (ref 0–0)
PLATELET # BLD AUTO: 161 10*3/MM3 (ref 130–400)
PMV BLD AUTO: 10.6 FL (ref 6–12)
POTASSIUM BLD-SCNC: 4.2 MMOL/L (ref 3.5–5.3)
PROT PATTERN SERPL IFE-IMP: NORMAL
PROT SERPL-MCNC: 5.7 G/DL (ref 6.3–8.7)
RBC # BLD AUTO: 3.62 10*6/MM3 (ref 4.8–5.9)
SODIUM BLD-SCNC: 141 MMOL/L (ref 135–145)
WBC NRBC COR # BLD: 12.1 10*3/MM3 (ref 4.8–10.8)

## 2018-09-12 PROCEDURE — 80053 COMPREHEN METABOLIC PANEL: CPT | Performed by: NURSE PRACTITIONER

## 2018-09-12 PROCEDURE — 70496 CT ANGIOGRAPHY HEAD: CPT

## 2018-09-12 PROCEDURE — 99231 SBSQ HOSP IP/OBS SF/LOW 25: CPT | Performed by: NEUROLOGICAL SURGERY

## 2018-09-12 PROCEDURE — 0 IOPAMIDOL PER 1 ML: Performed by: FAMILY MEDICINE

## 2018-09-12 PROCEDURE — 99222 1ST HOSP IP/OBS MODERATE 55: CPT | Performed by: INTERNAL MEDICINE

## 2018-09-12 PROCEDURE — 25810000003 SODIUM CHLORIDE 0.9 % WITH KCL 20 MEQ 20-0.9 MEQ/L-% SOLUTION: Performed by: NURSE PRACTITIONER

## 2018-09-12 PROCEDURE — 85025 COMPLETE CBC W/AUTO DIFF WBC: CPT | Performed by: FAMILY MEDICINE

## 2018-09-12 RX ADMIN — FAMOTIDINE 20 MG: 20 TABLET, FILM COATED ORAL at 09:12

## 2018-09-12 RX ADMIN — FOLIC ACID 1 MG: 1 TABLET ORAL at 09:12

## 2018-09-12 RX ADMIN — BUSPIRONE HYDROCHLORIDE 10 MG: 10 TABLET ORAL at 20:29

## 2018-09-12 RX ADMIN — LACTULOSE 20 G: 20 SOLUTION ORAL at 09:12

## 2018-09-12 RX ADMIN — ATORVASTATIN CALCIUM 80 MG: 40 TABLET, FILM COATED ORAL at 20:29

## 2018-09-12 RX ADMIN — MIDODRINE HYDROCHLORIDE 10 MG: 2.5 TABLET ORAL at 11:48

## 2018-09-12 RX ADMIN — QUETIAPINE FUMARATE 100 MG: 100 TABLET ORAL at 20:29

## 2018-09-12 RX ADMIN — LACTULOSE 20 G: 20 SOLUTION ORAL at 16:43

## 2018-09-12 RX ADMIN — LACTULOSE 20 G: 20 SOLUTION ORAL at 20:29

## 2018-09-12 RX ADMIN — MIDODRINE HYDROCHLORIDE 10 MG: 2.5 TABLET ORAL at 16:45

## 2018-09-12 RX ADMIN — BUSPIRONE HYDROCHLORIDE 10 MG: 10 TABLET ORAL at 09:12

## 2018-09-12 RX ADMIN — POTASSIUM CHLORIDE AND SODIUM CHLORIDE 50 ML/HR: 900; 150 INJECTION, SOLUTION INTRAVENOUS at 09:13

## 2018-09-12 RX ADMIN — IOPAMIDOL 82 ML: 755 INJECTION, SOLUTION INTRAVENOUS at 14:12

## 2018-09-12 RX ADMIN — POTASSIUM CHLORIDE 20 MEQ: 750 CAPSULE, EXTENDED RELEASE ORAL at 09:12

## 2018-09-12 RX ADMIN — DULOXETINE HYDROCHLORIDE 60 MG: 30 CAPSULE, DELAYED RELEASE ORAL at 09:12

## 2018-09-12 RX ADMIN — MIDODRINE HYDROCHLORIDE 10 MG: 2.5 TABLET ORAL at 07:08

## 2018-09-12 NOTE — PLAN OF CARE
Problem: Fall Risk (Adult)  Goal: Identify Related Risk Factors and Signs and Symptoms  Outcome: Outcome(s) achieved Date Met: 09/12/18    Goal: Absence of Fall  Outcome: Ongoing (interventions implemented as appropriate)      Problem: Patient Care Overview  Goal: Plan of Care Review  Outcome: Ongoing (interventions implemented as appropriate)   09/11/18 2000 09/12/18 0543   Coping/Psychosocial   Plan of Care Reviewed With patient --    Plan of Care Review   Progress --  improving   OTHER   Outcome Summary --  Pt a/ox4, BP 80-90 with MAp>65, RA, continue to monitor shoudl go to floor today/       Problem: Constipation (Adult)  Goal: Identify Related Risk Factors and Signs and Symptoms  Outcome: Outcome(s) achieved Date Met: 09/12/18    Goal: Effective Bowel Elimination  Outcome: Ongoing (interventions implemented as appropriate)    Goal: Comfort  Outcome: Ongoing (interventions implemented as appropriate)      Problem: Sepsis/Septic Shock (Adult)  Goal: Signs and Symptoms of Listed Potential Problems Will be Absent, Minimized or Managed (Sepsis/Septic Shock)  Outcome: Ongoing (interventions implemented as appropriate)      Problem: Syncope (Adult)  Goal: Identify Related Risk Factors and Signs and Symptoms  Outcome: Outcome(s) achieved Date Met: 09/12/18    Goal: Physical Safety/Health Maintenance  Outcome: Ongoing (interventions implemented as appropriate)    Goal: Optimal Emotional/Functional Sanders  Outcome: Ongoing (interventions implemented as appropriate)      Problem: Renal Failure/Kidney Injury, Acute (Adult)  Goal: Signs and Symptoms of Listed Potential Problems Will be Absent, Minimized or Managed (Renal Failure/Kidney Injury, Acute)  Outcome: Ongoing (interventions implemented as appropriate)      Problem: Brain Injury, Moderate Traumatic (GCS 9-12) (Adult)  Goal: Signs and Symptoms of Listed Potential Problems Will be Absent, Minimized or Managed (Brain Injury, Moderate Traumatic)  Outcome: Ongoing  (interventions implemented as appropriate)

## 2018-09-12 NOTE — PROGRESS NOTES
[facility] Medicine Services  INPATIENT PROGRESS NOTE    Patient Name: Travis Patel  Date of Admission: 9/10/2018  Today's Date: 09/12/18  Length of Stay: 2  Primary Care Physician: Shant Hayes MD    Subjective   Chief Complaint: feels ok.    HPI   No new problems.   No nausea, chest pain, shortness of breath.  Remains very orthostatic with position change.     Ok with NS to move to floor.     Review of Systems     All pertinent negatives and positives are as above. All other systems have been reviewed and are negative unless otherwise stated.     Objective    Temp:  [97.2 °F (36.2 °C)-98.2 °F (36.8 °C)] 98.2 °F (36.8 °C)  Heart Rate:  [] 113  Resp:  [11-21] 16  BP: ()/(42-97) 69/42  Physical Exam   Constitutional: He is oriented to person, place, and time. He appears well-developed and well-nourished.   Eyes: Pupils are equal, round, and reactive to light. Conjunctivae and EOM are normal.   Neck: Normal range of motion. Neck supple. No JVD present.   Cardiovascular: Normal rate, regular rhythm, normal heart sounds and intact distal pulses.    Pulmonary/Chest: Effort normal and breath sounds normal.   Abdominal: Soft. Bowel sounds are normal.   Musculoskeletal: Normal range of motion.   Neurological: He is alert and oriented to person, place, and time.   Skin: Skin is warm and dry.   Psychiatric: He has a normal mood and affect. His behavior is normal.   Nursing note and vitals reviewed.          Results Review:  I have reviewed the labs, radiology results, and diagnostic studies.    Laboratory Data:     Results from last 7 days  Lab Units 09/12/18  0251 09/11/18  0448 09/10/18  1110   WBC 10*3/mm3 12.10* 12.47* 15.60*   HEMOGLOBIN g/dL 11.9* 13.3* 15.2   HEMATOCRIT % 34.1* 39.0* 43.9   PLATELETS 10*3/mm3 161 206 313          Results from last 7 days  Lab Units 09/12/18  0251 09/11/18  0448 09/10/18  1110   SODIUM mmol/L 141 142 148*   POTASSIUM mmol/L 4.2 3.2*  4.4   CHLORIDE mmol/L 110 105 105   CO2 mmol/L 21.0* 21.0* 24.0   BUN mg/dL 20 36* 41*   CREATININE mg/dL 1.11 1.88* 1.75*   CALCIUM mg/dL 8.8 9.2 10.7*   BILIRUBIN mg/dL 1.0 1.1* 1.9*   ALK PHOS U/L 119 121* 172*   ALT (SGPT) U/L 30 31 33   AST (SGOT) U/L 53* 60* 72*   GLUCOSE mg/dL 107* 112* 121*       Culture Data:   Blood Culture   Date Value Ref Range Status   09/10/2018 No growth at 24 hours  Preliminary   09/10/2018 No growth at 24 hours  Preliminary       Radiology Data:   Imaging Results (last 24 hours)     Procedure Component Value Units Date/Time    CT Head Without Contrast [[id number]] Collected:  09/11/18 1457     Updated:  09/11/18 1503    Narrative:       EXAMINATION:  CT HEAD WO CONTRAST-  9/11/2018 11:33 AM CDT     HISTORY: I60.9-Nontraumatic subarachnoid hemorrhage, unspecified.  Z79.01-Long term (current) use of anticoagulants.     TECHNIQUE: Multiple axial images were obtained through the brain without  contrast infusion. Multiplanar images were reconstructed.     DLP: 616 mGy-cm. Automated dosage control was utilized.     COMPARISON: 9/10/2018.     FINDINGS: A small amount of subarachnoid hemorrhage in the sylvian  fissure on the right and between some of the right frontal sulci is  essentially stable compared to the prior study. There is no new  hemorrhage. There is mild to moderate atrophy. There is low density in  the white matter. The visualized paranasal sinuses and mastoid air cells  are clear. There is no calvarial fracture.       Impression:       1. Small amount of subarachnoid hemorrhage on the right side remains  stable.  2. Mild to moderate atrophy.  3. Low density in the white matter is nonspecific and most likely due to  chronic small vessel disease.     This report was finalized on 09/11/2018 15:00 by Dr. Winston Dawkins MD.    US Renal Bilateral [[id number]] Collected:  09/11/18 1454     Updated:  09/11/18 1500    Narrative:       EXAMINATION:  US RENAL BILATERAL-  9/11/2018 11:01  AM CDT     HISTORY: N17.9-Acute kidney failure, unspecified; R74.8-Abnormal levels  of other serum enzymes; N28.1-Cyst of kidney, acquired; E27.9-Disorder  of adrenal gland, unspecified.     COMPARISON: No comparison study.     TECHNIQUE: Multiple sonographic images were obtained.     FINDINGS: The right kidney measures 9.7 cm and the left kidney measures  10.4 cm. The cortical thickness and echogenicity are normal. There is an  exophytic left renal cyst arising from the lower pole left kidney  measuring 3.1 x 2.7 x 2.7 cm. There is no hydronephrosis. Thickening of  the wall of the urinary bladder is noted. The bladder is not fully  distended.       Impression:       1. The renal size, cortical thickness and echogenicity are within normal  limits.  2. Exophytic left renal cyst with size noted above in the body of the  report.  3. Wall thickening of the urinary bladder. This is likely related to  acute or chronic inflammation or infection. Bladder tumor is less  likely.  This report was finalized on 09/11/2018 14:57 by Dr. Winston Dawkins MD.        Echo    Interpretation Summary     · Left ventricular systolic function is mildly decreased. Estimated EF = 40-45%.  · Left ventricular diastolic dysfunction (grade I) consistent with impaired relaxation.  · Mild-to-moderate mitral valve regurgitation is present  · Based on previous outside hospital records, likely no significant change in LVEF or valvular abnormalities from 12/2017.       I have reviewed the patient's current medications.     Assessment/Plan     Hospital Problem List     Subarachnoid hemorrhage (CMS/HCC)               Assessment      Syncope  Orthostatic hypotension, chronic recurrent by history.  Subarrachnoid hemorrhage.   CAD   Bipolar disease  Constipation with possible rectal impaction.  Renal failure. Acute on chronic.  CHF by history, type unknown, echo in process.      Plan      Transfer to medical floor.   Continue ZEE hose.   Ideally would be  transitioned to Support hose 20-30 mm Hg pressure  CBC in AM  PT for mobilization   Orthostatic precautions.   Consult cardiology patient with orthostasis significant. Currently off most of his cardiac drugs.         Discharge Planning: I expect the patient to be discharged to home in 3-4 days.    Kenya Gomez,    09/12/18   8:02 AM

## 2018-09-12 NOTE — PROGRESS NOTES
Nephrology ([facility]) Progress Note      Patient:  Travis Patel  YOB: 1945  Date of Service: 9/12/2018  MRN: [id number]   Acct: [id number]   Primary Care Physician: Shant Hayes MD  Advance Directive:   Code Status and Medical Interventions:   Ordered at: 09/10/18 1402     Level Of Support Discussed With:    Patient     Code Status:    CPR     Medical Interventions (Level of Support Prior to Arrest):    Full     Admit Date: 9/10/2018       Hospital Day: 2  Referring Provider: Russell Collins MD      Patient personally seen and examined.  Complete chart including Consults, Notes, Operative Reports, Labs, Cardiology, and Radiology studies reviewed as able.        Subjective:  Travis Patel is a 73 y.o. male  whom we were consulted for chronic kidney disease stage 3.  Patient has never seen nephrologist.  Review of old labs shows baseline creatinine 1.2--1.4 mg.  History of Hypertension, congestive heart failure, coronary artery disease with prior CABG, bipolar.  Presented after a syncopal episode at home and struck his head.  Has fallen or passed out several times recently.  patient has not been eating well recently.  Takes Bumex and Aldactone at home.  Denies n/v/d.  No recent change in urine output, denies dysuria or hematuria.  Had CT angiogram as part of workup in Emergency department.  Admitted with small subarachnoid hemorrhage, neurosurgery has ruled out any intervention at this time.  Hospital course remarkable for improving renal function with IV volume replacement.     Today is feeling better; no new overnight issues. Urine output nonoliguric    Allergies:  Sulfa antibiotics    Home Meds:  Prescriptions Prior to Admission   Medication Sig Dispense Refill Last Dose   • aspirin 81 MG chewable tablet Chew 81 mg Daily.   9/9/2018 at Unknown time   • atorvastatin (LIPITOR) 80 MG tablet Take 80 mg by mouth Every Night.   9/9/2018 at Unknown time   • bumetanide  (BUMEX) 2 MG tablet Take 2 mg by mouth Every Other Day.   9/9/2018 at Unknown time   • busPIRone (BUSPAR) 10 MG tablet Take 10 mg by mouth 2 (Two) Times a Day.   9/9/2018 at Unknown time   • clopidogrel (PLAVIX) 75 MG tablet Take 75 mg by mouth Daily.   9/9/2018 at Unknown time   • DULoxetine (CYMBALTA) 60 MG capsule Take 60 mg by mouth Daily.   9/9/2018 at Unknown time   • folic acid (FOLVITE) 1 MG tablet Take 1 mg by mouth Daily.   9/9/2018 at Unknown time   • HYDROcodone-acetaminophen (NORCO)  MG per tablet Take 1 tablet by mouth Every 6 (Six) Hours As Needed for Moderate Pain .   9/9/2018 at Unknown time   • lisinopril (PRINIVIL,ZESTRIL) 2.5 MG tablet Take 2.5 mg by mouth Daily.   9/9/2018 at Unknown time   • lithium carbonate 300 MG capsule Take 300 mg by mouth 2 (Two) Times a Day With Meals.   9/9/2018 at Unknown time   • metoprolol succinate XL (TOPROL-XL) 25 MG 24 hr tablet Take 37.5 mg by mouth Daily.   9/9/2018 at Unknown time   • midodrine (PROAMATINE) 5 MG tablet Take 5 mg by mouth 2 (Two) Times a Day.   9/9/2018 at Unknown time   • QUEtiapine (SEROquel) 100 MG tablet Take 200 mg by mouth Every Night.   9/9/2018 at Unknown time   • raNITIdine (ZANTAC) 150 MG tablet Take 150 mg by mouth 2 (Two) Times a Day.   9/9/2018 at Unknown time   • spironolactone (ALDACTONE) 25 MG tablet Take 25 mg by mouth Daily.   9/9/2018 at Unknown time   • potassium chloride (K-DUR) 10 MEQ CR tablet Take 10 mEq by mouth Daily.   Unknown at Unknown time       Medicines:  Current Facility-Administered Medications   Medication Dose Route Frequency Provider Last Rate Last Dose   • acetaminophen (TYLENOL) tablet 650 mg  650 mg Oral Q4H PRN Kenya Gomez DO       • atorvastatin (LIPITOR) tablet 80 mg  80 mg Oral Nightly Kenya Gomez DO   80 mg at 09/11/18 2020   • busPIRone (BUSPAR) tablet 10 mg  10 mg Oral BID Kenya Gomez DO   10 mg at 09/11/18 2019   • DULoxetine (CYMBALTA) DR capsule 60 mg  60 mg Oral  Daily Kenya Gomez DO   60 mg at 09/11/18 0843   • famotidine (PEPCID) tablet 20 mg  20 mg Oral Daily Kenya Gomez DO   20 mg at 09/11/18 0843   • folic acid (FOLVITE) tablet 1 mg  1 mg Oral Daily Kenya Gomez DO   1 mg at 09/11/18 0843   • HYDROcodone-acetaminophen (NORCO) 5-325 MG per tablet 1 tablet  1 tablet Oral Q4H PRN Kenya Gomez DO       • lactulose solution 20 g  20 g Oral TID Kenya Gomez DO   20 g at 09/11/18 2020   • midodrine (PROAMATINE) tablet 10 mg  10 mg Oral TID AC Kenya Gomez DO   10 mg at 09/12/18 0708   • ondansetron (ZOFRAN) injection 4 mg  4 mg Intravenous Q6H PRN Kenya Gomez DO   4 mg at 09/10/18 2144   • potassium chloride (MICRO-K) CR capsule 20 mEq  20 mEq Oral Daily Kenya Gomez DO   20 mEq at 09/11/18 0843   • QUEtiapine (SEROquel) tablet 100 mg  100 mg Oral Nightly Kenya Gomez DO   100 mg at 09/11/18 2019   • sodium chloride 0.9 % flush 1-10 mL  1-10 mL Intravenous PRN Kenya Gomez DO       • sodium chloride 0.9 % flush 10 mL  10 mL Intravenous PRN Karol Berrios APRN       • sodium chloride 0.9 % with KCl 20 mEq/L infusion  75 mL/hr Intravenous Continuous Kenya Gomez DO 75 mL/hr at 09/11/18 2020 75 mL/hr at 09/11/18 2020       Past Medical History:  Past Medical History:   Diagnosis Date   • Diverticulitis    • Hernia of abdominal wall    • Myocardial infarction 02/2017   • Sleep apnea    • Stroke (CMS/HCC) 02/2017       Past Surgical History:  Past Surgical History:   Procedure Laterality Date   • COLOSTOMY  2001    x 3 months   • COLOSTOMY REVISION     • CORONARY ARTERY BYPASS GRAFT      1991/1996   • HERNIA REPAIR      abdominal hernia x 3; poor healing with multiple revisions   • PACEMAKER IMPLANTATION  2017    w/ defibrillator   • ROTATOR CUFF REPAIR Right 2005       Family History  Family History   Problem Relation Age of Onset   • Heart disease Brother    • Diabetes Brother    •  "Sleep apnea Brother        Social History  Social History     Social History   • Marital status:      Spouse name: N/A   • Number of children: N/A   • Years of education: N/A     Occupational History   • Not on file.     Social History Main Topics   • Smoking status: Former Smoker     Packs/day: 1.00     Years: 40.00     Types: Cigarettes   • Smokeless tobacco: Never Used   • Alcohol use No   • Drug use: No   • Sexual activity: Defer     Other Topics Concern   • Not on file     Social History Narrative   • No narrative on file         Review of Systems:  History obtained from chart review and the patient  General ROS: No fever or chills  Respiratory ROS: No cough, shortness of breath, wheezing  Cardiovascular ROS: No chest pain or palpitations  Gastrointestinal ROS: No abdominal pain or melena  Genito-Urinary ROS: No dysuria or hematuria  Neurological ROS: no headache or dizziness    Objective:  BP (!) 69/42 (BP Location: Right arm, Patient Position: Standing)   Pulse 113   Temp 98.2 °F (36.8 °C) (Oral)   Resp 16   Ht 170.2 cm (67\")   Wt 72.4 kg (159 lb 11.2 oz)   SpO2 98%   BMI 25.01 kg/m²     Intake/Output Summary (Last 24 hours) at 09/12/18 0835  Last data filed at 09/12/18 0721   Gross per 24 hour   Intake          2888.47 ml   Output             1125 ml   Net          1763.47 ml     General: awake/alert    Neck: supple, no JVD  Chest:  clear to auscultation bilaterally without respiratory distress  CVS: regular rate and rhythm  Abdominal: soft, nontender, normal bowel sounds  Extremities: no cyanosis or edema  Skin: warm and dry without rash  Neuro: no focal motor deficits    Labs:    Results from last 7 days  Lab Units 09/12/18  0251 09/11/18  0448 09/10/18  1110   WBC 10*3/mm3 12.10* 12.47* 15.60*   HEMOGLOBIN g/dL 11.9* 13.3* 15.2   HEMATOCRIT % 34.1* 39.0* 43.9   PLATELETS 10*3/mm3 161 206 313           Results from last 7 days  Lab Units 09/12/18  0251 09/11/18  0448 09/10/18  1110   SODIUM " mmol/L 141 142 148*   POTASSIUM mmol/L 4.2 3.2* 4.4   CHLORIDE mmol/L 110 105 105   CO2 mmol/L 21.0* 21.0* 24.0   BUN mg/dL 20 36* 41*   CREATININE mg/dL 1.11 1.88* 1.75*   CALCIUM mg/dL 8.8 9.2 10.7*   BILIRUBIN mg/dL 1.0 1.1* 1.9*   ALK PHOS U/L 119 121* 172*   ALT (SGPT) U/L 30 31 33   AST (SGOT) U/L 53* 60* 72*   GLUCOSE mg/dL 107* 112* 121*       Radiology:   Imaging Results (last 72 hours)     Procedure Component Value Units Date/Time    CT Head Without Contrast [[id number]] Collected:  09/11/18 1457     Updated:  09/11/18 1503    Narrative:       EXAMINATION:  CT HEAD WO CONTRAST-  9/11/2018 11:33 AM CDT     HISTORY: I60.9-Nontraumatic subarachnoid hemorrhage, unspecified.  Z79.01-Long term (current) use of anticoagulants.     TECHNIQUE: Multiple axial images were obtained through the brain without  contrast infusion. Multiplanar images were reconstructed.     DLP: 616 mGy-cm. Automated dosage control was utilized.     COMPARISON: 9/10/2018.     FINDINGS: A small amount of subarachnoid hemorrhage in the sylvian  fissure on the right and between some of the right frontal sulci is  essentially stable compared to the prior study. There is no new  hemorrhage. There is mild to moderate atrophy. There is low density in  the white matter. The visualized paranasal sinuses and mastoid air cells  are clear. There is no calvarial fracture.       Impression:       1. Small amount of subarachnoid hemorrhage on the right side remains  stable.  2. Mild to moderate atrophy.  3. Low density in the white matter is nonspecific and most likely due to  chronic small vessel disease.     This report was finalized on 09/11/2018 15:00 by Dr. Winston Dawkins MD.    US Renal Bilateral [[id number]] Collected:  09/11/18 1454     Updated:  09/11/18 1500    Narrative:       EXAMINATION:  US RENAL BILATERAL-  9/11/2018 11:01 AM CDT     HISTORY: N17.9-Acute kidney failure, unspecified; R74.8-Abnormal levels  of other serum enzymes; N28.1-Cyst  of kidney, acquired; E27.9-Disorder  of adrenal gland, unspecified.     COMPARISON: No comparison study.     TECHNIQUE: Multiple sonographic images were obtained.     FINDINGS: The right kidney measures 9.7 cm and the left kidney measures  10.4 cm. The cortical thickness and echogenicity are normal. There is an  exophytic left renal cyst arising from the lower pole left kidney  measuring 3.1 x 2.7 x 2.7 cm. There is no hydronephrosis. Thickening of  the wall of the urinary bladder is noted. The bladder is not fully  distended.       Impression:       1. The renal size, cortical thickness and echogenicity are within normal  limits.  2. Exophytic left renal cyst with size noted above in the body of the  report.  3. Wall thickening of the urinary bladder. This is likely related to  acute or chronic inflammation or infection. Bladder tumor is less  likely.  This report was finalized on 09/11/2018 14:57 by Dr. Winston Dawkins MD.    CT Abdomen Pelvis With Contrast [[id number]] Collected:  09/10/18 1243     Updated:  09/10/18 1252    Narrative:       CT ABDOMEN PELVIS W CONTRAST- 9/10/2018 12:06 PM CDT     HISTORY: abd pain, syncope      COMPARISON: None     DOSE LENGTH PRODUCT: 221 mGy cm. Automated exposure control was also  utilized to decrease patient radiation dose.     TECHNIQUE: Axial images of the abdomen pelvis are obtained following IV  contrast. No oral contrast administered.     FINDINGS:  There are scattered hypodense liver lesions favorable for  cysts. The spleen, pancreas are unremarkable. Small amount of biliary  sludge versus stones considered in the dependent portion the gallbladder  (image 24, series 4). There are bilateral nonspecific adrenal nodules  measuring up to 1.8 cm. There is an inferior left renal cyst. There is  no enhancing renal mass. There is no hydronephrosis. Bladder is  distended. There is no prostate enlargement.      A large amount of fecal material is contained within the rectum.  Rectum  measures 7.5 cm in width. Moderate stool seen throughout the remaining  colon. There are postoperative changes of the cecum. There is no  evidence for small bowel obstruction. The stomach is underdistended.  There is scarring along the region of the umbilicus.     There is dense vascular calcification with ectasia of the aorta to  maximum measurement of 2.4 cm.     Please refer to CT chest report for findings below the hemidiaphragms.     There are degenerative changes of the regional skeleton. There is a mild  rotoscoliotic curvature of the lumbar spine.       Impression:       1. Large volume of stool within the rectum may indicate constipation  and/or impaction. Moderate volume stool seen in the remaining colon. No  evidence of bowel obstruction.  2. Hypodense liver lesions favorable for cysts. Inferior left renal  cyst.  3. Nonspecific bilateral adrenal nodules can be followed on subsequent  imaging. Adrenal protocol CT versus MRI should be considered  nonemergently if there are no outside films for comparison.    4. Distended bladder. .  This report was finalized on 09/10/2018 12:49 by Dr. Kimberly Doll MD.    CT Angiogram Chest With Contrast [[id number]] Collected:  09/10/18 1239     Updated:  09/10/18 1246    Narrative:       CT ANGIOGRAM CHEST W CONTRAST- 9/10/2018 12:06 PM CDT     HISTORY: mid back pain, syncope      COMPARISON: None     DOSE LENGTH PRODUCT: 268 mGy cm. Automated exposure control was also  utilized to decrease patient radiation dose.     TECHNIQUE: Axial images of the chest are obtained following IV contrast.  2-D and maximal intensity projection images are reconstructed and  reviewed.     FINDINGS:  There are no pulmonary emboli. There is evidence of prior  mediastinal surgery. There is dense coronary artery calcification. There  is no thoracic aortic aneurysm or dissection. There is moderate  atherosclerotic change of the thoracic aorta. A left subclavian cardiac  pacer device is  in place. Heart is upper limits of normal in size. There  is no pericardial or pleural effusion. No pathologic intrathoracic or  axillary lymphadenopathy visualized.     Hypoechoic thin liver lesions are favorable for cysts. The adrenal  glands are prominent and hypodense. Please refer to CT abdomen report.  There is moderate stool within the visible colon.     There is a calcified granuloma within the left upper lobe. There is no  pulmonary consolidation. There is minimal dependent atelectasis. There  are no spiculated nodules. There is no pneumothorax.     No focal destructive osseous lesions are visualized.       Impression:       1. No pulmonary emboli.  2. Prior mediastinal surgery, likely coronary bypass. Left subclavian  cardiac pacer device.  3. Left upper lobe benign calcified granuloma. Minimal dependent  atelectasis. No consolidation or suspicious nodularity.  4. Hypodense liver lesions favorable for cysts. Please refer to CT  abdomen report.  This report was finalized on 09/10/2018 12:43 by Dr. Kimberly Doll MD.    CT Cervical Spine Without Contrast [[id number]] Collected:  09/10/18 1237     Updated:  09/10/18 1242    Narrative:       CT CERVICAL SPINE WO CONTRAST- 9/10/2018 12:06 PM CDT     HISTORY: neck pain, syncope, fall      COMPARISON: None     DOSE LENGTH PRODUCT: 241 mGy cm. Automated exposure control was also  utilized to decrease patient radiation dose.     TECHNIQUE: Axial images of cervical spine obtained with sagittal coronal  reconstructions.     FINDINGS:  There is minimal retrolisthesis of C5 in reference to C4  measuring 1 to 2 mm. There is moderate to advanced degenerative disc  change at C6/C7 and C7-T1 with moderate degenerative disc changes above  the C5 level. There is moderate facet osteoarthropathy. There is mild  uncinate process hypertrophy.     No cervical vertebral fracture is identified.     The degenerative changes result in only mild cervical spinal canal  stenosis with  scattered bilateral neural foramen narrowing. At least  moderate in severity.     There is carotid bulb calcification. There is no apical pneumothorax.       Impression:       1. Degenerative changes of the cervical spine with no acute cervical  vertebral fracture. Minimal retrolisthesis of C5 likely related to facet  osteoarthropathy.  This report was finalized on 09/10/2018 12:39 by Dr. Kimberly Doll MD.    CT Head Without Contrast [[id number]] Collected:  09/10/18 1233     Updated:  09/10/18 1240    Narrative:       CT HEAD WO CONTRAST- 9/10/2018 12:06 PM CDT     HISTORY: head injury, fall, syncope      COMPARISON: None     DOSE LENGTH PRODUCT: 696 mGy cm. Automated exposure control was also  utilized to decrease patient radiation dose.     TECHNIQUE: Axial images of brain obtained without IV contrast.     FINDINGS:  There is subarachnoid hemorrhage along the convexities of the  right frontal lobe. No intraparenchymal hematoma or contusion  visualized. There is moderate cerebral volume loss with chronic vessel  vessel ischemia. There are no convincing acute signs of ischemia. No  subdural or epidural hematomas visualized.     The visible paranasal sinuses and mastoid air cells are well-aerated.  The bony calvarium appears intact. No skull fracture identified.       Impression:       1. Subarachnoid hemorrhage along the convexities of the right frontal  lobe. No intraparenchymal hemorrhage visualized. This finding called to  Karol Berrios in the ER at 12:40 PM on 9/10/2018.  2. Moderate cerebral volume loss with chronic vessel vessel ischemia.  This report was finalized on 09/10/2018 12:37 by Dr. Kimberly Doll MD.    XR Elbow 3+ View Left [[id number]] Collected:  09/10/18 1056     Updated:  09/10/18 1100    Narrative:       HISTORY: Fall with left elbow pain     Left elbow: 3 views of left elbow are obtained.     There is an IV within the left antecubital fossa. There is no  appreciable joint effusion. There  "is mild bony spurring of the a \"knot  on and humeral epicondyles. No bony fracture or dislocation is  identified.       Impression:       1. Mild arthritic change of the left elbow with no acute bony pathology.  This report was finalized on 09/10/2018 10:57 by Dr. Kimberly Doll MD.    XR Chest 1 View [[id number]] Collected:  09/10/18 1055     Updated:  09/10/18 1059    Narrative:       HISTORY: Syncope     CXR: Frontal view the chest is obtained.     COMPARISON: None     FINDINGS: Patient has undergone prior mediastinal surgery. There is a  left subclavian cardiac pacer device with the lead projecting over the  right ventricle. There is a left upper lobe calcified granuloma. There  is no consolidation or edema. There is no pleural effusion or  pneumothorax. There is no acute bony pathology.       Impression:       1. Left upper lobe calcified granuloma with calcified left hilar lymph  nodes. Prior mediastinal surgery with left subclavian cardiac pacer  device. No acute pulmonary process identified.  This report was finalized on 09/10/2018 10:56 by Dr. Kimberly Doll MD.          Culture:  Blood Culture   Date Value Ref Range Status   09/10/2018 No growth at 24 hours  Preliminary   09/10/2018 No growth at 24 hours  Preliminary         Assessment   1.  Acute kidney injury--prerenal due to volume depletion--improving  2.  Baseline of chronic kidney disease stage 3  3.  Essential hypertension  4.  Syncopal episodes with orthostatic hypotension  5.  Subdural hematoma  6.  Bipolar disease  7.  Hypokalemia--resolved    Plan:  1.  Decrease rate IV fluids  2.  Continue Midodrine  3.  OK to medical floor from renal standpoint      Ciro Herrera, [place]  9/12/2018  8:35 AM    "

## 2018-09-12 NOTE — PLAN OF CARE
Problem: Fall Risk (Adult)  Goal: Absence of Fall  Outcome: Ongoing (interventions implemented as appropriate)   09/12/18 1656   Fall Risk (Adult)   Absence of Fall achieves outcome       Problem: Patient Care Overview  Goal: Plan of Care Review  Outcome: Ongoing (interventions implemented as appropriate)   09/12/18 1656   Coping/Psychosocial   Plan of Care Reviewed With patient;daughter   Plan of Care Review   Progress improving   OTHER   Outcome Summary PATIENT TRANSFERRED TO  FROM ICU TODAY. S/ST  WITH PVC'S ON TELE. SUBARACHNOID HEMORRHAGE STABLE. A/O X4, NO NEURO CHANGES. SAFETY PRECUATIONS. DAUGHTER AT BEDSIDE. ORTHOS QID. IVF D/C. NO C/O PAIN. STILL NO BM. CONTINUE TO MONITOR, NOTIFY MD OF ANY CHANGES.     Goal: Individualization and Mutuality  Outcome: Ongoing (interventions implemented as appropriate)   09/12/18 1656   Individualization   Patient Specific Goals (Include Timeframe) NO FALLS, BM, NO CHANGE IN SUBARACHNOID HEMORRHAGE   Patient Specific Interventions SAFETY PRECAUTIONS, NEURO CHECKS Q4H   Mutuality/Individual Preferences   What Anxieties, Fears, Concerns, or Questions Do You Have About Your Care? NONE       Problem: Constipation (Adult)  Goal: Effective Bowel Elimination  Outcome: Ongoing (interventions implemented as appropriate)   09/12/18 1656   Constipation (Adult)   Effective Bowel Elimination making progress toward outcome     Goal: Comfort  Outcome: Ongoing (interventions implemented as appropriate)   09/12/18 1656   Constipation (Adult)   Comfort making progress toward outcome       Problem: Sepsis/Septic Shock (Adult)  Goal: Signs and Symptoms of Listed Potential Problems Will be Absent, Minimized or Managed (Sepsis/Septic Shock)  Outcome: Ongoing (interventions implemented as appropriate)   09/11/18 1754   Goal/Outcome Evaluation   Problems Assessed (Sepsis) all   Problems Present (Sepsis) hypoperfusion/hemodynamic instability       Problem: Syncope (Adult)  Goal: Physical  Safety/Health Maintenance  Outcome: Ongoing (interventions implemented as appropriate)   09/12/18 1656   Syncope (Adult)   Physical Safety/Health Maintenance making progress toward outcome     Goal: Optimal Emotional/Functional [place]  Outcome: Ongoing (interventions implemented as appropriate)   09/12/18 1656   Syncope (Adult)   Optimal Emotional/Functional [place] making progress toward outcome       Problem: Renal Failure/Kidney Injury, Acute (Adult)  Goal: Signs and Symptoms of Listed Potential Problems Will be Absent, Minimized or Managed (Renal Failure/Kidney Injury, Acute)  Outcome: Ongoing (interventions implemented as appropriate)   09/12/18 1656   Goal/Outcome Evaluation   Problems Assessed (Acute Renal Failure/Kidney Injury) all   Problems Present (ARF/Kidney Injury) electrolyte imbalance;situational response       Problem: Brain Injury, Moderate Traumatic (GCS 9-12) (Adult)  Goal: Signs and Symptoms of Listed Potential Problems Will be Absent, Minimized or Managed (Brain Injury, Moderate Traumatic)  Outcome: Ongoing (interventions implemented as appropriate)   09/12/18 1656   Goal/Outcome Evaluation   Problems Assessed (Moderate Brain Injury (GCS 9-12)) all   Problems Present (Moderate Brain Injury) hemodynamic instability;situational response       Problem: Skin Injury Risk (Adult)  Goal: Identify Related Risk Factors and Signs and Symptoms  Outcome: Ongoing (interventions implemented as appropriate)   09/12/18 1656   Skin Injury Risk (Adult)   Related Risk Factors (Skin Injury Risk) critical care admission;medication;mobility impaired;nutritional deficiencies     Goal: Skin Health and Integrity  Outcome: Ongoing (interventions implemented as appropriate)   09/12/18 1656   Skin Injury Risk (Adult)   Skin Health and Integrity making progress toward outcome

## 2018-09-12 NOTE — PROGRESS NOTES
Travisten Patel  73 y.o.      Subjective  No events    Temp:  [97.2 °F (36.2 °C)-98.2 °F (36.8 °C)] 98.2 °F (36.8 °C)  Heart Rate:  [] 113  Resp:  [11-21] 16  BP: ()/(42-97) 69/42      Objective    Neurologic Exam    NAD  AAox3  MAEW    Active Problems:    Subarachnoid hemorrhage (CMS/HCC)      Lab Results (last 24 hours)     Procedure Component Value Units Date/Time    Comprehensive Metabolic Panel [[id number]]  (Abnormal) Collected:  09/12/18 0251    Specimen:  Blood Updated:  09/12/18 0335     Glucose 107 (H) mg/dL      BUN 20 mg/dL      Creatinine 1.11 mg/dL      Sodium 141 mmol/L      Potassium 4.2 mmol/L      Chloride 110 mmol/L      CO2 21.0 (L) mmol/L      Calcium 8.8 mg/dL      Total Protein 5.7 (L) g/dL      Albumin 3.20 (L) g/dL      ALT (SGPT) 30 U/L      AST (SGOT) 53 (H) U/L      Alkaline Phosphatase 119 U/L      Total Bilirubin 1.0 mg/dL      eGFR Non African Amer 65 mL/min/1.73      Globulin 2.5 gm/dL      A/G Ratio 1.3 g/dL      BUN/Creatinine Ratio 18.0     Anion Gap 10.0 mmol/L     Narrative:       The MDRD GFR formula is only valid for adults with stable renal function between ages 18 and 70.    CBC & Differential [[id number]] Collected:  09/12/18 0251    Specimen:  Blood Updated:  09/12/18 0322    Narrative:       The following orders were created for panel order CBC & Differential.  Procedure                               Abnormality         Status                     ---------                               -----------         ------                     CBC Auto Differential[[id number]]        Abnormal            Final result                 Please view results for these tests on the individual orders.    CBC Auto Differential [[id number]]  (Abnormal) Collected:  09/12/18 0251    Specimen:  Blood Updated:  09/12/18 0322     WBC 12.10 (H) 10*3/mm3      RBC 3.62 (L) 10*6/mm3      Hemoglobin 11.9 (L) g/dL      Hematocrit 34.1 (L) %      MCV 94.2 fL      MCH 32.9 (H) pg      MCHC 34.9 g/dL       RDW 14.6 %      RDW-SD 50.7 fl      MPV 10.6 fL      Platelets 161 10*3/mm3      Neutrophil % 83.8 (H) %      Lymphocyte % 9.0 (L) %      Monocyte % 4.5 %      Eosinophil % 2.1 %      Basophil % 0.2 %      Immature Grans % 0.4 %      Neutrophils, Absolute 10.13 (H) 10*3/mm3      Lymphocytes, Absolute 1.09 10*3/mm3      Monocytes, Absolute 0.55 10*3/mm3      Eosinophils, Absolute 0.25 10*3/mm3      Basophils, Absolute 0.03 10*3/mm3      Immature Grans, Absolute 0.05 (H) 10*3/mm3      nRBC 0.0 /100 WBC     Creatinine, Urine, Random - Urine, Clean Catch [[id number]] Collected:  09/11/18 1412    Specimen:  Urine from Urine, Clean Catch Updated:  09/11/18 1444     Creatinine, Urine 96.5 mg/dL     Urea Nitrogen, Urine - Urine, Clean Catch [[id number]] Collected:  09/11/18 1412    Specimen:  Urine from Urine, Clean Catch Updated:  09/11/18 1444     Urea Nitrogen, Urine 951 mg/dL     Sodium, Urine, Random - Urine, Clean Catch [[id number]]  (Abnormal) Collected:  09/11/18 1412    Specimen:  Urine from Urine, Clean Catch Updated:  09/11/18 1444     Sodium, Urine 18 (L) mmol/L     Protein, Urine, Random - Urine, Clean Catch [[id number]]  (Normal) Collected:  09/11/18 1412    Specimen:  Urine from Urine, Clean Catch Updated:  09/11/18 1444     Total Protein, Urine 11.0 mg/dL     Blood Culture - Blood, [[id number]]  (Normal) Collected:  09/10/18 1325    Specimen:  Blood from Arm, Right Updated:  09/11/18 1345     Blood Culture No growth at 24 hours    Blood Culture - Blood, [[id number]]  (Normal) Collected:  09/10/18 1315    Specimen:  Blood from Arm, Left Updated:  09/11/18 1345     Blood Culture No growth at 24 hours    Troponin [[id number]]  (Abnormal) Collected:  09/11/18 1227    Specimen:  Blood Updated:  09/11/18 1300     Troponin I 0.106 (H) ng/mL     Immunofixation, Serum [[id number]] Collected:  09/11/18 1227    Specimen:  Blood Updated:  09/11/18 1230    Iron Profile [[id number]]  (Normal) Collected:  09/10/18 1110     Specimen:  Blood Updated:  09/11/18 1131     Iron 116 mcg/dL      TIBC 390 mcg/dL      Iron Saturation 30 %     Uric Acid [[id number]]  (Abnormal) Collected:  09/10/18 1110    Specimen:  Blood Updated:  09/11/18 1122     Uric Acid 10.9 (H) mg/dL               Plan:   Syncope with fall     1) Subarachnoid hemorrhage - likely traumatic, no headache or nuchal rigidity, neuro exam stable, monitor with serial imaging/neuro checks.  Ok for floor from NS standpoint.  Obtain CTA.    Hu Calvillo MD

## 2018-09-13 LAB
ALBUMIN SERPL-MCNC: 2.5 G/DL (ref 3.5–5)
ALBUMIN/GLOB SERPL: 1.1 G/DL (ref 1.1–2.5)
ALP SERPL-CCNC: 113 U/L (ref 24–120)
ALT SERPL W P-5'-P-CCNC: 58 U/L (ref 0–54)
ANION GAP SERPL CALCULATED.3IONS-SCNC: 6 MMOL/L (ref 4–13)
AST SERPL-CCNC: 56 U/L (ref 7–45)
BASOPHILS # BLD AUTO: 0.02 10*3/MM3 (ref 0–0.2)
BASOPHILS NFR BLD AUTO: 0.2 % (ref 0–2)
BILIRUB SERPL-MCNC: 0.6 MG/DL (ref 0.1–1)
BUN BLD-MCNC: 12 MG/DL (ref 5–21)
BUN/CREAT SERPL: 13 (ref 7–25)
CALCIUM SPEC-SCNC: 8.3 MG/DL (ref 8.4–10.4)
CHLORIDE SERPL-SCNC: 109 MMOL/L (ref 98–110)
CO2 SERPL-SCNC: 22 MMOL/L (ref 24–31)
CREAT BLD-MCNC: 0.92 MG/DL (ref 0.5–1.4)
DEPRECATED RDW RBC AUTO: 51 FL (ref 40–54)
EOSINOPHIL # BLD AUTO: 0.32 10*3/MM3 (ref 0–0.7)
EOSINOPHIL NFR BLD AUTO: 3.9 % (ref 0–4)
ERYTHROCYTE [DISTWIDTH] IN BLOOD BY AUTOMATED COUNT: 14.7 % (ref 12–15)
GFR SERPL CREATININE-BSD FRML MDRD: 81 ML/MIN/1.73
GLOBULIN UR ELPH-MCNC: 2.2 GM/DL
GLUCOSE BLD-MCNC: 88 MG/DL (ref 70–100)
HCT VFR BLD AUTO: 29.6 % (ref 40–52)
HGB BLD-MCNC: 10.1 G/DL (ref 14–18)
IMM GRANULOCYTES # BLD: 0.05 10*3/MM3 (ref 0–0.03)
IMM GRANULOCYTES NFR BLD: 0.6 % (ref 0–5)
LYMPHOCYTES # BLD AUTO: 0.82 10*3/MM3 (ref 0.72–4.86)
LYMPHOCYTES NFR BLD AUTO: 9.9 % (ref 15–45)
MCH RBC QN AUTO: 32.2 PG (ref 28–32)
MCHC RBC AUTO-ENTMCNC: 34.1 G/DL (ref 33–36)
MCV RBC AUTO: 94.3 FL (ref 82–95)
MONOCYTES # BLD AUTO: 0.42 10*3/MM3 (ref 0.19–1.3)
MONOCYTES NFR BLD AUTO: 5.1 % (ref 4–12)
NEUTROPHILS # BLD AUTO: 6.63 10*3/MM3 (ref 1.87–8.4)
NEUTROPHILS NFR BLD AUTO: 80.3 % (ref 39–78)
NRBC BLD MANUAL-RTO: 0 /100 WBC (ref 0–0)
PLATELET # BLD AUTO: 134 10*3/MM3 (ref 130–400)
PMV BLD AUTO: 10.8 FL (ref 6–12)
POTASSIUM BLD-SCNC: 4.2 MMOL/L (ref 3.5–5.3)
PREALB SERPL-MCNC: 18.3 MG/DL (ref 18–36)
PROT SERPL-MCNC: 4.7 G/DL (ref 6.3–8.7)
PROT UR-MCNC: 12 MG/DL (ref 0–13.5)
RBC # BLD AUTO: 3.14 10*6/MM3 (ref 4.8–5.9)
SODIUM BLD-SCNC: 137 MMOL/L (ref 135–145)
WBC NRBC COR # BLD: 8.26 10*3/MM3 (ref 4.8–10.8)

## 2018-09-13 PROCEDURE — 80053 COMPREHEN METABOLIC PANEL: CPT | Performed by: NURSE PRACTITIONER

## 2018-09-13 PROCEDURE — 92507 TX SP LANG VOICE COMM INDIV: CPT

## 2018-09-13 PROCEDURE — 84156 ASSAY OF PROTEIN URINE: CPT | Performed by: FAMILY MEDICINE

## 2018-09-13 PROCEDURE — 84134 ASSAY OF PREALBUMIN: CPT | Performed by: FAMILY MEDICINE

## 2018-09-13 PROCEDURE — 85025 COMPLETE CBC W/AUTO DIFF WBC: CPT | Performed by: FAMILY MEDICINE

## 2018-09-13 RX ADMIN — LACTULOSE 20 G: 20 SOLUTION ORAL at 17:33

## 2018-09-13 RX ADMIN — ATORVASTATIN CALCIUM 80 MG: 40 TABLET, FILM COATED ORAL at 21:27

## 2018-09-13 RX ADMIN — FOLIC ACID 1 MG: 1 TABLET ORAL at 08:24

## 2018-09-13 RX ADMIN — DULOXETINE HYDROCHLORIDE 60 MG: 30 CAPSULE, DELAYED RELEASE ORAL at 08:24

## 2018-09-13 RX ADMIN — MIDODRINE HYDROCHLORIDE 10 MG: 2.5 TABLET ORAL at 08:24

## 2018-09-13 RX ADMIN — FAMOTIDINE 20 MG: 20 TABLET, FILM COATED ORAL at 08:24

## 2018-09-13 RX ADMIN — QUETIAPINE FUMARATE 100 MG: 100 TABLET ORAL at 21:27

## 2018-09-13 RX ADMIN — MIDODRINE HYDROCHLORIDE 10 MG: 2.5 TABLET ORAL at 11:58

## 2018-09-13 RX ADMIN — MIDODRINE HYDROCHLORIDE 10 MG: 2.5 TABLET ORAL at 17:34

## 2018-09-13 RX ADMIN — BUSPIRONE HYDROCHLORIDE 10 MG: 10 TABLET ORAL at 21:27

## 2018-09-13 RX ADMIN — POTASSIUM CHLORIDE 20 MEQ: 750 CAPSULE, EXTENDED RELEASE ORAL at 08:24

## 2018-09-13 RX ADMIN — LACTULOSE 20 G: 20 SOLUTION ORAL at 08:24

## 2018-09-13 RX ADMIN — BUSPIRONE HYDROCHLORIDE 10 MG: 10 TABLET ORAL at 08:24

## 2018-09-13 NOTE — THERAPY TREATMENT NOTE
Acute Care - Speech Language Pathology Treatment Note  [facility]     Patient Name: Travis Patel  : 1945  MRN: [id number]  Today's Date: 2018         Admit Date: 9/10/2018    Cognition tx completed. Pt demonstrated difficulty with divergent and convergent naming tasks, was able to complete with minimal cueing. Pt demonstrated improved ability to complete simple ADL tasks. Pt's daughter requested medication management to be targeted during future sessions. ST to follow. Thanks.  Donna Frances Speech Therapy Student 2018 2:18 PM    Visit Dx:      ICD-10-CM ICD-9-CM   1. Subarachnoid hemorrhage (CMS/HCC) I60.9 430   2. Syncope, unspecified syncope type R55 780.2   3. Elevated liver enzymes R74.8 790.5   4. Constipation, unspecified constipation type K59.00 564.00   5. VANI (acute kidney injury) (CMS/HCC) N17.9 584.9   6. Elevated troponin R74.8 790.6   7. Renal cyst N28.1 753.10   8. Adrenal nodule (CMS/HCC) E27.9 255.8   9. Chronic anticoagulation Z79.01 V58.61   10. Dysphagia, unspecified type R13.10 787.20   11. Impaired cognition R41.89 294.9     Patient Active Problem List   Diagnosis   • Subarachnoid hemorrhage (CMS/HCC)   • Chronic coronary artery disease   • Congestive heart failure (CMS/HCC)   • Hyperlipidemia   • ICD (implantable cardioverter-defibrillator) in place   • Orthostatic hypotension   • Syncope        Therapy Treatment    Therapy Treatment / Health Promotion    Treatment Time/Intention  Discipline: (P) speech language pathologist (18 1327 : Donna Frances, Speech Therapy Student)  Document Type: (P) therapy note (daily note) (18 1327 : Donna Frances, Speech Therapy Student)  Subjective Information: (P) no complaints (18 : Donna Frances, Speech Therapy Student)  Mode of Treatment: (P) individual therapy (18 132 : Donna Frances, Speech Therapy Student)  Patient/Family Observations: (P) Daughter present during tx (18 1327 : Donna Frances,  Speech Therapy Student)  Care Plan Review: (P) care plan/treatment goals reviewed (09/13/18 1327 : Donna Frances, Speech Therapy Student)  Care Plan Review, Other Participant(s): (P) daughter (09/13/18 1327 : Donna Frances, Speech Therapy Student)  Plan of Care Review  Plan of Care Reviewed With: (P) patient, daughter (09/13/18 1413 : Donna Frances, Speech Therapy Student)    Vitals/Pain/Safety       Cognition, Communication, Swallow  Recommendations  Anticipated Dischage Disposition: (P) unknown (09/13/18 1327 : Donna Frances, Speech Therapy Student)    Outcome Summary  Outcome Summary/Treatment Plan (SLP)  Daily Summary of Progress (SLP): (P) progress toward functional goals is good (09/13/18 1327 : Donna Frances, Speech Therapy Student)  Anticipated Dischage Disposition: (P) unknown (09/13/18 1327 : Donna Frances, Speech Therapy Student)      EDUCATION  The patient has been educated in the following areas:   Cognitive Impairment.    SLP Recommendation and Plan  SLP Diagnosis: moderate cognitive impairment  Rehab Potential/Prognosis: fair  SLC Criteria for Skilled Therapy Interventions Met: yes  Anticipated Dischage Disposition: (P) unknown        Predicted Duration Therapy Intervention (Days): until discharge       Plan of Care Reviewed With: (P) patient, daughter  Plan of Care Review  Plan of Care Reviewed With: (P) patient, daughter  Daily Summary of Progress (SLP): (P) progress toward functional goals is good  Progress: no change  Outcome Summary: (P) Cognition tx completed. Pt demonstrated difficulty with divergent and convergent naming tasks, was able to complete with minimal cueing. Pt demonstrated improved ability to complete simple ADL tasks. Pt's daughter requested medication management to be targeted during future sessions. ST to follow.          SLP GOALS     Row Name 09/13/18 1327 09/11/18 0950          Comprehend Questions Goal 1 (SLP)    Improve Ability to Comprehend Questions Goal 1 (SLP) (P)  " complex yes/no questions;independently (over 90% accuracy)  - complex yes/no questions;independently (over 90% accuracy)  -TM (r) AH (t) TM (c)     Time Frame (Comprehend Questions Goal 1, SLP) (P)  by discharge  - by discharge  -TM (r) AH (t) TM (c)     Progress/Outcomes (Comprehend Questions Goal 1, SLP) (P)  goal ongoing  - goal ongoing  -TM (r) AH (t) TM (c)        Organizational Skills Goal 1 (SLP)    Improve Thought Organization Through Goal 1 (SLP) (P)  completing a divergent naming task;concrete;completing a convergent naming task;abstract;independently (over 90% accuracy)  - completing a divergent naming task;concrete;completing a convergent naming task;abstract;independently (over 90% accuracy)  -TM (r) AH (t) TM (c)     Time Frame (Thought Organization Skills Goal 1, SLP) (P)  by discharge  - by discharge  -TM (r) AH (t) TM (c)     Progress/Outcomes (Thought Organization Skills Goal 1, SLP) (P)  goal ongoing  - goal ongoing  -TM (r) AH (t) TM (c)        Reasoning Goal 1 (SLP)    Improve Reasoning Through Goal 1 (SLP) (P)  complete basic reasoning task;complete mental flexibility task;independently (over 90% accuracy)  - complete basic reasoning task;complete mental flexibility task;independently (over 90% accuracy)  -TM (r) AH (t) TM (c)     Time Frame (Reasoning Goal 1, SLP) (P)  by discharge  - by discharge  -TM (r) AH (t) TM (c)     Progress/Outcomes (Reasoning Goal 1, SLP) (P)  goal ongoing  - goal ongoing  -TM (r) AH (t) TM (c)        Functional Problem Solving Skills Goal 1 (SLP)    Improve Problem Solving Through Goal 1 (SLP) (P)  answer questions about ADL problems;answer \"what if\" questions;tell similarity between items;independently (over 90% accuracy)  - answer questions about ADL problems;answer \"what if\" questions;tell similarity between items;independently (over 90% accuracy)  -TM (r) AH (t) TM (c)     Time Frame (Problem Solving Goal 1, SLP) (P)  by discharge  - by " discharge  -TM (r) AH (t) TM (c)     Progress/Outcomes (Problem Solving Goal 1, SLP) (P)  goal ongoing  - goal ongoing  -TM (r) AH (t) TM (c)       User Key  (r) = Recorded By, (t) = Taken By, (c) = Cosigned By    Initials Name Provider Type     Tonya Hunter, CCC-SLP Speech and Language Pathologist    Donna Hayes, Speech Therapy Student Speech Therapy Student             SLP Outcome Measures (last 72 hours)      SLP Outcome Measures     Row Name 09/11/18 1300 09/10/18 1610          SLP Outcome Measures    Outcome Measure Used? Adult NOMS  -TM (r) AH (t) TM (c) Adult NOMS  -TM        Adult FCM Scores    FCM Chosen Problem Solving  -TM (r) AH (t) TM (c) Swallowing  -TM     Swallowing FCM Score  -- 6  -TM     Problem Solving Score FCM 3  -TM (r) AH (t) TM (c)  --       User Key  (r) = Recorded By, (t) = Taken By, (c) = Cosigned By    Initials Name Effective Dates     Tonya Hunter, CCC-SLP 08/02/16 -      Donna Frances, Speech Therapy Student 08/10/18 -             Time Calculation:           Time Calculation- SLP     Row Name 09/13/18 1415             Time Calculation- [facility]    SLP Start Time (P)  1327  -      SLP Stop Time (P)  1358  -      SLP Time Calculation (min) (P)  31 min  -      SLP Received On (P)  09/13/18  -        User Key  (r) = Recorded By, (t) = Taken By, (c) = Cosigned By    Initials Name Provider Type     Donna Frances, Speech Therapy Student Speech Therapy Student          Therapy Charges for Today     Code Description Service Date Service Provider Modifiers Qty    [id number] HC ST TREATMENT SPEECH 2 9/13/2018 Donna Frances, Speech Therapy Student GN 1             ADULT NOMS (last 72 hours)      Adult NOMS     Row Name 09/11/18 1300 09/10/18 1610                Adult FCM Scores    FCM Chosen Problem Solving  -TM (r) AH (t) TM (c) Swallowing  -TM       Swallowing FCM Score  -- 6  -TM       Problem Solving Score FCM 3  -TM (r) AH (t) TM (c)  --         User Key  (r) =  Recorded By, (t) = Taken By, (c) = Cosigned By    Initials Name Effective Dates    TM Tonya Hunter, CCC-SLP 08/02/16 -     Donna Hayes, Speech Therapy Student 08/10/18 -         SLP G-Codes  SLP NOMS Used?: Yes (problem solving)  Functional Limitations: Other Speech Language Pathology (problem solving)  Swallow Current Status (): At least 1 percent but less than 20 percent impaired, limited or restricted  Swallow Goal Status (): At least 1 percent but less than 20 percent impaired, limited or restricted  Swallow Discharge Status (): At least 1 percent but less than 20 percent impaired, limited or restricted  Other Speech-Language Pathology Functional Limitation Current Status (): At least 60 percent but less than 80 percent impaired, limited or restricted  Other Speech-Language Pathology Functional Limitation Goal Status (): At least 40 percent but less than 60 percent impaired, limited or restricted      Donna Frances, Speech Therapy Student  9/13/2018

## 2018-09-13 NOTE — PLAN OF CARE
Problem: Patient Care Overview  Goal: Plan of Care Review  Outcome: Ongoing (interventions implemented as appropriate)   09/13/18 1413   Coping/Psychosocial   Plan of Care Reviewed With patient;daughter   OTHER   Outcome Summary Cognition tx completed. Pt demonstrated difficulty with divergent and convergent naming tasks, was able to complete with minimal cueing. Pt demonstrated improved ability to complete simple ADL tasks. Pt's daughter requested medication management to be targeted during future sessions. ST to follow.

## 2018-09-13 NOTE — PROGRESS NOTES
Nephrology ([facility]) Progress Note      Patient:  Travis Patel  YOB: 1945  Date of Service: 9/13/2018  MRN: [id number]   Acct: [id number]   Primary Care Physician: Shant Hayes MD  Advance Directive:   Code Status and Medical Interventions:   Ordered at: 09/10/18 1402     Level Of Support Discussed With:    Patient     Code Status:    CPR     Medical Interventions (Level of Support Prior to Arrest):    Full     Admit Date: 9/10/2018       Hospital Day: 3  Referring Provider: Russell Collins MD      Patient personally seen and examined.  Complete chart including Consults, Notes, Operative Reports, Labs, Cardiology, and Radiology studies reviewed as able.        Subjective:  Travis Patel is a 73 y.o. male  whom we were consulted for chronic kidney disease stage 3.  Patient has never seen nephrologist.  Review of old labs shows baseline creatinine 1.2--1.4 mg.  History of Hypertension, congestive heart failure, coronary artery disease with prior CABG, bipolar.  Presented after a syncopal episode at home and struck his head.  Has fallen or passed out several times recently.  patient has not been eating well recently.  Takes Bumex and Aldactone at home.  Denies n/v/d.  No recent change in urine output, denies dysuria or hematuria.  Had CT angiogram as part of workup in Emergency department.  Admitted with small subarachnoid hemorrhage, neurosurgery has ruled out any intervention at this time.  Hospital course remarkable for improvement of renal function with IV volume replacement.     Today is feeling better; no new overnight issues. Urine output nonoliguric    Allergies:  Sulfa antibiotics    Home Meds:  Prescriptions Prior to Admission   Medication Sig Dispense Refill Last Dose   • aspirin 81 MG chewable tablet Chew 81 mg Daily.   9/9/2018 at Unknown time   • atorvastatin (LIPITOR) 80 MG tablet Take 80 mg by mouth Every Night.   9/9/2018 at Unknown time   •  bumetanide (BUMEX) 2 MG tablet Take 2 mg by mouth Every Other Day.   9/9/2018 at Unknown time   • busPIRone (BUSPAR) 10 MG tablet Take 10 mg by mouth 2 (Two) Times a Day.   9/9/2018 at Unknown time   • clopidogrel (PLAVIX) 75 MG tablet Take 75 mg by mouth Daily.   9/9/2018 at Unknown time   • DULoxetine (CYMBALTA) 60 MG capsule Take 60 mg by mouth Daily.   9/9/2018 at Unknown time   • folic acid (FOLVITE) 1 MG tablet Take 1 mg by mouth Daily.   9/9/2018 at Unknown time   • HYDROcodone-acetaminophen (NORCO)  MG per tablet Take 1 tablet by mouth Every 6 (Six) Hours As Needed for Moderate Pain .   9/9/2018 at Unknown time   • lisinopril (PRINIVIL,ZESTRIL) 2.5 MG tablet Take 2.5 mg by mouth Daily.   9/9/2018 at Unknown time   • lithium carbonate 300 MG capsule Take 300 mg by mouth 2 (Two) Times a Day With Meals.   9/9/2018 at Unknown time   • metoprolol succinate XL (TOPROL-XL) 25 MG 24 hr tablet Take 37.5 mg by mouth Daily.   9/9/2018 at Unknown time   • midodrine (PROAMATINE) 5 MG tablet Take 5 mg by mouth 2 (Two) Times a Day.   9/9/2018 at Unknown time   • QUEtiapine (SEROquel) 100 MG tablet Take 200 mg by mouth Every Night.   9/9/2018 at Unknown time   • raNITIdine (ZANTAC) 150 MG tablet Take 150 mg by mouth 2 (Two) Times a Day.   9/9/2018 at Unknown time   • spironolactone (ALDACTONE) 25 MG tablet Take 25 mg by mouth Daily.   9/9/2018 at Unknown time   • potassium chloride (K-DUR) 10 MEQ CR tablet Take 10 mEq by mouth Daily.   Unknown at Unknown time       Medicines:  Current Facility-Administered Medications   Medication Dose Route Frequency Provider Last Rate Last Dose   • acetaminophen (TYLENOL) tablet 650 mg  650 mg Oral Q4H PRN Kenya Gomez DO       • atorvastatin (LIPITOR) tablet 80 mg  80 mg Oral Nightly Kenya Gomez DO   80 mg at 09/12/18 2029   • busPIRone (BUSPAR) tablet 10 mg  10 mg Oral BID Kenya Gomez DO   10 mg at 09/13/18 0824   • DULoxetine (CYMBALTA) DR capsule 60 mg   60 mg Oral Daily Kenya Gomez DO   60 mg at 09/13/18 0824   • famotidine (PEPCID) tablet 20 mg  20 mg Oral Daily Kenya Gomez DO   20 mg at 09/13/18 0824   • folic acid (FOLVITE) tablet 1 mg  1 mg Oral Daily Kenya Gomez, DO   1 mg at 09/13/18 0824   • HYDROcodone-acetaminophen (NORCO) 5-325 MG per tablet 1 tablet  1 tablet Oral Q4H PRN Kenya Gomez DO       • lactulose solution 20 g  20 g Oral TID Kenya Gomez DO   20 g at 09/13/18 0824   • midodrine (PROAMATINE) tablet 10 mg  10 mg Oral TID AC Kenya Gomez DO   10 mg at 09/13/18 0824   • ondansetron (ZOFRAN) injection 4 mg  4 mg Intravenous Q6H PRN Kenya Gomez DO   4 mg at 09/10/18 2144   • potassium chloride (MICRO-K) CR capsule 20 mEq  20 mEq Oral Daily Kenya Gomez DO   20 mEq at 09/13/18 0824   • QUEtiapine (SEROquel) tablet 100 mg  100 mg Oral Nightly Kenya Gomez, DO   100 mg at 09/12/18 2029   • sodium chloride 0.9 % flush 1-10 mL  1-10 mL Intravenous PRN Kenya Gomez DO       • sodium chloride 0.9 % flush 10 mL  10 mL Intravenous PRN Karol Berrios, APRN           Past Medical History:  Past Medical History:   Diagnosis Date   • Diverticulitis    • Hernia of abdominal wall    • Myocardial infarction 02/2017   • Sleep apnea    • Stroke (CMS/HCC) 02/2017       Past Surgical History:  Past Surgical History:   Procedure Laterality Date   • COLOSTOMY  2001    x 3 months   • COLOSTOMY REVISION     • CORONARY ARTERY BYPASS GRAFT      1991/1996   • HERNIA REPAIR      abdominal hernia x 3; poor healing with multiple revisions   • PACEMAKER IMPLANTATION  2017    w/ defibrillator   • ROTATOR CUFF REPAIR Right 2005       Family History  Family History   Problem Relation Age of Onset   • Heart disease Brother    • Diabetes Brother    • Sleep apnea Brother        Social History  Social History     Social History   • Marital status:      Spouse name: N/A   • Number of children: N/A  "  • Years of education: N/A     Occupational History   • Not on file.     Social History Main Topics   • Smoking status: Former Smoker     Packs/day: 1.00     Years: 40.00     Types: Cigarettes   • Smokeless tobacco: Never Used   • Alcohol use No   • Drug use: No   • Sexual activity: Defer     Other Topics Concern   • Not on file     Social History Narrative   • No narrative on file         Review of Systems:  History obtained from chart review and the patient  General ROS: No fever or chills  Respiratory ROS: No cough, shortness of breath, wheezing  Cardiovascular ROS: No chest pain or palpitations  Gastrointestinal ROS: No abdominal pain or melena  Genito-Urinary ROS: No dysuria or hematuria  Neurological ROS: no headache or dizziness    Objective:  /60 (BP Location: Left arm, Patient Position: Lying)   Pulse 85   Temp 97.8 °F (36.6 °C) (Temporal Artery )   Resp 16   Ht 170.2 cm (67.01\")   Wt 77.2 kg (170 lb 3.2 oz)   SpO2 98%   BMI 26.65 kg/m²     Intake/Output Summary (Last 24 hours) at 09/13/18 1024  Last data filed at 09/13/18 0900   Gross per 24 hour   Intake              875 ml   Output              925 ml   Net              -50 ml     General: awake/alert    Neck: supple, no JVD  Chest:  clear to auscultation bilaterally without respiratory distress  CVS: regular rate and rhythm  Abdominal: soft, nontender, normal bowel sounds  Extremities: no cyanosis or edema  Skin: warm and dry without rash  Neuro: no focal motor deficits    Labs:    Results from last 7 days  Lab Units 09/13/18 0353 09/12/18 0251 09/11/18 0448   WBC 10*3/mm3 8.26 12.10* 12.47*   HEMOGLOBIN g/dL 10.1* 11.9* 13.3*   HEMATOCRIT % 29.6* 34.1* 39.0*   PLATELETS 10*3/mm3 134 161 206           Results from last 7 days  Lab Units 09/13/18  0353 09/12/18  0251 09/11/18 0448   SODIUM mmol/L 137 141 142   POTASSIUM mmol/L 4.2 4.2 3.2*   CHLORIDE mmol/L 109 110 105   CO2 mmol/L 22.0* 21.0* 21.0*   BUN mg/dL 12 20 36*   CREATININE " mg/dL 0.92 1.11 1.88*   CALCIUM mg/dL 8.3* 8.8 9.2   BILIRUBIN mg/dL 0.6 1.0 1.1*   ALK PHOS U/L 113 119 121*   ALT (SGPT) U/L 58* 30 31   AST (SGOT) U/L 56* 53* 60*   GLUCOSE mg/dL 88 107* 112*       Radiology:   Imaging Results (last 72 hours)     Procedure Component Value Units Date/Time    CT Head Without Contrast [[id number]] Collected:  09/11/18 1457     Updated:  09/11/18 1503    Narrative:       EXAMINATION:  CT HEAD WO CONTRAST-  9/11/2018 11:33 AM CDT     HISTORY: I60.9-Nontraumatic subarachnoid hemorrhage, unspecified.  Z79.01-Long term (current) use of anticoagulants.     TECHNIQUE: Multiple axial images were obtained through the brain without  contrast infusion. Multiplanar images were reconstructed.     DLP: 616 mGy-cm. Automated dosage control was utilized.     COMPARISON: 9/10/2018.     FINDINGS: A small amount of subarachnoid hemorrhage in the sylvian  fissure on the right and between some of the right frontal sulci is  essentially stable compared to the prior study. There is no new  hemorrhage. There is mild to moderate atrophy. There is low density in  the white matter. The visualized paranasal sinuses and mastoid air cells  are clear. There is no calvarial fracture.       Impression:       1. Small amount of subarachnoid hemorrhage on the right side remains  stable.  2. Mild to moderate atrophy.  3. Low density in the white matter is nonspecific and most likely due to  chronic small vessel disease.     This report was finalized on 09/11/2018 15:00 by Dr. Winston Dawkins MD.    US Renal Bilateral [[id number]] Collected:  09/11/18 1454     Updated:  09/11/18 1500    Narrative:       EXAMINATION:  US RENAL BILATERAL-  9/11/2018 11:01 AM CDT     HISTORY: N17.9-Acute kidney failure, unspecified; R74.8-Abnormal levels  of other serum enzymes; N28.1-Cyst of kidney, acquired; E27.9-Disorder  of adrenal gland, unspecified.     COMPARISON: No comparison study.     TECHNIQUE: Multiple sonographic images were  obtained.     FINDINGS: The right kidney measures 9.7 cm and the left kidney measures  10.4 cm. The cortical thickness and echogenicity are normal. There is an  exophytic left renal cyst arising from the lower pole left kidney  measuring 3.1 x 2.7 x 2.7 cm. There is no hydronephrosis. Thickening of  the wall of the urinary bladder is noted. The bladder is not fully  distended.       Impression:       1. The renal size, cortical thickness and echogenicity are within normal  limits.  2. Exophytic left renal cyst with size noted above in the body of the  report.  3. Wall thickening of the urinary bladder. This is likely related to  acute or chronic inflammation or infection. Bladder tumor is less  likely.  This report was finalized on 09/11/2018 14:57 by Dr. Winston Dawkins MD.    CT Abdomen Pelvis With Contrast [[id number]] Collected:  09/10/18 1243     Updated:  09/10/18 1252    Narrative:       CT ABDOMEN PELVIS W CONTRAST- 9/10/2018 12:06 PM CDT     HISTORY: abd pain, syncope      COMPARISON: None     DOSE LENGTH PRODUCT: 221 mGy cm. Automated exposure control was also  utilized to decrease patient radiation dose.     TECHNIQUE: Axial images of the abdomen pelvis are obtained following IV  contrast. No oral contrast administered.     FINDINGS:  There are scattered hypodense liver lesions favorable for  cysts. The spleen, pancreas are unremarkable. Small amount of biliary  sludge versus stones considered in the dependent portion the gallbladder  (image 24, series 4). There are bilateral nonspecific adrenal nodules  measuring up to 1.8 cm. There is an inferior left renal cyst. There is  no enhancing renal mass. There is no hydronephrosis. Bladder is  distended. There is no prostate enlargement.      A large amount of fecal material is contained within the rectum. Rectum  measures 7.5 cm in width. Moderate stool seen throughout the remaining  colon. There are postoperative changes of the cecum. There is no  evidence for  small bowel obstruction. The stomach is underdistended.  There is scarring along the region of the umbilicus.     There is dense vascular calcification with ectasia of the aorta to  maximum measurement of 2.4 cm.     Please refer to CT chest report for findings below the hemidiaphragms.     There are degenerative changes of the regional skeleton. There is a mild  rotoscoliotic curvature of the lumbar spine.       Impression:       1. Large volume of stool within the rectum may indicate constipation  and/or impaction. Moderate volume stool seen in the remaining colon. No  evidence of bowel obstruction.  2. Hypodense liver lesions favorable for cysts. Inferior left renal  cyst.  3. Nonspecific bilateral adrenal nodules can be followed on subsequent  imaging. Adrenal protocol CT versus MRI should be considered  nonemergently if there are no outside films for comparison.    4. Distended bladder. .  This report was finalized on 09/10/2018 12:49 by Dr. Kimberly Doll MD.    CT Angiogram Chest With Contrast [[id number]] Collected:  09/10/18 1239     Updated:  09/10/18 1246    Narrative:       CT ANGIOGRAM CHEST W CONTRAST- 9/10/2018 12:06 PM CDT     HISTORY: mid back pain, syncope      COMPARISON: None     DOSE LENGTH PRODUCT: 268 mGy cm. Automated exposure control was also  utilized to decrease patient radiation dose.     TECHNIQUE: Axial images of the chest are obtained following IV contrast.  2-D and maximal intensity projection images are reconstructed and  reviewed.     FINDINGS:  There are no pulmonary emboli. There is evidence of prior  mediastinal surgery. There is dense coronary artery calcification. There  is no thoracic aortic aneurysm or dissection. There is moderate  atherosclerotic change of the thoracic aorta. A left subclavian cardiac  pacer device is in place. Heart is upper limits of normal in size. There  is no pericardial or pleural effusion. No pathologic intrathoracic or  axillary lymphadenopathy  visualized.     Hypoechoic thin liver lesions are favorable for cysts. The adrenal  glands are prominent and hypodense. Please refer to CT abdomen report.  There is moderate stool within the visible colon.     There is a calcified granuloma within the left upper lobe. There is no  pulmonary consolidation. There is minimal dependent atelectasis. There  are no spiculated nodules. There is no pneumothorax.     No focal destructive osseous lesions are visualized.       Impression:       1. No pulmonary emboli.  2. Prior mediastinal surgery, likely coronary bypass. Left subclavian  cardiac pacer device.  3. Left upper lobe benign calcified granuloma. Minimal dependent  atelectasis. No consolidation or suspicious nodularity.  4. Hypodense liver lesions favorable for cysts. Please refer to CT  abdomen report.  This report was finalized on 09/10/2018 12:43 by Dr. Kimberly Doll MD.    CT Cervical Spine Without Contrast [[id number]] Collected:  09/10/18 1237     Updated:  09/10/18 1242    Narrative:       CT CERVICAL SPINE WO CONTRAST- 9/10/2018 12:06 PM CDT     HISTORY: neck pain, syncope, fall      COMPARISON: None     DOSE LENGTH PRODUCT: 241 mGy cm. Automated exposure control was also  utilized to decrease patient radiation dose.     TECHNIQUE: Axial images of cervical spine obtained with sagittal coronal  reconstructions.     FINDINGS:  There is minimal retrolisthesis of C5 in reference to C4  measuring 1 to 2 mm. There is moderate to advanced degenerative disc  change at C6/C7 and C7-T1 with moderate degenerative disc changes above  the C5 level. There is moderate facet osteoarthropathy. There is mild  uncinate process hypertrophy.     No cervical vertebral fracture is identified.     The degenerative changes result in only mild cervical spinal canal  stenosis with scattered bilateral neural foramen narrowing. At least  moderate in severity.     There is carotid bulb calcification. There is no apical pneumothorax.     "   Impression:       1. Degenerative changes of the cervical spine with no acute cervical  vertebral fracture. Minimal retrolisthesis of C5 likely related to facet  osteoarthropathy.  This report was finalized on 09/10/2018 12:39 by Dr. Kimberly Doll MD.    CT Head Without Contrast [[id number]] Collected:  09/10/18 1233     Updated:  09/10/18 1240    Narrative:       CT HEAD WO CONTRAST- 9/10/2018 12:06 PM CDT     HISTORY: head injury, fall, syncope      COMPARISON: None     DOSE LENGTH PRODUCT: 696 mGy cm. Automated exposure control was also  utilized to decrease patient radiation dose.     TECHNIQUE: Axial images of brain obtained without IV contrast.     FINDINGS:  There is subarachnoid hemorrhage along the convexities of the  right frontal lobe. No intraparenchymal hematoma or contusion  visualized. There is moderate cerebral volume loss with chronic vessel  vessel ischemia. There are no convincing acute signs of ischemia. No  subdural or epidural hematomas visualized.     The visible paranasal sinuses and mastoid air cells are well-aerated.  The bony calvarium appears intact. No skull fracture identified.       Impression:       1. Subarachnoid hemorrhage along the convexities of the right frontal  lobe. No intraparenchymal hemorrhage visualized. This finding called to  Karol Berrios in the ER at 12:40 PM on 9/10/2018.  2. Moderate cerebral volume loss with chronic vessel vessel ischemia.  This report was finalized on 09/10/2018 12:37 by Dr. Kimberly Doll MD.    XR Elbow 3+ View Left [[id number]] Collected:  09/10/18 1056     Updated:  09/10/18 1100    Narrative:       HISTORY: Fall with left elbow pain     Left elbow: 3 views of left elbow are obtained.     There is an IV within the left antecubital fossa. There is no  appreciable joint effusion. There is mild bony spurring of the a \"knot  on and humeral epicondyles. No bony fracture or dislocation is  identified.       Impression:       1. Mild arthritic " change of the left elbow with no acute bony pathology.  This report was finalized on 09/10/2018 10:57 by Dr. Kimberly Doll MD.    XR Chest 1 View [[id number]] Collected:  09/10/18 1055     Updated:  09/10/18 1059    Narrative:       HISTORY: Syncope     CXR: Frontal view the chest is obtained.     COMPARISON: None     FINDINGS: Patient has undergone prior mediastinal surgery. There is a  left subclavian cardiac pacer device with the lead projecting over the  right ventricle. There is a left upper lobe calcified granuloma. There  is no consolidation or edema. There is no pleural effusion or  pneumothorax. There is no acute bony pathology.       Impression:       1. Left upper lobe calcified granuloma with calcified left hilar lymph  nodes. Prior mediastinal surgery with left subclavian cardiac pacer  device. No acute pulmonary process identified.  This report was finalized on 09/10/2018 10:56 by Dr. Kimberly Doll MD.          Culture:  Blood Culture   Date Value Ref Range Status   09/10/2018 No growth at 24 hours  Preliminary   09/10/2018 No growth at 24 hours  Preliminary         Assessment   1.  Acute kidney injury--prerenal due to volume depletion--resolved  2.  Baseline of chronic kidney disease stage 3  3.  Essential hypertension  4.  Syncopal episodes with orthostatic hypotension  5.  Subdural hematoma  6.  Bipolar disease  7.  Hypokalemia--resolved    Plan:  1.  Continue Midodrine  2.  Monitor labs      VIRGINIA Roa  9/13/2018  10:24 AM

## 2018-09-13 NOTE — PLAN OF CARE
Problem: Fall Risk (Adult)  Goal: Absence of Fall  Outcome: Ongoing (interventions implemented as appropriate)      Problem: Patient Care Overview  Goal: Plan of Care Review  Outcome: Ongoing (interventions implemented as appropriate)   09/13/18 0500   Coping/Psychosocial   Plan of Care Reviewed With patient   Plan of Care Review   Progress improving   OTHER   Outcome Summary Pt c/o slight pain at coccyx, but denies pain medicine. Repositioned for comfort. S 79-94 with couplets and FPVCs. BP at lowest this shift was 86/62 lying. Pt did not try to stand up at all. TEDs and SCDs in place per MD order. Q4 neuro checks WNL. Pt still has not had a BM but is passing gas. Will continue to monitor.      Goal: Individualization and Mutuality  Outcome: Ongoing (interventions implemented as appropriate)    Goal: Discharge Needs Assessment  Outcome: Ongoing (interventions implemented as appropriate)      Problem: Constipation (Adult)  Goal: Effective Bowel Elimination  Outcome: Ongoing (interventions implemented as appropriate)    Goal: Comfort  Outcome: Ongoing (interventions implemented as appropriate)      Problem: Sepsis/Septic Shock (Adult)  Goal: Signs and Symptoms of Listed Potential Problems Will be Absent, Minimized or Managed (Sepsis/Septic Shock)  Outcome: Ongoing (interventions implemented as appropriate)      Problem: Syncope (Adult)  Goal: Physical Safety/Health Maintenance  Outcome: Ongoing (interventions implemented as appropriate)    Goal: Optimal Emotional/Functional Todd  Outcome: Ongoing (interventions implemented as appropriate)      Problem: Renal Failure/Kidney Injury, Acute (Adult)  Goal: Signs and Symptoms of Listed Potential Problems Will be Absent, Minimized or Managed (Renal Failure/Kidney Injury, Acute)  Outcome: Ongoing (interventions implemented as appropriate)      Problem: Brain Injury, Moderate Traumatic (GCS 9-12) (Adult)  Goal: Signs and Symptoms of Listed Potential Problems Will be  Absent, Minimized or Managed (Brain Injury, Moderate Traumatic)  Outcome: Ongoing (interventions implemented as appropriate)      Problem: Skin Injury Risk (Adult)  Goal: Identify Related Risk Factors and Signs and Symptoms  Outcome: Outcome(s) achieved Date Met: 09/13/18    Goal: Skin Health and Integrity  Outcome: Ongoing (interventions implemented as appropriate)

## 2018-09-13 NOTE — PROGRESS NOTES
[facility] Medicine Services  INPATIENT PROGRESS NOTE    Patient Name: Travis Patel  Date of Admission: 9/10/2018  Today's Date: 09/13/18  Length of Stay: 3  Primary Care Physician: Shant Hayes MD    Subjective   Chief Complaint: feels about the same    HPI   No nausea or vomiting  No chest pain  No shortness of breath.  Getting up  Gets light headed.         Review of Systems     All pertinent negatives and positives are as above. All other systems have been reviewed and are negative unless otherwise stated.     Objective    Temp:  [97.4 °F (36.3 °C)-99.5 °F (37.5 °C)] 97.4 °F (36.3 °C)  Heart Rate:  [] 90  Resp:  [16-20] 18  BP: ()/(40-70) 99/65     Physical Exam   Constitutional: He is oriented to person, place, and time. He appears well-developed and well-nourished.   Eyes: Pupils are equal, round, and reactive to light. Conjunctivae and EOM are normal.   Neck: Normal range of motion. Neck supple. No JVD present.   Cardiovascular: Normal rate, regular rhythm, normal heart sounds and intact distal pulses.    Pulmonary/Chest: Effort normal and breath sounds normal.   Abdominal: Soft. Bowel sounds are normal.   Musculoskeletal: Normal range of motion. He exhibits no edema.   Neurological: He is alert and oriented to person, place, and time.   Skin: Skin is warm and dry.   Psychiatric: He has a normal mood and affect. His behavior is normal.           Results Review:  I have reviewed the labs, radiology results, and diagnostic studies.    Laboratory Data:     Results from last 7 days  Lab Units 09/13/18  0353 09/12/18  0251 09/11/18  0448   WBC 10*3/mm3 8.26 12.10* 12.47*   HEMOGLOBIN g/dL 10.1* 11.9* 13.3*   HEMATOCRIT % 29.6* 34.1* 39.0*   PLATELETS 10*3/mm3 134 161 206          Results from last 7 days  Lab Units 09/13/18  0353 09/12/18  0251 09/11/18  0448   SODIUM mmol/L 137 141 142   POTASSIUM mmol/L 4.2 4.2 3.2*   CHLORIDE mmol/L 109 110 105   CO2 mmol/L  22.0* 21.0* 21.0*   BUN mg/dL 12 20 36*   CREATININE mg/dL 0.92 1.11 1.88*   CALCIUM mg/dL 8.3* 8.8 9.2   BILIRUBIN mg/dL 0.6 1.0 1.1*   ALK PHOS U/L 113 119 121*   ALT (SGPT) U/L 58* 30 31   AST (SGOT) U/L 56* 53* 60*   GLUCOSE mg/dL 88 107* 112*       Culture Data:   Blood Culture   Date Value Ref Range Status   09/10/2018 No growth at 2 days  Preliminary   09/10/2018 No growth at 2 days  Preliminary       Radiology Data:   Imaging Results (last 24 hours)     Procedure Component Value Units Date/Time    CT Angiogram Head With & Without Contrast [[id number]] Collected:  09/12/18 1436     Updated:  09/12/18 1446    Narrative:       EXAMINATION:  CT ANGIOGRAM HEAD W WO CONTRAST-  9/12/2018 1:53 PM CDT     HISTORY: Subarachnoid hemorrhage.     COMPARISON : No comparison study.     DLP: 886 mGy-cm. Automated dosage control was utilized.     TECHNIQUE: A standard noncontrast brain CT was performed initially  followed by CT angio of the brain with contrast. Coronal, sagittal and  3-D images were reconstructed.     FINDINGS: The noncontrast standard brain CT images demonstrate continued  mild subarachnoid hemorrhage in the right sylvian fissure and along some  of the right frontal sulci. The amount of hemorrhage is decreased  slightly compared to the prior study. There is no new hemorrhage. There  is atrophy. There is low density in the white matter that is  nonspecific. There is no parenchymal hemorrhage. The visualized  paranasal sinuses and mastoid air cells are clear.     CT angiography demonstrates patent vertebral arteries bilaterally. There  is a patent right-vessel. There are patent small bilateral anterior  inferior cerebellar arteries. The superior cerebellar and posterior  cerebral arteries are patent. The internal carotid arteries are patent.  The anterior and middle cerebral arteries are patent. There are no major  branch occlusions or aneurysms identified.       Impression:       1. CT angiogram of the brain  is within normal limits demonstrating no  major branch occlusion or aneurysm.  2. Residual subarachnoid hemorrhage in the right sylvian fissure and  within right frontal sulci.  3. Mild atrophy with associated mild ventricular prominence.  4. Low density in the white matter is nonspecific and most likely due to  chronic small vessel disease.  This report was finalized on 09/12/2018 14:43 by Dr. Winston Dawkins MD.          I have reviewed the patient's current medications.     Assessment/Plan     Assessment     Syncope  Orthostatic hypotension, chronic recurrent by history.  Subarrachnoid hemorrhage.   CAD   Bipolar disease  Constipation with possible rectal impaction.  Renal failure. Acute on chronic.  CHF by history, type unknown, echo in process.  Constipation     Plan    PT OT  Enema milk and molasses.  Continue lactulose.   Nutrition consult.  Prealbumin  Monitor fluid status, has been off diuretics since admission.       Discharge Planning: I expect the patient to be discharged to home in 2-4 days.    Kenya Gomez,    09/13/18   12:40 PM

## 2018-09-13 NOTE — CONSULTS
[facility] HEART GROUP CONSULT NOTE    Patient Care Team:  Shant Hayes MD as PCP - General (Internal Medicine)  Russell Collins MD  REASON FOR REFERRAL: Orthostatic hypotension  Chief complaint: Syncope    Subjective     Patient is a 73 y.o. male with known CAD, including CABG in 91 and redo CABG in 96, followed at [place] [facility], who presented to the emergency department here on 9/10/18 after syncopal event.  He endorses multiple episodes of becoming lightheaded and occasionally passing out with upon standing up, or at least within the first few seconds after standing up and starting walking.  Denies palpitations.  He reports known of his cardiac medications, including diuretics and antihypertensives, have been changed during this time.  He states his primary physician did add mid to drain to his medical regimen he thinks 2-3 months ago, but he has not noticed any significant change in symptoms since that time.  Unfortunately, the syncopal events led to a fall which, during this admission, was revealed to have cause a subarachnoid hemorrhage.  Patient is being managed conservatively for this.  His labs on arrival showed prerenal acute kidney injury, which has normalized with IV fluid resuscitation.  His antihypertensives have been on hold.  His orthostatic hypotension is still present, though slowly improving by daily measurement.    Review of Systems   Review of Systems   Constitutional: Negative for appetite change, chills and fever.   HENT: Negative.    Eyes: Negative.    Respiratory: Negative for cough, shortness of breath and wheezing.    Cardiovascular: Negative for chest pain, palpitations and leg swelling.   Gastrointestinal: Negative for abdominal pain and blood in stool.   Endocrine: Negative.    Genitourinary: Negative for flank pain and hematuria.   Musculoskeletal: Positive for arthralgias.   Skin: Negative.    Allergic/Immunologic: Negative.    Neurological: Positive  for dizziness, syncope and light-headedness.   Hematological: Negative for adenopathy. Does not bruise/bleed easily.   Psychiatric/Behavioral: Negative.        History  Past Medical History:   Diagnosis Date   • Diverticulitis    • Hernia of abdominal wall    • Myocardial infarction 02/2017   • Sleep apnea    • Stroke (CMS/HCC) 02/2017     Past Surgical History:   Procedure Laterality Date   • COLOSTOMY  2001    x 3 months   • COLOSTOMY REVISION     • CORONARY ARTERY BYPASS GRAFT      1991/1996   • HERNIA REPAIR      abdominal hernia x 3; poor healing with multiple revisions   • PACEMAKER IMPLANTATION  2017    w/ defibrillator   • ROTATOR CUFF REPAIR Right 2005     Family History   Problem Relation Age of Onset   • Heart disease Brother    • Diabetes Brother    • Sleep apnea Brother      Social History   Substance Use Topics   • Smoking status: Former Smoker     Packs/day: 1.00     Years: 40.00     Types: Cigarettes   • Smokeless tobacco: Never Used   • Alcohol use No     Prescriptions Prior to Admission   Medication Sig Dispense Refill Last Dose   • aspirin 81 MG chewable tablet Chew 81 mg Daily.   9/9/2018 at Unknown time   • atorvastatin (LIPITOR) 80 MG tablet Take 80 mg by mouth Every Night.   9/9/2018 at Unknown time   • bumetanide (BUMEX) 2 MG tablet Take 2 mg by mouth Every Other Day.   9/9/2018 at Unknown time   • busPIRone (BUSPAR) 10 MG tablet Take 10 mg by mouth 2 (Two) Times a Day.   9/9/2018 at Unknown time   • clopidogrel (PLAVIX) 75 MG tablet Take 75 mg by mouth Daily.   9/9/2018 at Unknown time   • DULoxetine (CYMBALTA) 60 MG capsule Take 60 mg by mouth Daily.   9/9/2018 at Unknown time   • folic acid (FOLVITE) 1 MG tablet Take 1 mg by mouth Daily.   9/9/2018 at Unknown time   • HYDROcodone-acetaminophen (NORCO)  MG per tablet Take 1 tablet by mouth Every 6 (Six) Hours As Needed for Moderate Pain .   9/9/2018 at Unknown time   • lisinopril (PRINIVIL,ZESTRIL) 2.5 MG tablet Take 2.5 mg by mouth  Daily.   9/9/2018 at Unknown time   • lithium carbonate 300 MG capsule Take 300 mg by mouth 2 (Two) Times a Day With Meals.   9/9/2018 at Unknown time   • metoprolol succinate XL (TOPROL-XL) 25 MG 24 hr tablet Take 37.5 mg by mouth Daily.   9/9/2018 at Unknown time   • midodrine (PROAMATINE) 5 MG tablet Take 5 mg by mouth 2 (Two) Times a Day.   9/9/2018 at Unknown time   • QUEtiapine (SEROquel) 100 MG tablet Take 200 mg by mouth Every Night.   9/9/2018 at Unknown time   • raNITIdine (ZANTAC) 150 MG tablet Take 150 mg by mouth 2 (Two) Times a Day.   9/9/2018 at Unknown time   • spironolactone (ALDACTONE) 25 MG tablet Take 25 mg by mouth Daily.   9/9/2018 at Unknown time   • potassium chloride (K-DUR) 10 MEQ CR tablet Take 10 mEq by mouth Daily.   Unknown at Unknown time     Allergies:  Sulfa antibiotics    Objective     Vital Signs  Temp:  [97.2 °F (36.2 °C)-98.4 °F (36.9 °C)] 98.4 °F (36.9 °C)  Heart Rate:  [] 93  Resp:  [14-21] 20  BP: ()/(42-80) 93/61    Physical Exam:  Physical Exam   Constitutional: No distress.   HENT:   Mouth/Throat: Oropharynx is clear. Pharynx is normal.   Neck: Normal range of motion and thyroid normal. Neck supple. No JVD present. No thyromegaly present.   Cardiovascular: Normal rate, regular rhythm, S1 normal, S2 normal, normal heart sounds, intact distal pulses and normal pulses.   No extrasystoles are present. PMI is not displaced.  Exam reveals no gallop.    No murmur heard.  Pulmonary/Chest: Effort normal and breath sounds normal.   Abdominal: Soft. Bowel sounds are normal. He exhibits no distension. There is no splenomegaly or hepatomegaly. There is no tenderness.   Musculoskeletal: He exhibits no edema or tenderness.   Neurological: He is alert and oriented to person, place, and time.   Skin: Skin is warm and dry.     Results Review:   Lab Results (last 72 hours)     Procedure Component Value Units Date/Time    Blood Culture - Blood, [[id number]]  (Normal) Collected:   09/10/18 1325    Specimen:  Blood from Arm, Right Updated:  09/12/18 1345     Blood Culture No growth at 2 days    Blood Culture - Blood, [[id number]]  (Normal) Collected:  09/10/18 1315    Specimen:  Blood from Arm, Left Updated:  09/12/18 1345     Blood Culture No growth at 2 days    Immunofixation, Serum [[id number]] Collected:  09/11/18 1227    Specimen:  Blood Updated:  09/12/18 1316     Immunofixation Result, Serum Comment     Comment: No monoclonality detected.        IgG 823 mg/dL      IgA 202 mg/dL      IgM 86 mg/dL     Narrative:       Performed at:  [facility]  [id number]  : Vinny Fraser PhD, Phone:  [phone]    Comprehensive Metabolic Panel [[id number]]  (Abnormal) Collected:  09/12/18 0251    Specimen:  Blood Updated:  09/12/18 0335     Glucose 107 (H) mg/dL      BUN 20 mg/dL      Creatinine 1.11 mg/dL      Sodium 141 mmol/L      Potassium 4.2 mmol/L      Chloride 110 mmol/L      CO2 21.0 (L) mmol/L      Calcium 8.8 mg/dL      Total Protein 5.7 (L) g/dL      Albumin 3.20 (L) g/dL      ALT (SGPT) 30 U/L      AST (SGOT) 53 (H) U/L      Alkaline Phosphatase 119 U/L      Total Bilirubin 1.0 mg/dL      eGFR Non African Amer 65 mL/min/1.73      Globulin 2.5 gm/dL      A/G Ratio 1.3 g/dL      BUN/Creatinine Ratio 18.0     Anion Gap 10.0 mmol/L     Narrative:       The MDRD GFR formula is only valid for adults with stable renal function between ages 18 and 70.    CBC & Differential [[id number]] Collected:  09/12/18 0251    Specimen:  Blood Updated:  09/12/18 0322    Narrative:       The following orders were created for panel order CBC & Differential.  Procedure                               Abnormality         Status                     ---------                               -----------         ------                     CBC Auto Differential[[id number]]        Abnormal            Final result                 Please view results for these tests on the  individual orders.    CBC Auto Differential [[id number]]  (Abnormal) Collected:  09/12/18 0251    Specimen:  Blood Updated:  09/12/18 0322     WBC 12.10 (H) 10*3/mm3      RBC 3.62 (L) 10*6/mm3      Hemoglobin 11.9 (L) g/dL      Hematocrit 34.1 (L) %      MCV 94.2 fL      MCH 32.9 (H) pg      MCHC 34.9 g/dL      RDW 14.6 %      RDW-SD 50.7 fl      MPV 10.6 fL      Platelets 161 10*3/mm3      Neutrophil % 83.8 (H) %      Lymphocyte % 9.0 (L) %      Monocyte % 4.5 %      Eosinophil % 2.1 %      Basophil % 0.2 %      Immature Grans % 0.4 %      Neutrophils, Absolute 10.13 (H) 10*3/mm3      Lymphocytes, Absolute 1.09 10*3/mm3      Monocytes, Absolute 0.55 10*3/mm3      Eosinophils, Absolute 0.25 10*3/mm3      Basophils, Absolute 0.03 10*3/mm3      Immature Grans, Absolute 0.05 (H) 10*3/mm3      nRBC 0.0 /100 WBC     Creatinine, Urine, Random - Urine, Clean Catch [[id number]] Collected:  09/11/18 1412    Specimen:  Urine from Urine, Clean Catch Updated:  09/11/18 1444     Creatinine, Urine 96.5 mg/dL     Urea Nitrogen, Urine - Urine, Clean Catch [[id number]] Collected:  09/11/18 1412    Specimen:  Urine from Urine, Clean Catch Updated:  09/11/18 1444     Urea Nitrogen, Urine 951 mg/dL     Sodium, Urine, Random - Urine, Clean Catch [[id number]]  (Abnormal) Collected:  09/11/18 1412    Specimen:  Urine from Urine, Clean Catch Updated:  09/11/18 1444     Sodium, Urine 18 (L) mmol/L     Protein, Urine, Random - Urine, Clean Catch [[id number]]  (Normal) Collected:  09/11/18 1412    Specimen:  Urine from Urine, Clean Catch Updated:  09/11/18 1444     Total Protein, Urine 11.0 mg/dL     Troponin [[id number]]  (Abnormal) Collected:  09/11/18 1227    Specimen:  Blood Updated:  09/11/18 1300     Troponin I 0.106 (H) ng/mL     Iron Profile [[id number]]  (Normal) Collected:  09/10/18 1110    Specimen:  Blood Updated:  09/11/18 1131     Iron 116 mcg/dL      TIBC 390 mcg/dL      Iron Saturation 30 %     Uric Acid [[id number]]  (Abnormal)  Collected:  09/10/18 1110    Specimen:  Blood Updated:  09/11/18 1122     Uric Acid 10.9 (H) mg/dL     Comprehensive Metabolic Panel [[id number]]  (Abnormal) Collected:  09/11/18 0448    Specimen:  Blood Updated:  09/11/18 0528     Glucose 112 (H) mg/dL      BUN 36 (H) mg/dL      Creatinine 1.88 (H) mg/dL      Sodium 142 mmol/L      Potassium 3.2 (L) mmol/L      Chloride 105 mmol/L      CO2 21.0 (L) mmol/L      Calcium 9.2 mg/dL      Total Protein 6.7 g/dL      Albumin 4.00 g/dL      ALT (SGPT) 31 U/L      AST (SGOT) 60 (H) U/L      Alkaline Phosphatase 121 (H) U/L      Total Bilirubin 1.1 (H) mg/dL      eGFR Non African Amer 36 (L) mL/min/1.73      Globulin 2.7 gm/dL      A/G Ratio 1.5 g/dL      BUN/Creatinine Ratio 19.1     Anion Gap 16.0 (H) mmol/L     Narrative:       The MDRD GFR formula is only valid for adults with stable renal function between ages 18 and 70.    Troponin [[id number]]  (Abnormal) Collected:  09/11/18 0448    Specimen:  Blood Updated:  09/11/18 0520     Troponin I 0.183 (H) ng/mL     Lactic Acid, Plasma [[id number]]  (Abnormal) Collected:  09/11/18 0448    Specimen:  Blood Updated:  09/11/18 0510     Lactate 3.3 (C) mmol/L     CBC & Differential [[id number]] Collected:  09/11/18 0448    Specimen:  Blood Updated:  09/11/18 0503    Narrative:       The following orders were created for panel order CBC & Differential.  Procedure                               Abnormality         Status                     ---------                               -----------         ------                     CBC Auto Differential[[id number]]        Abnormal            Final result                 Please view results for these tests on the individual orders.    CBC Auto Differential [[id number]]  (Abnormal) Collected:  09/11/18 0448    Specimen:  Blood Updated:  09/11/18 0503     WBC 12.47 (H) 10*3/mm3      RBC 4.01 (L) 10*6/mm3      Hemoglobin 13.3 (L) g/dL      Hematocrit 39.0 (L) %      MCV 97.3 (H) fL      MCH 33.2  (H) pg      MCHC 34.1 g/dL      RDW 14.7 %      RDW-SD 53.1 fl      MPV 10.4 fL      Platelets 206 10*3/mm3      Neutrophil % 76.8 %      Lymphocyte % 14.5 (L) %      Monocyte % 6.9 %      Eosinophil % 1.0 %      Basophil % 0.2 %      Immature Grans % 0.6 %      Neutrophils, Absolute 9.57 (H) 10*3/mm3      Lymphocytes, Absolute 1.81 10*3/mm3      Monocytes, Absolute 0.86 10*3/mm3      Eosinophils, Absolute 0.13 10*3/mm3      Basophils, Absolute 0.03 10*3/mm3      Immature Grans, Absolute 0.07 (H) 10*3/mm3      nRBC 0.0 /100 WBC     Troponin [[id number]]  (Abnormal) Collected:  09/10/18 2318    Specimen:  Blood Updated:  09/11/18 0013     Troponin I 0.196 (H) ng/mL     Lactic Acid, Reflex [[id number]]  (Abnormal) Collected:  09/10/18 1600    Specimen:  Blood Updated:  09/10/18 1620     Lactate 2.9 (C) mmol/L     Lactic Acid, Reflex Timer (This will reflex a repeat order 3-3:15 hours after ordered.) [[id number]] Collected:  09/10/18 1110    Specimen:  Blood Updated:  09/10/18 1517     Extra Tube Hold for add-ons.     Comment: Auto resulted.       Urinalysis With Microscopic If Indicated (No Culture) - Urine, Clean Catch [[id number]]  (Abnormal) Collected:  09/10/18 1407    Specimen:  Urine from Urine, Clean Catch Updated:  09/10/18 1415     Color, UA Yellow     Appearance, UA Clear     pH, UA 6.5     Specific Gravity, UA 1.026     Glucose, UA Negative     Ketones, UA Trace (A)     Bilirubin, UA Negative     Blood, UA Negative     Protein, UA Negative     Leuk Esterase, UA Negative     Nitrite, UA Negative     Urobilinogen, UA 1.0 E.U./dL    Narrative:       Urine microscopic not indicated.    Lactic Acid, Plasma [[id number]]  (Abnormal) Collected:  09/10/18 1110    Specimen:  Blood Updated:  09/10/18 1205     Lactate 4.6 (C) mmol/L     Troponin [[id number]]  (Abnormal) Collected:  09/10/18 1110    Specimen:  Blood Updated:  09/10/18 1140     Troponin I 0.155 (H) ng/mL     BNP [[id number]]  (Abnormal) Collected:  09/10/18  1110    Specimen:  Blood Updated:  09/10/18 1140     proBNP 11,100.0 (H) pg/mL     Comprehensive Metabolic Panel [[id number]]  (Abnormal) Collected:  09/10/18 1110    Specimen:  Blood Updated:  09/10/18 1128     Glucose 121 (H) mg/dL      BUN 41 (H) mg/dL      Creatinine 1.75 (H) mg/dL      Sodium 148 (H) mmol/L      Potassium 4.4 mmol/L      Chloride 105 mmol/L      CO2 24.0 mmol/L      Calcium 10.7 (H) mg/dL      Total Protein 8.6 g/dL      Albumin 5.00 g/dL      ALT (SGPT) 33 U/L      AST (SGOT) 72 (H) U/L      Alkaline Phosphatase 172 (H) U/L      Total Bilirubin 1.9 (H) mg/dL      eGFR Non African Amer 39 (L) mL/min/1.73      Globulin 3.6 gm/dL      A/G Ratio 1.4 g/dL      BUN/Creatinine Ratio 23.4     Anion Gap 19.0 (H) mmol/L     Narrative:       The MDRD GFR formula is only valid for adults with stable renal function between ages 18 and 70.    Protime-INR [[id number]]  (Normal) Collected:  09/10/18 1110    Specimen:  Blood Updated:  09/10/18 1127     Protime 13.4 Seconds      INR 0.99    aPTT [[id number]]  (Normal) Collected:  09/10/18 1110    Specimen:  Blood Updated:  09/10/18 1127     PTT 28.3 seconds     Lithium Level [[id number]]  (Normal) Collected:  09/10/18 1110    Specimen:  Blood Updated:  09/10/18 1126     Lithium 0.8 mmol/L     CBC & Differential [[id number]] Collected:  09/10/18 1110    Specimen:  Blood Updated:  09/10/18 1118    Narrative:       The following orders were created for panel order CBC & Differential.  Procedure                               Abnormality         Status                     ---------                               -----------         ------                     CBC Auto Differential[[id number]]        Abnormal            Final result                 Please view results for these tests on the individual orders.    CBC Auto Differential [[id number]]  (Abnormal) Collected:  09/10/18 1110    Specimen:  Blood Updated:  09/10/18 1118     WBC 15.60 (H) 10*3/mm3      RBC 4.66 (L)  10*6/mm3      Hemoglobin 15.2 g/dL      Hematocrit 43.9 %      MCV 94.2 fL      MCH 32.6 (H) pg      MCHC 34.6 g/dL      RDW 14.6 %      RDW-SD 50.5 fl      MPV 10.3 fL      Platelets 313 10*3/mm3      Neutrophil % 82.9 (H) %      Lymphocyte % 11.1 (L) %      Monocyte % 5.3 %      Eosinophil % 0.1 %      Basophil % 0.2 %      Immature Grans % 0.4 %      Neutrophils, Absolute 12.93 (H) 10*3/mm3      Lymphocytes, Absolute 1.73 10*3/mm3      Monocytes, Absolute 0.83 10*3/mm3      Eosinophils, Absolute 0.01 10*3/mm3      Basophils, Absolute 0.03 10*3/mm3      Immature Grans, Absolute 0.07 (H) 10*3/mm3      nRBC 0.0 /100 WBC         Results for orders placed during the hospital encounter of 09/10/18   Adult Transthoracic Echo Complete W/ Cont if Necessary Per Protocol    Narrative · Left ventricular systolic function is mildly decreased. Estimated EF =   40-45%.  · Left ventricular diastolic dysfunction (grade I) consistent with   impaired relaxation.  · Mild-to-moderate mitral valve regurgitation is present  · Based on previous outside hospital records, likely no significant change   in LVEF or valvular abnormalities from 12/2017.        ECG personally visualized, my interpretation:  -9/11/18 at 0413: Normal sinus rhythm and first-degree AV block and occasional PVCs.  Inferior Q waves.  Nonspecific ST/T-wave abnormalities across the precordial leads.    Assessment/Plan     Active Problems:   1. Orthostatic hypotension - improving.  Likely has been due to antihypertensive locations and diuretic.  Perhaps was worsened prior to arrival by dehydration, which was evidence upon admission labs showing acute kidney injury that has resolved promptly with IV fluid resuscitation.  -Advised daily compliance with compression stockings; patient reports he has some at home but does not wear them consistently, and was not wearing at the time the event that led to this hospitalization  -Continue midodrine at current dose  -Continue  holding diuretic, ACE inhibitor, beta blocker, and spironolactone at this time - since he was taking his home and has only been admitted for 2 days with them on hold, I would hesitate to make other changes (like starting Florinef or Northera) without allowing ample time for these to wear off.  -If, once the above have worn off, his blood pressure begins to increase significantly, would slowly re-implement these medications one at a time.  Would start first with ACE inhibitor given low EF.     2. Subarachnoid hemorrhage (CMS/HCC)- stable; being managed and conservatively.   3. Chronic coronary artery disease - stable; continue asa, statin. ACE/BB on hold as per above.   4. Chronic systolic congestive heart failure, EF 40-45% (CMS/HCC) - stable; continue holding diuretic and monitor volume status closely.   5. Hyperlipidemia   6. ICD (implantable cardioverter-defibrillator) in place   7. Syncope - likely orthostatic; recommendations as per above   8.  Troponin elevation - no signs/symptoms of ACS    I discussed the patient's findings and my recommendations with patient.  Thank you for the consultation.  We will continue to follow peripherally but please not hesitate to contact me directly if you have any further questions.    Logan Willams MD  09/12/18  10:09 PM

## 2018-09-13 NOTE — PLAN OF CARE
Problem: Patient Care Overview  Goal: Plan of Care Review  Outcome: Ongoing (interventions implemented as appropriate)   09/13/18 1410   Coping/Psychosocial   Plan of Care Reviewed With patient   Plan of Care Review   Progress improving   OTHER   Outcome Summary Pt has only consumed avg 28% of the last 5 meals. His appetite has been better today consuming 50% of 2 meals. He is currently receiving Ensure Enlive BID. Will increase Ensure to TID and also offer larger protein servings. All weights have been taken in bed and have ranged from 152# - 170# in 2 days. Unsure of bed weight accuracy. Will cont to encourage po intake.       Problem: Nutrition, Imbalanced: Inadequate Oral Intake (Adult)  Goal: Identify Related Risk Factors and Signs and Symptoms  Outcome: Outcome(s) achieved Date Met: 09/13/18    Goal: Improved Oral Intake  Outcome: Ongoing (interventions implemented as appropriate)    Goal: Prevent Further Weight Loss  Outcome: Ongoing (interventions implemented as appropriate)

## 2018-09-13 NOTE — NURSING NOTE
Discharge Planning Assessment  [facility]     Patient Name: Travis Patel  MRN: [id number]  Today's Date: 9/13/2018    Admit Date: 9/10/2018          Discharge Needs Assessment     Row Name 09/13/18 1112       Transition Planning    Patient/Family Anticipates Transition to home;home with help/services    Patient/Family Anticipated Services at Transition home health care    Transportation Anticipated family or friend will provide       Discharge Needs Assessment    Equipment Currently Used at Home walker, standard;bipap/cpap    Anticipated Changes Related to Illness inability to care for self    Equipment Needed After Discharge none    Discharge Facility/Level of Care Needs home with home health    Current Discharge Risk chronically ill    Discharge Coordination/Progress Lives with spouse at home. Spouse assists with care. Has C-PAP and walker. Current with [facility]. Patient would like to return home with home health. Will need PT eval to assess functional status.     Row Name 09/13/18 1111       Living Environment    Lives With spouse    Current Living Arrangements home/apartment/condo    Primary Care Provided by spouse/significant other    Provides Primary Care For no one    Family Caregiver if Needed child(luan), adult;spouse    Quality of Family Relationships supportive;helpful            Discharge Plan    No documentation.       Destination     No service coordination in this encounter.      Durable Medical Equipment     No service coordination in this encounter.      Dialysis/Infusion     No service coordination in this encounter.      Home Medical Care     No service coordination in this encounter.      Social Care     No service coordination in this encounter.                Demographic Summary    No documentation.           Functional Status    No documentation.           Psychosocial    No documentation.           Abuse/Neglect    No documentation.           Legal    No documentation.            Substance Abuse    No documentation.           Patient Forms    No documentation.         Merlina A Fletcher, RN

## 2018-09-14 LAB
ALBUMIN SERPL-MCNC: 2.7 G/DL (ref 3.5–5)
ALBUMIN/GLOB SERPL: 1.2 G/DL (ref 1.1–2.5)
ALP SERPL-CCNC: 110 U/L (ref 24–120)
ALT SERPL W P-5'-P-CCNC: 53 U/L (ref 0–54)
ANION GAP SERPL CALCULATED.3IONS-SCNC: 6 MMOL/L (ref 4–13)
AST SERPL-CCNC: 53 U/L (ref 7–45)
BASOPHILS # BLD AUTO: 0.02 10*3/MM3 (ref 0–0.2)
BASOPHILS NFR BLD AUTO: 0.3 % (ref 0–2)
BILIRUB SERPL-MCNC: 0.6 MG/DL (ref 0.1–1)
BUN BLD-MCNC: 10 MG/DL (ref 5–21)
BUN/CREAT SERPL: 10.4 (ref 7–25)
CALCIUM SPEC-SCNC: 8.4 MG/DL (ref 8.4–10.4)
CHLORIDE SERPL-SCNC: 108 MMOL/L (ref 98–110)
CO2 SERPL-SCNC: 25 MMOL/L (ref 24–31)
CREAT BLD-MCNC: 0.96 MG/DL (ref 0.5–1.4)
DEPRECATED RDW RBC AUTO: 51.4 FL (ref 40–54)
EOSINOPHIL # BLD AUTO: 0.25 10*3/MM3 (ref 0–0.7)
EOSINOPHIL NFR BLD AUTO: 3.6 % (ref 0–4)
ERYTHROCYTE [DISTWIDTH] IN BLOOD BY AUTOMATED COUNT: 14.6 % (ref 12–15)
GFR SERPL CREATININE-BSD FRML MDRD: 77 ML/MIN/1.73
GLOBULIN UR ELPH-MCNC: 2.3 GM/DL
GLUCOSE BLD-MCNC: 88 MG/DL (ref 70–100)
HCT VFR BLD AUTO: 30.4 % (ref 40–52)
HGB BLD-MCNC: 10.3 G/DL (ref 14–18)
IMM GRANULOCYTES # BLD: 0.05 10*3/MM3 (ref 0–0.03)
IMM GRANULOCYTES NFR BLD: 0.7 % (ref 0–5)
LYMPHOCYTES # BLD AUTO: 1.46 10*3/MM3 (ref 0.72–4.86)
LYMPHOCYTES NFR BLD AUTO: 21.1 % (ref 15–45)
MCH RBC QN AUTO: 32.2 PG (ref 28–32)
MCHC RBC AUTO-ENTMCNC: 33.9 G/DL (ref 33–36)
MCV RBC AUTO: 95 FL (ref 82–95)
MONOCYTES # BLD AUTO: 0.37 10*3/MM3 (ref 0.19–1.3)
MONOCYTES NFR BLD AUTO: 5.4 % (ref 4–12)
NEUTROPHILS # BLD AUTO: 4.76 10*3/MM3 (ref 1.87–8.4)
NEUTROPHILS NFR BLD AUTO: 68.9 % (ref 39–78)
NRBC BLD MANUAL-RTO: 0 /100 WBC (ref 0–0)
PLATELET # BLD AUTO: 144 10*3/MM3 (ref 130–400)
PMV BLD AUTO: 10.7 FL (ref 6–12)
POTASSIUM BLD-SCNC: 4.4 MMOL/L (ref 3.5–5.3)
PROT SERPL-MCNC: 5 G/DL (ref 6.3–8.7)
RBC # BLD AUTO: 3.2 10*6/MM3 (ref 4.8–5.9)
SODIUM BLD-SCNC: 139 MMOL/L (ref 135–145)
WBC NRBC COR # BLD: 6.91 10*3/MM3 (ref 4.8–10.8)

## 2018-09-14 PROCEDURE — G8979 MOBILITY GOAL STATUS: HCPCS

## 2018-09-14 PROCEDURE — G8987 SELF CARE CURRENT STATUS: HCPCS

## 2018-09-14 PROCEDURE — 97166 OT EVAL MOD COMPLEX 45 MIN: CPT

## 2018-09-14 PROCEDURE — 97116 GAIT TRAINING THERAPY: CPT

## 2018-09-14 PROCEDURE — G8978 MOBILITY CURRENT STATUS: HCPCS

## 2018-09-14 PROCEDURE — 80053 COMPREHEN METABOLIC PANEL: CPT | Performed by: NURSE PRACTITIONER

## 2018-09-14 PROCEDURE — 85025 COMPLETE CBC W/AUTO DIFF WBC: CPT | Performed by: INTERNAL MEDICINE

## 2018-09-14 PROCEDURE — G8988 SELF CARE GOAL STATUS: HCPCS

## 2018-09-14 PROCEDURE — 97110 THERAPEUTIC EXERCISES: CPT

## 2018-09-14 PROCEDURE — 97162 PT EVAL MOD COMPLEX 30 MIN: CPT

## 2018-09-14 RX ORDER — LITHIUM CARBONATE 300 MG/1
300 CAPSULE ORAL
Status: DISCONTINUED | OUTPATIENT
Start: 2018-09-14 | End: 2018-09-20 | Stop reason: HOSPADM

## 2018-09-14 RX ADMIN — QUETIAPINE FUMARATE 100 MG: 100 TABLET ORAL at 20:50

## 2018-09-14 RX ADMIN — BUSPIRONE HYDROCHLORIDE 10 MG: 10 TABLET ORAL at 08:28

## 2018-09-14 RX ADMIN — LACTULOSE 20 G: 20 SOLUTION ORAL at 08:28

## 2018-09-14 RX ADMIN — LACTULOSE 20 G: 20 SOLUTION ORAL at 20:50

## 2018-09-14 RX ADMIN — FAMOTIDINE 20 MG: 20 TABLET, FILM COATED ORAL at 08:28

## 2018-09-14 RX ADMIN — LACTULOSE 20 G: 20 SOLUTION ORAL at 15:29

## 2018-09-14 RX ADMIN — MIDODRINE HYDROCHLORIDE 10 MG: 2.5 TABLET ORAL at 08:28

## 2018-09-14 RX ADMIN — MIDODRINE HYDROCHLORIDE 10 MG: 2.5 TABLET ORAL at 11:22

## 2018-09-14 RX ADMIN — BUSPIRONE HYDROCHLORIDE 10 MG: 10 TABLET ORAL at 20:50

## 2018-09-14 RX ADMIN — DULOXETINE HYDROCHLORIDE 60 MG: 30 CAPSULE, DELAYED RELEASE ORAL at 08:28

## 2018-09-14 RX ADMIN — MIDODRINE HYDROCHLORIDE 10 MG: 2.5 TABLET ORAL at 17:02

## 2018-09-14 RX ADMIN — LITHIUM CARBONATE 300 MG: 300 CAPSULE, GELATIN COATED ORAL at 17:02

## 2018-09-14 RX ADMIN — POTASSIUM CHLORIDE 20 MEQ: 750 CAPSULE, EXTENDED RELEASE ORAL at 08:28

## 2018-09-14 RX ADMIN — ATORVASTATIN CALCIUM 80 MG: 40 TABLET, FILM COATED ORAL at 20:50

## 2018-09-14 RX ADMIN — FOLIC ACID 1 MG: 1 TABLET ORAL at 08:28

## 2018-09-14 NOTE — THERAPY EVALUATION
Acute Care - Physical Therapy Initial Evaluation  [facility]     Patient Name: Travis Patel  : 1945  MRN: [id number]  Today's Date: 2018   Onset of Illness/Injury or Date of Surgery: 09/10/18  Date of Referral to PT: 18  Referring Physician: Dr. Gomez      Admit Date: 9/10/2018    Visit Dx:     ICD-10-CM ICD-9-CM   1. Subarachnoid hemorrhage (CMS/[facility]) I60.9 430   2. Syncope, unspecified syncope type R55 780.2   3. Elevated liver enzymes R74.8 790.5   4. Constipation, unspecified constipation type K59.00 564.00   5. VANI (acute kidney injury) (CMS/[facility]) N17.9 584.9   6. Elevated troponin R74.8 790.6   7. Renal cyst N28.1 753.10   8. Adrenal nodule (CMS/[facility]) E27.9 255.8   9. Chronic anticoagulation Z79.01 V58.61   10. Dysphagia, unspecified type R13.10 787.20   11. Impaired cognition R41.89 294.9   12. Impaired mobility and ADLs Z74.09 799.89   13. Gait abnormality R26.9 781.2     Patient Active Problem List   Diagnosis   • Subarachnoid hemorrhage (CMS/[facility])   • Chronic coronary artery disease   • Congestive heart failure (CMS/[facility])   • Hyperlipidemia   • ICD (implantable cardioverter-defibrillator) in place   • Orthostatic hypotension   • Syncope     Past Medical History:   Diagnosis Date   • Diverticulitis    • Hernia of abdominal wall    • Myocardial infarction 2017   • Sleep apnea    • Stroke (CMS/HCC) 2017     Past Surgical History:   Procedure Laterality Date   • COLOSTOMY  2001    x 3 months   • COLOSTOMY REVISION     • CORONARY ARTERY BYPASS GRAFT         • HERNIA REPAIR      abdominal hernia x 3; poor healing with multiple revisions   • PACEMAKER IMPLANTATION  2017    w/ defibrillator   • ROTATOR CUFF REPAIR Right         PT ASSESSMENT (last 12 hours)      Physical Therapy Evaluation     Row Name 18 0844          PT Evaluation Time/Intention    Subjective Information no complaints  -JE     Document Type evaluation;other (see comments)   see MAR  -JE     Mode of Treatment  physical therapy  -JE     Patient Effort good  -JE     Symptoms Noted During/After Treatment other (see comments)   light headedness; orthostasis  -JE     Row Name 09/14/18 0844          General Information    Patient Profile Reviewed? yes  -JE     Onset of Illness/Injury or Date of Surgery 09/13/18  -     Referring Physician Dr. Gomez  -KRYSTINA     Patient Observations alert;cooperative;agree to therapy  -JE     Patient/Family Observations daughter arrived and is very supportive  -JE     General Observations of Patient telemetry; fowlers  -KRYSTINA     Prior Level of Function independent:;all household mobility;community mobility;driving   with decline has used rollator  -JE     Equipment Currently Used at Home rollator  -JE     Pertinent History of Current Functional Problem Pt with 6 mth hx of falling, recently with syncopal episode resulting in fall with dx of subarrachnoid hemorrhage.  Pt also with dx of acute kidney injury and orthostatic hypotension as well as hx of CKD stage 3, HTN, CHF, CAD with prior CABG, and bipolar dz.  -JE     Existing Precautions/Restrictions fall;other (see comments)   orthostatic hypotension  -JE     Limitations/Impairments other (see comments)   orthostasis  -JE     Equipment Issued to Patient walker, front wheeled;gait belt  -JE     Risks Reviewed patient and family:;dizziness;increased discomfort;change in vital signs;other (comment)   fall risks  -JE     Benefits Reviewed patient and family:;improve function;increase independence;increase strength;increase balance;decrease risk of DVT;improve skin integrity;increase knowledge;other (comment)   safety/falls prevention  -     Barriers to Rehab --   orthostatic hypotension  -JE     Row Name 09/14/18 0844          Relationship/Environment    Primary Source of Support/Comfort child(luan);spouse  -KRYSTINA     Name of Support/Comfort Primary Source dtr, spouse - Fransisca MOORE     Lives With spouse  -KRYSTINA     Name(s) of Who Lives With Patient Fransisca MOORE      Family Caregiver if Needed child(luan), adult;spouse  -     Family Caregiver Names Fransisca - spouse; dtr  -     Row Name 09/14/18 0844          Resource/Environmental Concerns    Current Living Arrangements home/apartment/condo  -     Row Name 09/14/18 0844          Home Main Entrance    Number of Stairs, Main Entrance three  -     Stair Railings, Main Entrance railing on left side (ascending)  -     Row Name 09/14/18 0844          Cognitive Assessment/Intervention- PT/OT    Affect/Mental Status (Cognitive) flat/blunted affect  -     Orientation Status (Cognition) oriented x 4  -     Follows Commands (Cognition) follows multi-step commands;over 90% accuracy  -     Safety Deficit (Cognitive) safety precautions awareness  -     Personal Safety Interventions fall prevention program maintained;gait belt;muscle strengthening facilitated;nonskid shoes/slippers when out of bed;supervised activity  -     Row Name 09/14/18 0844          Safety Issues, Functional Mobility    Safety Issues Affecting Function (Mobility) safety precaution awareness  -     Impairments Affecting Function (Mobility) endurance/activity tolerance;other (see comments)   orthostatic hypotension  -     Row Name 09/14/18 0844          Bed Mobility Assessment/Treatment    Bed Mobility Assessment/Treatment scooting/bridging;supine-sit;sit-supine  -     Scooting/Bridging [place] (Bed Mobility) supervision  -     Supine-Sit [place] (Bed Mobility) supervision  -     Sit-Supine [place] (Bed Mobility) supervision  -     Assistive Device (Bed Mobility) head of bed elevated  -     Comment (Bed Mobility) BPs monitored throughout activity  -     Row Name 09/14/18 0844          Transfer Assessment/Treatment    Transfer Assessment/Treatment sit-stand transfer;stand-sit transfer  -     Comment (Transfers) BPs monitored throughout activity; pt performed LE exercise to include aps, LAQ and seated marching prior to  attempting standing with BP increasing from drop after transition to sitting and again after returning to sitting after standing  -     Sit-Stand [place] (Transfers) contact guard  -     Stand-Sit [place] (Transfers) contact guard  -[facility] Name 09/14/18 0844          Sit-Stand Transfer    Assistive Device (Sit-Stand Transfers) walker, front-wheeled  -     Row Name 09/14/18 0844          Stand-Sit Transfer    Assistive Device (Stand-Sit Transfers) walker, front-wheeled  -     Row Name 09/14/18 0844          Gait/Stairs Assessment/Training    Gait/Stairs Assessment/Training gait/ambulation independence;gait/ambulation assistive device;distance ambulated  -     [place] Level (Gait) contact guard   stood only due to significant drop in BP  -     Assistive Device (Gait) walker, front-wheeled  [facility]     Distance in Feet (Gait) stood only  -[facility] Name 09/14/18 0844          General ROM    GENERAL ROM COMMENTS AROM all 4 extremities WFLs demonstrated by activity  -JE     Row Name 09/14/18 0844          MMT (Manual Muscle Testing)    General MMT Comments no formal assessment with resistance; pt able to move all 4 extremities against gravity without difficulty  -[facility] Name 09/14/18 0844          Balance    Balance static sitting balance;static standing balance;dynamic sitting balance  -JE     Row Name 09/14/18 0844          Static Sitting Balance    Level of [place] (Unsupported Sitting, Static Balance) supervision  -     Sitting Position (Unsupported Sitting, Static Balance) sitting on edge of bed  -     Level of [place] (Supported Sitting, Static Balance) supervision  -     Sitting Position (Supported Sitting, Static Balance) sitting on edge of bed  -JE     Row Name 09/14/18 0844          Dynamic Sitting Balance    Level of [place], Reaches Outside Midline (Sitting, Dynamic Balance) supervision  -     Sitting Position, Reaches Outside Midline (Sitting, Dynamic  Balance) sitting on edge of bed  -     Comment, Reaches Outside Midline (Sitting, Dynamic Balance) pt performed functional tasks using UEs while sitting on side of bed  -     Row Name 09/14/18 08          Static Standing Balance    Level of [place] (Supported Standing, Static Balance) contact guard assist  -     Assistive Device Utilized (Supported Standing, Static Balance) rolling walker  -     Row Name 09/14/18 0844          Sensory Assessment/Intervention    Sensory General Assessment no sensation deficits identified  -[facility] Name 09/14/18 08          Pain Scale: Numbers Pre/Post-Treatment    Pain Scale: Numbers, Pretreatment 0/10 - no pain  -     Pain Scale: Numbers, Post-Treatment 0/10 - no pain  -[facility] Name             Wound 09/12/18 2000 Left lateral arm skin tear    Wound - Properties Group Date first assessed: 09/12/18  - Time first assessed: 2000  - Side: Left  - Orientation: lateral  - Location: arm  - Type: skin tear  -[facility] Name 09/14/18 0844          Coping    Observed Emotional State accepting;calm;cooperative;flat  -     Verbalized Emotional State acceptance  -     Row Name 09/14/18 0844          Plan of Care Review    Plan of Care Reviewed With patient  -[facility] Name 09/14/18 0844          Physical Therapy Clinical Impression    Date of Referral to PT 09/13/18  -     Patient/Family Goals Statement (PT Clinical Impression) increase tolerance to upright; out of bed activity  -     Criteria for Skilled Interventions Met (PT Clinical Impression) yes;treatment indicated;other (see comments)   decrease activity tolerance and decrease I with mobility  -     Rehab Potential (PT Clinical Summary) good, to achieve stated therapy goals  -     Predicted Duration of Therapy (PT) until discharge or goals achieved  -[facility] Name 09/14/18 0844          Vital Signs    Pre Systolic BP Rehab 106  -JE     Pre Treatment Diastolic BP 66   in supine  -      Intra Systolic BP Rehab 93  -JE     Intra Treatment Diastolic BP 66   sitting; after LE ex in sitting 109/58; stand 82/59  -     Post Systolic BP Rehab 127  -JE     Post Treatment Diastolic BP 74   in bed at end of visit - 113/73  -     Row Name 09/14/18 0844          Physical Therapy Goals    Bed Mobility Goal Selection (PT) bed mobility, PT goal 1  -JE     Transfer Goal Selection (PT) transfer, PT goal 1  -JE     Gait Training Goal Selection (PT) gait training, PT goal 1  -     Stairs Goal Selection (PT) stairs, PT goal 1  -JE     Additional Documentation Stairs Goal Selection (PT) (Row)  -     Row Name 09/14/18 0844          Bed Mobility Goal 1 (PT)    Activity/Assistive Device (Bed Mobility Goal 1, PT) scooting;sit to supine;supine to sit;rolling to left;rolling to right  -JE     [place] Level/Cues Needed (Bed Mobility Goal 1, PT) independent  -JE     Time Frame (Bed Mobility Goal 1, PT) by discharge  -JE     Progress/Outcomes (Bed Mobility Goal 1, PT) goal ongoing  -     Row Name 09/14/18 0844          Transfer Goal 1 (PT)    Activity/Assistive Device (Transfer Goal 1, PT) sit-to-stand/stand-to-sit;bed-to-chair/chair-to-bed  -JE     [place] Level/Cues Needed (Transfer Goal 1, PT) standby assist  -JE     Time Frame (Transfer Goal 1, PT) by discharge  -JE     Progress/Outcome (Transfer Goal 1, PT) goal ongoing  -     Row Name 09/14/18 0844          Gait Training Goal 1 (PT)    Activity/Assistive Device (Gait Training Goal 1, PT) gait (walking locomotion);assistive device use;decrease fall risk;forward stepping;improve balance and speed;increase endurance/gait distance;other (see comments)   increase tolerance to upright  -JE     [place] Level (Gait Training Goal 1, PT) standby assist  -JE     Distance (Gait Goal 1, PT) 200 ft w/ < or equal to 1 rest  -JE     Time Frame (Gait Training Goal 1, PT) by discharge  -JE     Progress/Outcome (Gait Training Goal 1, PT) goal ongoing  -[facility]  Name 09/14/18 0844          Stairs Goal 1 (PT)    Activity/Assistive Device (Stairs Goal 1, PT) ascending stairs;descending stairs;decrease fall risk;using handrail, left  -     [place] Level/Cues Needed (Stairs Goal 1, PT) standby assist  -     Number of Stairs (Stairs Goal 1, PT) 3  -JE     Time Frame (Stairs Goal 1, PT) by discharge  -JE     Progress/Outcome (Stairs Goal 1, PT) goal ongoing  -     Row Name 09/14/18 0844          Patient Education Goal (PT)    Activity (Patient Education Goal, PT) safety/falls prevention; LE exercise to combat orthostasis  -     [place]/Cues/Accuracy (Memory Goal 2, PT) demonstrates adequately;independent;verbalizes understanding  -JE     Time Frame (Patient Education Goal, PT) by discharge  -JE     Progress/Outcome (Patient Education Goal, PT) goal ongoing  -     Row Name 09/14/18 0844          Positioning and Restraints    Post Treatment Position bed  -     In Bed fowlers;call light within reach;encouraged to call for assist;with family/caregiver;side rails up x2;SCD pump applied  -     Row Name 09/14/18 0844          Living Environment    Home Accessibility stairs to enter home  -       User Key  (r) = Recorded By, (t) = Taken By, (c) = Cosigned By    Initials Name Provider Type    Channing Diaz, RN Registered Nurse    Maria A Lee, PT Physical Therapist          Physical Therapy Education     Title: PT OT SLP Therapies (Done)     Topic: Physical Therapy (Done)     Point: Mobility training (Done)    Learning Progress Summary     Learner Status Readiness Method Response Comment Documented by    Patient Done Acceptance MARK CLEANING D VU, DU,NR Education for LE exercise to combat orthostasis to include aps, LAQ, & seated marching and deep breathing  education to transition slowly during transfers to assist w/ adjustment to change in position to reduce risk for LOB due to orthostatic hypotension  09/14/18 1012    Family Done Acceptance MARK CLEANING D  ALEXANDER,AARON,NR Education for LE exercise to combat orthostasis to include aps, LAQ, & seated marching and deep breathing  education to transition slowly during transfers to assist w/ adjustment to change in position to reduce risk for LOB due to orthostatic hypotension  09/14/18 1012          Point: Precautions (Done)    Learning Progress Summary     Learner Status Readiness Method Response Comment Documented by    Patient Done Acceptance E,TB,D ALEXANDER,NR,AARON Educ re falls prevention to include risk w/ orthostasis; educ for LE ex, transition slowly during tfers, & incr awareness of changes in tolerance to being upright; educ to sit upright more often w/ meals/ in between meals to incr tolerance  09/14/18 1013                      User Key     Initials Effective Dates Name Provider Type Discipline     08/02/18 -  Maria A Amor, PT Physical Therapist PT                PT Recommendation and Plan  Anticipated Discharge Disposition (PT): home with assist, home with home health (may need SNF if cont w/ orthostasis  )  Planned Therapy Interventions (PT Eval): balance training, bed mobility training, gait training, patient/family education, stair training, strengthening, transfer training, other (see comments) (safety/falls prevention; activity to combat orthostasis)  Therapy Frequency (PT Clinical Impression): 2 times/day  Outcome Summary/Treatment Plan (PT)  Anticipated Discharge Disposition (PT): home with assist, home with home health (may need SNF if cont w/ orthostasis  )  Plan of Care Reviewed With: patient, daughter  Progress: improving  Outcome Summary: PT eval completed.  Pt is motivated to improve, however continues with decrease tolerance to upright.  Pt able to perform bed mobility and transfers to sitting side of bed and back to supine with supervision.  Transfers sit to stand to sit with CGA.  Noted drop in BP from supine to sitting.  Performed LE exercise to combat drop noting increase in BP from previous  drop.  Stood with BP dropping again, returning to sitting to perform additional LE exercise again with improved outcome in BP.  Pt back to bed with BP remaining stable.  Pt will benefit from continued PT while in acute care to increase tolerance to out of bed activity and to increase knowledge of activities to combat the drop in his BP.  Pt with supportive daughter and spouse involved in his care.  Upon discharge, pt should be ok to return home with assist and HH, however, if BP remains unstable with lack of tolerance to standing pt may need short stay in SNF for continued care and progression of safe activity.          Outcome Measures     Row Name 09/14/18 1000 09/14/18 0900          How much help from another person do you currently need...    Turning from your back to your side while in flat bed without using bedrails? 3  -JE  --     Moving from lying on back to sitting on the side of a flat bed without bedrails? 3  -JE  --     Moving to and from a bed to a chair (including a wheelchair)? 3  -JE  --     Standing up from a chair using your arms (e.g., wheelchair, bedside chair)? 3  -JE  --     Climbing 3-5 steps with a railing? 3  -JE  --     To walk in hospital room? 3  -JE  --     AM-PAC 6 Clicks Score 18  -JE  --        How much help from another is currently needed...    Putting on and taking off regular lower body clothing?  -- 2  -MW     Bathing (including washing, rinsing, and drying)  -- 2  -MW     Toileting (which includes using toilet bed pan or urinal)  -- 3  -MW     Putting on and taking off regular upper body clothing  -- 3  -MW     Taking care of personal grooming (such as brushing teeth)  -- 3  -MW     Eating meals  -- 4  -MW     Score  -- 17  -MW        Functional Assessment    Outcome Measure Options AM-PAC 6 Clicks Basic Mobility (PT)  -JE AM-PAC 6 Clicks Daily Activity (OT)  -MANUELA       User Key  (r) = Recorded By, (t) = Taken By, (c) = Cosigned By    Initials Name Provider Type    MANUELA Nagel  Sunni CRUZ, OTR/L Occupational Therapist    Maria A Lee, PT Physical Therapist           Time Calculation:         PT Charges     Row Name 09/14/18 1026             Time Calculation    Start Time 0844  -      Stop Time 0937  -      Time Calculation (min) 53 min  -      PT Received On 09/14/18  -      PT Goal Re-Cert Due Date 09/24/18  -        User Key  (r) = Recorded By, (t) = Taken By, (c) = Cosigned By    Initials Name Provider Type    Maria A Lee, PT Physical Therapist        Therapy Suggested Charges     Code   Minutes Charges    None           Therapy Charges for Today     Code Description Service Date Service Provider Modifiers Qty    62206703312 HC PT MOBILITY CURRENT 9/14/2018 Maria A Amor, PT GP, CK 1    27595623684 HC PT MOBILITY PROJECTED 9/14/2018 Maria A Amor, PT GP, CJ 1    56183630470 HC PT EVAL MOD COMPLEXITY 4 9/14/2018 Maria A Amor, PT GP, KX 1          PT G-Codes  PT Professional Judgement Used?: Yes  Outcome Measure Options: AM-PAC 6 Clicks Basic Mobility (PT)  AM-PAC 6 Clicks Score: 18  Score: 17  Functional Limitation: Mobility: Walking and moving around  Mobility: Walking and Moving Around Current Status (): At least 40 percent but less than 60 percent impaired, limited or restricted  Mobility: Walking and Moving Around Goal Status (): At least 20 percent but less than 40 percent impaired, limited or restricted      Maria A Amor, PT  9/14/2018

## 2018-09-14 NOTE — PLAN OF CARE
Problem: Fall Risk (Adult)  Goal: Absence of Fall  Outcome: Ongoing (interventions implemented as appropriate)      Problem: Patient Care Overview  Goal: Plan of Care Review  Outcome: Ongoing (interventions implemented as appropriate)   09/14/18 1513   Coping/Psychosocial   Plan of Care Reviewed With patient   Plan of Care Review   Progress improving   OTHER   Outcome Summary Patient stated that he is feeling better today. He sit up in the chair every meal. Cont to working with PT/OT/Speech. BP is better today also as charted. Cont current tx and re started lithium today. A/Ox4. Drsg changed to left arm with mepilex. Applied cream to coccyx. SSW consulted for dc planning. Cont to monitor pt.      Goal: Individualization and Mutuality  Outcome: Ongoing (interventions implemented as appropriate)      Problem: Constipation (Adult)  Goal: Effective Bowel Elimination  Outcome: Ongoing (interventions implemented as appropriate)    Goal: Comfort  Outcome: Ongoing (interventions implemented as appropriate)      Problem: Syncope (Adult)  Goal: Physical Safety/Health Maintenance  Outcome: Ongoing (interventions implemented as appropriate)    Goal: Optimal Emotional/Functional [place]  Outcome: Ongoing (interventions implemented as appropriate)      Problem: Renal Failure/Kidney Injury, Acute (Adult)  Goal: Signs and Symptoms of Listed Potential Problems Will be Absent, Minimized or Managed (Renal Failure/Kidney Injury, Acute)  Outcome: Ongoing (interventions implemented as appropriate)      Problem: Brain Injury, Moderate Traumatic (GCS 9-12) (Adult)  Goal: Signs and Symptoms of Listed Potential Problems Will be Absent, Minimized or Managed (Brain Injury, Moderate Traumatic)  Outcome: Ongoing (interventions implemented as appropriate)      Problem: Skin Injury Risk (Adult)  Goal: Skin Health and Integrity  Outcome: Ongoing (interventions implemented as appropriate)      Problem: Nutrition, Imbalanced: Inadequate Oral  Intake (Adult)  Goal: Improved Oral Intake  Outcome: Ongoing (interventions implemented as appropriate)    Goal: Prevent Further Weight Loss  Outcome: Ongoing (interventions implemented as appropriate)

## 2018-09-14 NOTE — THERAPY TREATMENT NOTE
Acute Care - Physical Therapy Treatment Note  [facility]     Patient Name: Travis Patel  : 1945  MRN: [id number]  Today's Date: 2018  Onset of Illness/Injury or Date of Surgery: 09/10/18  Date of Referral to PT: 18  Referring Physician: Dr. Gomez    Admit Date: 9/10/2018    Visit Dx:    ICD-10-CM ICD-9-CM   1. Subarachnoid hemorrhage (CMS/HCC) I60.9 430   2. Syncope, unspecified syncope type R55 780.2   3. Elevated liver enzymes R74.8 790.5   4. Constipation, unspecified constipation type K59.00 564.00   5. VANI (acute kidney injury) (CMS/HCC) N17.9 584.9   6. Elevated troponin R74.8 790.6   7. Renal cyst N28.1 753.10   8. Adrenal nodule (CMS/HCC) E27.9 255.8   9. Chronic anticoagulation Z79.01 V58.61   10. Dysphagia, unspecified type R13.10 787.20   11. Impaired cognition R41.89 294.9   12. Impaired mobility and ADLs Z74.09 799.89   13. Gait abnormality R26.9 781.2     Patient Active Problem List   Diagnosis   • Subarachnoid hemorrhage (CMS/HCC)   • Chronic coronary artery disease   • Congestive heart failure (CMS/HCC)   • Hyperlipidemia   • ICD (implantable cardioverter-defibrillator) in place   • Orthostatic hypotension   • Syncope       Therapy Treatment          Rehabilitation Treatment Summary     Row Name 18 1438             Treatment Time/Intention    Discipline physical therapy assistant  -AB      Document Type therapy note (daily note)  -AB      Subjective Information complains of;dizziness  -AB2      Existing Precautions/Restrictions fall   orthostasis  -AB2      Recorded by [AB] Amira William, PTA 18 1439  [AB2] Amira William, PTA 18 1509      Row Name 18 1438             Vital Signs    Pre Systolic BP Rehab 118  -AB      Pre Treatment Diastolic BP 68  -AB      Intra Systolic BP Rehab 104  -AB      Intra Treatment Diastolic BP 70  -AB      Post Systolic BP Rehab 120  -AB      Post Treatment Diastolic BP 80  -AB      Recorded by [AB] Amira William, PTA  09/14/18 1509      Row Name 09/14/18 1438             Bed Mobility Assessment/Treatment    Supine-Sit [place] (Bed Mobility) conditional independence  -AB      Sit-Supine [place] (Bed Mobility) conditional independence  -AB      Assistive Device (Bed Mobility) head of bed elevated  -AB      Recorded by [AB] Amira William, PTA 09/14/18 1509      Row Name 09/14/18 1438             Sit-Stand Transfer    Sit-Stand [place] (Transfers) contact guard;2 person assist  -AB      Assistive Device (Sit-Stand Transfers) walker, front-wheeled  -AB      Recorded by [AB] Amira William, PTA 09/14/18 1509      Row Name 09/14/18 1438             Stand-Sit Transfer    Stand-Sit [place] (Transfers) contact guard;2 person assist  -AB      Assistive Device (Stand-Sit Transfers) walker, front-wheeled  -AB      Recorded by [AB] Amira William, PTA 09/14/18 1509      Row Name 09/14/18 1438             Gait/Stairs Assessment/Training    Gait/Stairs Assessment/Training gait/ambulation independence  -AB      [place] Level (Gait) contact guard;2 person assist  -AB      Assistive Device (Gait) walker, front-wheeled  -AB      Distance in Feet (Gait) 100  -AB      Recorded by [AB] Amira William, PTA 09/14/18 1509      Row Name 09/14/18 1438             Therapeutic Exercise    Lower Extremity (Therapeutic Exercise) LAQ (long arc quad), bilateral  -AB      Lower Extremity Range of Motion (Therapeutic Exercise) ankle dorsiflexion/plantar flexion, bilateral;hip flexion/extension, bilateral  -AB      Exercise Type (Therapeutic Exercise) AROM (active range of motion)  -AB      Position (Therapeutic Exercise) seated  -AB      Sets/Reps (Therapeutic Exercise) 20  -AB      Recorded by [AB] Amira William, PTA 09/14/18 1509      Row Name 09/14/18 1438             Positioning and Restraints    Pre-Treatment Position in bed  -AB      Post Treatment Position bed  -AB      In Bed fowlers;call light within reach  -AB       Recorded by [AB] Amira William, PTA 09/14/18 1509      Row Name 09/14/18 1438             Pain Scale: Numbers Pre/Post-Treatment    Pain Scale: Numbers, Pretreatment 0/10 - no pain  -AB      Recorded by [AB] Amira William, PTA 09/14/18 1509      Row Name                Wound 09/12/18 2000 Left lateral arm skin tear    Wound - Properties Group Date first assessed: 09/12/18 [AH] Time first assessed: 2000 [AH] Side: Left [AH] Orientation: lateral [AH] Location: arm [AH] Type: skin tear [AH] Recorded by:  [] Channing Castellon, RN 09/13/18 0449      User Key  (r) = Recorded By, (t) = Taken By, (c) = Cosigned By    Initials Name Effective Dates Discipline    AB Amira William, PTA 08/02/16 -  PT     Channing Castellon, RN 02/12/18 -  Nurse          Wound 09/12/18 2000 Left lateral arm skin tear (Active)   Dressing Appearance dry;intact 9/14/2018  7:00 AM   Closure Adhesive bandage;Other (Comment) 9/14/2018  7:00 AM   Base dressing in place, unable to visualize 9/14/2018  7:00 AM   Periwound dry;intact 9/13/2018  8:50 PM   Periwound Temperature warm 9/13/2018  8:50 PM   Periwound Skin Turgor soft 9/13/2018  8:50 PM   Drainage Amount none 9/14/2018  7:00 AM   Care, Wound cleansed with;soap and water 9/14/2018  8:32 AM   Dressing Care, Wound dressing changed 9/14/2018  8:32 AM   Periwound Care, Wound dry periwound area maintained 9/14/2018  8:32 AM               PT Rehab Goals     Row Name 09/14/18 0844             Bed Mobility Goal 1 (PT)    Activity/Assistive Device (Bed Mobility Goal 1, PT) scooting;sit to supine;supine to sit;rolling to left;rolling to right  -JE      [place] Level/Cues Needed (Bed Mobility Goal 1, PT) independent  -JE      Time Frame (Bed Mobility Goal 1, PT) by discharge  -JE      Progress/Outcomes (Bed Mobility Goal 1, PT) goal ongoing  -JE         Transfer Goal 1 (PT)    Activity/Assistive Device (Transfer Goal 1, PT) sit-to-stand/stand-to-sit;bed-to-chair/chair-to-bed  -JE       [place] Level/Cues Needed (Transfer Goal 1, PT) standby assist  -JE      Time Frame (Transfer Goal 1, PT) by discharge  -JE      Progress/Outcome (Transfer Goal 1, PT) goal ongoing  -JE         Gait Training Goal 1 (PT)    Activity/Assistive Device (Gait Training Goal 1, PT) gait (walking locomotion);assistive device use;decrease fall risk;forward stepping;improve balance and speed;increase endurance/gait distance;other (see comments)   increase tolerance to upright  -JE      [place] Level (Gait Training Goal 1, PT) standby assist  -JE      Distance (Gait Goal 1, PT) 200 ft w/ < or equal to 1 rest  -JE      Time Frame (Gait Training Goal 1, PT) by discharge  -JE      Progress/Outcome (Gait Training Goal 1, PT) goal ongoing  -JE         Stairs Goal 1 (PT)    Activity/Assistive Device (Stairs Goal 1, PT) ascending stairs;descending stairs;decrease fall risk;using handrail, left  -JE      [place] Level/Cues Needed (Stairs Goal 1, PT) standby assist  -JE      Number of Stairs (Stairs Goal 1, PT) 3  -JE      Time Frame (Stairs Goal 1, PT) by discharge  -JE      Progress/Outcome (Stairs Goal 1, PT) goal ongoing  -JE         Patient Education Goal (PT)    Activity (Patient Education Goal, PT) safety/falls prevention; LE exercise to combat orthostasis  -JE      [place]/Cues/Accuracy (Memory Goal 2, PT) demonstrates adequately;independent;verbalizes understanding  -JE      Time Frame (Patient Education Goal, PT) by discharge  -JE      Progress/Outcome (Patient Education Goal, PT) goal ongoing  -JE        User Key  (r) = Recorded By, (t) = Taken By, (c) = Cosigned By    Initials Name Provider Type Discipline    Maria A Lee, PT Physical Therapist PT          Physical Therapy Education     Title: PT OT SLP Therapies (Done)     Topic: Physical Therapy (Done)     Point: Mobility training (Done)    Learning Progress Summary     Learner Status Readiness Method Response Comment Documented by    Patient  Done Acceptance MARK CLEANING D VU, DU,NR Education for LE exercise to combat orthostasis to include aps, LAQ, & seated marching and deep breathing  education to transition slowly during transfers to assist w/ adjustment to change in position to reduce risk for LOB due to orthostatic hypotension  09/14/18 1012    Family Done Acceptance MARK CLEANING D VU,AARON,NR Education for LE exercise to combat orthostasis to include aps, LAQ, & seated marching and deep breathing  education to transition slowly during transfers to assist w/ adjustment to change in position to reduce risk for LOB due to orthostatic hypotension  09/14/18 1012          Point: Precautions (Done)    Learning Progress Summary     Learner Status Readiness Method Response Comment Documented by    Patient Done Acceptance MARK CLEANING D VU,WILMAN,AARON Educ re falls prevention to include risk w/ orthostasis; educ for LE ex, transition slowly during tfers, & incr awareness of changes in tolerance to being upright; educ to sit upright more often w/ meals/ in between meals to incr tolerance  09/14/18 1013                      User Key     Initials Effective Dates Name Provider Type Discipline     08/02/18 -  Maria A Amor, PT Physical Therapist PT                    PT Recommendation and Plan                Outcome Measures     Row Name 09/14/18 1000 09/14/18 0900          How much help from another person do you currently need...    Turning from your back to your side while in flat bed without using bedrails? 3  -JE  --     Moving from lying on back to sitting on the side of a flat bed without bedrails? 3  -JE  --     Moving to and from a bed to a chair (including a wheelchair)? 3  -JE  --     Standing up from a chair using your arms (e.g., wheelchair, bedside chair)? 3  -JE  --     Climbing 3-5 steps with a railing? 3  -JE  --     To walk in hospital room? 3  -JE  --     AM-PAC 6 Clicks Score 18  -JE  --        How much help from another is currently needed...    Putting on and  taking off regular lower body clothing?  -- 2  -MW     Bathing (including washing, rinsing, and drying)  -- 2  -MW     Toileting (which includes using toilet bed pan or urinal)  -- 3  -MW     Putting on and taking off regular upper body clothing  -- 3  -MW     Taking care of personal grooming (such as brushing teeth)  -- 3  -MW     Eating meals  -- 4  -MW     Score  -- 17  -MW        Functional Assessment    Outcome Measure Options AM-PAC 6 Clicks Basic Mobility (PT)  - AM-PAC 6 Clicks Daily Activity (OT)  -       User Key  (r) = Recorded By, (t) = Taken By, (c) = Cosigned By    Initials Name Provider Type    MW Sunni Nagel, OTR/L Occupational Therapist    Maria A Lee, PT Physical Therapist           Time Calculation:         PT Charges     Row Name 09/14/18 1509 09/14/18 1026          Time Calculation    Start Time 1438  -AB 0844  -     Stop Time 1501  -AB 0937  -     Time Calculation (min) 23 min  -AB 53 min  -     PT Received On 09/14/18  -AB 09/14/18  -     PT Goal Re-Cert Due Date 09/24/18  -AB 09/24/18  -        Time Calculation- PT    Total Timed Code Minutes- PT 23 minute(s)  -AB  --       User Key  (r) = Recorded By, (t) = Taken By, (c) = Cosigned By    Initials Name Provider Type    AB Amira William, PTA Physical Therapy Assistant    Maria A Lee, PT Physical Therapist        Therapy Suggested Charges     Code   Minutes Charges    None           Therapy Charges for Today     Code Description Service Date Service Provider Modifiers Qty    30479571649 HC GAIT TRAINING EA 15 MIN 9/14/2018 Amira William, PTA GP, KX 1    64111717979 HC PT THER PROC EA 15 MIN 9/14/2018 Amira William, PTA GP, KX 1          PT G-Codes  PT Professional Judgement Used?: Yes  Outcome Measure Options: AM-PAC 6 Clicks Basic Mobility (PT)  AM-PAC 6 Clicks Score: 18  Score: 17  Functional Limitation: Mobility: Walking and moving around  Mobility: Walking and Moving Around Current Status ():  At least 40 percent but less than 60 percent impaired, limited or restricted  Mobility: Walking and Moving Around Goal Status (): At least 20 percent but less than 40 percent impaired, limited or restricted    Amira William, PTA  9/14/2018

## 2018-09-14 NOTE — PROGRESS NOTES
Nephrology ([facility]) Progress Note      Patient:  Travis Patel  YOB: 1945  Date of Service: 9/14/2018  MRN: [id number]   Acct: [id number]   Primary Care Physician: Shant Hayes MD  Advance Directive:   Code Status and Medical Interventions:   Ordered at: 09/10/18 1402     Level Of Support Discussed With:    Patient     Code Status:    CPR     Medical Interventions (Level of Support Prior to Arrest):    Full     Admit Date: 9/10/2018       Hospital Day: 4  Referring Provider: Russell Collins MD      Patient personally seen and examined.  Complete chart including Consults, Notes, Operative Reports, Labs, Cardiology, and Radiology studies reviewed as able.        Subjective:  Travis Patel is a 73 y.o. male  whom we were consulted for chronic kidney disease stage 3.  Patient has never seen nephrologist.  Review of old labs shows baseline creatinine 1.2--1.4 mg.  History of Hypertension, congestive heart failure, coronary artery disease with prior CABG, bipolar.  Presented after a syncopal episode at home and struck his head.  Has fallen or passed out several times recently.  patient has not been eating well recently.  Takes Bumex and Aldactone at home.  Denies n/v/d.  No recent change in urine output, denies dysuria or hematuria.  Had CT angiogram as part of workup in Emergency department.  Admitted with small subarachnoid hemorrhage, neurosurgery has ruled out any intervention at this time.  Hospital course remarkable for improvement of renal function with IV volume replacement.     Today is feeling better overall.  No new overnight issues. Urine output nonoliguric    Allergies:  Sulfa antibiotics    Home Meds:  Prescriptions Prior to Admission   Medication Sig Dispense Refill Last Dose   • aspirin 81 MG chewable tablet Chew 81 mg Daily.   9/9/2018 at Unknown time   • atorvastatin (LIPITOR) 80 MG tablet Take 80 mg by mouth Every Night.   9/9/2018 at Unknown time    • bumetanide (BUMEX) 2 MG tablet Take 2 mg by mouth Every Other Day.   9/9/2018 at Unknown time   • busPIRone (BUSPAR) 10 MG tablet Take 10 mg by mouth 2 (Two) Times a Day.   9/9/2018 at Unknown time   • clopidogrel (PLAVIX) 75 MG tablet Take 75 mg by mouth Daily.   9/9/2018 at Unknown time   • DULoxetine (CYMBALTA) 60 MG capsule Take 60 mg by mouth Daily.   9/9/2018 at Unknown time   • folic acid (FOLVITE) 1 MG tablet Take 1 mg by mouth Daily.   9/9/2018 at Unknown time   • HYDROcodone-acetaminophen (NORCO)  MG per tablet Take 1 tablet by mouth Every 6 (Six) Hours As Needed for Moderate Pain .   9/9/2018 at Unknown time   • lisinopril (PRINIVIL,ZESTRIL) 2.5 MG tablet Take 2.5 mg by mouth Daily.   9/9/2018 at Unknown time   • lithium carbonate 300 MG capsule Take 300 mg by mouth 2 (Two) Times a Day With Meals.   9/9/2018 at Unknown time   • metoprolol succinate XL (TOPROL-XL) 25 MG 24 hr tablet Take 37.5 mg by mouth Daily.   9/9/2018 at Unknown time   • midodrine (PROAMATINE) 5 MG tablet Take 5 mg by mouth 2 (Two) Times a Day.   9/9/2018 at Unknown time   • QUEtiapine (SEROquel) 100 MG tablet Take 200 mg by mouth Every Night.   9/9/2018 at Unknown time   • raNITIdine (ZANTAC) 150 MG tablet Take 150 mg by mouth 2 (Two) Times a Day.   9/9/2018 at Unknown time   • spironolactone (ALDACTONE) 25 MG tablet Take 25 mg by mouth Daily.   9/9/2018 at Unknown time   • potassium chloride (K-DUR) 10 MEQ CR tablet Take 10 mEq by mouth Daily.   Unknown at Unknown time       Medicines:  Current Facility-Administered Medications   Medication Dose Route Frequency Provider Last Rate Last Dose   • acetaminophen (TYLENOL) tablet 650 mg  650 mg Oral Q4H PRN Kenya Gomez DO       • atorvastatin (LIPITOR) tablet 80 mg  80 mg Oral Nightly Kenya Gomez DO   80 mg at 09/13/18 2127   • busPIRone (BUSPAR) tablet 10 mg  10 mg Oral BID Kenya Gomez DO   10 mg at 09/14/18 0828   • DULoxetine (CYMBALTA) DR capsule 60  mg  60 mg Oral Daily Kenya Gomez DO   60 mg at 09/14/18 0828   • famotidine (PEPCID) tablet 20 mg  20 mg Oral Daily Kenya Gomez, DO   20 mg at 09/14/18 0828   • folic acid (FOLVITE) tablet 1 mg  1 mg Oral Daily Kenya Gomez, DO   1 mg at 09/14/18 0828   • HYDROcodone-acetaminophen (NORCO) 5-325 MG per tablet 1 tablet  1 tablet Oral Q4H PRN Kenya Gomez DO       • lactulose solution 20 g  20 g Oral TID Kenya Gomez DO   20 g at 09/14/18 0828   • midodrine (PROAMATINE) tablet 10 mg  10 mg Oral TID AC Kenya Gomez DO   10 mg at 09/14/18 0828   • ondansetron (ZOFRAN) injection 4 mg  4 mg Intravenous Q6H PRN Kenya Gomez DO   4 mg at 09/10/18 2144   • potassium chloride (MICRO-K) CR capsule 20 mEq  20 mEq Oral Daily Kenya Gomez DO   20 mEq at 09/14/18 0828   • QUEtiapine (SEROquel) tablet 100 mg  100 mg Oral Nightly Kenya Gomez, DO   100 mg at 09/13/18 2127   • sodium chloride 0.9 % flush 1-10 mL  1-10 mL Intravenous PRN Kenya Gomez DO       • sodium chloride 0.9 % flush 10 mL  10 mL Intravenous PRN Karol Berrios, [place]           Past Medical History:  Past Medical History:   Diagnosis Date   • Diverticulitis    • Hernia of abdominal wall    • Myocardial infarction 02/2017   • Sleep apnea    • Stroke (CMS/HCC) 02/2017       Past Surgical History:  Past Surgical History:   Procedure Laterality Date   • COLOSTOMY  2001    x 3 months   • COLOSTOMY REVISION     • CORONARY ARTERY BYPASS GRAFT      1991/1996   • HERNIA REPAIR      abdominal hernia x 3; poor healing with multiple revisions   • PACEMAKER IMPLANTATION  2017    w/ defibrillator   • ROTATOR CUFF REPAIR Right 2005       Family History  Family History   Problem Relation Age of Onset   • Heart disease Brother    • Diabetes Brother    • Sleep apnea Brother        Social History  Social History     Social History   • Marital status:      Spouse name: N/A   • Number of children:  "N/A   • Years of education: N/A     Occupational History   • Not on file.     Social History Main Topics   • Smoking status: Former Smoker     Packs/day: 1.00     Years: 40.00     Types: Cigarettes   • Smokeless tobacco: Never Used   • Alcohol use No   • Drug use: No   • Sexual activity: Defer     Other Topics Concern   • Not on file     Social History Narrative   • No narrative on file         Review of Systems:  History obtained from chart review and the patient  General ROS: No fever or chills  Respiratory ROS: No cough, shortness of breath, wheezing  Cardiovascular ROS: No chest pain or palpitations  Gastrointestinal ROS: No abdominal pain or melena  Genito-Urinary ROS: No dysuria or hematuria  Neurological ROS: no headache or dizziness    Objective:  BP 94/65 (BP Location: Right arm, Patient Position: Standing)   Pulse 94   Temp 97.8 °F (36.6 °C) (Temporal Artery )   Resp 18   Ht 170.2 cm (67.01\")   Wt 76.7 kg (169 lb)   SpO2 100%   BMI 26.46 kg/m²     Intake/Output Summary (Last 24 hours) at 09/14/18 1040  Last data filed at 09/14/18 0832   Gross per 24 hour   Intake              690 ml   Output             1350 ml   Net             -660 ml     General: awake/alert    Neck: supple, no JVD  Chest:  clear to auscultation bilaterally without respiratory distress  CVS: regular rate and rhythm  Abdominal: soft, nontender, normal bowel sounds  Extremities: no cyanosis or edema  Skin: warm and dry without rash  Neuro: no focal motor deficits    Labs:    Results from last 7 days  Lab Units 09/14/18 0434 09/13/18  0353 09/12/18  0251   WBC 10*3/mm3 6.91 8.26 12.10*   HEMOGLOBIN g/dL 10.3* 10.1* 11.9*   HEMATOCRIT % 30.4* 29.6* 34.1*   PLATELETS 10*3/mm3 144 134 161           Results from last 7 days  Lab Units 09/14/18  0434 09/13/18  0353 09/12/18  0251   SODIUM mmol/L 139 137 141   POTASSIUM mmol/L 4.4 4.2 4.2   CHLORIDE mmol/L 108 109 110   CO2 mmol/L 25.0 22.0* 21.0*   BUN mg/dL 10 12 20   CREATININE mg/dL " 0.96 0.92 1.11   CALCIUM mg/dL 8.4 8.3* 8.8   BILIRUBIN mg/dL 0.6 0.6 1.0   ALK PHOS U/L 110 113 119   ALT (SGPT) U/L 53 58* 30   AST (SGOT) U/L 53* 56* 53*   GLUCOSE mg/dL 88 88 107*       Radiology:   Imaging Results (last 72 hours)     Procedure Component Value Units Date/Time    CT Head Without Contrast [[id number]] Collected:  09/11/18 1457     Updated:  09/11/18 1503    Narrative:       EXAMINATION:  CT HEAD WO CONTRAST-  9/11/2018 11:33 AM CDT     HISTORY: I60.9-Nontraumatic subarachnoid hemorrhage, unspecified.  Z79.01-Long term (current) use of anticoagulants.     TECHNIQUE: Multiple axial images were obtained through the brain without  contrast infusion. Multiplanar images were reconstructed.     DLP: 616 mGy-cm. Automated dosage control was utilized.     COMPARISON: 9/10/2018.     FINDINGS: A small amount of subarachnoid hemorrhage in the sylvian  fissure on the right and between some of the right frontal sulci is  essentially stable compared to the prior study. There is no new  hemorrhage. There is mild to moderate atrophy. There is low density in  the white matter. The visualized paranasal sinuses and mastoid air cells  are clear. There is no calvarial fracture.       Impression:       1. Small amount of subarachnoid hemorrhage on the right side remains  stable.  2. Mild to moderate atrophy.  3. Low density in the white matter is nonspecific and most likely due to  chronic small vessel disease.     This report was finalized on 09/11/2018 15:00 by Dr. Winston Dawkins MD.    US Renal Bilateral [[id number]] Collected:  09/11/18 1454     Updated:  09/11/18 1500    Narrative:       EXAMINATION:  US RENAL BILATERAL-  9/11/2018 11:01 AM CDT     HISTORY: N17.9-Acute kidney failure, unspecified; R74.8-Abnormal levels  of other serum enzymes; N28.1-Cyst of kidney, acquired; E27.9-Disorder  of adrenal gland, unspecified.     COMPARISON: No comparison study.     TECHNIQUE: Multiple sonographic images were obtained.      FINDINGS: The right kidney measures 9.7 cm and the left kidney measures  10.4 cm. The cortical thickness and echogenicity are normal. There is an  exophytic left renal cyst arising from the lower pole left kidney  measuring 3.1 x 2.7 x 2.7 cm. There is no hydronephrosis. Thickening of  the wall of the urinary bladder is noted. The bladder is not fully  distended.       Impression:       1. The renal size, cortical thickness and echogenicity are within normal  limits.  2. Exophytic left renal cyst with size noted above in the body of the  report.  3. Wall thickening of the urinary bladder. This is likely related to  acute or chronic inflammation or infection. Bladder tumor is less  likely.  This report was finalized on 09/11/2018 14:57 by Dr. Winston Dawkins MD.    CT Abdomen Pelvis With Contrast [[id number]] Collected:  09/10/18 1243     Updated:  09/10/18 1252    Narrative:       CT ABDOMEN PELVIS W CONTRAST- 9/10/2018 12:06 PM CDT     HISTORY: abd pain, syncope      COMPARISON: None     DOSE LENGTH PRODUCT: 221 mGy cm. Automated exposure control was also  utilized to decrease patient radiation dose.     TECHNIQUE: Axial images of the abdomen pelvis are obtained following IV  contrast. No oral contrast administered.     FINDINGS:  There are scattered hypodense liver lesions favorable for  cysts. The spleen, pancreas are unremarkable. Small amount of biliary  sludge versus stones considered in the dependent portion the gallbladder  (image 24, series 4). There are bilateral nonspecific adrenal nodules  measuring up to 1.8 cm. There is an inferior left renal cyst. There is  no enhancing renal mass. There is no hydronephrosis. Bladder is  distended. There is no prostate enlargement.      A large amount of fecal material is contained within the rectum. Rectum  measures 7.5 cm in width. Moderate stool seen throughout the remaining  colon. There are postoperative changes of the cecum. There is no  evidence for small bowel  obstruction. The stomach is underdistended.  There is scarring along the region of the umbilicus.     There is dense vascular calcification with ectasia of the aorta to  maximum measurement of 2.4 cm.     Please refer to CT chest report for findings below the hemidiaphragms.     There are degenerative changes of the regional skeleton. There is a mild  rotoscoliotic curvature of the lumbar spine.       Impression:       1. Large volume of stool within the rectum may indicate constipation  and/or impaction. Moderate volume stool seen in the remaining colon. No  evidence of bowel obstruction.  2. Hypodense liver lesions favorable for cysts. Inferior left renal  cyst.  3. Nonspecific bilateral adrenal nodules can be followed on subsequent  imaging. Adrenal protocol CT versus MRI should be considered  nonemergently if there are no outside films for comparison.    4. Distended bladder. .  This report was finalized on 09/10/2018 12:49 by Dr. Kimberly Doll MD.    CT Angiogram Chest With Contrast [[id number]] Collected:  09/10/18 1239     Updated:  09/10/18 1246    Narrative:       CT ANGIOGRAM CHEST W CONTRAST- 9/10/2018 12:06 PM CDT     HISTORY: mid back pain, syncope      COMPARISON: None     DOSE LENGTH PRODUCT: 268 mGy cm. Automated exposure control was also  utilized to decrease patient radiation dose.     TECHNIQUE: Axial images of the chest are obtained following IV contrast.  2-D and maximal intensity projection images are reconstructed and  reviewed.     FINDINGS:  There are no pulmonary emboli. There is evidence of prior  mediastinal surgery. There is dense coronary artery calcification. There  is no thoracic aortic aneurysm or dissection. There is moderate  atherosclerotic change of the thoracic aorta. A left subclavian cardiac  pacer device is in place. Heart is upper limits of normal in size. There  is no pericardial or pleural effusion. No pathologic intrathoracic or  axillary lymphadenopathy visualized.      Hypoechoic thin liver lesions are favorable for cysts. The adrenal  glands are prominent and hypodense. Please refer to CT abdomen report.  There is moderate stool within the visible colon.     There is a calcified granuloma within the left upper lobe. There is no  pulmonary consolidation. There is minimal dependent atelectasis. There  are no spiculated nodules. There is no pneumothorax.     No focal destructive osseous lesions are visualized.       Impression:       1. No pulmonary emboli.  2. Prior mediastinal surgery, likely coronary bypass. Left subclavian  cardiac pacer device.  3. Left upper lobe benign calcified granuloma. Minimal dependent  atelectasis. No consolidation or suspicious nodularity.  4. Hypodense liver lesions favorable for cysts. Please refer to CT  abdomen report.  This report was finalized on 09/10/2018 12:43 by Dr. Kimberly Doll MD.    CT Cervical Spine Without Contrast [[id number]] Collected:  09/10/18 1237     Updated:  09/10/18 1242    Narrative:       CT CERVICAL SPINE WO CONTRAST- 9/10/2018 12:06 PM CDT     HISTORY: neck pain, syncope, fall      COMPARISON: None     DOSE LENGTH PRODUCT: 241 mGy cm. Automated exposure control was also  utilized to decrease patient radiation dose.     TECHNIQUE: Axial images of cervical spine obtained with sagittal coronal  reconstructions.     FINDINGS:  There is minimal retrolisthesis of C5 in reference to C4  measuring 1 to 2 mm. There is moderate to advanced degenerative disc  change at C6/C7 and C7-T1 with moderate degenerative disc changes above  the C5 level. There is moderate facet osteoarthropathy. There is mild  uncinate process hypertrophy.     No cervical vertebral fracture is identified.     The degenerative changes result in only mild cervical spinal canal  stenosis with scattered bilateral neural foramen narrowing. At least  moderate in severity.     There is carotid bulb calcification. There is no apical pneumothorax.       Impression:   "     1. Degenerative changes of the cervical spine with no acute cervical  vertebral fracture. Minimal retrolisthesis of C5 likely related to facet  osteoarthropathy.  This report was finalized on 09/10/2018 12:39 by Dr. Kimberly Doll MD.    CT Head Without Contrast [[id number]] Collected:  09/10/18 1233     Updated:  09/10/18 1240    Narrative:       CT HEAD WO CONTRAST- 9/10/2018 12:06 PM CDT     HISTORY: head injury, fall, syncope      COMPARISON: None     DOSE LENGTH PRODUCT: 696 mGy cm. Automated exposure control was also  utilized to decrease patient radiation dose.     TECHNIQUE: Axial images of brain obtained without IV contrast.     FINDINGS:  There is subarachnoid hemorrhage along the convexities of the  right frontal lobe. No intraparenchymal hematoma or contusion  visualized. There is moderate cerebral volume loss with chronic vessel  vessel ischemia. There are no convincing acute signs of ischemia. No  subdural or epidural hematomas visualized.     The visible paranasal sinuses and mastoid air cells are well-aerated.  The bony calvarium appears intact. No skull fracture identified.       Impression:       1. Subarachnoid hemorrhage along the convexities of the right frontal  lobe. No intraparenchymal hemorrhage visualized. This finding called to  Karol Berrios in the ER at 12:40 PM on 9/10/2018.  2. Moderate cerebral volume loss with chronic vessel vessel ischemia.  This report was finalized on 09/10/2018 12:37 by Dr. Kimberly Doll MD.    XR Elbow 3+ View Left [[id number]] Collected:  09/10/18 1056     Updated:  09/10/18 1100    Narrative:       HISTORY: Fall with left elbow pain     Left elbow: 3 views of left elbow are obtained.     There is an IV within the left antecubital fossa. There is no  appreciable joint effusion. There is mild bony spurring of the a \"knot  on and humeral epicondyles. No bony fracture or dislocation is  identified.       Impression:       1. Mild arthritic change of the " left elbow with no acute bony pathology.  This report was finalized on 09/10/2018 10:57 by Dr. Kimberly Doll MD.    XR Chest 1 View [[id number]] Collected:  09/10/18 1055     Updated:  09/10/18 1059    Narrative:       HISTORY: Syncope     CXR: Frontal view the chest is obtained.     COMPARISON: None     FINDINGS: Patient has undergone prior mediastinal surgery. There is a  left subclavian cardiac pacer device with the lead projecting over the  right ventricle. There is a left upper lobe calcified granuloma. There  is no consolidation or edema. There is no pleural effusion or  pneumothorax. There is no acute bony pathology.       Impression:       1. Left upper lobe calcified granuloma with calcified left hilar lymph  nodes. Prior mediastinal surgery with left subclavian cardiac pacer  device. No acute pulmonary process identified.  This report was finalized on 09/10/2018 10:56 by Dr. Kimberly Doll MD.          Culture:  Blood Culture   Date Value Ref Range Status   09/10/2018 No growth at 24 hours  Preliminary   09/10/2018 No growth at 24 hours  Preliminary         Assessment   1.  Acute kidney injury--prerenal due to volume depletion--resolved  2.  Baseline of chronic kidney disease stage 3  3.  Essential hypertension  4.  Syncopal episodes with orthostatic hypotension--improved  5.  Subdural hematoma  6.  Bipolar disease  7.  Hypokalemia--resolved    Plan:  1.  Encourage oral fluid intake  2.  Monitor labs      Ciro Herrera, [place]  9/14/2018  10:40 AM

## 2018-09-14 NOTE — PLAN OF CARE
Problem: Patient Care Overview  Goal: Plan of Care Review  Outcome: Ongoing (interventions implemented as appropriate)   09/14/18 1020   Coping/Psychosocial   Plan of Care Reviewed With patient;daughter   Plan of Care Review   Progress improving   OTHER   Outcome Summary PT eval completed. Pt is motivated to improve, however continues with decrease tolerance to upright. Pt able to perform bed mobility and transfers to sitting side of bed and back to supine with supervision. Transfers sit to stand to sit with CGA. Noted drop in BP from supine to sitting. Performed LE exercise to combat drop noting increase in BP from previous drop. Stood with BP dropping again, returning to sitting to perform additional LE exercise again with improved outcome in BP. Pt back to bed with BP remaining stable. Pt will benefit from continued PT while in acute care to increase tolerance to out of bed activity and to increase knowledge of activities to combat the drop in his BP. Pt with supportive daughter and spouse involved in his care. Upon discharge, pt should be ok to return home with assist and HH, however, if BP remains unstable with lack of tolerance to standing pt may need short stay in SNF for continued care and progression of safe activity.

## 2018-09-14 NOTE — PROGRESS NOTES
Continued Stay Note   [place]     Patient Name: Travis Patel  MRN: [id number]  Today's Date: 9/14/2018    Admit Date: 9/10/2018          Discharge Plan     Row Name 09/14/18 1429       Plan    Plan HOME WITH [facility]; RESUMPTION    Patient/Family in Agreement with Plan yes    Plan Comments LONG DISCUSSION WITH PT. AND HIS DTR ABOUT D/C PLANNING OPTIONS AND PT. WANTS TO AVOID NHP, IF POSSIBLE.  HE HAS BEEN AT [facility] IN THE PAST AND NOT SURE HE WANTS TO GO BACK.  PT. AND DTR STATE THEY WILL LOOK AT OTHER FACILITY OPTIONS, MAYBE IN [facility].  PLEASE BE ADVISED THAT PT'S INS WILL HAVE TO PREAUTHORIZE ANY SNF.  WILL FOLLOW.              Discharge Codes    No documentation.           APRIL Buckley

## 2018-09-14 NOTE — PROGRESS NOTES
[facility] Medicine Services  INPATIENT PROGRESS NOTE    Patient Name: Travis Patel  Date of Admission: 9/10/2018  Today's Date: 09/14/18  Length of Stay: 4  Primary Care Physician: Shant Hayes MD    Subjective   Chief Complaint: feels a little better.   HPI   Has been working with therapy today.  Sat on side of bed.  Blood pressure does drop some but tolerated today.  Using support hose   No nausea.   Tolerating diet.          Review of Systems     All pertinent negatives and positives are as above. All other systems have been reviewed and are negative unless otherwise stated.     Objective    Temp:  [97.2 °F (36.2 °C)-98.4 °F (36.9 °C)] 98.3 °F (36.8 °C)  Heart Rate:  [74-95] 95  Resp:  [18-20] 18  BP: ()/(60-76) 91/64  Physical Exam   Constitutional: He is oriented to person, place, and time. He appears well-developed and well-nourished.   Eyes: Pupils are equal, round, and reactive to light. Conjunctivae and EOM are normal.   Neck: Normal range of motion. Neck supple. No JVD present.   Cardiovascular: Normal rate, regular rhythm, normal heart sounds and intact distal pulses.    Pulmonary/Chest: Effort normal and breath sounds normal.   Abdominal: Soft. Bowel sounds are normal.   Musculoskeletal: Normal range of motion.   Neurological: He is alert and oriented to person, place, and time.   Skin: Skin is warm and dry.   Psychiatric: He has a normal mood and affect. His behavior is normal.   Nursing note and vitals reviewed.          Results Review:  I have reviewed the labs, radiology results, and diagnostic studies.    Laboratory Data:     Results from last 7 days  Lab Units 09/14/18  0434 09/13/18  0353 09/12/18  0251   WBC 10*3/mm3 6.91 8.26 12.10*   HEMOGLOBIN g/dL 10.3* 10.1* 11.9*   HEMATOCRIT % 30.4* 29.6* 34.1*   PLATELETS 10*3/mm3 144 134 161          Results from last 7 days  Lab Units 09/14/18  0434 09/13/18  0353 09/12/18  0251   SODIUM mmol/L 139 137  141   POTASSIUM mmol/L 4.4 4.2 4.2   CHLORIDE mmol/L 108 109 110   CO2 mmol/L 25.0 22.0* 21.0*   BUN mg/dL 10 12 20   CREATININE mg/dL 0.96 0.92 1.11   CALCIUM mg/dL 8.4 8.3* 8.8   BILIRUBIN mg/dL 0.6 0.6 1.0   ALK PHOS U/L 110 113 119   ALT (SGPT) U/L 53 58* 30   AST (SGOT) U/L 53* 56* 53*   GLUCOSE mg/dL 88 88 107*       Culture Data:   Blood Culture   Date Value Ref Range Status   09/10/2018 No growth at 3 days  Preliminary   09/10/2018 No growth at 3 days  Preliminary       Radiology Data:   Imaging Results (last 24 hours)     ** No results found for the last 24 hours. **          I have reviewed the patient's current medications.     Assessment/Plan     Hospital Problem List     Subarachnoid hemorrhage (CMS/[facility])    Chronic coronary artery disease    Overview Signed 9/12/2018 10:09 PM by Logan Willams MD     Overview:   07/26/07:  Coronary artery disease involving > 3 vessels with more than 50%  : [  ]      Last Assessment & Plan:   Managed by Dr. Patton. Currently on lipitor, aspirin, plavix, lisinopril and metoprolol- no new symptoms. We discussed increasing his exercise now the weather has improved. He is to aim for walking 30 minutes 5 times a week.         Congestive heart failure (CMS/[facility])    Hyperlipidemia    Overview Signed 9/12/2018 10:09 PM by Logan Willams MD     Last Assessment & Plan:   He is on appropriate atorvastatin dosing which will continue.         ICD (implantable cardioverter-defibrillator) in place    Overview Signed 9/12/2018 10:09 PM by Logan Willams MD     Last Assessment & Plan:   His ICD appears to be functioning properly and he's had no shocks.         Orthostatic hypotension    Syncope          Assessment     Syncope  Orthostatic hypotension, chronic recurrent by history.  Subarrachnoid hemorrhage.   CAD   Bipolar disease  Constipation with possible rectal impaction.  Renal failure. Acute on chronic.  CHF by history, type unknown, echo in process.  Constipation  Large BM  9-13-18      Plan    Increase activity  Chair for meals  Resume lithium  Discussed weighing daily at home.   Social service consult dc planning.      Discharge Planning: I expect the patient to be discharged to home in 1-2 days.    Kenya Gomez DO   09/14/18   1:39 PM

## 2018-09-14 NOTE — THERAPY EVALUATION
Acute Care - Occupational Therapy Initial Evaluation  [facility]     Patient Name: Travis Patel  : 1945  MRN: [id number]  Today's Date: 2018  Onset of Illness/Injury or Date of Surgery: 09/10/18  Date of Referral to OT: 18  Referring Physician: Dr. Gomez    Admit Date: 9/10/2018       ICD-10-CM ICD-9-CM   1. Subarachnoid hemorrhage (CMS/HCC) I60.9 430   2. Syncope, unspecified syncope type R55 780.2   3. Elevated liver enzymes R74.8 790.5   4. Constipation, unspecified constipation type K59.00 564.00   5. VANI (acute kidney injury) (CMS/[facility]) N17.9 584.9   6. Elevated troponin R74.8 790.6   7. Renal cyst N28.1 753.10   8. Adrenal nodule (CMS/[facility]) E27.9 255.8   9. Chronic anticoagulation Z79.01 V58.61   10. Dysphagia, unspecified type R13.10 787.20   11. Impaired cognition R41.89 294.9   12. Impaired mobility and ADLs Z74.09 799.89     Patient Active Problem List   Diagnosis   • Subarachnoid hemorrhage (CMS/HCC)   • Chronic coronary artery disease   • Congestive heart failure (CMS/[facility])   • Hyperlipidemia   • ICD (implantable cardioverter-defibrillator) in place   • Orthostatic hypotension   • Syncope     Past Medical History:   Diagnosis Date   • Diverticulitis    • Hernia of abdominal wall    • Myocardial infarction 2017   • Sleep apnea    • Stroke (CMS/[facility]) 2017     Past Surgical History:   Procedure Laterality Date   • COLOSTOMY  2001    x 3 months   • COLOSTOMY REVISION     • CORONARY ARTERY BYPASS GRAFT         • HERNIA REPAIR      abdominal hernia x 3; poor healing with multiple revisions   • PACEMAKER IMPLANTATION  2017    w/ defibrillator   • ROTATOR CUFF REPAIR Right           OT ASSESSMENT FLOWSHEET (last 72 hours)      Occupational Therapy Evaluation     Row Name 18 0856                   OT Evaluation Time/Intention    Subjective Information no complaints  -MW        Document Type evaluation  -MW        Mode of Treatment occupational therapy  -MW           General  Information    Patient Profile Reviewed? yes  -MW        Onset of Illness/Injury or Date of Surgery 09/10/18  -MW        Referring Physician Dr. Gomez  -MW        Patient Observations alert;cooperative;agree to therapy  -MW        Patient/Family Observations daughter present  -MW        General Observations of Patient awake and alert in sidelying, alanis hose   -MW        Prior Level of Function min assist:;bathing;independent:;all household mobility;dressing;community mobility   walks short distances in home w rest breaks  -MW        Equipment Currently Used at Home walker, rolling;shower chair;bipap/ cpap  -MW        Pertinent History of Current Functional Problem multiple recent syncopal episodes, fall sustaining subarachnoid hemorrhage, VANI, elevated troponin, orthostatic hypotension, bipolar disorder  -MW        Existing Precautions/Restrictions fall   orthostasis  -MW        Equipment Issued to Patient gait belt;walker, front wheeled  -MW        Risks Reviewed patient and family:;LOB;nausea/vomiting;dizziness;increased discomfort;change in vital signs;increased drainage;lines disloged  -MW        Benefits Reviewed patient and family:;improve function;increase independence;increase strength;increase balance;decrease pain;decrease risk of DVT;improve skin integrity;increase knowledge  -MW        Barriers to Rehab physical barrier  -MW           Relationship/Environment    Lives With spouse  -MW        Family Caregiver if Needed spouse;child(luan), adult  -MW           Resource/Environmental Concerns    Current Living Arrangements home/apartment/condo  -MW           Home Main Entrance    Number of Stairs, Main Entrance three  -MW        Stair Railings, Main Entrance railing on left side (ascending)  -MW           Cognitive Assessment/Interventions    Additional Documentation Cognitive Assessment/Intervention (Group)  -MW           Cognitive Assessment/Intervention- PT/OT    Affect/Mental Status (Cognitive) WNL  -MW         Orientation Status (Cognition) oriented x 4  -MW        Follows Commands (Cognition) WNL  -MW        Cognitive Function (Cognitive) WNL  -MW        Personal Safety Interventions fall prevention program maintained;gait belt;nonskid shoes/slippers when out of bed;supervised activity  -MW           Safety Issues, Functional Mobility    Impairments Affecting Function (Mobility) other (see comments)   orthostatic hypotension  -MW           Bed Mobility Assessment/Treatment    Bed Mobility Assessment/Treatment supine-sit;sit-supine  -MW        Supine-Sit [place] (Bed Mobility) supervision  -        Sit-Supine [place] (Bed Mobility) supervision  -        Assistive Device (Bed Mobility) head of bed elevated  -           Functional Mobility    Functional Mobility- Ind. Level not tested  -MW        Functional Mobility- Comment BP dropped in standing, returned to sitting, did not take steps at this time   -           Transfer Assessment/Treatment    Transfer Assessment/Treatment sit-stand transfer;stand-sit transfer  -           Sit-Stand Transfer    Sit-Stand [place] (Transfers) contact guard  -        Assistive Device (Sit-Stand Transfers) walker, front-wheeled  -           Stand-Sit Transfer    Stand-Sit [place] (Transfers) stand by assist  -        Assistive Device (Stand-Sit Transfers) walker, front-wheeled  -           ADL Assessment/Intervention    BADL Assessment/Intervention lower body dressing  -           Lower Body Dressing Assessment/Training    Lower Body Dressing [place] Level don;doff;socks;supervision;moderate assist (50% patient effort)   alanis hose  -        Lower Body Dressing Position edge of bed sitting  -        Comment (Lower Body Dressing) supervision for socks, modA for alanis hose, educated on compensatory strategy for alanis hose  -           BADL Safety/Performance    Impairments, BADL Safety/Performance balance;endurance/activity tolerance    orthostasis  -MW           General ROM    GENERAL ROM COMMENTS AROM WFL BUE  -MW           MMT (Manual Muscle Testing)    General MMT Comments functionally 5/5 BUE  -MW           Motor Assessment/Interventions    Additional Documentation Balance (Group)  -MW           Balance    Balance dynamic sitting balance;dynamic standing balance  -MW           Dynamic Sitting Balance    Level of [place], Reaches Outside Midline (Sitting, Dynamic Balance) supervision  -MW        Sitting Position, Reaches Outside Midline (Sitting, Dynamic Balance) sitting on edge of bed  -MW        Comment, Reaches Outside Midline (Sitting, Dynamic Balance) completed BLE exs and LB dressing  -MW           Dynamic Standing Balance    Level of [place], Reaches Outside Midline (Standing, Dynamic Balance) contact guard assist  -MW        Comment, Reaches Outside Midline (Standing, Dynamic Balance) w RW  -MW           Sensory Assessment/Intervention    Sensory General Assessment no sensation deficits identified   per pt  -MW           Positioning and Restraints    Pre-Treatment Position in bed  -MW        Post Treatment Position bed  -MW        In Bed fowlers;call light within reach;encouraged to call for assist;with family/caregiver;side rails up x2;SCD pump applied  -MW           Pain Assessment    Additional Documentation Pain Scale: Numbers Pre/Post-Treatment (Group)  -           Pain Scale: Numbers Pre/Post-Treatment    Pain Scale: Numbers, Pretreatment 0/10 - no pain  -MW        Pain Scale: Numbers, Post-Treatment 0/10 - no pain  -MW           Wound 09/12/18 2000 Left lateral arm skin tear    Wound - [facility] Date first assessed: 09/12/18  [facility] Time first assessed: 2000  - Side: Left  - Orientation: lateral  - Location: arm  - Type: skin tear  -AH       Plan of Care Review    Plan of Care Reviewed With patient;daughter  -           Clinical Impression (OT)    Date of Referral to OT 09/13/18  -        OT  Diagnosis decline in ADL  -MW        Prognosis (OT Eval) good  -MW        Patient/Family Goals Statement (OT Eval) return home  -MW        Criteria for Skilled Therapeutic Interventions Met (OT Eval) yes;treatment indicated  -MW        Rehab Potential (OT Eval) good, to achieve stated therapy goals  -MW        Therapy Frequency (OT Eval) 3 times/wk  -MW        Predicted Duration of Therapy Intervention (Therapy Eval) until d/c  -MW        Care Plan Review (OT) evaluation/treatment results reviewed;care plan/treatment goals reviewed;risks/benefits reviewed;current/potential barriers reviewed;patient/other agree to care plan  -MW        Care Plan Review, Other Participant (OT Eval) daughter  -MW        Anticipated Discharge Disposition (OT) home with assist;home with home health  -MW           Vital Signs    Pre Systolic BP Rehab 106   supine  -MW        Pre Treatment Diastolic BP 66  -MW        Intra Systolic BP Rehab 93   sitting, w seated BLE exs: 109/58; standing 82/59  -MW        Intra Treatment Diastolic BP 66  -MW        Post Systolic BP Rehab 113   supine  -MW        Post Treatment Diastolic BP 73  -MW        Pretreatment Heart Rate (beats/min) 79  -MW        Intratreatment Heart Rate (beats/min) 97  -MW        Posttreatment Heart Rate (beats/min) 84  -MW        Pre Patient Position Supine  -MW        Intra Patient Position Standing  -MW        Post Patient Position Supine  -MW           Planned OT Interventions    Planned Therapy Interventions (OT Eval) activity tolerance training;BADL retraining;functional balance retraining;IADL retraining;occupation/activity based interventions;patient/caregiver education/training;transfer/mobility retraining  -MW           OT Goals    Transfer Goal Selection (OT) transfer, OT goal 1  -MW        Bathing Goal Selection (OT) bathing, OT goal 1  -MW        Dressing Goal Selection (OT) dressing, OT goal 1  -MW           Transfer Goal 1 (OT)    Activity/Assistive Device  (Transfer Goal 1, OT) bed-to-chair/chair-to-bed;toilet;shower chair;walk-in shower   with use of BLE exs prior to transfer to reduce change in BP  -MW        [place] Level/Cues Needed (Transfer Goal 1, OT) supervision required  -MW        Time Frame (Transfer Goal 1, OT) long term goal (LTG);by discharge  -MW        Progress/Outcome (Transfer Goal 1, OT) goal ongoing  -MW           Bathing Goal 1 (OT)    Activity/Assistive Device (Bathing Goal 1, OT) bathing skills, all;shower chair  -MW        [place] Level/Cues Needed (Bathing Goal 1, OT) supervision required  -MW        Time Frame (Bathing Goal 1, OT) long term goal (LTG);by discharge  -MW        Progress/Outcomes (Bathing Goal 1, OT) goal ongoing  -MW           Dressing Goal 1 (OT)    Activity/Assistive Device (Dressing Goal 1, OT) lower body dressing   alanis hose  -MW        [place]/Cues Needed (Dressing Goal 1, OT) conditional independence  -MW        Time Frame (Dressing Goal 1, OT) long term goal (LTG);by discharge  -MW        Progress/Outcome (Dressing Goal 1, OT) goal ongoing  -MW           Living Environment    Home Accessibility stairs to enter home   walk in shower  -MW          User Key  (r) = Recorded By, (t) = Taken By, (c) = Cosigned By    Initials Name Effective Dates    Channing Diaz, RN 02/12/18 -      Sunni Nagel OTR/L 08/28/18 -            Occupational Therapy Education     Title: PT OT SLP Therapies (Done)     Topic: Occupational Therapy (Done)     Point: ADL training (Done)     Description: Instruct learner(s) on proper safety adaptation and remediation techniques during self care or transfers.   Instruct in proper use of assistive devices.   Learning Progress Summary     Learner Status Readiness Method Response Comment Documented by    Patient Done Acceptance E VU home safety, orthostasis and importance of exercises/alanis hose for prevention, ADL, dressing modification/strategy, OT POC  09/14/18 1001                       User Key     Initials Effective Dates Name Provider Type Discipline     08/28/18 -  Sunni Nagel, OTR/L Occupational Therapist OT                  OT Recommendation and Plan  Outcome Summary/Treatment Plan (OT)  Anticipated Discharge Disposition (OT): home with assist, home with home health  Planned Therapy Interventions (OT Eval): activity tolerance training, BADL retraining, functional balance retraining, IADL retraining, occupation/activity based interventions, patient/caregiver education/training, transfer/mobility retraining  Therapy Frequency (OT Eval): 3 times/wk  Plan of Care Review  Plan of Care Reviewed With: patient  Plan of Care Reviewed With: patient  Outcome Summary: OT eval completed. Pt presents w syncopal episodes related to orthostatic hypotension. BP monitored throughout eval w drop in BP noted w positional change. Improvement in BP w BLE exercises. Educated to complete exercises prior to transfer and functional mobility in home, verbalized understanding. Don/doffed socks and alanis hose w modA for alanis hose. Compensatory strategy used, pt will require reinforcement of education of benefit of alanis hose and don/doff. Skilled OT required to increase knowledge, activity tolerance, and endurance impacting pt safety and independence to decrease fall risk. OT recommends d/c home w assist and HH.           Outcome Measures     Row Name 09/14/18 0900             How much help from another is currently needed...    Putting on and taking off regular lower body clothing? 2  -MW      Bathing (including washing, rinsing, and drying) 2  -MW      Toileting (which includes using toilet bed pan or urinal) 3  -MW      Putting on and taking off regular upper body clothing 3  -MW      Taking care of personal grooming (such as brushing teeth) 3  -MW      Eating meals 4  -MW      Score 17  -MW         Functional Assessment    Outcome Measure Options AM-PAC 6 Clicks Daily Activity (OT)  -MW        User Key  (r) =  Recorded By, (t) = Taken By, (c) = Cosigned By    Initials Name Provider Type    MW Sunni Nagel, OTR/L Occupational Therapist          Time Calculation:         Time Calculation- OT     Row Name 09/14/18 1002             Time Calculation- OT    OT Start Time 0840  -MW      OT Stop Time 0935  -MW      OT Time Calculation (min) 55 min  -MW      OT Received On 09/14/18  -MW      OT Goal Re-Cert Due Date 09/24/18  -        User Key  (r) = Recorded By, (t) = Taken By, (c) = Cosigned By    Initials Name Provider Type     Sunni Nagel, OTR/L Occupational Therapist        Therapy Suggested Charges     Code   Minutes Charges    None           Therapy Charges for Today     Code Description Service Date Service Provider Modifiers Qty    28509352517 HC OT SELFCARE CURRENT 9/14/2018 Sunni Nagel OTR/L GO, CK 1    88849250569 HC OT SELFCARE PROJECTED 9/14/2018 Sunni Nagel OTR/L GO, CJ 1    09422625348 HC OT EVAL MOD COMPLEXITY 4 9/14/2018 Sunni Nagel OTR/L [place], KX 1          OT G-codes  OT Professional Judgement Used?: Yes  OT Functional Scales Options: AM-PAC 6 Clicks Daily Activity (OT)  Score: 17  Functional Limitation: Self care  Self Care Current Status (): At least 40 percent but less than 60 percent impaired, limited or restricted  Self Care Goal Status (): At least 20 percent but less than 40 percent impaired, limited or restricted    NASIMA Kauffman/KAVYA  9/14/2018

## 2018-09-14 NOTE — PLAN OF CARE
Problem: Fall Risk (Adult)  Goal: Absence of Fall  Outcome: Ongoing (interventions implemented as appropriate)      Problem: Patient Care Overview  Goal: Plan of Care Review  Outcome: Ongoing (interventions implemented as appropriate)   09/14/18 0520   Coping/Psychosocial   Plan of Care Reviewed With patient   Plan of Care Review   Progress improving   OTHER   Outcome Summary Pt denies pain. VSS. S/ 70-79 with PVCs. XL BM this shift. Neuro intact. Orthos QID--dropped to 84/61 when standing on last set. Urinal at bedside. SCDs and TEDs. Will continue to monitor.      Goal: Individualization and Mutuality  Outcome: Ongoing (interventions implemented as appropriate)    Goal: Discharge Needs Assessment  Outcome: Ongoing (interventions implemented as appropriate)      Problem: Constipation (Adult)  Goal: Effective Bowel Elimination  Outcome: Ongoing (interventions implemented as appropriate)    Goal: Comfort  Outcome: Ongoing (interventions implemented as appropriate)      Problem: Sepsis/Septic Shock (Adult)  Goal: Signs and Symptoms of Listed Potential Problems Will be Absent, Minimized or Managed (Sepsis/Septic Shock)  Outcome: Ongoing (interventions implemented as appropriate)      Problem: Syncope (Adult)  Goal: Physical Safety/Health Maintenance  Outcome: Ongoing (interventions implemented as appropriate)    Goal: Optimal Emotional/Functional [place]  Outcome: Ongoing (interventions implemented as appropriate)      Problem: Renal Failure/Kidney Injury, Acute (Adult)  Goal: Signs and Symptoms of Listed Potential Problems Will be Absent, Minimized or Managed (Renal Failure/Kidney Injury, Acute)  Outcome: Ongoing (interventions implemented as appropriate)      Problem: Brain Injury, Moderate Traumatic (GCS 9-12) (Adult)  Goal: Signs and Symptoms of Listed Potential Problems Will be Absent, Minimized or Managed (Brain Injury, Moderate Traumatic)  Outcome: Ongoing (interventions implemented as  appropriate)      Problem: Skin Injury Risk (Adult)  Goal: Skin Health and Integrity  Outcome: Ongoing (interventions implemented as appropriate)      Problem: Nutrition, Imbalanced: Inadequate Oral Intake (Adult)  Goal: Improved Oral Intake  Outcome: Ongoing (interventions implemented as appropriate)    Goal: Prevent Further Weight Loss  Outcome: Ongoing (interventions implemented as appropriate)

## 2018-09-14 NOTE — PLAN OF CARE
Problem: Patient Care Overview  Goal: Plan of Care Review  Outcome: Ongoing (interventions implemented as appropriate)   09/14/18 0962   Coping/Psychosocial   Plan of Care Reviewed With patient   Plan of Care Review   Progress no change   OTHER   Outcome Summary OT eval completed. Pt presents w syncopal episodes related to orthostatic hypotension. BP monitored throughout eval w drop in BP noted w positional change. Improvement in BP w BLE exercises. Educated to complete exercises prior to transfer and functional mobility in home, verbalized understanding. Don/doffed socks and alanis hose w modA for alanis hose. Compensatory strategy used, pt will require reinforcement of education of benefit of alanis hose and don/doff. Skilled OT required to increase knowledge, activity tolerance, and endurance impacting pt safety and independence to decrease fall risk. OT recommends d/c home w assist and HH.

## 2018-09-15 LAB
ALBUMIN SERPL-MCNC: 2.8 G/DL (ref 3.5–5)
ALBUMIN/GLOB SERPL: 1.2 G/DL (ref 1.1–2.5)
ALP SERPL-CCNC: 126 U/L (ref 24–120)
ALT SERPL W P-5'-P-CCNC: 42 U/L (ref 0–54)
ANION GAP SERPL CALCULATED.3IONS-SCNC: 8 MMOL/L (ref 4–13)
AST SERPL-CCNC: 38 U/L (ref 7–45)
BACTERIA SPEC AEROBE CULT: NORMAL
BACTERIA SPEC AEROBE CULT: NORMAL
BASOPHILS # BLD AUTO: 0.03 10*3/MM3 (ref 0–0.2)
BASOPHILS NFR BLD AUTO: 0.5 % (ref 0–2)
BILIRUB SERPL-MCNC: 0.5 MG/DL (ref 0.1–1)
BUN BLD-MCNC: 14 MG/DL (ref 5–21)
BUN/CREAT SERPL: 16.5 (ref 7–25)
CALCIUM SPEC-SCNC: 8.5 MG/DL (ref 8.4–10.4)
CHLORIDE SERPL-SCNC: 108 MMOL/L (ref 98–110)
CO2 SERPL-SCNC: 23 MMOL/L (ref 24–31)
CREAT BLD-MCNC: 0.85 MG/DL (ref 0.5–1.4)
DEPRECATED RDW RBC AUTO: 50.5 FL (ref 40–54)
EOSINOPHIL # BLD AUTO: 0.24 10*3/MM3 (ref 0–0.7)
EOSINOPHIL NFR BLD AUTO: 3.7 % (ref 0–4)
ERYTHROCYTE [DISTWIDTH] IN BLOOD BY AUTOMATED COUNT: 14.6 % (ref 12–15)
GFR SERPL CREATININE-BSD FRML MDRD: 89 ML/MIN/1.73
GLOBULIN UR ELPH-MCNC: 2.3 GM/DL
GLUCOSE BLD-MCNC: 102 MG/DL (ref 70–100)
HCT VFR BLD AUTO: 29.6 % (ref 40–52)
HGB BLD-MCNC: 10.2 G/DL (ref 14–18)
IMM GRANULOCYTES # BLD: 0.05 10*3/MM3 (ref 0–0.03)
IMM GRANULOCYTES NFR BLD: 0.8 % (ref 0–5)
LYMPHOCYTES # BLD AUTO: 1.28 10*3/MM3 (ref 0.72–4.86)
LYMPHOCYTES NFR BLD AUTO: 19.6 % (ref 15–45)
MCH RBC QN AUTO: 32.8 PG (ref 28–32)
MCHC RBC AUTO-ENTMCNC: 34.5 G/DL (ref 33–36)
MCV RBC AUTO: 95.2 FL (ref 82–95)
MONOCYTES # BLD AUTO: 0.41 10*3/MM3 (ref 0.19–1.3)
MONOCYTES NFR BLD AUTO: 6.3 % (ref 4–12)
NEUTROPHILS # BLD AUTO: 4.52 10*3/MM3 (ref 1.87–8.4)
NEUTROPHILS NFR BLD AUTO: 69.1 % (ref 39–78)
NRBC BLD MANUAL-RTO: 0 /100 WBC (ref 0–0)
PLATELET # BLD AUTO: 148 10*3/MM3 (ref 130–400)
PMV BLD AUTO: 10.7 FL (ref 6–12)
POTASSIUM BLD-SCNC: 4.3 MMOL/L (ref 3.5–5.3)
PROT SERPL-MCNC: 5.1 G/DL (ref 6.3–8.7)
RBC # BLD AUTO: 3.11 10*6/MM3 (ref 4.8–5.9)
SODIUM BLD-SCNC: 139 MMOL/L (ref 135–145)
WBC NRBC COR # BLD: 6.53 10*3/MM3 (ref 4.8–10.8)

## 2018-09-15 PROCEDURE — 80053 COMPREHEN METABOLIC PANEL: CPT | Performed by: NURSE PRACTITIONER

## 2018-09-15 PROCEDURE — 97116 GAIT TRAINING THERAPY: CPT

## 2018-09-15 PROCEDURE — 85025 COMPLETE CBC W/AUTO DIFF WBC: CPT | Performed by: INTERNAL MEDICINE

## 2018-09-15 PROCEDURE — 97110 THERAPEUTIC EXERCISES: CPT

## 2018-09-15 RX ADMIN — MIDODRINE HYDROCHLORIDE 10 MG: 2.5 TABLET ORAL at 11:55

## 2018-09-15 RX ADMIN — MIDODRINE HYDROCHLORIDE 10 MG: 2.5 TABLET ORAL at 17:03

## 2018-09-15 RX ADMIN — LITHIUM CARBONATE 300 MG: 300 CAPSULE, GELATIN COATED ORAL at 08:08

## 2018-09-15 RX ADMIN — BUSPIRONE HYDROCHLORIDE 10 MG: 10 TABLET ORAL at 22:01

## 2018-09-15 RX ADMIN — BUSPIRONE HYDROCHLORIDE 10 MG: 10 TABLET ORAL at 08:07

## 2018-09-15 RX ADMIN — LACTULOSE 20 G: 20 SOLUTION ORAL at 22:01

## 2018-09-15 RX ADMIN — FOLIC ACID 1 MG: 1 TABLET ORAL at 08:07

## 2018-09-15 RX ADMIN — QUETIAPINE FUMARATE 100 MG: 100 TABLET ORAL at 22:02

## 2018-09-15 RX ADMIN — LACTULOSE 20 G: 20 SOLUTION ORAL at 08:08

## 2018-09-15 RX ADMIN — ATORVASTATIN CALCIUM 80 MG: 40 TABLET, FILM COATED ORAL at 22:01

## 2018-09-15 RX ADMIN — MIDODRINE HYDROCHLORIDE 10 MG: 2.5 TABLET ORAL at 08:07

## 2018-09-15 RX ADMIN — LITHIUM CARBONATE 300 MG: 300 CAPSULE, GELATIN COATED ORAL at 17:03

## 2018-09-15 RX ADMIN — POTASSIUM CHLORIDE 20 MEQ: 750 CAPSULE, EXTENDED RELEASE ORAL at 08:07

## 2018-09-15 RX ADMIN — HYDROCODONE BITARTRATE AND ACETAMINOPHEN 1 TABLET: 5; 325 TABLET ORAL at 22:02

## 2018-09-15 RX ADMIN — LACTULOSE 20 G: 20 SOLUTION ORAL at 15:05

## 2018-09-15 RX ADMIN — LITHIUM CARBONATE 300 MG: 300 CAPSULE, GELATIN COATED ORAL at 11:55

## 2018-09-15 RX ADMIN — DULOXETINE HYDROCHLORIDE 60 MG: 30 CAPSULE, DELAYED RELEASE ORAL at 08:07

## 2018-09-15 RX ADMIN — FAMOTIDINE 20 MG: 20 TABLET, FILM COATED ORAL at 08:07

## 2018-09-15 NOTE — PLAN OF CARE
Problem: Patient Care Overview  Goal: Plan of Care Review  Outcome: Ongoing (interventions implemented as appropriate)   09/15/18 1535   Coping/Psychosocial   Plan of Care Reviewed With patient   Plan of Care Review   Progress improving   OTHER   Outcome Summary pt walked 150 ft with Rwx and SBA assist, reported decreased dizzines as sangeeta   09/15/18 1535   Coping/Psychosocial   Plan of Care Reviewed With patient   Plan of Care Review   Progress improving   OTHER   Outcome Summary pt walked 150 ft with Rwx and SBA assist, reported decreased dizzines as compared to earlier   red to earlier

## 2018-09-15 NOTE — PLAN OF CARE
Problem: Fall Risk (Adult)  Goal: Absence of Fall  Outcome: Ongoing (interventions implemented as appropriate)   09/15/18 0336   Fall Risk (Adult)   Absence of Fall achieves outcome       Problem: Patient Care Overview  Goal: Plan of Care Review  Outcome: Ongoing (interventions implemented as appropriate)    Goal: Individualization and Mutuality  Outcome: Ongoing (interventions implemented as appropriate)    Goal: Discharge Needs Assessment  Outcome: Ongoing (interventions implemented as appropriate)      Problem: Constipation (Adult)  Goal: Effective Bowel Elimination  Outcome: Ongoing (interventions implemented as appropriate)   09/15/18 0336   Constipation (Adult)   Effective Bowel Elimination making progress toward outcome     Goal: Comfort  Outcome: Ongoing (interventions implemented as appropriate)      Problem: Sepsis/Septic Shock (Adult)  Goal: Signs and Symptoms of Listed Potential Problems Will be Absent, Minimized or Managed (Sepsis/Septic Shock)  Outcome: Ongoing (interventions implemented as appropriate)   09/14/18 0520   Goal/Outcome Evaluation   Problems Assessed (Sepsis) all   Problems Present (Sepsis) none       Problem: Syncope (Adult)  Goal: Physical Safety/Health Maintenance  Outcome: Ongoing (interventions implemented as appropriate)   09/15/18 0336   Syncope (Adult)   Physical Safety/Health Maintenance making progress toward outcome     Goal: Optimal Emotional/Functional [place]  Outcome: Ongoing (interventions implemented as appropriate)      Problem: Renal Failure/Kidney Injury, Acute (Adult)  Goal: Signs and Symptoms of Listed Potential Problems Will be Absent, Minimized or Managed (Renal Failure/Kidney Injury, Acute)  Outcome: Ongoing (interventions implemented as appropriate)   09/14/18 1513   Goal/Outcome Evaluation   Problems Assessed (Acute Renal Failure/Kidney Injury) all   Problems Present (ARF/Kidney Injury) none       Problem: Brain Injury, Moderate Traumatic (GCS 9-12)  (Adult)  Goal: Signs and Symptoms of Listed Potential Problems Will be Absent, Minimized or Managed (Brain Injury, Moderate Traumatic)  Outcome: Ongoing (interventions implemented as appropriate)   09/14/18 1513   Goal/Outcome Evaluation   Problems Assessed (Moderate Brain Injury (GCS 9-12)) all   Problems Present (Moderate Brain Injury) none       Problem: Skin Injury Risk (Adult)  Goal: Skin Health and Integrity  Outcome: Ongoing (interventions implemented as appropriate)   09/15/18 0336   Skin Injury Risk (Adult)   Skin Health and Integrity making progress toward outcome       Problem: Nutrition, Imbalanced: Inadequate Oral Intake (Adult)  Goal: Improved Oral Intake  Outcome: Ongoing (interventions implemented as appropriate)   09/15/18 0336   Nutrition, Imbalanced: Inadequate Oral Intake (Adult)   Improved Oral Intake making progress toward outcome     Goal: Prevent Further Weight Loss  Outcome: Ongoing (interventions implemented as appropriate)

## 2018-09-15 NOTE — THERAPY TREATMENT NOTE
Acute Care - Physical Therapy Treatment Note  [facility]     Patient Name: Travis Patel  : 1945  MRN: [id number]  Today's Date: 9/15/2018  Onset of Illness/Injury or Date of Surgery: 09/10/18  Date of Referral to PT: 18  Referring Physician: Dr. Gomez    Admit Date: 9/10/2018    Visit Dx:    ICD-10-CM ICD-9-CM   1. Subarachnoid hemorrhage (CMS/HCC) I60.9 430   2. Syncope, unspecified syncope type R55 780.2   3. Elevated liver enzymes R74.8 790.5   4. Constipation, unspecified constipation type K59.00 564.00   5. VANI (acute kidney injury) (CMS/HCC) N17.9 584.9   6. Elevated troponin R74.8 790.6   7. Renal cyst N28.1 753.10   8. Adrenal nodule (CMS/HCC) E27.9 255.8   9. Chronic anticoagulation Z79.01 V58.61   10. Dysphagia, unspecified type R13.10 787.20   11. Impaired cognition R41.89 294.9   12. Impaired mobility and ADLs Z74.09 799.89   13. Gait abnormality R26.9 781.2     Patient Active Problem List   Diagnosis   • Subarachnoid hemorrhage (CMS/HCC)   • Chronic coronary artery disease   • Congestive heart failure (CMS/[facility])   • Hyperlipidemia   • ICD (implantable cardioverter-defibrillator) in place   • Orthostatic hypotension   • Syncope       Therapy Treatment          Rehabilitation Treatment Summary     Row Name 09/15/18 08             Treatment Time/Intention    Discipline physical therapy assistant  -EC      Document Type therapy note (daily note)  -EC      Subjective Information complains of;weakness;pain;dizziness  -EC2      Recorded by [EC] Perry Linares PTA 09/15/18 0834  [EC2] Perry Linares PTA 09/15/18 0849      Row Name 09/15/18 08             Bed Mobility Assessment/Treatment    Supine-Sit [place] (Bed Mobility) conditional independence  -EC      Recorded by [EC] Perry Linares PTA 09/15/18 0855      Row Name 09/15/18 08             Sit-Stand Transfer    Sit-Stand [place] (Transfers) contact guard;verbal cues  -EC      Recorded by [EC] Perry Linares, PTA  09/15/18 0855      Row Name 09/15/18 0800             Stand-Sit Transfer    Stand-Sit [place] (Transfers) contact guard;verbal cues  -EC      Recorded by [EC] Perry Linares, PTA 09/15/18 0855      Row Name 09/15/18 0800             Gait/Stairs Assessment/Training    27212 - Gait Training Minutes  8  -EC      [place] Level (Gait) contact guard;verbal cues  -EC      Assistive Device (Gait) walker, front-wheeled  -EC      Distance in Feet (Gait) 110  -EC      Bilateral Gait Deviations heel strike decreased;forward flexed posture  -EC      Recorded by [EC] Perry Linares, PTA 09/15/18 0855      Row Name 09/15/18 0800             Therapeutic Exercise    52615 - PT Therapeutic Exercise Minutes 16  -EC      Recorded by [EC] Perry Linares, PTA 09/15/18 0858      Row Name 09/15/18 0800             Therapeutic Exercise    Lower Extremity Range of Motion (Therapeutic Exercise) knee flexion/extension, bilateral;ankle dorsiflexion/plantar flexion, bilateral  -EC      Sets/Reps (Therapeutic Exercise) 20  -EC      Comment (Therapeutic Exercise) maintaining midline reaching all plane  -EC      Recorded by [EC] Perry Linares, PTA 09/15/18 0855      Row Name 09/15/18 0800             Positioning and Restraints    Pre-Treatment Position in bed  -EC      Post Treatment Position chair  -EC      In Chair reclined;call light within reach;encouraged to call for assist  -EC      Recorded by [EC] Perry Linares, PTA 09/15/18 0855      Row Name 09/15/18 0800             Pain Scale: Numbers Pre/Post-Treatment    Pain Scale: Numbers, Pretreatment 5/10  -EC      Pain Scale: Numbers, Post-Treatment 0/10 - no pain  -EC      Pain Location --   sacrum  -EC      Recorded by [EC] Perry Linares, PTA 09/15/18 0855      Row Name                Wound 09/12/18 2000 Left lateral arm skin tear    Wound - Properties Group Date first assessed: 09/12/18 [] Time first assessed: 2000 [] Side: Left [AH] Orientation: lateral []  Location: arm [AH] Type: skin tear [AH] Recorded by:  [] Channing Castellon, RN 09/13/18 2569      User Key  (r) = Recorded By, (t) = Taken By, (c) = Cosigned By    Initials Name Effective Dates Discipline     VijayPerry KAVYA, PTA 08/02/16 -  PT    Channing Diaz, RN 02/12/18 -  Nurse          Wound 09/12/18 2000 Left lateral arm skin tear (Active)   Dressing Appearance dry;intact 9/15/2018  8:07 AM   Closure Adhesive bandage;Other (Comment) 9/14/2018  8:00 PM   Base dressing in place, unable to visualize 9/15/2018  8:07 AM   Drainage Amount none 9/14/2018  8:00 PM   Dressing Care, Wound other (see comments) 9/15/2018  8:07 AM             Physical Therapy Education     Title: PT OT SLP Therapies (Done)     Topic: Physical Therapy (Done)     Point: Mobility training (Done)    Learning Progress Summary     Learner Status Readiness Method Response Comment Documented by    Patient Done Acceptance HERMILA WALKER,NR benefits of activity  09/15/18 0855     Done Acceptance MARK CLEANING D VU,AARON,NR Education for LE exercise to combat orthostasis to include aps, LAQ, & seated marching and deep breathing  education to transition slowly during transfers to assist w/ adjustment to change in position to reduce risk for LOB due to orthostatic hypotension  09/14/18 1012    Family Done Acceptance MARK CLEANING D VU,DU,NR Education for LE exercise to combat orthostasis to include aps, LAQ, & seated marching and deep breathing  education to transition slowly during transfers to assist w/ adjustment to change in position to reduce risk for LOB due to orthostatic hypotension  09/14/18 1012          Point: Precautions (Done)    Learning Progress Summary     Learner Status Readiness Method Response Comment Documented by    Patient Done Acceptance MARK CLEAINNG D VU,NR,DU Educ re falls prevention to include risk w/ orthostasis; educ for LE ex, transition slowly during tfers, & incr awareness of changes in tolerance to being upright; educ to sit upright more  often w/ meals/ in between meals to incr tolerance  09/14/18 1013                      User Key     Initials Effective Dates Name Provider Type Discipline     08/02/16 -  Perry Linares, PTA Physical Therapy Assistant PT     08/02/18 -  Maria A Amor, PT Physical Therapist PT                    PT Recommendation and Plan     Plan of Care Reviewed With: patient  Progress: improving  Outcome Summary: pt able to walk 110 ft. with Rwx with CGA, c/o diziness after moving from supine to sit          Outcome Measures     Row Name 09/14/18 1000 09/14/18 0900          How much help from another person do you currently need...    Turning from your back to your side while in flat bed without using bedrails? 3  -JE  --     Moving from lying on back to sitting on the side of a flat bed without bedrails? 3  -JE  --     Moving to and from a bed to a chair (including a wheelchair)? 3  -JE  --     Standing up from a chair using your arms (e.g., wheelchair, bedside chair)? 3  -JE  --     Climbing 3-5 steps with a railing? 3  -JE  --     To walk in hospital room? 3  -JE  --     AM-PAC 6 Clicks Score 18  -JE  --        How much help from another is currently needed...    Putting on and taking off regular lower body clothing?  -- 2  -MW     Bathing (including washing, rinsing, and drying)  -- 2  -MW     Toileting (which includes using toilet bed pan or urinal)  -- 3  -MW     Putting on and taking off regular upper body clothing  -- 3  -MW     Taking care of personal grooming (such as brushing teeth)  -- 3  -MW     Eating meals  -- 4  -MW     Score  -- 17  -MW        Functional Assessment    Outcome Measure Options AM-PAC 6 Clicks Basic Mobility (PT)  - AM-PAC 6 Clicks Daily Activity (OT)  -MW       User Key  (r) = Recorded By, (t) = Taken By, (c) = Cosigned By    Initials Name Provider Type    Sunni Sky, OTR/L Occupational Therapist    Maria A Lee, PT Physical Therapist           Time Calculation:          PT Charges     Row Name 09/15/18 0833 09/15/18 0800          Time Calculation    Start Time 0833  -EC  --     Stop Time 0857  -EC  --     Time Calculation (min) 24 min  -EC  --     PT Received On 09/15/18  -EC  --     PT Goal Re-Cert Due Date 09/24/18  -EC  --        Time Calculation- PT    Total Timed Code Minutes- PT 24 minute(s)  -EC  --        Timed Charges    20458 - PT Therapeutic Exercise Minutes  -- 16  -EC     97376 - Gait Training Minutes   -- 8  -EC       User Key  (r) = Recorded By, (t) = Taken By, (c) = Cosigned By    Initials Name Provider Type    EC Perry Linares PTA Physical Therapy Assistant        Therapy Suggested Charges     Code   Minutes Charges    24587 (CPT®) Hc Pt Neuromusc Re Education Ea 15 Min      34517 (CPT®) Hc Pt Ther Proc Ea 15 Min 16 1    25805 (CPT®) Hc Gait Training Ea 15 Min 8 1    43113 (CPT®) Hc Pt Therapeutic Act Ea 15 Min      89093 (CPT®) Hc Pt Manual Therapy Ea 15 Min      16485 (CPT®) Hc Pt Iontophoresis Ea 15 Min      25537 (CPT®) Hc Pt Elec Stim Ea-Per 15 Min      38188 (CPT®) Hc Pt Ultrasound Ea 15 Min      35748 (CPT®) Hc Pt Self Care/Mgmt/Train Ea 15 Min      29543 (CPT®) Hc Pt Prosthetic (S) Train Initial Encounter, Each 15 Min      04592 (CPT®) Hc Pt Orthotic(S)/Prosthetic(S) Encounter, Each 15 Min      69154 (CPT®) Hc Orthotic(S) Mgmt/Train Initial Encounter, Each 15min      Total  24 2        Therapy Charges for Today     Code Description Service Date Service Provider Modifiers Qty    16949688411 HC PT THER PROC EA 15 MIN 9/15/2018 Perry Linares PTA GP, KX 1    78147405183 HC GAIT TRAINING EA 15 MIN 9/15/2018 Perry Linares PTA GP, KX 1          PT G-Codes  PT Professional Judgement Used?: Yes  Outcome Measure Options: AM-PAC 6 Clicks Basic Mobility (PT)  AM-PAC 6 Clicks Score: 18  Score: 17  Functional Limitation: Mobility: Walking and moving around  Mobility: Walking and Moving Around Current Status (): At least 40 percent but less than 60  percent impaired, limited or restricted  Mobility: Walking and Moving Around Goal Status (): At least 20 percent but less than 40 percent impaired, limited or restricted    Perry Linares, PTA  9/15/2018

## 2018-09-15 NOTE — PROGRESS NOTES
[facility] Medicine Services  INPATIENT PROGRESS NOTE    Patient Name: Travis Patel  Date of Admission: 9/10/2018  Today's Date: 09/15/18  Length of Stay: 5  Primary Care Physician: Shant Hayes MD    Subjective   Chief Complaint: still with head dizzy with sitting up on side of bed.    HPI   Therapy working with patient.   Sitting on side of bed.   Turns head with no balance loss.  No nausea or vomiting  Occasionally feels some shortness of breath.  No chest pain.  No orthopnea      Review of Systems     All pertinent negatives and positives are as above. All other systems have been reviewed and are negative unless otherwise stated.     Objective    Temp:  [97.6 °F (36.4 °C)-99 °F (37.2 °C)] 97.9 °F (36.6 °C)  Heart Rate:  [] 112  Resp:  [18] 18  BP: ()/(59-82) 92/63  Physical Exam   Constitutional: He is oriented to person, place, and time. He appears well-developed and well-nourished.   HENT:   Head: Normocephalic and atraumatic.   Eyes: Pupils are equal, round, and reactive to light. Conjunctivae and EOM are normal.   Neck: Normal range of motion. Neck supple. No JVD present.   Cardiovascular: Normal rate, regular rhythm, normal heart sounds and intact distal pulses.    Pulmonary/Chest: Effort normal and breath sounds normal.   Abdominal: Soft. Bowel sounds are normal.   Musculoskeletal: Normal range of motion.   Neurological: He is alert and oriented to person, place, and time.   Skin: Skin is warm and dry.   Psychiatric: He has a normal mood and affect. His behavior is normal.   Nursing note and vitals reviewed.          Results Review:  I have reviewed the labs, radiology results, and diagnostic studies.    Laboratory Data:     Results from last 7 days  Lab Units 09/15/18  0545 09/14/18  0434 09/13/18  0353   WBC 10*3/mm3 6.53 6.91 8.26   HEMOGLOBIN g/dL 10.2* 10.3* 10.1*   HEMATOCRIT % 29.6* 30.4* 29.6*   PLATELETS 10*3/mm3 148 144 134          Results  from last 7 days  Lab Units 09/15/18  0545 09/14/18  0434 09/13/18  0353   SODIUM mmol/L 139 139 137   POTASSIUM mmol/L 4.3 4.4 4.2   CHLORIDE mmol/L 108 108 109   CO2 mmol/L 23.0* 25.0 22.0*   BUN mg/dL 14 10 12   CREATININE mg/dL 0.85 0.96 0.92   CALCIUM mg/dL 8.5 8.4 8.3*   BILIRUBIN mg/dL 0.5 0.6 0.6   ALK PHOS U/L 126* 110 113   ALT (SGPT) U/L 42 53 58*   AST (SGOT) U/L 38 53* 56*   GLUCOSE mg/dL 102* 88 88       Culture Data:   Blood Culture   Date Value Ref Range Status   09/10/2018 No growth at 4 days  Preliminary   09/10/2018 No growth at 4 days  Preliminary       Radiology Data:   Imaging Results (last 24 hours)     ** No results found for the last 24 hours. **          I have reviewed the patient's current medications.     Assessment/Plan     Hospital Problem List     Subarachnoid hemorrhage (CMS/HCC)    Chronic coronary artery disease    Overview Signed 9/12/2018 10:09 PM by Logan Willams MD     Overview:   07/26/07:  Coronary artery disease involving > 3 vessels with more than 50%  : [  ]      Last Assessment & Plan:   Managed by Dr. Patton. Currently on lipitor, aspirin, plavix, lisinopril and metoprolol- no new symptoms. We discussed increasing his exercise now the weather has improved. He is to aim for walking 30 minutes 5 times a week.         Congestive heart failure (CMS/[facility])    Hyperlipidemia    Overview Signed 9/12/2018 10:09 PM by Logan Willams MD     Last Assessment & Plan:   He is on appropriate atorvastatin dosing which will continue.         ICD (implantable cardioverter-defibrillator) in place    Overview Signed 9/12/2018 10:09 PM by Logan Willams MD     Last Assessment & Plan:   His ICD appears to be functioning properly and he's had no shocks.         Orthostatic hypotension    Syncope          Assessment     Syncope  Orthostatic hypotension, chronic recurrent by history.  Subarrachnoid hemorrhage.   CAD   Bipolar disease  Constipation with possible rectal impaction.  Renal  failure. Acute on chronic.  CHF by history, type unknown, echo in process.  Constipation  Large BM 9-13-18    Plan    Lab abnormalities have largely resolved with fluid resuscitation and holding diuretics.  Blood pressures documented are improving.  Continue current medications  Continue to monitor orthostatics  Encourage fluid   for dc planning      Discharge Planning: I expect the patient to be discharged to home/snf in 1-3 days.    Kenya Gomez,    09/15/18   9:21 AM

## 2018-09-15 NOTE — PLAN OF CARE
Problem: Patient Care Overview  Goal: Plan of Care Review  Outcome: Ongoing (interventions implemented as appropriate)   09/15/18 3155   Coping/Psychosocial   Plan of Care Reviewed With patient   Plan of Care Review   Progress improving   OTHER   Outcome Summary pt able to walk 110 ft. with Rwx with CGA, c/o diziness after moving from supine to sit

## 2018-09-15 NOTE — PLAN OF CARE
Problem: Fall Risk (Adult)  Goal: Absence of Fall  Outcome: Ongoing (interventions implemented as appropriate)      Problem: Patient Care Overview  Goal: Plan of Care Review  Outcome: Ongoing (interventions implemented as appropriate)   09/15/18 1516   Coping/Psychosocial   Plan of Care Reviewed With patient   Plan of Care Review   Progress no change   OTHER   Outcome Summary no co pain. pt working with PT. cont to monitor ortho bp and dizzness.      Goal: Individualization and Mutuality  Outcome: Ongoing (interventions implemented as appropriate)    Goal: Discharge Needs Assessment  Outcome: Ongoing (interventions implemented as appropriate)    Goal: Interprofessional Rounds/Family Conf  Outcome: Ongoing (interventions implemented as appropriate)      Problem: Constipation (Adult)  Goal: Effective Bowel Elimination  Outcome: Ongoing (interventions implemented as appropriate)    Goal: Comfort  Outcome: Ongoing (interventions implemented as appropriate)      Problem: Sepsis/Septic Shock (Adult)  Goal: Signs and Symptoms of Listed Potential Problems Will be Absent, Minimized or Managed (Sepsis/Septic Shock)  Outcome: Ongoing (interventions implemented as appropriate)      Problem: Syncope (Adult)  Goal: Physical Safety/Health Maintenance  Outcome: Ongoing (interventions implemented as appropriate)    Goal: Optimal Emotional/Functional [place]  Outcome: Ongoing (interventions implemented as appropriate)      Problem: Renal Failure/Kidney Injury, Acute (Adult)  Goal: Signs and Symptoms of Listed Potential Problems Will be Absent, Minimized or Managed (Renal Failure/Kidney Injury, Acute)  Outcome: Ongoing (interventions implemented as appropriate)      Problem: Brain Injury, Moderate Traumatic (GCS 9-12) (Adult)  Goal: Signs and Symptoms of Listed Potential Problems Will be Absent, Minimized or Managed (Brain Injury, Moderate Traumatic)  Outcome: Ongoing (interventions implemented as appropriate)      Problem: Skin  Injury Risk (Adult)  Goal: Skin Health and Integrity  Outcome: Ongoing (interventions implemented as appropriate)      Problem: Nutrition, Imbalanced: Inadequate Oral Intake (Adult)  Goal: Prevent Further Weight Loss  Outcome: Ongoing (interventions implemented as appropriate)

## 2018-09-15 NOTE — THERAPY TREATMENT NOTE
Acute Care - Physical Therapy Treatment Note  [facility]     Patient Name: Travis Patel  : 1945  MRN: [id number]  Today's Date: 9/15/2018  Onset of Illness/Injury or Date of Surgery: 09/10/18  Date of Referral to PT: 18  Referring Physician: Dr. Goemz    Admit Date: 9/10/2018    Visit Dx:    ICD-10-CM ICD-9-CM   1. Subarachnoid hemorrhage (CMS/HCC) I60.9 430   2. Syncope, unspecified syncope type R55 780.2   3. Elevated liver enzymes R74.8 790.5   4. Constipation, unspecified constipation type K59.00 564.00   5. VANI (acute kidney injury) (CMS/HCC) N17.9 584.9   6. Elevated troponin R74.8 790.6   7. Renal cyst N28.1 753.10   8. Adrenal nodule (CMS/HCC) E27.9 255.8   9. Chronic anticoagulation Z79.01 V58.61   10. Dysphagia, unspecified type R13.10 787.20   11. Impaired cognition R41.89 294.9   12. Impaired mobility and ADLs Z74.09 799.89   13. Gait abnormality R26.9 781.2     Patient Active Problem List   Diagnosis   • Subarachnoid hemorrhage (CMS/HCC)   • Chronic coronary artery disease   • Congestive heart failure (CMS/[facility])   • Hyperlipidemia   • ICD (implantable cardioverter-defibrillator) in place   • Orthostatic hypotension   • Syncope       Therapy Treatment          Rehabilitation Treatment Summary     Row Name 09/15/18 1516 09/15/18 0800          Treatment Time/Intention    Discipline physical therapy assistant  -EC physical therapy assistant  -EC     Document Type therapy note (daily note)  -EC therapy note (daily note)  -EC     Subjective Information complains of;dizziness   reports reduced  -EC complains of;weakness;pain;dizziness  -EC2     Recorded by [EC] Perry Linares, PTA 09/15/18 1535 [EC] Perry Linares, PTA 09/15/18 0834  [EC2] Perry Linares, PTA 09/15/18 0849     Row Name 09/15/18 1516 09/15/18 0800          Bed Mobility Assessment/Treatment    Supine-Sit [place] (Bed Mobility) conditional independence  -EC conditional independence  -EC     Sit-Supine [place] (Bed  Mobility) --   up in chair  -EC  --     Recorded by [EC] Perry Linares, PTA 09/15/18 1535 [EC] Perry Linares, PTA 09/15/18 0855     Row Name 09/15/18 1516 09/15/18 0800          Sit-Stand Transfer    Sit-Stand [place] (Transfers) verbal cues;stand by assist  -EC contact guard;verbal cues  -EC     Recorded by [EC] Perry Linares, PTA 09/15/18 1535 [EC] Perry Linares, PTA 09/15/18 0855     Row Name 09/15/18 1516 09/15/18 0800          Stand-Sit Transfer    Stand-Sit [place] (Transfers) verbal cues;stand by assist  -EC contact guard;verbal cues  -EC     Recorded by [EC] Perry Linares, PTA 09/15/18 1535 [EC] Perry Linares, PTA 09/15/18 0855     Row Name 09/15/18 1516 09/15/18 0800          Gait/Stairs Assessment/Training    09177 - Gait Training Minutes  10  -EC 8  -EC     [place] Level (Gait) contact guard  -EC2 contact guard;verbal cues  -EC     Assistive Device (Gait) walker, front-wheeled  -EC2 walker, front-wheeled  -EC     Distance in Feet (Gait) 150  -EC2 110  -EC     Bilateral Gait Deviations forward flexed posture;heel strike decreased  -EC heel strike decreased;forward flexed posture  -EC     Recorded by [EC] Perry Linares, PTA 09/15/18 1538  [EC2] Perry Linares, PTA 09/15/18 1535 [EC] Perry Linares, PTA 09/15/18 0855     Row Name 09/15/18 1516 09/15/18 0800          Therapeutic Exercise    92768 - PT Therapeutic Exercise Minutes 13  -EC 16  -EC     Recorded by [EC] Perry Linares, PTA 09/15/18 1539 [EC] Perry Linares, PTA 09/15/18 0858     Row Name 09/15/18 1516 09/15/18 0800          Therapeutic Exercise    Lower Extremity (Therapeutic Exercise) LAQ (long arc quad), bilateral;marching while seated  -EC  --     Lower Extremity Range of Motion (Therapeutic Exercise) ankle dorsiflexion/plantar flexion, bilateral  -EC knee flexion/extension, bilateral;ankle dorsiflexion/plantar flexion, bilateral  -EC     Sets/Reps (Therapeutic Exercise)  -- 20  -EC      Comment (Therapeutic Exercise)  -- maintaining midline reaching all plane  -EC     Recorded by [EC] Perry Linares, PTA 09/15/18 1535 [EC] Perry Linares, PTA 09/15/18 0855     Row Name 09/15/18 1516 09/15/18 0800          Positioning and Restraints    Pre-Treatment Position in bed  -EC in bed  -EC     Post Treatment Position chair  -EC chair  -EC     In Chair reclined;call light within reach;encouraged to call for assist  -EC reclined;call light within reach;encouraged to call for assist  -EC     Recorded by [EC] Perry Linares, PTA 09/15/18 1535 [EC] Perry Linares, PTA 09/15/18 0855     Row Name 09/15/18 1516 09/15/18 0800          Pain Scale: Numbers Pre/Post-Treatment    Pain Scale: Numbers, Pretreatment 0/10 - no pain  -EC 5/10  -EC     Pain Scale: Numbers, Post-Treatment 0/10 - no pain  -EC 0/10 - no pain  -EC     Pain Location  -- --   sacrum  -EC     Recorded by [EC] Perry Linares, PTA 09/15/18 1535 [EC] Perry Linares, PTA 09/15/18 0855     Row Name                Wound 09/12/18 2000 Left lateral arm skin tear    Wound - Properties Group Date first assessed: 09/12/18 [AH] Time first assessed: 2000 [AH] Side: Left [AH] Orientation: lateral [] Location: arm [AH] Type: skin tear [AH] Recorded by:  [] Channing Castellon RN 09/13/18 7329      User Key  (r) = Recorded By, (t) = Taken By, (c) = Cosigned By    Initials Name Effective Dates Discipline    EC Perry Linares, PTA 08/02/16 -  PT    Channing Diaz RN 02/12/18 -  Nurse          Wound 09/12/18 2000 Left lateral arm skin tear (Active)   Dressing Appearance dry;intact 9/15/2018  8:07 AM   Closure Adhesive bandage;Other (Comment) 9/14/2018  8:00 PM   Base dressing in place, unable to visualize 9/15/2018  8:07 AM   Drainage Amount none 9/14/2018  8:00 PM   Dressing Care, Wound other (see comments) 9/15/2018  8:07 AM             Physical Therapy Education     Title: PT OT SLP Therapies (Done)     Topic: Physical Therapy (Done)      Point: Mobility training (Done)    Learning Progress Summary     Learner Status Readiness Method Response Comment Documented by    Patient Done Acceptance E AARON,NR benefits of activity  09/15/18 0855     Done Acceptance MARK CLEANING D VU, DU,NR Education for LE exercise to combat orthostasis to include aps, LAQ, & seated marching and deep breathing  education to transition slowly during transfers to assist w/ adjustment to change in position to reduce risk for LOB due to orthostatic hypotension  09/14/18 1012    Family Done Acceptance MARK [employer] D VU,AARON,NR Education for LE exercise to combat orthostasis to include aps, LAQ, & seated marching and deep breathing  education to transition slowly during transfers to assist w/ adjustment to change in position to reduce risk for LOB due to orthostatic hypotension  09/14/18 1012          Point: Precautions (Done)    Learning Progress Summary     Learner Status Readiness Method Response Comment Documented by    Patient Done Acceptance MARK [employer] D VU,WILMAN,DU Educ re falls prevention to include risk w/ orthostasis; educ for LE ex, transition slowly during tfers, & incr awareness of changes in tolerance to being upright; educ to sit upright more often w/ meals/ in between meals to incr tolerance  09/14/18 1013                      User Key     Initials Effective Dates Name Provider Type Discipline     08/02/16 -  Perry Linares, PTA Physical Therapy Assistant PT     08/02/18 -  Maria A Amor, PT Physical Therapist PT                    PT Recommendation and Plan     Plan of Care Reviewed With: patient  Progress: improving  Outcome Summary: pt walked 150 ft with Rwx and SBA assist, reported decreased dizzines as compared to earlier          Outcome Measures     Row Name 09/14/18 1000 09/14/18 0900          How much help from another person do you currently need...    Turning from your back to your side while in flat bed without using bedrails? 3  -JE  --     Moving from lying on  back to sitting on the side of a flat bed without bedrails? 3  -JE  --     Moving to and from a bed to a chair (including a wheelchair)? 3  -JE  --     Standing up from a chair using your arms (e.g., wheelchair, bedside chair)? 3  -JE  --     Climbing 3-5 steps with a railing? 3  -JE  --     To walk in hospital room? 3  -JE  --     AM-PAC 6 Clicks Score 18  -JE  --        How much help from another is currently needed...    Putting on and taking off regular lower body clothing?  -- 2  -MW     Bathing (including washing, rinsing, and drying)  -- 2  -MW     Toileting (which includes using toilet bed pan or urinal)  -- 3  -MW     Putting on and taking off regular upper body clothing  -- 3  -MW     Taking care of personal grooming (such as brushing teeth)  -- 3  -MW     Eating meals  -- 4  -MW     Score  -- 17  -MW        Functional Assessment    Outcome Measure Options AM-PAC 6 Clicks Basic Mobility (PT)  -[facility] 6 Clicks Daily Activity (OT)  -       User Key  (r) = Recorded By, (t) = Taken By, (c) = Cosigned By    Initials Name Provider Type    MW Sunni Nagel, OTR/L Occupational Therapist    Maria A Lee, PT Physical Therapist           Time Calculation:         PT Charges     Row Name 09/15/18 1517 09/15/18 1516 09/15/18 0833       Time Calculation    Start Time 1516  -EC  -- 0833  -EC    Stop Time 1539  -EC  -- 0857  -EC    Time Calculation (min) 23 min  -EC  -- 24 min  -EC    PT Received On 09/15/18  -EC  -- 09/15/18  -EC    PT Goal Re-Cert Due Date 09/24/18  -EC  -- 09/24/18  -EC       Time Calculation- PT    Total Timed Code Minutes- PT  --  -- 24 minute(s)  -EC       Timed Charges    85541 - PT Therapeutic Exercise Minutes  -- 13  -EC  --    80635 - Gait Training Minutes   -- 10  -EC  --    Row Name 09/15/18 0800             Timed Charges    95966 - PT Therapeutic Exercise Minutes 16  -EC      04167 - Gait Training Minutes  8  -EC        User Key  (r) = Recorded By, (t) = Taken By, (c) =  Cosigned By    Initials Name Provider Type    EC Perry Linares PTA Physical Therapy Assistant        Therapy Suggested Charges     Code   Minutes Charges    57839 (CPT®) Hc Pt Neuromusc Re Education Ea 15 Min      77592 (CPT®) Hc Pt Ther Proc Ea 15 Min 13 1    67582 (CPT®) Hc Gait Training Ea 15 Min 10 1    12991 (CPT®) Hc Pt Therapeutic Act Ea 15 Min      72062 (CPT®) Hc Pt Manual Therapy Ea 15 Min      74628 (CPT®) Hc Pt Iontophoresis Ea 15 Min      30219 (CPT®) Hc Pt Elec Stim Ea-Per 15 Min      82307 (CPT®) Hc Pt Ultrasound Ea 15 Min      33130 (CPT®) Hc Pt Self Care/Mgmt/Train Ea 15 Min      67515 (CPT®) Hc Pt Prosthetic (S) Train Initial Encounter, Each 15 Min      75929 (CPT®) Hc Pt Orthotic(S)/Prosthetic(S) Encounter, Each 15 Min      39416 (CPT®) Hc Orthotic(S) Mgmt/Train Initial Encounter, Each 15min      Total  23 2        Therapy Charges for Today     Code Description Service Date Service Provider Modifiers Qty    40678641409 HC PT THER PROC EA 15 MIN 9/15/2018 Perry Linares, PTA GP, KX 1    92045939242 HC GAIT TRAINING EA 15 MIN 9/15/2018 Perry Linares, PTA GP, KX 1    86253101545 HC PT THER PROC EA 15 MIN 9/15/2018 Perry Linares, PTA GP, KX 1    59754187067 HC GAIT TRAINING EA 15 MIN 9/15/2018 Perry Linares, PTA GP, KX 1          PT G-Codes  PT Professional Judgement Used?: Yes  Outcome Measure Options: AM-PAC 6 Clicks Basic Mobility (PT)  AM-PAC 6 Clicks Score: 18  Score: 17  Functional Limitation: Mobility: Walking and moving around  Mobility: Walking and Moving Around Current Status (): At least 40 percent but less than 60 percent impaired, limited or restricted  Mobility: Walking and Moving Around Goal Status (): At least 20 percent but less than 40 percent impaired, limited or restricted    Perry Linares PTA  9/15/2018

## 2018-09-16 LAB
ALBUMIN SERPL-MCNC: 2.7 G/DL (ref 3.5–5)
ALBUMIN/GLOB SERPL: 1.2 G/DL (ref 1.1–2.5)
ALP SERPL-CCNC: 123 U/L (ref 24–120)
ALT SERPL W P-5'-P-CCNC: 38 U/L (ref 0–54)
ANION GAP SERPL CALCULATED.3IONS-SCNC: 7 MMOL/L (ref 4–13)
AST SERPL-CCNC: 31 U/L (ref 7–45)
BILIRUB SERPL-MCNC: 0.5 MG/DL (ref 0.1–1)
BUN BLD-MCNC: 11 MG/DL (ref 5–21)
BUN/CREAT SERPL: 13.3 (ref 7–25)
CALCIUM SPEC-SCNC: 8.6 MG/DL (ref 8.4–10.4)
CHLORIDE SERPL-SCNC: 111 MMOL/L (ref 98–110)
CO2 SERPL-SCNC: 22 MMOL/L (ref 24–31)
CREAT BLD-MCNC: 0.83 MG/DL (ref 0.5–1.4)
GFR SERPL CREATININE-BSD FRML MDRD: 91 ML/MIN/1.73
GLOBULIN UR ELPH-MCNC: 2.2 GM/DL
GLUCOSE BLD-MCNC: 101 MG/DL (ref 70–100)
POTASSIUM BLD-SCNC: 4.3 MMOL/L (ref 3.5–5.3)
PROT SERPL-MCNC: 4.9 G/DL (ref 6.3–8.7)
SODIUM BLD-SCNC: 140 MMOL/L (ref 135–145)

## 2018-09-16 PROCEDURE — 80053 COMPREHEN METABOLIC PANEL: CPT | Performed by: NURSE PRACTITIONER

## 2018-09-16 PROCEDURE — 97116 GAIT TRAINING THERAPY: CPT

## 2018-09-16 PROCEDURE — 97110 THERAPEUTIC EXERCISES: CPT

## 2018-09-16 RX ADMIN — LITHIUM CARBONATE 300 MG: 300 CAPSULE, GELATIN COATED ORAL at 12:09

## 2018-09-16 RX ADMIN — LITHIUM CARBONATE 300 MG: 300 CAPSULE, GELATIN COATED ORAL at 08:16

## 2018-09-16 RX ADMIN — MIDODRINE HYDROCHLORIDE 10 MG: 2.5 TABLET ORAL at 08:17

## 2018-09-16 RX ADMIN — FOLIC ACID 1 MG: 1 TABLET ORAL at 08:16

## 2018-09-16 RX ADMIN — LACTULOSE 20 G: 20 SOLUTION ORAL at 08:16

## 2018-09-16 RX ADMIN — LITHIUM CARBONATE 300 MG: 300 CAPSULE, GELATIN COATED ORAL at 17:05

## 2018-09-16 RX ADMIN — ATORVASTATIN CALCIUM 80 MG: 40 TABLET, FILM COATED ORAL at 20:43

## 2018-09-16 RX ADMIN — POTASSIUM CHLORIDE 20 MEQ: 750 CAPSULE, EXTENDED RELEASE ORAL at 08:16

## 2018-09-16 RX ADMIN — FAMOTIDINE 20 MG: 20 TABLET, FILM COATED ORAL at 08:16

## 2018-09-16 RX ADMIN — MIDODRINE HYDROCHLORIDE 10 MG: 2.5 TABLET ORAL at 12:09

## 2018-09-16 RX ADMIN — BUSPIRONE HYDROCHLORIDE 10 MG: 10 TABLET ORAL at 20:43

## 2018-09-16 RX ADMIN — DULOXETINE HYDROCHLORIDE 60 MG: 30 CAPSULE, DELAYED RELEASE ORAL at 08:16

## 2018-09-16 RX ADMIN — MIDODRINE HYDROCHLORIDE 10 MG: 2.5 TABLET ORAL at 17:05

## 2018-09-16 RX ADMIN — LACTULOSE 20 G: 20 SOLUTION ORAL at 15:33

## 2018-09-16 RX ADMIN — BUSPIRONE HYDROCHLORIDE 10 MG: 10 TABLET ORAL at 08:17

## 2018-09-16 RX ADMIN — QUETIAPINE FUMARATE 100 MG: 100 TABLET ORAL at 20:43

## 2018-09-16 RX ADMIN — LACTULOSE 20 G: 20 SOLUTION ORAL at 20:43

## 2018-09-16 NOTE — THERAPY TREATMENT NOTE
Acute Care - Physical Therapy Treatment Note  [facility]     Patient Name: Travis Patel  : 1945  MRN: [id number]  Today's Date: 2018  Onset of Illness/Injury or Date of Surgery: 09/10/18  Date of Referral to PT: 18  Referring Physician: Dr. Gomez    Admit Date: 9/10/2018    Visit Dx:    ICD-10-CM ICD-9-CM   1. Subarachnoid hemorrhage (CMS/HCC) I60.9 430   2. Syncope, unspecified syncope type R55 780.2   3. Elevated liver enzymes R74.8 790.5   4. Constipation, unspecified constipation type K59.00 564.00   5. VANI (acute kidney injury) (CMS/HCC) N17.9 584.9   6. Elevated troponin R74.8 790.6   7. Renal cyst N28.1 753.10   8. Adrenal nodule (CMS/HCC) E27.9 255.8   9. Chronic anticoagulation Z79.01 V58.61   10. Dysphagia, unspecified type R13.10 787.20   11. Impaired cognition R41.89 294.9   12. Impaired mobility and ADLs Z74.09 799.89   13. Gait abnormality R26.9 781.2     Patient Active Problem List   Diagnosis   • Subarachnoid hemorrhage (CMS/HCC)   • Chronic coronary artery disease   • Congestive heart failure (CMS/HCC)   • Hyperlipidemia   • ICD (implantable cardioverter-defibrillator) in place   • Orthostatic hypotension   • Syncope       Therapy Treatment          Rehabilitation Treatment Summary     Row Name 18 1524 18 0900          Treatment Time/Intention    Discipline physical therapy assistant  -EC physical therapy assistant  -EC     Document Type therapy note (daily note)  -EC therapy note (daily note)  -EC     Subjective Information complains of;dizziness  -EC complains of;dizziness  -EC2     Recorded by [EC] Perry Linares, PTA 18 1530 [EC] Perry Linares, PTA 18 0943  [EC2] Perry Linares, PTA 18 1010     Row Name 18 1524 18 0900          Vital Signs    Pre Systolic BP Rehab 125  -  -EC     Pre Treatment Diastolic BP 70  -EC 68  -EC     Intra Systolic BP Rehab 105  -  -EC     Intra Treatment Diastolic BP 74  -EC 74  -EC      Post Systolic BP Rehab 105  -EC 88  -EC     Post Treatment Diastolic BP 69  -EC 62  -EC     Pre Patient Position --   reclined  -EC  --     Intra Patient Position Sitting  -EC  --     Post Patient Position Standing  -EC  --     Recorded by [EC] Perry Linares, PTA 09/16/18 1530 [EC] Perry Linares, PTA 09/16/18 1010     Row Name 09/16/18 1524 09/16/18 0900          Bed Mobility Assessment/Treatment    Supine-Sit [place] (Bed Mobility)  -- conditional independence  -EC     Sit-Supine [place] (Bed Mobility) conditional independence  -EC  --     Recorded by [EC] Perry Linares, PTA 09/16/18 1530 [EC] Perry Linares, PTA 09/16/18 1020     Row Name 09/16/18 1524 09/16/18 0900          Sit-Stand Transfer    Sit-Stand [place] (Transfers) stand by assist  -EC stand by assist  -EC     Recorded by [EC] Perry Linares, PTA 09/16/18 1530 [EC] Perry Linares, PTA 09/16/18 1020     Row Name 09/16/18 1524 09/16/18 0900          Stand-Sit Transfer    Stand-Sit [place] (Transfers) stand by assist  -EC stand by assist  -EC     Recorded by [EC] Perry Linares, PTA 09/16/18 1530 [EC] Perry Linares, PTA 09/16/18 1020     Row Name 09/16/18 1524 09/16/18 0900          Gait/Stairs Assessment/Training    Gait/Stairs Assessment/Training gait/ambulation independence  -EC  --     [place] Level (Gait) contact guard  -EC  --     Assistive Device (Gait) walker, front-wheeled  -EC walker, front-wheeled  -EC     Distance in Feet (Gait) 200  -  -EC     Deviations/Abnormal Patterns (Gait) gait speed decreased  -EC gait speed decreased  -EC     Bilateral Gait Deviations forward flexed posture  -EC forward flexed posture  -EC     Recorded by [EC] Perry Linares, PTA 09/16/18 1530 [EC] Perry Linares, PTA 09/16/18 1020     Row Name 09/16/18 1524 09/16/18 0900          Therapeutic Exercise    Lower Extremity Range of Motion (Therapeutic Exercise) hip flexion/extension, bilateral;hip  abduction/adduction, bilateral   B heel raises  -EC hip flexion/extension, bilateral;hip abduction/adduction, bilateral   heel raises  -EC     Position (Therapeutic Exercise) standing  -EC standing  -EC     Sets/Reps (Therapeutic Exercise) 15  -EC 15  -EC     Recorded by [EC] Perry Linares, PTA 09/16/18 1530 [EC] Perry Linares, PTA 09/16/18 1020     Row Name 09/16/18 1524 09/16/18 0900          Positioning and Restraints    Pre-Treatment Position sitting in chair/recliner  -EC in bed  -EC     Post Treatment Position bed  -EC  --     In Bed fowlers;call light within reach;side rails up x2;with family/caregiver  -EC  --     In Chair  -- notified nsg;reclined;call light within reach  -EC     Recorded by [EC] Perry Linares, PTA 09/16/18 1530 [EC] Perry Linares, PTA 09/16/18 1020     Row Name 09/16/18 1524 09/16/18 0900          Pain Scale: Numbers Pre/Post-Treatment    Pain Scale: Numbers, Pretreatment 0/10 - no pain  -EC 0/10 - no pain  -EC     Pain Scale: Numbers, Post-Treatment 0/10 - no pain  -EC 0/10 - no pain  -EC     Recorded by [EC] Perry Linares, PTA 09/16/18 1530 [EC] Perry Linares, PTA 09/16/18 1020     Row Name                Wound 09/12/18 2000 Left lateral arm skin tear    Wound - Properties Group Date first assessed: 09/12/18 [AH] Time first assessed: 2000 [AH] Side: Left [AH] Orientation: lateral [AH] Location: arm [AH] Type: skin tear [AH] Recorded by:  [] Channing Castellon RN 09/13/18 4829      User Key  (r) = Recorded By, (t) = Taken By, (c) = Cosigned By    Initials Name Effective Dates Discipline     Perry Linares, PTA 08/02/16 -  PT     Channing Castellon RN 02/12/18 -  Nurse          Wound 09/12/18 2000 Left lateral arm skin tear (Active)   Dressing Appearance dry;intact 9/16/2018  7:40 AM   Base dressing in place, unable to visualize 9/16/2018  7:40 AM   Dressing Care, Wound foam 9/16/2018  7:40 AM             Physical Therapy Education     Title: PT OT SLP  Therapies (Active)     Topic: Physical Therapy (Active)     Point: Mobility training (Active)    Learning Progress Summary     Learner Status Readiness Method Response Comment Documented by    Patient Active Acceptance E NR orthostatic hypotension and benefits of being OOB  09/16/18 1020     Done Acceptance E DU,NR benefits of activity  09/15/18 0855     Done Acceptance E,TB,ABHI VU,DU,NR Education for LE exercise to combat orthostasis to include aps, LAQ, & seated marching and deep breathing  education to transition slowly during transfers to assist w/ adjustment to change in position to reduce risk for LOB due to orthostatic hypotension  09/14/18 1012    Family Done Acceptance E,TB,ABHI VU,DU,NR Education for LE exercise to combat orthostasis to include aps, LAQ, & seated marching and deep breathing  education to transition slowly during transfers to assist w/ adjustment to change in position to reduce risk for LOB due to orthostatic hypotension  09/14/18 1012          Point: Precautions (Active)    Learning Progress Summary     Learner Status Readiness Method Response Comment Documented by    Patient Active Acceptance E NR orthostatic hypotension and benefits of being OOB  09/16/18 1020     Done Acceptance E,MARK,ABHI VU,NR,DU Educ re falls prevention to include risk w/ orthostasis; educ for LE ex, transition slowly during tfers, & incr awareness of changes in tolerance to being upright; educ to sit upright more often w/ meals/ in between meals to incr tolerance  09/14/18 1013                      User Key     Initials Effective Dates Name Provider Type Discipline     08/02/16 -  Perry Linares, PTA Physical Therapy Assistant PT     08/02/18 -  Maria A Amor PT Physical Therapist PT                    PT Recommendation and Plan     Plan of Care Reviewed With: patient  Progress: improving  Outcome Summary: 200 ft with Rwx CGA           Outcome Measures     Row Name 09/16/18 1500 09/16/18 1000 09/14/18  1000       How much help from another person do you currently need...    Turning from your back to your side while in flat bed without using bedrails? 4  -EC 4  -EC 3  -JE    Moving from lying on back to sitting on the side of a flat bed without bedrails? 4  -EC 4  -EC 3  -JE    Moving to and from a bed to a chair (including a wheelchair)? 3  -EC 3  -EC 3  -JE    Standing up from a chair using your arms (e.g., wheelchair, bedside chair)? 3  -EC 3  -EC 3  -JE    Climbing 3-5 steps with a railing? 3  -EC 3  -EC 3  -JE    To walk in hospital room? 3  -EC 3  -EC 3  -JE    AM-PAC 6 Clicks Score 20  -EC 20  -EC 18  -JE       Functional Assessment    Outcome Measure Options AM-PAC 6 Clicks Basic Mobility (PT)  -EC AM-PAC 6 Clicks Basic Mobility (PT)  -EC AM-PAC 6 Clicks Basic Mobility (PT)  -JE    Row Name 09/14/18 0900             How much help from another is currently needed...    Putting on and taking off regular lower body clothing? 2  -MW      Bathing (including washing, rinsing, and drying) 2  -MW      Toileting (which includes using toilet bed pan or urinal) 3  -MW      Putting on and taking off regular upper body clothing 3  -MW      Taking care of personal grooming (such as brushing teeth) 3  -MW      Eating meals 4  -MW      Score 17  -MW         Functional Assessment    Outcome Measure Options AM-PAC 6 Clicks Daily Activity (OT)  -MW        User Key  (r) = Recorded By, (t) = Taken By, (c) = Cosigned By    Initials Name Provider Type    EC Perry Linares, PTA Physical Therapy Assistant    Sunni Sky, OTR/L Occupational Therapist    Maria A Lee, PT Physical Therapist           Time Calculation:         PT Charges     Row Name 09/16/18 1524 09/16/18 0942          Time Calculation    Start Time 1501  -EC 0941  -EC     Stop Time 1524  -EC 1010  -EC     Time Calculation (min) 23 min  -EC 29 min  -EC     PT Received On  -- 09/16/18  -EC     PT Goal Re-Cert Due Date  -- 09/24/18  -EC        Time  Calculation- PT    Total Timed Code Minutes- PT 23 minute(s)  -EC 29 minute(s)  -EC        Timed Charges    18104 - PT Therapeutic Exercise Minutes 15  -EC 15  -EC     36610 - Gait Training Minutes  13  -EC 14  -EC       User Key  (r) = Recorded By, (t) = Taken By, (c) = Cosigned By    Initials Name Provider Type    EC Perry Linares, PTA Physical Therapy Assistant        Therapy Suggested Charges     Code   Minutes Charges    08817 (CPT®) Hc Pt Neuromusc Re Education Ea 15 Min      49985 (CPT®) Hc Pt Ther Proc Ea 15 Min 15 1    50926 (CPT®) Hc Gait Training Ea 15 Min 13 1    90892 (CPT®) Hc Pt Therapeutic Act Ea 15 Min      35292 (CPT®) Hc Pt Manual Therapy Ea 15 Min      12632 (CPT®) Hc Pt Iontophoresis Ea 15 Min      73766 (CPT®) Hc Pt Elec Stim Ea-Per 15 Min      99994 (CPT®) Hc Pt Ultrasound Ea 15 Min      19677 (CPT®) Hc Pt Self Care/Mgmt/Train Ea 15 Min      86346 (CPT®) Hc Pt Prosthetic (S) Train Initial Encounter, Each 15 Min      13245 (CPT®) Hc Pt Orthotic(S)/Prosthetic(S) Encounter, Each 15 Min      74816 (CPT®) Hc Orthotic(S) Mgmt/Train Initial Encounter, Each 15min      Total  28 2        Therapy Charges for Today     Code Description Service Date Service Provider Modifiers Qty    31675416012 HC PT THER PROC EA 15 MIN 9/15/2018 Perry Linares, PTA GP, KX 1    06366673230 HC GAIT TRAINING EA 15 MIN 9/15/2018 Perry Linares, PTA GP, KX 1    37725496628 HC PT THER PROC EA 15 MIN 9/15/2018 Perry Linares, PTA GP, KX 1    41595176841 HC GAIT TRAINING EA 15 MIN 9/15/2018 Perry Linares, PTA GP, KX 1    07111677604 HC PT THER PROC EA 15 MIN 9/16/2018 Perry Linares, PTA GP, KX 1    71620398002 HC GAIT TRAINING EA 15 MIN 9/16/2018 Perry Linares, PTA GP, KX 1    86459314363 HC PT THER PROC EA 15 MIN 9/16/2018 Perry Linares PTA GP, KX 1    36609488857 HC GAIT TRAINING EA 15 MIN 9/16/2018 Perry Linares PTA GP, KX 1          PT G-Codes  PT Professional Judgement Used?: Yes  Outcome  Measure Options: AM-PAC 6 Clicks Basic Mobility (PT)  AM-PAC 6 Clicks Score: 20  Score: 17  Functional Limitation: Mobility: Walking and moving around  Mobility: Walking and Moving Around Current Status (): At least 40 percent but less than 60 percent impaired, limited or restricted  Mobility: Walking and Moving Around Goal Status (): At least 20 percent but less than 40 percent impaired, limited or restricted    Perry Linares, PTA  9/16/2018

## 2018-09-16 NOTE — PLAN OF CARE
Problem: Fall Risk (Adult)  Goal: Absence of Fall  Outcome: Ongoing (interventions implemented as appropriate)   09/16/18 0353   Fall Risk (Adult)   Absence of Fall achieves outcome       Problem: Patient Care Overview  Goal: Plan of Care Review  Outcome: Ongoing (interventions implemented as appropriate)   09/16/18 0353   Coping/Psychosocial   Plan of Care Reviewed With patient   Plan of Care Review   Progress improving   OTHER   Outcome Summary VSS, c/o back pain with relief from PRN pain med. pt is noncompliant with bipap. Large BM tonight. Cont to monitor     Goal: Individualization and Mutuality  Outcome: Ongoing (interventions implemented as appropriate)    Goal: Discharge Needs Assessment  Outcome: Ongoing (interventions implemented as appropriate)      Problem: Constipation (Adult)  Goal: Effective Bowel Elimination  Outcome: Outcome(s) achieved Date Met: 09/16/18 09/16/18 0353   Constipation (Adult)   Effective Bowel Elimination achieves outcome     Goal: Comfort  Outcome: Outcome(s) achieved Date Met: 09/16/18      Problem: Sepsis/Septic Shock (Adult)  Goal: Signs and Symptoms of Listed Potential Problems Will be Absent, Minimized or Managed (Sepsis/Septic Shock)  Outcome: Ongoing (interventions implemented as appropriate)   09/15/18 1516   Goal/Outcome Evaluation   Problems Assessed (Sepsis) all   Problems Present (Sepsis) none       Problem: Syncope (Adult)  Goal: Physical Safety/Health Maintenance  Outcome: Ongoing (interventions implemented as appropriate)   09/16/18 0353   Syncope (Adult)   Physical Safety/Health Maintenance making progress toward outcome     Goal: Optimal Emotional/Functional [place]  Outcome: Ongoing (interventions implemented as appropriate)      Problem: Renal Failure/Kidney Injury, Acute (Adult)  Goal: Signs and Symptoms of Listed Potential Problems Will be Absent, Minimized or Managed (Renal Failure/Kidney Injury, Acute)  Outcome: Ongoing (interventions implemented as  appropriate)   09/15/18 1516   Goal/Outcome Evaluation   Problems Assessed (Acute Renal Failure/Kidney Injury) all   Problems Present (ARF/Kidney Injury) none       Problem: Brain Injury, Moderate Traumatic (GCS 9-12) (Adult)  Goal: Signs and Symptoms of Listed Potential Problems Will be Absent, Minimized or Managed (Brain Injury, Moderate Traumatic)  Outcome: Ongoing (interventions implemented as appropriate)   09/16/18 0353   Goal/Outcome Evaluation   Problems Assessed (Moderate Brain Injury (GCS 9-12)) all   Problems Present (Moderate Brain Injury) none       Problem: Skin Injury Risk (Adult)  Goal: Skin Health and Integrity  Outcome: Ongoing (interventions implemented as appropriate)   09/16/18 0353   Skin Injury Risk (Adult)   Skin Health and Integrity making progress toward outcome       Problem: Nutrition, Imbalanced: Inadequate Oral Intake (Adult)  Goal: Improved Oral Intake  Outcome: Ongoing (interventions implemented as appropriate)   09/16/18 0353   Nutrition, Imbalanced: Inadequate Oral Intake (Adult)   Improved Oral Intake making progress toward outcome     Goal: Prevent Further Weight Loss  Outcome: Ongoing (interventions implemented as appropriate)

## 2018-09-16 NOTE — PLAN OF CARE
Problem: Patient Care Overview  Goal: Plan of Care Review  Outcome: Ongoing (interventions implemented as appropriate)   09/16/18 1732   Coping/Psychosocial   Plan of Care Reviewed With patient   Plan of Care Review   Progress improving   OTHER   Outcome Summary NSR 79-93 WITH PVC'S ON TELE. NO C/O PAIN THIS SHIFT. PATIENT REQUESTED MILK/MOLASSES ENEMA. PATIENT AGREEABLE TO NHP NOW,  NOTIFIED. MEPILEX ON LFA REPLACED. WORKING WELL WITH PT, AMBULATING IN HALLS. BP STILL ORTHOSTATIC. CONTINUE TO MONITOR, NOTIFY MD OF ANY CHANGES.     Goal: Individualization and Mutuality  Outcome: Ongoing (interventions implemented as appropriate)      Problem: Syncope (Adult)  Goal: Physical Safety/Health Maintenance  Outcome: Ongoing (interventions implemented as appropriate)   09/16/18 1732   Syncope (Adult)   Physical Safety/Health Maintenance making progress toward outcome     Goal: Optimal Emotional/Functional [place]  Outcome: Ongoing (interventions implemented as appropriate)   09/16/18 1732   Syncope (Adult)   Optimal Emotional/Functional [place] making progress toward outcome       Problem: Brain Injury, Moderate Traumatic (GCS 9-12) (Adult)  Goal: Signs and Symptoms of Listed Potential Problems Will be Absent, Minimized or Managed (Brain Injury, Moderate Traumatic)  Outcome: Ongoing (interventions implemented as appropriate)   09/16/18 1732   Goal/Outcome Evaluation   Problems Assessed (Moderate Brain Injury (GCS 9-12)) all   Problems Present (Moderate Brain Injury) hemodynamic instability;situational response       Problem: Skin Injury Risk (Adult)  Goal: Skin Health and Integrity  Outcome: Ongoing (interventions implemented as appropriate)   09/16/18 1732   Skin Injury Risk (Adult)   Skin Health and Integrity making progress toward outcome       Problem: Nutrition, Imbalanced: Inadequate Oral Intake (Adult)  Goal: Improved Oral Intake  Outcome: Ongoing (interventions implemented as appropriate)    09/16/18 1732   Nutrition, Imbalanced: Inadequate Oral Intake (Adult)   Improved Oral Intake making progress toward outcome     Goal: Prevent Further Weight Loss  Outcome: Ongoing (interventions implemented as appropriate)   09/16/18 1732   Nutrition, Imbalanced: Inadequate Oral Intake (Adult)   Prevent Further Weight Loss making progress toward outcome

## 2018-09-16 NOTE — PROGRESS NOTES
[facility]  INPATIENT PROGRESS NOTE    Patient Name: Travis Patel  Date of Admission: 9/10/2018  Today's Date: 09/16/18  Length of Stay: 6  Primary Care Physician: Shant Hayes MD    Subjective   Chief Complaint: needs to have a bowel movement.  Would like to have an enema.    HPI   No nasuea  Has been getting up with therapy is and progressing.  Not as dizzy.  No cheat pain  No short ness of breath.    Patient states he is going to SNF rehab from here for strengthening.    Review of Systems     All pertinent negatives and positives are as above. All other systems have been reviewed and are negative unless otherwise stated.     Objective    Temp:  [97.7 °F (36.5 °C)-98.1 °F (36.7 °C)] 98.1 °F (36.7 °C)  Heart Rate:  [] 86  Resp:  [18] 18  BP: ()/(58-75) 109/60  Physical Exam   Constitutional: He is oriented to person, place, and time. He appears well-developed and well-nourished.   HENT:   Head: Normocephalic and atraumatic.   Eyes: Pupils are equal, round, and reactive to light. Conjunctivae and EOM are normal.   Neck: Normal range of motion. Neck supple. No JVD present.   Cardiovascular: Normal rate, regular rhythm, normal heart sounds and intact distal pulses.    Pulmonary/Chest: Effort normal and breath sounds normal.   Abdominal: Soft. Bowel sounds are normal.   Musculoskeletal: Normal range of motion.   Neurological: He is alert and oriented to person, place, and time.   Skin: Skin is warm and dry.   Psychiatric: He has a normal mood and affect. His behavior is normal.           Results Review:  I have reviewed the labs, radiology results, and diagnostic studies.    Laboratory Data:     Results from last 7 days  Lab Units 09/15/18  0545 09/14/18  0434 09/13/18  0353   WBC 10*3/mm3 6.53 6.91 8.26   HEMOGLOBIN g/dL 10.2* 10.3* 10.1*   HEMATOCRIT % 29.6* 30.4* 29.6*   PLATELETS 10*3/mm3 148 144 134          Results from last 7 days  Lab Units  09/16/18  0619 09/15/18  0545 09/14/18  0434   SODIUM mmol/L 140 139 139   POTASSIUM mmol/L 4.3 4.3 4.4   CHLORIDE mmol/L 111* 108 108   CO2 mmol/L 22.0* 23.0* 25.0   BUN mg/dL 11 14 10   CREATININE mg/dL 0.83 0.85 0.96   CALCIUM mg/dL 8.6 8.5 8.4   BILIRUBIN mg/dL 0.5 0.5 0.6   ALK PHOS U/L 123* 126* 110   ALT (SGPT) U/L 38 42 53   AST (SGOT) U/L 31 38 53*   GLUCOSE mg/dL 101* 102* 88       Culture Data:   Blood Culture   Date Value Ref Range Status   09/10/2018 No growth at 5 days  Final   09/10/2018 No growth at 5 days  Final       Radiology Data:   Imaging Results (last 24 hours)     ** No results found for the last 24 hours. **          I have reviewed the patient's current medications.     Assessment/Plan     Hospital Problem List     Subarachnoid hemorrhage (CMS/Formerly [facility])    Chronic coronary artery disease    Overview Signed 9/12/2018 10:09 PM by Logan Willams MD     Overview:   07/26/07:  Coronary artery disease involving > 3 vessels with more than 50%  : [  ]      Last Assessment & Plan:   Managed by Dr. Patton. Currently on lipitor, aspirin, plavix, lisinopril and metoprolol- no new symptoms. We discussed increasing his exercise now the weather has improved. He is to aim for walking 30 minutes 5 times a week.         Congestive heart failure (CMS/Formerly [facility])    Hyperlipidemia    Overview Signed 9/12/2018 10:09 PM by Logan Willams MD     Last Assessment & Plan:   He is on appropriate atorvastatin dosing which will continue.         ICD (implantable cardioverter-defibrillator) in place    Overview Signed 9/12/2018 10:09 PM by Logan Willams MD     Last Assessment & Plan:   His ICD appears to be functioning properly and he's had no shocks.         Orthostatic hypotension    Syncope          Assessment     Syncope  Orthostatic hypotension, chronic recurrent by history.  Subarrachnoid hemorrhage.   CAD   Bipolar disease  Constipation with possible rectal impaction.  Renal failure. Acute on chronic.  CHF by history,  type unknown, echo in process.  Constipation  Large BM 9-13-18     Plan    Continue PT  Monitor blood pressure, markedly improved with stopping diuretics.   SNF on DC  Enema per patient request    Discharge Planning: I expect the patient to be discharged to SNF in 1-2 days.    Kenya Gomez,    09/16/18   1:06 PM

## 2018-09-16 NOTE — THERAPY TREATMENT NOTE
Acute Care - Physical Therapy Treatment Note  [facility]     Patient Name: Travis Patel  : 1945  MRN: [id number]  Today's Date: 2018  Onset of Illness/Injury or Date of Surgery: 09/10/18  Date of Referral to PT: 18  Referring Physician: Dr. Gomez    Admit Date: 9/10/2018    Visit Dx:    ICD-10-CM ICD-9-CM   1. Subarachnoid hemorrhage (CMS/HCC) I60.9 430   2. Syncope, unspecified syncope type R55 780.2   3. Elevated liver enzymes R74.8 790.5   4. Constipation, unspecified constipation type K59.00 564.00   5. VANI (acute kidney injury) (CMS/Formerly [facility]) N17.9 584.9   6. Elevated troponin R74.8 790.6   7. Renal cyst N28.1 753.10   8. Adrenal nodule (CMS/Formerly [facility]) E27.9 255.8   9. Chronic anticoagulation Z79.01 V58.61   10. Dysphagia, unspecified type R13.10 787.20   11. Impaired cognition R41.89 294.9   12. Impaired mobility and ADLs Z74.09 799.89   13. Gait abnormality R26.9 781.2     Patient Active Problem List   Diagnosis   • Subarachnoid hemorrhage (CMS/HCC)   • Chronic coronary artery disease   • Congestive heart failure (CMS/Formerly [facility])   • Hyperlipidemia   • ICD (implantable cardioverter-defibrillator) in place   • Orthostatic hypotension   • Syncope       Therapy Treatment          Rehabilitation Treatment Summary     Row Name 18 09             Treatment Time/Intention    Discipline physical therapy assistant  -EC      Document Type therapy note (daily note)  -EC      Subjective Information complains of;dizziness  -EC2      Recorded by [EC] Perry Linares, PTA 18 0943  [EC2] Perry Linares, PTA 18 1010      Row Name 18 09             Vital Signs    Pre Systolic BP Rehab 114  -EC      Pre Treatment Diastolic BP 68  -EC      Intra Systolic BP Rehab 105  -EC      Intra Treatment Diastolic BP 74  -EC      Post Systolic BP Rehab 88  -EC      Post Treatment Diastolic BP 62  -EC      Recorded by [EC] Perry Linares, PTA 18 1010      Row Name 18 09             Bed  Mobility Assessment/Treatment    Supine-Sit [place] (Bed Mobility) conditional independence  -EC      Recorded by [EC] Perry Linares, PTA 09/16/18 1020      Row Name 09/16/18 0900             Sit-Stand Transfer    Sit-Stand [place] (Transfers) stand by assist  -EC      Recorded by [EC] Perry Linares, PTA 09/16/18 1020      Row Name 09/16/18 0900             Stand-Sit Transfer    Stand-Sit [place] (Transfers) stand by assist  -EC      Recorded by [EC] Perry Linares, PTA 09/16/18 1020      Row Name 09/16/18 0900             Gait/Stairs Assessment/Training    Assistive Device (Gait) walker, front-wheeled  -EC      Distance in Feet (Gait) 200  -EC      Deviations/Abnormal Patterns (Gait) gait speed decreased  -EC      Bilateral Gait Deviations forward flexed posture  -EC      Recorded by [EC] Perry Linares, PTA 09/16/18 1020      Row Name 09/16/18 0900             Therapeutic Exercise    Lower Extremity Range of Motion (Therapeutic Exercise) hip flexion/extension, bilateral;hip abduction/adduction, bilateral   heel raises  -EC      Position (Therapeutic Exercise) standing  -EC      Sets/Reps (Therapeutic Exercise) 15  -EC      Recorded by [EC] Perry Linares, PTA 09/16/18 1020      Row Name 09/16/18 0900             Positioning and Restraints    Pre-Treatment Position in bed  -EC      In Chair notified nsg;reclined;call light within reach  -EC      Recorded by [EC] Perry Linares, PTA 09/16/18 1020      Row Name 09/16/18 0900             Pain Scale: Numbers Pre/Post-Treatment    Pain Scale: Numbers, Pretreatment 0/10 - no pain  -EC      Pain Scale: Numbers, Post-Treatment 0/10 - no pain  -EC      Recorded by [EC] Perry Linares, PTA 09/16/18 1020      Row Name                Wound 09/12/18 2000 Left lateral arm skin tear    Wound - Properties Group Date first assessed: 09/12/18 [AH] Time first assessed: 2000 [AH] Side: Left [AH] Orientation: lateral [] Location: arm [AH] Type:  skin tear [AH] Recorded by:  [] Channing Castellon, RN 09/13/18 0449      User Key  (r) = Recorded By, (t) = Taken By, (c) = Cosigned By    Initials Name Effective Dates Discipline     Perry Linares, PTA 08/02/16 -  PT    Channing Diaz, RN 02/12/18 -  Nurse          Wound 09/12/18 2000 Left lateral arm skin tear (Active)   Dressing Appearance dry;intact 9/16/2018  7:40 AM   Base dressing in place, unable to visualize 9/16/2018  7:40 AM   Dressing Care, Wound foam 9/16/2018  7:40 AM             Physical Therapy Education     Title: PT OT SLP Therapies (Active)     Topic: Physical Therapy (Active)     Point: Mobility training (Active)    Learning Progress Summary     Learner Status Readiness Method Response Comment Documented by    Patient Active Acceptance E NR orthostatic hypotension and benefits of being OOB  09/16/18 1020     Done Acceptance E DU,NR benefits of activity  09/15/18 0855     Done Acceptance MARK CLEANING D VU,DU,NR Education for LE exercise to combat orthostasis to include aps, LAQ, & seated marching and deep breathing  education to transition slowly during transfers to assist w/ adjustment to change in position to reduce risk for LOB due to orthostatic hypotension  09/14/18 1012    Family Done Acceptance MARK CLEANING D VU,DU,NR Education for LE exercise to combat orthostasis to include aps, LAQ, & seated marching and deep breathing  education to transition slowly during transfers to assist w/ adjustment to change in position to reduce risk for LOB due to orthostatic hypotension  09/14/18 1012          Point: Precautions (Active)    Learning Progress Summary     Learner Status Readiness Method Response Comment Documented by    Patient Active Acceptance E NR orthostatic hypotension and benefits of being OOB  09/16/18 1020     Done Acceptance MARK CLEANING D VU,NR,DU Educ re falls prevention to include risk w/ orthostasis; educ for LE ex, transition slowly during tfers, & incr awareness of changes in  tolerance to being upright; educ to sit upright more often w/ meals/ in between meals to incr tolerance  09/14/18 1013                      User Key     Initials Effective Dates Name Provider Type Discipline     08/02/16 -  Perry Linares PTA Physical Therapy Assistant PT     08/02/18 -  Maria A Amor PT Physical Therapist PT                    PT Recommendation and Plan     Plan of Care Reviewed With: patient  Progress: improving  Outcome Summary: 200 ft with Rwx CGA           Outcome Measures     Row Name 09/16/18 1000 09/14/18 1000 09/14/18 0900       How much help from another person do you currently need...    Turning from your back to your side while in flat bed without using bedrails? 4  -EC 3  -JE  --    Moving from lying on back to sitting on the side of a flat bed without bedrails? 4  -EC 3  -JE  --    Moving to and from a bed to a chair (including a wheelchair)? 3  -EC 3  -JE  --    Standing up from a chair using your arms (e.g., wheelchair, bedside chair)? 3  -EC 3  -JE  --    Climbing 3-5 steps with a railing? 3  -EC 3  -JE  --    To walk in hospital room? 3  -EC 3  -JE  --    AM-PAC 6 Clicks Score 20  -EC 18  -JE  --       How much help from another is currently needed...    Putting on and taking off regular lower body clothing?  --  -- 2  -MW    Bathing (including washing, rinsing, and drying)  --  -- 2  -MW    Toileting (which includes using toilet bed pan or urinal)  --  -- 3  -MW    Putting on and taking off regular upper body clothing  --  -- 3  -MW    Taking care of personal grooming (such as brushing teeth)  --  -- 3  -MW    Eating meals  --  -- 4  -MW    Score  --  -- 17  -MW       Functional Assessment    Outcome Measure Options AM-PAC 6 Clicks Basic Mobility (PT)  - AM-PAC 6 Clicks Basic Mobility (PT)  - AM-PAC 6 Clicks Daily Activity (OT)  -MW      User Key  (r) = Recorded By, (t) = Taken By, (c) = Cosigned By    Initials Name Provider Type     Perry Linares PTA  Physical Therapy Assistant    Sunni Sky, OTR/L Occupational Therapist    Maria A Lee, PT Physical Therapist           Time Calculation:         PT Charges     Row Name 09/16/18 0942             Time Calculation    Start Time 0941  -EC      Stop Time 1010  -EC      Time Calculation (min) 29 min  -EC      PT Received On 09/16/18  -EC      PT Goal Re-Cert Due Date 09/24/18  -EC         Time Calculation- PT    Total Timed Code Minutes- PT 29 minute(s)  -EC         Timed Charges    54545 - PT Therapeutic Exercise Minutes 15  -EC      21330 - Gait Training Minutes  14  -EC        User Key  (r) = Recorded By, (t) = Taken By, (c) = Cosigned By    Initials Name Provider Type    EC Perry Linares, PTA Physical Therapy Assistant        Therapy Suggested Charges     Code   Minutes Charges    31318 (CPT®) Hc Pt Neuromusc Re Education Ea 15 Min      71059 (CPT®) Hc Pt Ther Proc Ea 15 Min 15 1    58779 (CPT®) Hc Gait Training Ea 15 Min 14 1    57187 (CPT®) Hc Pt Therapeutic Act Ea 15 Min      77567 (CPT®) Hc Pt Manual Therapy Ea 15 Min      76159 (CPT®) Hc Pt Iontophoresis Ea 15 Min      70256 (CPT®) Hc Pt Elec Stim Ea-Per 15 Min      25692 (CPT®) Hc Pt Ultrasound Ea 15 Min      11937 (CPT®) Hc Pt Self Care/Mgmt/Train Ea 15 Min      68325 (CPT®) Hc Pt Prosthetic (S) Train Initial Encounter, Each 15 Min      89534 (CPT®) Hc Pt Orthotic(S)/Prosthetic(S) Encounter, Each 15 Min      07941 (CPT®) Hc Orthotic(S) Mgmt/Train Initial Encounter, Each 15min      Total  29 2        Therapy Charges for Today     Code Description Service Date Service Provider Modifiers Qty    16354756571 HC PT THER PROC EA 15 MIN 9/15/2018 Perry Linares, PTA GP, KX 1    95636748058 HC GAIT TRAINING EA 15 MIN 9/15/2018 Perry Linares, PTA GP, KX 1    71749591943 HC PT THER PROC EA 15 MIN 9/15/2018 Perry Linares, PTA GP, KX 1    94900129731 HC GAIT TRAINING EA 15 MIN 9/15/2018 Perry Linares, PTA GP, KX 1    87702753700  PT  THER PROC EA 15 MIN 9/16/2018 Perry Linares PTA GP, KX 1    93328410311 HC GAIT TRAINING EA 15 MIN 9/16/2018 Perry Linares, ESPERANZA GP, KX 1          PT G-Codes  PT Professional Judgement Used?: Yes  Outcome Measure Options: AM-PAC 6 Clicks Basic Mobility (PT)  AM-PAC 6 Clicks Score: 20  Score: 17  Functional Limitation: Mobility: Walking and moving around  Mobility: Walking and Moving Around Current Status (): At least 40 percent but less than 60 percent impaired, limited or restricted  Mobility: Walking and Moving Around Goal Status (): At least 20 percent but less than 40 percent impaired, limited or restricted    Perry Linares PTA  9/16/2018

## 2018-09-16 NOTE — PLAN OF CARE
Problem: Patient Care Overview  Goal: Plan of Care Review  Outcome: Ongoing (interventions implemented as appropriate)   09/16/18 1021   Coping/Psychosocial   Plan of Care Reviewed With patient   Plan of Care Review   Progress improving   OTHER   Outcome Summary 200 ft with Rwx CGA

## 2018-09-17 LAB
ALBUMIN SERPL-MCNC: 2.8 G/DL (ref 3.5–5)
ALBUMIN/GLOB SERPL: 1.2 G/DL (ref 1.1–2.5)
ALP SERPL-CCNC: 120 U/L (ref 24–120)
ALT SERPL W P-5'-P-CCNC: 38 U/L (ref 0–54)
ANION GAP SERPL CALCULATED.3IONS-SCNC: 5 MMOL/L (ref 4–13)
AST SERPL-CCNC: 30 U/L (ref 7–45)
BILIRUB SERPL-MCNC: 0.5 MG/DL (ref 0.1–1)
BUN BLD-MCNC: 10 MG/DL (ref 5–21)
BUN/CREAT SERPL: 11.8 (ref 7–25)
CALCIUM SPEC-SCNC: 8.9 MG/DL (ref 8.4–10.4)
CHLORIDE SERPL-SCNC: 110 MMOL/L (ref 98–110)
CO2 SERPL-SCNC: 24 MMOL/L (ref 24–31)
CREAT BLD-MCNC: 0.85 MG/DL (ref 0.5–1.4)
GFR SERPL CREATININE-BSD FRML MDRD: 89 ML/MIN/1.73
GLOBULIN UR ELPH-MCNC: 2.3 GM/DL
GLUCOSE BLD-MCNC: 94 MG/DL (ref 70–100)
POTASSIUM BLD-SCNC: 4.3 MMOL/L (ref 3.5–5.3)
PROT SERPL-MCNC: 5.1 G/DL (ref 6.3–8.7)
SODIUM BLD-SCNC: 139 MMOL/L (ref 135–145)

## 2018-09-17 PROCEDURE — 97535 SELF CARE MNGMENT TRAINING: CPT

## 2018-09-17 PROCEDURE — 97116 GAIT TRAINING THERAPY: CPT

## 2018-09-17 PROCEDURE — 80053 COMPREHEN METABOLIC PANEL: CPT | Performed by: NURSE PRACTITIONER

## 2018-09-17 PROCEDURE — 97110 THERAPEUTIC EXERCISES: CPT

## 2018-09-17 RX ADMIN — POLYETHYLENE GLYCOL (3350) 17 G: 17 POWDER, FOR SOLUTION ORAL at 17:29

## 2018-09-17 RX ADMIN — LACTULOSE 20 G: 20 SOLUTION ORAL at 17:21

## 2018-09-17 RX ADMIN — MIDODRINE HYDROCHLORIDE 10 MG: 2.5 TABLET ORAL at 08:10

## 2018-09-17 RX ADMIN — LITHIUM CARBONATE 300 MG: 300 CAPSULE, GELATIN COATED ORAL at 17:22

## 2018-09-17 RX ADMIN — LACTULOSE 20 G: 20 SOLUTION ORAL at 08:10

## 2018-09-17 RX ADMIN — BUSPIRONE HYDROCHLORIDE 10 MG: 10 TABLET ORAL at 08:10

## 2018-09-17 RX ADMIN — MIDODRINE HYDROCHLORIDE 10 MG: 2.5 TABLET ORAL at 11:10

## 2018-09-17 RX ADMIN — LACTULOSE 20 G: 20 SOLUTION ORAL at 21:06

## 2018-09-17 RX ADMIN — MIDODRINE HYDROCHLORIDE 10 MG: 2.5 TABLET ORAL at 17:22

## 2018-09-17 RX ADMIN — LITHIUM CARBONATE 300 MG: 300 CAPSULE, GELATIN COATED ORAL at 11:11

## 2018-09-17 RX ADMIN — BUSPIRONE HYDROCHLORIDE 10 MG: 10 TABLET ORAL at 21:06

## 2018-09-17 RX ADMIN — ATORVASTATIN CALCIUM 80 MG: 40 TABLET, FILM COATED ORAL at 21:06

## 2018-09-17 RX ADMIN — FAMOTIDINE 20 MG: 20 TABLET, FILM COATED ORAL at 08:10

## 2018-09-17 RX ADMIN — LITHIUM CARBONATE 300 MG: 300 CAPSULE, GELATIN COATED ORAL at 08:09

## 2018-09-17 RX ADMIN — DULOXETINE HYDROCHLORIDE 60 MG: 30 CAPSULE, DELAYED RELEASE ORAL at 08:09

## 2018-09-17 RX ADMIN — POTASSIUM CHLORIDE 20 MEQ: 750 CAPSULE, EXTENDED RELEASE ORAL at 08:10

## 2018-09-17 RX ADMIN — FOLIC ACID 1 MG: 1 TABLET ORAL at 08:09

## 2018-09-17 RX ADMIN — QUETIAPINE FUMARATE 100 MG: 100 TABLET ORAL at 21:06

## 2018-09-17 NOTE — THERAPY TREATMENT NOTE
Acute Care - Physical Therapy Treatment Note  [facility]     Patient Name: Travis Patel  : 1945  MRN: [id number]  Today's Date: 2018  Onset of Illness/Injury or Date of Surgery: 09/10/18  Date of Referral to PT: 18  Referring Physician: Dr. Gomez    Admit Date: 9/10/2018    Visit Dx:    ICD-10-CM ICD-9-CM   1. Subarachnoid hemorrhage (CMS/HCC) I60.9 430   2. Syncope, unspecified syncope type R55 780.2   3. Elevated liver enzymes R74.8 790.5   4. Constipation, unspecified constipation type K59.00 564.00   5. VANI (acute kidney injury) (CMS/HCC) N17.9 584.9   6. Elevated troponin R74.8 790.6   7. Renal cyst N28.1 753.10   8. Adrenal nodule (CMS/HCC) E27.9 255.8   9. Chronic anticoagulation Z79.01 V58.61   10. Dysphagia, unspecified type R13.10 787.20   11. Impaired cognition R41.89 294.9   12. Impaired mobility and ADLs Z74.09 799.89   13. Gait abnormality R26.9 781.2     Patient Active Problem List   Diagnosis   • Subarachnoid hemorrhage (CMS/HCC)   • Chronic coronary artery disease   • Congestive heart failure (CMS/[facility])   • Hyperlipidemia   • ICD (implantable cardioverter-defibrillator) in place   • Orthostatic hypotension   • Syncope       Therapy Treatment          Rehabilitation Treatment Summary     Row Name 1827             Treatment Time/Intention    Discipline physical therapy assistant  -AE      Document Type therapy note (daily note)  -AE      Subjective Information complains of;dizziness  -AE2      Existing Precautions/Restrictions fall   orthostasis  -AE2      Recorded by [AE] Adamaris Broussard, PTA 18 0943  [AE2] Adamaris Broussard, PTA 18 1003      Row Name 1827             Vital Signs    Pre Systolic BP Rehab 140   supine  -AE      Pre Treatment Diastolic BP 73  -AE      Intra Systolic BP Rehab 112   sitting  -AE      Intra Treatment Diastolic BP 76  -AE      Post Systolic BP Rehab 107   standing  -AE      Post Treatment Diastolic BP 67  -AE      Post Patient  Position --   after walk 124/76 in sitting position  -AE      Recorded by [AE] Adamaris Broussard, PTA 09/17/18 1003      Row Name 09/17/18 0927             Bed Mobility Assessment/Treatment    Supine-Sit [place] (Bed Mobility) supervision  -AE      Assistive Device (Bed Mobility) bed rails;head of bed elevated  -AE      Recorded by [AE] Adamaris Broussard, PTA 09/17/18 1003      Row Name 09/17/18 0927             Sit-Stand Transfer    Sit-Stand [place] (Transfers) stand by assist  -AE      Recorded by [AE] Adamaris Broussard, PTA 09/17/18 1003      Row Name 09/17/18 0927             Stand-Sit Transfer    Stand-Sit [place] (Transfers) stand by assist  -AE      Recorded by [AE] Adamaris Broussard, [facility] 09/17/18 1003      Row Name 09/17/18 0927             Gait/Stairs Assessment/Training    20781 - Gait Training Minutes  18  -AE      Gait/Stairs Assessment/Training gait/ambulation independence  -AE2      [place] Level (Gait) stand by assist  -AE      Assistive Device (Gait) walker, front-wheeled  -AE2      Distance in Feet (Gait) 200  -AE2      Deviations/Abnormal Patterns (Gait) gait speed decreased  -AE2      Bilateral Gait Deviations forward flexed posture  -AE2      [place] Level (Ramp) contact guard;verbal cues  -AE2      Handrail Location (Ramp) right side (ascending)  -AE2      Recorded by [AE] Adamaris Broussard, PTA 09/17/18 1006  [AE2] Adamaris Broussard, [facility] 09/17/18 1003      Row Name 09/17/18 0927             Therapeutic Exercise    62073 - PT Therapeutic Exercise Minutes 20  -AE      Recorded by [AE] Adamaris Broussard, PTA 09/17/18 1006      Row Name 09/17/18 0927             Therapeutic Exercise    Lower Extremity (Therapeutic Exercise) LAQ (long arc quad), bilateral  -AE      Lower Extremity Range of Motion (Therapeutic Exercise) ankle dorsiflexion/plantar flexion, bilateral  -AE      Exercise Type (Therapeutic Exercise) AROM (active range of motion)  -AE      Position (Therapeutic Exercise) seated   -AE      Sets/Reps (Therapeutic Exercise) 20  -AE      Recorded by [AE] Adamaris Broussard, PTA 09/17/18 1003      Row Name 09/17/18 0927             Positioning and Restraints    Pre-Treatment Position in bed  -AE      Post Treatment Position chair  -AE      In Chair reclined;sitting;call light within reach  -AE      Recorded by [AE] Adamaris Broussard, PTA 09/17/18 1003      Row Name 09/17/18 0927             Pain Scale: Numbers Pre/Post-Treatment    Pain Scale: Numbers, Pretreatment 0/10 - no pain  -AE      Pain Scale: Numbers, Post-Treatment 0/10 - no pain  -AE      Recorded by [AE] Adamaris Broussard, PTA 09/17/18 1003      Row Name                Wound 09/12/18 2000 Left lateral arm skin tear    Wound - Properties Group Date first assessed: 09/12/18 [AH] Time first assessed: 2000 [AH] Side: Left [AH] Orientation: lateral [] Location: arm [] Type: skin tear [] Recorded by:  [] Channing Castellon, RN 09/13/18 1039      User Key  (r) = Recorded By, (t) = Taken By, (c) = Cosigned By    Initials Name Effective Dates Discipline    AE Adamaris Broussard, PTA 06/22/15 -  PT     Channing Castellon, RN 02/12/18 -  Nurse          Wound 09/12/18 2000 Left lateral arm skin tear (Active)   Dressing Appearance dry;intact 9/17/2018  8:05 AM   Closure Adhesive bandage 9/17/2018  8:05 AM   Base dressing in place, unable to visualize 9/17/2018  8:05 AM   Periwound dry;intact 9/17/2018  8:05 AM   Periwound Temperature warm 9/17/2018  8:05 AM   Periwound Skin Turgor soft 9/17/2018  8:05 AM             Physical Therapy Education     Title: PT OT SLP Therapies (Done)     Topic: Physical Therapy (Done)     Point: Mobility training (Done)    Learning Progress Summary     Learner Status Readiness Method Response Comment Documented by    Patient Done Eager E VU POC seated ex's AE 09/17/18 1006     Active Acceptance E NR orthostatic hypotension and benefits of being OOB EC 09/16/18 1020     Done Acceptance E DU,NR benefits of activity EC  09/15/18 0855     Done Acceptance [employer] D VU,DU,NR Education for LE exercise to combat orthostasis to include aps, LAQ, & seated marching and deep breathing  education to transition slowly during transfers to assist w/ adjustment to change in position to reduce risk for LOB due to orthostatic hypotension  09/14/18 1012    Family Done Acceptance [employer] D VU,DU,NR Education for LE exercise to combat orthostasis to include aps, LAQ, & seated marching and deep breathing  education to transition slowly during transfers to assist w/ adjustment to change in position to reduce risk for LOB due to orthostatic hypotension  09/14/18 1012          Point: Precautions (Done)    Learning Progress Summary     Learner Status Readiness Method Response Comment Documented by    Patient Done Eager E VU POC seated ex's  09/17/18 1006     Active Acceptance E NR orthostatic hypotension and benefits of being OOB  09/16/18 1020     Done Acceptance [employer] D VU,NR,DU Educ re falls prevention to include risk w/ orthostasis; educ for LE ex, transition slowly during tfers, & incr awareness of changes in tolerance to being upright; educ to sit upright more often w/ meals/ in between meals to incr tolerance  09/14/18 1013                      User Key     Initials Effective Dates Name Provider Type Discipline     08/02/16 -  Perry Linares, PTA Physical Therapy Assistant PT     06/22/15 -  Adamaris Broussard PTA Physical Therapy Assistant PT     08/02/18 -  Maria A Amor, PT Physical Therapist PT                    PT Recommendation and Plan     Plan of Care Reviewed With: patient  Progress: improving  Outcome Summary: Pt was S for bed mobility and SBA for t/f's. Pt ambulated SBA with RW and completed 3 steps cga          Outcome Measures     Row Name 09/17/18 0927 09/16/18 1500 09/16/18 1000       How much help from another person do you currently need...    Turning from your back to your side while in flat bed without using  bedrails? 4  -AE 4  -EC 4  -EC    Moving from lying on back to sitting on the side of a flat bed without bedrails? 4  -AE 4  -EC 4  -EC    Moving to and from a bed to a chair (including a wheelchair)? 3  -AE 3  -EC 3  -EC    Standing up from a chair using your arms (e.g., wheelchair, bedside chair)? 3  -AE 3  -EC 3  -EC    Climbing 3-5 steps with a railing? 3  -AE 3  -EC 3  -EC    To walk in hospital room? 3  -AE 3  -EC 3  -EC    AM-PAC 6 Clicks Score 20  -AE 20  -EC 20  -EC       Functional Assessment    Outcome Measure Options AM-PAC 6 Clicks Basic Mobility (PT)  -AE AM-PAC 6 Clicks Basic Mobility (PT)  -EC AM-PAC 6 Clicks Basic Mobility (PT)  -EC      User Key  (r) = Recorded By, (t) = Taken By, (c) = Cosigned By    Initials Name Provider Type    EC Perry Linares, PTA Physical Therapy Assistant    AE Adamaris Broussard, ESPERANZA Physical Therapy Assistant           Time Calculation:         PT Charges     Row Name 09/17/18 1010 09/17/18 0927          Time Calculation    Start Time 0927  -AE  --     Stop Time 1005  -AE  --     Time Calculation (min) 38 min  -AE  --     PT Received On 09/17/18  -AE  --     PT Goal Re-Cert Due Date 09/24/18  -AE  --        Time Calculation- PT    Total Timed Code Minutes- PT 38 minute(s)  -AE  --        Timed Charges    37058 - PT Therapeutic Exercise Minutes 20  -AE 20  -AE     23578 - Gait Training Minutes  18  -AE 18  -AE       User Key  (r) = Recorded By, (t) = Taken By, (c) = Cosigned By    Initials Name Provider Type    Adamaris Perea, ESPERANZA Physical Therapy Assistant        Therapy Suggested Charges     Code   Minutes Charges    87147 (CPT®) Hc Pt Neuromusc Re Education Ea 15 Min      17535 (CPT®) Hc Pt Ther Proc Ea 15 Min 40 3    62827 (CPT®) Hc Gait Training Ea 15 Min 36 2    98984 (CPT®) Hc Pt Therapeutic Act Ea 15 Min      14020 (CPT®) Hc Pt Manual Therapy Ea 15 Min      79172 (CPT®) Hc Pt Iontophoresis Ea 15 Min      72120 (CPT®) Hc Pt Elec Stim Ea-Per 15 Min      44265  (CPT®) Hc Pt Ultrasound Ea 15 Min      78684 (CPT®) Hc Pt Self Care/Mgmt/Train Ea 15 Min      56952 (CPT®) Hc Pt Prosthetic (S) Train Initial Encounter, Each 15 Min      26399 (CPT®) Hc Pt Orthotic(S)/Prosthetic(S) Encounter, Each 15 Min      96227 (CPT®) Hc Orthotic(S) Mgmt/Train Initial Encounter, Each 15min      Total  76 5        Therapy Charges for Today     Code Description Service Date Service Provider Modifiers Qty    56541049258 HC GAIT TRAINING EA 15 MIN 9/17/2018 Adamaris Broussard, PTA GP, KX 2    41216365238 HC PT THER PROC EA 15 MIN 9/17/2018 Adamaris Broussard, PTA GP, KX 1          PT G-Codes  PT Professional Judgement Used?: Yes  Outcome Measure Options: AM-PAC 6 Clicks Basic Mobility (PT)  AM-PAC 6 Clicks Score: 20  Score: 17  Functional Limitation: Mobility: Walking and moving around  Mobility: Walking and Moving Around Current Status (): At least 40 percent but less than 60 percent impaired, limited or restricted  Mobility: Walking and Moving Around Goal Status (): At least 20 percent but less than 40 percent impaired, limited or restricted    Adamaris Broussard, PTA  9/17/2018

## 2018-09-17 NOTE — PLAN OF CARE
Problem: Patient Care Overview  Goal: Plan of Care Review  Outcome: Ongoing (interventions implemented as appropriate)   09/17/18 1300   Coping/Psychosocial   Plan of Care Reviewed With patient   Plan of Care Review   Progress improving   OTHER   Outcome Summary Pt up in chair; agreeable to OT. Educated on benefits of activity/reason for wearing alanis hose. Demo'd alanis hose don/doffing; pt able to perform with S 2x with minimal VCs for technique. Pt then simulated don/doffing pants with S for seated aspect; SBA for standing due to hx of orthostatic hypotension. Pt educated on possible home modifications and benefits of sitting during showers. Continue OT POC. Recommend d/c home with 24/7 assist/HH or SNF due to unpredictable syncope episodes.

## 2018-09-17 NOTE — THERAPY TREATMENT NOTE
Acute Care - Occupational Therapy Treatment Note  [facility]     Patient Name: Travis Patel  : 1945  MRN: [id number]  Today's Date: 2018  Onset of Illness/Injury or Date of Surgery: 09/10/18  Date of Referral to OT: 18  Referring Physician: Dr. Gomez    Admit Date: 9/10/2018       ICD-10-CM ICD-9-CM   1. Subarachnoid hemorrhage (CMS/[facility]) I60.9 430   2. Syncope, unspecified syncope type R55 780.2   3. Elevated liver enzymes R74.8 790.5   4. Constipation, unspecified constipation type K59.00 564.00   5. VANI (acute kidney injury) (CMS/[facility]) N17.9 584.9   6. Elevated troponin R74.8 790.6   7. Renal cyst N28.1 753.10   8. Adrenal nodule (CMS/[facility]) E27.9 255.8   9. Chronic anticoagulation Z79.01 V58.61   10. Dysphagia, unspecified type R13.10 787.20   11. Impaired cognition R41.89 294.9   12. Impaired mobility and ADLs Z74.09 799.89   13. Gait abnormality R26.9 781.2     Patient Active Problem List   Diagnosis   • Subarachnoid hemorrhage (CMS/[facility])   • Chronic coronary artery disease   • Congestive heart failure (CMS/[facility])   • Hyperlipidemia   • ICD (implantable cardioverter-defibrillator) in place   • Orthostatic hypotension   • Syncope     Past Medical History:   Diagnosis Date   • Diverticulitis    • Hernia of abdominal wall    • Myocardial infarction 2017   • Sleep apnea    • Stroke (CMS/[facility]) 2017     Past Surgical History:   Procedure Laterality Date   • COLOSTOMY  2001    x 3 months   • COLOSTOMY REVISION     • CORONARY ARTERY BYPASS GRAFT         • HERNIA REPAIR      abdominal hernia x 3; poor healing with multiple revisions   • PACEMAKER IMPLANTATION  2017    w/ defibrillator   • ROTATOR CUFF REPAIR Right 2005       Therapy Treatment          Rehabilitation Treatment Summary     Row Name 18 1119 18 0927          Treatment Time/Intention    Discipline occupational therapy assistant  -MM physical therapy assistant  -AE     Document Type therapy note (daily note)  -MM therapy  note (daily note)  -AE     Subjective Information complains of;dizziness  -MM complains of;dizziness  -AE2     Mode of Treatment occupational therapy  -MM  --     Patient/Family Observations no family present; pt up in chair   -MM  --     Patient Effort adequate  -MM2  --     Existing Precautions/Restrictions fall  -MM2 fall   orthostasis  -AE2     Treatment Considerations/Comments orthostatic hypotension; pt reports multiple falls at home due to this   -MM2  --     Recorded by [MM] Park Chino OTA 09/17/18 1127  [MM2] Park Chino EDITH 09/17/18 1212 [AE] Adamaris Broussard, PTA 09/17/18 0943  [AE2] Adamaris Broussard, PTA 09/17/18 1003     Row Name 09/17/18 0927             Vital Signs    Pre Systolic BP Rehab 140   supine  -AE      Pre Treatment Diastolic BP 73  -AE      Intra Systolic BP Rehab 112   sitting  -AE      Intra Treatment Diastolic BP 76  -AE      Post Systolic BP Rehab 107   standing  -AE      Post Treatment Diastolic BP 67  -AE      Post Patient Position --   after walk 124/76 in sitting position  -AE      Recorded by [AE] Adamaris Broussard, PTA 09/17/18 1003      Row Name 09/17/18 1119             Cognitive Assessment/Intervention- PT/OT    Affect/Mental Status (Cognitive) flat/blunted affect  -MM      Orientation Status (Cognition) oriented x 4  -MM      Follows Commands (Cognition) follows multi-step commands;over 90% accuracy  -MM      Cognitive Function (Cognitive) WNL  -MM      Safety Deficit (Cognitive) safety precautions awareness  -MM      Personal Safety Interventions elopement precautions initiated;fall prevention program maintained;gait belt;nonskid shoes/slippers when out of bed;supervised activity  -MM      Recorded by [MM] Park Chino EDITH 09/17/18 1212      Row Name 09/17/18 1119             Safety Issues, Functional Mobility    Safety Issues Affecting Function (Mobility) safety precaution awareness  -MM      Impairments Affecting Function (Mobility) endurance/activity  tolerance;other (see comments)   orthostatic hypotension   -MM      Recorded by [MM] Park Chino [facility] 09/17/18 1212      Row Name 09/17/18 1119 09/17/18 0927          Bed Mobility Assessment/Treatment    Supine-Sit [place] (Bed Mobility)  -- supervision  -AE     Assistive Device (Bed Mobility)  -- bed rails;head of bed elevated  -AE     Comment (Bed Mobility) up in chair   -MM  --     Recorded by [MM] Park Chino [facility] 09/17/18 1212 [AE] Adamaris Broussard, PTA 09/17/18 1003     Row Name 09/17/18 1119             Transfer Assessment/Treatment    Transfer Assessment/Treatment sit-stand transfer;stand-sit transfer  -MM      Comment (Transfers) sit<>stands during dressing task   -MM      Recorded by [MM] Park Chino [facility] 09/17/18 1253      Row Name 09/17/18 1119 09/17/18 0927          Sit-Stand Transfer    Sit-Stand [place] (Transfers) stand by assist  -MM stand by assist  -AE     Recorded by [MM] Park Chino [facility] 09/17/18 1253 [AE] Adamaris Broussard, \[facility] [place] [facility]\"" 09/17/18 1003     Row Name 09/17/18 1119 09/17/18 0927          Stand-Sit Transfer    Stand-Sit [place] (Transfers) stand by assist  -MM stand by assist  -AE     Recorded by [MM] Park Chino [facility] 09/17/18 1253 [AE] Adamaris Broussard, PTA 09/17/18 1003     Row Name 09/17/18 0927             Gait/Stairs Assessment/Training    96664 - Gait Training Minutes  18  -AE      Gait/Stairs Assessment/Training gait/ambulation independence  -AE2      [place] Level (Gait) stand by assist  -AE      Assistive Device (Gait) walker, front-wheeled  -AE2      Distance in Feet (Gait) 200  -AE2      Deviations/Abnormal Patterns (Gait) gait speed decreased  -AE2      Bilateral Gait Deviations forward flexed posture  -AE2      [place] Level (Ramp) contact guard;verbal cues  -AE2      Handrail Location (Ramp) right side (ascending)  -AE2      Recorded by [AE] Adamaris Broussard, PTA 09/17/18 1006  [AE2] Adamaris Broussard, PTA 09/17/18 1003      Row  Name 09/17/18 1119             ADL Assessment/Intervention    BADL Assessment/Intervention lower body dressing  -MM      Recorded by [MM] Park Chino OTA 09/17/18 1253      Row Name 09/17/18 1119             Lower Body Dressing Assessment/Training    Lower Body Dressing Wheeler Level doff;don;pants/bottoms;socks;set up;supervision   alanis hose   -MM      Lower Body Dressing Position unsupported sitting;unsupported standing  -MM      Comment (Lower Body Dressing) supervision for socks/alanis hose after educating/teaching pt how to don/doff them. Pt simulated don/doff pants with SBA; anticipate no difficulties other than possible drop in BP due to orthostatic hypotension  -MM      Recorded by [MM] Park Chino OTA 09/17/18 1253      Row Name 09/17/18 1119             BADL Safety/Performance    Impairments, BADL Safety/Performance balance;endurance/activity tolerance   orthostasis  -MM      Skilled BADL Treatment/Intervention BADL process/adaptation training;compensatory training;energy conservation;environmental modifications  -MM      Progress in BADL Status independence level  -MM      Recorded by [MM] Park Chino OTA 09/17/18 1253      Row Name 09/17/18 0927             Therapeutic Exercise    01514 - PT Therapeutic Exercise Minutes 20  -AE      Recorded by [AE] Adamaris Broussard, PTA 09/17/18 1006      Row Name 09/17/18 0927             Therapeutic Exercise    Lower Extremity (Therapeutic Exercise) LAQ (long arc quad), bilateral  -AE      Lower Extremity Range of Motion (Therapeutic Exercise) ankle dorsiflexion/plantar flexion, bilateral  -AE      Exercise Type (Therapeutic Exercise) AROM (active range of motion)  -AE      Position (Therapeutic Exercise) seated  -AE      Sets/Reps (Therapeutic Exercise) 20  -AE      Recorded by [AE] Adamaris Broussard, PTA 09/17/18 1003      Row Name 09/17/18 1119 09/17/18 0927          Positioning and Restraints    Pre-Treatment Position sitting in chair/recliner   -MM in bed  -AE     Post Treatment Position chair  -MM chair  -AE     In Chair reclined;call light within reach;encouraged to call for assist  -MM reclined;sitting;call light within reach  -AE     Recorded by [MM] Park Chino OTA 09/17/18 1253 [AE] Adamaris Broussard, ESPERANZA 09/17/18 1003     Row Name 09/17/18 1119 09/17/18 0927          Pain Scale: Numbers Pre/Post-Treatment    Pain Scale: Numbers, Pretreatment 0/10 - no pain  -MM 0/10 - no pain  -AE     Pain Scale: Numbers, Post-Treatment 0/10 - no pain  -MM 0/10 - no pain  -AE     Recorded by [MM] Park Chino OTA 09/17/18 1253 [AE] Adamaris Broussard PTA 09/17/18 1003     Row Name                Wound 09/12/18 2000 Left lateral arm skin tear    Wound - Properties Group Date first assessed: 09/12/18 [AH] Time first assessed: 2000 [AH] Side: Left [AH] Orientation: lateral [AH] Location: arm [AH] Type: skin tear [AH] Recorded by:  [AH] Channing Castellon, RN 09/13/18 0449    Row Name 09/17/18 1119             Plan of Care Review    Plan of Care Reviewed With patient  -MM      Recorded by [MM] Park Chino OTA 09/17/18 1253      Row Name 09/17/18 1119             Outcome Summary/Treatment Plan (OT)    Daily Summary of Progress (OT) progress toward functional goals is good  -MM      Recorded by [MM] Park Chino OTA 09/17/18 1253        User Key  (r) = Recorded By, (t) = Taken By, (c) = Cosigned By    Initials Name Effective Dates Discipline    AE Adamaris Broussard, PTA 06/22/15 -  PT     Channing Castellon, RN 02/12/18 -  Nurse    MM Park Chino OTA 08/01/18 -  OT        Wound 09/12/18 2000 Left lateral arm skin tear (Active)   Dressing Appearance dry;intact 9/17/2018  8:05 AM   Closure Adhesive bandage 9/17/2018  8:05 AM   Base dressing in place, unable to visualize 9/17/2018  8:05 AM   Periwound dry;intact 9/17/2018  8:05 AM   Periwound Temperature warm 9/17/2018  8:05 AM   Periwound Skin Turgor soft 9/17/2018  8:05 AM   Drainage Amount none  9/17/2018  8:05 AM   Dressing Care, Wound foam 9/17/2018  8:05 AM   Periwound Care, Wound dry periwound area maintained 9/17/2018  8:05 AM         Occupational Therapy Education     Title: PT OT SLP Therapies (Done)     Topic: Occupational Therapy (Done)     Point: ADL training (Done)     Description: Instruct learner(s) on proper safety adaptation and remediation techniques during self care or transfers.   Instruct in proper use of assistive devices.   Learning Progress Summary     Learner Status Readiness Method Response Comment Documented by    Patient Done Acceptance E,TB VU possible home mods, energy conservation, fall risk/safety, importance of alanis hose/how to apply, importance of safety awareness due to orthostatic hypotension  09/17/18 1255     Done Acceptance E VU home safety, orthostasis and importance of exercises/alanis hose for prevention, ADL, dressing modification/strategy, OT POC  09/14/18 1001                      User Key     Initials Effective Dates Name Provider Type Discipline     08/28/18 -  Sunni Nagel, OTR/L Occupational Therapist OT     08/01/18 -  Park Chino OTA Occupational Therapy Assistant OT                OT Recommendation and Plan  Outcome Summary/Treatment Plan (OT)  Daily Summary of Progress (OT): progress toward functional goals is good  Daily Summary of Progress (OT): progress toward functional goals is good  Plan of Care Review  Plan of Care Reviewed With: patient  Plan of Care Reviewed With: patient  Outcome Summary: Pt up in chair; agreeable to OT. Educated on benefits of activity/reason for wearing alanis hose. Demo'd alanis hose don/doffing; pt able to perform with S 2x with minimal VCs for technique. Pt then simulated don/doffing pants with S for seated aspect; SBA for standing due to hx of orthostatic hypotension. Pt educated on possible home modifications and benefits of sitting during showers. Continue OT POC. Recommend d/c home with 24/7 assist/HH or SNF due  to unpredictable syncope episodes.         Outcome Measures     Row Name 09/17/18 1119 09/17/18 0927 09/16/18 1500       How much help from another person do you currently need...    Turning from your back to your side while in flat bed without using bedrails?  -- 4  -AE 4  -EC    Moving from lying on back to sitting on the side of a flat bed without bedrails?  -- 4  -AE 4  -EC    Moving to and from a bed to a chair (including a wheelchair)?  -- 3  -AE 3  -EC    Standing up from a chair using your arms (e.g., wheelchair, bedside chair)?  -- 3  -AE 3  -EC    Climbing 3-5 steps with a railing?  -- 3  -AE 3  -EC    To walk in hospital room?  -- 3  -AE 3  -EC    AM-PAC 6 Clicks Score  -- 20  -AE 20  -EC       How much help from another is currently needed...    Putting on and taking off regular lower body clothing? 3  -MM  --  --    Bathing (including washing, rinsing, and drying) 3  -MM  --  --    Toileting (which includes using toilet bed pan or urinal) 3  -MM  --  --    Putting on and taking off regular upper body clothing 3  -MM  --  --    Taking care of personal grooming (such as brushing teeth) 4  -MM  --  --    Eating meals 4  -MM  --  --    Score 20  -MM  --  --       Functional Assessment    Outcome Measure Options  -- AM-PAC 6 Clicks Basic Mobility (PT)  -AE AM-PAC 6 Clicks Basic Mobility (PT)  -EC    Row Name 09/16/18 1000             How much help from another person do you currently need...    Turning from your back to your side while in flat bed without using bedrails? 4  -EC      Moving from lying on back to sitting on the side of a flat bed without bedrails? 4  -EC      Moving to and from a bed to a chair (including a wheelchair)? 3  -EC      Standing up from a chair using your arms (e.g., wheelchair, bedside chair)? 3  -EC      Climbing 3-5 steps with a railing? 3  -EC      To walk in hospital room? 3  -EC      AM-PAC 6 Clicks Score 20  -EC         Functional Assessment    Outcome Measure Options AM-PAC  6 Clicks Basic Mobility (PT)  -EC        User Key  (r) = Recorded By, (t) = Taken By, (c) = Cosigned By    Initials Name Provider Type    EC Perry Linares, ESPERANZA Physical Therapy Assistant    AE Adamaris Broussard, ESPERANZA Physical Therapy Assistant    MM Park Chino OTA Occupational Therapy Assistant           Time Calculation:         Time Calculation- OT     Row Name 09/17/18 1119 09/17/18 1010 09/17/18 0927       Time Calculation- OT    OT Start Time 1119  -MM  --  --    OT Stop Time 1148  -MM  --  --    OT Time Calculation (min) 29 min  -MM  --  --    Total Timed Code Minutes- OT 29 minute(s)  -MM  --  --    OT Received On 09/17/18  -MM  --  --       Timed Charges    05058 - Gait Training Minutes   -- 18  -AE 18  -AE    51541 - OT Self Care/Mgmt Minutes 29  -MM  --  --      User Key  (r) = Recorded By, (t) = Taken By, (c) = Cosigned By    Initials Name Provider Type    AE Adamaris Broussard, ESPERANZA Physical Therapy Assistant    MM Park Chino OTA Occupational Therapy Assistant           Therapy Suggested Charges     Code   Minutes Charges    74750 (CPT®) Hc Ot Neuromusc Re Education Ea 15 Min      38759 (CPT®) Hc Ot Ther Proc Ea 15 Min      37918 (CPT®) Hc Ot Therapeutic Act Ea 15 Min      48907 (CPT®) Hc Ot Manual Therapy Ea 15 Min      88624 (CPT®) Hc Ot Iontophoresis Ea 15 Min      13866 (CPT®) Hc Ot Elec Stim Ea-Per 15 Min      97519 (CPT®) Hc Ot Ultrasound Ea 15 Min      96811 (CPT®) Hc Ot Self Care/Mgmt/Train Ea 15 Min 29 2    Total  29 2        Therapy Charges for Today     Code Description Service Date Service Provider Modifiers Qty    [id number] HC OT SELF CARE/MGMT/TRAIN EA 15 MIN 9/17/2018 Park Chino OTA GO, KSATYA 2          OT G-codes  OT Professional Judgement Used?: Yes  OT Functional Scales Options: AM-PAC 6 Clicks Daily Activity (OT)  Score: 17  Functional Limitation: Self care  Self Care Current Status (): At least 40 percent but less than 60 percent impaired, limited or  restricted  Self Care Goal Status (): At least 20 percent but less than 40 percent impaired, limited or restricted    EDITH Schofield  9/17/2018

## 2018-09-17 NOTE — PROGRESS NOTES
[facility] Medicine Services  INPATIENT PROGRESS NOTE    Patient Name: Travis Patel  Date of Admission: 9/10/2018  Today's Date: 09/17/18  Length of Stay: 7  Primary Care Physician: Shant Hayes MD    Subjective   Chief Complaint: f/u  HPI   Blood pressure reading remains stable  Referral made to snf.  Pre authorization in progress  Had 2 BM with help of milk of molasses    Daughter is at bedside  He has been having issue with constipation at home for the past 8 wks.  He has known hx of reversal of colostomy 10-15 yrs ago.  Details is not known to her but but she presumed it was related to a perf bowel during an appendectomy.  He has bowel regimen aty home (colace and miralax) but misses to take this.  He had CT of abd/pelvis which showed indication of constipation.  Since then he has had BM.     He was admitted because of SAH, chronic recurrent orthostatic hypotension     Review of Systems     All pertinent negatives and positives are as above. All other systems have been reviewed and are negative unless otherwise stated.     Objective    Temp:  [96.9 °F (36.1 °C)-98 °F (36.7 °C)] 97.2 °F (36.2 °C)  Heart Rate:  [73-90] 79  Resp:  [18] 18  BP: (102-130)/(70-85) 113/73  Physical Exam  Constitutional: He is oriented to person, place, and time. He appears well-developed and well-nourished.   HENT:   Head: Normocephalic and atraumatic.   Eyes: Pupils are equal, round  Conjunctivae and EOM are normal.   Neck: Normal range of motion. Neck supple. No JVD present.   Cardiovascular: Normal rate, regular rhythm, normal heart sounds and intact distal pulses.    Pulmonary/Chest: Effort normal and breath sounds normal.   Abdominal: Soft. Bowel sounds are normal. Non tender, NABS  Musculoskeletal: Normal range of motion.   Neurological: He is alert and oriented to person, place, and time.   Skin: Skin is warm and dry.   Psychiatric: He has a normal mood and affect. His behavior is  normal.          Results Review:  I have reviewed the labs, radiology results, and diagnostic studies.    Laboratory Data:     Results from last 7 days  Lab Units 09/15/18  0545 09/14/18  0434 09/13/18  0353   WBC 10*3/mm3 6.53 6.91 8.26   HEMOGLOBIN g/dL 10.2* 10.3* 10.1*   HEMATOCRIT % 29.6* 30.4* 29.6*   PLATELETS 10*3/mm3 148 144 134          Results from last 7 days  Lab Units 09/17/18  0501 09/16/18  0619 09/15/18  0545   SODIUM mmol/L 139 140 139   POTASSIUM mmol/L 4.3 4.3 4.3   CHLORIDE mmol/L 110 111* 108   CO2 mmol/L 24.0 22.0* 23.0*   BUN mg/dL 10 11 14   CREATININE mg/dL 0.85 0.83 0.85   CALCIUM mg/dL 8.9 8.6 8.5   BILIRUBIN mg/dL 0.5 0.5 0.5   ALK PHOS U/L 120 123* 126*   ALT (SGPT) U/L 38 38 42   AST (SGOT) U/L 30 31 38   GLUCOSE mg/dL 94 101* 102*       Culture Data:        Radiology Data:   Imaging Results (last 24 hours)     ** No results found for the last 24 hours. **        · Left ventricular systolic function is mildly decreased. Estimated EF = 40-45%.  · Left ventricular diastolic dysfunction (grade I) consistent with impaired relaxation.  · Mild-to-moderate mitral valve regurgitation is present  · Based on previous outside hospital records, likely no significant change in LVEF or valvular abnormalities from 12/2017.       I have reviewed the patient's current medications.     Assessment/Plan     Hospital Problem List     Subarachnoid hemorrhage (CMS/[facility])    Chronic coronary artery disease    Overview Signed 9/12/2018 10:09 PM by Logan Willams MD     Overview:   07/26/07:  Coronary artery disease involving > 3 vessels with more than 50%  : [  ]      Last Assessment & Plan:   Managed by Dr. Patton. Currently on lipitor, aspirin, plavix, lisinopril and metoprolol- no new symptoms. We discussed increasing his exercise now the weather has improved. He is to aim for walking 30 minutes 5 times a week.         Congestive heart failure (CMS/[facility])    Hyperlipidemia    Overview Signed 9/12/2018 10:09 PM  by Logan Willams MD     Last Assessment & Plan:   He is on appropriate atorvastatin dosing which will continue.         ICD (implantable cardioverter-defibrillator) in place    Overview Signed 9/12/2018 10:09 PM by Logan Willams MD     Last Assessment & Plan:   His ICD appears to be functioning properly and he's had no shocks.         Orthostatic hypotension    Syncope          Assessment     Syncope  Orthostatic hypotension, chronic recurrent by history.- on midodrine  Subarrachnoid hemorrhage.   CAD   Bipolar disease  Constipation   Renal failure. Acute  (resolved) on chronic.  CHF by history, type unknown (combined systolic and diastolic w/o gross exacerbation today   Constipation  Large BM 9-13-18 and 9/16/18     Plan  Ok chemistry   Continue PT - ambulated with use of RW 200ft and completed 3 steps cga  Monitor blood pressureSNF on DC  Cont lactulose; add miralax, increase activities as tolerated   i reviewed his medications. Seroquel causes constipation in 2-11 % of case  Discharge Planning: I expect the patient to be discharged to SNF in 1-2 days.                 Discharge Planning:awaiting palcement  Angel Zheng MD   09/17/18   3:38 PM

## 2018-09-17 NOTE — PLAN OF CARE
Problem: Patient Care Overview  Goal: Plan of Care Review  Outcome: Ongoing (interventions implemented as appropriate)   09/17/18 1007   Coping/Psychosocial   Plan of Care Reviewed With patient   Plan of Care Review   Progress improving   OTHER   Outcome Summary Pt was S for bed mobility and SBA for t/f's. Pt ambulated SBA with RW 200ft and completed 3 steps cga.

## 2018-09-17 NOTE — PROGRESS NOTES
Continued Stay Note   [place]     Patient Name: Travis Patel  MRN: [id number]  Today's Date: 9/17/2018    Admit Date: 9/10/2018          Discharge Plan     Row Name 09/17/18 1017       Plan    Plan [facility]; PENDING EVAL, BED OFFER AND INS PREAUTH    Patient/Family in Agreement with Plan yes    Plan Comments PT. AGREES TO REHAB PLACEMENT AT [facility] AS HE HAS BEEN THERE IN THE PAST.  REFERRAL CALLED AND FAXED TO [facility].  WILL FOLLOW FOR EVAL AND BED OFFER.  PLEASE BE ADVISED THAT PT'S INS REQUIRES PREAUTH AND MAY TAKE 24-48 HRS.              Discharge Codes    No documentation.           APRIL Buckley

## 2018-09-17 NOTE — PLAN OF CARE
Problem: Patient Care Overview  Goal: Plan of Care Review  Outcome: Ongoing (interventions implemented as appropriate)   09/16/18 2312   Coping/Psychosocial   Plan of Care Reviewed With patient   Plan of Care Review   Progress improving   OTHER   Outcome Summary VSS, no c/o pain. Pt doing alot better overall. Large BM x 2 today. Pt agreed to placement at SNF for rehab. Cont to monitor     Goal: Individualization and Mutuality  Outcome: Ongoing (interventions implemented as appropriate)    Goal: Discharge Needs Assessment  Outcome: Ongoing (interventions implemented as appropriate)      Problem: Syncope (Adult)  Goal: Physical Safety/Health Maintenance  Outcome: Ongoing (interventions implemented as appropriate)   09/16/18 2312   Syncope (Adult)   Physical Safety/Health Maintenance making progress toward outcome     Goal: Optimal Emotional/Functional [place]  Outcome: Ongoing (interventions implemented as appropriate)      Problem: Brain Injury, Moderate Traumatic (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) (Adult)  Goal: Signs and Symptoms of Listed Potential Problems Will be Absent, Minimized or Managed (Brain Injury, Moderate Traumatic)  Outcome: Ongoing (interventions implemented as appropriate)   09/16/18 1732   Goal/Outcome Evaluation   Problems Assessed (Moderate Brain Injury (GCS 9-12)) all   Problems Present (Moderate Brain Injury) hemodynamic instability;situational response       Problem: Skin Injury Risk (Adult)  Goal: Skin Health and Integrity  Outcome: Ongoing (interventions implemented as appropriate)   09/16/18 2312   Skin Injury Risk (Adult)   Skin Health and Integrity making progress toward outcome       Problem: Nutrition, Imbalanced: Inadequate Oral Intake (Adult)  Goal: Improved Oral Intake  Outcome: Ongoing (interventions implemented as appropriate)   09/16/18 2312   Nutrition, Imbalanced: Inadequate Oral Intake (Adult)   Improved Oral Intake making progress toward outcome     Goal: Prevent Further Weight Loss  Outcome:  Ongoing (interventions implemented as appropriate)

## 2018-09-18 LAB
ALBUMIN SERPL-MCNC: 2.8 G/DL (ref 3.5–5)
ALBUMIN/GLOB SERPL: 1.2 G/DL (ref 1.1–2.5)
ALP SERPL-CCNC: 116 U/L (ref 24–120)
ALT SERPL W P-5'-P-CCNC: 35 U/L (ref 0–54)
ANION GAP SERPL CALCULATED.3IONS-SCNC: 7 MMOL/L (ref 4–13)
AST SERPL-CCNC: 28 U/L (ref 7–45)
BILIRUB SERPL-MCNC: 0.5 MG/DL (ref 0.1–1)
BUN BLD-MCNC: 12 MG/DL (ref 5–21)
BUN/CREAT SERPL: 13.6 (ref 7–25)
CALCIUM SPEC-SCNC: 8.9 MG/DL (ref 8.4–10.4)
CHLORIDE SERPL-SCNC: 108 MMOL/L (ref 98–110)
CO2 SERPL-SCNC: 24 MMOL/L (ref 24–31)
CREAT BLD-MCNC: 0.88 MG/DL (ref 0.5–1.4)
GFR SERPL CREATININE-BSD FRML MDRD: 85 ML/MIN/1.73
GLOBULIN UR ELPH-MCNC: 2.3 GM/DL
GLUCOSE BLD-MCNC: 95 MG/DL (ref 70–100)
POTASSIUM BLD-SCNC: 4.4 MMOL/L (ref 3.5–5.3)
PROT SERPL-MCNC: 5.1 G/DL (ref 6.3–8.7)
SODIUM BLD-SCNC: 139 MMOL/L (ref 135–145)

## 2018-09-18 PROCEDURE — 80053 COMPREHEN METABOLIC PANEL: CPT | Performed by: NURSE PRACTITIONER

## 2018-09-18 PROCEDURE — 97535 SELF CARE MNGMENT TRAINING: CPT

## 2018-09-18 PROCEDURE — 92507 TX SP LANG VOICE COMM INDIV: CPT | Performed by: SPEECH-LANGUAGE PATHOLOGIST

## 2018-09-18 PROCEDURE — 97116 GAIT TRAINING THERAPY: CPT

## 2018-09-18 RX ADMIN — MIDODRINE HYDROCHLORIDE 10 MG: 2.5 TABLET ORAL at 08:47

## 2018-09-18 RX ADMIN — QUETIAPINE FUMARATE 100 MG: 100 TABLET ORAL at 20:41

## 2018-09-18 RX ADMIN — BUSPIRONE HYDROCHLORIDE 10 MG: 10 TABLET ORAL at 08:47

## 2018-09-18 RX ADMIN — POTASSIUM CHLORIDE 20 MEQ: 750 CAPSULE, EXTENDED RELEASE ORAL at 08:47

## 2018-09-18 RX ADMIN — LITHIUM CARBONATE 300 MG: 300 CAPSULE, GELATIN COATED ORAL at 17:20

## 2018-09-18 RX ADMIN — MIDODRINE HYDROCHLORIDE 10 MG: 2.5 TABLET ORAL at 17:20

## 2018-09-18 RX ADMIN — ATORVASTATIN CALCIUM 80 MG: 40 TABLET, FILM COATED ORAL at 20:41

## 2018-09-18 RX ADMIN — LITHIUM CARBONATE 300 MG: 300 CAPSULE, GELATIN COATED ORAL at 12:47

## 2018-09-18 RX ADMIN — DULOXETINE HYDROCHLORIDE 60 MG: 30 CAPSULE, DELAYED RELEASE ORAL at 08:47

## 2018-09-18 RX ADMIN — POLYETHYLENE GLYCOL (3350) 17 G: 17 POWDER, FOR SOLUTION ORAL at 08:46

## 2018-09-18 RX ADMIN — LACTULOSE 20 G: 20 SOLUTION ORAL at 08:46

## 2018-09-18 RX ADMIN — MIDODRINE HYDROCHLORIDE 10 MG: 2.5 TABLET ORAL at 12:47

## 2018-09-18 RX ADMIN — LITHIUM CARBONATE 300 MG: 300 CAPSULE, GELATIN COATED ORAL at 08:47

## 2018-09-18 RX ADMIN — LACTULOSE 20 G: 20 SOLUTION ORAL at 20:41

## 2018-09-18 RX ADMIN — LACTULOSE 20 G: 20 SOLUTION ORAL at 15:58

## 2018-09-18 RX ADMIN — BUSPIRONE HYDROCHLORIDE 10 MG: 10 TABLET ORAL at 20:41

## 2018-09-18 RX ADMIN — FOLIC ACID 1 MG: 1 TABLET ORAL at 08:46

## 2018-09-18 RX ADMIN — FAMOTIDINE 20 MG: 20 TABLET, FILM COATED ORAL at 08:47

## 2018-09-18 NOTE — PROGRESS NOTES
Continued Stay Note   Natalio     Patient Name: Travis Patel  MRN: [id number]  Today's Date: 9/18/2018    Admit Date: 9/10/2018          Discharge Plan     Row Name 09/18/18 1245       Plan    Plan Comments CONTACTED DIONISIO AT [facility] TO CHECK THE STATUS OF PREAUTH AND AM ADVISED THAT IT IS STILL IN PROGRESS.  WILL CONT. TO FOLLOW.               Discharge Codes    No documentation.           APRIL Buckley

## 2018-09-18 NOTE — PLAN OF CARE
Problem: Fall Risk (Adult)  Goal: Absence of Fall  Outcome: Ongoing (interventions implemented as appropriate)      Problem: Patient Care Overview  Goal: Plan of Care Review  Outcome: Ongoing (interventions implemented as appropriate)   09/18/18 1520   Coping/Psychosocial   Plan of Care Reviewed With patient   Plan of Care Review   Progress no change   OTHER   Outcome Summary Pt did not c/o any pain today. BP is better. Cont current tx. Still waiting for [facility] for the pre aouthorization. Cont to monitor patient.      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

## 2018-09-18 NOTE — PLAN OF CARE
Problem: Fall Risk (Adult)  Goal: Absence of Fall  Outcome: Ongoing (interventions implemented as appropriate)      Problem: Patient Care Overview  Goal: Plan of Care Review  Outcome: Ongoing (interventions implemented as appropriate)   09/17/18 1954   Coping/Psychosocial   Plan of Care Reviewed With patient   Plan of Care Review   Progress improving   OTHER   Outcome Summary patient up in chair for shift, vss, no c/o pain, pt IV was infiltrated so it was removed, patient refused another IV at this time, will continue to monitor       Problem: Syncope (Adult)  Goal: Physical Safety/Health Maintenance  Outcome: Ongoing (interventions implemented as appropriate)      Problem: Skin Injury Risk (Adult)  Goal: Skin Health and Integrity  Outcome: Ongoing (interventions implemented as appropriate)      Problem: Nutrition, Imbalanced: Inadequate Oral Intake (Adult)  Goal: Improved Oral Intake  Outcome: Ongoing (interventions implemented as appropriate)    Goal: Prevent Further Weight Loss  Outcome: Ongoing (interventions implemented as appropriate)

## 2018-09-18 NOTE — PLAN OF CARE
Problem: Fall Risk (Adult)  Goal: Absence of Fall  Outcome: Ongoing (interventions implemented as appropriate)      Problem: Patient Care Overview  Goal: Plan of Care Review  Outcome: Ongoing (interventions implemented as appropriate)   09/18/18 0441   Coping/Psychosocial   Plan of Care Reviewed With patient   Plan of Care Review   Progress improving   OTHER   Outcome Summary Pt denies pain. VSS. Ambulated in room with no orthostatic S/S. Voiding per urinal. S 74-90. TEDs on. Awaiting approval to go to [facility].      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

## 2018-09-18 NOTE — THERAPY TREATMENT NOTE
"Acute Care - Speech Language Pathology Treatment Note  [facility]     Patient Name: Travis Patel  : 1945  MRN: [id number]  Today's Date: 2018         Admit Date: 9/10/2018  ST tx completed. Pt did well with cognitive task with mild cueing needed throughout. Increased cues at times for similarities and differences. Pt did well with \"what if\" and basic reasoning. Increased cues for more abstract divergent naming task. ST to continue to follow.   Hallie Evans, CCC-SLP 2018 2:11 PM      Visit Dx:      ICD-10-CM ICD-9-CM   1. Subarachnoid hemorrhage (CMS/HCC) I60.9 430   2. Syncope, unspecified syncope type R55 780.2   3. Elevated liver enzymes R74.8 790.5   4. Constipation, unspecified constipation type K59.00 564.00   5. VANI (acute kidney injury) (CMS/HCC) N17.9 584.9   6. Elevated troponin R74.8 790.6   7. Renal cyst N28.1 753.10   8. Adrenal nodule (CMS/HCC) E27.9 255.8   9. Chronic anticoagulation Z79.01 V58.61   10. Dysphagia, unspecified type R13.10 787.20   11. Impaired cognition R41.89 294.9   12. Impaired mobility and ADLs Z74.09 799.89   13. Gait abnormality R26.9 781.2     Patient Active Problem List   Diagnosis   • Subarachnoid hemorrhage (CMS/HCC)   • Chronic coronary artery disease   • Congestive heart failure (CMS/HCC)   • Hyperlipidemia   • ICD (implantable cardioverter-defibrillator) in place   • Orthostatic hypotension   • Syncope        Therapy Treatment        Rehabilitation Treatment Summary     Row Name 18 1322 18 1112 18 0926       Treatment Time/Intention    Discipline speech language pathologist  -BN physical therapy assistant  -NATANAEL occupational therapy assistant  -MM    Document Type therapy note (daily note)  -BN therapy note (daily note)  -NATANAEL therapy note (daily note)  -MM    Subjective Information no complaints  -BN no complaints  -JP2 complains of;weakness  -MM    Mode of Treatment individual therapy;speech-language pathology  -BN  -- occupational " therapy  -MM    Patient/Family Observations pt dtr present  -BN  -- no family present; in bed   -MM    Care Plan Review care plan/treatment goals reviewed  -BN  --  --    Care Plan Review, Other Participant(s) daughter  -BN  --  --    Patient Effort good  -BN  --  --    Existing Precautions/Restrictions  -- fall  -JP2 fall  -MM    Treatment Considerations/Comments  --  -- orthostatic hypotension   -MM    Recorded by [BN] Hallie Evans, CCC-SLP 09/18/18 1404 [NATANAEL] Katie Lokc, PTA 09/18/18 1114  [JP2] Katie Lock, PTA 09/18/18 1127 [MM] Park Chino OTA 09/18/18 0932    Row Name 09/18/18 0926             Cognitive Assessment/Intervention- PT/OT    Affect/Mental Status (Cognitive) flat/blunted affect  -MM      Orientation Status (Cognition) oriented x 4  -MM      Follows Commands (Cognition) WFL  -MM      Cognitive Function (Cognitive) WFL  -MM      Safety Deficit (Cognitive) safety precautions awareness  -MM      Personal Safety Interventions elopement precautions initiated;fall prevention program maintained;gait belt;nonskid shoes/slippers when out of bed;supervised activity  -MM      Recorded by [MM] Park Chino OTA 09/18/18 1037      Row Name 09/18/18 0926             Safety Issues, Functional Mobility    Safety Issues Affecting Function (Mobility) safety precaution awareness;other (see comments)   orthostatic hypotension   -MM      Impairments Affecting Function (Mobility) balance;endurance/activity tolerance;strength  -MM      Comment, Safety Issues/Impairments (Mobility) bed>shower chair next to bed; pt did not feel like he could walk to the bathroom at this time due to fatigue/feeling dizzy   -MM      Recorded by [MM] Park Chino OTA 09/18/18 1037      Row Name 09/18/18 0926             Bed Mobility Assessment/Treatment    Bed Mobility Assessment/Treatment bed mobility (all) activities  -MM      [place] Level (Bed Mobility) supervision  -MM      Bed Mobility, Safety  Issues other (see comments)  -MM      Assistive Device (Bed Mobility) bed rails  -MM      Comment (Bed Mobility) orthostatic hypotension; VCs to perform leg exercises prior to sitting/standing to decrease risk of orthostatic hypotension   -MM      Recorded by [MM] Park Chino [facility] 09/18/18 1037      Row Name 09/18/18 0926             Functional Mobility    Functional Mobility- Ind. Level contact guard assist  -MM      Functional Mobility- Safety Issues other (see comments)   orthostatic hypotension   -MM      Functional Mobility- Comment bed>shower chair (approx 8 steps)   -MM      Recorded by [MM] Park Chino [facility] 09/18/18 1037      Row Name 09/18/18 0926             Transfer Assessment/Treatment    Transfer Assessment/Treatment sit-stand transfer;stand-sit transfer;shower transfer  -MM      Comment (Transfers) BP did not drop this date during sit<>stands and t/fs   -MM      Recorded by [MM] Park Chino [facility] 09/18/18 1037      Row Name 09/18/18 1112 09/18/18 0926          Sit-Stand Transfer    Sit-Stand [place] (Transfers) stand by assist  -NATANAEL stand by assist  -MM     Recorded by [NATANAEL] Katie Lock, PTA 09/18/18 1127 [MM] Park Chino [facility] 09/18/18 1037     Row Name 09/18/18 1112 09/18/18 0926          Stand-Sit Transfer    Stand-Sit [place] (Transfers) stand by assist  -NATANAEL stand by assist  -MM     Recorded by [NATANAEL] Katie Lock, PTA 09/18/18 1127 [MM] Park Chino [facility] 09/18/18 1037     Row Name 09/18/18 0926             Shower Transfer    Type (Shower Transfer) sit-stand;stand-sit  -MM      [place] Level (Shower Transfer) contact guard  -MM      Assistive Device (Shower Transfer) shower chair   Simulated shower t/f (stepping over small shower ledge)  -MM      Recorded by [MM] Park Chino [facility] 09/18/18 1037      Row Name 09/18/18 1112             Gait/Stairs Assessment/Training    [place] Level (Gait) stand by assist   1 self corrected slight LOB while  turning  -NATANAEL      Assistive Device (Gait) walker, front-wheeled  -NATANAEL      Distance in Feet (Gait) 200  -NATANAEL      Deviations/Abnormal Patterns (Gait) base of support, narrow;dang decreased  -NATANAEL      Bilateral Gait Deviations --   forward neck  -NATANAEL      Recorded by [NATANAEL] Katie Lock, PTA 09/18/18 1127      Row Name 09/18/18 0926             ADL Assessment/Intervention    BADL Assessment/Intervention bathing;upper body dressing;lower body dressing;grooming  -MM      Recorded by [MM] Park Chino OTA 09/18/18 1037      Row Name 09/18/18 0926             Bathing Assessment/Intervention    Bathing [place] Level bathing skills;lower body;upper body;upper extremities;chest/trunk;distal lower extremities/feet;proximal lower extremities;perineal area;supervision;contact guard assist  -MM      Bathing Position supported sitting  -MM      Comment (Bathing) S when sitting; CGA when standing   -MM      Recorded by [MM] Park Chino OTA 09/18/18 1037      Row Name 09/18/18 0926             Upper Body Dressing Assessment/Training    Upper Body Dressing [place] Level doff;don;independent  -MM      Upper Body Dressing Position supported sitting  -MM      Comment (Upper Body Dressing) Gown   -MM      Recorded by [MM] Park Chino OTA 09/18/18 1037      Row Name 09/18/18 0926             Lower Body Dressing Assessment/Training    Lower Body Dressing [place] Level doff;don;undergarment;socks;set up   alanis hose   -MM      Lower Body Dressing Position edge of bed sitting  -MM      Comment (Lower Body Dressing) S for socks/alanis hose and CGA when standing to don pants over hips   -MM      Recorded by [MM] Park Chino OTA 09/18/18 1037      Row Name 09/18/18 0926             Grooming Assessment/Training    [place] Level (Grooming) wash face, hands;hair care, combing/brushing;supervision;set up  -MM      Grooming Position supported sitting  -MM      Recorded by [MM] Park Chino OTA  09/18/18 1037      Row Name 09/18/18 1112 09/18/18 0926          Positioning and Restraints    Pre-Treatment Position sitting in chair/recliner  -NATANAEL in bed  -MM     Post Treatment Position chair  -NATANAEL bed  -MM     In Bed  -- fowlers;call light within reach;encouraged to call for assist;side rails up x2;exit alarm on;with other staff  -MM     In Chair reclined;call light within reach;encouraged to call for assist  -NATANAEL  --     Recorded by [NATANAEL] Katie Lock, PTA 09/18/18 1127 [MM] Park Chino OTA 09/18/18 1037     Row Name 09/18/18 1322 09/18/18 1112 09/18/18 0926       Pain Scale: Numbers Pre/Post-Treatment    Pain Scale: Numbers, Pretreatment 0/10 - no pain  -BN 0/10 - no pain  -NATANAEL 0/10 - no pain  -MM    Pain Scale: Numbers, Post-Treatment 0/10 - no pain  -BN 0/10 - no pain  -NATANAEL 0/10 - no pain  -MM    Recorded by [BN] Hallie Evans, CCC-SLP 09/18/18 1404 [NATANAEL] Katie Lock, PTA 09/18/18 1127 [MM] Park Chino OTA 09/18/18 1037    Row Name                Wound 09/12/18 2000 Left lateral arm skin tear    Wound - Properties Group Date first assessed: 09/12/18 [AH] Time first assessed: 2000 [AH] Side: Left [AH] Orientation: lateral [AH] Location: arm [AH] Type: skin tear [AH] Recorded by:  [AH] Channing Castellon RN 09/13/18 0449    Row Name 09/18/18 0926             Plan of Care Review    Plan of Care Reviewed With patient  -MM      Recorded by [MM] Park Chino OTA 09/18/18 1037      Row Name 09/18/18 0926             Outcome Summary/Treatment Plan (OT)    Daily Summary of Progress (OT) progress toward functional goals is good  -MM      Recorded by [MM] Park Chino OTA 09/18/18 1037      Row Name 09/18/18 1322             Outcome Summary/Treatment Plan (SLP)    Daily Summary of Progress (SLP) progress toward functional goals is good  -BN      Barriers to Overall Progress (SLP) n/a  -BN      Plan for Continued Treatment (SLP) continue to follow and treat  -BN      Anticipated  "Dischage Disposition skilled nursing facility  -BN      Recorded by [BN] Hallie Evans, CCC-SLP 09/18/18 7562        User Key  (r) = Recorded By, (t) = Taken By, (c) = Cosigned By    Initials Name Effective Dates Discipline    NATANAEL Katie Lock TAMMY, PTA 08/02/16 -  PT    BN Hallie Evans, CCC-SLP 04/03/18 -  SLP    Channing Diaz, RN 02/12/18 -  Nurse    Park Aguirre OTA 08/01/18 -  OT            EDUCATION  The patient has been educated in the following areas:   Cognitive Impairment.    SLP Recommendation and Plan           Anticipated Dischage Disposition: skilled nursing facility                Plan of Care Reviewed With: patient, daughter  Plan of Care Review  Plan of Care Reviewed With: patient, daughter  Daily Summary of Progress (SLP): progress toward functional goals is good  Plan for Continued Treatment (SLP): continue to follow and treat  Progress: improving  Outcome Summary: ST tx completed. Pt did well with cognitive task with mild cueing needed throughout. Increased cues at times for similarities and differences. Pt did well with \"what if\" and basic reasoning. Increased cues for more abstract divergent naming task. ST to continue to follow.           SLP GOALS     Row Name 09/18/18 1322             Comprehend Questions Goal 1 (SLP)    Improve Ability to Comprehend Questions Goal 1 (SLP) complex yes/no questions;independently (over 90% accuracy)  -BN      Time Frame (Comprehend Questions Goal 1, SLP) by discharge  -BN      Progress/Outcomes (Comprehend Questions Goal 1, SLP) goal ongoing  -BN         Organizational Skills Goal 1 (SLP)    Improve Thought Organization Through Goal 1 (SLP) completing a divergent naming task;concrete;completing a convergent naming task;abstract;independently (over 90% accuracy)  -BN      Time Frame (Thought Organization Skills Goal 1, SLP) by discharge  -BN      Progress (Thought Organization Skills Goal 1, SLP) 70%;60%  -BN      " "Progress/Outcomes (Thought Organization Skills Goal 1, SLP) continuing progress toward goal  -BN         Reasoning Goal 1 (SLP)    Improve Reasoning Through Goal 1 (SLP) complete basic reasoning task;complete mental flexibility task;independently (over 90% accuracy)  -BN      Time Frame (Reasoning Goal 1, SLP) by discharge  -BN      Progress (Reasoning Goal 1, SLP) 70%;80%  -BN      Progress/Outcomes (Reasoning Goal 1, SLP) continuing progress toward goal  -BN         Functional Problem Solving Skills Goal 1 (SLP)    Improve Problem Solving Through Goal 1 (SLP) answer questions about ADL problems;answer \"what if\" questions;tell similarity between items;independently (over 90% accuracy)  -BN      Time Frame (Problem Solving Goal 1, SLP) by discharge  -BN      Progress (Problem Solving Goal 1, SLP) 70%;80%  -BN      Progress/Outcomes (Problem Solving Goal 1, SLP) continuing progress toward goal  -BN        User Key  (r) = Recorded By, (t) = Taken By, (c) = Cosigned By    Initials Name Provider Type    Hallie Delarosa CCC-SLP Speech and Language Pathologist                Time Calculation:           Time Calculation- SLP     Row Name 09/18/18 1410             Time Calculation- SLP    SLP Start Time 1322  -BN      SLP Stop Time 1349  -BN      SLP Time Calculation (min) 27 min  -BN      SLP Received On 09/18/18  -        User Key  (r) = Recorded By, (t) = Taken By, (c) = Cosigned By    Initials Name Provider Type    Hallie Delarosa CCC-SLP Speech and Language Pathologist          Therapy Charges for Today     Code Description Service Date Service Provider Modifiers Qty    [id number] [facility] TREATMENT SPEECH 2 9/18/2018 Hallie Evans CCC-SLP GN, KX 1              SLP G-Codes  SLP NOMS Used?: Yes (problem solving)  Functional Limitations: Other Speech Language Pathology (problem solving)  Swallow Current Status (): At least 1 percent but less than 20 percent impaired, limited or " restricted  Swallow Goal Status (): At least 1 percent but less than 20 percent impaired, limited or restricted  Swallow Discharge Status (): At least 1 percent but less than 20 percent impaired, limited or restricted  Other Speech-Language Pathology Functional Limitation Current Status (): At least 60 percent but less than 80 percent impaired, limited or restricted  Other Speech-Language Pathology Functional Limitation Goal Status (): At least 40 percent but less than 60 percent impaired, limited or restricted      Hallie Evans CCC-SLP  9/18/2018

## 2018-09-18 NOTE — THERAPY TREATMENT NOTE
Acute Care - Occupational Therapy Treatment Note  [facility]     Patient Name: Travis Patel  : 1945  MRN: [id number]  Today's Date: 2018  Onset of Illness/Injury or Date of Surgery: 09/10/18  Date of Referral to OT: 18  Referring Physician: Dr. Gomez    Admit Date: 9/10/2018       ICD-10-CM ICD-9-CM   1. Subarachnoid hemorrhage (CMS/[facility]) I60.9 430   2. Syncope, unspecified syncope type R55 780.2   3. Elevated liver enzymes R74.8 790.5   4. Constipation, unspecified constipation type K59.00 564.00   5. VANI (acute kidney injury) (CMS/[facility]) N17.9 584.9   6. Elevated troponin R74.8 790.6   7. Renal cyst N28.1 753.10   8. Adrenal nodule (CMS/[facility]) E27.9 255.8   9. Chronic anticoagulation Z79.01 V58.61   10. Dysphagia, unspecified type R13.10 787.20   11. Impaired cognition R41.89 294.9   12. Impaired mobility and ADLs Z74.09 799.89   13. Gait abnormality R26.9 781.2     Patient Active Problem List   Diagnosis   • Subarachnoid hemorrhage (CMS/[facility])   • Chronic coronary artery disease   • Congestive heart failure (CMS/[facility])   • Hyperlipidemia   • ICD (implantable cardioverter-defibrillator) in place   • Orthostatic hypotension   • Syncope     Past Medical History:   Diagnosis Date   • Diverticulitis    • Hernia of abdominal wall    • Myocardial infarction 2017   • Sleep apnea    • Stroke (CMS/[facility]) 2017     Past Surgical History:   Procedure Laterality Date   • COLOSTOMY  2001    x 3 months   • COLOSTOMY REVISION     • CORONARY ARTERY BYPASS GRAFT         • HERNIA REPAIR      abdominal hernia x 3; poor healing with multiple revisions   • PACEMAKER IMPLANTATION  2017    w/ defibrillator   • ROTATOR CUFF REPAIR Right 2005       Therapy Treatment          Rehabilitation Treatment Summary     Row Name 18 0926             Treatment Time/Intention    Discipline occupational therapy assistant  -MM      Document Type therapy note (daily note)  -MM      Subjective Information complains of;weakness   -MM      Mode of Treatment occupational therapy  -MM      Patient/Family Observations no family present; in bed   -MM      Existing Precautions/Restrictions fall  -MM      Treatment Considerations/Comments orthostatic hypotension   -MM      Recorded by [MM] Park Chino OTA 09/18/18 0932      Row Name 09/18/18 0926             Cognitive Assessment/Intervention- PT/OT    Affect/Mental Status (Cognitive) flat/blunted affect  -MM      Orientation Status (Cognition) oriented x 4  -MM      Follows Commands (Cognition) WFL  -MM      Cognitive Function (Cognitive) WFL  -MM      Safety Deficit (Cognitive) safety precautions awareness  -MM      Personal Safety Interventions elopement precautions initiated;fall prevention program maintained;gait belt;nonskid shoes/slippers when out of bed;supervised activity  -MM      Recorded by [MM] Park Chino OTA 09/18/18 1037      Row Name 09/18/18 0926             Safety Issues, Functional Mobility    Safety Issues Affecting Function (Mobility) safety precaution awareness;other (see comments)   orthostatic hypotension   -MM      Impairments Affecting Function (Mobility) balance;endurance/activity tolerance;strength  -MM      Comment, Safety Issues/Impairments (Mobility) bed>shower chair next to bed; pt did not feel like he could walk to the bathroom at this time due to fatigue/feeling dizzy   -MM      Recorded by [MM] Park Chino OTA 09/18/18 1037      Row Name 09/18/18 0926             Bed Mobility Assessment/Treatment    Bed Mobility Assessment/Treatment bed mobility (all) activities  -MM      [place] Level (Bed Mobility) supervision  -MM      Bed Mobility, Safety Issues other (see comments)  -MM      Assistive Device (Bed Mobility) bed rails  -MM      Comment (Bed Mobility) orthostatic hypotension; VCs to perform leg exercises prior to sitting/standing to decrease risk of orthostatic hypotension   -MM      Recorded by [MM] Park Chino OTA 09/18/18 1037       Row Name 09/18/18 0926             Functional Mobility    Functional Mobility- Ind. Level contact guard assist  -MM      Functional Mobility- Safety Issues other (see comments)   orthostatic hypotension   -MM      Functional Mobility- Comment bed>shower chair (approx 8 steps)   -MM      Recorded by [MM] Park Chino [facility] 09/18/18 1037      Row Name 09/18/18 0926             Transfer Assessment/Treatment    Transfer Assessment/Treatment sit-stand transfer;stand-sit transfer;shower transfer  -MM      Comment (Transfers) BP did not drop this date during sit<>stands and t/fs   -MM      Recorded by [MM] Park Chino OTA 09/18/18 1037      Row Name 09/18/18 0926             Sit-Stand Transfer    Sit-Stand [place] (Transfers) stand by assist  -MM      Recorded by [MM] Park Chino OTA 09/18/18 1037      Row Name 09/18/18 0926             Stand-Sit Transfer    Stand-Sit [place] (Transfers) stand by assist  -MM      Recorded by [MM] Park Chino OTA 09/18/18 1037      Row Name 09/18/18 0926             Shower Transfer    Type (Shower Transfer) sit-stand;stand-sit  -MM      [place] Level (Shower Transfer) contact guard  -MM      Assistive Device (Shower Transfer) shower chair   Simulated shower t/f (stepping over small shower ledge)  -MM      Recorded by [MM] Park Chino OTA 09/18/18 1037      Row Name 09/18/18 0926             ADL Assessment/Intervention    BADL Assessment/Intervention bathing;upper body dressing;lower body dressing;grooming  -MM      Recorded by [MM] Park Chino OTA 09/18/18 1037      Row Name 09/18/18 0926             Bathing Assessment/Intervention    Bathing [place] Level bathing skills;lower body;upper body;upper extremities;chest/trunk;distal lower extremities/feet;proximal lower extremities;perineal area;supervision;contact guard assist  -MM      Bathing Position supported sitting  -MM      Comment (Bathing) S when sitting; CGA when  standing   -MM      Recorded by [MM] Park Chino OTA 09/18/18 1037      Row Name 09/18/18 0926             Upper Body Dressing Assessment/Training    Upper Body Dressing [place] Level doff;don;independent  -MM      Upper Body Dressing Position supported sitting  -MM      Comment (Upper Body Dressing) Gown   -MM      Recorded by [MM] Park Chino OTA 09/18/18 1037      Row Name 09/18/18 0926             Lower Body Dressing Assessment/Training    Lower Body Dressing [place] Level doff;don;undergarment;socks;set up   alanis hose   -MM      Lower Body Dressing Position edge of bed sitting  -MM      Comment (Lower Body Dressing) S for socks/alanis hose and CGA when standing to don pants over hips   -MM      Recorded by [MM] Park Chino OTA 09/18/18 1037      Row Name 09/18/18 0926             Grooming Assessment/Training    [place] Level (Grooming) wash face, hands;hair care, combing/brushing;supervision;set up  -MM      Grooming Position supported sitting  -MM      Recorded by [MM] Park Chino OTA 09/18/18 1037      Row Name 09/18/18 0926             Positioning and Restraints    Pre-Treatment Position in bed  -MM      Post Treatment Position bed  -MM      In Bed fowlers;call light within reach;encouraged to call for assist;side rails up x2;exit alarm on;with other staff  -MM      Recorded by [MM] Park Chino OTA 09/18/18 1037      Row Name 09/18/18 0926             Pain Scale: Numbers Pre/Post-Treatment    Pain Scale: Numbers, Pretreatment 0/10 - no pain  -MM      Pain Scale: Numbers, Post-Treatment 0/10 - no pain  -MM      Recorded by [MM] Park Chino OTA 09/18/18 1037      Row Name                Wound 09/12/18 2000 Left lateral arm skin tear    Wound - Properties Group Date first assessed: 09/12/18 [AH] Time first assessed: 2000 [AH] Side: Left [AH] Orientation: lateral [AH] Location: arm [AH] Type: skin tear [AH] Recorded by:  [AH] Channing Castellon RN 09/13/18 0449     Row Name 09/18/18 0926             Plan of Care Review    Plan of Care Reviewed With patient  -MM      Recorded by [MM] LulúPark mercerEDITH 09/18/18 1037      Row Name 09/18/18 0926             Outcome Summary/Treatment Plan (OT)    Daily Summary of Progress (OT) progress toward functional goals is good  -MM      Recorded by [MM] Lulú EDITH Nick 09/18/18 1037        User Key  (r) = Recorded By, (t) = Taken By, (c) = Cosigned By    Initials Name Effective Dates Discipline    Channing Diaz RN 02/12/18 -  Nurse    MM Lulú ParkEDITH 08/01/18 -  OT        Wound 09/12/18 2000 Left lateral arm skin tear (Active)   Dressing Appearance dry;intact 9/18/2018  7:00 AM   Closure Adhesive bandage 9/18/2018  7:00 AM   Base dressing in place, unable to visualize 9/18/2018  7:00 AM   Periwound dry;intact 9/18/2018  7:00 AM   Periwound Temperature warm 9/18/2018  7:00 AM   Periwound Skin Turgor soft 9/18/2018  7:00 AM   Drainage Amount none 9/18/2018  7:00 AM   Dressing Care, Wound foam 9/18/2018  7:00 AM   Periwound Care, Wound dry periwound area maintained 9/18/2018  7:00 AM         Occupational Therapy Education     Title: PT OT SLP Therapies (Done)     Topic: Occupational Therapy (Done)     Point: ADL training (Done)     Description: Instruct learner(s) on proper safety adaptation and remediation techniques during self care or transfers.   Instruct in proper use of assistive devices.   Learning Progress Summary     Learner Status Readiness Method Response Comment Documented by    Patient Done Acceptance E,TB VU energy conservation, fall risk/safety, ADL retraining, t/f training  09/18/18 1038     Done Acceptance E,TB VU possible home mods, energy conservation, fall risk/safety, importance of alanis hose/how to apply, importance of safety awareness due to orthostatic hypotension  09/17/18 1255     Done Acceptance E VU home safety, orthostasis and importance of exercises/alanis hose for prevention, ADL,  dressing modification/strategy, OT POC  09/14/18 1001                      User Key     Initials Effective Dates Name Provider Type Discipline     08/28/18 -  Sunni Nagel, OTR/L Occupational Therapist OT     08/01/18 -  Park Chino OTA Occupational Therapy Assistant OT                OT Recommendation and Plan  Outcome Summary/Treatment Plan (OT)  Daily Summary of Progress (OT): progress toward functional goals is good  Daily Summary of Progress (OT): progress toward functional goals is good  Plan of Care Review  Plan of Care Reviewed With: patient  Plan of Care Reviewed With: patient  Outcome Summary: Pt in bed; agreeable to OT. VCs to perform exercises in bed to decrease risk of orthostatic hypotension when sitting/standing. Pt performed bed mobility with S. T/f to shower chair next to bed (approx 8 steps); pt denied walking into the bathroom due to feeling weak/fatigue. Pt t/f with CGA and performed standing aspect of bathing with CGA; seated aspect with S. Pt don/doffed gown with I while seated; LB dress socks/alanis hose S and underwear; CGA for seated aspect. Pt also able to wash face with S. All aspects of bathing set up for pt, no cues needed for completion of task. No evidence of drop in BP during session. Continue OT POC. Pt plans on discharging to a SNF, due to living alone at home and not having help if needed.         Outcome Measures     Row Name 09/18/18 0926 09/17/18 1119 09/17/18 0927       How much help from another person do you currently need...    Turning from your back to your side while in flat bed without using bedrails?  --  -- 4  -AE    Moving from lying on back to sitting on the side of a flat bed without bedrails?  --  -- 4  -AE    Moving to and from a bed to a chair (including a wheelchair)?  --  -- 3  -AE    Standing up from a chair using your arms (e.g., wheelchair, bedside chair)?  --  -- 3  -AE    Climbing 3-5 steps with a railing?  --  -- 3  -AE    To walk in hospital  room?  --  -- 3  -AE    AM-PAC 6 Clicks Score  --  -- 20  -AE       How much help from another is currently needed...    Putting on and taking off regular lower body clothing? 3  -MM 3  -MM  --    Bathing (including washing, rinsing, and drying) 3  -MM 3  -MM  --    Toileting (which includes using toilet bed pan or urinal) 3  -MM 3  -MM  --    Putting on and taking off regular upper body clothing 4  -MM 3  -MM  --    Taking care of personal grooming (such as brushing teeth) 4  -MM 4  -MM  --    Eating meals 4  -MM 4  -MM  --    Score 21  -MM 20  -MM  --       Functional Assessment    Outcome Measure Options  --  -- AM-PAC 6 Clicks Basic Mobility (PT)  -AE    Row Name 09/16/18 1500 09/16/18 1000          How much help from another person do you currently need...    Turning from your back to your side while in flat bed without using bedrails? 4  -EC 4  -EC     Moving from lying on back to sitting on the side of a flat bed without bedrails? 4  -EC 4  -EC     Moving to and from a bed to a chair (including a wheelchair)? 3  -EC 3  -EC     Standing up from a chair using your arms (e.g., wheelchair, bedside chair)? 3  -EC 3  -EC     Climbing 3-5 steps with a railing? 3  -EC 3  -EC     To walk in hospital room? 3  -EC 3  -EC     AM-PAC 6 Clicks Score 20  -EC 20  -EC        Functional Assessment    Outcome Measure Options AM-PAC 6 Clicks Basic Mobility (PT)  -EC AM-PAC 6 Clicks Basic Mobility (PT)  -EC       User Key  (r) = Recorded By, (t) = Taken By, (c) = Cosigned By    Initials Name Provider Type    EC Perry Linares PTA Physical Therapy Assistant    AE Adamaris Broussard PTA Physical Therapy Assistant    MM Park Chino OTA Occupational Therapy Assistant           Time Calculation:         Time Calculation- OT     Row Name 09/18/18 0926             Time Calculation- OT    OT Start Time 0926  -MM      OT Stop Time 1020  -MM      OT Time Calculation (min) 54 min  -MM      Total Timed Code Minutes- OT 54 minute(s)   -MM      OT Received On 09/18/18  -MM         Timed Charges    35241 - OT Self Care/Mgmt Minutes 54  -MM        User Key  (r) = Recorded By, (t) = Taken By, (c) = Cosigned By    Initials Name Provider Type    Park Aguirre OTA Occupational Therapy Assistant           Therapy Suggested Charges     Code   Minutes Charges    84985 (CPT®) Hc Ot Neuromusc Re Education Ea 15 Min      91108 (CPT®) Hc Ot Ther Proc Ea 15 Min      94187 (CPT®) Hc Ot Therapeutic Act Ea 15 Min      37367 (CPT®) Hc Ot Manual Therapy Ea 15 Min      78248 (CPT®) Hc Ot Iontophoresis Ea 15 Min      94014 (CPT®) Hc Ot Elec Stim Ea-Per 15 Min      27298 (CPT®) Hc Ot Ultrasound Ea 15 Min      60856 (CPT®) Hc Ot Self Care/Mgmt/Train Ea 15 Min 54 4    Total  54 4        Therapy Charges for Today     Code Description Service Date Service Provider Modifiers Qty    63198809715 HC OT SELF CARE/MGMT/TRAIN EA 15 MIN 9/17/2018 Park Chino OTA GO, KX 2    81111367746 HC OT SELF CARE/MGMT/TRAIN EA 15 MIN 9/18/2018 Park Chino OTA GO, KX 4          OT G-codes  OT Professional Judgement Used?: Yes  OT Functional Scales Options: AM-PAC 6 Clicks Daily Activity (OT)  Score: 17  Functional Limitation: Self care  Self Care Current Status (): At least 40 percent but less than 60 percent impaired, limited or restricted  Self Care Goal Status (): At least 20 percent but less than 40 percent impaired, limited or restricted    EDITH Schofield  9/18/2018

## 2018-09-18 NOTE — PLAN OF CARE
Problem: Patient Care Overview  Goal: Plan of Care Review  Outcome: Ongoing (interventions implemented as appropriate)   09/18/18 5911   Coping/Psychosocial   Plan of Care Reviewed With patient   Plan of Care Review   Progress improving   OTHER   Outcome Summary Appetite has shown improvement since last review. He has consumed 70% of the last 5 meals. He is receiving Ensure TID with large protein servings. PAB is WDl. Will cont to follow for nutrition needs.       Problem: Nutrition, Imbalanced: Inadequate Oral Intake (Adult)  Goal: Improved Oral Intake  Outcome: Ongoing (interventions implemented as appropriate)    Goal: Prevent Further Weight Loss  Outcome: Ongoing (interventions implemented as appropriate)

## 2018-09-18 NOTE — PLAN OF CARE
Problem: Patient Care Overview  Goal: Plan of Care Review  Outcome: Ongoing (interventions implemented as appropriate)   09/18/18 1127   Coping/Psychosocial   Plan of Care Reviewed With patient   OTHER   Outcome Summary Pt. stands and ambulates 200' with SBA. Instruct on safe use of RWX. One slight LOB during turning. Will benefit from strengthening and balance activities,

## 2018-09-18 NOTE — PLAN OF CARE
Problem: Patient Care Overview  Goal: Plan of Care Review  Outcome: Ongoing (interventions implemented as appropriate)   09/18/18 1040   Coping/Psychosocial   Plan of Care Reviewed With patient   Plan of Care Review   Progress improving   OTHER   Outcome Summary Pt in bed; agreeable to OT. VCs to perform exercises in bed to decrease risk of orthostatic hypotension when sitting/standing. Pt performed bed mobility with S. T/f to shower chair next to bed (approx 8 steps); pt denied walking into the bathroom due to feeling weak/fatigue. Pt t/f with CGA and performed standing aspect of bathing with CGA; seated aspect with S. Pt don/doffed gown with I while seated; LB dress socks/alanis hose S and underwear; CGA for seated aspect. Pt also able to wash face with S. All aspects of bathing set up for pt, no cues needed for completion of task. No evidence of drop in BP during session. Continue OT POC. Pt plans on discharging to a SNF, due to living alone at home and not having help if needed.

## 2018-09-18 NOTE — PLAN OF CARE
"Problem: Patient Care Overview  Goal: Plan of Care Review  Outcome: Ongoing (interventions implemented as appropriate)   09/18/18 0342   Coping/Psychosocial   Plan of Care Reviewed With patient;daughter   Plan of Care Review   Progress improving   OTHER   Outcome Summary ST tx completed. Pt did well with cognitive task with mild cueing needed throughout. Increased cues at times for similarities and differences. Pt did well with \"what if\" and basic reasoning. Increased cues for more abstract divergent naming task. ST to continue to follow.          "

## 2018-09-18 NOTE — PROGRESS NOTES
[facility] Medicine Services  INPATIENT PROGRESS NOTE    Patient Name: Travis Patel  Date of Admission: 9/10/2018  Today's Date: 09/18/18  Length of Stay: 8  Primary Care Physician: Shant Hayes MD    Subjective   Chief Complaint: f.u  HPI   Awaiting approval for SNF  No new event     No bm today according to nurse despite bowel regimen.  Last BM is Saturday.    He had low bp reading earlier.  He is on Midodrine  Review of Systems     All pertinent negatives and positives are as above. All other systems have been reviewed and are negative unless otherwise stated.     Objective    Temp:  [97.2 °F (36.2 °C)-99.5 °F (37.5 °C)] 98.3 °F (36.8 °C)  Heart Rate:  [76-98] 89  Resp:  [16-18] 16  BP: ()/(53-79) 105/68  Physical Exam  Constitutional: He is oriented to person, place, and time. He appears well-developed and well-nourished.   HENT:   Head: Normocephalic and atraumatic.   Eyes: Pupils are equal, round  Conjunctivae and EOM are normal.   Neck: Normal range of motion. Neck supple. No JVD present.   Cardiovascular: Normal rate, regular rhythm, normal heart sounds and intact distal pulses.    Pulmonary/Chest: Effort normal and breath sounds normal.   Abdominal: Soft. Bowel sounds are normal. Non tender, NABS  Musculoskeletal: Normal range of motion.   Neurological: He is alert and oriented to person, place, and time.   Skin: Skin is warm and dry.   Psychiatric: He has a normal mood and affect. His behavior is normal.           Results Review:  I have reviewed the labs, radiology results, and diagnostic studies.    Laboratory Data:     Results from last 7 days  Lab Units 09/15/18  0545 09/14/18  0434 09/13/18  0353   WBC 10*3/mm3 6.53 6.91 8.26   HEMOGLOBIN g/dL 10.2* 10.3* 10.1*   HEMATOCRIT % 29.6* 30.4* 29.6*   PLATELETS 10*3/mm3 148 144 134          Results from last 7 days  Lab Units 09/18/18  0258 09/17/18  0501 09/16/18  0619   SODIUM mmol/L 139 139 140   POTASSIUM  mmol/L 4.4 4.3 4.3   CHLORIDE mmol/L 108 110 111*   CO2 mmol/L 24.0 24.0 22.0*   BUN mg/dL 12 10 11   CREATININE mg/dL 0.88 0.85 0.83   CALCIUM mg/dL 8.9 8.9 8.6   BILIRUBIN mg/dL 0.5 0.5 0.5   ALK PHOS U/L 116 120 123*   ALT (SGPT) U/L 35 38 38   AST (SGOT) U/L 28 30 31   GLUCOSE mg/dL 95 94 101*       Culture Data:        Radiology Data:   Imaging Results (last 24 hours)     ** No results found for the last 24 hours. **          I have reviewed the patient's current medications.     Assessment/Plan     Hospital Problem List     Subarachnoid hemorrhage (CMS/HCC)    Chronic coronary artery disease    Overview Signed 9/12/2018 10:09 PM by Logan Willams MD     Overview:   07/26/07:  Coronary artery disease involving > 3 vessels with more than 50%  : [  ]      Last Assessment & Plan:   Managed by Dr. Patton. Currently on lipitor, aspirin, plavix, lisinopril and metoprolol- no new symptoms. We discussed increasing his exercise now the weather has improved. He is to aim for walking 30 minutes 5 times a week.         Congestive heart failure (CMS/[facility])    Hyperlipidemia    Overview Signed 9/12/2018 10:09 PM by Logan Willams MD     Last Assessment & Plan:   He is on appropriate atorvastatin dosing which will continue.         ICD (implantable cardioverter-defibrillator) in place    Overview Signed 9/12/2018 10:09 PM by Logan Willams MD     Last Assessment & Plan:   His ICD appears to be functioning properly and he's had no shocks.         Orthostatic hypotension    Syncope          Assessment     Syncope  Orthostatic hypotension, chronic recurrent by history.- on midodrine  Subarrachnoid hemorrhage.   CAD   Bipolar disease  Constipation   Renal failure. Acute  (resolved) on chronic.  CHF by history, type unknown (combined systolic and diastolic w/o gross exacerbation today   Constipation  Large BM 9-13-18 and 9/16/18     Plan  Ok chemistry   Continue PT - ambulated with use of RW 200ft and completed 3 steps  cga  Monitor blood pressureSNF on DC  Cont lactulose; add miralax, increase activities as tolerated; consider KUB   i reviewed his medications. Seroquel causes constipation in 2-11 % of case  Discharge Planning: pending snf palcement                    Angel Zheng MD   09/18/18   3:22 PM

## 2018-09-19 ENCOUNTER — APPOINTMENT (OUTPATIENT)
Dept: GENERAL RADIOLOGY | Facility: HOSPITAL | Age: 73
End: 2018-09-19

## 2018-09-19 LAB
ALBUMIN SERPL-MCNC: 2.9 G/DL (ref 3.5–5)
ALBUMIN/GLOB SERPL: 1.3 G/DL (ref 1.1–2.5)
ALP SERPL-CCNC: 117 U/L (ref 24–120)
ALT SERPL W P-5'-P-CCNC: 37 U/L (ref 0–54)
ANION GAP SERPL CALCULATED.3IONS-SCNC: 7 MMOL/L (ref 4–13)
AST SERPL-CCNC: 25 U/L (ref 7–45)
BILIRUB SERPL-MCNC: 0.6 MG/DL (ref 0.1–1)
BUN BLD-MCNC: 14 MG/DL (ref 5–21)
BUN/CREAT SERPL: 16.5 (ref 7–25)
CALCIUM SPEC-SCNC: 9 MG/DL (ref 8.4–10.4)
CHLORIDE SERPL-SCNC: 106 MMOL/L (ref 98–110)
CO2 SERPL-SCNC: 26 MMOL/L (ref 24–31)
CREAT BLD-MCNC: 0.85 MG/DL (ref 0.5–1.4)
GFR SERPL CREATININE-BSD FRML MDRD: 89 ML/MIN/1.73
GLOBULIN UR ELPH-MCNC: 2.2 GM/DL
GLUCOSE BLD-MCNC: 95 MG/DL (ref 70–100)
POTASSIUM BLD-SCNC: 4.9 MMOL/L (ref 3.5–5.3)
PROT SERPL-MCNC: 5.1 G/DL (ref 6.3–8.7)
SODIUM BLD-SCNC: 139 MMOL/L (ref 135–145)

## 2018-09-19 PROCEDURE — 92507 TX SP LANG VOICE COMM INDIV: CPT | Performed by: SPEECH-LANGUAGE PATHOLOGIST

## 2018-09-19 PROCEDURE — 74018 RADEX ABDOMEN 1 VIEW: CPT

## 2018-09-19 PROCEDURE — 80053 COMPREHEN METABOLIC PANEL: CPT | Performed by: NURSE PRACTITIONER

## 2018-09-19 RX ORDER — BISACODYL 10 MG
10 SUPPOSITORY, RECTAL RECTAL DAILY
Status: DISCONTINUED | OUTPATIENT
Start: 2018-09-19 | End: 2018-09-20 | Stop reason: HOSPADM

## 2018-09-19 RX ADMIN — LITHIUM CARBONATE 300 MG: 300 CAPSULE, GELATIN COATED ORAL at 08:30

## 2018-09-19 RX ADMIN — POLYETHYLENE GLYCOL (3350) 17 G: 17 POWDER, FOR SOLUTION ORAL at 08:30

## 2018-09-19 RX ADMIN — DULOXETINE HYDROCHLORIDE 60 MG: 30 CAPSULE, DELAYED RELEASE ORAL at 08:30

## 2018-09-19 RX ADMIN — MIDODRINE HYDROCHLORIDE 10 MG: 2.5 TABLET ORAL at 11:09

## 2018-09-19 RX ADMIN — LACTULOSE 20 G: 20 SOLUTION ORAL at 20:01

## 2018-09-19 RX ADMIN — FAMOTIDINE 20 MG: 20 TABLET, FILM COATED ORAL at 08:30

## 2018-09-19 RX ADMIN — LITHIUM CARBONATE 300 MG: 300 CAPSULE, GELATIN COATED ORAL at 11:09

## 2018-09-19 RX ADMIN — QUETIAPINE FUMARATE 100 MG: 100 TABLET ORAL at 20:01

## 2018-09-19 RX ADMIN — MIDODRINE HYDROCHLORIDE 10 MG: 2.5 TABLET ORAL at 08:30

## 2018-09-19 RX ADMIN — BISACODYL 10 MG: 10 SUPPOSITORY RECTAL at 11:09

## 2018-09-19 RX ADMIN — BUSPIRONE HYDROCHLORIDE 10 MG: 10 TABLET ORAL at 08:29

## 2018-09-19 RX ADMIN — MIDODRINE HYDROCHLORIDE 10 MG: 2.5 TABLET ORAL at 17:17

## 2018-09-19 RX ADMIN — LACTULOSE 20 G: 20 SOLUTION ORAL at 08:30

## 2018-09-19 RX ADMIN — LACTULOSE 20 G: 20 SOLUTION ORAL at 17:17

## 2018-09-19 RX ADMIN — LITHIUM CARBONATE 300 MG: 300 CAPSULE, GELATIN COATED ORAL at 17:18

## 2018-09-19 RX ADMIN — ATORVASTATIN CALCIUM 80 MG: 40 TABLET, FILM COATED ORAL at 20:01

## 2018-09-19 RX ADMIN — FOLIC ACID 1 MG: 1 TABLET ORAL at 08:30

## 2018-09-19 RX ADMIN — BUSPIRONE HYDROCHLORIDE 10 MG: 10 TABLET ORAL at 20:01

## 2018-09-19 NOTE — PROGRESS NOTES
[facility] Medicine Services  INPATIENT PROGRESS NOTE    Patient Name: Travis Patel  Date of Admission: 9/10/2018  Today's Date: 09/19/18  Length of Stay: 9  Primary Care Physician: Shant Hayes MD    Subjective   Chief Complaint: f/u  HPI   + BM today after dulcolax  No new event  No nausea/vomiting, abdominal pain, urinary disturbance  No fever    Review of Systems     All pertinent negatives and positives are as above. All other systems have been reviewed and are negative unless otherwise stated.     Objective    Temp:  [96.9 °F (36.1 °C)-98.5 °F (36.9 °C)] 98.5 °F (36.9 °C)  Heart Rate:  [75-99] 99  Resp:  [16-17] 17  BP: ()/(57-79) 100/66  Physical Exam  Seated on commode; + BM after dulcolax  Constitutional: He is oriented to person, place, and time. He appears well-developed and well-nourished.   HENT:   Head: Normocephalic and atraumatic.   Eyes: Pupils are equal, round  Conjunctivae and EOM are normal.   Neck: Normal range of motion. Neck supple. No JVD present.   Cardiovascular: Normal rate, regular rhythm, normal heart sounds and intact distal pulses.    Pulmonary/Chest: Effort normal and breath sounds normal.   Abdominal: Soft. Bowel sounds are normal. Non tender, no guarding   Musculoskeletal: Normal range of motion.   Neurological: He is alert and oriented to person, place, and time.   Skin: Skin is warm and dry.   Psychiatric: He has a normal mood and affect. His behavior is normal.           Results Review:  I have reviewed the labs, radiology results, and diagnostic studies.    Laboratory Data:     Results from last 7 days  Lab Units 09/15/18  0545 09/14/18  0434 09/13/18  0353   WBC 10*3/mm3 6.53 6.91 8.26   HEMOGLOBIN g/dL 10.2* 10.3* 10.1*   HEMATOCRIT % 29.6* 30.4* 29.6*   PLATELETS 10*3/mm3 148 144 134          Results from last 7 days  Lab Units 09/19/18  0542 09/18/18  0258 09/17/18  0501   SODIUM mmol/L 139 139 139   POTASSIUM mmol/L 4.9 4.4  4.3   CHLORIDE mmol/L 106 108 110   CO2 mmol/L 26.0 24.0 24.0   BUN mg/dL 14 12 10   CREATININE mg/dL 0.85 0.88 0.85   CALCIUM mg/dL 9.0 8.9 8.9   BILIRUBIN mg/dL 0.6 0.5 0.5   ALK PHOS U/L 117 116 120   ALT (SGPT) U/L 37 35 38   AST (SGOT) U/L 25 28 30   GLUCOSE mg/dL 95 95 94       Culture Data:        Radiology Data:   Imaging Results (last 24 hours)     Procedure Component Value Units Date/Time    XR Abdomen KUB [[id number]] Collected:  09/19/18 0951     Updated:  09/19/18 0956    Narrative:       EXAMINATION:  XR ABDOMEN KUB-  9/19/2018 9:24 AM CDT     HISTORY: ABDOMINAL PAIN      COMPARISON: 19 2018 abdomen and pelvis CT     TECHNIQUE: Single view: KUB     FINDINGS:      Stool and gas identified throughout the colon including the rectum  without dilated bowel loops or evidence of obstruction. Moderate amount  of stool appreciated.     Phleboliths noted in the left hemipelvis. There is no organomegaly or  definite urinary tract calculi.     Degenerative spurring noted diffusely in the lumbar spine with  osteophyte formation.       Impression:       1. Moderate amount stool in the colon without dilated bowel is indicate  obstruction.  This report was finalized on 09/19/2018 09:53 by Dr. Jese Enrique MD.          I have reviewed the patient's current medications.     Assessment/Plan     Hospital Problem List     Subarachnoid hemorrhage (CMS/HCC)    Chronic coronary artery disease    Overview Signed 9/12/2018 10:09 PM by Logan Willams MD     Overview:   07/26/07:  Coronary artery disease involving > 3 vessels with more than 50%  : [  ]      Last Assessment & Plan:   Managed by Dr. Patton. Currently on lipitor, aspirin, plavix, lisinopril and metoprolol- no new symptoms. We discussed increasing his exercise now the weather has improved. He is to aim for walking 30 minutes 5 times a week.         Congestive heart failure (CMS/HCC)    Hyperlipidemia    Overview Signed 9/12/2018 10:09 PM by Logan Willams MD      Last Assessment & Plan:   He is on appropriate atorvastatin dosing which will continue.         ICD (implantable cardioverter-defibrillator) in place    Overview Signed 9/12/2018 10:09 PM by Logan Willams MD     Last Assessment & Plan:   His ICD appears to be functioning properly and he's had no shocks.         Orthostatic hypotension    Syncope        Assessment     Syncope  Orthostatic hypotension, chronic recurrent by history.- on midodrine  Subarrachnoid hemorrhage.   CAD   Bipolar disease  Constipation - dulcolax suppository   Renal failure. Acute  (resolved) on chronic.  CHF by history, type unknown (combined systolic and diastolic w/o gross exacerbation today   Constipation  Large BM 9-13-18 and 9/16/18 and 9/19/18     Plan    Continue PT -  Monitor blood pressure  SNF on DC  Cont lactulose; add miralax, dulcolax today  increase activities as tolerated; consider KUB - noted above; clarify with radiologist   Seroquel causes constipation in 2-11 % of case    Discharge Planning: pending snf palcement                            Angel Zheng MD   09/19/18   11:41 AM

## 2018-09-19 NOTE — PLAN OF CARE
Problem: Patient Care Overview  Goal: Plan of Care Review  Outcome: Ongoing (interventions implemented as appropriate)   09/19/18 1504   Coping/Psychosocial   Plan of Care Reviewed With patient   Plan of Care Review   Progress improving   OTHER   Outcome Summary ST tx completed. Pt doing well with divergent/convergent naming task. Occasional difficulty naming multiple items with divergent naming task. Pt did well with problem solving and mental manipulation task with minimal cues needed. ST to work on medication management and check writing at next session. ST to continue to follow.

## 2018-09-19 NOTE — PROGRESS NOTES
Continued Stay Note  [facility]     Patient Name: Travis Patel  MRN: [id number]  Today's Date: 9/19/2018    Admit Date: 9/10/2018          Discharge Plan     Row Name 09/19/18 1202       Plan    Plan [facility] pending eval    Plan Comments Spoke with Ashlie at [facility] [phone] and she is still waiting on [employer] to precert. She has left them a message. Sent a message to  nurse to see if she can contact [facility] as well.              Discharge Codes    No documentation.           APRIL Love

## 2018-09-19 NOTE — PROGRESS NOTES
Continued Stay Note   [facility]     Patient Name: Travis Patel  MRN: [id number]  Today's Date: 9/19/2018    Admit Date: 9/10/2018          Discharge Plan     Row Name 09/19/18 1546       Plan    Plan Comments INSURANCE HAS DENIED SNF. PT IS AMBULATING 200FT WITH THERAPY. INFORMED PT.PT STATES HIS DTR (VIGNESH) WILL BE HERE LATER THIS EVENING. PT STATES HE PLANS ON EITHER RETURNING TO HIS HOME OR TO HIS DAUGHTER'S HOME. PT IS ALREADY FOLLOWED BY [facility] .               Discharge Codes    No documentation.           EDIL Sparks

## 2018-09-19 NOTE — PLAN OF CARE
Problem: Patient Care Overview  Goal: Plan of Care Review  Outcome: Ongoing (interventions implemented as appropriate)   09/19/18 0437   Coping/Psychosocial   Plan of Care Reviewed With patient   Plan of Care Review   Progress no change   OTHER   Outcome Summary No c/o pain ,aleart x 3 no antonio changes ,no dranage from skin tear , no falls or injuries waiting nh bed .       Problem: Brain Injury, Moderate Traumatic (GCS 9-12) (Adult)  Goal: Signs and Symptoms of Listed Potential Problems Will be Absent, Minimized or Managed (Brain Injury, Moderate Traumatic)  Outcome: Ongoing (interventions implemented as appropriate)

## 2018-09-19 NOTE — PAYOR COMM NOTE
ATTN: MCKENNA  THERAPY NOTES    [phone]  THANKS FOR YOUR HELP--LET ME KNOW IF YOU NEED ANYTHING ELSE:)  UR PHONE    [phone]  Travis Patel (73 y.o. Male)     Date of Birth Social Security Number Address Home Phone MRN    1945  [id number] [place] [place] [address] [phone] [id number]    Buddhism Marital Status          Restorationist        Admission Date Admission Type Admitting Provider Attending Provider Department, Room/Bed    9/10/18 Emergency Angel Zheng MD [place], Glenn Riego, MD [facility] 4B, 408/1    Discharge Date Discharge Disposition Discharge Destination                       Physical Therapy Notes (last 72 hours) (Notes from 2018 11:05 AM through 2018 11:05 AM)      Perry Linares, PTA at 2018  3:32 PM  Version 1 of 1         Acute Care - Physical Therapy Treatment Note  [facility]     Patient Name: Travis Patel  : 1945  MRN: [id number]  Today's Date: 2018  Onset of Illness/Injury or Date of Surgery: 09/10/18  Date of Referral to PT: 18  Referring Physician: Dr. Gomez    Admit Date: 9/10/2018    Visit Dx:    ICD-10-CM ICD-9-CM   1. Subarachnoid hemorrhage (CMS/HCC) I60.9 430   2. Syncope, unspecified syncope type R55 780.2   3. Elevated liver enzymes R74.8 790.5   4. Constipation, unspecified constipation type K59.00 564.00   5. VANI (acute kidney injury) (CMS/HCC) N17.9 584.9   6. Elevated troponin R74.8 790.6   7. Renal cyst N28.1 753.10   8. Adrenal nodule (CMS/HCC) E27.9 255.8   9. Chronic anticoagulation Z79.01 V58.61   10. Dysphagia, unspecified type R13.10 787.20   11. Impaired cognition R41.89 294.9   12. Impaired mobility and ADLs Z74.09 799.89   13. Gait abnormality R26.9 781.2     Patient Active Problem List   Diagnosis   • Subarachnoid hemorrhage (CMS/HCC)   • Chronic coronary artery disease   • Congestive heart failure (CMS/HCC)   • Hyperlipidemia   • ICD (implantable  cardioverter-defibrillator) in place   • Orthostatic hypotension   • Syncope       Therapy Treatment          Rehabilitation Treatment Summary     Row Name 09/16/18 1524 09/16/18 0900          Treatment Time/Intention    Discipline physical therapy assistant  -EC physical therapy assistant  -EC     Document Type therapy note (daily note)  -EC therapy note (daily note)  -EC     Subjective Information complains of;dizziness  -EC complains of;dizziness  -EC2     Recorded by [EC] Perry Linares, PTA 09/16/18 1530 [EC] Perry Linares, PTA 09/16/18 0943  [EC2] Perry Linares, PTA 09/16/18 1010     Row Name 09/16/18 1524 09/16/18 0900          Vital Signs    Pre Systolic BP Rehab 125  -  -EC     Pre Treatment Diastolic BP 70  -EC 68  -EC     Intra Systolic BP Rehab 105  -  -EC     Intra Treatment Diastolic BP 74  -EC 74  -EC     Post Systolic BP Rehab 105  -EC 88  -EC     Post Treatment Diastolic BP 69  -EC 62  -EC     Pre Patient Position --   reclined  -EC  --     Intra Patient Position Sitting  -EC  --     Post Patient Position Standing  -EC  --     Recorded by [EC] Perry Linares, PTA 09/16/18 1530 [EC] Perry Linares, PTA 09/16/18 1010     Row Name 09/16/18 1524 09/16/18 0900          Bed Mobility Assessment/Treatment    Supine-Sit [place] (Bed Mobility)  -- conditional independence  -EC     Sit-Supine [place] (Bed Mobility) conditional independence  -EC  --     Recorded by [EC] Perry Linares, PTA 09/16/18 1530 [EC] Perry Linares, PTA 09/16/18 1020     Row Name 09/16/18 1524 09/16/18 0900          Sit-Stand Transfer    Sit-Stand [place] (Transfers) stand by assist  -EC stand by assist  -EC     Recorded by [EC] Perry Linares, PTA 09/16/18 1530 [EC] Perry Linares, PTA 09/16/18 1020     Row Name 09/16/18 1524 09/16/18 0900          Stand-Sit Transfer    Stand-Sit [place] (Transfers) stand by assist  -EC stand by assist  -EC     Recorded by [EC] Vijay  Perry HOFF, PTA 09/16/18 1530 [EC] Perry Linares, PTA 09/16/18 1020     Row Name 09/16/18 1524 09/16/18 0900          Gait/Stairs Assessment/Training    Gait/Stairs Assessment/Training gait/ambulation independence  -EC  --     [place] Level (Gait) contact guard  -EC  --     Assistive Device (Gait) walker, front-wheeled  -EC walker, front-wheeled  -EC     Distance in Feet (Gait) 200  -  -EC     Deviations/Abnormal Patterns (Gait) gait speed decreased  -EC gait speed decreased  -EC     Bilateral Gait Deviations forward flexed posture  -EC forward flexed posture  -EC     Recorded by [EC] Perry Linares, PTA 09/16/18 1530 [EC] Perry Linares, [facility] 09/16/18 1020     Row Name 09/16/18 1524 09/16/18 0900          Therapeutic Exercise    Lower Extremity Range of Motion (Therapeutic Exercise) hip flexion/extension, bilateral;hip abduction/adduction, bilateral   B heel raises  -EC hip flexion/extension, bilateral;hip abduction/adduction, bilateral   heel raises  -EC     Position (Therapeutic Exercise) standing  -EC standing  -EC     Sets/Reps (Therapeutic Exercise) 15  -EC 15  -EC     Recorded by [EC] Perry Linares, PTA 09/16/18 1530 [EC] Perry Linares, [facility] 09/16/18 1020     Row Name 09/16/18 1524 09/16/18 0900          Positioning and Restraints    Pre-Treatment Position sitting in chair/recliner  -EC in bed  -EC     Post Treatment Position bed  -EC  --     In Bed fowlers;call light within reach;side rails up x2;with family/caregiver  -EC  --     In Chair  -- notified nsg;reclined;call light within reach  -EC     Recorded by [EC] Perry Linares, PTA 09/16/18 1530 [EC] Perry Linares, [facility] 09/16/18 1020     Row Name 09/16/18 1524 09/16/18 0900          Pain Scale: Numbers Pre/Post-Treatment    Pain Scale: Numbers, Pretreatment 0/10 - no pain  -EC 0/10 - no pain  -EC     Pain Scale: Numbers, Post-Treatment 0/10 - no pain  -EC 0/10 - no pain  -EC     Recorded by [EC] Perry Linares, PTA 09/16/18  1530 [EC] Perry Linares, PTA 09/16/18 1020     Row Name                Wound 09/12/18 2000 Left lateral arm skin tear    Wound - Properties Group Date first assessed: 09/12/18 [AH] Time first assessed: 2000 [AH] Side: Left [AH] Orientation: lateral [AH] Location: arm [AH] Type: skin tear [AH] Recorded by:  [] Channing Castellon, RN 09/13/18 4780      User Key  (r) = Recorded By, (t) = Taken By, (c) = Cosigned By    Initials Name Effective Dates Discipline     Perry Linares, PTA 08/02/16 -  PT     Channing Castellon, RN 02/12/18 -  Nurse          Wound 09/12/18 2000 Left lateral arm skin tear (Active)   Dressing Appearance dry;intact 9/16/2018  7:40 AM   Base dressing in place, unable to visualize 9/16/2018  7:40 AM   Dressing Care, Wound foam 9/16/2018  7:40 AM             Physical Therapy Education     Title: PT OT SLP Therapies (Active)     Topic: Physical Therapy (Active)     Point: Mobility training (Active)    Learning Progress Summary     Learner Status Readiness Method Response Comment Documented by    Patient Active Acceptance E NR orthostatic hypotension and benefits of being OOB  09/16/18 1020     Done Acceptance E DU,NR benefits of activity  09/15/18 0855     Done Acceptance E,ABHI FLORES,AARON,NR Education for LE exercise to combat orthostasis to include aps, LAQ, & seated marching and deep breathing  education to transition slowly during transfers to assist w/ adjustment to change in position to reduce risk for LOB due to orthostatic hypotension  09/14/18 1012    Family Done Acceptance EMARK D VU,AARON,NR Education for LE exercise to combat orthostasis to include aps, LAQ, & seated marching and deep breathing  education to transition slowly during transfers to assist w/ adjustment to change in position to reduce risk for LOB due to orthostatic hypotension  09/14/18 1012          Point: Precautions (Active)    Learning Progress Summary     Learner Status Readiness Method Response Comment  Documented by    Patient Active Acceptance E NR orthostatic hypotension and benefits of being OOB  09/16/18 1020     Done Acceptance E,TB,D VU,NR,DU Educ re falls prevention to include risk w/ orthostasis; educ for LE ex, transition slowly during tfers, & incr awareness of changes in tolerance to being upright; educ to sit upright more often w/ meals/ in between meals to incr tolerance  09/14/18 1013                           PT Recommendation and Plan     Plan of Care Reviewed With: patient  Progress: improving  Outcome Summary: 200 ft with Rwx CGA           Outcome Measures     Row Name 09/16/18 1500 09/16/18 1000 09/14/18 1000       How much help from another person do you currently need...    Turning from your back to your side while in flat bed without using bedrails? 4  -EC 4  -EC 3  -JE    Moving from lying on back to sitting on the side of a flat bed without bedrails? 4  -EC 4  -EC 3  -JE    Moving to and from a bed to a chair (including a wheelchair)? 3  -EC 3  -EC 3  -JE    Standing up from a chair using your arms (e.g., wheelchair, bedside chair)? 3  -EC 3  -EC 3  -JE    Climbing 3-5 steps with a railing? 3  -EC 3  -EC 3  -JE    To walk in hospital room? 3  -EC 3  -EC 3  -JE    AM-PAC 6 Clicks Score 20  -EC 20  -EC 18  -JE       Functional Assessment    Outcome Measure Options AM-PAC 6 Clicks Basic Mobility (PT)  -EC AM-PAC 6 Clicks Basic Mobility (PT)  -EC AM-PAC 6 Clicks Basic Mobility (PT)  -JE    Row Name 09/14/18 0900             How much help from another is currently needed...    Putting on and taking off regular lower body clothing? 2  -MW      Bathing (including washing, rinsing, and drying) 2  -MW      Toileting (which includes using toilet bed pan or urinal) 3  -MW      Putting on and taking off regular upper body clothing 3  -MW      Taking care of personal grooming (such as brushing teeth) 3  -MW      Eating meals 4  -MW      Score 17  -MW         Functional Assessment    Outcome Measure  Options AM-PAC 6 Clicks Daily Activity (OT)  -        User Key  (r) = Recorded By, (t) = Taken By, (c) = Cosigned By    Initials Name Provider Type    EC Perry Linares, PTA Physical Therapy Assistant    Sunni Sky, OTR/L Occupational Therapist    Maria A Lee, PT Physical Therapist        Therapy Suggested Charges     Code   Minutes Charges    91806 (CPT®) Hc Pt Neuromusc Re Education Ea 15 Min      15213 (CPT®) Hc Pt Ther Proc Ea 15 Min 15 1    16544 (CPT®) Hc Gait Training Ea 15 Min 13 1    46467 (CPT®) Hc Pt Therapeutic Act Ea 15 Min      55805 (CPT®) Hc Pt Manual Therapy Ea 15 Min      84386 (CPT®) Hc Pt Iontophoresis Ea 15 Min      76984 (CPT®) Hc Pt Elec Stim Ea-Per 15 Min      00186 (CPT®) Hc Pt Ultrasound Ea 15 Min      50980 (CPT®) Hc Pt Self Care/Mgmt/Train Ea 15 Min      43032 (CPT®) Hc Pt Prosthetic (S) Train Initial Encounter, Each 15 Min      98196 (CPT®) Hc Pt Orthotic(S)/Prosthetic(S) Encounter, Each 15 Min      35337 (CPT®) Hc Orthotic(S) Mgmt/Train Initial Encounter, Each 15min      Total  28 2          PT G-Codes  PT Professional Judgement Used?: Yes  Outcome Measure Options: AM-PAC 6 Clicks Basic Mobility (PT)  AM-PAC 6 Clicks Score: 20  Score: 17  Functional Limitation: Mobility: Walking and moving around  Mobility: Walking and Moving Around Current Status (): At least 40 percent but less than 60 percent impaired, limited or restricted  Mobility: Walking and Moving Around Goal Status (): At least 20 percent but less than 40 percent impaired, limited or restricted    Perry Linares PTA  9/16/2018         Electronically signed by Perry Linares PTA at 9/16/2018  3:32 PM     Adamaris Broussard PTA at 9/17/2018 10:09 AM  Version 1 of 1         Problem: Patient Care Overview  Goal: Plan of Care Review  Outcome: Ongoing (interventions implemented as appropriate)   09/17/18 1007   Coping/Psychosocial   Plan of Care Reviewed With patient   Plan of Care Review    Progress improving   OTHER   Outcome Summary Pt was S for bed mobility and SBA for t/f's. Pt ambulated SBA with RW 200ft and completed 3 steps cga.            Electronically signed by Adamaris Broussard PTA at 2018 10:09 AM     Adamaris Broussard PTA at 2018 10:11 AM  Version 1 of 1         Acute Care - Physical Therapy Treatment Note  [facility]     Patient Name: Travis Patel  : 1945  MRN: [id number]  Today's Date: 2018  Onset of Illness/Injury or Date of Surgery: 09/10/18  Date of Referral to PT: 18  Referring Physician: Dr. Gomez    Admit Date: 9/10/2018    Visit Dx:    ICD-10-CM ICD-9-CM   1. Subarachnoid hemorrhage (CMS/HCC) I60.9 430   2. Syncope, unspecified syncope type R55 780.2   3. Elevated liver enzymes R74.8 790.5   4. Constipation, unspecified constipation type K59.00 564.00   5. VANI (acute kidney injury) (CMS/HCC) N17.9 584.9   6. Elevated troponin R74.8 790.6   7. Renal cyst N28.1 753.10   8. Adrenal nodule (CMS/HCC) E27.9 255.8   9. Chronic anticoagulation Z79.01 V58.61   10. Dysphagia, unspecified type R13.10 787.20   11. Impaired cognition R41.89 294.9   12. Impaired mobility and ADLs Z74.09 799.89   13. Gait abnormality R26.9 781.2     Patient Active Problem List   Diagnosis   • Subarachnoid hemorrhage (CMS/HCC)   • Chronic coronary artery disease   • Congestive heart failure (CMS/HCC)   • Hyperlipidemia   • ICD (implantable cardioverter-defibrillator) in place   • Orthostatic hypotension   • Syncope       Therapy Treatment          Rehabilitation Treatment Summary     Row Name 18 0927             Treatment Time/Intention    Discipline physical therapy assistant  -AE      Document Type therapy note (daily note)  -AE      Subjective Information complains of;dizziness  -AE2      Existing Precautions/Restrictions fall   orthostasis  -AE2      Recorded by [AE] Adamaris Broussard PTA 18 0943  [AE2] Adamaris Broussard PTA 18 1003      Row Name 18 0927              Vital Signs    Pre Systolic BP Rehab 140   supine  -AE      Pre Treatment Diastolic BP 73  -AE      Intra Systolic BP Rehab 112   sitting  -AE      Intra Treatment Diastolic BP 76  -AE      Post Systolic BP Rehab 107   standing  -AE      Post Treatment Diastolic BP 67  -AE      Post Patient Position --   after walk 124/76 in sitting position  -AE      Recorded by [AE] Adamaris Broussard, PTA 09/17/18 1003      Row Name 09/17/18 0927             Bed Mobility Assessment/Treatment    Supine-Sit [place] (Bed Mobility) supervision  -AE      Assistive Device (Bed Mobility) bed rails;head of bed elevated  -AE      Recorded by [AE] Adamaris Broussard, PTA 09/17/18 1003      Row Name 09/17/18 0927             Sit-Stand Transfer    Sit-Stand [place] (Transfers) stand by assist  -AE      Recorded by [AE] Adamaris Broussard, PTA 09/17/18 1003      Row Name 09/17/18 0927             Stand-Sit Transfer    Stand-Sit [place] (Transfers) stand by assist  -AE      Recorded by [AE] Adamaris Broussard, PTA 09/17/18 1003      Row Name 09/17/18 0927             Gait/Stairs Assessment/Training    25069 - Gait Training Minutes  18  -AE      Gait/Stairs Assessment/Training gait/ambulation independence  -AE2      [place] Level (Gait) stand by assist  -AE      Assistive Device (Gait) walker, front-wheeled  -AE2      Distance in Feet (Gait) 200  -AE2      Deviations/Abnormal Patterns (Gait) gait speed decreased  -AE2      Bilateral Gait Deviations forward flexed posture  -AE2      [place] Level (Ramp) contact guard;verbal cues  -AE2      Handrail Location (Ramp) right side (ascending)  -AE2      Recorded by [AE] Adamaris Broussard, PTA 09/17/18 1006  [AE2] Adamaris Broussard, PTA 09/17/18 1003      Row Name 09/17/18 0927             Therapeutic Exercise    12690 - PT Therapeutic Exercise Minutes 20  -AE      Recorded by [AE] Adamaris Broussard, PTA 09/17/18 1006      Row Name 09/17/18 0927             Therapeutic Exercise    Lower  Extremity (Therapeutic Exercise) LAQ (long arc quad), bilateral  -AE      Lower Extremity Range of Motion (Therapeutic Exercise) ankle dorsiflexion/plantar flexion, bilateral  -AE      Exercise Type (Therapeutic Exercise) AROM (active range of motion)  -AE      Position (Therapeutic Exercise) seated  -AE      Sets/Reps (Therapeutic Exercise) 20  -AE      Recorded by [AE] Adamaris Broussard, PTA 09/17/18 1003      Row Name 09/17/18 0927             Positioning and Restraints    Pre-Treatment Position in bed  -AE      Post Treatment Position chair  -AE      In Chair reclined;sitting;call light within reach  -AE      Recorded by [AE] Adamaris Broussard, PTA 09/17/18 1003      Row Name 09/17/18 0927             Pain Scale: Numbers Pre/Post-Treatment    Pain Scale: Numbers, Pretreatment 0/10 - no pain  -AE      Pain Scale: Numbers, Post-Treatment 0/10 - no pain  -AE      Recorded by [AE] Adamaris Broussard, PTA 09/17/18 1003      Row Name                Wound 09/12/18 2000 Left lateral arm skin tear    Wound - Properties Group Date first assessed: 09/12/18 [AH] Time first assessed: 2000 [AH] Side: Left [AH] Orientation: lateral [AH] Location: arm [AH] Type: skin tear [AH] Recorded by:  [] Channing Castellon RN 09/13/18 0449      User Key  (r) = Recorded By, (t) = Taken By, (c) = Cosigned By    Initials Name Effective Dates Discipline    AE Adamaris Broussard, PTA 06/22/15 -  PT     Channing Castellon RN 02/12/18 -  Nurse          Wound 09/12/18 2000 Left lateral arm skin tear (Active)   Dressing Appearance dry;intact 9/17/2018  8:05 AM   Closure Adhesive bandage 9/17/2018  8:05 AM   Base dressing in place, unable to visualize 9/17/2018  8:05 AM   Periwound dry;intact 9/17/2018  8:05 AM   Periwound Temperature warm 9/17/2018  8:05 AM   Periwound Skin Turgor soft 9/17/2018  8:05 AM             Physical Therapy Education     Title: PT OT SLP Therapies (Done)     Topic: Physical Therapy (Done)     Point: Mobility training (Done)     Learning Progress Summary     Learner Status Readiness Method Response Comment Documented by    Patient Done Saroj MUNOZ POC seated ex's  09/17/18 1006     Active Acceptance E NR orthostatic hypotension and benefits of being OOB  09/16/18 1020     Done Acceptance E DU,NR benefits of activity  09/15/18 0855     Done Acceptance E,TB,ABHI VU,DU,NR Education for LE exercise to combat orthostasis to include aps, LAQ, & seated marching and deep breathing  education to transition slowly during transfers to assist w/ adjustment to change in position to reduce risk for LOB due to orthostatic hypotension  09/14/18 1012    Family Done Acceptance ETB,ABHI VU,DU,NR Education for LE exercise to combat orthostasis to include aps, LAQ, & seated marching and deep breathing  education to transition slowly during transfers to assist w/ adjustment to change in position to reduce risk for LOB due to orthostatic hypotension  09/14/18 1012          Point: Precautions (Done)    Learning Progress Summary     Learner Status Readiness Method Response Comment Documented by    Patient Done Saroj MUNOZ POC seated ex's  09/17/18 1006     Active Acceptance E NR orthostatic hypotension and benefits of being OOB  09/16/18 1020     Done Acceptance E,TB,ABHI VU,NR,DU Educ re falls prevention to include risk w/ orthostasis; educ for LE ex, transition slowly during tfers, & incr awareness of changes in tolerance to being upright; educ to sit upright more often w/ meals/ in between meals to incr tolerance  09/14/18 1013                      User Key     Initials Effective Dates Name Provider Type Discipline     08/02/16 -  Perry Linares, PTA Physical Therapy Assistant PT     06/22/15 -  Adamaris Broussard PTA Physical Therapy Assistant PT     08/02/18 -  Maria A Amor, PT Physical Therapist PT                    PT Recommendation and Plan     Plan of Care Reviewed With: patient  Progress: improving  Outcome Summary: Pt was S for bed  mobility and SBA for t/f's. Pt ambulated SBA with RW and completed 3 steps cga          Outcome Measures     Row Name 09/17/18 0927 09/16/18 1500 09/16/18 1000       How much help from another person do you currently need...    Turning from your back to your side while in flat bed without using bedrails? 4  -AE 4  -EC 4  -EC    Moving from lying on back to sitting on the side of a flat bed without bedrails? 4  -AE 4  -EC 4  -EC    Moving to and from a bed to a chair (including a wheelchair)? 3  -AE 3  -EC 3  -EC    Standing up from a chair using your arms (e.g., wheelchair, bedside chair)? 3  -AE 3  -EC 3  -EC    Climbing 3-5 steps with a railing? 3  -AE 3  -EC 3  -EC    To walk in hospital room? 3  -AE 3  -EC 3  -EC    AM-PAC 6 Clicks Score 20  -AE 20  -EC 20  -EC       Functional Assessment    Outcome Measure Options AM-PAC 6 Clicks Basic Mobility (PT)  -AE AM-PAC 6 Clicks Basic Mobility (PT)  -EC AM-PAC 6 Clicks Basic Mobility (PT)  -EC      User Key  (r) = Recorded By, (t) = Taken By, (c) = Cosigned By    Initials Name Provider Type    Perry Blas PTA Physical Therapy Assistant    Adamaris Perea, ESPERANZA Physical Therapy Assistant           Time Calculation:         PT Charges     Row Name 09/17/18 1010 09/17/18 0927          Time Calculation    Start Time 0927  -AE  --     Stop Time 1005  -AE  --     Time Calculation (min) 38 min  -AE  --     PT Received On 09/17/18  -AE  --     PT Goal Re-Cert Due Date 09/24/18  -AE  --        Time Calculation- PT    Total Timed Code Minutes- PT 38 minute(s)  -AE  --        Timed Charges    45083 - PT Therapeutic Exercise Minutes 20  -AE 20  -AE     33475 - Gait Training Minutes  18  -AE 18  -AE       User Key  (r) = Recorded By, (t) = Taken By, (c) = Cosigned By    Initials Name Provider Type    Adamaris Perea, ESPERANZA Physical Therapy Assistant        Therapy Suggested Charges     Code   Minutes Charges    18213 (CPT®) Hc Pt Neuromusc Re Education Ea 15 Min       77845 (CPT®) Hc Pt Ther Proc Ea 15 Min 40 3    91845 (CPT®) Hc Gait Training Ea 15 Min 36 2    68667 (CPT®) Hc Pt Therapeutic Act Ea 15 Min      94180 (CPT®) Hc Pt Manual Therapy Ea 15 Min      81557 (CPT®) Hc Pt Iontophoresis Ea 15 Min      18351 (CPT®) Hc Pt Elec Stim Ea-Per 15 Min      84328 (CPT®) Hc Pt Ultrasound Ea 15 Min      86577 (CPT®) Hc Pt Self Care/Mgmt/Train Ea 15 Min      98914 (CPT®) Hc Pt Prosthetic (S) Train Initial Encounter, Each 15 Min      47612 (CPT®) Hc Pt Orthotic(S)/Prosthetic(S) Encounter, Each 15 Min      67136 (CPT®) Hc Orthotic(S) Mgmt/Train Initial Encounter, Each 15min      Total  76 5        Therapy Charges for Today     Code Description Service Date Service Provider Modifiers Qty    56332462851 HC GAIT TRAINING EA 15 MIN 9/17/2018 Adamaris Broussard, PTA GP, KX 2    10196622930 HC PT THER PROC EA 15 MIN 9/17/2018 Adamaris Broussard PTA GP, KX 1          PT G-Codes  PT Professional Judgement Used?: Yes  Outcome Measure Options: AM-PAC 6 Clicks Basic Mobility (PT)  AM-PAC 6 Clicks Score: 20  Score: 17  Functional Limitation: Mobility: Walking and moving around  Mobility: Walking and Moving Around Current Status (): At least 40 percent but less than 60 percent impaired, limited or restricted  Mobility: Walking and Moving Around Goal Status (): At least 20 percent but less than 40 percent impaired, limited or restricted    Adamaris Broussard PTA  9/17/2018         Electronically signed by Adamaris Broussard PTA at 9/17/2018 10:11 AM     Katie Lock PTA at 9/18/2018 11:29 AM  Version 1 of 1         Problem: Patient Care Overview  Goal: Plan of Care Review  Outcome: Ongoing (interventions implemented as appropriate)   09/18/18 1127   Coping/Psychosocial   Plan of Care Reviewed With patient   OTHER   Outcome Summary Pt. stands and ambulates 200' with SBA. Instruct on safe use of RWX. One slight LOB during turning. Will benefit from strengthening and balance activities,            Electronically signed by Katie Lock PTA at 2018 11:29 AM          Occupational Therapy Notes (last 72 hours) (Notes from 2018 11:05 AM through 2018 11:05 AM)      [place] OTA at 2018 10:45 AM          Acute Care - Occupational Therapy Treatment Note  [facility]     Patient Name: Travis Patel  : 1945  MRN: [id number]  Today's Date: 2018  Onset of Illness/Injury or Date of Surgery: 09/10/18  Date of Referral to OT: 18  Referring Physician: Dr. Gomez    Admit Date: 9/10/2018       ICD-10-CM ICD-9-CM   1. Subarachnoid hemorrhage (CMS/HCC) I60.9 430   2. Syncope, unspecified syncope type R55 780.2   3. Elevated liver enzymes R74.8 790.5   4. Constipation, unspecified constipation type K59.00 564.00   5. VANI (acute kidney injury) (CMS/[facility]) N17.9 584.9   6. Elevated troponin R74.8 790.6   7. Renal cyst N28.1 753.10   8. Adrenal nodule (CMS/[facility]) E27.9 255.8   9. Chronic anticoagulation Z79.01 V58.61   10. Dysphagia, unspecified type R13.10 787.20   11. Impaired cognition R41.89 294.9   12. Impaired mobility and ADLs Z74.09 799.89   13. Gait abnormality R26.9 781.2   Therapy Treatment          Rehabilitation Treatment Summary     Row Name 18             Treatment Time/Intention    Discipline occupational therapy assistant  -MM      Document Type therapy note (daily note)  -MM      Subjective Information complains of;weakness  -MM      Mode of Treatment occupational therapy  -MM      Patient/Family Observations no family present; in bed   -MM      Existing Precautions/Restrictions fall  -MM      Treatment Considerations/Comments orthostatic hypotension   -MM      Recorded by [MM] Park Chino OTA 18 0932      Row Name 18             Cognitive Assessment/Intervention- PT/OT    Affect/Mental Status (Cognitive) flat/blunted affect  -MM      Orientation Status (Cognition) oriented x 4  -MM      Follows Commands (Cognition) [facility]   -MM      Cognitive Function (Cognitive) WFL  -MM      Safety Deficit (Cognitive) safety precautions awareness  -MM      Personal Safety Interventions elopement precautions initiated;fall prevention program maintained;gait belt;nonskid shoes/slippers when out of bed;supervised activity  -MM      Recorded by [MM] Park Chino OTA 09/18/18 1037      Row Name 09/18/18 0926             Safety Issues, Functional Mobility    Safety Issues Affecting Function (Mobility) safety precaution awareness;other (see comments)   orthostatic hypotension   -MM      Impairments Affecting Function (Mobility) balance;endurance/activity tolerance;strength  -MM      Comment, Safety Issues/Impairments (Mobility) bed>shower chair next to bed; pt did not feel like he could walk to the bathroom at this time due to fatigue/feeling dizzy   -MM      Recorded by [MM] Park Chino OTA 09/18/18 1037      Row Name 09/18/18 0926             Bed Mobility Assessment/Treatment    Bed Mobility Assessment/Treatment bed mobility (all) activities  -MM      [place] Level (Bed Mobility) supervision  -MM      Bed Mobility, Safety Issues other (see comments)  -MM      Assistive Device (Bed Mobility) bed rails  -MM      Comment (Bed Mobility) orthostatic hypotension; VCs to perform leg exercises prior to sitting/standing to decrease risk of orthostatic hypotension   -MM      Recorded by [MM] Park Chino OTA 09/18/18 1037      Row Name 09/18/18 0926             Functional Mobility    Functional Mobility- Ind. Level contact guard assist  -MM      Functional Mobility- Safety Issues other (see comments)   orthostatic hypotension   -MM      Functional Mobility- Comment bed>shower chair (approx 8 steps)   -MM      Recorded by [MM] Park Chino OTA 09/18/18 1037      Row Name 09/18/18 0926             Transfer Assessment/Treatment    Transfer Assessment/Treatment sit-stand transfer;stand-sit transfer;shower transfer  -MM      Comment  (Transfers) BP did not drop this date during sit<>stands and t/fs   -MM      Recorded by [MM] Park Chino OTA 09/18/18 1037      Row Name 09/18/18 0926             Sit-Stand Transfer    Sit-Stand [place] (Transfers) stand by assist  -MM      Recorded by [MM] Park Chino OTA 09/18/18 1037      Row Name 09/18/18 0926             Stand-Sit Transfer    Stand-Sit [place] (Transfers) stand by assist  -MM      Recorded by [MM] Park Chino EDITH 09/18/18 1037      Row Name 09/18/18 0926             Shower Transfer    Type (Shower Transfer) sit-stand;stand-sit  -MM      [place] Level (Shower Transfer) contact guard  -MM      Assistive Device (Shower Transfer) shower chair   Simulated shower t/f (stepping over small shower ledge)  -MM      Recorded by [MM] Park Chino OTA 09/18/18 1037      Row Name 09/18/18 0926             ADL Assessment/Intervention    BADL Assessment/Intervention bathing;upper body dressing;lower body dressing;grooming  -MM      Recorded by [MM] Park Chino OTA 09/18/18 1037      Row Name 09/18/18 0926             Bathing Assessment/Intervention    Bathing [place] Level bathing skills;lower body;upper body;upper extremities;chest/trunk;distal lower extremities/feet;proximal lower extremities;perineal area;supervision;contact guard assist  -MM      Bathing Position supported sitting  -MM      Comment (Bathing) S when sitting; CGA when standing   -MM      Recorded by [MM] Park Chino OTA 09/18/18 1037      Row Name 09/18/18 0926             Upper Body Dressing Assessment/Training    Upper Body Dressing [place] Level doff;don;independent  -MM      Upper Body Dressing Position supported sitting  -MM      Comment (Upper Body Dressing) Gown   -MM      Recorded by [MM] Park Chino OTA 09/18/18 1037      Row Name 09/18/18 0926             Lower Body Dressing Assessment/Training    Lower Body Dressing [place] Level  doff;don;undergarment;socks;set up   alanis hose   -MM      Lower Body Dressing Position edge of bed sitting  -MM      Comment (Lower Body Dressing) S for socks/alanis hose and CGA when standing to don pants over hips   -MM      Recorded by [MM] Park Chino OTA 09/18/18 1037      Row Name 09/18/18 0926             Grooming Assessment/Training    [place] Level (Grooming) wash face, hands;hair care, combing/brushing;supervision;set up  -MM      Grooming Position supported sitting  -MM      Recorded by [MM] Park Chino OTA 09/18/18 1037      Row Name 09/18/18 0926             Positioning and Restraints    Pre-Treatment Position in bed  -MM      Post Treatment Position bed  -MM      In Bed fowlers;call light within reach;encouraged to call for assist;side rails up x2;exit alarm on;with other staff  -MM      Recorded by [MM] Park Chino OTA 09/18/18 1037      Row Name 09/18/18 0926             Pain Scale: Numbers Pre/Post-Treatment    Pain Scale: Numbers, Pretreatment 0/10 - no pain  -MM      Pain Scale: Numbers, Post-Treatment 0/10 - no pain  -MM      Recorded by [MM] Park Chino OTA 09/18/18 1037      Row Name                Wound 09/12/18 2000 Left lateral arm skin tear    Wound - Properties Group Date first assessed: 09/12/18 [AH] Time first assessed: 2000 [] Side: Left [AH] Orientation: lateral [] Location: arm [] Type: skin tear [] Recorded by:  [] Channing Castellon RN 09/13/18 0449    Row Name 09/18/18 0926             Plan of Care Review    Plan of Care Reviewed With patient  -MM      Recorded by [MM] Park Chino OTA 09/18/18 1037      Row Name 09/18/18 0926             Outcome Summary/Treatment Plan (OT)    Daily Summary of Progress (OT) progress toward functional goals is good  -MM      Recorded by [MM] Park Chino OTA 09/18/18 1037        User Key  (r) = Recorded By, (t) = Taken By, (c) = Cosigned By    Initials Name Effective Dates Discipline    ROSE Castellon  Channing MOE RN 02/12/18 -  Nurse    MM Park Chino OTA 08/01/18 -  OT        Wound 09/12/18 2000 Left lateral arm skin tear (Active)   Dressing Appearance dry;intact 9/18/2018  7:00 AM   Closure Adhesive bandage 9/18/2018  7:00 AM   Base dressing in place, unable to visualize 9/18/2018  7:00 AM   Periwound dry;intact 9/18/2018  7:00 AM   Periwound Temperature warm 9/18/2018  7:00 AM   Periwound Skin Turgor soft 9/18/2018  7:00 AM   Drainage Amount none 9/18/2018  7:00 AM   Dressing Care, Wound foam 9/18/2018  7:00 AM   Periwound Care, Wound dry periwound area maintained 9/18/2018  7:00 AM         Occupational Therapy Education     Title: PT OT SLP Therapies (Done)     Topic: Occupational Therapy (Done)     Point: ADL training (Done)     Description: Instruct learner(s) on proper safety adaptation and remediation techniques during self care or transfers.   Instruct in proper use of assistive devices.   Learning Progress Summary     Learner Status Readiness Method Response Comment Documented by    Patient Done Acceptance E,TB VU energy conservation, fall risk/safety, ADL retraining, t/f training  09/18/18 1038     Done Acceptance E,TB VU possible home mods, energy conservation, fall risk/safety, importance of alanis hose/how to apply, importance of safety awareness due to orthostatic hypotension  09/17/18 1255     Done Acceptance E VU home safety, orthostasis and importance of exercises/alanis hose for prevention, ADL, dressing modification/strategy, OT POC  09/14/18 1001                      User Key     Initials Effective Dates Name Provider Type Discipline     08/28/18 -  Sunni Nagel, OTR/L Occupational Therapist OT     08/01/18 -  Park Chino OTA Occupational Therapy Assistant OT                OT Recommendation and Plan  Outcome Summary/Treatment Plan (OT)  Daily Summary of Progress (OT): progress toward functional goals is good  Daily Summary of Progress (OT): progress toward functional  goals is good  Plan of Care Review  Plan of Care Reviewed With: patient  Plan of Care Reviewed With: patient  Outcome Summary: Pt in bed; agreeable to OT. VCs to perform exercises in bed to decrease risk of orthostatic hypotension when sitting/standing. Pt performed bed mobility with S. T/f to shower chair next to bed (approx 8 steps); pt denied walking into the bathroom due to feeling weak/fatigue. Pt t/f with CGA and performed standing aspect of bathing with CGA; seated aspect with S. Pt don/doffed gown with I while seated; LB dress socks/alanis hose S and underwear; CGA for seated aspect. Pt also able to wash face with S. All aspects of bathing set up for pt, no cues needed for completion of task. No evidence of drop in BP during session. Continue OT POC. Pt plans on discharging to a SNF, due to living alone at home and not having help if needed.         Outcome Measures     Row Name 09/18/18 0926 09/17/18 1119 09/17/18 0927       How much help from another person do you currently need...    Turning from your back to your side while in flat bed without using bedrails?  --  -- 4  -AE    Moving from lying on back to sitting on the side of a flat bed without bedrails?  --  -- 4  -AE    Moving to and from a bed to a chair (including a wheelchair)?  --  -- 3  -AE    Standing up from a chair using your arms (e.g., wheelchair, bedside chair)?  --  -- 3  -AE    Climbing 3-5 steps with a railing?  --  -- 3  -AE    To walk in hospital room?  --  -- 3  -AE    AM-PAC 6 Clicks Score  --  -- 20  -AE       How much help from another is currently needed...    Putting on and taking off regular lower body clothing? 3  -MM 3  -MM  --    Bathing (including washing, rinsing, and drying) 3  -MM 3  -MM  --    Toileting (which includes using toilet bed pan or urinal) 3  -MM 3  -MM  --    Putting on and taking off regular upper body clothing 4  -MM 3  -MM  --    Taking care of personal grooming (such as brushing teeth) 4  -MM 4  -MM  --     Eating meals 4  -MM 4  -MM  --    Score 21  -MM 20  -MM  --       Functional Assessment    Outcome Measure Options  --  -- AM-PAC 6 Clicks Basic Mobility (PT)  -AE    Row Name 09/16/18 1500 09/16/18 1000          How much help from another person do you currently need...    Turning from your back to your side while in flat bed without using bedrails? 4  -EC 4  -EC     Moving from lying on back to sitting on the side of a flat bed without bedrails? 4  -EC 4  -EC     Moving to and from a bed to a chair (including a wheelchair)? 3  -EC 3  -EC     Standing up from a chair using your arms (e.g., wheelchair, bedside chair)? 3  -EC 3  -EC     Climbing 3-5 steps with a railing? 3  -EC 3  -EC     To walk in hospital room? 3  -EC 3  -EC     AM-PAC 6 Clicks Score 20  -EC 20  -EC        Functional Assessment    Outcome Measure Options AM-PAC 6 Clicks Basic Mobility (PT)  -EC AM-PAC 6 Clicks Basic Mobility (PT)  -EC       User Key  (r) = Recorded By, (t) = Taken By, (c) = Cosigned By    Initials Name Provider Type    EC Perry Linares, ESPERANZA Physical Therapy Assistant    AE Adamaris Broussard PTA Physical Therapy Assistant    MM Park Chino OTA Occupational Therapy Assistant           Time Calculation:         Time Calculation- OT     Row Name 09/18/18 0926             Time Calculation- OT    OT Start Time 0926  -MM      OT Stop Time 1020  -MM      OT Time Calculation (min) 54 min  -MM      Total Timed Code Minutes- OT 54 minute(s)  -MM      OT Received On 09/18/18  -MM         Timed Charges    44269 - OT Self Care/Mgmt Minutes 54  -MM        User Key  (r) = Recorded By, (t) = Taken By, (c) = Cosigned By    Initials Name Provider Type    Park Aguirre OTA Occupational Therapy Assistant           Therapy Suggested Charges     Code   Minutes Charges    91855 (CPT®) Hc Ot Neuromusc Re Education Ea 15 Min      16396 (CPT®) Hc Ot Ther Proc Ea 15 Min      45153 (CPT®) Hc Ot Therapeutic Act Ea 15 Min      89977 (CPT®) Hc  Ot Manual Therapy Ea 15 Min      01458 (CPT®) Hc Ot Iontophoresis Ea 15 Min      47974 (CPT®) Hc Ot Elec Stim Ea-Per 15 Min      15660 (CPT®) Hc Ot Ultrasound Ea 15 Min      73846 (CPT®) Hc Ot Self Care/Mgmt/Train Ea 15 Min 54 4    Total  54 4        Therapy Charges for Today     Code Description Service Date Service Provider Modifiers Qty    21553718107 HC OT SELF CARE/MGMT/TRAIN EA 15 MIN 9/17/2018 Park Chino OTA GO, KX 2    22024428910 HC OT SELF CARE/MGMT/TRAIN EA 15 MIN 9/18/2018 Park Chino OTA GO KX 4          OT G-codes  OT Professional Judgement Used?: Yes  OT Functional Scales Options: AM-PAC 6 Clicks Daily Activity (OT)  Score: 17  Functional Limitation: Self care  Self Care Current Status (): At least 40 percent but less than 60 percent impaired, limited or restricted  Self Care Goal Status (): At least 20 percent but less than 40 percent impaired, limited or restricted    EDITH Schofield  9/18/2018    Electronically signed by Vivian Hood OTR/L at 9/18/2018 11:01 AM     Park Chino OTA at 9/18/2018 10:45 AM          Problem: Patient Care Overview  Goal: Plan of Care Review  Outcome: Ongoing (interventions implemented as appropriate)   09/18/18 1040   Coping/Psychosocial   Plan of Care Reviewed With patient   Plan of Care Review   Progress improving   OTHER   Outcome Summary Pt in bed; agreeable to OT. VCs to perform exercises in bed to decrease risk of orthostatic hypotension when sitting/standing. Pt performed bed mobility with S. T/f to shower chair next to bed (approx 8 steps); pt denied walking into the bathroom due to feeling weak/fatigue. Pt t/f with CGA and performed standing aspect of bathing with CGA; seated aspect with S. Pt don/doffed gown with I while seated; LB dress socks/alanis hose S and underwear; CGA for seated aspect. Pt also able to wash face with S. All aspects of bathing set up for pt, no cues needed for completion of task. No evidence of  drop in BP during session. Continue OT POC. Pt plans on discharging to a SNF, due to living alone at home and not having help if needed.            Electronically signed by Park Chino OTA at 2018 10:45 AM     Park Chino OTA at 2018  1:05 PM          Acute Care - Occupational Therapy Treatment Note   [place]     Patient Name: Travis Patel  : 1945  MRN: [id number]  Today's Date: 2018  Onset of Illness/Injury or Date of Surgery: 09/10/18  Date of Referral to OT: 18  Referring Physician: Dr. Gomez    Admit Date: 9/10/2018       ICD-10-CM ICD-9-CM   1. Subarachnoid hemorrhage (CMS/[facility]) I60.9 430   2. Syncope, unspecified syncope type R55 780.2   3. Elevated liver enzymes R74.8 790.5   4. Constipation, unspecified constipation type K59.00 564.00   5. VANI (acute kidney injury) (CMS/[facility]) N17.9 584.9   6. Elevated troponin R74.8 790.6   7. Renal cyst N28.1 753.10   8. Adrenal nodule (CMS/[facility]) E27.9 255.8   9. Chronic anticoagulation Z79.01 V58.61   10. Dysphagia, unspecified type R13.10 787.20   11. Impaired cognition R41.89 294.9   12. Impaired mobility and ADLs Z74.09 799.89   13. Gait abnormality R26.9 781.2     Patient Active Problem List   Diagnosis   • Subarachnoid hemorrhage (CMS/HCC)   • Chronic coronary artery disease   • Congestive heart failure (CMS/[facility])   • Hyperlipidemia   • ICD (implantable cardioverter-defibrillator) in place   • Orthostatic hypotension   • Syncope     Past Medical History:   Diagnosis Date   • Diverticulitis    • Hernia of abdominal wall    • Myocardial infarction 2017   • Sleep apnea    • Stroke (CMS/HCC) 2017     Past Surgical History:   Procedure Laterality Date   • COLOSTOMY  2001    x 3 months   • COLOSTOMY REVISION     • CORONARY ARTERY BYPASS GRAFT         • HERNIA REPAIR      abdominal hernia x 3; poor healing with multiple revisions   • PACEMAKER IMPLANTATION      w/ defibrillator   • ROTATOR CUFF REPAIR Right         Therapy Treatment          Rehabilitation Treatment Summary     Row Name 09/17/18 1119 09/17/18 0927          Treatment Time/Intention    Discipline occupational therapy assistant  -MM physical therapy assistant  -AE     Document Type therapy note (daily note)  -MM therapy note (daily note)  -AE     Subjective Information complains of;dizziness  -MM complains of;dizziness  -AE2     Mode of Treatment occupational therapy  -MM  --     Patient/Family Observations no family present; pt up in chair   -MM  --     Patient Effort adequate  -MM2  --     Existing Precautions/Restrictions fall  -MM2 fall   orthostasis  -AE2     Treatment Considerations/Comments orthostatic hypotension; pt reports multiple falls at home due to this   -MM2  --     Recorded by [MM] Park Chino OTA 09/17/18 1127  [MM2] Park Chino OTA 09/17/18 1212 [AE] Adamaris Broussard, [facility] 09/17/18 0943  [AE2] Adamaris Broussard, [facility] 09/17/18 1003     Row Name 09/17/18 0927             Vital Signs    Pre Systolic BP Rehab 140   supine  -AE      Pre Treatment Diastolic BP 73  -AE      Intra Systolic BP Rehab 112   sitting  -AE      Intra Treatment Diastolic BP 76  -AE      Post Systolic BP Rehab 107   standing  -AE      Post Treatment Diastolic BP 67  -AE      Post Patient Position --   after walk 124/76 in sitting position  -AE      Recorded by [AE] Adamaris Broussard, [facility] 09/17/18 1003      Row Name 09/17/18 1119             Cognitive Assessment/Intervention- PT/OT    Affect/Mental Status (Cognitive) flat/blunted affect  -MM      Orientation Status (Cognition) oriented x 4  -MM      Follows Commands (Cognition) follows multi-step commands;over 90% accuracy  -MM      Cognitive Function (Cognitive) WNL  -MM      Safety Deficit (Cognitive) safety precautions awareness  -MM      Personal Safety Interventions elopement precautions initiated;fall prevention program maintained;gait belt;nonskid shoes/slippers when out of bed;supervised activity  -MM       Recorded by [MM] Park Chino [facility] 09/17/18 1212      Row Name 09/17/18 1119             Safety Issues, Functional Mobility    Safety Issues Affecting Function (Mobility) safety precaution awareness  -MM      Impairments Affecting Function (Mobility) endurance/activity tolerance;other (see comments)   orthostatic hypotension   -MM      Recorded by [MM] Park Chino EDITH 09/17/18 1212      Row Name 09/17/18 1119 09/17/18 0927          Bed Mobility Assessment/Treatment    Supine-Sit [place] (Bed Mobility)  -- supervision  -AE     Assistive Device (Bed Mobility)  -- bed rails;head of bed elevated  -AE     Comment (Bed Mobility) up in chair   -MM  --     Recorded by [MM] Park Chino [facility] 09/17/18 1212 [AE] Adamaris Broussard, PTA 09/17/18 1003     Row Name 09/17/18 1119             Transfer Assessment/Treatment    Transfer Assessment/Treatment sit-stand transfer;stand-sit transfer  -MM      Comment (Transfers) sit<>stands during dressing task   -MM      Recorded by [MM] Park Chino EDITH 09/17/18 1253      Row Name 09/17/18 1119 09/17/18 0927          Sit-Stand Transfer    Sit-Stand [place] (Transfers) stand by assist  -MM stand by assist  -AE     Recorded by [MM] Park Chino [facility] 09/17/18 1253 [AE] Adamaris Broussard, PTA 09/17/18 1003     Row Name 09/17/18 1119 09/17/18 0927          Stand-Sit Transfer    Stand-Sit [place] (Transfers) stand by assist  -MM stand by assist  -AE     Recorded by [MM] Park Chino [facility] 09/17/18 1253 [AE] Adamaris Broussard, PTA 09/17/18 1003     Row Name 09/17/18 0927             Gait/Stairs Assessment/Training    06466 - Gait Training Minutes  18  -AE      Gait/Stairs Assessment/Training gait/ambulation independence  -AE2      [place] Level (Gait) stand by assist  -AE      Assistive Device (Gait) walker, front-wheeled  -AE2      Distance in Feet (Gait) 200  -AE2      Deviations/Abnormal Patterns (Gait) gait speed decreased  -AE2      Bilateral  Gait Deviations forward flexed posture  -AE2      [place] Level (Ramp) contact guard;verbal cues  -AE2      Handrail Location (Ramp) right side (ascending)  -AE2      Recorded by [AE] Adamaris Broussard, [facility] 09/17/18 1006  [AE2] Adamaris Broussard, [facility] 09/17/18 1003      Row Name 09/17/18 1119             ADL Assessment/Intervention    BADL Assessment/Intervention lower body dressing  -MM      Recorded by [MM] Park Chino [facility] 09/17/18 1253      Row Name 09/17/18 1119             Lower Body Dressing Assessment/Training    Lower Body Dressing [place] Level doff;don;pants/bottoms;socks;set up;supervision   alanis hose   -MM      Lower Body Dressing Position unsupported sitting;unsupported standing  -MM      Comment (Lower Body Dressing) supervision for socks/alanis hose after educating/teaching pt how to don/doff them. Pt simulated don/doff pants with SBA; anticipate no difficulties other than possible drop in BP due to orthostatic hypotension  -MM      Recorded by [MM] Park Chino [facility] 09/17/18 1253      Row Name 09/17/18 1119             BADL Safety/Performance    Impairments, BADL Safety/Performance balance;endurance/activity tolerance   orthostasis  -MM      Skilled BADL Treatment/Intervention BADL process/adaptation training;compensatory training;energy conservation;environmental modifications  -MM      Progress in BADL Status independence level  -MM      Recorded by [MM] Park Chino EDITH 09/17/18 1253      Row Name 09/17/18 0927             Therapeutic Exercise    87279 - PT Therapeutic Exercise Minutes 20  -AE      Recorded by [AE] Adamaris Broussard, [facility] 09/17/18 1006      Row Name 09/17/18 0927             Therapeutic Exercise    Lower Extremity (Therapeutic Exercise) LAQ (long arc quad), bilateral  -AE      Lower Extremity Range of Motion (Therapeutic Exercise) ankle dorsiflexion/plantar flexion, bilateral  -AE      Exercise Type (Therapeutic Exercise) AROM (active range of motion)  -AE       Position (Therapeutic Exercise) seated  -AE      Sets/Reps (Therapeutic Exercise) 20  -AE      Recorded by [AE] Adamaris Broussard, PTA 09/17/18 1003      Row Name 09/17/18 1119 09/17/18 0927          Positioning and Restraints    Pre-Treatment Position sitting in chair/recliner  -MM in bed  -AE     Post Treatment Position chair  -MM chair  -AE     In Chair reclined;call light within reach;encouraged to call for assist  -MM reclined;sitting;call light within reach  -AE     Recorded by [MM] Park Chino OTA 09/17/18 1253 [AE] Adamaris Broussard, PTA 09/17/18 1003     Row Name 09/17/18 1119 09/17/18 0927          Pain Scale: Numbers Pre/Post-Treatment    Pain Scale: Numbers, Pretreatment 0/10 - no pain  -MM 0/10 - no pain  -AE     Pain Scale: Numbers, Post-Treatment 0/10 - no pain  -MM 0/10 - no pain  -AE     Recorded by [MM] Park Chino OTA 09/17/18 1253 [AE] Adamaris Broussard, PTA 09/17/18 1003     Row Name                Wound 09/12/18 2000 Left lateral arm skin tear    Wound - Properties Group Date first assessed: 09/12/18 [AH] Time first assessed: 2000 [AH] Side: Left [AH] Orientation: lateral [AH] Location: arm [AH] Type: skin tear [AH] Recorded by:  [AH] Channing Castellon RN 09/13/18 0449    Row Name 09/17/18 1119             Plan of Care Review    Plan of Care Reviewed With patient  -MM      Recorded by [MM] Park Chino OTA 09/17/18 1253      Row Name 09/17/18 1119             Outcome Summary/Treatment Plan (OT)    Daily Summary of Progress (OT) progress toward functional goals is good  -MM      Recorded by [MM] Park Chino OTA 09/17/18 1253        User Key  (r) = Recorded By, (t) = Taken By, (c) = Cosigned By    Initials Name Effective Dates Discipline    AE Adamaris Broussard, PTA 06/22/15 -  PT     Channing Castellon RN 02/12/18 -  Nurse     Park Chino OTA 08/01/18 -  OT        Wound 09/12/18 2000 Left lateral arm skin tear (Active)   Dressing Appearance dry;intact 9/17/2018  8:05  AM   Closure Adhesive bandage 9/17/2018  8:05 AM   Base dressing in place, unable to visualize 9/17/2018  8:05 AM   Periwound dry;intact 9/17/2018  8:05 AM   Periwound Temperature warm 9/17/2018  8:05 AM   Periwound Skin Turgor soft 9/17/2018  8:05 AM   Drainage Amount none 9/17/2018  8:05 AM   Dressing Care, Wound foam 9/17/2018  8:05 AM   Periwound Care, Wound dry periwound area maintained 9/17/2018  8:05 AM         Occupational Therapy Education     Title: PT OT SLP Therapies (Done)     Topic: Occupational Therapy (Done)     Point: ADL training (Done)     Description: Instruct learner(s) on proper safety adaptation and remediation techniques during self care or transfers.   Instruct in proper use of assistive devices.   Learning Progress Summary     Learner Status Readiness Method Response Comment Documented by    Patient Done Acceptance E,TB VU possible home mods, energy conservation, fall risk/safety, importance of alanis hose/how to apply, importance of safety awareness due to orthostatic hypotension  09/17/18 1255     Done Acceptance E VU home safety, orthostasis and importance of exercises/alanis hose for prevention, ADL, dressing modification/strategy, OT POC  09/14/18 1001                      User Key     Initials Effective Dates Name Provider Type Discipline     08/28/18 -  Sunni Nagel, OTR/L Occupational Therapist OT     08/01/18 -  Park Chino OTA Occupational Therapy Assistant OT                OT Recommendation and Plan  Outcome Summary/Treatment Plan (OT)  Daily Summary of Progress (OT): progress toward functional goals is good  Daily Summary of Progress (OT): progress toward functional goals is good  Plan of Care Review  Plan of Care Reviewed With: patient  Plan of Care Reviewed With: patient  Outcome Summary: Pt up in chair; agreeable to OT. Educated on benefits of activity/reason for wearing alanis hose. Demo'd alanis hose don/doffing; pt able to perform with S 2x with minimal VCs for  technique. Pt then simulated don/doffing pants with S for seated aspect; SBA for standing due to hx of orthostatic hypotension. Pt educated on possible home modifications and benefits of sitting during showers. Continue OT POC. Recommend d/c home with 24/7 assist/HH or SNF due to unpredictable syncope episodes.         Outcome Measures     Row Name 09/17/18 1119 09/17/18 0927 09/16/18 1500       How much help from another person do you currently need...    Turning from your back to your side while in flat bed without using bedrails?  -- 4  -AE 4  -EC    Moving from lying on back to sitting on the side of a flat bed without bedrails?  -- 4  -AE 4  -EC    Moving to and from a bed to a chair (including a wheelchair)?  -- 3  -AE 3  -EC    Standing up from a chair using your arms (e.g., wheelchair, bedside chair)?  -- 3  -AE 3  -EC    Climbing 3-5 steps with a railing?  -- 3  -AE 3  -EC    To walk in hospital room?  -- 3  -AE 3  -EC    AM-PAC 6 Clicks Score  -- 20  -AE 20  -EC       How much help from another is currently needed...    Putting on and taking off regular lower body clothing? 3  -MM  --  --    Bathing (including washing, rinsing, and drying) 3  -MM  --  --    Toileting (which includes using toilet bed pan or urinal) 3  -MM  --  --    Putting on and taking off regular upper body clothing 3  -MM  --  --    Taking care of personal grooming (such as brushing teeth) 4  -MM  --  --    Eating meals 4  -MM  --  --    Score 20  -MM  --  --       Functional Assessment    Outcome Measure Options  -- AM-PAC 6 Clicks Basic Mobility (PT)  -AE AM-PAC 6 Clicks Basic Mobility (PT)  -EC    Row Name 09/16/18 1000             How much help from another person do you currently need...    Turning from your back to your side while in flat bed without using bedrails? 4  -EC      Moving from lying on back to sitting on the side of a flat bed without bedrails? 4  -EC      Moving to and from a bed to a chair (including a wheelchair)?  3  -EC      Standing up from a chair using your arms (e.g., wheelchair, bedside chair)? 3  -EC      Climbing 3-5 steps with a railing? 3  -EC      To walk in hospital room? 3  -EC      AM-PAC 6 Clicks Score 20  -EC         Functional Assessment    Outcome Measure Options AM-PAC 6 Clicks Basic Mobility (PT)  -EC        User Key  (r) = Recorded By, (t) = Taken By, (c) = Cosigned By    Initials Name Provider Type    EC Perry Linares, PTA Physical Therapy Assistant    AE Adamaris Broussard, PTA Physical Therapy Assistant    MM Park Chino OTA Occupational Therapy Assistant           Time Calculation:         Time Calculation- OT     Row Name 09/17/18 1119 09/17/18 1010 09/17/18 0927       Time Calculation- OT    OT Start Time 1119  -MM  --  --    OT Stop Time 1148  -MM  --  --    OT Time Calculation (min) 29 min  -MM  --  --    Total Timed Code Minutes- OT 29 minute(s)  -MM  --  --    OT Received On 09/17/18  -MM  --  --       Timed Charges    21180 - Gait Training Minutes   -- 18  -AE 18  -AE    00803 - OT Self Care/Mgmt Minutes 29  -MM  --  --      User Key  (r) = Recorded By, (t) = Taken By, (c) = Cosigned By    Initials Name Provider Type     Adamaris Broussard, ESPERANZA Physical Therapy Assistant    MM Park Chino OTA Occupational Therapy Assistant           Therapy Suggested Charges     Code   Minutes Charges    70623 (CPT®) Hc Ot Neuromusc Re Education Ea 15 Min      64945 (CPT®) Hc Ot Ther Proc Ea 15 Min      49056 (CPT®) Hc Ot Therapeutic Act Ea 15 Min      92899 (CPT®) Hc Ot Manual Therapy Ea 15 Min      85638 (CPT®) Hc Ot Iontophoresis Ea 15 Min      47410 (CPT®) Hc Ot Elec Stim Ea-Per 15 Min      58036 (CPT®) Hc Ot Ultrasound Ea 15 Min      76018 (CPT®) Hc Ot Self Care/Mgmt/Train Ea 15 Min 29 2    Total  29 2        Therapy Charges for Today     Code Description Service Date Service Provider Modifiers Qty    01002514284 HC OT SELF CARE/MGMT/TRAIN EA 15 MIN 9/17/2018 Park Chino OTA GO, KX 2  "         OT G-codes  OT Professional Judgement Used?: Yes  OT Functional Scales Options: AM-PAC 6 Clicks Daily Activity (OT)  Score: 17  Functional Limitation: Self care  Self Care Current Status (): At least 40 percent but less than 60 percent impaired, limited or restricted  Self Care Goal Status (): At least 20 percent but less than 40 percent impaired, limited or restricted    EDITH Schofield  2018    Electronically signed by Vivian Hood OTR/L at 2018  1:48 PM     Park Chino OTA at 2018  1:04 PM          Problem: Patient Care Overview  Goal: Plan of Care Review  Outcome: Ongoing (interventions implemented as appropriate)   18 1300   Coping/Psychosocial   Plan of Care Reviewed With patient   Plan of Care Review   Progress improving   OTHER   Outcome Summary Pt up in chair; agreeable to OT. Educated on benefits of activity/reason for wearing alanis hose. Demo'd alanis hose don/doffing; pt able to perform with S 2x with minimal VCs for technique. Pt then simulated don/doffing pants with S for seated aspect; SBA for standing due to hx of orthostatic hypotension. Pt educated on possible home modifications and benefits of sitting during showers. Continue OT POC. Recommend d/c home with 24/7 assist/HH or SNF due to unpredictable syncope episodes.            Electronically signed by Park Chino OTA at 2018  1:04 PM          Speech Language Pathology Notes (last 72 hours) (Notes from 2018 11:05 AM through 2018 11:05 AM)      Hallie Evans, CCC-SLP at 2018  2:10 PM          Acute Care - Speech Language Pathology Treatment Note   [place]     Patient Name: Travis Patel  : 1945  MRN: [id number]  Today's Date: 2018         Admit Date: 9/10/2018  ST tx completed. Pt did well with cognitive task with mild cueing needed throughout. Increased cues at times for similarities and differences. Pt did well with \"what if\" and basic " reasoning. Increased cues for more abstract divergent naming task. ST to continue to follow.   Hallie Evans, CCC-SLP 9/18/2018 2:11 PM      Visit Dx:      ICD-10-CM ICD-9-CM   1. Subarachnoid hemorrhage (CMS/HCC) I60.9 430   2. Syncope, unspecified syncope type R55 780.2   3. Elevated liver enzymes R74.8 790.5   4. Constipation, unspecified constipation type K59.00 564.00   5. VANI (acute kidney injury) (CMS/HCC) N17.9 584.9   6. Elevated troponin R74.8 790.6   7. Renal cyst N28.1 753.10   8. Adrenal nodule (CMS/HCC) E27.9 255.8   9. Chronic anticoagulation Z79.01 V58.61   10. Dysphagia, unspecified type R13.10 787.20   11. Impaired cognition R41.89 294.9   12. Impaired mobility and ADLs Z74.09 799.89   13. Gait abnormality R26.9 781.2     Patient Active Problem List   Diagnosis   • Subarachnoid hemorrhage (CMS/HCC)   • Chronic coronary artery disease   • Congestive heart failure (CMS/HCC)   • Hyperlipidemia   • ICD (implantable cardioverter-defibrillator) in place   • Orthostatic hypotension   • Syncope        Therapy Treatment        Rehabilitation Treatment Summary     Row Name 09/18/18 1322 09/18/18 1112 09/18/18 0926       Treatment Time/Intention    Discipline speech language pathologist  -BN physical therapy assistant  -NATANAEL occupational therapy assistant  -MM    Document Type therapy note (daily note)  -BN therapy note (daily note)  -NATANAEL therapy note (daily note)  -MM    Subjective Information no complaints  -BN no complaints  -JP2 complains of;weakness  -MM    Mode of Treatment individual therapy;speech-language pathology  -BN  -- occupational therapy  -MM    Patient/Family Observations pt dtr present  -BN  -- no family present; in bed   -MM    Care Plan Review care plan/treatment goals reviewed  -BN  --  --    Care Plan Review, Other Participant(s) daughter  -BN  --  --    Patient Effort good  -BN  --  --    Existing Precautions/Restrictions  -- fall  -JP2 fall  -MM    Treatment  Considerations/Comments  --  -- orthostatic hypotension   -MM    Recorded by [BN] Hallie Evans, CCC-SLP 09/18/18 1404 [NATANAEL] Katie Lock, PTA 09/18/18 1114  [JP2] Katie Lock, PTA 09/18/18 1127 [MM] Park Chino OTA 09/18/18 0932    Row Name 09/18/18 0926             Cognitive Assessment/Intervention- PT/OT    Affect/Mental Status (Cognitive) flat/blunted affect  -MM      Orientation Status (Cognition) oriented x 4  -MM      Follows Commands (Cognition) WFL  -MM      Cognitive Function (Cognitive) WFL  -MM      Safety Deficit (Cognitive) safety precautions awareness  -MM      Personal Safety Interventions elopement precautions initiated;fall prevention program maintained;gait belt;nonskid shoes/slippers when out of bed;supervised activity  -MM      Recorded by [MM] Park Chino OTA 09/18/18 1037      Row Name 09/18/18 0926             Safety Issues, Functional Mobility    Safety Issues Affecting Function (Mobility) safety precaution awareness;other (see comments)   orthostatic hypotension   -MM      Impairments Affecting Function (Mobility) balance;endurance/activity tolerance;strength  -MM      Comment, Safety Issues/Impairments (Mobility) bed>shower chair next to bed; pt did not feel like he could walk to the bathroom at this time due to fatigue/feeling dizzy   -MM      Recorded by [MM] Park Chino OTA 09/18/18 1037      Row Name 09/18/18 0926             Bed Mobility Assessment/Treatment    Bed Mobility Assessment/Treatment bed mobility (all) activities  -MM      [place] Level (Bed Mobility) supervision  -MM      Bed Mobility, Safety Issues other (see comments)  -MM      Assistive Device (Bed Mobility) bed rails  -MM      Comment (Bed Mobility) orthostatic hypotension; VCs to perform leg exercises prior to sitting/standing to decrease risk of orthostatic hypotension   -MM      Recorded by [MM] Park Chino OTA 09/18/18 1037      Row Name 09/18/18 0926              Functional Mobility    Functional Mobility- Ind. Level contact guard assist  -MM      Functional Mobility- Safety Issues other (see comments)   orthostatic hypotension   -MM      Functional Mobility- Comment bed>shower chair (approx 8 steps)   -MM      Recorded by [MM] Park Chino \[facility]\"" 09/18/18 1037      Row Name 09/18/18 0926             Transfer Assessment/Treatment    Transfer Assessment/Treatment sit-stand transfer;stand-sit transfer;shower transfer  -MM      Comment (Transfers) BP did not drop this date during sit<>stands and t/fs   -MM      Recorded by [MM] Park Chino, \[facility]\"" 09/18/18 1037      Row Name 09/18/18 1112 09/18/18 0926          Sit-Stand Transfer    Sit-Stand [place] (Transfers) stand by assist  -NATANAEL stand by assist  -MM     Recorded by [NATANAEL] Katie Lock, [facility] 09/18/18 1127 [MM] Park Chino, \[facility]\"" 09/18/18 1037     Row Name 09/18/18 1112 09/18/18 0926          Stand-Sit Transfer    Stand-Sit [place] (Transfers) stand by assist  -NATANAEL stand by assist  -MM     Recorded by [NATANAEL] Katie Lock, PTA 09/18/18 1127 [MM] Park Chino, \[facility]\"" 09/18/18 1037     Row Name 09/18/18 0926             Shower Transfer    Type (Shower Transfer) sit-stand;stand-sit  -MM      [place] Level (Shower Transfer) contact guard  -MM      Assistive Device (Shower Transfer) shower chair   Simulated shower t/f (stepping over small shower ledge)  -MM      Recorded by [MM] Park Chino \[facility]\"" 09/18/18 1037      Row Name 09/18/18 1112             Gait/Stairs Assessment/Training    [place] Level (Gait) stand by assist   1 self corrected slight LOB while turning  -NATANAEL      Assistive Device (Gait) walker, front-wheeled  -NATANAEL      Distance in Feet (Gait) 200  -NATANAEL      Deviations/Abnormal Patterns (Gait) base of support, narrow;dang decreased  -NATANAEL      Bilateral Gait Deviations --   forward neck  -NATANAEL      Recorded by [NATANAEL] Katie Lock, PTA 09/18/18 1127      Row Name 09/18/18 0926              ADL Assessment/Intervention    BADL Assessment/Intervention bathing;upper body dressing;lower body dressing;grooming  -MM      Recorded by [MM] Park Chino OTA 09/18/18 1037      Row Name 09/18/18 0926             Bathing Assessment/Intervention    Bathing [place] Level bathing skills;lower body;upper body;upper extremities;chest/trunk;distal lower extremities/feet;proximal lower extremities;perineal area;supervision;contact guard assist  -MM      Bathing Position supported sitting  -MM      Comment (Bathing) S when sitting; CGA when standing   -MM      Recorded by [MM] Park Chino OTA 09/18/18 1037      Row Name 09/18/18 0926             Upper Body Dressing Assessment/Training    Upper Body Dressing [place] Level doff;don;independent  -MM      Upper Body Dressing Position supported sitting  -MM      Comment (Upper Body Dressing) Gown   -MM      Recorded by [MM] Park Chino OTA 09/18/18 1037      Row Name 09/18/18 0926             Lower Body Dressing Assessment/Training    Lower Body Dressing [place] Level doff;don;undergarment;socks;set up   alanis hose   -MM      Lower Body Dressing Position edge of bed sitting  -MM      Comment (Lower Body Dressing) S for socks/alanis hose and CGA when standing to don pants over hips   -MM      Recorded by [MM] Park Chino OTA 09/18/18 1037      Row Name 09/18/18 0926             Grooming Assessment/Training    [place] Level (Grooming) wash face, hands;hair care, combing/brushing;supervision;set up  -MM      Grooming Position supported sitting  -MM      Recorded by [MM] Park Chino OTA 09/18/18 1037      Row Name 09/18/18 1112 09/18/18 0926          Positioning and Restraints    Pre-Treatment Position sitting in chair/recliner  -NATANAEL in bed  -MM     Post Treatment Position chair  -NATANAEL bed  -MM     In Bed  -- fowlers;call light within reach;encouraged to call for assist;side rails up x2;exit alarm on;with other staff  -MM     In  Chair reclined;call light within reach;encouraged to call for assist  -NATANAEL  --     Recorded by [NATANAEL] Katie Lock, PTA 09/18/18 1127 [MM] Park Chino OTA 09/18/18 1037     Row Name 09/18/18 1322 09/18/18 1112 09/18/18 0926       Pain Scale: Numbers Pre/Post-Treatment    Pain Scale: Numbers, Pretreatment 0/10 - no pain  -BN 0/10 - no pain  -NATANAEL 0/10 - no pain  -MM    Pain Scale: Numbers, Post-Treatment 0/10 - no pain  -BN 0/10 - no pain  -NATANAEL 0/10 - no pain  -MM    Recorded by [BN] Hallie Evans, CCC-SLP 09/18/18 1404 [NATANAEL] Katie Lock, PTA 09/18/18 1127 [MM] Park Chino OTA 09/18/18 1037    Row Name                Wound 09/12/18 2000 Left lateral arm skin tear    Wound - Properties Group Date first assessed: 09/12/18 [AH] Time first assessed: 2000 [AH] Side: Left [AH] Orientation: lateral [AH] Location: arm [AH] Type: skin tear [AH] Recorded by:  [AH] Channing Castellon RN 09/13/18 0449    Row Name 09/18/18 0926             Plan of Care Review    Plan of Care Reviewed With patient  -MM      Recorded by [MM] Park Chino OTA 09/18/18 1037      Row Name 09/18/18 0926             Outcome Summary/Treatment Plan (OT)    Daily Summary of Progress (OT) progress toward functional goals is good  -MM      Recorded by [MM] Park Chino OTA 09/18/18 1037      Row Name 09/18/18 1322             Outcome Summary/Treatment Plan (SLP)    Daily Summary of Progress (SLP) progress toward functional goals is good  -BN      Barriers to Overall Progress (SLP) n/a  -BN      Plan for Continued Treatment (SLP) continue to follow and treat  -BN      Anticipated Dischage Disposition skilled nursing facility  -BN      Recorded by [BN] Hallie Evans CCC-SLP 09/18/18 1404        User Key  (r) = Recorded By, (t) = Taken By, (c) = Cosigned By    Initials Name Effective Dates Discipline    Katie Mckeon, PTA 08/02/16 -  PT    Hallie Delarosa, CCC-SLP 04/03/18 -  SLP    ROSE Castellon,  "Channing MOE RN 02/12/18 -  Nurse    Park Aguirre OTA 08/01/18 -  OT            EDUCATION  The patient has been educated in the following areas:   Cognitive Impairment.    SLP Recommendation and Plan           Anticipated Dischage Disposition: skilled nursing facility                Plan of Care Reviewed With: patient, daughter  Plan of Care Review  Plan of Care Reviewed With: patient, daughter  Daily Summary of Progress (SLP): progress toward functional goals is good  Plan for Continued Treatment (SLP): continue to follow and treat  Progress: improving  Outcome Summary: ST tx completed. Pt did well with cognitive task with mild cueing needed throughout. Increased cues at times for similarities and differences. Pt did well with \"what if\" and basic reasoning. Increased cues for more abstract divergent naming task. ST to continue to follow.           SLP GOALS     Row Name 09/18/18 1322             Comprehend Questions Goal 1 (SLP)    Improve Ability to Comprehend Questions Goal 1 (SLP) complex yes/no questions;independently (over 90% accuracy)  -BN      Time Frame (Comprehend Questions Goal 1, SLP) by discharge  -BN      Progress/Outcomes (Comprehend Questions Goal 1, SLP) goal ongoing  -BN         Organizational Skills Goal 1 (SLP)    Improve Thought Organization Through Goal 1 (SLP) completing a divergent naming task;concrete;completing a convergent naming task;abstract;independently (over 90% accuracy)  -BN      Time Frame (Thought Organization Skills Goal 1, SLP) by discharge  -BN      Progress (Thought Organization Skills Goal 1, SLP) 70%;60%  -BN      Progress/Outcomes (Thought Organization Skills Goal 1, SLP) continuing progress toward goal  -BN         Reasoning Goal 1 (SLP)    Improve Reasoning Through Goal 1 (SLP) complete basic reasoning task;complete mental flexibility task;independently (over 90% accuracy)  -BN      Time Frame (Reasoning Goal 1, SLP) by discharge  -BN      Progress (Reasoning Goal " "1, SLP) 70%;80%  -BN      Progress/Outcomes (Reasoning Goal 1, SLP) continuing progress toward goal  -BN         Functional Problem Solving Skills Goal 1 (SLP)    Improve Problem Solving Through Goal 1 (SLP) answer questions about ADL problems;answer \"what if\" questions;tell similarity between items;independently (over 90% accuracy)  -BN      Time Frame (Problem Solving Goal 1, SLP) by discharge  -BN      Progress (Problem Solving Goal 1, SLP) 70%;80%  -BN      Progress/Outcomes (Problem Solving Goal 1, SLP) continuing progress toward goal  -BN        User Key  (r) = Recorded By, (t) = Taken By, (c) = Cosigned By    Initials Name Provider Type    Hallie Delarosa CCC-SLP Speech and Language Pathologist                Time Calculation:           Time Calculation- SLP     Row Name 09/18/18 1410             Time Calculation- SLP    SLP Start Time 1322  -BN      SLP Stop Time 1349  -BN      SLP Time Calculation (min) 27 min  -BN      SLP Received On 09/18/18  -BN        User Key  (r) = Recorded By, (t) = Taken By, (c) = Cosigned By    Initials Name Provider Type    Hallie Delarosa CCC-SLP Speech and Language Pathologist          Therapy Charges for Today     Code Description Service Date Service Provider Modifiers Qty    [id number]  ST TREATMENT SPEECH 2 9/18/2018 Hallie Evans CCC-SLP GN, KX 1              SLP G-Codes  SLP NOMS Used?: Yes (problem solving)  Functional Limitations: Other Speech Language Pathology (problem solving)  Swallow Current Status (): At least 1 percent but less than 20 percent impaired, limited or restricted  Swallow Goal Status (): At least 1 percent but less than 20 percent impaired, limited or restricted  Swallow Discharge Status (): At least 1 percent but less than 20 percent impaired, limited or restricted  Other Speech-Language Pathology Functional Limitation Current Status (): At least 60 percent but less than 80 percent impaired, " "limited or restricted  Other Speech-Language Pathology Functional Limitation Goal Status (): At least 40 percent but less than 60 percent impaired, limited or restricted      Hallie Evans CCC-SLP  9/18/2018      Electronically signed by Hallie Evans CCC-SLP at 9/18/2018  2:11 PM     Hallie Evans CCC-SLP at 9/18/2018  2:10 PM          Problem: Patient Care Overview  Goal: Plan of Care Review  Outcome: Ongoing (interventions implemented as appropriate)   09/18/18 0697   Coping/Psychosocial   Plan of Care Reviewed With patient;daughter   Plan of Care Review   Progress improving   OTHER   Outcome Summary ST tx completed. Pt did well with cognitive task with mild cueing needed throughout. Increased cues at times for similarities and differences. Pt did well with \"what if\" and basic reasoning. Increased cues for more abstract divergent naming task. ST to continue to follow.            Electronically signed by Hallie Evans CCC-SLP at 9/18/2018  2:10 PM       "

## 2018-09-19 NOTE — PLAN OF CARE
Problem: Patient Care Overview  Goal: Plan of Care Review  Outcome: Ongoing (interventions implemented as appropriate)   09/19/18 1521   Coping/Psychosocial   Plan of Care Reviewed With patient   Plan of Care Review   Progress improving   OTHER   Outcome Summary No complaints of pain today, however stomach is tender per patient. After medication patient successfully had a large BM and feels better. We are still waiting on NH placement. Ortho vitals continue and are improving. Continue to monitor overall condition and notify Md of any changes.

## 2018-09-19 NOTE — THERAPY TREATMENT NOTE
Acute Care - Speech Language Pathology Treatment Note  [facility]     Patient Name: Travis Patel  : 1945  MRN: [id number]  Today's Date: 2018         Admit Date: 9/10/2018  ST tx completed. Pt doing well with divergent/convergent naming task. Occasional difficulty naming multiple items with divergent naming task. Pt did well with problem solving and mental manipulation task with minimal cues needed. ST to work on medication management and check writing at next session. ST to continue to follow.   Hallie Evans, CCC-SLP 2018 3:07 PM      Visit Dx:      ICD-10-CM ICD-9-CM   1. Subarachnoid hemorrhage (CMS/HCC) I60.9 430   2. Syncope, unspecified syncope type R55 780.2   3. Elevated liver enzymes R74.8 790.5   4. Constipation, unspecified constipation type K59.00 564.00   5. VANI (acute kidney injury) (CMS/HCC) N17.9 584.9   6. Elevated troponin R74.8 790.6   7. Renal cyst N28.1 753.10   8. Adrenal nodule (CMS/HCC) E27.9 255.8   9. Chronic anticoagulation Z79.01 V58.61   10. Dysphagia, unspecified type R13.10 787.20   11. Impaired cognition R41.89 294.9   12. Impaired mobility and ADLs Z74.09 799.89   13. Gait abnormality R26.9 781.2     Patient Active Problem List   Diagnosis   • Subarachnoid hemorrhage (CMS/HCC)   • Chronic coronary artery disease   • Congestive heart failure (CMS/HCC)   • Hyperlipidemia   • ICD (implantable cardioverter-defibrillator) in place   • Orthostatic hypotension   • Syncope        Therapy Treatment        Rehabilitation Treatment Summary     Row Name 18 1410 18 1110          Treatment Time/Intention    Discipline speech language pathologist  -BN physical therapy assistant  -NATANAEL     Document Type therapy note (daily note)  -BN therapy note (daily note)  -NATANAEL     Subjective Information no complaints  -BN  --     Mode of Treatment individual therapy;speech-language pathology  -BN  --     Patient/Family Observations no family present  -BN  --     Care Plan  Review care plan/treatment goals reviewed  -BN  --     Patient Effort good  -BN  --     Comment  -- Nsg request check back, suppository  -NATANAEL     Reason Treatment Not Performed  -- unavailable for treatment  -NATANAEL     Existing Precautions/Restrictions  -- fall  -NATANAEL     Recorded by [BN] Hallie Evans CCC-SLP 09/19/18 1502 [NATANAEL] Katie Lock, PTA 09/19/18 1117     Row Name 09/19/18 1410             Pain Scale: Numbers Pre/Post-Treatment    Pain Scale: Numbers, Pretreatment 0/10 - no pain  -BN      Pain Scale: Numbers, Post-Treatment 0/10 - no pain  -BN      Recorded by [BN] Hallie Evans CCC-SLP 09/19/18 1502      Row Name                Wound 09/12/18 2000 Left lateral arm skin tear    Wound - Properties Group Date first assessed: 09/12/18 [AH] Time first assessed: 2000 [AH] Side: Left [AH] Orientation: lateral [AH] Location: arm [AH] Type: skin tear [AH] Recorded by:  [] Channing Castellon, RN 09/13/18 0449    Row Name 09/19/18 1410             Outcome Summary/Treatment Plan (SLP)    Daily Summary of Progress (SLP) progress toward functional goals is good  -BN      Barriers to Overall Progress (SLP) n/a  -BN      Plan for Continued Treatment (SLP) continue tx  -BN      Anticipated Dischage Disposition skilled nursing facility  -BN      Recorded by [] Hallie Evans CCC-SLP 09/19/18 1502        User Key  (r) = Recorded By, (t) = Taken By, (c) = Cosigned By    Initials Name Effective Dates Discipline    NATANAEL Katie Lock, PTA 08/02/16 -  PT    BN Hallie Evans CCC-SLP 04/03/18 -  SLP     Channing Castellon, RN 02/12/18 -  Nurse            EDUCATION  The patient has been educated in the following areas:   Cognitive Impairment.    SLP Recommendation and Plan           Anticipated Dischage Disposition: skilled nursing facility                Plan of Care Reviewed With: patient  Plan of Care Review  Plan of Care Reviewed With: patient  Daily Summary of Progress (SLP): progress  toward functional goals is good  Plan for Continued Treatment (SLP): continue tx  Progress: improving  Outcome Summary: ST tx completed. Pt doing well with divergent/convergent naming task. Occasional difficulty naming multiple items with divergent naming task. Pt did well with problem solving and mental manipulation task with minimal cues needed. ST to work on medication management and check writing at next session. ST to continue to follow.           SLP GOALS     Row Name 09/19/18 1410 09/18/18 1322          Comprehend Questions Goal 1 (SLP)    Improve Ability to Comprehend Questions Goal 1 (SLP) complex yes/no questions;independently (over 90% accuracy)  -BN complex yes/no questions;independently (over 90% accuracy)  -BN     Time Frame (Comprehend Questions Goal 1, SLP) by discharge  -BN by discharge  -BN     Progress/Outcomes (Comprehend Questions Goal 1, SLP) goal ongoing  -BN goal ongoing  -BN        Organizational Skills Goal 1 (SLP)    Improve Thought Organization Through Goal 1 (SLP) completing a divergent naming task;concrete;completing a convergent naming task;abstract;independently (over 90% accuracy)  -BN completing a divergent naming task;concrete;completing a convergent naming task;abstract;independently (over 90% accuracy)  -BN     Time Frame (Thought Organization Skills Goal 1, SLP) by discharge  -BN by discharge  -BN     Progress (Thought Organization Skills Goal 1, SLP) 80%  -BN 70%;60%  -BN     Progress/Outcomes (Thought Organization Skills Goal 1, SLP) continuing progress toward goal  -BN continuing progress toward goal  -BN        Reasoning Goal 1 (SLP)    Improve Reasoning Through Goal 1 (SLP) complete basic reasoning task;complete mental flexibility task;independently (over 90% accuracy)  -BN complete basic reasoning task;complete mental flexibility task;independently (over 90% accuracy)  -BN     Time Frame (Reasoning Goal 1, SLP) by discharge  -BN by discharge  -BN     Progress (Reasoning  "Goal 1, SLP) 80%;90%  -BN 70%;80%  -BN     Progress/Outcomes (Reasoning Goal 1, SLP) continuing progress toward goal  -BN continuing progress toward goal  -BN        Functional Problem Solving Skills Goal 1 (SLP)    Improve Problem Solving Through Goal 1 (SLP) answer questions about ADL problems;answer \"what if\" questions;tell similarity between items;independently (over 90% accuracy)  -BN answer questions about ADL problems;answer \"what if\" questions;tell similarity between items;independently (over 90% accuracy)  -BN     Time Frame (Problem Solving Goal 1, SLP) by discharge  -BN by discharge  -BN     Progress (Problem Solving Goal 1, SLP) 70%;80%  -BN 70%;80%  -BN     Progress/Outcomes (Problem Solving Goal 1, SLP) goal ongoing  -BN continuing progress toward goal  -BN       User Key  (r) = Recorded By, (t) = Taken By, (c) = Cosigned By    Initials Name Provider Type    Hallie Delarosa CCC-SLP Speech and Language Pathologist                Time Calculation:           Time Calculation- SLP     Row Name 09/19/18 1505             Time Calculation- SLP    SLP Start Time 1410  -BN      SLP Stop Time 1437  -BN      SLP Time Calculation (min) 27 min  -BN      SLP Received On 09/19/18  -BN        User Key  (r) = Recorded By, (t) = Taken By, (c) = Cosigned By    Initials Name Provider Type    Hallie Delarosa CCC-SLP Speech and Language Pathologist          Therapy Charges for Today     Code Description Service Date Service Provider Modifiers Qty    [id number]  ST TREATMENT SPEECH 2 9/18/2018 Hallie Evans CCC-SLP GN, KX 1    [id number]  ST TREATMENT SPEECH 2 9/19/2018 Hallie Evans CCC-SLP GN, KX 1              SLP G-Codes  SLP NOMS Used?: Yes (problem solving)  Functional Limitations: Other Speech Language Pathology (problem solving)  Swallow Current Status (): At least 1 percent but less than 20 percent impaired, limited or restricted  Swallow Goal Status (): At " least 1 percent but less than 20 percent impaired, limited or restricted  Swallow Discharge Status (): At least 1 percent but less than 20 percent impaired, limited or restricted  Other Speech-Language Pathology Functional Limitation Current Status (): At least 60 percent but less than 80 percent impaired, limited or restricted  Other Speech-Language Pathology Functional Limitation Goal Status (): At least 40 percent but less than 60 percent impaired, limited or restricted      Hallie Evans CCC-SLP  9/19/2018

## 2018-09-20 VITALS
BODY MASS INDEX: 27.65 KG/M2 | DIASTOLIC BLOOD PRESSURE: 74 MMHG | HEART RATE: 84 BPM | OXYGEN SATURATION: 100 % | HEIGHT: 67 IN | TEMPERATURE: 97.7 F | SYSTOLIC BLOOD PRESSURE: 122 MMHG | WEIGHT: 176.2 LBS | RESPIRATION RATE: 18 BRPM

## 2018-09-20 LAB
ALBUMIN SERPL-MCNC: 2.8 G/DL (ref 3.5–5)
ALBUMIN/GLOB SERPL: 1.2 G/DL (ref 1.1–2.5)
ALP SERPL-CCNC: 112 U/L (ref 24–120)
ALT SERPL W P-5'-P-CCNC: 38 U/L (ref 0–54)
ANION GAP SERPL CALCULATED.3IONS-SCNC: 6 MMOL/L (ref 4–13)
AST SERPL-CCNC: 21 U/L (ref 7–45)
BILIRUB SERPL-MCNC: 0.5 MG/DL (ref 0.1–1)
BUN BLD-MCNC: 11 MG/DL (ref 5–21)
BUN/CREAT SERPL: 13.1 (ref 7–25)
CALCIUM SPEC-SCNC: 8.8 MG/DL (ref 8.4–10.4)
CHLORIDE SERPL-SCNC: 107 MMOL/L (ref 98–110)
CO2 SERPL-SCNC: 26 MMOL/L (ref 24–31)
CREAT BLD-MCNC: 0.84 MG/DL (ref 0.5–1.4)
GFR SERPL CREATININE-BSD FRML MDRD: 90 ML/MIN/1.73
GLOBULIN UR ELPH-MCNC: 2.3 GM/DL
GLUCOSE BLD-MCNC: 101 MG/DL (ref 70–100)
POTASSIUM BLD-SCNC: 4.6 MMOL/L (ref 3.5–5.3)
PROT SERPL-MCNC: 5.1 G/DL (ref 6.3–8.7)
SODIUM BLD-SCNC: 139 MMOL/L (ref 135–145)
TSH SERPL DL<=0.05 MIU/L-ACNC: 3.4 MIU/ML (ref 0.47–4.68)

## 2018-09-20 PROCEDURE — 97110 THERAPEUTIC EXERCISES: CPT

## 2018-09-20 PROCEDURE — 97535 SELF CARE MNGMENT TRAINING: CPT

## 2018-09-20 PROCEDURE — 97116 GAIT TRAINING THERAPY: CPT

## 2018-09-20 PROCEDURE — 80053 COMPREHEN METABOLIC PANEL: CPT | Performed by: NURSE PRACTITIONER

## 2018-09-20 PROCEDURE — 84443 ASSAY THYROID STIM HORMONE: CPT | Performed by: INTERNAL MEDICINE

## 2018-09-20 RX ORDER — MIDODRINE HYDROCHLORIDE 10 MG/1
10 TABLET ORAL
Qty: 90 TABLET | Refills: 0 | Status: ON HOLD | OUTPATIENT
Start: 2018-09-20 | End: 2022-01-01

## 2018-09-20 RX ORDER — BISACODYL 10 MG
10 SUPPOSITORY, RECTAL RECTAL DAILY
Qty: 10 SUPPOSITORY | Refills: 0 | Status: ON HOLD | OUTPATIENT
Start: 2018-09-21 | End: 2022-01-01

## 2018-09-20 RX ORDER — DOCUSATE SODIUM 100 MG/1
100 CAPSULE, LIQUID FILLED ORAL 2 TIMES DAILY
Qty: 30 CAPSULE | Refills: 0 | Status: SHIPPED | OUTPATIENT
Start: 2018-09-20 | End: 2022-01-01 | Stop reason: HOSPADM

## 2018-09-20 RX ADMIN — POTASSIUM CHLORIDE 20 MEQ: 750 CAPSULE, EXTENDED RELEASE ORAL at 08:30

## 2018-09-20 RX ADMIN — LITHIUM CARBONATE 300 MG: 300 CAPSULE, GELATIN COATED ORAL at 11:28

## 2018-09-20 RX ADMIN — FOLIC ACID 1 MG: 1 TABLET ORAL at 08:30

## 2018-09-20 RX ADMIN — DULOXETINE HYDROCHLORIDE 60 MG: 30 CAPSULE, DELAYED RELEASE ORAL at 08:30

## 2018-09-20 RX ADMIN — MIDODRINE HYDROCHLORIDE 10 MG: 2.5 TABLET ORAL at 11:28

## 2018-09-20 RX ADMIN — FAMOTIDINE 20 MG: 20 TABLET, FILM COATED ORAL at 08:30

## 2018-09-20 RX ADMIN — LACTULOSE 20 G: 20 SOLUTION ORAL at 08:29

## 2018-09-20 RX ADMIN — POLYETHYLENE GLYCOL (3350) 17 G: 17 POWDER, FOR SOLUTION ORAL at 08:29

## 2018-09-20 RX ADMIN — MIDODRINE HYDROCHLORIDE 10 MG: 2.5 TABLET ORAL at 08:30

## 2018-09-20 RX ADMIN — BUSPIRONE HYDROCHLORIDE 10 MG: 10 TABLET ORAL at 08:29

## 2018-09-20 RX ADMIN — LITHIUM CARBONATE 300 MG: 300 CAPSULE, GELATIN COATED ORAL at 08:30

## 2018-09-20 NOTE — THERAPY TREATMENT NOTE
Acute Care - Occupational Therapy Treatment Note  [facility]     Patient Name: Travis Patel  : 1945  MRN: [id number]  Today's Date: 2018  Onset of Illness/Injury or Date of Surgery: 09/10/18  Date of Referral to OT: 18  Referring Physician: Dr. Gomez    Admit Date: 9/10/2018       ICD-10-CM ICD-9-CM   1. Subarachnoid hemorrhage (CMS/[facility]) I60.9 430   2. Syncope, unspecified syncope type R55 780.2   3. Elevated liver enzymes R74.8 790.5   4. Constipation, unspecified constipation type K59.00 564.00   5. VANI (acute kidney injury) (CMS/[facility]) N17.9 584.9   6. Elevated troponin R74.8 790.6   7. Renal cyst N28.1 753.10   8. Adrenal nodule (CMS/[facility]) E27.9 255.8   9. Chronic anticoagulation Z79.01 V58.61   10. Dysphagia, unspecified type R13.10 787.20   11. Impaired cognition R41.89 294.9   12. Impaired mobility and ADLs Z74.09 799.89   13. Gait abnormality R26.9 781.2   14. Chronic systolic congestive heart failure (CMS/[facility]) I50.22 428.22     428.0   15. Orthostatic hypotension I95.1 458.0     Patient Active Problem List   Diagnosis   • Subarachnoid hemorrhage (CMS/[facility])   • Chronic coronary artery disease   • Congestive heart failure (CMS/[facility])   • Hyperlipidemia   • ICD (implantable cardioverter-defibrillator) in place   • Orthostatic hypotension   • Syncope     Past Medical History:   Diagnosis Date   • Diverticulitis    • Hernia of abdominal wall    • Myocardial infarction 2017   • Sleep apnea    • Stroke (CMS/[facility]) 2017     Past Surgical History:   Procedure Laterality Date   • COLOSTOMY  2001    x 3 months   • COLOSTOMY REVISION     • CORONARY ARTERY BYPASS GRAFT      /   • HERNIA REPAIR      abdominal hernia x 3; poor healing with multiple revisions   • PACEMAKER IMPLANTATION  2017    w/ defibrillator   • ROTATOR CUFF REPAIR Right 2005       Therapy Treatment          Rehabilitation Treatment Summary     Row Name 18 1328 18 1110          Treatment Time/Intention    Discipline  occupational therapy assistant  -MM physical therapy assistant  -NATANAEL     Document Type therapy note (daily note)  -MM therapy note (daily note)  -NATANAEL     Subjective Information no complaints  -MM no complaints  -JP2     Mode of Treatment occupational therapy  -MM  —     Patient/Family Observations 4 family members present; pt up in chair  -MM  —     Existing Precautions/Restrictions fall  -MM fall  -JP2     Recorded by [MM] Park [place]ino, [facility] 09/20/18 1345 [NATANAEL] Katie Lock, [facility] 09/20/18 1111  [JP2] Katie Lock, [facility] 09/20/18 1124     Row Name 09/20/18 1328             Cognitive Assessment/Intervention- PT/OT    Affect/Mental Status (Cognitive) flat/blunted affect  -MM      Personal Safety Interventions elopement precautions initiated;fall prevention program maintained;supervised activity  -MM      Recorded by [MM] Park Chino [facility] 09/20/18 1345      Row Name 09/20/18 1328 09/20/18 1110          Bed Mobility Assessment/Treatment    Supine-Sit [place] (Bed Mobility)  — independent  -NATANAEL     Comment (Bed Mobility) up in chair  -MM  —     Recorded by [MM] Park Chino [facility] 09/20/18 1345 [NATANAEL] Katie Lock, [facility] 09/20/18 1127     Row Name 09/20/18 1328             Transfer Assessment/Treatment    Comment (Transfers) Pt reports being able to t/f and walk by himself   -MM      Recorded by [MM] Park Chino [facility] 09/20/18 1345      Row Name 09/20/18 1110             Sit-Stand Transfer    Sit-Stand [place] (Transfers) independent  -NATANAEL      Recorded by [NATANAEL] Katie Lock, [facility] 09/20/18 1127      Row Name 09/20/18 1110             Stand-Sit Transfer    Stand-Sit [place] (Transfers) independent  -NATANAEL      Recorded by [NATANAEL] Katie Lock, [facility] 09/20/18 1127      Row Name 09/20/18 1110             Gait/Stairs Assessment/Training    [place] Level (Gait) stand by assist  -NATANAEL      Assistive Device (Gait) walker, front-wheeled  -NATANAEL      Distance in Feet (Gait) 225  -NATANAEL       Deviations/Abnormal Patterns (Gait) base of support, narrow  -NATANAEL      Recorded by [NATANAEL] Katie Lock, PTA 09/20/18 1127      Row Name 09/20/18 1328             ADL Assessment/Intervention    BADL Assessment/Intervention lower body dressing;bathing;toileting  -MM      Recorded by [MM] Park Chino OTA 09/20/18 1345      Row Name 09/20/18 1328             Bathing Assessment/Intervention    Comment (Bathing) educated on use of shower chair; pt reports feeling safer with someone being in the room with him; but reports being able to bathe with no difficulty   -MM      Recorded by [MM] Park Chino OTA 09/20/18 1345      Row Name 09/20/18 1328             Lower Body Dressing Assessment/Training    Lower Body Dressing [place] Level conditional independence;socks  -MM      Lower Body Dressing Position supported sitting  -MM      Comment (Lower Body Dressing) Mod I; pt reports expecting to have no difficulty when sitting or standing to don pants  -MM      Recorded by [MM] Park Chino OTA 09/20/18 1345      Row Name 09/20/18 1328             Toileting Assessment/Training    Comment (Toileting) pt reports no difficulty; educated on sitting if he is feeling dizzy/fatigued   -MM      Recorded by [MM] Park Chino OTA 09/20/18 1345      Row Name 09/20/18 1328             BADL Safety/Performance    Impairments, BADL Safety/Performance balance  -MM      Skilled BADL Treatment/Intervention adaptive equipment training;BADL process/adaptation training;compensatory training;energy conservation;environmental modifications  -MM      Progress in BADL Status improvement noted;independence level;effort and initiation  -MM      Recorded by [MM] Park Chino OTA 09/20/18 1345      Row Name 09/20/18 1110             Therapeutic Exercise    Lower Extremity Range of Motion (Therapeutic Exercise) hip flexion/extension, bilateral;hip abduction/adduction, bilateral;knee flexion/extension, bilateral;ankle  dorsiflexion/plantar flexion, bilateral  -NATANAEL      Exercise Type (Therapeutic Exercise) AROM (active range of motion)  -NATANAEL      Position (Therapeutic Exercise) seated  -NATANAEL      Sets/Reps (Therapeutic Exercise) 20-25  -NATANAEL      Recorded by [NATANAEL] Katie Lock, PTA 09/20/18 1129      Row Name 09/20/18 1110             Static Sitting Balance    Level of [place] (Unsupported Sitting, Static Balance) independent  -NATANAEL      Sitting Position (Unsupported Sitting, Static Balance) sitting on edge of bed  -NATANAEL      Recorded by [NATANAEL] Katie Lock, PTA 09/20/18 1129      Row Name 09/20/18 1328 09/20/18 1110          Positioning and Restraints    Pre-Treatment Position sitting in chair/recliner  -MM in bed  -NATANAEL     Post Treatment Position chair  -MM chair  -NATANAEL     In Chair reclined;call light within reach;encouraged to call for assist;with family/caregiver  -MM sitting;call light within reach;encouraged to call for assist  -NATANAEL     Recorded by [MM] Park Chino OTA 09/20/18 1345 [NATANAEL] Katie Lock, [facility] 09/20/18 1129     Row Name 09/20/18 1328 09/20/18 1110          Pain Scale: Numbers Pre/Post-Treatment    Pain Scale: Numbers, Pretreatment 0/10 - no pain  -MM 0/10 - no pain  -NATANAEL     Pain Scale: Numbers, Post-Treatment 0/10 - no pain  -MM  —     Recorded by [MM] Park Chino OTA 09/20/18 1345 [NATANAEL] Katie Lock, [facility] 09/20/18 1129     Row Name                Wound 09/12/18 2000 Left lateral arm skin tear    Wound - Properties Group Date first assessed: 09/12/18 [AH] Time first assessed: 2000 [AH] Side: Left [AH] Orientation: lateral [AH] Location: arm [AH] Type: skin tear [AH] Recorded by:  [] Channing Castellon RN 09/13/18 0449    Row Name 09/20/18 1328             Plan of Care Review    Plan of Care Reviewed With patient;family  -MM      Recorded by [MM] Park Chino OTA 09/20/18 1345      Row Name 09/20/18 1328             Outcome Summary/Treatment Plan (OT)    Daily Summary of Progress (OT)  progress toward functional goals is good  -MM      Recorded by [MM] Park Chino OTA 09/20/18 1345        User Key  (r) = Recorded By, (t) = Taken By, (c) = Cosigned By    Initials Name Effective Dates Discipline    NATANAEL Katie Lock, PTA 08/02/16 -  PT    Channing Diaz, RN 02/12/18 -  Nurse    MM Park Chino OTA 08/01/18 -  OT        Wound 09/12/18 2000 Left lateral arm skin tear (Active)   Dressing Appearance dry;intact 9/20/2018  8:07 AM   Closure Adhesive bandage 9/20/2018  8:07 AM   Base dressing in place, unable to visualize 9/20/2018  8:07 AM   Periwound dry;intact 9/20/2018  8:07 AM   Periwound Temperature warm 9/19/2018  8:00 PM   [place]iwound Skin Turgor soft 9/19/2018  8:00 PM   Drainage Amount none 9/20/2018  8:07 AM   Dressing Care, Wound foam 9/19/2018  8:00 PM             OT Rehab Goals     Row Name 09/20/18 1328             Transfer Goal 1 (OT)    Activity/Assistive Device (Transfer Goal 1, OT) bed-to-chair/chair-to-bed;toilet;shower chair;walk-in shower  -MM      [place] Level/Cues Needed (Transfer Goal 1, OT) supervision required  -MM      Progress/Outcome (Transfer Goal 1, OT) goal met  -MM         Bathing Goal 1 (OT)    Activity/Assistive Device (Bathing Goal 1, OT) bathing skills, all;shower chair  -MM      [place] Level/Cues Needed (Bathing Goal 1, OT) supervision required  -MM      Progress/Outcomes (Bathing Goal 1, OT) goal met  -MM         Dressing Goal 1 (OT)    Activity/Assistive Device (Dressing Goal 1, OT) lower body dressing  -MM      [place]/Cues Needed (Dressing Goal 1, OT) conditional independence  -MM      Progress/Outcome (Dressing Goal 1, OT) goal met  -MM        User Key  (r) = Recorded By, (t) = Taken By, (c) = Cosigned By    Initials Name Provider Type Discipline    MM Park [place]ino OTA Occupational Therapy Assistant OT        Occupational Therapy Education     Title: PT OT SLP Therapies (Done)     Topic: Occupational Therapy (Done)      Point: ADL training (Done)     Description: Instruct learner(s) on proper safety adaptation and remediation techniques during self care or transfers.   Instruct in proper use of assistive devices.   Learning Progress Summary     Learner Status Readiness Method Response Comment Documented by    Patient Done Acceptance E,TB VU Home modifications, fall risk/safety, energy conservation, benefits of activity when returning home, caregiver training  09/20/18 1346     Done Acceptance E,TB VU energy conservation, fall risk/safety, ADL retraining, t/f training  09/18/18 1038     Done Acceptance E,TB VU possible home mods, energy conservation, fall risk/safety, importance of alanis hose/how to apply, importance of safety awareness due to orthostatic hypotension  09/17/18 1255     Done Acceptance E VU home safety, orthostasis and importance of exercises/alanis hose for prevention, ADL, dressing modification/strategy, OT POC  09/14/18 1001                      User Key     Initials Effective Dates Name Provider Type Discipline     08/28/18 -  Sunni Nagel, ZENIAR/L Occupational Therapist OT     08/01/18 -  Park Chino OTA Occupational Therapy Assistant OT                OT Recommendation and Plan  Outcome Summary/Treatment Plan (OT)  Daily Summary of Progress (OT): progress toward functional goals is good  Daily Summary of Progress (OT): progress toward functional goals is good  Plan of Care Review  Plan of Care Reviewed With: patient, family  Plan of Care Reviewed With: patient, family  Outcome Summary: Pt up in chair; family members in room. Pt educated on possible home modifications/adaptations of activities (shower chair, removing throw rugs, sitting when dressing). Pt reports no difficulty donning/doffing UB/LB clothing when seated. Reports feeling more comfortable with someone in the room with him during bathing due to fear of falling; educated caregiver and pt on use of sitting in a shower chair/where to  "buy a shower chair; pt/daughter verbalized understanding. Pt reports transferring in room with no difficulties/\"someone just in the room with him\". Pt has met all OT goals from initial evaluation, showing progress in all areas of ADLs. Recommend d/c from OT services. Pt plans on discharging home with assist/daughter.         Outcome Measures     Row Name 09/20/18 1328 09/20/18 1100 09/18/18 0926       How much help from another person do you currently need...    Turning from your back to your side while in flat bed without using bedrails?  — 4  -NATANAEL  —    Moving from lying on back to sitting on the side of a flat bed without bedrails?  — 4  -NATANAEL  —    Moving to and from a bed to a chair (including a wheelchair)?  — 4  -NATANAEL  —    Standing up from a chair using your arms (e.g., wheelchair, bedside chair)?  — 4  -NATANAEL  —    Climbing 3-5 steps with a railing?  — 3  -NATANAEL  —    To walk in hospital room?  — 4  -NATANAEL  —    AM-PAC 6 Clicks Score  — 23  -NATANAEL  —       How much help from another is currently needed...    Putting on and taking off regular lower body clothing? 4  -MM  — 3  -MM    Bathing (including washing, rinsing, and drying) 3  -MM  — 3  -MM    Toileting (which includes using toilet bed pan or urinal) 4  -MM  — 3  -MM    Putting on and taking off regular upper body clothing 4  -MM  — 4  -MM    Taking care of personal grooming (such as brushing teeth) 4  -MM  — 4  -MM    Eating meals 4  -MM  — 4  -MM    Score 23  -MM  — 21  -MM      User Key  (r) = Recorded By, (t) = Taken By, (c) = Cosigned By    Initials Name Provider Type    Katie Mckeon PTA Physical Therapy Assistant    MM Park [place]ino OTA Occupational Therapy Assistant           Time Calculation:         Time Calculation- OT     Row Name 09/20/18 1328 09/20/18 1135          Time Calculation- OT    OT Start Time 1328  -MM  —     OT Stop Time 1339  -MM  —     OT Time Calculation (min) 11 min  -MM  —     Total Timed Code Minutes- OT 11 minute(s)  -MM  — "     OT Received On 09/20/18  -MM  —        Timed Charges    66832 - Gait Training Minutes   — 13  -NATANAEL     72644 - OT Self Care/Mgmt Minutes 11  -MM  —       User Key  (r) = Recorded By, (t) = Taken By, (c) = Cosigned By    Initials Name Provider Type    Katie Mckeon, PTA Physical Therapy Assistant    MM Park Chino OTA Occupational Therapy Assistant           Therapy Suggested Charges     Code   Minutes Charges    99145 (CPT®) Hc Ot Neuromusc Re Education Ea 15 Min      15510 (CPT®) Hc Ot Ther Proc Ea 15 Min      08550 (CPT®) Hc Ot Therapeutic Act Ea 15 Min      56993 (CPT®) Hc Ot Manual Therapy Ea 15 Min      05880 (CPT®) Hc Ot Iontophoresis Ea 15 Min      80864 (CPT®) Hc Ot Elec Stim Ea-Per 15 Min      66291 (CPT®) Hc Ot Ultrasound Ea 15 Min      56336 (CPT®) Hc Ot Self Care/Mgmt/Train Ea 15 Min 11 1    Total  11 1        Therapy Charges for Today     Code Description Service Date Service Provider Modifiers Qty    [id number] HC OT SELF CARE/MGMT/TRAIN EA 15 MIN 9/20/2018 Park [place]ino OTA GO, KX 1          OT G-codes  OT Professional Judgement Used?: Yes  OT Functional Scales Options: AM-PAC 6 Clicks Daily Activity (OT)  Score: 17  Functional Limitation: Self care  Self Care Current Status (): At least 40 percent but less than 60 percent impaired, limited or restricted  Self Care Goal Status (): At least 20 percent but less than 40 percent impaired, limited or restricted    EDITH Schofield  9/20/2018

## 2018-09-20 NOTE — PLAN OF CARE
Problem: Patient Care Overview  Goal: Plan of Care Review  Outcome: Ongoing (interventions implemented as appropriate)   09/20/18 1133   Coping/Psychosocial   Plan of Care Reviewed With patient   OTHER   Outcome Summary Pt. is independent with bed mobility and transfers. Ambulates 220' with RWX and stand by assist. Performs LE exercises actively. Will benefit from increased activity.

## 2018-09-20 NOTE — PLAN OF CARE
Problem: Fall Risk (Adult)  Goal: Absence of Fall  Outcome: Ongoing (interventions implemented as appropriate)   09/20/18 0348   Fall Risk (Adult)   Absence of Fall making progress toward outcome       Problem: Patient Care Overview  Goal: Plan of Care Review  Outcome: Ongoing (interventions implemented as appropriate)   09/20/18 0348   Coping/Psychosocial   Plan of Care Reviewed With patient   Plan of Care Review   Progress improving   OTHER   Outcome Summary No c/o voiced. Sleeping long intervals. Plan to discharge with family today. Ortho vitals done with drop in BP when sitting. Mepilex drsg CDI to left arm. 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

## 2018-09-20 NOTE — THERAPY DISCHARGE NOTE
Acute Care - Occupational Therapy Treatment Note/Discharge  [facility]     Patient Name: Travis Patel  : 1945  MRN: [id number]  Today's Date: 2018  Onset of Illness/Injury or Date of Surgery: 09/10/18  Date of Referral to OT: 18  Referring Physician: Dr. Gomez      Admit Date: 9/10/2018    Visit Dx:     ICD-10-CM ICD-9-CM   1. Subarachnoid hemorrhage (CMS/HCC) I60.9 430   2. Syncope, unspecified syncope type R55 780.2   3. Elevated liver enzymes R74.8 790.5   4. Constipation, unspecified constipation type K59.00 564.00   5. VANI (acute kidney injury) (CMS/[facility]) N17.9 584.9   6. Elevated troponin R74.8 790.6   7. Renal cyst N28.1 753.10   8. Adrenal nodule (CMS/[facility]) E27.9 255.8   9. Chronic anticoagulation Z79.01 V58.61   10. Dysphagia, unspecified type R13.10 787.20   11. Impaired cognition R41.89 294.9   12. Impaired mobility and ADLs Z74.09 799.89   13. Gait abnormality R26.9 781.2   14. Chronic systolic congestive heart failure (CMS/HCC) I50.22 428.22     428.0   15. Orthostatic hypotension I95.1 458.0     Patient Active Problem List   Diagnosis   • Subarachnoid hemorrhage (CMS/HCC)   • Chronic coronary artery disease   • Congestive heart failure (CMS/[facility])   • Hyperlipidemia   • ICD (implantable cardioverter-defibrillator) in place   • Orthostatic hypotension   • Syncope       Therapy Treatment          Rehabilitation Treatment Summary     Row Name 18 1328 18 1110          Treatment Time/Intention    Discipline occupational therapy assistant  -MM physical therapy assistant  -NATANAEL     Document Type therapy note (daily note)  -MM therapy note (daily note)  -NATANAEL     Subjective Information no complaints  -MM no complaints  -JP2     Mode of Treatment occupational therapy  -MM  --     Patient/Family Observations 4 family members present; pt up in chair  -MM  --     Existing Precautions/Restrictions fall  -MM fall  -JP2     Recorded by [MM] Park Chino OTA 18 1345 [NATANAEL] Hayde  Katie MUNOZ, [facility] 09/20/18 1111  [JP2] Katie Lock, [facility] 09/20/18 1124     Row Name 09/20/18 1328             Cognitive Assessment/Intervention- PT/OT    Affect/Mental Status (Cognitive) flat/blunted affect  -MM      Personal Safety Interventions elopement precautions initiated;fall prevention program maintained;supervised activity  -MM      Recorded by [MM] Park Chino [facility] 09/20/18 1345      Row Name 09/20/18 1328 09/20/18 1110          Bed Mobility Assessment/Treatment    Supine-Sit [place] (Bed Mobility)  -- independent  -NATANAEL     Comment (Bed Mobility) up in chair  -MM  --     Recorded by [MM] Park Chino [facility] 09/20/18 1345 [NATANAEL] Katie Lock, [facility] 09/20/18 1127     Row Name 09/20/18 1328             Transfer Assessment/Treatment    Comment (Transfers) Pt reports being able to t/f and walk by himself   -MM      Recorded by [MM] Park Chino [facility] 09/20/18 1345      Row Name 09/20/18 1110             Sit-Stand Transfer    Sit-Stand [place] (Transfers) independent  -NATANAEL      Recorded by [NATANAEL] Katie Lock, [facility] 09/20/18 1127      Row Name 09/20/18 1110             Stand-Sit Transfer    Stand-Sit [place] (Transfers) independent  -NATANAEL      Recorded by [NATANAEL] Katie Lock, [facility] 09/20/18 1127      Row Name 09/20/18 1110             Gait/Stairs Assessment/Training    [place] Level (Gait) stand by assist  -NATANAEL      Assistive Device (Gait) walker, front-wheeled  -NATANAEL      Distance in Feet (Gait) 225  -NATANAEL      Deviations/Abnormal Patterns (Gait) base of support, narrow  -NATANAEL      Recorded by [NATANAEL] Katie Lock, [facility] 09/20/18 1127      Row Name 09/20/18 1328             ADL Assessment/Intervention    BADL Assessment/Intervention lower body dressing;bathing;toileting  -MM      Recorded by [MM] Park Chino OTA 09/20/18 1345      Row Name 09/20/18 1328             Bathing Assessment/Intervention    Comment (Bathing) educated on use of shower chair; pt reports feeling safer with  someone being in the room with him; but reports being able to bathe with no difficulty   -MM      Recorded by [MM] Park Chino OTA 09/20/18 1345      Row Name 09/20/18 1328             Lower Body Dressing Assessment/Training    Lower Body Dressing [place] Level conditional independence;socks  -MM      Lower Body Dressing Position supported sitting  -MM      Comment (Lower Body Dressing) Mod I; pt reports expecting to have no difficulty when sitting or standing to don pants  -MM      Recorded by [MM] Park Chino OTA 09/20/18 1345      Row Name 09/20/18 1328             Toileting Assessment/Training    Comment (Toileting) pt reports no difficulty; educated on sitting if he is feeling dizzy/fatigued   -MM      Recorded by [MM] Park Chino OTA 09/20/18 1345      Row Name 09/20/18 1328             BADL Safety/Performance    Impairments, BADL Safety/Performance balance  -MM      Skilled BADL Treatment/Intervention adaptive equipment training;BADL process/adaptation training;compensatory training;energy conservation;environmental modifications  -MM      Progress in BADL Status improvement noted;independence level;effort and initiation  -MM      Recorded by [MM] Park Chino OTA 09/20/18 1345      Row Name 09/20/18 1110             Therapeutic Exercise    Lower Extremity Range of Motion (Therapeutic Exercise) hip flexion/extension, bilateral;hip abduction/adduction, bilateral;knee flexion/extension, bilateral;ankle dorsiflexion/plantar flexion, bilateral  -NATANAEL      Exercise Type (Therapeutic Exercise) AROM (active range of motion)  -NATANAEL      Position (Therapeutic Exercise) seated  -NATANAEL      Sets/Reps (Therapeutic Exercise) 20-25  -NATANAEL      Recorded by [NATANAEL] Katie Lock, PTA 09/20/18 1129      Row Name 09/20/18 1110             Static Sitting Balance    Level of [place] (Unsupported Sitting, Static Balance) independent  -NATANAEL      Sitting Position (Unsupported Sitting, Static Balance)  sitting on edge of bed  -NATANAEL      Recorded by [NATANAEL] Katie Lock, PTA 09/20/18 1129      Row Name 09/20/18 1328 09/20/18 1110          Positioning and Restraints    Pre-Treatment Position sitting in chair/recliner  -MM in bed  -NATANAEL     Post Treatment Position chair  -MM chair  -NATANAEL     In Chair reclined;call light within reach;encouraged to call for assist;with family/caregiver  -MM sitting;call light within reach;encouraged to call for assist  -NATANAEL     Recorded by [MM] Park Chino OTA 09/20/18 1345 [NATANAEL] Katie Lock, PTA 09/20/18 1129     Row Name 09/20/18 1328 09/20/18 1110          Pain Scale: Numbers Pre/Post-Treatment    Pain Scale: Numbers, Pretreatment 0/10 - no pain  -MM 0/10 - no pain  -NATANAEL     Pain Scale: Numbers, Post-Treatment 0/10 - no pain  -MM  --     Recorded by [MM] Park Chino OTA 09/20/18 1345 [NATANAEL] Katie Lock, PTA 09/20/18 1129     Row Name                Wound 09/12/18 2000 Left lateral arm skin tear    Wound - Properties Group Date first assessed: 09/12/18 [AH] Time first assessed: 2000 [AH] Side: Left [AH] Orientation: lateral [AH] Location: arm [AH] Type: skin tear [AH] Recorded by:  [AH] Channing Castellon, RN 09/13/18 0449    Row Name 09/20/18 1328             Plan of Care Review    Plan of Care Reviewed With patient;family  -MM      Recorded by [MM] Park Chino OTA 09/20/18 1345      Row Name 09/20/18 1328             Outcome Summary/Treatment Plan (OT)    Daily Summary of Progress (OT) progress toward functional goals is good  -MM      Recorded by [MM] Park Chino OTA 09/20/18 1345        User Key  (r) = Recorded By, (t) = Taken By, (c) = Cosigned By    Initials Name Effective Dates Discipline    NATANAEL Katie Lock, PTA 08/02/16 -  PT     Channing Castellon, RN 02/12/18 -  Nurse     Park Chino OTA 08/01/18 -  OT        Wound 09/12/18 2000 Left lateral arm skin tear (Active)   Dressing Appearance dry;intact 9/20/2018  8:07 AM   Closure  Adhesive bandage 9/20/2018  8:07 AM   Base dressing in place, unable to visualize 9/20/2018  8:07 AM   Periwound dry;intact 9/20/2018  8:07 AM   Periwound Temperature warm 9/19/2018  8:00 PM   Periwound Skin Turgor soft 9/19/2018  8:00 PM   Drainage Amount none 9/20/2018  8:07 AM   Dressing Care, Wound foam 9/19/2018  8:00 PM             OT Rehab Goals     Row Name 09/20/18 1328             Transfer Goal 1 (OT)    Activity/Assistive Device (Transfer Goal 1, OT) bed-to-chair/chair-to-bed;toilet;shower chair;walk-in shower  -MM      [place] Level/Cues Needed (Transfer Goal 1, OT) supervision required  -MM      Progress/Outcome (Transfer Goal 1, OT) goal met  -MM         Bathing Goal 1 (OT)    Activity/Assistive Device (Bathing Goal 1, OT) bathing skills, all;shower chair  -MM      [place] Level/Cues Needed (Bathing Goal 1, OT) supervision required  -MM      Progress/Outcomes (Bathing Goal 1, OT) goal met  -MM         Dressing Goal 1 (OT)    Activity/Assistive Device (Dressing Goal 1, OT) lower body dressing  -MM      [place]/Cues Needed (Dressing Goal 1, OT) conditional independence  -MM      Progress/Outcome (Dressing Goal 1, OT) goal met  -MM        User Key  (r) = Recorded By, (t) = Taken By, (c) = Cosigned By    Initials Name Provider Type Discipline    Park Aguirre OTA Occupational Therapy Assistant OT          Occupational Therapy Education     Title: PT OT SLP Therapies (Done)     Topic: Occupational Therapy (Done)     Point: ADL training (Done)     Description: Instruct learner(s) on proper safety adaptation and remediation techniques during self care or transfers.   Instruct in proper use of assistive devices.   Learning Progress Summary     Learner Status Readiness Method Response Comment Documented by    Patient Done Acceptance E,TB VU Home modifications, fall risk/safety, energy conservation, benefits of activity when returning home, caregiver training  09/20/18 3681     Done  "Acceptance E,TB VU energy conservation, fall risk/safety, ADL retraining, t/f training  09/18/18 1038     Done Acceptance E,TB VU possible home mods, energy conservation, fall risk/safety, importance of alanis hose/how to apply, importance of safety awareness due to orthostatic hypotension  09/17/18 1255     Done Acceptance E VU home safety, orthostasis and importance of exercises/alanis hose for prevention, ADL, dressing modification/strategy, OT POC  09/14/18 1001                      User Key     Initials Effective Dates Name Provider Type Discipline     08/28/18 -  Sunni Nagel, OTR/L Occupational Therapist OT     08/01/18 -  Park Chino OTA Occupational Therapy Assistant OT                OT Recommendation and Plan  Outcome Summary/Treatment Plan (OT)  Daily Summary of Progress (OT): progress toward functional goals is good  Anticipated Discharge Disposition (OT): home with assist  Daily Summary of Progress (OT): progress toward functional goals is good  Plan of Care Review  Plan of Care Reviewed With: patient, family  Plan of Care Reviewed With: patient, family  Outcome Summary: Pt up in chair; family members in room. Pt educated on possible home modifications/adaptations of activities (shower chair, removing throw rugs, sitting when dressing). Pt reports no difficulty donning/doffing UB/LB clothing when seated. Reports feeling more comfortable with someone in the room with him during bathing due to fear of falling; educated caregiver and pt on use of sitting in a shower chair/where to buy a shower chair; pt/daughter verbalized understanding. Pt reports transferring in room with no difficulties/\"someone just in the room with him\". Pt has met all OT goals from initial evaluation, showing progress in all areas of ADLs. Recommend d/c from OT services. Pt plans on discharging home with assist/daughter.           Outcome Measures     Row Name 09/20/18 1328 09/20/18 1100 09/18/18 0926       How much " help from another person do you currently need...    Turning from your back to your side while in flat bed without using bedrails?  -- 4  -NATANAEL  --    Moving from lying on back to sitting on the side of a flat bed without bedrails?  -- 4  -NATANAEL  --    Moving to and from a bed to a chair (including a wheelchair)?  -- 4  -NATANAEL  --    Standing up from a chair using your arms (e.g., wheelchair, bedside chair)?  -- 4  -NATANAEL  --    Climbing 3-5 steps with a railing?  -- 3  -NATANAEL  --    To walk in hospital room?  -- 4  -NATANAEL  --    AM-PAC 6 Clicks Score  -- 23  -NATANAEL  --       How much help from another is currently needed...    Putting on and taking off regular lower body clothing? 4  -MM  -- 3  -MM    Bathing (including washing, rinsing, and drying) 3  -MM  -- 3  -MM    Toileting (which includes using toilet bed pan or urinal) 4  -MM  -- 3  -MM    Putting on and taking off regular upper body clothing 4  -MM  -- 4  -MM    Taking care of personal grooming (such as brushing teeth) 4  -MM  -- 4  -MM    Eating meals 4  -MM  -- 4  -MM    Score 23  -MM  -- 21  -MM      User Key  (r) = Recorded By, (t) = Taken By, (c) = Cosigned By    Initials Name Provider Type    Katie Mckeon PTA Physical Therapy Assistant    Park Aguirre OTA Occupational Therapy Assistant           Time Calculation:          Time Calculation- OT     Row Name 09/20/18 1328 09/20/18 1135          Time Calculation- OT    OT Start Time 1328  -MM  --     OT Stop Time 1339  -MM  --     OT Time Calculation (min) 11 min  -MM  --     Total Timed Code Minutes- OT 11 minute(s)  -MM  --     OT Received On 09/20/18  -MM  --        Timed Charges    14479 - Gait Training Minutes   -- 13  -NATANAEL     71485 - OT Self Care/Mgmt Minutes 11  -MM  --       User Key  (r) = Recorded By, (t) = Taken By, (c) = Cosigned By    Initials Name Provider Type    Katie Mckeon PTA Physical Therapy Assistant    Park Aguirre OTA Occupational Therapy Assistant        Therapy  Suggested Charges     Code   Minutes Charges    76271 (CPT®) Hc Ot Neuromusc Re Education Ea 15 Min      04498 (CPT®) Hc Ot Ther Proc Ea 15 Min      54638 (CPT®) Hc Ot Therapeutic Act Ea 15 Min      23956 (CPT®) Hc Ot Manual Therapy Ea 15 Min      67018 (CPT®) Hc Ot Iontophoresis Ea 15 Min      23806 (CPT®) Hc Ot Elec Stim Ea-Per 15 Min      83812 (CPT®) Hc Ot Ultrasound Ea 15 Min      32348 (CPT®) Hc Ot Self Care/Mgmt/Train Ea 15 Min 11 1    Total  11 1        Therapy Charges for Today     Code Description Service Date Service Provider Modifiers Qty    91227819758 HC OT SELF CARE/MGMT/TRAIN EA 15 MIN 9/20/2018 Park Chino OTA GO, KX 1          OT G-codes  OT Professional Judgement Used?: Yes  OT Functional Scales Options: AM-PAC 6 Clicks Daily Activity (OT)  Score: 17  Functional Limitation: Self care  Self Care Current Status (): At least 40 percent but less than 60 percent impaired, limited or restricted  Self Care Goal Status (): At least 20 percent but less than 40 percent impaired, limited or restricted    OT Discharge Summary  Anticipated Discharge Disposition (OT): home with assist  Reason for Discharge: All goals achieved, Discharge from facility  Outcomes Achieved: Refer to plan of care for updates on goals achieved  Discharge Destination: Home with assist    EDITH Schofield  9/20/2018

## 2018-09-20 NOTE — DISCHARGE PLACEMENT REQUEST
"[facility] [phone]   Pt will be staying with his daughter at [address] [facility], [place], [place]    Travis Patel (73 y.o. Male)     Date of Birth Social Security Number Address Home Phone MRN    1945  [address] [place] [id number] [phone] [id number]    Episcopalian Marital Status          Sabianism        Admission Date Admission Type Admitting Provider Attending Provider Department, Room/Bed    9/10/18 Emergency Angel Zheng MD [place], Glenn Riego, MD [address], 408/1    Discharge Date Discharge Disposition Discharge Destination         Home-Health Care [facility]              Attending Provider:  Angel Zheng MD    Allergies:  Sulfa Antibiotics    Isolation:  None   Infection:  None   Code Status:  CPR    Ht:  170.2 cm (67.01\")   Wt:  79.9 kg (176 lb 3.2 oz)    Admission Cmt:  None   Principal Problem:  None                Active Insurance as of 9/10/2018     Primary Coverage     Payor Plan Insurance Group Employer/Plan Group    HUMANA MEDICARE REPLACEMENT HUMANA MEDICARE REPL [id number]     Payor Plan Address Payor Plan Phone Number Effective From Effective To    [address] [address] [phone] 2014     [facility] [address]       Subscriber Name Subscriber Birth Date Member ID       TRAVIS PATEL 1945 [id number]                 Emergency Contacts      (Rel.) Home Phone Work Phone Mobile Phone    Jennifer Turcios (Other) [phone] -- --    Fransisca Patel (Spouse) -- -- [phone]         [address] [address]  Phone:  [phone]  Fax:   Date: Sep 20, 2018      Referral to Home Health     Patient:  Travis Patel MRN:  [id number]   [address]  [facility] [address] :  1945  SSN:    Phone: No phone on record Sex:  M      INSURANCE PAYOR PLAN GROUP # SUBSCRIBER ID   Primary:    HUMANA MEDICARE REPLACEMENT [id number] "      Referring Provider Information:  ANGEL ZHENG Phone: [phone] Fax:       Referral Information:   # Visits:  1 Referral Type: Home Health [42]   Urgency:  Routine Referral Reason: Specialty Services Required   Start Date: Sep 20, 2018 End Date:  To be determined by Insurer   Diagnosis: Subarachnoid hemorrhage (CMS/HCC) (I60.9 [ICD-10-CM] 430 [ICD-9-CM])  Chronic systolic congestive heart failure (CMS/HCC) (I50.22 [ICD-10-CM] 428.22,428.0 [ICD-9-CM])  Orthostatic hypotension (I95.1 [ICD-10-CM] 458.0 [ICD-9-CM])      Refer to Dept:   Refer to Provider:   Refer to Facility:       Face to Face Visit Date: 9/20/2018  Follow-up Provider for Plan of Care? I treated the patient in an acute care facility and will not continue treatment after discharge.  Follow-up Provider: JOSE LUIS STILL [[id number]]  Reason/Clinical Findings: SAH, CAD, chronic systolic HF but with orthostatic not tolerating heart failure med, constipation  Describe mobility limitations that make leaving home difficult: SAH, CAD, chronic systolic HF but with orthostatic not tolerating heart failure med, constipation  Nursing/Therapeutic Services Requested: Skilled Nursing  Skilled nursing orders: Cardiopulmonary assessments  Skilled nursing orders: Mental health  Skilled nursing orders: CHF management  Skilled nursing orders: Medication education  Frequency: 1 Week 1     This document serves as a request of services and does not constitute Insurance authorization or approval of services.  To determine eligibility, please contact the members Insurance carrier to verify and review coverage.     If you have medical questions regarding this request for services. Please contact [address] at [phone] between the hours of 8:00am - 5:00pm (Mon-Fri).             Authorizing Provider:Angel Zheng MD  Authorizing Provider's NPI: [id number]  Order Entered By: Angel Zheng MD 9/20/2018  1:38  PM     Electronically signed by: Angel Zheng MD 9/20/2018  1:38 PM                  History & Physical      Kenya Gomez DO at 9/10/2018  5:21 PM              [facility]  HISTORY AND PHYSICAL    Date of Admission: 9/10/2018  Primary Care Physician: Shant Hayes MD    Subjective     Chief Complaint: passed out.    History of Present Illness  Patient has been having issues with falling a lot over the last six months.    Gets up, gets light headed.  Passes out on occasion.  Today got up and passed out.  Unknown how long he was out but was alert when he woke up.  Relates no trauma.  No nausea   No chest pain.          Review of Systems   Constitutional: Positive for activity change. Negative for appetite change, chills and fever.   HENT: Negative for hearing loss, nosebleeds, tinnitus and trouble swallowing.    Eyes: Negative for visual disturbance.   Respiratory: Negative for cough, chest tightness, shortness of breath and wheezing.    Cardiovascular: Negative for chest pain, palpitations and leg swelling.   Gastrointestinal: Negative for abdominal distention, abdominal pain, blood in stool, constipation, diarrhea, nausea and vomiting.   Endocrine: Negative for cold intolerance, heat intolerance, polydipsia, polyphagia and polyuria.   Genitourinary: Negative for decreased urine volume, difficulty urinating, dysuria, flank pain, frequency and hematuria.   Musculoskeletal: Positive for arthralgias. Negative for joint swelling and myalgias.   Skin: Negative for rash.   Allergic/Immunologic: Negative for immunocompromised state.   Neurological: Positive for syncope, weakness and light-headedness. Negative for dizziness and headaches.   Hematological: Negative for adenopathy. Does not bruise/bleed easily.   Psychiatric/Behavioral: Negative for confusion and sleep disturbance. The patient is not nervous/anxious.             Past Medical History:  "  Past Medical History:   Diagnosis Date   • Hernia of abdominal wall    • Myocardial infarction 02/2017   • Sleep apnea    • Stroke (CMS/HCC) 02/2017     Past Surgical History:  Past Surgical History:   Procedure Laterality Date   • COLOSTOMY  2001    x 3 months   • COLOSTOMY REVISION     • CORONARY ARTERY BYPASS GRAFT      1991/1996   • HERNIA REPAIR      abdominal hernia x 3; poor healing with multiple revisions   • PACEMAKER IMPLANTATION  2017    w/ defibrillator   • ROTATOR CUFF REPAIR Right 2005     Social History:  reports that he has quit smoking. His smoking use included Cigarettes. He has a 40.00 pack-year smoking history. He has never used smokeless tobacco. He reports that he does not drink alcohol or use drugs.    Retired  No alcohol, drugs or tobacco.  DPOA wife  Living will yes  Code status full  [facility]    Family History: family history includes Diabetes in his brother; Heart disease in his brother; Sleep apnea in his brother.       Allergies:  Allergies   Allergen Reactions   • Sulfa Antibiotics      Medications:  Buspar 10 bid  Aldactone 25 mg daily  Lipitor 80 mg daily  Zantac 150 bid  Folkic acid 1 mg daiuly  Bumex 1mg alternating with 2 mg daily  Cymbalta 60 mg daily  Plavix 75 mg daily  Lisinopril 2.5 mg daiuly  Seroquel 200 mg at hs  Metoprolol ER 25 mg 1.5 tab daily  Lithium 300 mg tid  Midodrine 5 mb bid  ASA 81 mg daily  Medication bottles reviewed with patient and daughter at bedside.    Objective     Vital Signs: /80   Pulse 79   Temp 98.2 °F (36.8 °C) (Oral)   Resp 14   Ht 170.2 cm (67\")   Wt 69.1 kg (152 lb 4.8 oz)   SpO2 100%   BMI 23.85 kg/m²    Physical Exam   Constitutional: He is oriented to person, place, and time. He appears well-developed and well-nourished.   HENT:   Head: Normocephalic and atraumatic.   Eyes: Pupils are equal, round, and reactive to light. Conjunctivae and EOM are normal.   Neck: Normal range of motion. Neck supple. No " JVD present.   Cardiovascular: Normal rate, regular rhythm and intact distal pulses.    Murmur heard.  Pulmonary/Chest: Effort normal and breath sounds normal.   Abdominal: Bowel sounds are normal. He exhibits distension. There is tenderness (generalized. ).   Musculoskeletal: Normal range of motion.   Neurological: He is alert and oriented to person, place, and time.   Skin: Skin is warm and dry.   Psychiatric: He has a normal mood and affect. His behavior is normal.   Nursing note and vitals reviewed.          Results Reviewed:  Lab Results (last 24 hours)     Procedure Component Value Units Date/Time    Lactic Acid, Reflex [[id number]]  (Abnormal) Collected:  09/10/18 1600    Specimen:  Blood Updated:  09/10/18 1620     Lactate 2.9 (C) mmol/L     Lactic Acid, Reflex Timer (This will reflex a repeat order 3-3:15 hours after ordered.) [[id number]] Collected:  09/10/18 1110    Specimen:  Blood Updated:  09/10/18 1517     Extra Tube Hold for add-ons.     Comment: Auto resulted.       Urinalysis With Microscopic If Indicated (No Culture) - Urine, Clean Catch [[id number]]  (Abnormal) Collected:  09/10/18 1407    Specimen:  Urine from Urine, Clean Catch Updated:  09/10/18 1415     Color, UA Yellow     Appearance, UA Clear     pH, UA 6.5     Specific Gravity, UA 1.026     Glucose, UA Negative     Ketones, UA Trace (A)     Bilirubin, UA Negative     Blood, UA Negative     Protein, UA Negative     Leuk Esterase, UA Negative     Nitrite, UA Negative     Urobilinogen, UA 1.0 E.U./dL    Narrative:       Urine microscopic not indicated.    Blood Culture - Blood, [[id number]] Collected:  09/10/18 1315    Specimen:  Blood from Arm, Left Updated:  09/10/18 1336    Blood Culture - Blood, [[id number]] Collected:  09/10/18 1325    Specimen:  Blood from Arm, Right Updated:  09/10/18 1336    Lactic Acid, Plasma [[id number]]  (Abnormal) Collected:  09/10/18 1110    Specimen:  Blood Updated:  09/10/18 1205     Lactate 4.6 (C) mmol/L      Troponin [[id number]]  (Abnormal) Collected:  09/10/18 1110    Specimen:  Blood Updated:  09/10/18 1140     Troponin I 0.155 (H) ng/mL     BNP [[id number]]  (Abnormal) Collected:  09/10/18 1110    Specimen:  Blood Updated:  09/10/18 1140     proBNP 11,100.0 (H) pg/mL     Comprehensive Metabolic Panel [[id number]]  (Abnormal) Collected:  09/10/18 1110    Specimen:  Blood Updated:  09/10/18 1128     Glucose 121 (H) mg/dL      BUN 41 (H) mg/dL      Creatinine 1.75 (H) mg/dL      Sodium 148 (H) mmol/L      Potassium 4.4 mmol/L      Chloride 105 mmol/L      CO2 24.0 mmol/L      Calcium 10.7 (H) mg/dL      Total Protein 8.6 g/dL      Albumin 5.00 g/dL      ALT (SGPT) 33 U/L      AST (SGOT) 72 (H) U/L      Alkaline Phosphatase 172 (H) U/L      Total Bilirubin 1.9 (H) mg/dL      eGFR Non African Amer 39 (L) mL/min/1.73      Globulin 3.6 gm/dL      A/G Ratio 1.4 g/dL      BUN/Creatinine Ratio 23.4     Anion Gap 19.0 (H) mmol/L     Narrative:       The MDRD GFR formula is only valid for adults with stable renal function between ages 18 and 70.    Protime-INR [[id number]]  (Normal) Collected:  09/10/18 1110    Specimen:  Blood Updated:  09/10/18 1127     Protime 13.4 Seconds      INR 0.99    aPTT [[id number]]  (Normal) Collected:  09/10/18 1110    Specimen:  Blood Updated:  09/10/18 1127     PTT 28.3 seconds     Lithium Level [[id number]]  (Normal) Collected:  09/10/18 1110    Specimen:  Blood Updated:  09/10/18 1126     Lithium 0.8 mmol/L     CBC & Differential [[id number]] Collected:  09/10/18 1110    Specimen:  Blood Updated:  09/10/18 1118    Narrative:       The following orders were created for panel order CBC & Differential.  Procedure                               Abnormality         Status                     ---------                               -----------         ------                     CBC Auto Differential[[id number]]        Abnormal            Final result                 Please view results for these tests on the  individual orders.    CBC Auto Differential [[id number]]  (Abnormal) Collected:  09/10/18 1110    Specimen:  Blood Updated:  09/10/18 1118     WBC 15.60 (H) 10*3/mm3      RBC 4.66 (L) 10*6/mm3      Hemoglobin 15.2 g/dL      Hematocrit 43.9 %      MCV 94.2 fL      MCH 32.6 (H) pg      MCHC 34.6 g/dL      RDW 14.6 %      RDW-SD 50.5 fl      MPV 10.3 fL      Platelets 313 10*3/mm3      Neutrophil % 82.9 (H) %      Lymphocyte % 11.1 (L) %      Monocyte % 5.3 %      Eosinophil % 0.1 %      Basophil % 0.2 %      Immature Grans % 0.4 %      Neutrophils, Absolute 12.93 (H) 10*3/mm3      Lymphocytes, Absolute 1.73 10*3/mm3      Monocytes, Absolute 0.83 10*3/mm3      Eosinophils, Absolute 0.01 10*3/mm3      Basophils, Absolute 0.03 10*3/mm3      Immature Grans, Absolute 0.07 (H) 10*3/mm3      nRBC 0.0 /100 WBC         Imaging Results (last 24 hours)     Procedure Component Value Units Date/Time    CT Abdomen Pelvis With Contrast [[id number]] Collected:  09/10/18 1243     Updated:  09/10/18 1252    Narrative:       CT ABDOMEN PELVIS W CONTRAST- 9/10/2018 12:06 PM CDT     HISTORY: abd pain, syncope      COMPARISON: None     DOSE LENGTH PRODUCT: 221 mGy cm. Automated exposure control was also  utilized to decrease patient radiation dose.     TECHNIQUE: Axial images of the abdomen pelvis are obtained following IV  contrast. No oral contrast administered.     FINDINGS:  There are scattered hypodense liver lesions favorable for  cysts. The spleen, pancreas are unremarkable. Small amount of biliary  sludge versus stones considered in the dependent portion the gallbladder  (image 24, series 4). There are bilateral nonspecific adrenal nodules  measuring up to 1.8 cm. There is an inferior left renal cyst. There is  no enhancing renal mass. There is no hydronephrosis. Bladder is  distended. There is no prostate enlargement.      A large amount of fecal material is contained within the rectum. Rectum  measures 7.5 cm in width. Moderate  stool seen throughout the remaining  colon. There are postoperative changes of the cecum. There is no  evidence for small bowel obstruction. The stomach is underdistended.  There is scarring along the region of the umbilicus.     There is dense vascular calcification with ectasia of the aorta to  maximum measurement of 2.4 cm.     Please refer to CT chest report for findings below the hemidiaphragms.     There are degenerative changes of the regional skeleton. There is a mild  rotoscoliotic curvature of the lumbar spine.       Impression:       1. Large volume of stool within the rectum may indicate constipation  and/or impaction. Moderate volume stool seen in the remaining colon. No  evidence of bowel obstruction.  2. Hypodense liver lesions favorable for cysts. Inferior left renal  cyst.  3. Nonspecific bilateral adrenal nodules can be followed on subsequent  imaging. Adrenal protocol CT versus MRI should be considered  nonemergently if there are no outside films for comparison.    4. Distended bladder. .  This report was finalized on 09/10/2018 12:49 by Dr. Kimberly Doll MD.    CT Angiogram Chest With Contrast [[id number]] Collected:  09/10/18 1239     Updated:  09/10/18 1246    Narrative:       CT ANGIOGRAM CHEST W CONTRAST- 9/10/2018 12:06 PM CDT     HISTORY: mid back pain, syncope      COMPARISON: None     DOSE LENGTH PRODUCT: 268 mGy cm. Automated exposure control was also  utilized to decrease patient radiation dose.     TECHNIQUE: Axial images of the chest are obtained following IV contrast.  2-D and maximal intensity projection images are reconstructed and  reviewed.     FINDINGS:  There are no pulmonary emboli. There is evidence of prior  mediastinal surgery. There is dense coronary artery calcification. There  is no thoracic aortic aneurysm or dissection. There is moderate  atherosclerotic change of the thoracic aorta. A left subclavian cardiac  pacer device is in place. Heart is upper limits of normal  in size. There  is no pericardial or pleural effusion. No pathologic intrathoracic or  axillary lymphadenopathy visualized.     Hypoechoic thin liver lesions are favorable for cysts. The adrenal  glands are prominent and hypodense. Please refer to CT abdomen report.  There is moderate stool within the visible colon.     There is a calcified granuloma within the left upper lobe. There is no  pulmonary consolidation. There is minimal dependent atelectasis. There  are no spiculated nodules. There is no pneumothorax.     No focal destructive osseous lesions are visualized.       Impression:       1. No pulmonary emboli.  2. Prior mediastinal surgery, likely coronary bypass. Left subclavian  cardiac pacer device.  3. Left upper lobe benign calcified granuloma. Minimal dependent  atelectasis. No consolidation or suspicious nodularity.  4. Hypodense liver lesions favorable for cysts. Please refer to CT  abdomen report.  This report was finalized on 09/10/2018 12:43 by Dr. Kimberly Doll MD.    CT Cervical Spine Without Contrast [[id number]] Collected:  09/10/18 1237     Updated:  09/10/18 1242    Narrative:       CT CERVICAL SPINE WO CONTRAST- 9/10/2018 12:06 PM CDT     HISTORY: neck pain, syncope, fall      COMPARISON: None     DOSE LENGTH PRODUCT: 241 mGy cm. Automated exposure control was also  utilized to decrease patient radiation dose.     TECHNIQUE: Axial images of cervical spine obtained with sagittal coronal  reconstructions.     FINDINGS:  There is minimal retrolisthesis of C5 in reference to C4  measuring 1 to 2 mm. There is moderate to advanced degenerative disc  change at C6/C7 and C7-T1 with moderate degenerative disc changes above  the C5 level. There is moderate facet osteoarthropathy. There is mild  uncinate process hypertrophy.     No cervical vertebral fracture is identified.     The degenerative changes result in only mild cervical spinal canal  stenosis with scattered bilateral neural foramen  narrowing. At least  moderate in severity.     There is carotid bulb calcification. There is no apical pneumothorax.       Impression:       1. Degenerative changes of the cervical spine with no acute cervical  vertebral fracture. Minimal retrolisthesis of C5 likely related to facet  osteoarthropathy.  This report was finalized on 09/10/2018 12:39 by Dr. Kimberly Doll MD.    CT Head Without Contrast [[id number]] Collected:  09/10/18 1233     Updated:  09/10/18 1240    Narrative:       CT HEAD WO CONTRAST- 9/10/2018 12:06 PM CDT     HISTORY: head injury, fall, syncope      COMPARISON: None     DOSE LENGTH PRODUCT: 696 mGy cm. Automated exposure control was also  utilized to decrease patient radiation dose.     TECHNIQUE: Axial images of brain obtained without IV contrast.     FINDINGS:  There is subarachnoid hemorrhage along the convexities of the  right frontal lobe. No intraparenchymal hematoma or contusion  visualized. There is moderate cerebral volume loss with chronic vessel  vessel ischemia. There are no convincing acute signs of ischemia. No  subdural or epidural hematomas visualized.     The visible paranasal sinuses and mastoid air cells are well-aerated.  The bony calvarium appears intact. No skull fracture identified.       Impression:       1. Subarachnoid hemorrhage along the convexities of the right frontal  lobe. No intraparenchymal hemorrhage visualized. This finding called to  Karol Berrios in the ER at 12:40 PM on 9/10/2018.  2. Moderate cerebral volume loss with chronic vessel vessel ischemia.  This report was finalized on 09/10/2018 12:37 by Dr. Kimberly Doll MD.    XR Elbow 3+ View Left [[id number]] Collected:  09/10/18 1056     Updated:  09/10/18 1100    Narrative:       HISTORY: Fall with left elbow pain     Left elbow: 3 views of left elbow are obtained.     There is an IV within the left antecubital fossa. There is no  appreciable joint effusion. There is mild bony spurring of the a  "\"knot  on and humeral epicondyles. No bony fracture or dislocation is  identified.       Impression:       1. Mild arthritic change of the left elbow with no acute bony pathology.  This report was finalized on 09/10/2018 10:57 by Dr. Kimberly Doll MD.    XR Chest 1 View [[id number]] Collected:  09/10/18 1055     Updated:  09/10/18 1059    Narrative:       HISTORY: Syncope     CXR: Frontal view the chest is obtained.     COMPARISON: None     FINDINGS: Patient has undergone prior mediastinal surgery. There is a  left subclavian cardiac pacer device with the lead projecting over the  right ventricle. There is a left upper lobe calcified granuloma. There  is no consolidation or edema. There is no pleural effusion or  pneumothorax. There is no acute bony pathology.       Impression:       1. Left upper lobe calcified granuloma with calcified left hilar lymph  nodes. Prior mediastinal surgery with left subclavian cardiac pacer  device. No acute pulmonary process identified.  This report was finalized on 09/10/2018 10:56 by Dr. Kimberly Doll MD.        I have personally reviewed and interpreted the radiology studies and ECG obtained at time of admission.     Assessment / Plan     Assessment:   Syncope  Orthostatic hypotension, chronic recurrent by history.  Subarrachnoid hemorrhage.   CAD   Bipolar disease  Constipation with possible rectal impaction.  Renal failure. Acute on chronic.      Plan:      Admit   ICU monitor overnight  Consult neurosurgery  Check orthostatic blood pressure  Milk and molasses enema  Lactulose tid  Lab in AM  cmp cbc  Neuro checks.   Consult Dr Reese.  Acute on chronic renal failure, patient received dye study.  Gentle hydration in patient with hx of CHF  Code Status: full  Echocardiogram  Ideally we would also place patient in support/ZEE hose but these are no longer available at this institution.    I discussed the patient's findings and my recommendations with the patient and " daughter    Estimated length of stay 2-3 days    Kenya Gomez DO   09/10/18   5:21 PM              Electronically signed by Kenya Gomez DO at 9/10/2018  6:00 PM     Hu Calvillo MD at 9/10/2018  4:59 PM          Patient Care Team:  Shant Hayes MD as PCP - General (Internal Medicine)    Chief complaint syncope    Subjective     HPI: HPI    This patient is a 73-year-old male who has a history of multiple syncopal events.  He presented to the emergency room today after falling and striking his head.  He does have positive loss of consciousness of unknown duration.  He is currently resting comfortably with no complaints, eating a meal.    Review of Systems     12 point review of systems is obtained and is negative except for as described in HPI    Past Medical History:   Diagnosis Date   • Hernia of abdominal wall    • Myocardial infarction 02/2017   • Sleep apnea    • Stroke (CMS/[facility]) 02/2017     Past Surgical History:   Procedure Laterality Date   • COLOSTOMY  2001    x 3 months   • COLOSTOMY REVISION     • CORONARY ARTERY BYPASS GRAFT      1991/1996   • HERNIA REPAIR      abdominal hernia x 3; poor healing with multiple revisions   • PACEMAKER IMPLANTATION  2017    w/ defibrillator   • ROTATOR CUFF REPAIR Right 2005     Family History   Problem Relation Age of Onset   • Heart disease Brother    • Diabetes Brother    • Sleep apnea Brother      Social History   Substance Use Topics   • Smoking status: Former Smoker     Packs/day: 1.00     Years: 40.00     Types: Cigarettes   • Smokeless tobacco: Never Used   • Alcohol use No     No prescriptions prior to admission.     Allergies:  Sulfa antibiotics      Objective    Neurologic Exam  Vital Signs  Temp:  [96.9 °F (36.1 °C)-98.2 °F (36.8 °C)] 98.2 °F (36.8 °C)  Heart Rate:  [] 79  Resp:  [14-17] 14  BP: ()/(53-96) 112/80    Physical Exam    No acute distress  Awake alert oriented ×3  HEENT atraumatic also found  Neck  "supple  Abdomen soft nontender  Extremities no clubbing, edema, cyanosis  Neurologic exam  Cranial nerves II through XII grossly intact  No pronator drift  Patient moves all extremities well    Results Review:   I reviewed the patient's new clinical results.  I reviewed the patient's new imaging results and agree with the interpretation.  I reviewed the patient's other test results and agree with the interpretation    Xr Elbow 3+ View Left    Result Date: 9/10/2018  Narrative: HISTORY: Fall with left elbow pain  Left elbow: 3 views of left elbow are obtained.  There is an IV within the left antecubital fossa. There is no appreciable joint effusion. There is mild bony spurring of the a \"knot on and humeral epicondyles. No bony fracture or dislocation is identified.      Impression: 1. Mild arthritic change of the left elbow with no acute bony pathology. This report was finalized on 09/10/2018 10:57 by Dr. Kimberly Doll MD.    Ct Head Without Contrast    Result Date: 9/10/2018  Narrative: CT HEAD WO CONTRAST- 9/10/2018 12:06 PM CDT  HISTORY: head injury, fall, syncope  COMPARISON: None  DOSE LENGTH PRODUCT: 696 mGy cm. Automated exposure control was also utilized to decrease patient radiation dose.  TECHNIQUE: Axial images of brain obtained without IV contrast.  FINDINGS:  There is subarachnoid hemorrhage along the convexities of the right frontal lobe. No intraparenchymal hematoma or contusion visualized. There is moderate cerebral volume loss with chronic vessel vessel ischemia. There are no convincing acute signs of ischemia. No subdural or epidural hematomas visualized.  The visible paranasal sinuses and mastoid air cells are well-aerated. The bony calvarium appears intact. No skull fracture identified.      Impression: 1. Subarachnoid hemorrhage along the convexities of the right frontal lobe. No intraparenchymal hemorrhage visualized. This finding called to Karol Berrios in the ER at 12:40 PM on 9/10/2018. " 2. Moderate cerebral volume loss with chronic vessel vessel ischemia. This report was finalized on 09/10/2018 12:37 by Dr. Kimberly Doll MD.    Ct Cervical Spine Without Contrast    Result Date: 9/10/2018  Narrative: CT CERVICAL SPINE WO CONTRAST- 9/10/2018 12:06 PM CDT  HISTORY: neck pain, syncope, fall  COMPARISON: None  DOSE LENGTH PRODUCT: 241 mGy cm. Automated exposure control was also utilized to decrease patient radiation dose.  TECHNIQUE: Axial images of cervical spine obtained with sagittal coronal reconstructions.  FINDINGS:  There is minimal retrolisthesis of C5 in reference to C4 measuring 1 to 2 mm. There is moderate to advanced degenerative disc change at C6/C7 and C7-T1 with moderate degenerative disc changes above the C5 level. There is moderate facet osteoarthropathy. There is mild uncinate process hypertrophy.  No cervical vertebral fracture is identified.  The degenerative changes result in only mild cervical spinal canal stenosis with scattered bilateral neural foramen narrowing. At least moderate in severity.  There is carotid bulb calcification. There is no apical pneumothorax.      Impression: 1. Degenerative changes of the cervical spine with no acute cervical vertebral fracture. Minimal retrolisthesis of C5 likely related to facet osteoarthropathy. This report was finalized on 09/10/2018 12:39 by Dr. Kimberly Doll MD.    Ct Abdomen Pelvis With Contrast    Result Date: 9/10/2018  Narrative: CT ABDOMEN PELVIS W CONTRAST- 9/10/2018 12:06 PM CDT  HISTORY: abd pain, syncope  COMPARISON: None  DOSE LENGTH PRODUCT: 221 mGy cm. Automated exposure control was also utilized to decrease patient radiation dose.  TECHNIQUE: Axial images of the abdomen pelvis are obtained following IV contrast. No oral contrast administered.  FINDINGS:  There are scattered hypodense liver lesions favorable for cysts. The spleen, pancreas are unremarkable. Small amount of biliary sludge versus stones considered in the  dependent portion the gallbladder (image 24, series 4). There are bilateral nonspecific adrenal nodules measuring up to 1.8 cm. There is an inferior left renal cyst. There is no enhancing renal mass. There is no hydronephrosis. Bladder is distended. There is no prostate enlargement.  A large amount of fecal material is contained within the rectum. Rectum measures 7.5 cm in width. Moderate stool seen throughout the remaining colon. There are postoperative changes of the cecum. There is no evidence for small bowel obstruction. The stomach is underdistended. There is scarring along the region of the umbilicus.  There is dense vascular calcification with ectasia of the aorta to maximum measurement of 2.4 cm.  Please refer to CT chest report for findings below the hemidiaphragms.  There are degenerative changes of the regional skeleton. There is a mild rotoscoliotic curvature of the lumbar spine.      Impression: 1. Large volume of stool within the rectum may indicate constipation and/or impaction. Moderate volume stool seen in the remaining colon. No evidence of bowel obstruction. 2. Hypodense liver lesions favorable for cysts. Inferior left renal cyst. 3. Nonspecific bilateral adrenal nodules can be followed on subsequent imaging. Adrenal protocol CT versus MRI should be considered nonemergently if there are no outside films for comparison.  4. Distended bladder. . This report was finalized on 09/10/2018 12:49 by Dr. Kimberly Doll MD.    Xr Chest 1 View    Result Date: 9/10/2018  Narrative: HISTORY: Syncope  CXR: Frontal view the chest is obtained.  COMPARISON: None  FINDINGS: Patient has undergone prior mediastinal surgery. There is a left subclavian cardiac pacer device with the lead projecting over the right ventricle. There is a left upper lobe calcified granuloma. There is no consolidation or edema. There is no pleural effusion or pneumothorax. There is no acute bony pathology.      Impression: 1. Left upper  lobe calcified granuloma with calcified left hilar lymph nodes. Prior mediastinal surgery with left subclavian cardiac pacer device. No acute pulmonary process identified. This report was finalized on 09/10/2018 10:56 by Dr. Kimberly Doll MD.    Ct Angiogram Chest With Contrast    Result Date: 9/10/2018  Narrative: CT ANGIOGRAM CHEST W CONTRAST- 9/10/2018 12:06 PM CDT  HISTORY: mid back pain, syncope  COMPARISON: None  DOSE LENGTH PRODUCT: 268 mGy cm. Automated exposure control was also utilized to decrease patient radiation dose.  TECHNIQUE: Axial images of the chest are obtained following IV contrast. 2-D and maximal intensity projection images are reconstructed and reviewed.  FINDINGS:  There are no pulmonary emboli. There is evidence of prior mediastinal surgery. There is dense coronary artery calcification. There is no thoracic aortic aneurysm or dissection. There is moderate atherosclerotic change of the thoracic aorta. A left subclavian cardiac pacer device is in place. Heart is upper limits of normal in size. There is no pericardial or pleural effusion. No pathologic intrathoracic or axillary lymphadenopathy visualized.  Hypoechoic thin liver lesions are favorable for cysts. The adrenal glands are prominent and hypodense. Please refer to CT abdomen report. There is moderate stool within the visible colon.  There is a calcified granuloma within the left upper lobe. There is no pulmonary consolidation. There is minimal dependent atelectasis. There are no spiculated nodules. There is no pneumothorax.  No focal destructive osseous lesions are visualized.      Impression: 1. No pulmonary emboli. 2. Prior mediastinal surgery, likely coronary bypass. Left subclavian cardiac pacer device. 3. Left upper lobe benign calcified granuloma. Minimal dependent atelectasis. No consolidation or suspicious nodularity. 4. Hypodense liver lesions favorable for cysts. Please refer to CT abdomen report. This report was finalized  on 09/10/2018 12:43 by Dr. Kimberly Doll MD.        Lab Results (last 24 hours)     Procedure Component Value Units Date/Time    Lactic Acid, Reflex [[id number]]  (Abnormal) Collected:  09/10/18 1600    Specimen:  Blood Updated:  09/10/18 1620     Lactate 2.9 (C) mmol/L     Lactic Acid, Reflex Timer (This will reflex a repeat order 3-3:15 hours after ordered.) [[id number]] Collected:  09/10/18 1110    Specimen:  Blood Updated:  09/10/18 1517     Extra Tube Hold for add-ons.     Comment: Auto resulted.       Urinalysis With Microscopic If Indicated (No Culture) - Urine, Clean Catch [[id number]]  (Abnormal) Collected:  09/10/18 1407    Specimen:  Urine from Urine, Clean Catch Updated:  09/10/18 1415     Color, UA Yellow     Appearance, UA Clear     pH, UA 6.5     Specific Gravity, UA 1.026     Glucose, UA Negative     Ketones, UA Trace (A)     Bilirubin, UA Negative     Blood, UA Negative     Protein, UA Negative     Leuk Esterase, UA Negative     Nitrite, UA Negative     Urobilinogen, UA 1.0 E.U./dL    Narrative:       Urine microscopic not indicated.    Blood Culture - Blood, [[id number]] Collected:  09/10/18 1315    Specimen:  Blood from Arm, Left Updated:  09/10/18 1336    Blood Culture - Blood, [[id number]] Collected:  09/10/18 1325    Specimen:  Blood from Arm, Right Updated:  09/10/18 1336    Lactic Acid, Plasma [[id number]]  (Abnormal) Collected:  09/10/18 1110    Specimen:  Blood Updated:  09/10/18 1205     Lactate 4.6 (C) mmol/L     Troponin [[id number]]  (Abnormal) Collected:  09/10/18 1110    Specimen:  Blood Updated:  09/10/18 1140     Troponin I 0.155 (H) ng/mL     BNP [[id number]]  (Abnormal) Collected:  09/10/18 1110    Specimen:  Blood Updated:  09/10/18 1140     proBNP 11,100.0 (H) pg/mL     Comprehensive Metabolic Panel [[id number]]  (Abnormal) Collected:  09/10/18 1110    Specimen:  Blood Updated:  09/10/18 1128     Glucose 121 (H) mg/dL      BUN 41 (H) mg/dL      Creatinine 1.75 (H) mg/dL      Sodium 148  (H) mmol/L      Potassium 4.4 mmol/L      Chloride 105 mmol/L      CO2 24.0 mmol/L      Calcium 10.7 (H) mg/dL      Total Protein 8.6 g/dL      Albumin 5.00 g/dL      ALT (SGPT) 33 U/L      AST (SGOT) 72 (H) U/L      Alkaline Phosphatase 172 (H) U/L      Total Bilirubin 1.9 (H) mg/dL      eGFR Non African Amer 39 (L) mL/min/1.73      Globulin 3.6 gm/dL      A/G Ratio 1.4 g/dL      BUN/Creatinine Ratio 23.4     Anion Gap 19.0 (H) mmol/L     Narrative:       The MDRD GFR formula is only valid for adults with stable renal function between ages 18 and 70.    Protime-INR [[id number]]  (Normal) Collected:  09/10/18 1110    Specimen:  Blood Updated:  09/10/18 1127     Protime 13.4 Seconds      INR 0.99    aPTT [[id number]]  (Normal) Collected:  09/10/18 1110    Specimen:  Blood Updated:  09/10/18 1127     PTT 28.3 seconds     Lithium Level [[id number]]  (Normal) Collected:  09/10/18 1110    Specimen:  Blood Updated:  09/10/18 1126     Lithium 0.8 mmol/L     CBC & Differential [[id number]] Collected:  09/10/18 1110    Specimen:  Blood Updated:  09/10/18 1118    Narrative:       The following orders were created for panel order CBC & Differential.  Procedure                               Abnormality         Status                     ---------                               -----------         ------                     CBC Auto Differential[[id number]]        Abnormal            Final result                 Please view results for these tests on the individual orders.    CBC Auto Differential [[id number]]  (Abnormal) Collected:  09/10/18 1110    Specimen:  Blood Updated:  09/10/18 1118     WBC 15.60 (H) 10*3/mm3      RBC 4.66 (L) 10*6/mm3      Hemoglobin 15.2 g/dL      Hematocrit 43.9 %      MCV 94.2 fL      MCH 32.6 (H) pg      MCHC 34.6 g/dL      RDW 14.6 %      RDW-SD 50.5 fl      MPV 10.3 fL      Platelets 313 10*3/mm3      Neutrophil % 82.9 (H) %      Lymphocyte % 11.1 (L) %      Monocyte % 5.3 %      Eosinophil % 0.1 %       Basophil % 0.2 %      Immature Grans % 0.4 %      Neutrophils, Absolute 12.93 (H) 10*3/mm3      Lymphocytes, Absolute 1.73 10*3/mm3      Monocytes, Absolute 0.83 10*3/mm3      Eosinophils, Absolute 0.01 10*3/mm3      Basophils, Absolute 0.03 10*3/mm3      Immature Grans, Absolute 0.07 (H) 10*3/mm3      nRBC 0.0 /100 WBC             Patient Active Problem List   Diagnosis   • Subarachnoid hemorrhage (CMS/HCC)       Assessment/Plan     73-year-old male with significant cardiac history and multiple syncopal events.  He fell yesterday and struck his head with positive loss of consciousness of unknown duration.  He has a small amount of trace subarachnoid hemorrhage.  Likely this will resolve on its own without surgery.  Given his significant cardiac history, we will continue his antiplatelets.  Recommend cardiology evaluation for syncope.  We will follow along.  Thank you for this consultation.    I discussed the patients findings and my recommendations with patient and family    Hu Calvillo MD  09/10/18  4:59 PM    Electronically signed by Hu Calvillo MD at 9/10/2018  5:08 PM            Discharge Summary      Angel Zheng MD at 9/20/2018  1:38 PM              [facility]  DISCHARGE SUMMARY       Date of Admission: 9/10/2018  Date of Discharge:  9/20/2018  Primary Care Physician: Shant Hayes MD    Discharge Diagnoses:  Hospital Problem List     Subarachnoid hemorrhage (CMS/HCC)    Chronic coronary artery disease    Congestive heart failure (CMS/HCC) (chronic systolic)    Hyperlipidemia    ICD (implantable cardioverter-defibrillator) in place    Orthostatic hypotension    Syncope   Constipation   Suspected mental health disorder based on medications         Presenting Problem/History of Present Illness:  Subarachnoid hemorrhage (CMS/HCC) [I60.9]         Hospital Course    This is a 73-year-old  man who presented in the  emergency room for complaints of passing out.  Record indicates that he has been falling a lot over the last 6 months.  He was admitted with diagnoses of syncope, orthostatic hypotension (chronic recurrent by history) subarachnoid hemorrhage, coronary artery disease, bipolar disorder, constipation and acute on chronic renal failure.      He was initially placed in the intensive care unit.  Dr. Calvillo saw the patient in consultation requested serial CT imaging of his head.  On September 12, he indicates that the patient has no headache or nuchal rigidity, urine exam is stable.  He cleared the patient to telemetry floor.  CT imaging of his head showed residual subarachnoid hemorrhage in the right sylvian fissure and within right frontal sulci.    He was placed on midodrine for orthostatic hypotension.  I believe he has been on midodrine even prior to admission.  Because of orthostatic hypotension, medications such as Bumex, lisinopril, metoprolol and spironolactone were discontinued.  Aspirin and Plavix were discontinued due to subarachnoid hemorrhage.    Dr. Willams saw the patient in consult.  He believes diuretic, ACE inhibitor, beta blocker are all contributing to the orthostatic blood pressure.  So far, blood pressure has been ranging anywhere from /65-83.  With discussed precautionary geared towards safety when changing position to prevent fall as an effect of orthostatic blood pressure.  He worked with physical therapist who described him independent with bed mobility and transfers.  He ambulates 220 feet with rolling walker and standby assist.  He performs lower extremity exercises actively.  We looked at placement however bed has not been offered/insurance declined.    Renal service saw the patient in consult in reference to acute kidney injury that is now resolved.      Patient also has issues on constipation.  He had received milk of molasses in multiple location and subsequently Dulcolax.  He has had  bowel movement during this hospitalization.  Patient has prior history of small bowel obstruction requiring colostomy and takedown of the colostomy.  He has no nausea or vomiting.  He is tolerating his meals.  He had a UV of the abdomen on September 19 which showed no evidence of obstruction.  I reviewed records from outside hospital but has not seen any history of colonoscopy.  He not remember any colonoscopy in the past.  Will defer to his primary care provider facilitation of care for screening colonoscopy and further evaluation and management of constipation if it persists.  I also checked on thyroid-stimulating hormone prior to discharge to evaluate for hypothyroidism that may be contribution story to constipation.  I reviewed his medication and explained to him and his daughter that Seroquel can cause constipation in 2-11%.  This, however, is not a new medication for him.  I encouraged him to increase dietary fiber, increase activities as tolerated and treat symptomatically.      At this time, patient is felt to be at his best condition and medically appropriate for discharge.  Questions were encouraged during my encounter with him and his family.  All questions were answered the best of my knowledge.    Procedures Performed:  None      Consults:  Dr. Imer Reese    Pertinent Test Results:   Lab Results (last 72 hours)     Procedure Component Value Units Date/Time    TSH [[id number]] Collected:  09/20/18 0422    Specimen:  Blood Updated:  09/20/18 1323    Comprehensive Metabolic Panel [[id number]]  (Abnormal) Collected:  09/20/18 0422    Specimen:  Blood Updated:  09/20/18 0511     Glucose 101 (H) mg/dL      BUN 11 mg/dL      Creatinine 0.84 mg/dL      Sodium 139 mmol/L      Potassium 4.6 mmol/L      Chloride 107 mmol/L      CO2 26.0 mmol/L      Calcium 8.8 mg/dL      Total Protein 5.1 (L) g/dL      Albumin 2.80 (L) g/dL      ALT (SGPT) 38 U/L      AST (SGOT) 21 U/L      Alkaline Phosphatase 112  U/L      Total Bilirubin 0.5 mg/dL      eGFR Non African Amer 90 mL/min/1.73      Globulin 2.3 gm/dL      A/G Ratio 1.2 g/dL      BUN/Creatinine Ratio 13.1     Anion Gap 6.0 mmol/L     Narrative:       The MDRD GFR formula is only valid for adults with stable renal function between ages 18 and 70.    Comprehensive Metabolic Panel [[id number]]  (Abnormal) Collected:  09/19/18 0542    Specimen:  Blood Updated:  09/19/18 0631     Glucose 95 mg/dL      BUN 14 mg/dL      Creatinine 0.85 mg/dL      Sodium 139 mmol/L      Potassium 4.9 mmol/L      Chloride 106 mmol/L      CO2 26.0 mmol/L      Calcium 9.0 mg/dL      Total Protein 5.1 (L) g/dL      Albumin 2.90 (L) g/dL      ALT (SGPT) 37 U/L      AST (SGOT) 25 U/L      Alkaline Phosphatase 117 U/L      Total Bilirubin 0.6 mg/dL      eGFR Non African Amer 89 mL/min/1.73      Globulin 2.2 gm/dL      A/G Ratio 1.3 g/dL      BUN/Creatinine Ratio 16.5     Anion Gap 7.0 mmol/L     Narrative:       The MDRD GFR formula is only valid for adults with stable renal function between ages 18 and 70.    Comprehensive Metabolic Panel [[id number]]  (Abnormal) Collected:  09/18/18 0258    Specimen:  Blood Updated:  09/18/18 0403     Glucose 95 mg/dL      BUN 12 mg/dL      Creatinine 0.88 mg/dL      Sodium 139 mmol/L      Potassium 4.4 mmol/L      Chloride 108 mmol/L      CO2 24.0 mmol/L      Calcium 8.9 mg/dL      Total Protein 5.1 (L) g/dL      Albumin 2.80 (L) g/dL      ALT (SGPT) 35 U/L      AST (SGOT) 28 U/L      Alkaline Phosphatase 116 U/L      Total Bilirubin 0.5 mg/dL      eGFR Non African Amer 85 mL/min/1.73      Globulin 2.3 gm/dL      A/G Ratio 1.2 g/dL      BUN/Creatinine Ratio 13.6     Anion Gap 7.0 mmol/L     Narrative:       The MDRD GFR formula is only valid for adults with stable renal function between ages 18 and 70.        Imaging Results (last 72 hours)     Procedure Component Value Units Date/Time    XR Abdomen KUB [[id number]] Collected:  09/19/18 0951     Updated:   "09/19/18 1147    Addenda:        There is a typographical error in the impression of this report.     The impression should read:     Moderate amount of stool in the colon is observed without dilated bowel  loops to indicate obstruction.           POWERSCRIBE transcription disclaimer:     This dictation is an  electronic transcription/translation of spoken  language to printed text. The electronic translation of spoken language  may permit erroneous, or at times, nonsensical words or phrases to be  inadvertently transcribed. Although I have reviewed the note for such  errors, some may still exist.  This report was finalized on 09/19/2018 11:44 by Dr. Jese Enrique MD.  Signed:  09/19/18 1144 by Jese Enrique MD    Narrative:       EXAMINATION:  XR ABDOMEN KUB-  9/19/2018 9:24 AM CDT     HISTORY: ABDOMINAL PAIN      COMPARISON: 19 2018 abdomen and pelvis CT     TECHNIQUE: Single view: KUB     FINDINGS:      Stool and gas identified throughout the colon including the rectum  without dilated bowel loops or evidence of obstruction. Moderate amount  of stool appreciated.     Phleboliths noted in the left hemipelvis. There is no organomegaly or  definite urinary tract calculi.     Degenerative spurring noted diffusely in the lumbar spine with  osteophyte formation.       Impression:       1. Moderate amount stool in the colon without dilated bowel is indicate  obstruction.  This report was finalized on 09/19/2018 09:53 by Dr. Jese Enrique MD.          Condition on Discharge:  Stable    Physical Exam on Discharge:  /74 (BP Location: Right arm, Patient Position: Standing)   Pulse 84   Temp 97.7 °F (36.5 °C) (Temporal Artery )   Resp 18   Ht 170.2 cm (67.01\")   Wt 79.9 kg (176 lb 3.2 oz)   SpO2 100%   BMI 27.59 kg/m²    Physical Exam  Seated on recliner;  NAD  Constitutional: He is oriented to person, place, and time. He appears well-developed and well-nourished.   HENT:   Head: Normocephalic and atraumatic. "   Eyes: Pupils are equal, round  Conjunctivae and EOM are normal.   Neck: Normal range of motion. Neck supple. No JVD present.   Cardiovascular: Normal rate, regular rhythm, normal heart sounds and intact distal pulses.  Tele sr  Pulmonary/Chest: Effort normal and breath sounds normal.   Abdominal: Soft. Bowel sounds are normal. Non tender, no guarding   Musculoskeletal: Normal range of motion.   Neurological: He is alert and oriented to person, place, and time.   Skin: Skin is warm and dry.   Psychiatric: He has a normal mood and affect. His behavior is normal.           Discharge Disposition:  [place] [facility] [place]    Discharge Medications:     Discharge Medications      New Medications      Instructions Start Date   bisacodyl 10 MG suppository  Commonly known as:  DULCOLAX   10 mg, Rectal, Daily      docusate sodium 100 MG capsule  Commonly known as:  COLACE   100 mg, Oral, 2 Times Daily      polyethylene glycol pack packet  Commonly known as:  MIRALAX   17 g, Oral, Daily         Changes to Medications      Instructions Start Date   midodrine 10 MG tablet  Commonly known as:  PROAMATINE  What changed:  · medication strength  · how much to take  · when to take this   10 mg, Oral, 3 Times Daily Before Meals         Continue These Medications      Instructions Start Date   atorvastatin 80 MG tablet  Commonly known as:  LIPITOR   80 mg, Oral, Nightly      busPIRone 10 MG tablet  Commonly known as:  BUSPAR   10 mg, Oral, 2 Times Daily      DULoxetine 60 MG capsule  Commonly known as:  CYMBALTA   60 mg, Oral, Daily      folic acid 1 MG tablet  Commonly known as:  FOLVITE   1 mg, Oral, Daily      HYDROcodone-acetaminophen  MG per tablet  Commonly known as:  NORCO   1 tablet, Oral, Every 6 Hours PRN      lithium carbonate 300 MG capsule   300 mg, Oral, 2 Times Daily With Meals      QUEtiapine 100 MG tablet  Commonly known as:  SEROquel   200 mg, Oral, Nightly      raNITIdine 150 MG tablet  Commonly known as:   ZANTAC   150 mg, Oral, 2 Times Daily         Stop These Medications    aspirin 81 MG chewable tablet     bumetanide 2 MG tablet  Commonly known as:  BUMEX     clopidogrel 75 MG tablet  Commonly known as:  PLAVIX     lisinopril 2.5 MG tablet  Commonly known as:  PRINIVIL,ZESTRIL     metoprolol succinate XL 25 MG 24 hr tablet  Commonly known as:  TOPROL-XL     potassium chloride 10 MEQ CR tablet  Commonly known as:  K-DUR     spironolactone 25 MG tablet  Commonly known as:  ALDACTONE            Discharge Diet:   Diet Instructions     Diet: Regular, Cardiac; Thin       Discharge Diet:   Regular  Cardiac       Fluid Consistency:  Thin    High fiber              Activity at Discharge:   Activity Instructions     Activity as Tolerated             Follow-up Appointments:  Dr. Grove in 1 week or sooner as needed; Dr. Willams, Dr. Reese, Dr. Calvillo per their recommendations  Test Results Pending at Discharge:    Order Current Status    TSH In process           Angel Zheng MD  09/20/18  1:38 PM    Time: > 40 mins    Please note that portions of this note may have been completed with a voice recognition program. Efforts were made to edit the dictations, but occasionally words are mistranscribed.            Electronically signed by Angel Zheng MD at 9/20/2018  1:57 PM

## 2018-09-20 NOTE — DISCHARGE SUMMARY
[facility]  DISCHARGE SUMMARY       Date of Admission: 9/10/2018  Date of Discharge:  9/20/2018  Primary Care Physician: Shant Hayes MD    Discharge Diagnoses:  Hospital Problem List     Subarachnoid hemorrhage (CMS/HCC)    Chronic coronary artery disease    Congestive heart failure (CMS/HCC) (chronic systolic)    Hyperlipidemia    ICD (implantable cardioverter-defibrillator) in place    Orthostatic hypotension    Syncope   Constipation   Suspected mental health disorder based on medications         Presenting Problem/History of Present Illness:  Subarachnoid hemorrhage (CMS/HCC) [I60.9]         Hospital Course    This is a 73-year-old  man who presented in the emergency room for complaints of passing out.  Record indicates that he has been falling a lot over the last 6 months.  He was admitted with diagnoses of syncope, orthostatic hypotension (chronic recurrent by history) subarachnoid hemorrhage, coronary artery disease, bipolar disorder, constipation and acute on chronic renal failure.      He was initially placed in the intensive care unit.  Dr. Calvillo saw the patient in consultation requested serial CT imaging of his head.  On September 12, he indicates that the patient has no headache or nuchal rigidity, urine exam is stable.  He cleared the patient to telemetry floor.  CT imaging of his head showed residual subarachnoid hemorrhage in the right sylvian fissure and within right frontal sulci.    He was placed on midodrine for orthostatic hypotension.  I believe he has been on midodrine even prior to admission.  Because of orthostatic hypotension, medications such as Bumex, lisinopril, metoprolol and spironolactone were discontinued.  Aspirin and Plavix were discontinued due to subarachnoid hemorrhage.    Dr. Willams saw the patient in consult.  He believes diuretic, ACE inhibitor, beta blocker are all contributing to the orthostatic blood  pressure.  So far, blood pressure has been ranging anywhere from /65-83.  With discussed precautionary geared towards safety when changing position to prevent fall as an effect of orthostatic blood pressure.  He worked with physical therapist who described him independent with bed mobility and transfers.  He ambulates 220 feet with rolling walker and standby assist.  He performs lower extremity exercises actively.  We looked at placement however bed has not been offered/insurance declined.    Renal service saw the patient in consult in reference to acute kidney injury that is now resolved.      Patient also has issues on constipation.  He had received milk of molasses in multiple location and subsequently Dulcolax.  He has had bowel movement during this hospitalization.  Patient has prior history of small bowel obstruction requiring colostomy and takedown of the colostomy.  He has no nausea or vomiting.  He is tolerating his meals.  He had a UV of the abdomen on September 19 which showed no evidence of obstruction.  I reviewed records from outside hospital but has not seen any history of colonoscopy.  He not remember any colonoscopy in the past.  Will defer to his primary care provider facilitation of care for screening colonoscopy and further evaluation and management of constipation if it persists.  I also checked on thyroid-stimulating hormone prior to discharge to evaluate for hypothyroidism that may be contribution story to constipation.  I reviewed his medication and explained to him and his daughter that Seroquel can cause constipation in 2-11%.  This, however, is not a new medication for him.  I encouraged him to increase dietary fiber, increase activities as tolerated and treat symptomatically.      At this time, patient is felt to be at his best condition and medically appropriate for discharge.  Questions were encouraged during my encounter with him and his family.  All questions were answered the  best of my knowledge.    Procedures Performed:  None      Consults:  Dr. Imer Reese    Pertinent Test Results:   Lab Results (last 72 hours)     Procedure Component Value Units Date/Time    TSH [[id number]] Collected:  09/20/18 0422    Specimen:  Blood Updated:  09/20/18 1323    Comprehensive Metabolic Panel [[id number]]  (Abnormal) Collected:  09/20/18 0422    Specimen:  Blood Updated:  09/20/18 0511     Glucose 101 (H) mg/dL      BUN 11 mg/dL      Creatinine 0.84 mg/dL      Sodium 139 mmol/L      Potassium 4.6 mmol/L      Chloride 107 mmol/L      CO2 26.0 mmol/L      Calcium 8.8 mg/dL      Total Protein 5.1 (L) g/dL      Albumin 2.80 (L) g/dL      ALT (SGPT) 38 U/L      AST (SGOT) 21 U/L      Alkaline Phosphatase 112 U/L      Total Bilirubin 0.5 mg/dL      eGFR Non African Amer 90 mL/min/1.73      Globulin 2.3 gm/dL      A/G Ratio 1.2 g/dL      BUN/Creatinine Ratio 13.1     Anion Gap 6.0 mmol/L     Narrative:       The MDRD GFR formula is only valid for adults with stable renal function between ages 18 and 70.    Comprehensive Metabolic Panel [[id number]]  (Abnormal) Collected:  09/19/18 0542    Specimen:  Blood Updated:  09/19/18 0631     Glucose 95 mg/dL      BUN 14 mg/dL      Creatinine 0.85 mg/dL      Sodium 139 mmol/L      Potassium 4.9 mmol/L      Chloride 106 mmol/L      CO2 26.0 mmol/L      Calcium 9.0 mg/dL      Total Protein 5.1 (L) g/dL      Albumin 2.90 (L) g/dL      ALT (SGPT) 37 U/L      AST (SGOT) 25 U/L      Alkaline Phosphatase 117 U/L      Total Bilirubin 0.6 mg/dL      eGFR Non African Amer 89 mL/min/1.73      Globulin 2.2 gm/dL      A/G Ratio 1.3 g/dL      BUN/Creatinine Ratio 16.5     Anion Gap 7.0 mmol/L     Narrative:       The MDRD GFR formula is only valid for adults with stable renal function between ages 18 and 70.    Comprehensive Metabolic Panel [[id number]]  (Abnormal) Collected:  09/18/18 0258    Specimen:  Blood Updated:  09/18/18 0403     Glucose 95 mg/dL      BUN 12  mg/dL      Creatinine 0.88 mg/dL      Sodium 139 mmol/L      Potassium 4.4 mmol/L      Chloride 108 mmol/L      CO2 24.0 mmol/L      Calcium 8.9 mg/dL      Total Protein 5.1 (L) g/dL      Albumin 2.80 (L) g/dL      ALT (SGPT) 35 U/L      AST (SGOT) 28 U/L      Alkaline Phosphatase 116 U/L      Total Bilirubin 0.5 mg/dL      eGFR Non African Amer 85 mL/min/1.73      Globulin 2.3 gm/dL      A/G Ratio 1.2 g/dL      BUN/Creatinine Ratio 13.6     Anion Gap 7.0 mmol/L     Narrative:       The MDRD GFR formula is only valid for adults with stable renal function between ages 18 and 70.        Imaging Results (last 72 hours)     Procedure Component Value Units Date/Time    XR Abdomen KUB [[id number]] Collected:  09/19/18 0951     Updated:  09/19/18 1147    Addenda:        There is a typographical error in the impression of this report.     The impression should read:     Moderate amount of stool in the colon is observed without dilated bowel  loops to indicate obstruction.           POWERSCRIBE transcription disclaimer:     This dictation is an  electronic transcription/translation of spoken  language to printed text. The electronic translation of spoken language  may permit erroneous, or at times, nonsensical words or phrases to be  inadvertently transcribed. Although I have reviewed the note for such  errors, some may still exist.  This report was finalized on 09/19/2018 11:44 by Dr. Jese Enrique MD.  Signed:  09/19/18 1144 by Jese Enrique MD    Narrative:       EXAMINATION:  XR ABDOMEN KUB-  9/19/2018 9:24 AM CDT     HISTORY: ABDOMINAL PAIN      COMPARISON: 19 2018 abdomen and pelvis CT     TECHNIQUE: Single view: KUB     FINDINGS:      Stool and gas identified throughout the colon including the rectum  without dilated bowel loops or evidence of obstruction. Moderate amount  of stool appreciated.     Phleboliths noted in the left hemipelvis. There is no organomegaly or  definite urinary tract calculi.     Degenerative  "spurring noted diffusely in the lumbar spine with  osteophyte formation.       Impression:       1. Moderate amount stool in the colon without dilated bowel is indicate  obstruction.  This report was finalized on 09/19/2018 09:53 by Dr. Jese Enrique MD.          Condition on Discharge:  Stable    Physical Exam on Discharge:  /74 (BP Location: Right arm, Patient Position: Standing)   Pulse 84   Temp 97.7 °F (36.5 °C) (Temporal Artery )   Resp 18   Ht 170.2 cm (67.01\")   Wt 79.9 kg (176 lb 3.2 oz)   SpO2 100%   BMI 27.59 kg/m²   Physical Exam  Seated on recliner;  NAD  Constitutional: He is oriented to person, place, and time. He appears well-developed and well-nourished.   HENT:   Head: Normocephalic and atraumatic.   Eyes: Pupils are equal, round  Conjunctivae and EOM are normal.   Neck: Normal range of motion. Neck supple. No JVD present.   Cardiovascular: Normal rate, regular rhythm, normal heart sounds and intact distal pulses.  Tele sr  Pulmonary/Chest: Effort normal and breath sounds normal.   Abdominal: Soft. Bowel sounds are normal. Non tender, no guarding   Musculoskeletal: Normal range of motion.   Neurological: He is alert and oriented to person, place, and time.   Skin: Skin is warm and dry.   Psychiatric: He has a normal mood and affect. His behavior is normal.           Discharge Disposition:  Home-Health Care [facility]    Discharge Medications:     Discharge Medications      New Medications      Instructions Start Date   bisacodyl 10 MG suppository  Commonly known as:  DULCOLAX   10 mg, Rectal, Daily      docusate sodium 100 MG capsule  Commonly known as:  COLACE   100 mg, Oral, 2 Times Daily      polyethylene glycol pack packet  Commonly known as:  MIRALAX   17 g, Oral, Daily         Changes to Medications      Instructions Start Date   midodrine 10 MG tablet  Commonly known as:  PROAMATINE  What changed:  · medication strength  · how much to take  · when to take this   10 mg, Oral, 3 Times " Daily Before Meals         Continue These Medications      Instructions Start Date   atorvastatin 80 MG tablet  Commonly known as:  LIPITOR   80 mg, Oral, Nightly      busPIRone 10 MG tablet  Commonly known as:  BUSPAR   10 mg, Oral, 2 Times Daily      DULoxetine 60 MG capsule  Commonly known as:  CYMBALTA   60 mg, Oral, Daily      folic acid 1 MG tablet  Commonly known as:  FOLVITE   1 mg, Oral, Daily      HYDROcodone-acetaminophen  MG per tablet  Commonly known as:  NORCO   1 tablet, Oral, Every 6 Hours PRN      lithium carbonate 300 MG capsule   300 mg, Oral, 2 Times Daily With Meals      QUEtiapine 100 MG tablet  Commonly known as:  SEROquel   200 mg, Oral, Nightly      raNITIdine 150 MG tablet  Commonly known as:  ZANTAC   150 mg, Oral, 2 Times Daily         Stop These Medications    aspirin 81 MG chewable tablet     bumetanide 2 MG tablet  Commonly known as:  BUMEX     clopidogrel 75 MG tablet  Commonly known as:  PLAVIX     lisinopril 2.5 MG tablet  Commonly known as:  PRINIVIL,ZESTRIL     metoprolol succinate XL 25 MG 24 hr tablet  Commonly known as:  TOPROL-XL     potassium chloride 10 MEQ CR tablet  Commonly known as:  K-DUR     spironolactone 25 MG tablet  Commonly known as:  ALDACTONE            Discharge Diet:   Diet Instructions     Diet: Regular, Cardiac; Thin       Discharge Diet:   Regular  Cardiac       Fluid Consistency:  Thin    High fiber              Activity at Discharge:   Activity Instructions     Activity as Tolerated             Follow-up Appointments:  Dr. Grove in 1 week or sooner as needed; Dr. Willams, Dr. Reese, Dr. Calvillo per their recommendations  Test Results Pending at Discharge:    Order Current Status    TSH In process           Angel Zheng MD  09/20/18  1:38 PM    Time: > 40 mins    Please note that portions of this note may have been completed with a voice recognition program. Efforts were made to edit the dictations, but occasionally words are  mistranscribed.

## 2018-09-20 NOTE — THERAPY DISCHARGE NOTE
Acute Care - Speech Language Pathology Discharge Summary  [facility]       Patient Name: Travis Patel  : 1945  MRN: [id number]    Today's Date: 2018  Onset of Illness/Injury or Date of Surgery: 09/10/18       Referring Physician: Dr. Gomez        Admit Date: 9/10/2018      SLP Recommendation and Plan  Pt discharging home with family today. Family reports they will be able to assist with medication management, meal planning, etc.   Rick Albarado CCC-SLP 2018 3:02 PM    Visit Dx:    ICD-10-CM ICD-9-CM   1. Subarachnoid hemorrhage (CMS/[facility]) I60.9 430   2. Syncope, unspecified syncope type R55 780.2   3. Elevated liver enzymes R74.8 790.5   4. Constipation, unspecified constipation type K59.00 564.00   5. VANI (acute kidney injury) (CMS/[facility]) N17.9 584.9   6. Elevated troponin R74.8 790.6   7. Renal cyst N28.1 753.10   8. Adrenal nodule (CMS/[facility]) E27.9 255.8   9. Chronic anticoagulation Z79.01 V58.61   10. Dysphagia, unspecified type R13.10 787.20   11. Impaired cognition R41.89 294.9   12. Impaired mobility and ADLs Z74.09 799.89   13. Gait abnormality R26.9 781.2   14. Chronic systolic congestive heart failure (CMS/[facility]) I50.22 428.22     428.0   15. Orthostatic hypotension I95.1 458.0               Time Calculation- SLP     Row Name 18 1458             Time Calculation- SLP    SLP Start Time 1450  -MB      SLP Stop Time 1458  -MB      SLP Time Calculation (min) 8 min  -MB      SLP Non-Billable Time (min) 8 min  -MB      SLP Received On 18  -MB        User Key  (r) = Recorded By, (t) = Taken By, (c) = Cosigned By    Initials Name Provider Type    Rick Cam CCC-SLP Speech and Language Pathologist                  SLP GOALS     Row Name 18 1400 18 1410 18 1322       Comprehend Questions Goal 1 (SLP)    Improve Ability to Comprehend Questions Goal 1 (SLP) complex yes/no questions;independently (over 90% accuracy)  -MB complex yes/no questions;independently  (over 90% accuracy)  -BN complex yes/no questions;independently (over 90% accuracy)  -BN    Time Frame (Comprehend Questions Goal 1, SLP) by discharge  -MB by discharge  -BN by discharge  -BN    Progress/Outcomes (Comprehend Questions Goal 1, SLP) goal not met  -MB goal ongoing  -BN goal ongoing  -BN       Organizational Skills Goal 1 (SLP)    Improve Thought Organization Through Goal 1 (SLP) completing a divergent naming task;concrete;completing a convergent naming task;abstract;independently (over 90% accuracy)  -MB completing a divergent naming task;concrete;completing a convergent naming task;abstract;independently (over 90% accuracy)  -BN completing a divergent naming task;concrete;completing a convergent naming task;abstract;independently (over 90% accuracy)  -BN    Time Frame (Thought Organization Skills Goal 1, SLP) by discharge  -MB by discharge  -BN by discharge  -BN    Progress (Thought Organization Skills Goal 1, SLP)  -- 80%  -BN 70%;60%  -BN    Progress/Outcomes (Thought Organization Skills Goal 1, SLP) goal partially met  -MB continuing progress toward goal  -BN continuing progress toward goal  -BN       Reasoning Goal 1 (SLP)    Improve Reasoning Through Goal 1 (SLP) complete basic reasoning task;complete mental flexibility task;independently (over 90% accuracy)  -MB complete basic reasoning task;complete mental flexibility task;independently (over 90% accuracy)  -BN complete basic reasoning task;complete mental flexibility task;independently (over 90% accuracy)  -BN    Time Frame (Reasoning Goal 1, SLP) by discharge  -MB by discharge  -BN by discharge  -BN    Progress (Reasoning Goal 1, SLP)  -- 80%;90%  -BN 70%;80%  -BN    Progress/Outcomes (Reasoning Goal 1, SLP) goal partially met  -MB continuing progress toward goal  -BN continuing progress toward goal  -BN       Functional Problem Solving Skills Goal 1 (SLP)    Improve Problem Solving Through Goal 1 (SLP) answer questions about ADL  "problems;answer \"what if\" questions;tell similarity between items;independently (over 90% accuracy)  -MB answer questions about ADL problems;answer \"what if\" questions;tell similarity between items;independently (over 90% accuracy)  -BN answer questions about ADL problems;answer \"what if\" questions;tell similarity between items;independently (over 90% accuracy)  -BN    Time Frame (Problem Solving Goal 1, SLP) by discharge  -MB by discharge  -BN by discharge  -BN    Progress (Problem Solving Goal 1, SLP)  -- 70%;80%  -BN 70%;80%  -BN    Progress/Outcomes (Problem Solving Goal 1, SLP) goal partially met  -MB goal ongoing  -BN continuing progress toward goal  -BN      User Key  (r) = Recorded By, (t) = Taken By, (c) = Cosigned By    Initials Name Provider Type    Rick Cam CCC-SLP Speech and Language Pathologist    Hallie Delarosa CCC-SLP Speech and Language Pathologist            Therapy Charges for Today     Code Description Service Date Service Provider Modifiers Qty    [id number] [facility] CARE PLAN 15 MIN 9/20/2018 Rick Albarado CCC-SLP GN 1            SLP Discharge Summary  Anticipated Dischage Disposition: home with assist  Reason for Discharge: discharge from this facility  Progress Toward Achieving Short/long Term Goals: goals partially met within established timelines  Discharge Destination: other (see comments) (Still in acute care, discharging home today)      LINCOLN GarciaSLP  9/20/2018  "

## 2018-09-20 NOTE — PROGRESS NOTES
Continued Stay Note  [facility] Lance     Patient Name: Travis Patel  MRN: [id number]  Today's Date: 9/20/2018    Admit Date: 9/10/2018          Discharge Plan     Row Name 09/20/18 1434       Plan    Plan Home with [facility]     Patient/Family in Agreement with Plan yes    Final Discharge Disposition Code 06 - home with home health care    Final Note Pt is going home today. He has orders for home health. He is already current with [facility] in [facility] but he is going home with his daughter in [facility]. [facility] Called the [facility] office that covers [place] [phone] and spoke with Mandy. Faxed referral to them at [phone].              Discharge Codes    No documentation.       Expected Discharge Date and Time     Expected Discharge Date Expected Discharge Time    Sep 20, 2018             APRIL Love

## 2018-09-21 ENCOUNTER — READMISSION MANAGEMENT (OUTPATIENT)
Dept: CALL CENTER | Facility: HOSPITAL | Age: 73
End: 2018-09-21

## 2018-09-21 NOTE — OUTREACH NOTE
Prep Survey      Responses   Facility patient discharged from?  [place]   Is patient eligible?  Yes   Discharge diagnosis  Subarachnoid hemorrhage,  CHF,  CAD,  ICD,  HLD   Does the patient have one of the following disease processes/diagnoses(primary or secondary)?  CHF   Does the patient have Home health ordered?  Yes   What is the Home health agency?   [facility] in [facility]   Is there a DME ordered?  No   Prep survey completed?  Yes          Kavitha Joshua RN

## 2018-09-21 NOTE — PAYOR COMM NOTE
"DC HOME 9-20-18    [id number]  Travis Patel (73 y.o. Male)     Date of Birth Social Security Number Address Home Phone MRN    1945  [id number] [facility] [address] [phone] [id number]    Church Marital Status          Muslim        Admission Date Admission Type Admitting Provider Attending Provider Department, Room/Bed    9/10/18 Emergency Angel Zheng MD  [facility] 4B, 408/1    Discharge Date Discharge Disposition Discharge Destination        9/20/2018 Home-Health Care Svc              Attending Provider:  (none)   Allergies:  Sulfa Antibiotics    Isolation:  None   Infection:  None   Code Status:  Prior    Ht:  170.2 cm (67.01\")   Wt:  79.9 kg (176 lb 3.2 oz)    Admission Cmt:  None   Principal Problem:  None                Active Insurance as of 9/10/2018     Primary Coverage     Payor Plan Insurance Group Employer/Plan Group    HUMANA MEDICARE REPLACEMENT HUMANA MEDICARE REPL [id number]     Payor Plan Address Payor Plan Phone Number Effective From Effective To    [address] [address] [phone] 1/1/2014     [facility] [id number]       Subscriber Name Subscriber Birth Date Member ID       TRAVIS PATEL 1945 [id number]                 Emergency Contacts      (Rel.) Home Phone Work Phone Mobile Phone    Jennifer Turcios (Other) [phone] -- --    Fransisca Patel (Spouse) -- -- [phone]               Discharge Summary      Angel Zheng MD at 9/20/2018  1:38 PM              [facility]  DISCHARGE SUMMARY       Date of Admission: 9/10/2018  Date of Discharge:  9/20/2018  Primary Care Physician: Shant Hayes MD    Discharge Diagnoses:  Hospital Problem List     Subarachnoid hemorrhage (CMS/HCC)    Chronic coronary artery disease    Congestive heart failure (CMS/HCC) (chronic systolic)    Hyperlipidemia    ICD (implantable cardioverter-defibrillator) in place    Orthostatic " hypotension    Syncope   Constipation   Suspected mental health disorder based on medications         Presenting Problem/History of Present Illness:  Subarachnoid hemorrhage (CMS/[facility]) [I60.9]         Hospital Course    This is a 73-year-old  man who presented in the emergency room for complaints of passing out.  Record indicates that he has been falling a lot over the last 6 months.  He was admitted with diagnoses of syncope, orthostatic hypotension (chronic recurrent by history) subarachnoid hemorrhage, coronary artery disease, bipolar disorder, constipation and acute on chronic renal failure.      He was initially placed in the intensive care unit.  Dr. Calvillo saw the patient in consultation requested serial CT imaging of his head.  On September 12, he indicates that the patient has no headache or nuchal rigidity, urine exam is stable.  He cleared the patient to telemetry floor.  CT imaging of his head showed residual subarachnoid hemorrhage in the right sylvian fissure and within right frontal sulci.    He was placed on midodrine for orthostatic hypotension.  I believe he has been on midodrine even prior to admission.  Because of orthostatic hypotension, medications such as Bumex, lisinopril, metoprolol and spironolactone were discontinued.  Aspirin and Plavix were discontinued due to subarachnoid hemorrhage.    Dr. Willams saw the patient in consult.  He believes diuretic, ACE inhibitor, beta blocker are all contributing to the orthostatic blood pressure.  So far, blood pressure has been ranging anywhere from /65-83.  With discussed precautionary geared towards safety when changing position to prevent fall as an effect of orthostatic blood pressure.  He worked with physical therapist who described him independent with bed mobility and transfers.  He ambulates 220 feet with rolling walker and standby assist.  He performs lower extremity exercises actively.  We looked at placement however bed has not  been offered/insurance declined.    Renal service saw the patient in consult in reference to acute kidney injury that is now resolved.      Patient also has issues on constipation.  He had received milk of molasses in multiple location and subsequently Dulcolax.  He has had bowel movement during this hospitalization.  Patient has prior history of small bowel obstruction requiring colostomy and takedown of the colostomy.  He has no nausea or vomiting.  He is tolerating his meals.  He had a UV of the abdomen on September 19 which showed no evidence of obstruction.  I reviewed records from outside hospital but has not seen any history of colonoscopy.  He not remember any colonoscopy in the past.  Will defer to his primary care provider facilitation of care for screening colonoscopy and further evaluation and management of constipation if it persists.  I also checked on thyroid-stimulating hormone prior to discharge to evaluate for hypothyroidism that may be contribution story to constipation.  I reviewed his medication and explained to him and his daughter that Seroquel can cause constipation in 2-11%.  This, however, is not a new medication for him.  I encouraged him to increase dietary fiber, increase activities as tolerated and treat symptomatically.      At this time, patient is felt to be at his best condition and medically appropriate for discharge.  Questions were encouraged during my encounter with him and his family.  All questions were answered the best of my knowledge.    Procedures Performed:  None      Consults:  Dr. Imer Resee    Pertinent Test Results:   Lab Results (last 72 hours)     Procedure Component Value Units Date/Time    TSH [[id number]] Collected:  09/20/18 0422    Specimen:  Blood Updated:  09/20/18 1323    Comprehensive Metabolic Panel [[id number]]  (Abnormal) Collected:  09/20/18 0422    Specimen:  Blood Updated:  09/20/18 0511     Glucose 101 (H) mg/dL      BUN 11 mg/dL       Creatinine 0.84 mg/dL      Sodium 139 mmol/L      Potassium 4.6 mmol/L      Chloride 107 mmol/L      CO2 26.0 mmol/L      Calcium 8.8 mg/dL      Total Protein 5.1 (L) g/dL      Albumin 2.80 (L) g/dL      ALT (SGPT) 38 U/L      AST (SGOT) 21 U/L      Alkaline Phosphatase 112 U/L      Total Bilirubin 0.5 mg/dL      eGFR Non African Amer 90 mL/min/1.73      Globulin 2.3 gm/dL      A/G Ratio 1.2 g/dL      BUN/Creatinine Ratio 13.1     Anion Gap 6.0 mmol/L     Narrative:       The MDRD GFR formula is only valid for adults with stable renal function between ages 18 and 70.    Comprehensive Metabolic Panel [[id number]]  (Abnormal) Collected:  09/19/18 0542    Specimen:  Blood Updated:  09/19/18 0631     Glucose 95 mg/dL      BUN 14 mg/dL      Creatinine 0.85 mg/dL      Sodium 139 mmol/L      Potassium 4.9 mmol/L      Chloride 106 mmol/L      CO2 26.0 mmol/L      Calcium 9.0 mg/dL      Total Protein 5.1 (L) g/dL      Albumin 2.90 (L) g/dL      ALT (SGPT) 37 U/L      AST (SGOT) 25 U/L      Alkaline Phosphatase 117 U/L      Total Bilirubin 0.6 mg/dL      eGFR Non African Amer 89 mL/min/1.73      Globulin 2.2 gm/dL      A/G Ratio 1.3 g/dL      BUN/Creatinine Ratio 16.5     Anion Gap 7.0 mmol/L     Narrative:       The MDRD GFR formula is only valid for adults with stable renal function between ages 18 and 70.    Comprehensive Metabolic Panel [[id number]]  (Abnormal) Collected:  09/18/18 0258    Specimen:  Blood Updated:  09/18/18 0403     Glucose 95 mg/dL      BUN 12 mg/dL      Creatinine 0.88 mg/dL      Sodium 139 mmol/L      Potassium 4.4 mmol/L      Chloride 108 mmol/L      CO2 24.0 mmol/L      Calcium 8.9 mg/dL      Total Protein 5.1 (L) g/dL      Albumin 2.80 (L) g/dL      ALT (SGPT) 35 U/L      AST (SGOT) 28 U/L      Alkaline Phosphatase 116 U/L      Total Bilirubin 0.5 mg/dL      eGFR Non African Amer 85 mL/min/1.73      Globulin 2.3 gm/dL      A/G Ratio 1.2 g/dL      BUN/Creatinine Ratio 13.6     Anion Gap 7.0 mmol/L      Narrative:       The MDRD GFR formula is only valid for adults with stable renal function between ages 18 and 70.        Imaging Results (last 72 hours)     Procedure Component Value Units Date/Time    XR Abdomen KUB [[id number]] Collected:  09/19/18 0951     Updated:  09/19/18 1147    Addenda:        There is a typographical error in the impression of this report.     The impression should read:     Moderate amount of stool in the colon is observed without dilated bowel  loops to indicate obstruction.           POWERSCRIBE transcription disclaimer:     This dictation is an  electronic transcription/translation of spoken  language to printed text. The electronic translation of spoken language  may permit erroneous, or at times, nonsensical words or phrases to be  inadvertently transcribed. Although I have reviewed the note for such  errors, some may still exist.  This report was finalized on 09/19/2018 11:44 by Dr. Jese Enrique MD.  Signed:  09/19/18 1144 by Jese Enrique MD    Narrative:       EXAMINATION:  XR ABDOMEN KUB-  9/19/2018 9:24 AM CDT     HISTORY: ABDOMINAL PAIN      COMPARISON: 19 2018 abdomen and pelvis CT     TECHNIQUE: Single view: KUB     FINDINGS:      Stool and gas identified throughout the colon including the rectum  without dilated bowel loops or evidence of obstruction. Moderate amount  of stool appreciated.     Phleboliths noted in the left hemipelvis. There is no organomegaly or  definite urinary tract calculi.     Degenerative spurring noted diffusely in the lumbar spine with  osteophyte formation.       Impression:       1. Moderate amount stool in the colon without dilated bowel is indicate  obstruction.  This report was finalized on 09/19/2018 09:53 by Dr. Jese Enrique MD.          Condition on Discharge:  Stable    Physical Exam on Discharge:  /74 (BP Location: Right arm, Patient Position: Standing)   Pulse 84   Temp 97.7 °F (36.5 °C) (Temporal Artery )   Resp 18   Ht 170.2  "cm (67.01\")   Wt 79.9 kg (176 lb 3.2 oz)   SpO2 100%   BMI 27.59 kg/m²    Physical Exam  Seated on recliner;  NAD  Constitutional: He is oriented to person, place, and time. He appears well-developed and well-nourished.   HENT:   Head: Normocephalic and atraumatic.   Eyes: Pupils are equal, round  Conjunctivae and EOM are normal.   Neck: Normal range of motion. Neck supple. No JVD present.   Cardiovascular: Normal rate, regular rhythm, normal heart sounds and intact distal pulses.  Tele sr  Pulmonary/Chest: Effort normal and breath sounds normal.   Abdominal: Soft. Bowel sounds are normal. Non tender, no guarding   Musculoskeletal: Normal range of motion.   Neurological: He is alert and oriented to person, place, and time.   Skin: Skin is warm and dry.   Psychiatric: He has a normal mood and affect. His behavior is normal.           Discharge Disposition:  [place] [facility]    Discharge Medications:     Discharge Medications      New Medications      Instructions Start Date   bisacodyl 10 MG suppository  Commonly known as:  DULCOLAX   10 mg, Rectal, Daily      docusate sodium 100 MG capsule  Commonly known as:  COLACE   100 mg, Oral, 2 Times Daily      polyethylene glycol pack packet  Commonly known as:  MIRALAX   17 g, Oral, Daily         Changes to Medications      Instructions Start Date   midodrine 10 MG tablet  Commonly known as:  PROAMATINE  What changed:  · medication strength  · how much to take  · when to take this   10 mg, Oral, 3 Times Daily Before Meals         Continue These Medications      Instructions Start Date   atorvastatin 80 MG tablet  Commonly known as:  LIPITOR   80 mg, Oral, Nightly      busPIRone 10 MG tablet  Commonly known as:  BUSPAR   10 mg, Oral, 2 Times Daily      DULoxetine 60 MG capsule  Commonly known as:  CYMBALTA   60 mg, Oral, Daily      folic acid 1 MG tablet  Commonly known as:  FOLVITE   1 mg, Oral, Daily      HYDROcodone-acetaminophen  MG per tablet  Commonly " known as:  NORCO   1 tablet, Oral, Every 6 Hours PRN      lithium carbonate 300 MG capsule   300 mg, Oral, 2 Times Daily With Meals      QUEtiapine 100 MG tablet  Commonly known as:  SEROquel   200 mg, Oral, Nightly      raNITIdine 150 MG tablet  Commonly known as:  ZANTAC   150 mg, Oral, 2 Times Daily         Stop These Medications    aspirin 81 MG chewable tablet     bumetanide 2 MG tablet  Commonly known as:  BUMEX     clopidogrel 75 MG tablet  Commonly known as:  PLAVIX     lisinopril 2.5 MG tablet  Commonly known as:  PRINIVIL,ZESTRIL     metoprolol succinate XL 25 MG 24 hr tablet  Commonly known as:  TOPROL-XL     potassium chloride 10 MEQ CR tablet  Commonly known as:  K-DUR     spironolactone 25 MG tablet  Commonly known as:  ALDACTONE            Discharge Diet:   Diet Instructions     Diet: Regular, Cardiac; Thin       Discharge Diet:   Regular  Cardiac       Fluid Consistency:  Thin    High fiber              Activity at Discharge:   Activity Instructions     Activity as Tolerated             Follow-up Appointments:  Dr. Grove in 1 week or sooner as needed; Dr. Willams, Dr. Reese, Dr. Calvillo per their recommendations  Test Results Pending at Discharge:    Order Current Status    TSH In process           Angel Zheng MD  09/20/18  1:38 PM    Time: > 40 mins    Please note that portions of this note may have been completed with a voice recognition program. Efforts were made to edit the dictations, but occasionally words are mistranscribed.            Electronically signed by Angel Zheng MD at 9/20/2018  1:57 PM

## 2018-09-21 NOTE — THERAPY DISCHARGE NOTE
Acute Care - Physical Therapy Progress Note/Discharge  [facility]     Patient Name: Travis Patel  : 1945  MRN: [id number]  Today's Date: 2018  Onset of Illness/Injury or Date of Surgery: 09/10/18  Date of Referral to PT: 18  Referring Physician: Dr. Gomez    Admit Date: 9/10/2018    Visit Dx:    ICD-10-CM ICD-9-CM   1. Subarachnoid hemorrhage (CMS/[facility]) I60.9 430   2. Syncope, unspecified syncope type R55 780.2   3. Elevated liver enzymes R74.8 790.5   4. Constipation, unspecified constipation type K59.00 564.00   5. VANI (acute kidney injury) (CMS/[facility]) N17.9 584.9   6. Elevated troponin R74.8 790.6   7. Renal cyst N28.1 753.10   8. Adrenal nodule (CMS/[facility]) E27.9 255.8   9. Chronic anticoagulation Z79.01 V58.61   10. Dysphagia, unspecified type R13.10 787.20   11. Impaired cognition R41.89 294.9   12. Impaired mobility and ADLs Z74.09 799.89   13. Gait abnormality R26.9 781.2   14. Chronic systolic congestive heart failure (CMS/[facility]) I50.22 428.22     428.0   15. Orthostatic hypotension I95.1 458.0     Patient Active Problem List   Diagnosis   • Subarachnoid hemorrhage (CMS/[facility])   • Chronic coronary artery disease   • Congestive heart failure (CMS/[facility])   • Hyperlipidemia   • ICD (implantable cardioverter-defibrillator) in place   • Orthostatic hypotension   • Syncope       Physical Therapy Education     Title: PT OT SLP Therapies (Resolved)     Topic: Physical Therapy (Resolved)     Point: Mobility training (Resolved)    Learning Progress Summary     Learner Status Readiness Method Response Comment Documented by    Patient Done Acceptance E VU,DU benefits of activity NATANAEL 18 1132     Active Acceptance E,D NR Safe use of RWX during turns NATANAEL 18 1127     Done Eager E VU POC seated ex's AE 18 1006     Active Acceptance E NR orthostatic hypotension and benefits of being OOB  18 1020     Done Acceptance E DU,NR benefits of activity EC 09/15/18 0855     Done Acceptance E,TB,D VU,DU,NR  Education for LE exercise to combat orthostasis to include aps, LAQ, & seated marching and deep breathing  education to transition slowly during transfers to assist w/ adjustment to change in position to reduce risk for LOB due to orthostatic hypotension  09/14/18 1012    Family Done Acceptance E,ABHI FLORES,DU,NR Education for LE exercise to combat orthostasis to include aps, LAQ, & seated marching and deep breathing  education to transition slowly during transfers to assist w/ adjustment to change in position to reduce risk for LOB due to orthostatic hypotension  09/14/18 1012          Point: Precautions (Resolved)    Learning Progress Summary     Learner Status Readiness Method Response Comment Documented by    Patient Done Eager E VU POC seated ex's  09/17/18 1006     Active Acceptance E NR orthostatic hypotension and benefits of being OOB  09/16/18 1020     Done Acceptance EMARK D VU,WILMAN,DU Educ re falls prevention to include risk w/ orthostasis; educ for LE ex, transition slowly during tfers, & incr awareness of changes in tolerance to being upright; educ to sit upright more often w/ meals/ in between meals to incr tolerance  09/14/18 1013                      User Key     Initials Effective Dates Name Provider Type Discipline     08/02/16 -  Perry Linares, PTA Physical Therapy Assistant PT    AE 06/22/15 -  Adamaris Broussard, PTA Physical Therapy Assistant PT     08/02/16 -  Katie Lock, PTA Physical Therapy Assistant PT     08/02/18 -  Maria A Amor, PT Physical Therapist PT                    PT Rehab Goals     Row Name 09/21/18 1300             Bed Mobility Goal 1 (PT)    [place] Level/Cues Needed (Bed Mobility Goal 1, PT) independent   goal: independent  -NATANAEL      Progress/Outcomes (Bed Mobility Goal 1, PT) goal met  -NATANAEL         Transfer Goal 1 (PT)    [place] Level/Cues Needed (Transfer Goal 1, PT) independent   goal: independent  -NATANAEL      Progress/Outcome (Transfer Goal 1,  PT) goal met  -NATANAEL         Gait Training Goal 1 (PT)    [place] Level (Gait Training Goal 1, PT) standby assist   goal: SBA  -NATANAEL      Distance (Gait Goal 1, PT) 225   goal: 200  -NATANAEL      Progress/Outcome (Gait Training Goal 1, PT) goal met  -NATANAEL         Stairs Goal 1 (PT)    [place] Level/Cues Needed (Stairs Goal 1, PT) --   goal: SBA  -NATANAEL      Number of Stairs (Stairs Goal 1, PT) 0   goal:3  -NATANAEL      Progress/Outcome (Stairs Goal 1, PT) goal not met  -NATANAEL         Patient Education Goal (PT)    [place]/Cues/Accuracy (Memory Goal 2, PT) demonstrates adequately   Goal: demo adequately  -NATANAEL      Progress/Outcome (Patient Education Goal, PT) goal met  -NATANAEL        User Key  (r) = Recorded By, (t) = Taken By, (c) = Cosigned By    Initials Name Provider Type Discipline    Katie Mckeon, PTA Physical Therapy Assistant PT        Therapy Treatment        Rehabilitation Treatment Summary     Row Name                Wound 09/12/18 2000 Left lateral arm skin tear    Wound - Properties Group Date first assessed: 09/12/18 [AH] Time first assessed: 2000 [] Side: Left [AH] Orientation: lateral [] Location: arm [] Type: skin tear [] Recorded by:  [] Channing Castellon, RN 09/13/18 0449      User Key  (r) = Recorded By, (t) = Taken By, (c) = Cosigned By    Initials Name Effective Dates Discipline     Channing Castellon, RN 02/12/18 -  Nurse             PT Recommendation and Plan  Anticipated Discharge Disposition (PT): home with home health  Outcome Summary/Treatment Plan (PT)  Anticipated Discharge Disposition (PT): home with home health  Plan of Care Reviewed With: patient  Outcome Summary: Pt. is independent with bed mobility and transfers. Ambulates 220' with RWX and stand by assist. Performs LE exercises actively. Will benefit from increased activity.          Outcome Measures     Row Name 09/20/18 1328 09/20/18 1100          How much help from another person do you currently need...    Turning from your  back to your side while in flat bed without using bedrails?  -- 4  -NATANAEL     Moving from lying on back to sitting on the side of a flat bed without bedrails?  -- 4  -NATANAEL     Moving to and from a bed to a chair (including a wheelchair)?  -- 4  -NATANAEL     Standing up from a chair using your arms (e.g., wheelchair, bedside chair)?  -- 4  -NATANAEL     Climbing 3-5 steps with a railing?  -- 3  -NATANAEL     To walk in hospital room?  -- 4  -NATANAEL     AM-PAC 6 Clicks Score  -- 23  -NATANAEL        How much help from another is currently needed...    Putting on and taking off regular lower body clothing? 4  -MM  --     Bathing (including washing, rinsing, and drying) 3  -MM  --     Toileting (which includes using toilet bed pan or urinal) 4  -MM  --     Putting on and taking off regular upper body clothing 4  -MM  --     Taking care of personal grooming (such as brushing teeth) 4  -MM  --     Eating meals 4  -MM  --     Score 23  -MM  --       User Key  (r) = Recorded By, (t) = Taken By, (c) = Cosigned By    Initials Name Provider Type    Katie Mckeon PTA Physical Therapy Assistant    MM Park [place]ino OTA Occupational Therapy Assistant           Time Calculation:     Therapy Suggested Charges     Code   Minutes Charges    06013 (CPT®) Hc Pt Neuromusc Re Education Ea 15 Min      44040 (CPT®) Hc Pt Ther Proc Ea 15 Min 10 1    61496 (CPT®) Hc Gait Training Ea 15 Min 13 1    04518 (CPT®) Hc Pt Therapeutic Act Ea 15 Min      64265 (CPT®) Hc Pt Manual Therapy Ea 15 Min      63677 (CPT®) Hc Pt Iontophoresis Ea 15 Min      36737 (CPT®) Hc Pt Elec Stim Ea-Per 15 Min      00177 (CPT®) Hc Pt Ultrasound Ea 15 Min      56723 (CPT®) Hc Pt Self Care/Mgmt/Train Ea 15 Min      44684 (CPT®) Hc Pt Prosthetic (S) Train Initial Encounter, Each 15 Min      27276 (CPT®) Hc Pt Orthotic(S)/Prosthetic(S) Encounter, Each 15 Min      91992 (CPT®) Hc Orthotic(S) Mgmt/Train Initial Encounter, Each 15min      Total  23 2          Therapy Charges for Today     Code  Description Service Date Service Provider Modifiers Qty    [id number]  PT THER PROC EA 15 MIN 9/20/2018 Katie Lock, ESPERANZA GP, KX 1    [id number] HC GAIT TRAINING EA 15 MIN 9/20/2018 Katie Lock PTA GP, KX 1          PT G-Codes  PT Professional Judgement Used?: Yes  Outcome Measure Options: AM-PAC 6 Clicks Basic Mobility (PT)  AM-PAC 6 Clicks Score: 23  Score: 23  Functional Limitation: Mobility: Walking and moving around  Mobility: Walking and Moving Around Current Status (): At least 40 percent but less than 60 percent impaired, limited or restricted  Mobility: Walking and Moving Around Goal Status (): At least 20 percent but less than 40 percent impaired, limited or restricted    PT Discharge Summary  Anticipated Discharge Disposition (PT): home with home health  Reason for Discharge: Discharge from facility  Outcomes Achieved: Patient able to partially acheive established goals  Discharge Destination: Home with home health    Katie Lock PTA  9/21/2018

## 2018-09-22 ENCOUNTER — READMISSION MANAGEMENT (OUTPATIENT)
Dept: CALL CENTER | Facility: HOSPITAL | Age: 73
End: 2018-09-22

## 2018-09-22 NOTE — OUTREACH NOTE
CHF Week 1 Survey      Responses   Facility patient discharged from?  [place]   Does the patient have one of the following disease processes/diagnoses(primary or secondary)?  CHF   Is there a successful TCM telephone encounter documented?  No   CHF Week 1 attempt successful?  Yes   Call start time  0943   Call end time  0952   Discharge diagnosis  Subarachnoid hemorrhage,  CHF,  CAD,  ICD,  HLD   Meds reviewed with patient/caregiver?  Yes   Is the patient having any side effects they believe may be caused by any medication additions or changes?  No   Does the patient have all medications ordered at discharge?  Yes   Is the patient taking all medications as directed (includes completed medication regime)?  Yes   Medication comments  Concern for pt and dtr r/t DC of diuretics, blood thinners   Does the patient have a primary care provider?   Yes   Does the patient have an appointment with their PCP within 7 days of discharge?  No   What is preventing the patient from scheduling follow up appointments within 7 days of discharge?  Haven't had time   Nursing Interventions  Educated patient on importance of making appointment, Advised patient to make appointment   Has the patient kept scheduled appointments due by today?  N/A   Comments  Pt is seeing his Cardiologist and HF clinic at [facility] on 9-24.   What is the Home health agency?   [facility] in [facility]   Has home health visited the patient within 72 hours of discharge?  Yes   Psychosocial issues?  No   Comments  Pt is monitoring bp at home and wts. Has continued to feel poorly per dtr. No further syncopal episodes at this time. Weak, fatigued and generalized feeling poorly. Staying with Dtr, since DC.   Did the patient receive a copy of their discharge instructions?  Yes   Nursing interventions  Reviewed instructions with patient   What is the patient's perception of their health status since discharge?  Improving   Nursing interventions  Nurse provided  patient education   Is the patient weighing daily?  Yes   Does the patient have scales?  Yes   Daily weight interventions  Education provided on importance of daily weight   Is the patient able to teach back Heart Failure diet management?  Yes   Is the patient able to teach back Heart Failure Zones?  Yes   Is the patient able to teach back signs and symptoms of worsening condition? (i.e. weight gain, shortness of air, etc.)  Yes   Is the patient/caregiver able to teach back the hierarchy of who to call/visit for symptoms/problems? PCP, Specialist, Home health nurse, Urgent Care, ED, 911  Yes    CHF Week 1 call completed?  Yes          Chery Montana RN

## 2018-10-01 ENCOUNTER — READMISSION MANAGEMENT (OUTPATIENT)
Dept: CALL CENTER | Facility: HOSPITAL | Age: 73
End: 2018-10-01

## 2018-10-01 NOTE — OUTREACH NOTE
CHF Week 2 Survey      Responses   Facility patient discharged from?  [place]   Does the patient have one of the following disease processes/diagnoses(primary or secondary)?  CHF   Week 2 attempt successful?  No   Unsuccessful attempts  Attempt 1          Chery Montana RN

## 2018-10-02 ENCOUNTER — READMISSION MANAGEMENT (OUTPATIENT)
Dept: CALL CENTER | Facility: HOSPITAL | Age: 73
End: 2018-10-02

## 2018-10-02 NOTE — OUTREACH NOTE
CHF Week 2 Survey      Responses   Facility patient discharged from?  [place]   Does the patient have one of the following disease processes/diagnoses(primary or secondary)?  CHF   Week 2 attempt successful?  Yes   Call start time  1501   Call end time  1512   Discharge diagnosis  Subarachnoid hemorrhage,  CHF,  CAD,  ICD,  HLD   Is patient permission given to speak with other caregiver?  Yes   List who call center can speak with  wife and dtr   Person spoke with today (if not patient) and relationship  dtr- Jennifer   Meds reviewed with patient/caregiver?  Yes   Is the patient having any side effects they believe may be caused by any medication additions or changes?  No   Does the patient have all medications ordered at discharge?  Yes   Is the patient taking all medications as directed (includes completed medication regime)?  Yes   Medication comments  Cardiology added 81mg ASA   Does the patient have a primary care provider?   Yes   Does the patient have an appointment with their PCP within 7 days of discharge?  Yes   Has the patient kept scheduled appointments due by today?  Yes   What is the Home health agency?   [facility] in [facility]   Has home health visited the patient within 72 hours of discharge?  Yes   Psychosocial issues?  No   Comments  BP running 98/60,  daily wts stable. Being monitored by [facility]. Appetite is improving, fatigue is improving.    What is the patient's perception of their health status since discharge?  Improving   Nursing interventions  Nurse provided patient education   Is the patient weighing daily?  Yes   Does the patient have scales?  Yes   Is the patient able to teach back Heart Failure Zones?  Yes   Is the patient able to teach back signs and symptoms of worsening condition? (i.e. weight gain, shortness of air, etc.)  Yes   CHF Week 2 call completed?  Yes          Chery Montana RN

## 2018-10-10 ENCOUNTER — READMISSION MANAGEMENT (OUTPATIENT)
Dept: CALL CENTER | Facility: HOSPITAL | Age: 73
End: 2018-10-10

## 2018-10-10 NOTE — OUTREACH NOTE
CHF Week 3 Survey      Responses   Facility patient discharged from?  [place]   Does the patient have one of the following disease processes/diagnoses(primary or secondary)?  CHF   Week 3 attempt successful?  No   Unsuccessful attempts  Attempt 1          Dino Courtney RN

## 2018-10-13 ENCOUNTER — READMISSION MANAGEMENT (OUTPATIENT)
Dept: CALL CENTER | Facility: HOSPITAL | Age: 73
End: 2018-10-13

## 2018-10-13 NOTE — OUTREACH NOTE
CHF Week 3 Survey      Responses   Facility patient discharged from?  [place]   Does the patient have one of the following disease processes/diagnoses(primary or secondary)?  CHF   Week 3 attempt successful?  Yes   Call start time  1344   Call end time  1348   Discharge diagnosis  Subarachnoid hemorrhage,  CHF,  CAD,  ICD,  HLD   Is patient permission given to speak with other caregiver?  Yes   Person spoke with today (if not patient) and relationship  Spouse   Meds reviewed with patient/caregiver?  Yes   Is the patient having any side effects they believe may be caused by any medication additions or changes?  No   Does the patient have all medications ordered at discharge?  Yes   Is the patient taking all medications as directed (includes completed medication regime)?  Yes   Does the patient have a primary care provider?   Yes   Does the patient have an appointment with their PCP within 7 days of discharge?  Yes   Has the patient kept scheduled appointments due by today?  Yes   What is the Home health agency?   [facility] in [facility]   Has home health visited the patient within 72 hours of discharge?  N/A   Home health comments  HH to restart soon after he returned home from being under care at [facility] home.   Psychosocial issues?  No   Comments  No s/s of exacerbation. Swelling and wt is down.   Did the patient receive a copy of their discharge instructions?  Yes   Nursing interventions  Reviewed instructions with patient   What is the patient's perception of their health status since discharge?  Improving   Nursing interventions  Nurse provided patient education   Is the patient weighing daily?  Yes   Does the patient have scales?  Yes   Daily weight interventions  Education provided on importance of daily weight   Is the patient able to teach back Heart Failure diet management?  Yes   Is the patient able to teach back Heart Failure Zones?  Yes   Is the patient able to teach back signs and symptoms of worsening  condition? (i.e. weight gain, shortness of air, etc.)  Yes   Is the patient/caregiver able to teach back the hierarchy of who to call/visit for symptoms/problems? PCP, Specialist, Home health nurse, Urgent Care, ED, 911  Yes   CHF Week 3 call completed?  Yes          Med Cabrera RN

## 2018-10-22 ENCOUNTER — READMISSION MANAGEMENT (OUTPATIENT)
Dept: CALL CENTER | Facility: HOSPITAL | Age: 73
End: 2018-10-22

## 2018-10-22 NOTE — OUTREACH NOTE
CHF Week 4 Survey      Responses   Facility patient discharged from?  [place]   Does the patient have one of the following disease processes/diagnoses(primary or secondary)?  CHF   Week 4 attempt successful?  Yes   Call start time  1229   Call end time  1231   Discharge diagnosis  Subarachnoid hemorrhage,  CHF,  CAD,  ICD,  HLD   Is patient permission given to speak with other caregiver?  Yes   List who call center can speak with  wife and dtr   Person spoke with today (if not patient) and relationship  Spouse   Meds reviewed with patient/caregiver?  Yes   Is the patient having any side effects they believe may be caused by any medication additions or changes?  No   Is the patient taking all medications as directed (includes completed medication regime)?  Yes   Has the patient kept scheduled appointments due by today?  Yes   Is the patient still receiving Home Health Services?  Yes   Psychosocial issues?  No   What is the patient's perception of their health status since discharge?  Improving   Is the patient weighing daily?  Yes   Does the patient have scales?  Yes   Daily weight interventions  Education provided on importance of daily weight   Is the patient able to teach back Heart Failure diet management?  Yes   Is the patient able to teach back Heart Failure Zones?  Yes   Is the patient able to teach back signs and symptoms of worsening condition? (i.e. weight gain, shortness of air, etc.)  Yes   Is the patient/caregiver able to teach back the hierarchy of who to call/visit for symptoms/problems? PCP, Specialist, Home health nurse, Urgent Care, ED, 911  Yes   Week 4 Call Completed?  Yes   Would the patient like one additional call?  No   Graduated  Yes   Did the patient feel the follow up calls were helpful during their recovery period?  Yes   Was the number of calls appropriate?  Yes          Melissa Hernandez RN

## 2022-01-01 ENCOUNTER — APPOINTMENT (OUTPATIENT)
Dept: GENERAL RADIOLOGY | Facility: HOSPITAL | Age: 77
End: 2022-01-01

## 2022-01-01 ENCOUNTER — APPOINTMENT (OUTPATIENT)
Dept: ULTRASOUND IMAGING | Facility: HOSPITAL | Age: 77
End: 2022-01-01

## 2022-01-01 ENCOUNTER — HOSPITAL ENCOUNTER (INPATIENT)
Facility: HOSPITAL | Age: 77
LOS: 7 days | End: 2022-05-23
Attending: EMERGENCY MEDICINE | Admitting: STUDENT IN AN ORGANIZED HEALTH CARE EDUCATION/TRAINING PROGRAM

## 2022-01-01 ENCOUNTER — HOME HEALTH ADMISSION (OUTPATIENT)
Dept: HOME HEALTH SERVICES | Facility: HOME HEALTHCARE | Age: 77
End: 2022-01-01

## 2022-01-01 ENCOUNTER — APPOINTMENT (OUTPATIENT)
Dept: CT IMAGING | Facility: HOSPITAL | Age: 77
End: 2022-01-01

## 2022-01-01 ENCOUNTER — READMISSION MANAGEMENT (OUTPATIENT)
Dept: CALL CENTER | Facility: HOSPITAL | Age: 77
End: 2022-01-01

## 2022-01-01 ENCOUNTER — HOSPITAL ENCOUNTER (INPATIENT)
Facility: HOSPITAL | Age: 77
LOS: 14 days | Discharge: NURSING, REHAB OR LONG-TERM CARE | End: 2022-05-02
Attending: EMERGENCY MEDICINE | Admitting: INTERNAL MEDICINE

## 2022-01-01 ENCOUNTER — APPOINTMENT (OUTPATIENT)
Dept: MRI IMAGING | Facility: HOSPITAL | Age: 77
End: 2022-01-01

## 2022-01-01 ENCOUNTER — HOSPITAL ENCOUNTER (OUTPATIENT)
Facility: HOSPITAL | Age: 77
Setting detail: OBSERVATION
LOS: 3 days | Discharge: HOME HEALTH | End: 2022-05-12
Attending: EMERGENCY MEDICINE | Admitting: STUDENT IN AN ORGANIZED HEALTH CARE EDUCATION/TRAINING PROGRAM

## 2022-01-01 ENCOUNTER — APPOINTMENT (OUTPATIENT)
Dept: NEUROLOGY | Facility: HOSPITAL | Age: 77
End: 2022-01-01

## 2022-01-01 VITALS
HEART RATE: 57 BPM | OXYGEN SATURATION: 97 % | WEIGHT: 190 LBS | SYSTOLIC BLOOD PRESSURE: 109 MMHG | TEMPERATURE: 98 F | DIASTOLIC BLOOD PRESSURE: 64 MMHG | BODY MASS INDEX: 30.53 KG/M2 | RESPIRATION RATE: 18 BRPM | HEIGHT: 66 IN

## 2022-01-01 VITALS
RESPIRATION RATE: 16 BRPM | BODY MASS INDEX: 29.95 KG/M2 | HEIGHT: 67 IN | WEIGHT: 190.8 LBS | TEMPERATURE: 98 F | OXYGEN SATURATION: 96 % | HEART RATE: 85 BPM | DIASTOLIC BLOOD PRESSURE: 62 MMHG | SYSTOLIC BLOOD PRESSURE: 104 MMHG

## 2022-01-01 VITALS
HEIGHT: 67 IN | OXYGEN SATURATION: 90 % | WEIGHT: 192.46 LBS | DIASTOLIC BLOOD PRESSURE: 40 MMHG | HEART RATE: 77 BPM | SYSTOLIC BLOOD PRESSURE: 58 MMHG | BODY MASS INDEX: 30.21 KG/M2 | TEMPERATURE: 99.8 F

## 2022-01-01 DIAGNOSIS — E87.29 INCREASED ANION GAP METABOLIC ACIDOSIS: ICD-10-CM

## 2022-01-01 DIAGNOSIS — E87.1 HYPONATREMIA: ICD-10-CM

## 2022-01-01 DIAGNOSIS — N18.9 CHRONIC KIDNEY DISEASE, UNSPECIFIED CKD STAGE: ICD-10-CM

## 2022-01-01 DIAGNOSIS — D64.9 ANEMIA, UNSPECIFIED TYPE: ICD-10-CM

## 2022-01-01 DIAGNOSIS — Z74.09 IMPAIRED MOBILITY: ICD-10-CM

## 2022-01-01 DIAGNOSIS — R57.9 SHOCK, UNSPECIFIED: ICD-10-CM

## 2022-01-01 DIAGNOSIS — R41.82 ALTERED MENTAL STATUS, UNSPECIFIED ALTERED MENTAL STATUS TYPE: ICD-10-CM

## 2022-01-01 DIAGNOSIS — G93.40 ENCEPHALOPATHY: Primary | ICD-10-CM

## 2022-01-01 DIAGNOSIS — Z78.9 DECREASED ACTIVITIES OF DAILY LIVING (ADL): ICD-10-CM

## 2022-01-01 DIAGNOSIS — Z78.9 IMPAIRED MOBILITY AND ADLS: ICD-10-CM

## 2022-01-01 DIAGNOSIS — R13.10 DYSPHAGIA, UNSPECIFIED TYPE: ICD-10-CM

## 2022-01-01 DIAGNOSIS — K76.9 CHRONIC LIVER DISEASE: ICD-10-CM

## 2022-01-01 DIAGNOSIS — K76.82 HEPATIC ENCEPHALOPATHY: ICD-10-CM

## 2022-01-01 DIAGNOSIS — Z74.09 IMPAIRED MOBILITY AND ADLS: ICD-10-CM

## 2022-01-01 DIAGNOSIS — R41.89 IMPAIRED COGNITION: ICD-10-CM

## 2022-01-01 DIAGNOSIS — E87.6 HYPOKALEMIA: Primary | ICD-10-CM

## 2022-01-01 DIAGNOSIS — I46.9 CARDIAC ARREST: ICD-10-CM

## 2022-01-01 DIAGNOSIS — K76.82 HEPATIC ENCEPHALOPATHY: Primary | ICD-10-CM

## 2022-01-01 DIAGNOSIS — K92.2 GASTROINTESTINAL HEMORRHAGE, UNSPECIFIED GASTROINTESTINAL HEMORRHAGE TYPE: ICD-10-CM

## 2022-01-01 LAB
25(OH)D3 SERPL-MCNC: 60.2 NG/ML (ref 30–100)
A-A DO2: ABNORMAL
ABO GROUP BLD: NORMAL
ALBUMIN SERPL-MCNC: 3 G/DL (ref 3.5–5.2)
ALBUMIN SERPL-MCNC: 3.1 G/DL (ref 3.5–5.2)
ALBUMIN SERPL-MCNC: 3.1 G/DL (ref 3.5–5.2)
ALBUMIN SERPL-MCNC: 3.2 G/DL (ref 3.5–5.2)
ALBUMIN SERPL-MCNC: 3.3 G/DL (ref 3.5–5.2)
ALBUMIN SERPL-MCNC: 3.4 G/DL (ref 3.5–5.2)
ALBUMIN SERPL-MCNC: 3.5 G/DL (ref 3.5–5.2)
ALBUMIN SERPL-MCNC: 3.6 G/DL (ref 3.5–5.2)
ALBUMIN SERPL-MCNC: 3.7 G/DL (ref 3.5–5.2)
ALBUMIN SERPL-MCNC: 3.8 G/DL (ref 3.5–5.2)
ALBUMIN SERPL-MCNC: 3.9 G/DL (ref 3.5–5.2)
ALBUMIN SERPL-MCNC: 4 G/DL (ref 3.5–5.2)
ALBUMIN SERPL-MCNC: 4 G/DL (ref 3.5–5.2)
ALBUMIN SERPL-MCNC: 4.1 G/DL (ref 3.5–5.2)
ALBUMIN/GLOB SERPL: 1.2 G/DL
ALBUMIN/GLOB SERPL: 1.2 G/DL
ALBUMIN/GLOB SERPL: 1.3 G/DL
ALBUMIN/GLOB SERPL: 1.3 G/DL
ALBUMIN/GLOB SERPL: 1.4 G/DL
ALBUMIN/GLOB SERPL: 1.5 G/DL
ALBUMIN/GLOB SERPL: 1.6 G/DL
ALBUMIN/GLOB SERPL: 1.7 G/DL
ALBUMIN/GLOB SERPL: 1.8 G/DL
ALBUMIN/GLOB SERPL: 1.9 G/DL
ALBUMIN/GLOB SERPL: 2.4 G/DL
ALP SERPL-CCNC: 109 U/L (ref 39–117)
ALP SERPL-CCNC: 109 U/L (ref 39–117)
ALP SERPL-CCNC: 126 U/L (ref 39–117)
ALP SERPL-CCNC: 133 U/L (ref 39–117)
ALP SERPL-CCNC: 137 U/L (ref 39–117)
ALP SERPL-CCNC: 138 U/L (ref 39–117)
ALP SERPL-CCNC: 143 U/L (ref 39–117)
ALP SERPL-CCNC: 146 U/L (ref 39–117)
ALP SERPL-CCNC: 150 U/L (ref 39–117)
ALP SERPL-CCNC: 150 U/L (ref 39–117)
ALP SERPL-CCNC: 151 U/L (ref 39–117)
ALP SERPL-CCNC: 153 U/L (ref 39–117)
ALP SERPL-CCNC: 158 U/L (ref 39–117)
ALP SERPL-CCNC: 162 U/L (ref 39–117)
ALP SERPL-CCNC: 164 U/L (ref 39–117)
ALP SERPL-CCNC: 167 U/L (ref 39–117)
ALP SERPL-CCNC: 168 U/L (ref 39–117)
ALP SERPL-CCNC: 168 U/L (ref 39–117)
ALP SERPL-CCNC: 169 U/L (ref 39–117)
ALP SERPL-CCNC: 171 U/L (ref 39–117)
ALP SERPL-CCNC: 172 U/L (ref 39–117)
ALP SERPL-CCNC: 176 U/L (ref 39–117)
ALP SERPL-CCNC: 182 U/L (ref 39–117)
ALP SERPL-CCNC: 183 U/L (ref 39–117)
ALP SERPL-CCNC: 186 U/L (ref 39–117)
ALP SERPL-CCNC: 187 U/L (ref 39–117)
ALP SERPL-CCNC: 197 U/L (ref 39–117)
ALP SERPL-CCNC: 198 U/L (ref 39–117)
ALP SERPL-CCNC: 205 U/L (ref 39–117)
ALPHA1 GLOB MFR UR ELPH: 216 MG/DL (ref 90–200)
ALT SERPL W P-5'-P-CCNC: 114 U/L (ref 1–41)
ALT SERPL W P-5'-P-CCNC: 164 U/L (ref 1–41)
ALT SERPL W P-5'-P-CCNC: 164 U/L (ref 1–41)
ALT SERPL W P-5'-P-CCNC: 184 U/L (ref 1–41)
ALT SERPL W P-5'-P-CCNC: 190 U/L (ref 1–41)
ALT SERPL W P-5'-P-CCNC: 200 U/L (ref 1–41)
ALT SERPL W P-5'-P-CCNC: 29 U/L (ref 1–41)
ALT SERPL W P-5'-P-CCNC: 298 U/L (ref 1–41)
ALT SERPL W P-5'-P-CCNC: 30 U/L (ref 1–41)
ALT SERPL W P-5'-P-CCNC: 33 U/L (ref 1–41)
ALT SERPL W P-5'-P-CCNC: 34 U/L (ref 1–41)
ALT SERPL W P-5'-P-CCNC: 35 U/L (ref 1–41)
ALT SERPL W P-5'-P-CCNC: 36 U/L (ref 1–41)
ALT SERPL W P-5'-P-CCNC: 376 U/L (ref 1–41)
ALT SERPL W P-5'-P-CCNC: 38 U/L (ref 1–41)
ALT SERPL W P-5'-P-CCNC: 38 U/L (ref 1–41)
ALT SERPL W P-5'-P-CCNC: 39 U/L (ref 1–41)
ALT SERPL W P-5'-P-CCNC: 459 U/L (ref 1–41)
ALT SERPL W P-5'-P-CCNC: 49 U/L (ref 1–41)
ALT SERPL W P-5'-P-CCNC: 49 U/L (ref 1–41)
ALT SERPL W P-5'-P-CCNC: 51 U/L (ref 1–41)
ALT SERPL W P-5'-P-CCNC: 53 U/L (ref 1–41)
ALT SERPL W P-5'-P-CCNC: 54 U/L (ref 1–41)
ALT SERPL W P-5'-P-CCNC: 57 U/L (ref 1–41)
ALT SERPL W P-5'-P-CCNC: 580 U/L (ref 1–41)
ALT SERPL W P-5'-P-CCNC: 67 U/L (ref 1–41)
ALT SERPL W P-5'-P-CCNC: 687 U/L (ref 1–41)
ALT SERPL W P-5'-P-CCNC: 79 U/L (ref 1–41)
ALT SERPL W P-5'-P-CCNC: 89 U/L (ref 1–41)
AMMONIA BLD-SCNC: 112 UMOL/L (ref 16–60)
AMMONIA BLD-SCNC: 25 UMOL/L (ref 16–60)
AMMONIA BLD-SCNC: 31 UMOL/L (ref 16–60)
AMMONIA BLD-SCNC: 34 UMOL/L (ref 16–60)
AMMONIA BLD-SCNC: 40 UMOL/L (ref 16–60)
AMMONIA BLD-SCNC: 40 UMOL/L (ref 16–60)
AMMONIA BLD-SCNC: 45 UMOL/L (ref 16–60)
AMMONIA BLD-SCNC: 53 UMOL/L (ref 16–60)
AMMONIA BLD-SCNC: 57 UMOL/L (ref 16–60)
AMMONIA BLD-SCNC: 59 UMOL/L (ref 16–60)
AMMONIA BLD-SCNC: 70 UMOL/L (ref 16–60)
AMMONIA BLD-SCNC: 77 UMOL/L (ref 16–60)
AMMONIA BLD-SCNC: 82 UMOL/L (ref 16–60)
AMPHET+METHAMPHET UR QL: NEGATIVE
AMPHETAMINES UR QL: NEGATIVE
ANION GAP SERPL CALCULATED.3IONS-SCNC: 10 MMOL/L (ref 5–15)
ANION GAP SERPL CALCULATED.3IONS-SCNC: 10 MMOL/L (ref 5–15)
ANION GAP SERPL CALCULATED.3IONS-SCNC: 11 MMOL/L (ref 5–15)
ANION GAP SERPL CALCULATED.3IONS-SCNC: 12 MMOL/L (ref 5–15)
ANION GAP SERPL CALCULATED.3IONS-SCNC: 13 MMOL/L (ref 5–15)
ANION GAP SERPL CALCULATED.3IONS-SCNC: 14 MMOL/L (ref 5–15)
ANION GAP SERPL CALCULATED.3IONS-SCNC: 15 MMOL/L (ref 5–15)
ANION GAP SERPL CALCULATED.3IONS-SCNC: 17 MMOL/L (ref 5–15)
ANION GAP SERPL CALCULATED.3IONS-SCNC: 17 MMOL/L (ref 5–15)
ANION GAP SERPL CALCULATED.3IONS-SCNC: 18 MMOL/L (ref 5–15)
ANION GAP SERPL CALCULATED.3IONS-SCNC: 19 MMOL/L (ref 5–15)
ANION GAP SERPL CALCULATED.3IONS-SCNC: 19 MMOL/L (ref 5–15)
ANION GAP SERPL CALCULATED.3IONS-SCNC: 20 MMOL/L (ref 5–15)
ANION GAP SERPL CALCULATED.3IONS-SCNC: 22 MMOL/L (ref 5–15)
ANION GAP SERPL CALCULATED.3IONS-SCNC: 22 MMOL/L (ref 5–15)
ANION GAP SERPL CALCULATED.3IONS-SCNC: 23 MMOL/L (ref 5–15)
ANION GAP SERPL CALCULATED.3IONS-SCNC: 26 MMOL/L (ref 5–15)
ANION GAP SERPL CALCULATED.3IONS-SCNC: 9 MMOL/L (ref 5–15)
ANISOCYTOSIS BLD QL: ABNORMAL
ANTI-E: NORMAL
ANTI-LITTLE C: NORMAL
APAP SERPL-MCNC: <5 MCG/ML (ref 0–30)
APTT PPP: 33.7 SECONDS (ref 24.1–35)
ARTERIAL PATENCY WRIST A: ABNORMAL
ARTERIAL PATENCY WRIST A: POSITIVE
AST SERPL-CCNC: 111 U/L (ref 1–40)
AST SERPL-CCNC: 122 U/L (ref 1–40)
AST SERPL-CCNC: 186 U/L (ref 1–40)
AST SERPL-CCNC: 252 U/L (ref 1–40)
AST SERPL-CCNC: 269 U/L (ref 1–40)
AST SERPL-CCNC: 282 U/L (ref 1–40)
AST SERPL-CCNC: 284 U/L (ref 1–40)
AST SERPL-CCNC: 285 U/L (ref 1–40)
AST SERPL-CCNC: 31 U/L (ref 1–40)
AST SERPL-CCNC: 31 U/L (ref 1–40)
AST SERPL-CCNC: 32 U/L (ref 1–40)
AST SERPL-CCNC: 33 U/L (ref 1–40)
AST SERPL-CCNC: 34 U/L (ref 1–40)
AST SERPL-CCNC: 35 U/L (ref 1–40)
AST SERPL-CCNC: 37 U/L (ref 1–40)
AST SERPL-CCNC: 37 U/L (ref 1–40)
AST SERPL-CCNC: 39 U/L (ref 1–40)
AST SERPL-CCNC: 428 U/L (ref 1–40)
AST SERPL-CCNC: 43 U/L (ref 1–40)
AST SERPL-CCNC: 43 U/L (ref 1–40)
AST SERPL-CCNC: 45 U/L (ref 1–40)
AST SERPL-CCNC: 46 U/L (ref 1–40)
AST SERPL-CCNC: 47 U/L (ref 1–40)
AST SERPL-CCNC: 55 U/L (ref 1–40)
AST SERPL-CCNC: 60 U/L (ref 1–40)
AST SERPL-CCNC: 653 U/L (ref 1–40)
AST SERPL-CCNC: 66 U/L (ref 1–40)
AST SERPL-CCNC: 91 U/L (ref 1–40)
AST SERPL-CCNC: 97 U/L (ref 1–40)
ATMOSPHERIC PRESS: 740 MMHG
ATMOSPHERIC PRESS: 740 MMHG
ATMOSPHERIC PRESS: 744 MMHG
ATMOSPHERIC PRESS: 745 MMHG
ATMOSPHERIC PRESS: 747 MMHG
ATMOSPHERIC PRESS: 748 MMHG
ATMOSPHERIC PRESS: 750 MMHG
ATMOSPHERIC PRESS: 750 MMHG
ATMOSPHERIC PRESS: 751 MMHG
ATMOSPHERIC PRESS: 753 MMHG
ATMOSPHERIC PRESS: 754 MMHG
B-OH-BUTYR SERPL-MCNC: 8.1 MG/DL
BACTERIA SPEC AEROBE CULT: NORMAL
BACTERIA UR QL AUTO: ABNORMAL /HPF
BARBITURATES UR QL SCN: NEGATIVE
BASE EXCESS BLDA CALC-SCNC: -1.9 MMOL/L (ref 0–2)
BASE EXCESS BLDA CALC-SCNC: -19.8 MMOL/L (ref 0–2)
BASE EXCESS BLDA CALC-SCNC: -2.2 MMOL/L (ref 0–2)
BASE EXCESS BLDA CALC-SCNC: -2.2 MMOL/L (ref 0–2)
BASE EXCESS BLDA CALC-SCNC: -3 MMOL/L (ref 0–2)
BASE EXCESS BLDA CALC-SCNC: -3.3 MMOL/L (ref 0–2)
BASE EXCESS BLDA CALC-SCNC: -9.8 MMOL/L (ref 0–2)
BASE EXCESS BLDA CALC-SCNC: 0.9 MMOL/L (ref 0–2)
BASE EXCESS BLDA CALC-SCNC: 3.4 MMOL/L (ref 0–2)
BASE EXCESS BLDA CALC-SCNC: 4.7 MMOL/L (ref 0–2)
BASE EXCESS BLDA CALC-SCNC: 5.6 MMOL/L (ref 0–2)
BASOPHILS # BLD AUTO: 0.01 10*3/MM3 (ref 0–0.2)
BASOPHILS # BLD AUTO: 0.02 10*3/MM3 (ref 0–0.2)
BASOPHILS # BLD AUTO: 0.02 10*3/MM3 (ref 0–0.2)
BASOPHILS # BLD AUTO: 0.03 10*3/MM3 (ref 0–0.2)
BASOPHILS # BLD AUTO: 0.04 10*3/MM3 (ref 0–0.2)
BASOPHILS # BLD AUTO: 0.05 10*3/MM3 (ref 0–0.2)
BASOPHILS # BLD AUTO: 0.06 10*3/MM3 (ref 0–0.2)
BASOPHILS NFR BLD AUTO: 0.1 % (ref 0–1.5)
BASOPHILS NFR BLD AUTO: 0.2 % (ref 0–1.5)
BASOPHILS NFR BLD AUTO: 0.2 % (ref 0–1.5)
BASOPHILS NFR BLD AUTO: 0.4 % (ref 0–1.5)
BASOPHILS NFR BLD AUTO: 0.5 % (ref 0–1.5)
BASOPHILS NFR BLD AUTO: 0.6 % (ref 0–1.5)
BASOPHILS NFR BLD AUTO: 0.6 % (ref 0–1.5)
BASOPHILS NFR BLD AUTO: 0.7 % (ref 0–1.5)
BASOPHILS NFR BLD AUTO: 0.8 % (ref 0–1.5)
BASOPHILS NFR BLD AUTO: 0.9 % (ref 0–1.5)
BASOPHILS NFR BLD AUTO: 1 % (ref 0–1.5)
BASOPHILS NFR BLD AUTO: 1.1 % (ref 0–1.5)
BDY SITE: ABNORMAL
BENZODIAZ UR QL SCN: NEGATIVE
BENZODIAZ UR QL SCN: POSITIVE
BH BB BLOOD EXPIRATION DATE: NORMAL
BH BB BLOOD EXPIRATION DATE: NORMAL
BH BB BLOOD TYPE BARCODE: 5100
BH BB BLOOD TYPE BARCODE: 5100
BH BB DISPENSE STATUS: NORMAL
BH BB DISPENSE STATUS: NORMAL
BH BB PRODUCT CODE: NORMAL
BH BB PRODUCT CODE: NORMAL
BH BB UNIT NUMBER: NORMAL
BH BB UNIT NUMBER: NORMAL
BILIRUB CONJ SERPL-MCNC: 1.5 MG/DL (ref 0–0.3)
BILIRUB INDIRECT SERPL-MCNC: 1.1 MG/DL
BILIRUB SERPL-MCNC: 1.3 MG/DL (ref 0–1.2)
BILIRUB SERPL-MCNC: 1.4 MG/DL (ref 0–1.2)
BILIRUB SERPL-MCNC: 1.5 MG/DL (ref 0–1.2)
BILIRUB SERPL-MCNC: 1.5 MG/DL (ref 0–1.2)
BILIRUB SERPL-MCNC: 1.6 MG/DL (ref 0–1.2)
BILIRUB SERPL-MCNC: 1.6 MG/DL (ref 0–1.2)
BILIRUB SERPL-MCNC: 1.7 MG/DL (ref 0–1.2)
BILIRUB SERPL-MCNC: 1.9 MG/DL (ref 0–1.2)
BILIRUB SERPL-MCNC: 2 MG/DL (ref 0–1.2)
BILIRUB SERPL-MCNC: 2.1 MG/DL (ref 0–1.2)
BILIRUB SERPL-MCNC: 2.4 MG/DL (ref 0–1.2)
BILIRUB SERPL-MCNC: 2.4 MG/DL (ref 0–1.2)
BILIRUB SERPL-MCNC: 2.5 MG/DL (ref 0–1.2)
BILIRUB SERPL-MCNC: 2.5 MG/DL (ref 0–1.2)
BILIRUB SERPL-MCNC: 2.6 MG/DL (ref 0–1.2)
BILIRUB SERPL-MCNC: 2.7 MG/DL (ref 0–1.2)
BILIRUB SERPL-MCNC: 3 MG/DL (ref 0–1.2)
BILIRUB UR QL STRIP: NEGATIVE
BILIRUB UR QL STRIP: NEGATIVE
BLD GP AB SCN SERPL QL: POSITIVE
BODY TEMPERATURE: 37 C
BUN SERPL-MCNC: 16 MG/DL (ref 8–23)
BUN SERPL-MCNC: 18 MG/DL (ref 8–23)
BUN SERPL-MCNC: 18 MG/DL (ref 8–23)
BUN SERPL-MCNC: 19 MG/DL (ref 8–23)
BUN SERPL-MCNC: 21 MG/DL (ref 8–23)
BUN SERPL-MCNC: 24 MG/DL (ref 8–23)
BUN SERPL-MCNC: 25 MG/DL (ref 8–23)
BUN SERPL-MCNC: 27 MG/DL (ref 8–23)
BUN SERPL-MCNC: 28 MG/DL (ref 8–23)
BUN SERPL-MCNC: 29 MG/DL (ref 8–23)
BUN SERPL-MCNC: 29 MG/DL (ref 8–23)
BUN SERPL-MCNC: 30 MG/DL (ref 8–23)
BUN SERPL-MCNC: 31 MG/DL (ref 8–23)
BUN SERPL-MCNC: 32 MG/DL (ref 8–23)
BUN SERPL-MCNC: 39 MG/DL (ref 8–23)
BUN SERPL-MCNC: 46 MG/DL (ref 8–23)
BUN SERPL-MCNC: 49 MG/DL (ref 8–23)
BUN SERPL-MCNC: 50 MG/DL (ref 8–23)
BUN SERPL-MCNC: 52 MG/DL (ref 8–23)
BUN SERPL-MCNC: 52 MG/DL (ref 8–23)
BUN SERPL-MCNC: 53 MG/DL (ref 8–23)
BUN SERPL-MCNC: 54 MG/DL (ref 8–23)
BUN SERPL-MCNC: 54 MG/DL (ref 8–23)
BUN SERPL-MCNC: 57 MG/DL (ref 8–23)
BUN SERPL-MCNC: 57 MG/DL (ref 8–23)
BUN SERPL-MCNC: 58 MG/DL (ref 8–23)
BUN SERPL-MCNC: 58 MG/DL (ref 8–23)
BUN SERPL-MCNC: 61 MG/DL (ref 8–23)
BUN SERPL-MCNC: 64 MG/DL (ref 8–23)
BUN SERPL-MCNC: 67 MG/DL (ref 8–23)
BUN SERPL-MCNC: 70 MG/DL (ref 8–23)
BUN SERPL-MCNC: 70 MG/DL (ref 8–23)
BUN SERPL-MCNC: 71 MG/DL (ref 8–23)
BUN/CREAT SERPL: 12 (ref 7–25)
BUN/CREAT SERPL: 12.3 (ref 7–25)
BUN/CREAT SERPL: 12.6 (ref 7–25)
BUN/CREAT SERPL: 12.7 (ref 7–25)
BUN/CREAT SERPL: 13.4 (ref 7–25)
BUN/CREAT SERPL: 13.5 (ref 7–25)
BUN/CREAT SERPL: 14.9 (ref 7–25)
BUN/CREAT SERPL: 15.8 (ref 7–25)
BUN/CREAT SERPL: 16.2 (ref 7–25)
BUN/CREAT SERPL: 16.2 (ref 7–25)
BUN/CREAT SERPL: 16.8 (ref 7–25)
BUN/CREAT SERPL: 17.8 (ref 7–25)
BUN/CREAT SERPL: 18.8 (ref 7–25)
BUN/CREAT SERPL: 19.7 (ref 7–25)
BUN/CREAT SERPL: 19.9 (ref 7–25)
BUN/CREAT SERPL: 22.7 (ref 7–25)
BUN/CREAT SERPL: 23.2 (ref 7–25)
BUN/CREAT SERPL: 24.4 (ref 7–25)
BUN/CREAT SERPL: 25.2 (ref 7–25)
BUN/CREAT SERPL: 25.4 (ref 7–25)
BUN/CREAT SERPL: 25.5 (ref 7–25)
BUN/CREAT SERPL: 25.8 (ref 7–25)
BUN/CREAT SERPL: 25.9 (ref 7–25)
BUN/CREAT SERPL: 27.4 (ref 7–25)
BUN/CREAT SERPL: 28.1 (ref 7–25)
BUN/CREAT SERPL: 28.7 (ref 7–25)
BUN/CREAT SERPL: 28.7 (ref 7–25)
BUN/CREAT SERPL: 29.6 (ref 7–25)
BUN/CREAT SERPL: 29.9 (ref 7–25)
BUN/CREAT SERPL: 30 (ref 7–25)
BUN/CREAT SERPL: 30.3 (ref 7–25)
BUN/CREAT SERPL: 30.5 (ref 7–25)
BUN/CREAT SERPL: 30.7 (ref 7–25)
BUN/CREAT SERPL: 31 (ref 7–25)
BUN/CREAT SERPL: 32 (ref 7–25)
BUPRENORPHINE SERPL-MCNC: NEGATIVE NG/ML
BURR CELLS BLD QL SMEAR: ABNORMAL
CALCIUM SPEC-SCNC: 10.1 MG/DL (ref 8.6–10.5)
CALCIUM SPEC-SCNC: 10.2 MG/DL (ref 8.6–10.5)
CALCIUM SPEC-SCNC: 10.3 MG/DL (ref 8.6–10.5)
CALCIUM SPEC-SCNC: 8.7 MG/DL (ref 8.6–10.5)
CALCIUM SPEC-SCNC: 8.7 MG/DL (ref 8.6–10.5)
CALCIUM SPEC-SCNC: 8.8 MG/DL (ref 8.6–10.5)
CALCIUM SPEC-SCNC: 8.9 MG/DL (ref 8.6–10.5)
CALCIUM SPEC-SCNC: 9 MG/DL (ref 8.6–10.5)
CALCIUM SPEC-SCNC: 9.1 MG/DL (ref 8.6–10.5)
CALCIUM SPEC-SCNC: 9.1 MG/DL (ref 8.6–10.5)
CALCIUM SPEC-SCNC: 9.2 MG/DL (ref 8.6–10.5)
CALCIUM SPEC-SCNC: 9.3 MG/DL (ref 8.6–10.5)
CALCIUM SPEC-SCNC: 9.4 MG/DL (ref 8.6–10.5)
CALCIUM SPEC-SCNC: 9.5 MG/DL (ref 8.6–10.5)
CALCIUM SPEC-SCNC: 9.5 MG/DL (ref 8.6–10.5)
CALCIUM SPEC-SCNC: 9.6 MG/DL (ref 8.6–10.5)
CALCIUM SPEC-SCNC: 9.7 MG/DL (ref 8.6–10.5)
CALCIUM SPEC-SCNC: 9.7 MG/DL (ref 8.6–10.5)
CALCIUM SPEC-SCNC: 9.9 MG/DL (ref 8.6–10.5)
CANNABINOIDS SERPL QL: NEGATIVE
CENTROMERE B AB SER-ACNC: <0.2 AI (ref 0–0.9)
CERULOPLASMIN SERPL-MCNC: 30 MG/DL (ref 16–31)
CHLORIDE SERPL-SCNC: 102 MMOL/L (ref 98–107)
CHLORIDE SERPL-SCNC: 106 MMOL/L (ref 98–107)
CHLORIDE SERPL-SCNC: 112 MMOL/L (ref 98–107)
CHLORIDE SERPL-SCNC: 119 MMOL/L (ref 98–107)
CHLORIDE SERPL-SCNC: 82 MMOL/L (ref 98–107)
CHLORIDE SERPL-SCNC: 83 MMOL/L (ref 98–107)
CHLORIDE SERPL-SCNC: 86 MMOL/L (ref 98–107)
CHLORIDE SERPL-SCNC: 87 MMOL/L (ref 98–107)
CHLORIDE SERPL-SCNC: 87 MMOL/L (ref 98–107)
CHLORIDE SERPL-SCNC: 88 MMOL/L (ref 98–107)
CHLORIDE SERPL-SCNC: 88 MMOL/L (ref 98–107)
CHLORIDE SERPL-SCNC: 89 MMOL/L (ref 98–107)
CHLORIDE SERPL-SCNC: 90 MMOL/L (ref 98–107)
CHLORIDE SERPL-SCNC: 91 MMOL/L (ref 98–107)
CHLORIDE SERPL-SCNC: 91 MMOL/L (ref 98–107)
CHLORIDE SERPL-SCNC: 92 MMOL/L (ref 98–107)
CHLORIDE SERPL-SCNC: 93 MMOL/L (ref 98–107)
CHLORIDE SERPL-SCNC: 94 MMOL/L (ref 98–107)
CHLORIDE SERPL-SCNC: 95 MMOL/L (ref 98–107)
CHLORIDE SERPL-SCNC: 96 MMOL/L (ref 98–107)
CHLORIDE SERPL-SCNC: 99 MMOL/L (ref 98–107)
CHOLEST SERPL-MCNC: 71 MG/DL (ref 0–200)
CHROMATIN AB SERPL-ACNC: <0.2 AI (ref 0–0.9)
CK SERPL-CCNC: 1503 U/L (ref 20–200)
CLARITY UR: CLEAR
CLARITY UR: CLEAR
CO2 SERPL-SCNC: 14 MMOL/L (ref 22–29)
CO2 SERPL-SCNC: 15 MMOL/L (ref 22–29)
CO2 SERPL-SCNC: 16 MMOL/L (ref 22–29)
CO2 SERPL-SCNC: 18 MMOL/L (ref 22–29)
CO2 SERPL-SCNC: 18 MMOL/L (ref 22–29)
CO2 SERPL-SCNC: 19 MMOL/L (ref 22–29)
CO2 SERPL-SCNC: 20 MMOL/L (ref 22–29)
CO2 SERPL-SCNC: 21 MMOL/L (ref 22–29)
CO2 SERPL-SCNC: 21 MMOL/L (ref 22–29)
CO2 SERPL-SCNC: 22 MMOL/L (ref 22–29)
CO2 SERPL-SCNC: 23 MMOL/L (ref 22–29)
CO2 SERPL-SCNC: 23 MMOL/L (ref 22–29)
CO2 SERPL-SCNC: 24 MMOL/L (ref 22–29)
CO2 SERPL-SCNC: 25 MMOL/L (ref 22–29)
CO2 SERPL-SCNC: 26 MMOL/L (ref 22–29)
CO2 SERPL-SCNC: 27 MMOL/L (ref 22–29)
CO2 SERPL-SCNC: 29 MMOL/L (ref 22–29)
CO2 SERPL-SCNC: 30 MMOL/L (ref 22–29)
CO2 SERPL-SCNC: 30 MMOL/L (ref 22–29)
CO2 SERPL-SCNC: 31 MMOL/L (ref 22–29)
COCAINE UR QL: NEGATIVE
COHGB MFR BLD: 1.6 % (ref 0–5)
COLOR UR: YELLOW
COLOR UR: YELLOW
CREAT SERPL-MCNC: 1.3 MG/DL (ref 0.76–1.27)
CREAT SERPL-MCNC: 1.41 MG/DL (ref 0.76–1.27)
CREAT SERPL-MCNC: 1.41 MG/DL (ref 0.76–1.27)
CREAT SERPL-MCNC: 1.43 MG/DL (ref 0.76–1.27)
CREAT SERPL-MCNC: 1.47 MG/DL (ref 0.76–1.27)
CREAT SERPL-MCNC: 1.49 MG/DL (ref 0.76–1.27)
CREAT SERPL-MCNC: 1.5 MG/DL (ref 0.76–1.27)
CREAT SERPL-MCNC: 1.51 MG/DL (ref 0.76–1.27)
CREAT SERPL-MCNC: 1.52 MG/DL (ref 0.76–1.27)
CREAT SERPL-MCNC: 1.54 MG/DL (ref 0.76–1.27)
CREAT SERPL-MCNC: 1.65 MG/DL (ref 0.76–1.27)
CREAT SERPL-MCNC: 1.66 MG/DL (ref 0.76–1.27)
CREAT SERPL-MCNC: 1.68 MG/DL (ref 0.76–1.27)
CREAT SERPL-MCNC: 1.68 MG/DL (ref 0.76–1.27)
CREAT SERPL-MCNC: 1.69 MG/DL (ref 0.76–1.27)
CREAT SERPL-MCNC: 1.73 MG/DL (ref 0.76–1.27)
CREAT SERPL-MCNC: 1.79 MG/DL (ref 0.76–1.27)
CREAT SERPL-MCNC: 1.79 MG/DL (ref 0.76–1.27)
CREAT SERPL-MCNC: 1.81 MG/DL (ref 0.76–1.27)
CREAT SERPL-MCNC: 1.81 MG/DL (ref 0.76–1.27)
CREAT SERPL-MCNC: 1.84 MG/DL (ref 0.76–1.27)
CREAT SERPL-MCNC: 1.88 MG/DL (ref 0.76–1.27)
CREAT SERPL-MCNC: 1.94 MG/DL (ref 0.76–1.27)
CREAT SERPL-MCNC: 1.96 MG/DL (ref 0.76–1.27)
CREAT SERPL-MCNC: 2 MG/DL (ref 0.76–1.27)
CREAT SERPL-MCNC: 2.03 MG/DL (ref 0.76–1.27)
CREAT SERPL-MCNC: 2.05 MG/DL (ref 0.76–1.27)
CREAT SERPL-MCNC: 2.09 MG/DL (ref 0.76–1.27)
CREAT SERPL-MCNC: 2.13 MG/DL (ref 0.76–1.27)
CREAT SERPL-MCNC: 2.16 MG/DL (ref 0.76–1.27)
CREAT SERPL-MCNC: 2.16 MG/DL (ref 0.76–1.27)
CREAT SERPL-MCNC: 2.28 MG/DL (ref 0.76–1.27)
CREAT SERPL-MCNC: 2.3 MG/DL (ref 0.76–1.27)
CREAT SERPL-MCNC: 2.37 MG/DL (ref 0.76–1.27)
CREAT SERPL-MCNC: 2.44 MG/DL (ref 0.76–1.27)
CREAT UR-MCNC: 13.3 MG/DL
CROSSMATCH INTERPRETATION: NORMAL
CROSSMATCH INTERPRETATION: NORMAL
CRP SERPL-MCNC: <0.3 MG/DL (ref 0–0.5)
D DIMER PPP FEU-MCNC: 0.34 MG/L (FEU) (ref 0–0.5)
D-LACTATE SERPL-SCNC: 1.1 MMOL/L (ref 0.5–2)
D-LACTATE SERPL-SCNC: 1.3 MMOL/L (ref 0.5–2)
D-LACTATE SERPL-SCNC: 1.3 MMOL/L (ref 0.5–2)
D-LACTATE SERPL-SCNC: 1.4 MMOL/L (ref 0.5–2)
D-LACTATE SERPL-SCNC: 2 MMOL/L (ref 0.5–2)
D-LACTATE SERPL-SCNC: 2.1 MMOL/L (ref 0.5–2)
D-LACTATE SERPL-SCNC: 2.7 MMOL/L (ref 0.5–2)
D-LACTATE SERPL-SCNC: 3.1 MMOL/L (ref 0.5–2)
D-LACTATE SERPL-SCNC: 9.9 MMOL/L (ref 0.5–2)
DAT IGG GEL: POSITIVE
DAT POLY-SP REAG RBC QL: POSITIVE
DEPRECATED RDW RBC AUTO: 62 FL (ref 37–54)
DEPRECATED RDW RBC AUTO: 62.3 FL (ref 37–54)
DEPRECATED RDW RBC AUTO: 64 FL (ref 37–54)
DEPRECATED RDW RBC AUTO: 64 FL (ref 37–54)
DEPRECATED RDW RBC AUTO: 64.8 FL (ref 37–54)
DEPRECATED RDW RBC AUTO: 66.5 FL (ref 37–54)
DEPRECATED RDW RBC AUTO: 68.4 FL (ref 37–54)
DEPRECATED RDW RBC AUTO: 69.2 FL (ref 37–54)
DEPRECATED RDW RBC AUTO: 69.7 FL (ref 37–54)
DEPRECATED RDW RBC AUTO: 70.5 FL (ref 37–54)
DEPRECATED RDW RBC AUTO: 71.5 FL (ref 37–54)
DEPRECATED RDW RBC AUTO: 73.2 FL (ref 37–54)
DEPRECATED RDW RBC AUTO: 74 FL (ref 37–54)
DEPRECATED RDW RBC AUTO: 75.6 FL (ref 37–54)
DEPRECATED RDW RBC AUTO: 75.8 FL (ref 37–54)
DEPRECATED RDW RBC AUTO: 76.4 FL (ref 37–54)
DEPRECATED RDW RBC AUTO: 77.1 FL (ref 37–54)
DEPRECATED RDW RBC AUTO: 77.5 FL (ref 37–54)
DEPRECATED RDW RBC AUTO: 77.9 FL (ref 37–54)
DEPRECATED RDW RBC AUTO: 78.3 FL (ref 37–54)
DEPRECATED RDW RBC AUTO: 79.1 FL (ref 37–54)
DEPRECATED RDW RBC AUTO: 79.7 FL (ref 37–54)
DEPRECATED RDW RBC AUTO: 80.9 FL (ref 37–54)
DEPRECATED RDW RBC AUTO: 82.5 FL (ref 37–54)
DEPRECATED RDW RBC AUTO: 82.7 FL (ref 37–54)
DEPRECATED RDW RBC AUTO: 85.2 FL (ref 37–54)
DEPRECATED RDW RBC AUTO: 85.9 FL (ref 37–54)
DEPRECATED RDW RBC AUTO: 88.6 FL (ref 37–54)
DEPRECATED RDW RBC AUTO: 91.2 FL (ref 37–54)
DEVELOPER EXPIRATION DATE: 215
DEVELOPER LOT NUMBER: 215
DSDNA AB SER-ACNC: <1 IU/ML (ref 0–9)
EGFRCR SERPLBLD CKD-EPI 2021: 27.1 ML/MIN/1.73
EGFRCR SERPLBLD CKD-EPI 2021: 28 ML/MIN/1.73
EGFRCR SERPLBLD CKD-EPI 2021: 29.1 ML/MIN/1.73
EGFRCR SERPLBLD CKD-EPI 2021: 29.4 ML/MIN/1.73
EGFRCR SERPLBLD CKD-EPI 2021: 31.3 ML/MIN/1.73
EGFRCR SERPLBLD CKD-EPI 2021: 31.3 ML/MIN/1.73
EGFRCR SERPLBLD CKD-EPI 2021: 31.9 ML/MIN/1.73
EGFRCR SERPLBLD CKD-EPI 2021: 32.6 ML/MIN/1.73
EGFRCR SERPLBLD CKD-EPI 2021: 33.4 ML/MIN/1.73
EGFRCR SERPLBLD CKD-EPI 2021: 33.8 ML/MIN/1.73
EGFRCR SERPLBLD CKD-EPI 2021: 34.4 ML/MIN/1.73
EGFRCR SERPLBLD CKD-EPI 2021: 35.2 ML/MIN/1.73
EGFRCR SERPLBLD CKD-EPI 2021: 35.7 ML/MIN/1.73
EGFRCR SERPLBLD CKD-EPI 2021: 37 ML/MIN/1.73
EGFRCR SERPLBLD CKD-EPI 2021: 38 ML/MIN/1.73
EGFRCR SERPLBLD CKD-EPI 2021: 38.8 ML/MIN/1.73
EGFRCR SERPLBLD CKD-EPI 2021: 38.8 ML/MIN/1.73
EGFRCR SERPLBLD CKD-EPI 2021: 39.3 ML/MIN/1.73
EGFRCR SERPLBLD CKD-EPI 2021: 39.3 ML/MIN/1.73
EGFRCR SERPLBLD CKD-EPI 2021: 40.9 ML/MIN/1.73
EGFRCR SERPLBLD CKD-EPI 2021: 42.1 ML/MIN/1.73
EGFRCR SERPLBLD CKD-EPI 2021: 42.4 ML/MIN/1.73
EGFRCR SERPLBLD CKD-EPI 2021: 42.4 ML/MIN/1.73
EGFRCR SERPLBLD CKD-EPI 2021: 43 ML/MIN/1.73
EGFRCR SERPLBLD CKD-EPI 2021: 43.3 ML/MIN/1.73
EGFRCR SERPLBLD CKD-EPI 2021: 47 ML/MIN/1.73
EGFRCR SERPLBLD CKD-EPI 2021: 47.8 ML/MIN/1.73
EGFRCR SERPLBLD CKD-EPI 2021: 48.2 ML/MIN/1.73
EGFRCR SERPLBLD CKD-EPI 2021: 48.6 ML/MIN/1.73
EGFRCR SERPLBLD CKD-EPI 2021: 48.9 ML/MIN/1.73
EGFRCR SERPLBLD CKD-EPI 2021: 49.7 ML/MIN/1.73
EGFRCR SERPLBLD CKD-EPI 2021: 51.4 ML/MIN/1.73
EGFRCR SERPLBLD CKD-EPI 2021: 52.3 ML/MIN/1.73
EGFRCR SERPLBLD CKD-EPI 2021: 52.3 ML/MIN/1.73
EGFRCR SERPLBLD CKD-EPI 2021: 57.6 ML/MIN/1.73
ENA JO1 AB SER-ACNC: <0.2 AI (ref 0–0.9)
ENA RNP AB SER-ACNC: <0.2 AI (ref 0–0.9)
ENA SCL70 AB SER-ACNC: <0.2 AI (ref 0–0.9)
ENA SM AB SER-ACNC: <0.2 AI (ref 0–0.9)
ENA SS-A AB SER-ACNC: <0.2 AI (ref 0–0.9)
ENA SS-B AB SER-ACNC: <0.2 AI (ref 0–0.9)
EOSINOPHIL # BLD AUTO: 0 10*3/MM3 (ref 0–0.4)
EOSINOPHIL # BLD AUTO: 0.01 10*3/MM3 (ref 0–0.4)
EOSINOPHIL # BLD AUTO: 0.05 10*3/MM3 (ref 0–0.4)
EOSINOPHIL # BLD AUTO: 0.07 10*3/MM3 (ref 0–0.4)
EOSINOPHIL # BLD AUTO: 0.13 10*3/MM3 (ref 0–0.4)
EOSINOPHIL # BLD AUTO: 0.14 10*3/MM3 (ref 0–0.4)
EOSINOPHIL # BLD AUTO: 0.14 10*3/MM3 (ref 0–0.4)
EOSINOPHIL # BLD AUTO: 0.16 10*3/MM3 (ref 0–0.4)
EOSINOPHIL # BLD AUTO: 0.16 10*3/MM3 (ref 0–0.4)
EOSINOPHIL # BLD AUTO: 0.18 10*3/MM3 (ref 0–0.4)
EOSINOPHIL # BLD AUTO: 0.18 10*3/MM3 (ref 0–0.4)
EOSINOPHIL # BLD AUTO: 0.2 10*3/MM3 (ref 0–0.4)
EOSINOPHIL # BLD AUTO: 0.21 10*3/MM3 (ref 0–0.4)
EOSINOPHIL # BLD AUTO: 0.22 10*3/MM3 (ref 0–0.4)
EOSINOPHIL # BLD AUTO: 0.25 10*3/MM3 (ref 0–0.4)
EOSINOPHIL NFR BLD AUTO: 0 % (ref 0.3–6.2)
EOSINOPHIL NFR BLD AUTO: 0.1 % (ref 0.3–6.2)
EOSINOPHIL NFR BLD AUTO: 0.6 % (ref 0.3–6.2)
EOSINOPHIL NFR BLD AUTO: 0.8 % (ref 0.3–6.2)
EOSINOPHIL NFR BLD AUTO: 1.6 % (ref 0.3–6.2)
EOSINOPHIL NFR BLD AUTO: 1.8 % (ref 0.3–6.2)
EOSINOPHIL NFR BLD AUTO: 2.4 % (ref 0.3–6.2)
EOSINOPHIL NFR BLD AUTO: 2.5 % (ref 0.3–6.2)
EOSINOPHIL NFR BLD AUTO: 2.5 % (ref 0.3–6.2)
EOSINOPHIL NFR BLD AUTO: 2.8 % (ref 0.3–6.2)
EOSINOPHIL NFR BLD AUTO: 3.2 % (ref 0.3–6.2)
EOSINOPHIL NFR BLD AUTO: 3.3 % (ref 0.3–6.2)
EOSINOPHIL NFR BLD AUTO: 3.4 % (ref 0.3–6.2)
EOSINOPHIL NFR BLD AUTO: 3.6 % (ref 0.3–6.2)
EOSINOPHIL NFR BLD AUTO: 4.9 % (ref 0.3–6.2)
ERYTHROCYTE [DISTWIDTH] IN BLOOD BY AUTOMATED COUNT: 20.5 % (ref 12.3–15.4)
ERYTHROCYTE [DISTWIDTH] IN BLOOD BY AUTOMATED COUNT: 20.6 % (ref 12.3–15.4)
ERYTHROCYTE [DISTWIDTH] IN BLOOD BY AUTOMATED COUNT: 21.2 % (ref 12.3–15.4)
ERYTHROCYTE [DISTWIDTH] IN BLOOD BY AUTOMATED COUNT: 21.4 % (ref 12.3–15.4)
ERYTHROCYTE [DISTWIDTH] IN BLOOD BY AUTOMATED COUNT: 21.5 % (ref 12.3–15.4)
ERYTHROCYTE [DISTWIDTH] IN BLOOD BY AUTOMATED COUNT: 21.7 % (ref 12.3–15.4)
ERYTHROCYTE [DISTWIDTH] IN BLOOD BY AUTOMATED COUNT: 21.7 % (ref 12.3–15.4)
ERYTHROCYTE [DISTWIDTH] IN BLOOD BY AUTOMATED COUNT: 21.8 % (ref 12.3–15.4)
ERYTHROCYTE [DISTWIDTH] IN BLOOD BY AUTOMATED COUNT: 22.1 % (ref 12.3–15.4)
ERYTHROCYTE [DISTWIDTH] IN BLOOD BY AUTOMATED COUNT: 22.2 % (ref 12.3–15.4)
ERYTHROCYTE [DISTWIDTH] IN BLOOD BY AUTOMATED COUNT: 22.2 % (ref 12.3–15.4)
ERYTHROCYTE [DISTWIDTH] IN BLOOD BY AUTOMATED COUNT: 22.5 % (ref 12.3–15.4)
ERYTHROCYTE [DISTWIDTH] IN BLOOD BY AUTOMATED COUNT: 22.5 % (ref 12.3–15.4)
ERYTHROCYTE [DISTWIDTH] IN BLOOD BY AUTOMATED COUNT: 23 % (ref 12.3–15.4)
ERYTHROCYTE [DISTWIDTH] IN BLOOD BY AUTOMATED COUNT: 23.3 % (ref 12.3–15.4)
ERYTHROCYTE [DISTWIDTH] IN BLOOD BY AUTOMATED COUNT: 24 % (ref 12.3–15.4)
ERYTHROCYTE [DISTWIDTH] IN BLOOD BY AUTOMATED COUNT: 24.1 % (ref 12.3–15.4)
ERYTHROCYTE [DISTWIDTH] IN BLOOD BY AUTOMATED COUNT: 24.5 % (ref 12.3–15.4)
ERYTHROCYTE [DISTWIDTH] IN BLOOD BY AUTOMATED COUNT: 25 % (ref 12.3–15.4)
ERYTHROCYTE [DISTWIDTH] IN BLOOD BY AUTOMATED COUNT: 25.2 % (ref 12.3–15.4)
ERYTHROCYTE [DISTWIDTH] IN BLOOD BY AUTOMATED COUNT: 25.7 % (ref 12.3–15.4)
ERYTHROCYTE [DISTWIDTH] IN BLOOD BY AUTOMATED COUNT: 25.9 % (ref 12.3–15.4)
ERYTHROCYTE [DISTWIDTH] IN BLOOD BY AUTOMATED COUNT: 26.1 % (ref 12.3–15.4)
ERYTHROCYTE [DISTWIDTH] IN BLOOD BY AUTOMATED COUNT: 26.6 % (ref 12.3–15.4)
ERYTHROCYTE [DISTWIDTH] IN BLOOD BY AUTOMATED COUNT: 26.8 % (ref 12.3–15.4)
ERYTHROCYTE [DISTWIDTH] IN BLOOD BY AUTOMATED COUNT: 27.1 % (ref 12.3–15.4)
ETHANOL UR QL: <0.01 %
EXPIRATION DATE: ABNORMAL
FECAL OCCULT BLOOD SCREEN, POC: POSITIVE
FERRITIN SERPL-MCNC: 140.3 NG/ML (ref 30–400)
FERRITIN SERPL-MCNC: 336.6 NG/ML (ref 30–400)
GAS FLOW AIRWAY: 15 LPM
GAS FLOW AIRWAY: 2 LPM
GGT SERPL-CCNC: 121 U/L (ref 8–61)
GIANT PLATELETS: ABNORMAL
GLOBULIN UR ELPH-MCNC: 1.6 GM/DL
GLOBULIN UR ELPH-MCNC: 1.9 GM/DL
GLOBULIN UR ELPH-MCNC: 2 GM/DL
GLOBULIN UR ELPH-MCNC: 2.1 GM/DL
GLOBULIN UR ELPH-MCNC: 2.2 GM/DL
GLOBULIN UR ELPH-MCNC: 2.2 GM/DL
GLOBULIN UR ELPH-MCNC: 2.3 GM/DL
GLOBULIN UR ELPH-MCNC: 2.4 GM/DL
GLOBULIN UR ELPH-MCNC: 2.4 GM/DL
GLOBULIN UR ELPH-MCNC: 2.5 GM/DL
GLOBULIN UR ELPH-MCNC: 2.6 GM/DL
GLOBULIN UR ELPH-MCNC: 2.7 GM/DL
GLOBULIN UR ELPH-MCNC: 2.8 GM/DL
GLOBULIN UR ELPH-MCNC: 2.9 GM/DL
GLUCOSE BLDC GLUCOMTR-MCNC: 122 MG/DL (ref 70–130)
GLUCOSE BLDC GLUCOMTR-MCNC: 126 MG/DL (ref 70–130)
GLUCOSE BLDC GLUCOMTR-MCNC: 137 MG/DL (ref 70–130)
GLUCOSE BLDC GLUCOMTR-MCNC: 144 MG/DL (ref 70–130)
GLUCOSE BLDC GLUCOMTR-MCNC: 148 MG/DL (ref 70–130)
GLUCOSE BLDC GLUCOMTR-MCNC: 150 MG/DL (ref 70–130)
GLUCOSE BLDC GLUCOMTR-MCNC: 154 MG/DL (ref 70–130)
GLUCOSE BLDC GLUCOMTR-MCNC: 157 MG/DL (ref 70–130)
GLUCOSE BLDC GLUCOMTR-MCNC: 164 MG/DL (ref 70–130)
GLUCOSE BLDC GLUCOMTR-MCNC: 167 MG/DL (ref 70–130)
GLUCOSE BLDC GLUCOMTR-MCNC: 179 MG/DL (ref 70–130)
GLUCOSE BLDC GLUCOMTR-MCNC: 198 MG/DL (ref 70–130)
GLUCOSE BLDC GLUCOMTR-MCNC: 198 MG/DL (ref 70–130)
GLUCOSE BLDC GLUCOMTR-MCNC: 213 MG/DL (ref 70–130)
GLUCOSE SERPL-MCNC: 106 MG/DL (ref 65–99)
GLUCOSE SERPL-MCNC: 108 MG/DL (ref 65–99)
GLUCOSE SERPL-MCNC: 109 MG/DL (ref 65–99)
GLUCOSE SERPL-MCNC: 112 MG/DL (ref 65–99)
GLUCOSE SERPL-MCNC: 113 MG/DL (ref 65–99)
GLUCOSE SERPL-MCNC: 114 MG/DL (ref 65–99)
GLUCOSE SERPL-MCNC: 114 MG/DL (ref 65–99)
GLUCOSE SERPL-MCNC: 116 MG/DL (ref 65–99)
GLUCOSE SERPL-MCNC: 118 MG/DL (ref 65–99)
GLUCOSE SERPL-MCNC: 119 MG/DL (ref 65–99)
GLUCOSE SERPL-MCNC: 120 MG/DL (ref 65–99)
GLUCOSE SERPL-MCNC: 120 MG/DL (ref 65–99)
GLUCOSE SERPL-MCNC: 121 MG/DL (ref 65–99)
GLUCOSE SERPL-MCNC: 121 MG/DL (ref 65–99)
GLUCOSE SERPL-MCNC: 126 MG/DL (ref 65–99)
GLUCOSE SERPL-MCNC: 129 MG/DL (ref 65–99)
GLUCOSE SERPL-MCNC: 129 MG/DL (ref 65–99)
GLUCOSE SERPL-MCNC: 130 MG/DL (ref 65–99)
GLUCOSE SERPL-MCNC: 130 MG/DL (ref 65–99)
GLUCOSE SERPL-MCNC: 131 MG/DL (ref 65–99)
GLUCOSE SERPL-MCNC: 135 MG/DL (ref 65–99)
GLUCOSE SERPL-MCNC: 136 MG/DL (ref 65–99)
GLUCOSE SERPL-MCNC: 146 MG/DL (ref 65–99)
GLUCOSE SERPL-MCNC: 153 MG/DL (ref 65–99)
GLUCOSE SERPL-MCNC: 156 MG/DL (ref 65–99)
GLUCOSE SERPL-MCNC: 163 MG/DL (ref 65–99)
GLUCOSE SERPL-MCNC: 166 MG/DL (ref 65–99)
GLUCOSE SERPL-MCNC: 168 MG/DL (ref 65–99)
GLUCOSE SERPL-MCNC: 168 MG/DL (ref 65–99)
GLUCOSE SERPL-MCNC: 179 MG/DL (ref 65–99)
GLUCOSE SERPL-MCNC: 197 MG/DL (ref 65–99)
GLUCOSE SERPL-MCNC: 204 MG/DL (ref 65–99)
GLUCOSE SERPL-MCNC: 243 MG/DL (ref 65–99)
GLUCOSE SERPL-MCNC: 78 MG/DL (ref 65–99)
GLUCOSE SERPL-MCNC: 98 MG/DL (ref 65–99)
GLUCOSE UR STRIP-MCNC: ABNORMAL MG/DL
GLUCOSE UR STRIP-MCNC: ABNORMAL MG/DL
HAPTOGLOB SERPL-MCNC: 127 MG/DL (ref 30–200)
HAV IGM SERPL QL IA: NORMAL
HAV IGM SERPL QL IA: NORMAL
HBA1C MFR BLD: 5.4 % (ref 4.8–5.6)
HBV CORE IGM SERPL QL IA: NORMAL
HBV CORE IGM SERPL QL IA: NORMAL
HBV SURFACE AG SERPL QL IA: NORMAL
HCO3 BLDA-SCNC: 18.2 MMOL/L (ref 20–26)
HCO3 BLDA-SCNC: 19.4 MMOL/L (ref 20–26)
HCO3 BLDA-SCNC: 19.7 MMOL/L (ref 20–26)
HCO3 BLDA-SCNC: 20.2 MMOL/L (ref 20–26)
HCO3 BLDA-SCNC: 21.3 MMOL/L (ref 20–26)
HCO3 BLDA-SCNC: 21.4 MMOL/L (ref 20–26)
HCO3 BLDA-SCNC: 24.4 MMOL/L (ref 20–26)
HCO3 BLDA-SCNC: 25.5 MMOL/L (ref 20–26)
HCO3 BLDA-SCNC: 27.1 MMOL/L (ref 20–26)
HCO3 BLDA-SCNC: 28.2 MMOL/L (ref 20–26)
HCO3 BLDA-SCNC: 9.8 MMOL/L (ref 20–26)
HCT VFR BLD AUTO: 26.2 % (ref 37.5–51)
HCT VFR BLD AUTO: 26.7 % (ref 37.5–51)
HCT VFR BLD AUTO: 27.1 % (ref 37.5–51)
HCT VFR BLD AUTO: 27.2 % (ref 37.5–51)
HCT VFR BLD AUTO: 27.9 % (ref 37.5–51)
HCT VFR BLD AUTO: 28.6 % (ref 37.5–51)
HCT VFR BLD AUTO: 28.7 % (ref 37.5–51)
HCT VFR BLD AUTO: 29.1 % (ref 37.5–51)
HCT VFR BLD AUTO: 29.1 % (ref 37.5–51)
HCT VFR BLD AUTO: 29.9 % (ref 37.5–51)
HCT VFR BLD AUTO: 30.1 % (ref 37.5–51)
HCT VFR BLD AUTO: 30.5 % (ref 37.5–51)
HCT VFR BLD AUTO: 30.6 % (ref 37.5–51)
HCT VFR BLD AUTO: 31.4 % (ref 37.5–51)
HCT VFR BLD AUTO: 31.8 % (ref 37.5–51)
HCT VFR BLD AUTO: 32.1 % (ref 37.5–51)
HCT VFR BLD AUTO: 32.2 % (ref 37.5–51)
HCT VFR BLD AUTO: 32.8 % (ref 37.5–51)
HCT VFR BLD AUTO: 33 % (ref 37.5–51)
HCT VFR BLD AUTO: 33.3 % (ref 37.5–51)
HCT VFR BLD AUTO: 33.4 % (ref 37.5–51)
HCT VFR BLD AUTO: 33.7 % (ref 37.5–51)
HCT VFR BLD AUTO: 33.8 % (ref 37.5–51)
HCT VFR BLD AUTO: 33.9 % (ref 37.5–51)
HCT VFR BLD AUTO: 33.9 % (ref 37.5–51)
HCT VFR BLD AUTO: 37.4 % (ref 37.5–51)
HCT VFR BLD AUTO: 39.5 % (ref 37.5–51)
HCT VFR BLD CALC: 33.6 % (ref 38–51)
HCV AB SER DONR QL: NORMAL
HDLC SERPL-MCNC: 31 MG/DL (ref 40–60)
HGB BLD-MCNC: 10.1 G/DL (ref 13–17.7)
HGB BLD-MCNC: 10.2 G/DL (ref 13–17.7)
HGB BLD-MCNC: 10.4 G/DL (ref 13–17.7)
HGB BLD-MCNC: 10.4 G/DL (ref 13–17.7)
HGB BLD-MCNC: 10.5 G/DL (ref 13–17.7)
HGB BLD-MCNC: 10.5 G/DL (ref 13–17.7)
HGB BLD-MCNC: 10.9 G/DL (ref 13–17.7)
HGB BLD-MCNC: 11.6 G/DL (ref 13–17.7)
HGB BLD-MCNC: 7.9 G/DL (ref 13–17.7)
HGB BLD-MCNC: 8.3 G/DL (ref 13–17.7)
HGB BLD-MCNC: 8.4 G/DL (ref 13–17.7)
HGB BLD-MCNC: 8.5 G/DL (ref 13–17.7)
HGB BLD-MCNC: 8.6 G/DL (ref 13–17.7)
HGB BLD-MCNC: 8.7 G/DL (ref 13–17.7)
HGB BLD-MCNC: 8.7 G/DL (ref 13–17.7)
HGB BLD-MCNC: 8.8 G/DL (ref 13–17.7)
HGB BLD-MCNC: 8.9 G/DL (ref 13–17.7)
HGB BLD-MCNC: 9.2 G/DL (ref 13–17.7)
HGB BLD-MCNC: 9.2 G/DL (ref 13–17.7)
HGB BLD-MCNC: 9.3 G/DL (ref 13–17.7)
HGB BLD-MCNC: 9.4 G/DL (ref 13–17.7)
HGB BLD-MCNC: 9.5 G/DL (ref 13–17.7)
HGB BLD-MCNC: 9.6 G/DL (ref 13–17.7)
HGB BLD-MCNC: 9.7 G/DL (ref 13–17.7)
HGB BLD-MCNC: 9.8 G/DL (ref 13–17.7)
HGB BLDA-MCNC: 11 G/DL (ref 14–18)
HGB UR QL STRIP.AUTO: NEGATIVE
HGB UR QL STRIP.AUTO: NEGATIVE
HIV1+2 AB SER QL: NORMAL
HOLD SPECIMEN: NORMAL
HYALINE CASTS UR QL AUTO: ABNORMAL /LPF
IMM GRANULOCYTES # BLD AUTO: 0.01 10*3/MM3 (ref 0–0.05)
IMM GRANULOCYTES # BLD AUTO: 0.02 10*3/MM3 (ref 0–0.05)
IMM GRANULOCYTES # BLD AUTO: 0.02 10*3/MM3 (ref 0–0.05)
IMM GRANULOCYTES # BLD AUTO: 0.03 10*3/MM3 (ref 0–0.05)
IMM GRANULOCYTES # BLD AUTO: 0.04 10*3/MM3 (ref 0–0.05)
IMM GRANULOCYTES # BLD AUTO: 0.04 10*3/MM3 (ref 0–0.05)
IMM GRANULOCYTES # BLD AUTO: 0.05 10*3/MM3 (ref 0–0.05)
IMM GRANULOCYTES # BLD AUTO: 0.06 10*3/MM3 (ref 0–0.05)
IMM GRANULOCYTES # BLD AUTO: 0.07 10*3/MM3 (ref 0–0.05)
IMM GRANULOCYTES # BLD AUTO: 0.08 10*3/MM3 (ref 0–0.05)
IMM GRANULOCYTES # BLD AUTO: 0.09 10*3/MM3 (ref 0–0.05)
IMM GRANULOCYTES # BLD AUTO: 0.15 10*3/MM3 (ref 0–0.05)
IMM GRANULOCYTES # BLD AUTO: 0.24 10*3/MM3 (ref 0–0.05)
IMM GRANULOCYTES NFR BLD AUTO: 0.2 % (ref 0–0.5)
IMM GRANULOCYTES NFR BLD AUTO: 0.3 % (ref 0–0.5)
IMM GRANULOCYTES NFR BLD AUTO: 0.3 % (ref 0–0.5)
IMM GRANULOCYTES NFR BLD AUTO: 0.4 % (ref 0–0.5)
IMM GRANULOCYTES NFR BLD AUTO: 0.4 % (ref 0–0.5)
IMM GRANULOCYTES NFR BLD AUTO: 0.5 % (ref 0–0.5)
IMM GRANULOCYTES NFR BLD AUTO: 0.6 % (ref 0–0.5)
IMM GRANULOCYTES NFR BLD AUTO: 0.7 % (ref 0–0.5)
IMM GRANULOCYTES NFR BLD AUTO: 1.3 % (ref 0–0.5)
IMM GRANULOCYTES NFR BLD AUTO: 1.6 % (ref 0–0.5)
INHALED O2 CONCENTRATION: 100 %
INHALED O2 CONCENTRATION: 100 %
INHALED O2 CONCENTRATION: 30 %
INHALED O2 CONCENTRATION: 50 %
INR PPP: 1.19 (ref 0.91–1.09)
INR PPP: 1.28 (ref 0.91–1.09)
INR PPP: 1.41 (ref 0.91–1.09)
INR PPP: 1.43 (ref 0.91–1.09)
INR PPP: 1.56 (ref 0.91–1.09)
INR PPP: 1.59 (ref 0.91–1.09)
INR PPP: 1.76 (ref 0.91–1.09)
IRON 24H UR-MRATE: 22 MCG/DL (ref 59–158)
IRON 24H UR-MRATE: 92 MCG/DL (ref 59–158)
IRON SATN MFR SERPL: 19 % (ref 20–50)
IRON SATN MFR SERPL: 5 % (ref 20–50)
KETONES UR QL STRIP: NEGATIVE
KETONES UR QL STRIP: NEGATIVE
LDH SERPL-CCNC: 326 U/L (ref 135–225)
LDLC SERPL CALC-MCNC: 24 MG/DL (ref 0–100)
LDLC/HDLC SERPL: 0.81 {RATIO}
LEUKOCYTE ESTERASE UR QL STRIP.AUTO: ABNORMAL
LEUKOCYTE ESTERASE UR QL STRIP.AUTO: NEGATIVE
LITHIUM SERPL-SCNC: 0.4 MMOL/L (ref 0.6–1.2)
LITHIUM SERPL-SCNC: 0.5 MMOL/L (ref 0.6–1.2)
LITHIUM SERPL-SCNC: 0.5 MMOL/L (ref 0.6–1.2)
LITHIUM SERPL-SCNC: 0.7 MMOL/L (ref 0.6–1.2)
LYMPHOCYTES # BLD AUTO: 0.49 10*3/MM3 (ref 0.7–3.1)
LYMPHOCYTES # BLD AUTO: 0.5 10*3/MM3 (ref 0.7–3.1)
LYMPHOCYTES # BLD AUTO: 0.54 10*3/MM3 (ref 0.7–3.1)
LYMPHOCYTES # BLD AUTO: 0.59 10*3/MM3 (ref 0.7–3.1)
LYMPHOCYTES # BLD AUTO: 0.62 10*3/MM3 (ref 0.7–3.1)
LYMPHOCYTES # BLD AUTO: 0.69 10*3/MM3 (ref 0.7–3.1)
LYMPHOCYTES # BLD AUTO: 0.69 10*3/MM3 (ref 0.7–3.1)
LYMPHOCYTES # BLD AUTO: 0.72 10*3/MM3 (ref 0.7–3.1)
LYMPHOCYTES # BLD AUTO: 0.73 10*3/MM3 (ref 0.7–3.1)
LYMPHOCYTES # BLD AUTO: 0.74 10*3/MM3 (ref 0.7–3.1)
LYMPHOCYTES # BLD AUTO: 0.8 10*3/MM3 (ref 0.7–3.1)
LYMPHOCYTES # BLD AUTO: 0.81 10*3/MM3 (ref 0.7–3.1)
LYMPHOCYTES # BLD AUTO: 0.83 10*3/MM3 (ref 0.7–3.1)
LYMPHOCYTES # BLD AUTO: 0.83 10*3/MM3 (ref 0.7–3.1)
LYMPHOCYTES # BLD AUTO: 0.86 10*3/MM3 (ref 0.7–3.1)
LYMPHOCYTES # BLD AUTO: 1.11 10*3/MM3 (ref 0.7–3.1)
LYMPHOCYTES # BLD AUTO: 1.2 10*3/MM3 (ref 0.7–3.1)
LYMPHOCYTES # BLD AUTO: 1.26 10*3/MM3 (ref 0.7–3.1)
LYMPHOCYTES # BLD MANUAL: 0.84 10*3/MM3 (ref 0.7–3.1)
LYMPHOCYTES NFR BLD AUTO: 10.9 % (ref 19.6–45.3)
LYMPHOCYTES NFR BLD AUTO: 11.3 % (ref 19.6–45.3)
LYMPHOCYTES NFR BLD AUTO: 11.9 % (ref 19.6–45.3)
LYMPHOCYTES NFR BLD AUTO: 14.3 % (ref 19.6–45.3)
LYMPHOCYTES NFR BLD AUTO: 15.9 % (ref 19.6–45.3)
LYMPHOCYTES NFR BLD AUTO: 16.8 % (ref 19.6–45.3)
LYMPHOCYTES NFR BLD AUTO: 19.2 % (ref 19.6–45.3)
LYMPHOCYTES NFR BLD AUTO: 23 % (ref 19.6–45.3)
LYMPHOCYTES NFR BLD AUTO: 3.3 % (ref 19.6–45.3)
LYMPHOCYTES NFR BLD AUTO: 3.5 % (ref 19.6–45.3)
LYMPHOCYTES NFR BLD AUTO: 4.1 % (ref 19.6–45.3)
LYMPHOCYTES NFR BLD AUTO: 5.1 % (ref 19.6–45.3)
LYMPHOCYTES NFR BLD AUTO: 5.9 % (ref 19.6–45.3)
LYMPHOCYTES NFR BLD AUTO: 8.1 % (ref 19.6–45.3)
LYMPHOCYTES NFR BLD AUTO: 8.1 % (ref 19.6–45.3)
LYMPHOCYTES NFR BLD AUTO: 8.6 % (ref 19.6–45.3)
LYMPHOCYTES NFR BLD AUTO: 9.1 % (ref 19.6–45.3)
LYMPHOCYTES NFR BLD AUTO: 9.4 % (ref 19.6–45.3)
LYMPHOCYTES NFR BLD MANUAL: 4 % (ref 5–12)
Lab: 215
Lab: ABNORMAL
Lab: NORMAL
MAGNESIUM SERPL-MCNC: 2.3 MG/DL (ref 1.6–2.4)
MAGNESIUM SERPL-MCNC: 2.3 MG/DL (ref 1.6–2.4)
MAGNESIUM SERPL-MCNC: 2.5 MG/DL (ref 1.6–2.4)
MAGNESIUM SERPL-MCNC: 2.5 MG/DL (ref 1.6–2.4)
MAGNESIUM SERPL-MCNC: 3.1 MG/DL (ref 1.6–2.4)
MAGNESIUM SERPL-MCNC: 3.1 MG/DL (ref 1.6–2.4)
MAGNESIUM SERPL-MCNC: 3.2 MG/DL (ref 1.6–2.4)
MAGNESIUM SERPL-MCNC: 3.3 MG/DL (ref 1.6–2.4)
MCH RBC QN AUTO: 25.2 PG (ref 26.6–33)
MCH RBC QN AUTO: 25.3 PG (ref 26.6–33)
MCH RBC QN AUTO: 25.3 PG (ref 26.6–33)
MCH RBC QN AUTO: 25.6 PG (ref 26.6–33)
MCH RBC QN AUTO: 25.7 PG (ref 26.6–33)
MCH RBC QN AUTO: 26.3 PG (ref 26.6–33)
MCH RBC QN AUTO: 26.3 PG (ref 26.6–33)
MCH RBC QN AUTO: 26.6 PG (ref 26.6–33)
MCH RBC QN AUTO: 26.7 PG (ref 26.6–33)
MCH RBC QN AUTO: 26.9 PG (ref 26.6–33)
MCH RBC QN AUTO: 27 PG (ref 26.6–33)
MCH RBC QN AUTO: 27.1 PG (ref 26.6–33)
MCH RBC QN AUTO: 27.2 PG (ref 26.6–33)
MCH RBC QN AUTO: 27.3 PG (ref 26.6–33)
MCH RBC QN AUTO: 27.4 PG (ref 26.6–33)
MCH RBC QN AUTO: 27.4 PG (ref 26.6–33)
MCH RBC QN AUTO: 27.5 PG (ref 26.6–33)
MCH RBC QN AUTO: 27.6 PG (ref 26.6–33)
MCH RBC QN AUTO: 27.6 PG (ref 26.6–33)
MCH RBC QN AUTO: 27.7 PG (ref 26.6–33)
MCH RBC QN AUTO: 27.7 PG (ref 26.6–33)
MCH RBC QN AUTO: 27.9 PG (ref 26.6–33)
MCH RBC QN AUTO: 28 PG (ref 26.6–33)
MCHC RBC AUTO-ENTMCNC: 28.8 G/DL (ref 31.5–35.7)
MCHC RBC AUTO-ENTMCNC: 28.9 G/DL (ref 31.5–35.7)
MCHC RBC AUTO-ENTMCNC: 29.1 G/DL (ref 31.5–35.7)
MCHC RBC AUTO-ENTMCNC: 29.2 G/DL (ref 31.5–35.7)
MCHC RBC AUTO-ENTMCNC: 29.2 G/DL (ref 31.5–35.7)
MCHC RBC AUTO-ENTMCNC: 29.3 G/DL (ref 31.5–35.7)
MCHC RBC AUTO-ENTMCNC: 29.4 G/DL (ref 31.5–35.7)
MCHC RBC AUTO-ENTMCNC: 29.6 G/DL (ref 31.5–35.7)
MCHC RBC AUTO-ENTMCNC: 29.6 G/DL (ref 31.5–35.7)
MCHC RBC AUTO-ENTMCNC: 29.7 G/DL (ref 31.5–35.7)
MCHC RBC AUTO-ENTMCNC: 29.9 G/DL (ref 31.5–35.7)
MCHC RBC AUTO-ENTMCNC: 30 G/DL (ref 31.5–35.7)
MCHC RBC AUTO-ENTMCNC: 30.1 G/DL (ref 31.5–35.7)
MCHC RBC AUTO-ENTMCNC: 30.2 G/DL (ref 31.5–35.7)
MCHC RBC AUTO-ENTMCNC: 30.3 G/DL (ref 31.5–35.7)
MCHC RBC AUTO-ENTMCNC: 30.5 G/DL (ref 31.5–35.7)
MCHC RBC AUTO-ENTMCNC: 30.6 G/DL (ref 31.5–35.7)
MCHC RBC AUTO-ENTMCNC: 30.8 G/DL (ref 31.5–35.7)
MCHC RBC AUTO-ENTMCNC: 30.8 G/DL (ref 31.5–35.7)
MCHC RBC AUTO-ENTMCNC: 30.9 G/DL (ref 31.5–35.7)
MCHC RBC AUTO-ENTMCNC: 31 G/DL (ref 31.5–35.7)
MCHC RBC AUTO-ENTMCNC: 31 G/DL (ref 31.5–35.7)
MCHC RBC AUTO-ENTMCNC: 31.1 G/DL (ref 31.5–35.7)
MCHC RBC AUTO-ENTMCNC: 31.8 G/DL (ref 31.5–35.7)
MCHC RBC AUTO-ENTMCNC: 31.9 G/DL (ref 31.5–35.7)
MCHC RBC AUTO-ENTMCNC: 32.3 G/DL (ref 31.5–35.7)
MCHC RBC AUTO-ENTMCNC: 33 G/DL (ref 31.5–35.7)
MCV RBC AUTO: 82 FL (ref 79–97)
MCV RBC AUTO: 82.4 FL (ref 79–97)
MCV RBC AUTO: 82.9 FL (ref 79–97)
MCV RBC AUTO: 82.9 FL (ref 79–97)
MCV RBC AUTO: 83.3 FL (ref 79–97)
MCV RBC AUTO: 84.3 FL (ref 79–97)
MCV RBC AUTO: 84.5 FL (ref 79–97)
MCV RBC AUTO: 84.6 FL (ref 79–97)
MCV RBC AUTO: 85.7 FL (ref 79–97)
MCV RBC AUTO: 86.1 FL (ref 79–97)
MCV RBC AUTO: 86.4 FL (ref 79–97)
MCV RBC AUTO: 86.7 FL (ref 79–97)
MCV RBC AUTO: 87.6 FL (ref 79–97)
MCV RBC AUTO: 88.8 FL (ref 79–97)
MCV RBC AUTO: 89.1 FL (ref 79–97)
MCV RBC AUTO: 89.4 FL (ref 79–97)
MCV RBC AUTO: 90 FL (ref 79–97)
MCV RBC AUTO: 90.1 FL (ref 79–97)
MCV RBC AUTO: 90.4 FL (ref 79–97)
MCV RBC AUTO: 90.8 FL (ref 79–97)
MCV RBC AUTO: 91.2 FL (ref 79–97)
MCV RBC AUTO: 91.4 FL (ref 79–97)
MCV RBC AUTO: 91.5 FL (ref 79–97)
MCV RBC AUTO: 91.7 FL (ref 79–97)
MCV RBC AUTO: 92 FL (ref 79–97)
MCV RBC AUTO: 92.4 FL (ref 79–97)
MCV RBC AUTO: 92.6 FL (ref 79–97)
MCV RBC AUTO: 93 FL (ref 79–97)
MCV RBC AUTO: 93.5 FL (ref 79–97)
METHADONE UR QL SCN: NEGATIVE
METHGB BLD QL: 0.7 % (ref 0–3)
MITOCHONDRIA M2 IGG SER-ACNC: <20 UNITS (ref 0–20)
MODALITY: ABNORMAL
MONOCYTES # BLD AUTO: 0.3 10*3/MM3 (ref 0.1–0.9)
MONOCYTES # BLD AUTO: 0.34 10*3/MM3 (ref 0.1–0.9)
MONOCYTES # BLD AUTO: 0.36 10*3/MM3 (ref 0.1–0.9)
MONOCYTES # BLD AUTO: 0.38 10*3/MM3 (ref 0.1–0.9)
MONOCYTES # BLD AUTO: 0.38 10*3/MM3 (ref 0.1–0.9)
MONOCYTES # BLD AUTO: 0.42 10*3/MM3 (ref 0.1–0.9)
MONOCYTES # BLD AUTO: 0.49 10*3/MM3 (ref 0.1–0.9)
MONOCYTES # BLD AUTO: 0.49 10*3/MM3 (ref 0.1–0.9)
MONOCYTES # BLD AUTO: 0.51 10*3/MM3 (ref 0.1–0.9)
MONOCYTES # BLD AUTO: 0.54 10*3/MM3 (ref 0.1–0.9)
MONOCYTES # BLD AUTO: 0.59 10*3/MM3 (ref 0.1–0.9)
MONOCYTES # BLD AUTO: 0.69 10*3/MM3 (ref 0.1–0.9)
MONOCYTES # BLD AUTO: 0.78 10*3/MM3 (ref 0.1–0.9)
MONOCYTES # BLD AUTO: 0.82 10*3/MM3 (ref 0.1–0.9)
MONOCYTES # BLD AUTO: 0.83 10*3/MM3 (ref 0.1–0.9)
MONOCYTES # BLD AUTO: 1.03 10*3/MM3 (ref 0.1–0.9)
MONOCYTES # BLD: 0.22 10*3/MM3 (ref 0.1–0.9)
MONOCYTES NFR BLD AUTO: 3.7 % (ref 5–12)
MONOCYTES NFR BLD AUTO: 4.1 % (ref 5–12)
MONOCYTES NFR BLD AUTO: 5.3 % (ref 5–12)
MONOCYTES NFR BLD AUTO: 5.3 % (ref 5–12)
MONOCYTES NFR BLD AUTO: 5.5 % (ref 5–12)
MONOCYTES NFR BLD AUTO: 5.8 % (ref 5–12)
MONOCYTES NFR BLD AUTO: 6.1 % (ref 5–12)
MONOCYTES NFR BLD AUTO: 6.2 % (ref 5–12)
MONOCYTES NFR BLD AUTO: 6.5 % (ref 5–12)
MONOCYTES NFR BLD AUTO: 6.7 % (ref 5–12)
MONOCYTES NFR BLD AUTO: 7.5 % (ref 5–12)
MONOCYTES NFR BLD AUTO: 7.6 % (ref 5–12)
MONOCYTES NFR BLD AUTO: 7.7 % (ref 5–12)
MONOCYTES NFR BLD AUTO: 8.7 % (ref 5–12)
MONOCYTES NFR BLD AUTO: 9.8 % (ref 5–12)
MONOCYTES NFR BLD AUTO: 9.8 % (ref 5–12)
NEGATIVE CONTROL: NEGATIVE
NEUTROPHILS # BLD AUTO: 4.51 10*3/MM3 (ref 1.7–7)
NEUTROPHILS NFR BLD AUTO: 11.75 10*3/MM3 (ref 1.7–7)
NEUTROPHILS NFR BLD AUTO: 13.05 10*3/MM3 (ref 1.7–7)
NEUTROPHILS NFR BLD AUTO: 16.38 10*3/MM3 (ref 1.7–7)
NEUTROPHILS NFR BLD AUTO: 20.56 10*3/MM3 (ref 1.7–7)
NEUTROPHILS NFR BLD AUTO: 3.02 10*3/MM3 (ref 1.7–7)
NEUTROPHILS NFR BLD AUTO: 3.36 10*3/MM3 (ref 1.7–7)
NEUTROPHILS NFR BLD AUTO: 3.49 10*3/MM3 (ref 1.7–7)
NEUTROPHILS NFR BLD AUTO: 3.62 10*3/MM3 (ref 1.7–7)
NEUTROPHILS NFR BLD AUTO: 4.09 10*3/MM3 (ref 1.7–7)
NEUTROPHILS NFR BLD AUTO: 4.49 10*3/MM3 (ref 1.7–7)
NEUTROPHILS NFR BLD AUTO: 5.32 10*3/MM3 (ref 1.7–7)
NEUTROPHILS NFR BLD AUTO: 6.08 10*3/MM3 (ref 1.7–7)
NEUTROPHILS NFR BLD AUTO: 6.4 10*3/MM3 (ref 1.7–7)
NEUTROPHILS NFR BLD AUTO: 61.2 % (ref 42.7–76)
NEUTROPHILS NFR BLD AUTO: 67.6 % (ref 42.7–76)
NEUTROPHILS NFR BLD AUTO: 7 10*3/MM3 (ref 1.7–7)
NEUTROPHILS NFR BLD AUTO: 7.12 10*3/MM3 (ref 1.7–7)
NEUTROPHILS NFR BLD AUTO: 7.18 10*3/MM3 (ref 1.7–7)
NEUTROPHILS NFR BLD AUTO: 7.19 10*3/MM3 (ref 1.7–7)
NEUTROPHILS NFR BLD AUTO: 70.7 % (ref 42.7–76)
NEUTROPHILS NFR BLD AUTO: 71.9 % (ref 42.7–76)
NEUTROPHILS NFR BLD AUTO: 72.3 % (ref 42.7–76)
NEUTROPHILS NFR BLD AUTO: 77.5 % (ref 42.7–76)
NEUTROPHILS NFR BLD AUTO: 78.2 % (ref 42.7–76)
NEUTROPHILS NFR BLD AUTO: 78.7 % (ref 42.7–76)
NEUTROPHILS NFR BLD AUTO: 79.6 % (ref 42.7–76)
NEUTROPHILS NFR BLD AUTO: 8.52 10*3/MM3 (ref 1.7–7)
NEUTROPHILS NFR BLD AUTO: 81.5 % (ref 42.7–76)
NEUTROPHILS NFR BLD AUTO: 83.3 % (ref 42.7–76)
NEUTROPHILS NFR BLD AUTO: 83.7 % (ref 42.7–76)
NEUTROPHILS NFR BLD AUTO: 84.6 % (ref 42.7–76)
NEUTROPHILS NFR BLD AUTO: 87.1 % (ref 42.7–76)
NEUTROPHILS NFR BLD AUTO: 88.8 % (ref 42.7–76)
NEUTROPHILS NFR BLD AUTO: 89.8 % (ref 42.7–76)
NEUTROPHILS NFR BLD AUTO: 91.6 % (ref 42.7–76)
NEUTROPHILS NFR BLD AUTO: 92.2 % (ref 42.7–76)
NEUTROPHILS NFR BLD MANUAL: 81 % (ref 42.7–76)
NITRITE UR QL STRIP: NEGATIVE
NITRITE UR QL STRIP: NEGATIVE
NOTE: ABNORMAL
NOTIFIED BY: ABNORMAL
NOTIFIED BY: ABNORMAL
NOTIFIED WHO: ABNORMAL
NOTIFIED WHO: ABNORMAL
NRBC BLD AUTO-RTO: 0 /100 WBC (ref 0–0.2)
NRBC BLD AUTO-RTO: 0.2 /100 WBC (ref 0–0.2)
NRBC SPEC MANUAL: 1 /100 WBC (ref 0–0.2)
NT-PROBNP SERPL-MCNC: 5182 PG/ML (ref 0–900)
OPIATES UR QL: NEGATIVE
OSMOLALITY SERPL: 275 MOSM/KG (ref 280–301)
OSMOLALITY UR: 115 MOSM/KG (ref 50–1400)
OXYCODONE UR QL SCN: NEGATIVE
OXYHGB MFR BLDV: 95.6 % (ref 94–99)
PCO2 BLDA: 25.6 MM HG (ref 35–45)
PCO2 BLDA: 26.5 MM HG (ref 35–45)
PCO2 BLDA: 27.5 MM HG (ref 35–45)
PCO2 BLDA: 28.8 MM HG (ref 35–45)
PCO2 BLDA: 31 MM HG (ref 35–45)
PCO2 BLDA: 31.2 MM HG (ref 35–45)
PCO2 BLDA: 31.7 MM HG (ref 35–45)
PCO2 BLDA: 32.7 MM HG (ref 35–45)
PCO2 BLDA: 33.3 MM HG (ref 35–45)
PCO2 BLDA: 36.4 MM HG (ref 35–45)
PCO2 BLDA: 47.8 MM HG (ref 35–45)
PCO2 TEMP ADJ BLD: 25.6 MM HG (ref 35–45)
PCO2 TEMP ADJ BLD: 26.5 MM HG (ref 35–45)
PCO2 TEMP ADJ BLD: 27.5 MM HG (ref 35–45)
PCO2 TEMP ADJ BLD: 28.8 MM HG (ref 35–45)
PCO2 TEMP ADJ BLD: 31 MM HG (ref 35–45)
PCO2 TEMP ADJ BLD: 31.2 MM HG (ref 35–45)
PCO2 TEMP ADJ BLD: 31.7 MM HG (ref 35–45)
PCO2 TEMP ADJ BLD: 32.7 MM HG (ref 35–45)
PCO2 TEMP ADJ BLD: 33.3 MM HG (ref 35–45)
PCO2 TEMP ADJ BLD: 36.4 MM HG (ref 35–45)
PCO2 TEMP ADJ BLD: 47.8 MM HG (ref 35–45)
PCP UR QL SCN: NEGATIVE
PEEP RESPIRATORY: 5 CM[H2O]
PEEP RESPIRATORY: 7 CM[H2O]
PH BLDA: 7.04 PH UNITS (ref 7.35–7.45)
PH BLDA: 7.19 PH UNITS (ref 7.35–7.45)
PH BLDA: 7.44 PH UNITS (ref 7.35–7.45)
PH BLDA: 7.45 PH UNITS (ref 7.35–7.45)
PH BLDA: 7.46 PH UNITS (ref 7.35–7.45)
PH BLDA: 7.47 PH UNITS (ref 7.35–7.45)
PH BLDA: 7.47 PH UNITS (ref 7.35–7.45)
PH BLDA: 7.5 PH UNITS (ref 7.35–7.45)
PH BLDA: 7.54 PH UNITS (ref 7.35–7.45)
PH BLDA: 7.55 PH UNITS (ref 7.35–7.45)
PH BLDA: 7.56 PH UNITS (ref 7.35–7.45)
PH UR STRIP.AUTO: 8 [PH] (ref 5–8)
PH UR STRIP.AUTO: 8.5 [PH] (ref 5–8)
PH, TEMP CORRECTED: 7.04 PH UNITS (ref 7.35–7.45)
PH, TEMP CORRECTED: 7.19 PH UNITS (ref 7.35–7.45)
PH, TEMP CORRECTED: 7.44 PH UNITS (ref 7.35–7.45)
PH, TEMP CORRECTED: 7.45 PH UNITS (ref 7.35–7.45)
PH, TEMP CORRECTED: 7.46 PH UNITS (ref 7.35–7.45)
PH, TEMP CORRECTED: 7.47 PH UNITS (ref 7.35–7.45)
PH, TEMP CORRECTED: 7.47 PH UNITS (ref 7.35–7.45)
PH, TEMP CORRECTED: 7.5 PH UNITS (ref 7.35–7.45)
PH, TEMP CORRECTED: 7.54 PH UNITS (ref 7.35–7.45)
PH, TEMP CORRECTED: 7.55 PH UNITS (ref 7.35–7.45)
PH, TEMP CORRECTED: 7.56 PH UNITS (ref 7.35–7.45)
PHOSPHATE SERPL-MCNC: 3.6 MG/DL (ref 2.5–4.5)
PHOSPHATE SERPL-MCNC: 4 MG/DL (ref 2.5–4.5)
PHOSPHATE SERPL-MCNC: 4.9 MG/DL (ref 2.5–4.5)
PLATELET # BLD AUTO: 121 10*3/MM3 (ref 140–450)
PLATELET # BLD AUTO: 123 10*3/MM3 (ref 140–450)
PLATELET # BLD AUTO: 129 10*3/MM3 (ref 140–450)
PLATELET # BLD AUTO: 136 10*3/MM3 (ref 140–450)
PLATELET # BLD AUTO: 142 10*3/MM3 (ref 140–450)
PLATELET # BLD AUTO: 144 10*3/MM3 (ref 140–450)
PLATELET # BLD AUTO: 145 10*3/MM3 (ref 140–450)
PLATELET # BLD AUTO: 147 10*3/MM3 (ref 140–450)
PLATELET # BLD AUTO: 149 10*3/MM3 (ref 140–450)
PLATELET # BLD AUTO: 155 10*3/MM3 (ref 140–450)
PLATELET # BLD AUTO: 156 10*3/MM3 (ref 140–450)
PLATELET # BLD AUTO: 157 10*3/MM3 (ref 140–450)
PLATELET # BLD AUTO: 158 10*3/MM3 (ref 140–450)
PLATELET # BLD AUTO: 160 10*3/MM3 (ref 140–450)
PLATELET # BLD AUTO: 162 10*3/MM3 (ref 140–450)
PLATELET # BLD AUTO: 170 10*3/MM3 (ref 140–450)
PLATELET # BLD AUTO: 175 10*3/MM3 (ref 140–450)
PLATELET # BLD AUTO: 176 10*3/MM3 (ref 140–450)
PLATELET # BLD AUTO: 182 10*3/MM3 (ref 140–450)
PLATELET # BLD AUTO: 184 10*3/MM3 (ref 140–450)
PLATELET # BLD AUTO: 192 10*3/MM3 (ref 140–450)
PLATELET # BLD AUTO: 193 10*3/MM3 (ref 140–450)
PLATELET # BLD AUTO: 195 10*3/MM3 (ref 140–450)
PLATELET # BLD AUTO: 225 10*3/MM3 (ref 140–450)
PLATELET # BLD AUTO: 275 10*3/MM3 (ref 140–450)
PLATELET # BLD AUTO: 275 10*3/MM3 (ref 140–450)
PLATELET # BLD AUTO: 299 10*3/MM3 (ref 140–450)
PLATELET # BLD AUTO: 322 10*3/MM3 (ref 140–450)
PLATELET # BLD AUTO: 358 10*3/MM3 (ref 140–450)
PMV BLD AUTO: 10 FL (ref 6–12)
PMV BLD AUTO: 10 FL (ref 6–12)
PMV BLD AUTO: 10.1 FL (ref 6–12)
PMV BLD AUTO: 10.1 FL (ref 6–12)
PMV BLD AUTO: 10.2 FL (ref 6–12)
PMV BLD AUTO: 10.4 FL (ref 6–12)
PMV BLD AUTO: 10.7 FL (ref 6–12)
PMV BLD AUTO: 11.8 FL (ref 6–12)
PMV BLD AUTO: 9.3 FL (ref 6–12)
PMV BLD AUTO: 9.4 FL (ref 6–12)
PMV BLD AUTO: 9.5 FL (ref 6–12)
PMV BLD AUTO: 9.5 FL (ref 6–12)
PMV BLD AUTO: 9.6 FL (ref 6–12)
PMV BLD AUTO: 9.7 FL (ref 6–12)
PMV BLD AUTO: 9.7 FL (ref 6–12)
PMV BLD AUTO: 9.8 FL (ref 6–12)
PMV BLD AUTO: 9.9 FL (ref 6–12)
PO2 BLDA: 101 MM HG (ref 83–108)
PO2 BLDA: 147 MM HG (ref 83–108)
PO2 BLDA: 229 MM HG (ref 83–108)
PO2 BLDA: 492 MM HG (ref 83–108)
PO2 BLDA: 59.5 MM HG (ref 83–108)
PO2 BLDA: 59.7 MM HG (ref 83–108)
PO2 BLDA: 61.3 MM HG (ref 83–108)
PO2 BLDA: 70.4 MM HG (ref 83–108)
PO2 BLDA: 71.8 MM HG (ref 83–108)
PO2 BLDA: 86.6 MM HG (ref 83–108)
PO2 BLDA: 92.7 MM HG (ref 83–108)
PO2 TEMP ADJ BLD: 101 MM HG (ref 83–108)
PO2 TEMP ADJ BLD: 147 MM HG (ref 83–108)
PO2 TEMP ADJ BLD: 229 MM HG (ref 83–108)
PO2 TEMP ADJ BLD: 492 MM HG (ref 83–108)
PO2 TEMP ADJ BLD: 59.5 MM HG (ref 83–108)
PO2 TEMP ADJ BLD: 59.7 MM HG (ref 83–108)
PO2 TEMP ADJ BLD: 61.3 MM HG (ref 83–108)
PO2 TEMP ADJ BLD: 70.4 MM HG (ref 83–108)
PO2 TEMP ADJ BLD: 71.8 MM HG (ref 83–108)
PO2 TEMP ADJ BLD: 86.6 MM HG (ref 83–108)
PO2 TEMP ADJ BLD: 92.7 MM HG (ref 83–108)
POIKILOCYTOSIS BLD QL SMEAR: ABNORMAL
POLYCHROMASIA BLD QL SMEAR: ABNORMAL
POSITIVE CONTROL: POSITIVE
POTASSIUM BLDA-SCNC: 4.2 MMOL/L (ref 3.5–5.2)
POTASSIUM SERPL-SCNC: 1.8 MMOL/L (ref 3.5–5.2)
POTASSIUM SERPL-SCNC: 2.2 MMOL/L (ref 3.5–5.2)
POTASSIUM SERPL-SCNC: 2.5 MMOL/L (ref 3.5–5.2)
POTASSIUM SERPL-SCNC: 2.5 MMOL/L (ref 3.5–5.2)
POTASSIUM SERPL-SCNC: 2.6 MMOL/L (ref 3.5–5.2)
POTASSIUM SERPL-SCNC: 2.6 MMOL/L (ref 3.5–5.2)
POTASSIUM SERPL-SCNC: 2.7 MMOL/L (ref 3.5–5.2)
POTASSIUM SERPL-SCNC: 2.8 MMOL/L (ref 3.5–5.2)
POTASSIUM SERPL-SCNC: 2.9 MMOL/L (ref 3.5–5.2)
POTASSIUM SERPL-SCNC: 3.1 MMOL/L (ref 3.5–5.2)
POTASSIUM SERPL-SCNC: 3.2 MMOL/L (ref 3.5–5.2)
POTASSIUM SERPL-SCNC: 3.3 MMOL/L (ref 3.5–5.2)
POTASSIUM SERPL-SCNC: 3.4 MMOL/L (ref 3.5–5.2)
POTASSIUM SERPL-SCNC: 3.5 MMOL/L (ref 3.5–5.2)
POTASSIUM SERPL-SCNC: 3.6 MMOL/L (ref 3.5–5.2)
POTASSIUM SERPL-SCNC: 3.7 MMOL/L (ref 3.5–5.2)
POTASSIUM SERPL-SCNC: 3.7 MMOL/L (ref 3.5–5.2)
POTASSIUM SERPL-SCNC: 3.8 MMOL/L (ref 3.5–5.2)
POTASSIUM SERPL-SCNC: 3.9 MMOL/L (ref 3.5–5.2)
POTASSIUM SERPL-SCNC: 4 MMOL/L (ref 3.5–5.2)
POTASSIUM SERPL-SCNC: 4.1 MMOL/L (ref 3.5–5.2)
POTASSIUM SERPL-SCNC: 4.1 MMOL/L (ref 3.5–5.2)
POTASSIUM SERPL-SCNC: 4.2 MMOL/L (ref 3.5–5.2)
POTASSIUM SERPL-SCNC: 4.5 MMOL/L (ref 3.5–5.2)
POTASSIUM SERPL-SCNC: 4.6 MMOL/L (ref 3.5–5.2)
POTASSIUM SERPL-SCNC: 4.7 MMOL/L (ref 3.5–5.2)
POTASSIUM SERPL-SCNC: 5.1 MMOL/L (ref 3.5–5.2)
PROCALCITONIN SERPL-MCNC: 0.11 NG/ML (ref 0–0.25)
PROCALCITONIN SERPL-MCNC: 0.12 NG/ML (ref 0–0.25)
PROCALCITONIN SERPL-MCNC: 0.25 NG/ML (ref 0–0.25)
PROPOXYPH UR QL: NEGATIVE
PROT SERPL-MCNC: 5.1 G/DL (ref 6–8.5)
PROT SERPL-MCNC: 5.2 G/DL (ref 6–8.5)
PROT SERPL-MCNC: 5.3 G/DL (ref 6–8.5)
PROT SERPL-MCNC: 5.3 G/DL (ref 6–8.5)
PROT SERPL-MCNC: 5.4 G/DL (ref 6–8.5)
PROT SERPL-MCNC: 5.5 G/DL (ref 6–8.5)
PROT SERPL-MCNC: 5.6 G/DL (ref 6–8.5)
PROT SERPL-MCNC: 5.7 G/DL (ref 6–8.5)
PROT SERPL-MCNC: 5.8 G/DL (ref 6–8.5)
PROT SERPL-MCNC: 5.9 G/DL (ref 6–8.5)
PROT SERPL-MCNC: 6 G/DL (ref 6–8.5)
PROT SERPL-MCNC: 6.1 G/DL (ref 6–8.5)
PROT SERPL-MCNC: 6.2 G/DL (ref 6–8.5)
PROT SERPL-MCNC: 6.3 G/DL (ref 6–8.5)
PROT SERPL-MCNC: 6.4 G/DL (ref 6–8.5)
PROT SERPL-MCNC: 6.5 G/DL (ref 6–8.5)
PROT SERPL-MCNC: 6.6 G/DL (ref 6–8.5)
PROT SERPL-MCNC: 6.6 G/DL (ref 6–8.5)
PROT SERPL-MCNC: 6.7 G/DL (ref 6–8.5)
PROT SERPL-MCNC: 6.7 G/DL (ref 6–8.5)
PROT SERPL-MCNC: 6.8 G/DL (ref 6–8.5)
PROT SERPL-MCNC: 6.9 G/DL (ref 6–8.5)
PROT UR QL STRIP: NEGATIVE
PROT UR QL STRIP: NEGATIVE
PROTHROMBIN TIME: 14.6 SECONDS (ref 11.9–14.6)
PROTHROMBIN TIME: 15.5 SECONDS (ref 11.9–14.6)
PROTHROMBIN TIME: 16.7 SECONDS (ref 11.9–14.6)
PROTHROMBIN TIME: 16.9 SECONDS (ref 11.9–14.6)
PROTHROMBIN TIME: 18.1 SECONDS (ref 11.9–14.6)
PROTHROMBIN TIME: 18.4 SECONDS (ref 11.9–14.6)
PROTHROMBIN TIME: 19.8 SECONDS (ref 11.9–14.6)
PSV: 5 CMH2O
QT INTERVAL: 466 MS
QTC INTERVAL: 466 MS
RBC # BLD AUTO: 2.91 10*6/MM3 (ref 4.14–5.8)
RBC # BLD AUTO: 3.01 10*6/MM3 (ref 4.14–5.8)
RBC # BLD AUTO: 3.06 10*6/MM3 (ref 4.14–5.8)
RBC # BLD AUTO: 3.1 10*6/MM3 (ref 4.14–5.8)
RBC # BLD AUTO: 3.15 10*6/MM3 (ref 4.14–5.8)
RBC # BLD AUTO: 3.16 10*6/MM3 (ref 4.14–5.8)
RBC # BLD AUTO: 3.25 10*6/MM3 (ref 4.14–5.8)
RBC # BLD AUTO: 3.26 10*6/MM3 (ref 4.14–5.8)
RBC # BLD AUTO: 3.29 10*6/MM3 (ref 4.14–5.8)
RBC # BLD AUTO: 3.3 10*6/MM3 (ref 4.14–5.8)
RBC # BLD AUTO: 3.34 10*6/MM3 (ref 4.14–5.8)
RBC # BLD AUTO: 3.39 10*6/MM3 (ref 4.14–5.8)
RBC # BLD AUTO: 3.43 10*6/MM3 (ref 4.14–5.8)
RBC # BLD AUTO: 3.44 10*6/MM3 (ref 4.14–5.8)
RBC # BLD AUTO: 3.47 10*6/MM3 (ref 4.14–5.8)
RBC # BLD AUTO: 3.48 10*6/MM3 (ref 4.14–5.8)
RBC # BLD AUTO: 3.5 10*6/MM3 (ref 4.14–5.8)
RBC # BLD AUTO: 3.52 10*6/MM3 (ref 4.14–5.8)
RBC # BLD AUTO: 3.57 10*6/MM3 (ref 4.14–5.8)
RBC # BLD AUTO: 3.61 10*6/MM3 (ref 4.14–5.8)
RBC # BLD AUTO: 3.77 10*6/MM3 (ref 4.14–5.8)
RBC # BLD AUTO: 3.96 10*6/MM3 (ref 4.14–5.8)
RBC # BLD AUTO: 4 10*6/MM3 (ref 4.14–5.8)
RBC # BLD AUTO: 4.04 10*6/MM3 (ref 4.14–5.8)
RBC # BLD AUTO: 4.06 10*6/MM3 (ref 4.14–5.8)
RBC # BLD AUTO: 4.09 10*6/MM3 (ref 4.14–5.8)
RBC # BLD AUTO: 4.09 10*6/MM3 (ref 4.14–5.8)
RBC # BLD AUTO: 4.33 10*6/MM3 (ref 4.14–5.8)
RBC # BLD AUTO: 4.61 10*6/MM3 (ref 4.14–5.8)
RBC # UR STRIP: ABNORMAL /HPF
REF LAB TEST METHOD: ABNORMAL
RETICS # AUTO: 0.09 10*6/MM3 (ref 0.02–0.13)
RETICS/RBC NFR AUTO: 2.85 % (ref 0.7–1.9)
RH BLD: POSITIVE
SALICYLATES SERPL-MCNC: <0.3 MG/DL
SAO2 % BLDCOA: 90.8 % (ref 94–99)
SAO2 % BLDCOA: 91.9 % (ref 94–99)
SAO2 % BLDCOA: 92.4 % (ref 94–99)
SAO2 % BLDCOA: 94.1 % (ref 94–99)
SAO2 % BLDCOA: 95.2 % (ref 94–99)
SAO2 % BLDCOA: 96.5 % (ref 94–99)
SAO2 % BLDCOA: 97.8 % (ref 94–99)
SAO2 % BLDCOA: 97.9 % (ref 94–99)
SAO2 % BLDCOA: 98.5 % (ref 94–99)
SAO2 % BLDCOA: >100.1 % (ref 94–99)
SAO2 % BLDCOA: >100.1 % (ref 94–99)
SARS-COV-2 RNA PNL SPEC NAA+PROBE: NOT DETECTED
SCHISTOCYTES BLD QL SMEAR: ABNORMAL
SET MECH RESP RATE: 16
SET MECH RESP RATE: 20
SET MECH RESP RATE: 20
SMA IGG SER-ACNC: 6 UNITS (ref 0–19)
SMALL PLATELETS BLD QL SMEAR: ABNORMAL
SODIUM BLDA-SCNC: 139 MMOL/L (ref 136–145)
SODIUM SERPL-SCNC: 122 MMOL/L (ref 136–145)
SODIUM SERPL-SCNC: 125 MMOL/L (ref 136–145)
SODIUM SERPL-SCNC: 126 MMOL/L (ref 136–145)
SODIUM SERPL-SCNC: 127 MMOL/L (ref 136–145)
SODIUM SERPL-SCNC: 128 MMOL/L (ref 136–145)
SODIUM SERPL-SCNC: 129 MMOL/L (ref 136–145)
SODIUM SERPL-SCNC: 130 MMOL/L (ref 136–145)
SODIUM SERPL-SCNC: 131 MMOL/L (ref 136–145)
SODIUM SERPL-SCNC: 132 MMOL/L (ref 136–145)
SODIUM SERPL-SCNC: 132 MMOL/L (ref 136–145)
SODIUM SERPL-SCNC: 134 MMOL/L (ref 136–145)
SODIUM SERPL-SCNC: 134 MMOL/L (ref 136–145)
SODIUM SERPL-SCNC: 135 MMOL/L (ref 136–145)
SODIUM SERPL-SCNC: 136 MMOL/L (ref 136–145)
SODIUM SERPL-SCNC: 136 MMOL/L (ref 136–145)
SODIUM SERPL-SCNC: 139 MMOL/L (ref 136–145)
SODIUM SERPL-SCNC: 144 MMOL/L (ref 136–145)
SODIUM SERPL-SCNC: 145 MMOL/L (ref 136–145)
SODIUM SERPL-SCNC: 152 MMOL/L (ref 136–145)
SODIUM UR-SCNC: <20 MMOL/L
SP GR UR STRIP: 1.01 (ref 1–1.03)
SP GR UR STRIP: 1.01 (ref 1–1.03)
SQUAMOUS #/AREA URNS HPF: ABNORMAL /HPF
T&S EXPIRATION DATE: NORMAL
TIBC SERPL-MCNC: 468 MCG/DL (ref 298–536)
TIBC SERPL-MCNC: 489 MCG/DL (ref 298–536)
TRANSFERRIN SERPL-MCNC: 314 MG/DL (ref 200–360)
TRANSFERRIN SERPL-MCNC: 328 MG/DL (ref 200–360)
TRICYCLICS UR QL SCN: POSITIVE
TRIGL SERPL-MCNC: 75 MG/DL (ref 0–150)
TROPONIN T SERPL-MCNC: 0.06 NG/ML (ref 0–0.03)
TROPONIN T SERPL-MCNC: 0.06 NG/ML (ref 0–0.03)
TROPONIN T SERPL-MCNC: 0.07 NG/ML (ref 0–0.03)
TROPONIN T SERPL-MCNC: 0.17 NG/ML (ref 0–0.03)
TROPONIN T SERPL-MCNC: 0.2 NG/ML (ref 0–0.03)
TSH SERPL DL<=0.05 MIU/L-ACNC: 11.1 UIU/ML (ref 0.27–4.2)
TSH SERPL DL<=0.05 MIU/L-ACNC: 5.77 UIU/ML (ref 0.27–4.2)
UNIT  ABO: NORMAL
UNIT  ABO: NORMAL
UNIT  RH: NORMAL
UNIT  RH: NORMAL
URATE SERPL-MCNC: 6.9 MG/DL (ref 3.4–7)
URATE SERPL-MCNC: 6.9 MG/DL (ref 3.4–7)
UROBILINOGEN UR QL STRIP: ABNORMAL
UROBILINOGEN UR QL STRIP: ABNORMAL
VANCOMYCIN TROUGH SERPL-MCNC: 16.4 MCG/ML (ref 5–20)
VARIANT LYMPHS NFR BLD MANUAL: 15 % (ref 19.6–45.3)
VENTILATOR MODE: ABNORMAL
VENTILATOR MODE: AC
VLDLC SERPL-MCNC: 16 MG/DL (ref 5–40)
VT ON VENT VENT: 470 ML
VT ON VENT VENT: 500 ML
WBC # UR STRIP: ABNORMAL /HPF
WBC MORPH BLD: NORMAL
WBC NRBC COR # BLD: 10.2 10*3/MM3 (ref 3.4–10.8)
WBC NRBC COR # BLD: 12.59 10*3/MM3 (ref 3.4–10.8)
WBC NRBC COR # BLD: 12.85 10*3/MM3 (ref 3.4–10.8)
WBC NRBC COR # BLD: 13.09 10*3/MM3 (ref 3.4–10.8)
WBC NRBC COR # BLD: 14.25 10*3/MM3 (ref 3.4–10.8)
WBC NRBC COR # BLD: 15.76 10*3/MM3 (ref 3.4–10.8)
WBC NRBC COR # BLD: 19.04 10*3/MM3 (ref 3.4–10.8)
WBC NRBC COR # BLD: 19.04 10*3/MM3 (ref 3.4–10.8)
WBC NRBC COR # BLD: 22.3 10*3/MM3 (ref 3.4–10.8)
WBC NRBC COR # BLD: 4.47 10*3/MM3 (ref 3.4–10.8)
WBC NRBC COR # BLD: 4.94 10*3/MM3 (ref 3.4–10.8)
WBC NRBC COR # BLD: 5.03 10*3/MM3 (ref 3.4–10.8)
WBC NRBC COR # BLD: 5.2 10*3/MM3 (ref 3.4–10.8)
WBC NRBC COR # BLD: 5.49 10*3/MM3 (ref 3.4–10.8)
WBC NRBC COR # BLD: 5.57 10*3/MM3 (ref 3.4–10.8)
WBC NRBC COR # BLD: 5.8 10*3/MM3 (ref 3.4–10.8)
WBC NRBC COR # BLD: 6.8 10*3/MM3 (ref 3.4–10.8)
WBC NRBC COR # BLD: 7.47 10*3/MM3 (ref 3.4–10.8)
WBC NRBC COR # BLD: 7.65 10*3/MM3 (ref 3.4–10.8)
WBC NRBC COR # BLD: 8.15 10*3/MM3 (ref 3.4–10.8)
WBC NRBC COR # BLD: 8.27 10*3/MM3 (ref 3.4–10.8)
WBC NRBC COR # BLD: 8.4 10*3/MM3 (ref 3.4–10.8)
WBC NRBC COR # BLD: 8.4 10*3/MM3 (ref 3.4–10.8)
WBC NRBC COR # BLD: 8.48 10*3/MM3 (ref 3.4–10.8)
WBC NRBC COR # BLD: 8.83 10*3/MM3 (ref 3.4–10.8)
WBC NRBC COR # BLD: 8.94 10*3/MM3 (ref 3.4–10.8)
WBC NRBC COR # BLD: 8.96 10*3/MM3 (ref 3.4–10.8)
WBC NRBC COR # BLD: 9.36 10*3/MM3 (ref 3.4–10.8)
WBC NRBC COR # BLD: 9.6 10*3/MM3 (ref 3.4–10.8)
WHOLE BLOOD HOLD SPECIMEN: NORMAL
WHOLE BLOOD HOLD SPECIMEN: NORMAL

## 2022-01-01 PROCEDURE — 99213 OFFICE O/P EST LOW 20 MIN: CPT | Performed by: CLINICAL NURSE SPECIALIST

## 2022-01-01 PROCEDURE — 25010000002 ENOXAPARIN PER 10 MG: Performed by: STUDENT IN AN ORGANIZED HEALTH CARE EDUCATION/TRAINING PROGRAM

## 2022-01-01 PROCEDURE — 84132 ASSAY OF SERUM POTASSIUM: CPT | Performed by: STUDENT IN AN ORGANIZED HEALTH CARE EDUCATION/TRAINING PROGRAM

## 2022-01-01 PROCEDURE — 97530 THERAPEUTIC ACTIVITIES: CPT

## 2022-01-01 PROCEDURE — 96372 THER/PROPH/DIAG INJ SC/IM: CPT

## 2022-01-01 PROCEDURE — 80053 COMPREHEN METABOLIC PANEL: CPT | Performed by: INTERNAL MEDICINE

## 2022-01-01 PROCEDURE — 84484 ASSAY OF TROPONIN QUANT: CPT | Performed by: EMERGENCY MEDICINE

## 2022-01-01 PROCEDURE — 92507 TX SP LANG VOICE COMM INDIV: CPT

## 2022-01-01 PROCEDURE — 83036 HEMOGLOBIN GLYCOSYLATED A1C: CPT | Performed by: INTERNAL MEDICINE

## 2022-01-01 PROCEDURE — 94664 DEMO&/EVAL PT USE INHALER: CPT

## 2022-01-01 PROCEDURE — 85025 COMPLETE CBC W/AUTO DIFF WBC: CPT | Performed by: PHYSICIAN ASSISTANT

## 2022-01-01 PROCEDURE — 63710000001 INSULIN LISPRO (HUMAN) PER 5 UNITS: Performed by: STUDENT IN AN ORGANIZED HEALTH CARE EDUCATION/TRAINING PROGRAM

## 2022-01-01 PROCEDURE — 0BH18EZ INSERTION OF ENDOTRACHEAL AIRWAY INTO TRACHEA, VIA NATURAL OR ARTIFICIAL OPENING ENDOSCOPIC: ICD-10-PCS | Performed by: INTERNAL MEDICINE

## 2022-01-01 PROCEDURE — 96374 THER/PROPH/DIAG INJ IV PUSH: CPT

## 2022-01-01 PROCEDURE — 25010000002 PROPOFOL 10 MG/ML EMULSION: Performed by: STUDENT IN AN ORGANIZED HEALTH CARE EDUCATION/TRAINING PROGRAM

## 2022-01-01 PROCEDURE — 97116 GAIT TRAINING THERAPY: CPT

## 2022-01-01 PROCEDURE — 99497 ADVNCD CARE PLAN 30 MIN: CPT

## 2022-01-01 PROCEDURE — 84300 ASSAY OF URINE SODIUM: CPT | Performed by: INTERNAL MEDICINE

## 2022-01-01 PROCEDURE — 70553 MRI BRAIN STEM W/O & W/DYE: CPT

## 2022-01-01 PROCEDURE — 82962 GLUCOSE BLOOD TEST: CPT

## 2022-01-01 PROCEDURE — 36600 WITHDRAWAL OF ARTERIAL BLOOD: CPT

## 2022-01-01 PROCEDURE — 83735 ASSAY OF MAGNESIUM: CPT | Performed by: STUDENT IN AN ORGANIZED HEALTH CARE EDUCATION/TRAINING PROGRAM

## 2022-01-01 PROCEDURE — 86225 DNA ANTIBODY NATIVE: CPT | Performed by: CLINICAL NURSE SPECIALIST

## 2022-01-01 PROCEDURE — 25010000002 CEFEPIME PER 500 MG: Performed by: STUDENT IN AN ORGANIZED HEALTH CARE EDUCATION/TRAINING PROGRAM

## 2022-01-01 PROCEDURE — 80053 COMPREHEN METABOLIC PANEL: CPT | Performed by: PHYSICIAN ASSISTANT

## 2022-01-01 PROCEDURE — 80053 COMPREHEN METABOLIC PANEL: CPT | Performed by: STUDENT IN AN ORGANIZED HEALTH CARE EDUCATION/TRAINING PROGRAM

## 2022-01-01 PROCEDURE — 87635 SARS-COV-2 COVID-19 AMP PRB: CPT | Performed by: EMERGENCY MEDICINE

## 2022-01-01 PROCEDURE — 86235 NUCLEAR ANTIGEN ANTIBODY: CPT | Performed by: CLINICAL NURSE SPECIALIST

## 2022-01-01 PROCEDURE — 0 POTASSIUM CHLORIDE 10 MEQ/100ML SOLUTION: Performed by: STUDENT IN AN ORGANIZED HEALTH CARE EDUCATION/TRAINING PROGRAM

## 2022-01-01 PROCEDURE — 71045 X-RAY EXAM CHEST 1 VIEW: CPT

## 2022-01-01 PROCEDURE — 86901 BLOOD TYPING SEROLOGIC RH(D): CPT | Performed by: INTERNAL MEDICINE

## 2022-01-01 PROCEDURE — 82977 ASSAY OF GGT: CPT | Performed by: INTERNAL MEDICINE

## 2022-01-01 PROCEDURE — 99232 SBSQ HOSP IP/OBS MODERATE 35: CPT

## 2022-01-01 PROCEDURE — 84484 ASSAY OF TROPONIN QUANT: CPT | Performed by: INTERNAL MEDICINE

## 2022-01-01 PROCEDURE — 80053 COMPREHEN METABOLIC PANEL: CPT | Performed by: EMERGENCY MEDICINE

## 2022-01-01 PROCEDURE — G0378 HOSPITAL OBSERVATION PER HR: HCPCS

## 2022-01-01 PROCEDURE — 99223 1ST HOSP IP/OBS HIGH 75: CPT | Performed by: INTERNAL MEDICINE

## 2022-01-01 PROCEDURE — 82728 ASSAY OF FERRITIN: CPT | Performed by: INTERNAL MEDICINE

## 2022-01-01 PROCEDURE — 86803 HEPATITIS C AB TEST: CPT | Performed by: INTERNAL MEDICINE

## 2022-01-01 PROCEDURE — 97535 SELF CARE MNGMENT TRAINING: CPT

## 2022-01-01 PROCEDURE — 87340 HEPATITIS B SURFACE AG IA: CPT | Performed by: INTERNAL MEDICINE

## 2022-01-01 PROCEDURE — 25010000002 THIAMINE PER 100 MG: Performed by: INTERNAL MEDICINE

## 2022-01-01 PROCEDURE — 99497 ADVNCD CARE PLAN 30 MIN: CPT | Performed by: CLINICAL NURSE SPECIALIST

## 2022-01-01 PROCEDURE — A9577 INJ MULTIHANCE: HCPCS | Performed by: STUDENT IN AN ORGANIZED HEALTH CARE EDUCATION/TRAINING PROGRAM

## 2022-01-01 PROCEDURE — 25010000002 LORAZEPAM PER 2 MG: Performed by: PHYSICIAN ASSISTANT

## 2022-01-01 PROCEDURE — 86900 BLOOD TYPING SEROLOGIC ABO: CPT

## 2022-01-01 PROCEDURE — 85014 HEMATOCRIT: CPT | Performed by: NURSE PRACTITIONER

## 2022-01-01 PROCEDURE — 82077 ASSAY SPEC XCP UR&BREATH IA: CPT | Performed by: EMERGENCY MEDICINE

## 2022-01-01 PROCEDURE — 85025 COMPLETE CBC W/AUTO DIFF WBC: CPT | Performed by: EMERGENCY MEDICINE

## 2022-01-01 PROCEDURE — 25010000002 HALOPERIDOL LACTATE PER 5 MG: Performed by: INTERNAL MEDICINE

## 2022-01-01 PROCEDURE — 94799 UNLISTED PULMONARY SVC/PX: CPT

## 2022-01-01 PROCEDURE — 82140 ASSAY OF AMMONIA: CPT | Performed by: STUDENT IN AN ORGANIZED HEALTH CARE EDUCATION/TRAINING PROGRAM

## 2022-01-01 PROCEDURE — 83605 ASSAY OF LACTIC ACID: CPT | Performed by: STUDENT IN AN ORGANIZED HEALTH CARE EDUCATION/TRAINING PROGRAM

## 2022-01-01 PROCEDURE — 82728 ASSAY OF FERRITIN: CPT | Performed by: CLINICAL NURSE SPECIALIST

## 2022-01-01 PROCEDURE — 97110 THERAPEUTIC EXERCISES: CPT

## 2022-01-01 PROCEDURE — 85027 COMPLETE CBC AUTOMATED: CPT | Performed by: INTERNAL MEDICINE

## 2022-01-01 PROCEDURE — 82803 BLOOD GASES ANY COMBINATION: CPT

## 2022-01-01 PROCEDURE — 84443 ASSAY THYROID STIM HORMONE: CPT | Performed by: PSYCHIATRY & NEUROLOGY

## 2022-01-01 PROCEDURE — 80143 DRUG ASSAY ACETAMINOPHEN: CPT | Performed by: STUDENT IN AN ORGANIZED HEALTH CARE EDUCATION/TRAINING PROGRAM

## 2022-01-01 PROCEDURE — 80053 COMPREHEN METABOLIC PANEL: CPT | Performed by: FAMILY MEDICINE

## 2022-01-01 PROCEDURE — 94640 AIRWAY INHALATION TREATMENT: CPT

## 2022-01-01 PROCEDURE — 25010000002 CEFTRIAXONE PER 250 MG: Performed by: INTERNAL MEDICINE

## 2022-01-01 PROCEDURE — 36415 COLL VENOUS BLD VENIPUNCTURE: CPT | Performed by: INTERNAL MEDICINE

## 2022-01-01 PROCEDURE — 80306 DRUG TEST PRSMV INSTRMNT: CPT | Performed by: PSYCHIATRY & NEUROLOGY

## 2022-01-01 PROCEDURE — 99222 1ST HOSP IP/OBS MODERATE 55: CPT | Performed by: CLINICAL NURSE SPECIALIST

## 2022-01-01 PROCEDURE — 82140 ASSAY OF AMMONIA: CPT | Performed by: INTERNAL MEDICINE

## 2022-01-01 PROCEDURE — 80048 BASIC METABOLIC PNL TOTAL CA: CPT | Performed by: INTERNAL MEDICINE

## 2022-01-01 PROCEDURE — 83605 ASSAY OF LACTIC ACID: CPT | Performed by: INTERNAL MEDICINE

## 2022-01-01 PROCEDURE — 99285 EMERGENCY DEPT VISIT HI MDM: CPT

## 2022-01-01 PROCEDURE — 82805 BLOOD GASES W/O2 SATURATION: CPT

## 2022-01-01 PROCEDURE — 87040 BLOOD CULTURE FOR BACTERIA: CPT | Performed by: EMERGENCY MEDICINE

## 2022-01-01 PROCEDURE — 74018 RADEX ABDOMEN 1 VIEW: CPT

## 2022-01-01 PROCEDURE — 25010000002 DIPHENHYDRAMINE PER 50 MG: Performed by: INTERNAL MEDICINE

## 2022-01-01 PROCEDURE — 84145 PROCALCITONIN (PCT): CPT | Performed by: INTERNAL MEDICINE

## 2022-01-01 PROCEDURE — 80202 ASSAY OF VANCOMYCIN: CPT | Performed by: STUDENT IN AN ORGANIZED HEALTH CARE EDUCATION/TRAINING PROGRAM

## 2022-01-01 PROCEDURE — 0 POTASSIUM CHLORIDE 10 MEQ/100ML SOLUTION: Performed by: INTERNAL MEDICINE

## 2022-01-01 PROCEDURE — 70450 CT HEAD/BRAIN W/O DYE: CPT

## 2022-01-01 PROCEDURE — 93010 ELECTROCARDIOGRAM REPORT: CPT | Performed by: INTERNAL MEDICINE

## 2022-01-01 PROCEDURE — 76705 ECHO EXAM OF ABDOMEN: CPT

## 2022-01-01 PROCEDURE — 25010000002 VANCOMYCIN 10 G RECONSTITUTED SOLUTION: Performed by: STUDENT IN AN ORGANIZED HEALTH CARE EDUCATION/TRAINING PROGRAM

## 2022-01-01 PROCEDURE — 85045 AUTOMATED RETICULOCYTE COUNT: CPT | Performed by: STUDENT IN AN ORGANIZED HEALTH CARE EDUCATION/TRAINING PROGRAM

## 2022-01-01 PROCEDURE — 25010000002 PHENTOLAMINE MESYLATE PER 5 MG: Performed by: FAMILY MEDICINE

## 2022-01-01 PROCEDURE — 99214 OFFICE O/P EST MOD 30 MIN: CPT | Performed by: CLINICAL NURSE SPECIALIST

## 2022-01-01 PROCEDURE — 25010000002 MORPHINE (PF) 10 MG/ML SOLUTION: Performed by: INTERNAL MEDICINE

## 2022-01-01 PROCEDURE — 83050 HGB METHEMOGLOBIN QUAN: CPT

## 2022-01-01 PROCEDURE — 85025 COMPLETE CBC W/AUTO DIFF WBC: CPT | Performed by: INTERNAL MEDICINE

## 2022-01-01 PROCEDURE — 99232 SBSQ HOSP IP/OBS MODERATE 35: CPT | Performed by: NURSE PRACTITIONER

## 2022-01-01 PROCEDURE — 99233 SBSQ HOSP IP/OBS HIGH 50: CPT | Performed by: INTERNAL MEDICINE

## 2022-01-01 PROCEDURE — 25010000002 PROPOFOL 1000 MG/100ML EMULSION

## 2022-01-01 PROCEDURE — 97535 SELF CARE MNGMENT TRAINING: CPT | Performed by: OCCUPATIONAL THERAPIST

## 2022-01-01 PROCEDURE — 86901 BLOOD TYPING SEROLOGIC RH(D): CPT

## 2022-01-01 PROCEDURE — 4A133B1 MONITORING OF ARTERIAL PRESSURE, PERIPHERAL, PERCUTANEOUS APPROACH: ICD-10-PCS | Performed by: INTERNAL MEDICINE

## 2022-01-01 PROCEDURE — 86920 COMPATIBILITY TEST SPIN: CPT

## 2022-01-01 PROCEDURE — 93005 ELECTROCARDIOGRAM TRACING: CPT

## 2022-01-01 PROCEDURE — 99214 OFFICE O/P EST MOD 30 MIN: CPT | Performed by: PSYCHIATRY & NEUROLOGY

## 2022-01-01 PROCEDURE — 86922 COMPATIBILITY TEST ANTIGLOB: CPT

## 2022-01-01 PROCEDURE — 94002 VENT MGMT INPAT INIT DAY: CPT

## 2022-01-01 PROCEDURE — 84295 ASSAY OF SERUM SODIUM: CPT | Performed by: INTERNAL MEDICINE

## 2022-01-01 PROCEDURE — 82390 ASSAY OF CERULOPLASMIN: CPT | Performed by: CLINICAL NURSE SPECIALIST

## 2022-01-01 PROCEDURE — 81403 MOPATH PROCEDURE LEVEL 4: CPT

## 2022-01-01 PROCEDURE — 85610 PROTHROMBIN TIME: CPT | Performed by: STUDENT IN AN ORGANIZED HEALTH CARE EDUCATION/TRAINING PROGRAM

## 2022-01-01 PROCEDURE — 80074 ACUTE HEPATITIS PANEL: CPT | Performed by: CLINICAL NURSE SPECIALIST

## 2022-01-01 PROCEDURE — 84550 ASSAY OF BLOOD/URIC ACID: CPT | Performed by: INTERNAL MEDICINE

## 2022-01-01 PROCEDURE — 80306 DRUG TEST PRSMV INSTRMNT: CPT | Performed by: EMERGENCY MEDICINE

## 2022-01-01 PROCEDURE — 82140 ASSAY OF AMMONIA: CPT | Performed by: FAMILY MEDICINE

## 2022-01-01 PROCEDURE — 99498 ADVNCD CARE PLAN ADDL 30 MIN: CPT

## 2022-01-01 PROCEDURE — 85610 PROTHROMBIN TIME: CPT | Performed by: INTERNAL MEDICINE

## 2022-01-01 PROCEDURE — 25010000002 LORAZEPAM PER 2 MG: Performed by: INTERNAL MEDICINE

## 2022-01-01 PROCEDURE — 06HY33Z INSERTION OF INFUSION DEVICE INTO LOWER VEIN, PERCUTANEOUS APPROACH: ICD-10-PCS | Performed by: INTERNAL MEDICINE

## 2022-01-01 PROCEDURE — C1751 CATH, INF, PER/CENT/MIDLINE: HCPCS

## 2022-01-01 PROCEDURE — 97162 PT EVAL MOD COMPLEX 30 MIN: CPT

## 2022-01-01 PROCEDURE — 82103 ALPHA-1-ANTITRYPSIN TOTAL: CPT | Performed by: CLINICAL NURSE SPECIALIST

## 2022-01-01 PROCEDURE — 82247 BILIRUBIN TOTAL: CPT | Performed by: CLINICAL NURSE SPECIALIST

## 2022-01-01 PROCEDURE — 99284 EMERGENCY DEPT VISIT MOD MDM: CPT

## 2022-01-01 PROCEDURE — 86381 MITOCHONDRIAL ANTIBODY EACH: CPT | Performed by: CLINICAL NURSE SPECIALIST

## 2022-01-01 PROCEDURE — 80178 ASSAY OF LITHIUM: CPT | Performed by: EMERGENCY MEDICINE

## 2022-01-01 PROCEDURE — 25010000002 FUROSEMIDE PER 20 MG: Performed by: STUDENT IN AN ORGANIZED HEALTH CARE EDUCATION/TRAINING PROGRAM

## 2022-01-01 PROCEDURE — 82010 KETONE BODYS QUAN: CPT | Performed by: STUDENT IN AN ORGANIZED HEALTH CARE EDUCATION/TRAINING PROGRAM

## 2022-01-01 PROCEDURE — P9612 CATHETERIZE FOR URINE SPEC: HCPCS

## 2022-01-01 PROCEDURE — 97165 OT EVAL LOW COMPLEX 30 MIN: CPT | Performed by: OCCUPATIONAL THERAPIST

## 2022-01-01 PROCEDURE — 25010000002 ENOXAPARIN PER 10 MG: Performed by: INTERNAL MEDICINE

## 2022-01-01 PROCEDURE — 82375 ASSAY CARBOXYHB QUANT: CPT

## 2022-01-01 PROCEDURE — 99232 SBSQ HOSP IP/OBS MODERATE 35: CPT | Performed by: INTERNAL MEDICINE

## 2022-01-01 PROCEDURE — 5A1945Z RESPIRATORY VENTILATION, 24-96 CONSECUTIVE HOURS: ICD-10-PCS | Performed by: STUDENT IN AN ORGANIZED HEALTH CARE EDUCATION/TRAINING PROGRAM

## 2022-01-01 PROCEDURE — 85025 COMPLETE CBC W/AUTO DIFF WBC: CPT | Performed by: STUDENT IN AN ORGANIZED HEALTH CARE EDUCATION/TRAINING PROGRAM

## 2022-01-01 PROCEDURE — 93005 ELECTROCARDIOGRAM TRACING: CPT | Performed by: EMERGENCY MEDICINE

## 2022-01-01 PROCEDURE — 83540 ASSAY OF IRON: CPT | Performed by: INTERNAL MEDICINE

## 2022-01-01 PROCEDURE — 83935 ASSAY OF URINE OSMOLALITY: CPT | Performed by: INTERNAL MEDICINE

## 2022-01-01 PROCEDURE — 86902 BLOOD TYPE ANTIGEN DONOR EA: CPT

## 2022-01-01 PROCEDURE — 80048 BASIC METABOLIC PNL TOTAL CA: CPT | Performed by: STUDENT IN AN ORGANIZED HEALTH CARE EDUCATION/TRAINING PROGRAM

## 2022-01-01 PROCEDURE — 92610 EVALUATE SWALLOWING FUNCTION: CPT | Performed by: SPEECH-LANGUAGE PATHOLOGIST

## 2022-01-01 PROCEDURE — 02HV33Z INSERTION OF INFUSION DEVICE INTO SUPERIOR VENA CAVA, PERCUTANEOUS APPROACH: ICD-10-PCS | Performed by: INTERNAL MEDICINE

## 2022-01-01 PROCEDURE — 25010000002 FENTANYL CITRATE (PF) 50 MCG/ML SOLUTION: Performed by: INTERNAL MEDICINE

## 2022-01-01 PROCEDURE — 82550 ASSAY OF CK (CPK): CPT | Performed by: INTERNAL MEDICINE

## 2022-01-01 PROCEDURE — 85730 THROMBOPLASTIN TIME PARTIAL: CPT | Performed by: INTERNAL MEDICINE

## 2022-01-01 PROCEDURE — 84100 ASSAY OF PHOSPHORUS: CPT | Performed by: INTERNAL MEDICINE

## 2022-01-01 PROCEDURE — 99232 SBSQ HOSP IP/OBS MODERATE 35: CPT | Performed by: CLINICAL NURSE SPECIALIST

## 2022-01-01 PROCEDURE — 99223 1ST HOSP IP/OBS HIGH 75: CPT | Performed by: PSYCHIATRY & NEUROLOGY

## 2022-01-01 PROCEDURE — 94003 VENT MGMT INPAT SUBQ DAY: CPT

## 2022-01-01 PROCEDURE — 82978 ASSAY OF GLUTATHIONE: CPT | Performed by: STUDENT IN AN ORGANIZED HEALTH CARE EDUCATION/TRAINING PROGRAM

## 2022-01-01 PROCEDURE — 85027 COMPLETE CBC AUTOMATED: CPT | Performed by: STUDENT IN AN ORGANIZED HEALTH CARE EDUCATION/TRAINING PROGRAM

## 2022-01-01 PROCEDURE — 92526 ORAL FUNCTION THERAPY: CPT | Performed by: SPEECH-LANGUAGE PATHOLOGIST

## 2022-01-01 PROCEDURE — 76981 USE PARENCHYMA: CPT

## 2022-01-01 PROCEDURE — 86015 ACTIN ANTIBODY EACH: CPT | Performed by: CLINICAL NURSE SPECIALIST

## 2022-01-01 PROCEDURE — 97164 PT RE-EVAL EST PLAN CARE: CPT | Performed by: PHYSICAL THERAPIST

## 2022-01-01 PROCEDURE — 83735 ASSAY OF MAGNESIUM: CPT | Performed by: INTERNAL MEDICINE

## 2022-01-01 PROCEDURE — 85610 PROTHROMBIN TIME: CPT | Performed by: CLINICAL NURSE SPECIALIST

## 2022-01-01 PROCEDURE — 74176 CT ABD & PELVIS W/O CONTRAST: CPT

## 2022-01-01 PROCEDURE — 5A12012 PERFORMANCE OF CARDIAC OUTPUT, SINGLE, MANUAL: ICD-10-PCS | Performed by: INTERNAL MEDICINE

## 2022-01-01 PROCEDURE — 83540 ASSAY OF IRON: CPT | Performed by: CLINICAL NURSE SPECIALIST

## 2022-01-01 PROCEDURE — 84145 PROCALCITONIN (PCT): CPT | Performed by: EMERGENCY MEDICINE

## 2022-01-01 PROCEDURE — 83735 ASSAY OF MAGNESIUM: CPT | Performed by: EMERGENCY MEDICINE

## 2022-01-01 PROCEDURE — 84443 ASSAY THYROID STIM HORMONE: CPT | Performed by: INTERNAL MEDICINE

## 2022-01-01 PROCEDURE — 25010000002 VANCOMYCIN 1 G RECONSTITUTED SOLUTION 1 EACH VIAL: Performed by: STUDENT IN AN ORGANIZED HEALTH CARE EDUCATION/TRAINING PROGRAM

## 2022-01-01 PROCEDURE — 25010000002 HALOPERIDOL LACTATE PER 5 MG: Performed by: FAMILY MEDICINE

## 2022-01-01 PROCEDURE — 86850 RBC ANTIBODY SCREEN: CPT

## 2022-01-01 PROCEDURE — 92526 ORAL FUNCTION THERAPY: CPT

## 2022-01-01 PROCEDURE — 25010000002 PROPOFOL 10 MG/ML EMULSION: Performed by: INTERNAL MEDICINE

## 2022-01-01 PROCEDURE — 85610 PROTHROMBIN TIME: CPT | Performed by: EMERGENCY MEDICINE

## 2022-01-01 PROCEDURE — 81003 URINALYSIS AUTO W/O SCOPE: CPT | Performed by: INTERNAL MEDICINE

## 2022-01-01 PROCEDURE — 80074 ACUTE HEPATITIS PANEL: CPT | Performed by: INTERNAL MEDICINE

## 2022-01-01 PROCEDURE — 04HY32Z INSERTION OF MONITORING DEVICE INTO LOWER ARTERY, PERCUTANEOUS APPROACH: ICD-10-PCS | Performed by: INTERNAL MEDICINE

## 2022-01-01 PROCEDURE — 84466 ASSAY OF TRANSFERRIN: CPT | Performed by: CLINICAL NURSE SPECIALIST

## 2022-01-01 PROCEDURE — 25010000002 EPINEPHRINE 1 MG/10ML SOLUTION PREFILLED SYRINGE: Performed by: INTERNAL MEDICINE

## 2022-01-01 PROCEDURE — 82570 ASSAY OF URINE CREATININE: CPT | Performed by: INTERNAL MEDICINE

## 2022-01-01 PROCEDURE — 80179 DRUG ASSAY SALICYLATE: CPT | Performed by: STUDENT IN AN ORGANIZED HEALTH CARE EDUCATION/TRAINING PROGRAM

## 2022-01-01 PROCEDURE — 82140 ASSAY OF AMMONIA: CPT | Performed by: PSYCHIATRY & NEUROLOGY

## 2022-01-01 PROCEDURE — 94761 N-INVAS EAR/PLS OXIMETRY MLT: CPT

## 2022-01-01 PROCEDURE — 80061 LIPID PANEL: CPT | Performed by: INTERNAL MEDICINE

## 2022-01-01 PROCEDURE — 82140 ASSAY OF AMMONIA: CPT | Performed by: EMERGENCY MEDICINE

## 2022-01-01 PROCEDURE — 31500 INSERT EMERGENCY AIRWAY: CPT | Performed by: INTERNAL MEDICINE

## 2022-01-01 PROCEDURE — 25010000002 POTASSIUM CHLORIDE PER 2 MEQ: Performed by: EMERGENCY MEDICINE

## 2022-01-01 PROCEDURE — 85007 BL SMEAR W/DIFF WBC COUNT: CPT | Performed by: PHYSICIAN ASSISTANT

## 2022-01-01 PROCEDURE — 25010000002 LORAZEPAM PER 2 MG: Performed by: EMERGENCY MEDICINE

## 2022-01-01 PROCEDURE — 95819 EEG AWAKE AND ASLEEP: CPT | Performed by: PSYCHIATRY & NEUROLOGY

## 2022-01-01 PROCEDURE — 25010000002 MAGNESIUM SULFATE 2 GM/50ML SOLUTION: Performed by: INTERNAL MEDICINE

## 2022-01-01 PROCEDURE — 92950 HEART/LUNG RESUSCITATION CPR: CPT

## 2022-01-01 PROCEDURE — 83615 LACTATE (LD) (LDH) ENZYME: CPT | Performed by: STUDENT IN AN ORGANIZED HEALTH CARE EDUCATION/TRAINING PROGRAM

## 2022-01-01 PROCEDURE — 82248 BILIRUBIN DIRECT: CPT | Performed by: CLINICAL NURSE SPECIALIST

## 2022-01-01 PROCEDURE — 83605 ASSAY OF LACTIC ACID: CPT | Performed by: EMERGENCY MEDICINE

## 2022-01-01 PROCEDURE — 86880 COOMBS TEST DIRECT: CPT | Performed by: INTERNAL MEDICINE

## 2022-01-01 PROCEDURE — 83880 ASSAY OF NATRIURETIC PEPTIDE: CPT | Performed by: EMERGENCY MEDICINE

## 2022-01-01 PROCEDURE — 71250 CT THORAX DX C-: CPT

## 2022-01-01 PROCEDURE — 0 GADOBENATE DIMEGLUMINE 529 MG/ML SOLUTION: Performed by: STUDENT IN AN ORGANIZED HEALTH CARE EDUCATION/TRAINING PROGRAM

## 2022-01-01 PROCEDURE — 0 POTASSIUM CHLORIDE PER 2 MEQ: Performed by: STUDENT IN AN ORGANIZED HEALTH CARE EDUCATION/TRAINING PROGRAM

## 2022-01-01 PROCEDURE — 86140 C-REACTIVE PROTEIN: CPT | Performed by: EMERGENCY MEDICINE

## 2022-01-01 PROCEDURE — 25010000002 CEFTRIAXONE PER 250 MG: Performed by: STUDENT IN AN ORGANIZED HEALTH CARE EDUCATION/TRAINING PROGRAM

## 2022-01-01 PROCEDURE — 87635 SARS-COV-2 COVID-19 AMP PRB: CPT | Performed by: FAMILY MEDICINE

## 2022-01-01 PROCEDURE — 82306 VITAMIN D 25 HYDROXY: CPT | Performed by: CLINICAL NURSE SPECIALIST

## 2022-01-01 PROCEDURE — 97168 OT RE-EVAL EST PLAN CARE: CPT

## 2022-01-01 PROCEDURE — 84100 ASSAY OF PHOSPHORUS: CPT | Performed by: EMERGENCY MEDICINE

## 2022-01-01 PROCEDURE — 92523 SPEECH SOUND LANG COMPREHEN: CPT

## 2022-01-01 PROCEDURE — 99223 1ST HOSP IP/OBS HIGH 75: CPT

## 2022-01-01 PROCEDURE — 82270 OCCULT BLOOD FECES: CPT | Performed by: EMERGENCY MEDICINE

## 2022-01-01 PROCEDURE — 4A133J1 MONITORING OF ARTERIAL PULSE, PERIPHERAL, PERCUTANEOUS APPROACH: ICD-10-PCS | Performed by: INTERNAL MEDICINE

## 2022-01-01 PROCEDURE — 83930 ASSAY OF BLOOD OSMOLALITY: CPT | Performed by: INTERNAL MEDICINE

## 2022-01-01 PROCEDURE — 99222 1ST HOSP IP/OBS MODERATE 55: CPT | Performed by: NURSE PRACTITIONER

## 2022-01-01 PROCEDURE — 05HY33Z INSERTION OF INFUSION DEVICE INTO UPPER VEIN, PERCUTANEOUS APPROACH: ICD-10-PCS | Performed by: INTERNAL MEDICINE

## 2022-01-01 PROCEDURE — 97165 OT EVAL LOW COMPLEX 30 MIN: CPT

## 2022-01-01 PROCEDURE — 85018 HEMOGLOBIN: CPT | Performed by: NURSE PRACTITIONER

## 2022-01-01 PROCEDURE — 99222 1ST HOSP IP/OBS MODERATE 55: CPT | Performed by: PSYCHIATRY & NEUROLOGY

## 2022-01-01 PROCEDURE — C9803 HOPD COVID-19 SPEC COLLECT: HCPCS

## 2022-01-01 PROCEDURE — 86850 RBC ANTIBODY SCREEN: CPT | Performed by: INTERNAL MEDICINE

## 2022-01-01 PROCEDURE — 84466 ASSAY OF TRANSFERRIN: CPT | Performed by: INTERNAL MEDICINE

## 2022-01-01 PROCEDURE — 83010 ASSAY OF HAPTOGLOBIN QUANT: CPT | Performed by: STUDENT IN AN ORGANIZED HEALTH CARE EDUCATION/TRAINING PROGRAM

## 2022-01-01 PROCEDURE — 25010000002 FUROSEMIDE PER 20 MG: Performed by: INTERNAL MEDICINE

## 2022-01-01 PROCEDURE — 25010000002 THIAMINE PER 100 MG: Performed by: EMERGENCY MEDICINE

## 2022-01-01 PROCEDURE — 85379 FIBRIN DEGRADATION QUANT: CPT | Performed by: EMERGENCY MEDICINE

## 2022-01-01 PROCEDURE — 85025 COMPLETE CBC W/AUTO DIFF WBC: CPT | Performed by: FAMILY MEDICINE

## 2022-01-01 PROCEDURE — 80178 ASSAY OF LITHIUM: CPT | Performed by: STUDENT IN AN ORGANIZED HEALTH CARE EDUCATION/TRAINING PROGRAM

## 2022-01-01 PROCEDURE — 86870 RBC ANTIBODY IDENTIFICATION: CPT | Performed by: INTERNAL MEDICINE

## 2022-01-01 PROCEDURE — 80306 DRUG TEST PRSMV INSTRMNT: CPT | Performed by: INTERNAL MEDICINE

## 2022-01-01 PROCEDURE — 87040 BLOOD CULTURE FOR BACTERIA: CPT | Performed by: STUDENT IN AN ORGANIZED HEALTH CARE EDUCATION/TRAINING PROGRAM

## 2022-01-01 PROCEDURE — 36415 COLL VENOUS BLD VENIPUNCTURE: CPT

## 2022-01-01 PROCEDURE — 99291 CRITICAL CARE FIRST HOUR: CPT | Performed by: PSYCHIATRY & NEUROLOGY

## 2022-01-01 PROCEDURE — 92610 EVALUATE SWALLOWING FUNCTION: CPT

## 2022-01-01 PROCEDURE — 86900 BLOOD TYPING SEROLOGIC ABO: CPT | Performed by: INTERNAL MEDICINE

## 2022-01-01 PROCEDURE — G0432 EIA HIV-1/HIV-2 SCREEN: HCPCS | Performed by: INTERNAL MEDICINE

## 2022-01-01 PROCEDURE — 95819 EEG AWAKE AND ASLEEP: CPT

## 2022-01-01 PROCEDURE — 81001 URINALYSIS AUTO W/SCOPE: CPT | Performed by: EMERGENCY MEDICINE

## 2022-01-01 RX ORDER — LACTULOSE 20 G/30ML
45 SOLUTION ORAL 4 TIMES DAILY
Status: DISCONTINUED | OUTPATIENT
Start: 2022-01-01 | End: 2022-01-01

## 2022-01-01 RX ORDER — SODIUM CHLORIDE 0.9 % (FLUSH) 0.9 %
10 SYRINGE (ML) INJECTION AS NEEDED
Status: DISCONTINUED | OUTPATIENT
Start: 2022-01-01 | End: 2022-01-01 | Stop reason: HOSPADM

## 2022-01-01 RX ORDER — NOREPINEPHRINE BIT/0.9 % NACL 8 MG/250ML
.02-.3 INFUSION BOTTLE (ML) INTRAVENOUS
Status: DISCONTINUED | OUTPATIENT
Start: 2022-01-01 | End: 2022-01-01

## 2022-01-01 RX ORDER — AMIODARONE HYDROCHLORIDE 200 MG/1
200 TABLET ORAL EVERY EVENING
COMMUNITY
End: 2022-01-01 | Stop reason: HOSPADM

## 2022-01-01 RX ORDER — SODIUM CHLORIDE, SODIUM LACTATE, POTASSIUM CHLORIDE, CALCIUM CHLORIDE 600; 310; 30; 20 MG/100ML; MG/100ML; MG/100ML; MG/100ML
75 INJECTION, SOLUTION INTRAVENOUS CONTINUOUS
Status: DISCONTINUED | OUTPATIENT
Start: 2022-01-01 | End: 2022-01-01

## 2022-01-01 RX ORDER — POTASSIUM CHLORIDE 750 MG/1
40 CAPSULE, EXTENDED RELEASE ORAL DAILY
Qty: 30 CAPSULE | Refills: 2 | Status: SHIPPED | OUTPATIENT
Start: 2022-01-01

## 2022-01-01 RX ORDER — THIAMINE HYDROCHLORIDE 100 MG/ML
100 INJECTION, SOLUTION INTRAMUSCULAR; INTRAVENOUS ONCE
Status: COMPLETED | OUTPATIENT
Start: 2022-01-01 | End: 2022-01-01

## 2022-01-01 RX ORDER — ENOXAPARIN SODIUM 100 MG/ML
40 INJECTION SUBCUTANEOUS
Status: DISCONTINUED | OUTPATIENT
Start: 2022-01-01 | End: 2022-01-01

## 2022-01-01 RX ORDER — LIDOCAINE HYDROCHLORIDE 10 MG/ML
INJECTION, SOLUTION INFILTRATION; PERINEURAL
Status: DISPENSED
Start: 2022-01-01 | End: 2022-01-01

## 2022-01-01 RX ORDER — FUROSEMIDE 10 MG/ML
40 INJECTION INTRAMUSCULAR; INTRAVENOUS ONCE
Status: COMPLETED | OUTPATIENT
Start: 2022-01-01 | End: 2022-01-01

## 2022-01-01 RX ORDER — SODIUM CHLORIDE 9 MG/ML
125 INJECTION, SOLUTION INTRAVENOUS CONTINUOUS
Status: DISPENSED | OUTPATIENT
Start: 2022-01-01 | End: 2022-01-01

## 2022-01-01 RX ORDER — SODIUM CHLORIDE, SODIUM LACTATE, POTASSIUM CHLORIDE, CALCIUM CHLORIDE 600; 310; 30; 20 MG/100ML; MG/100ML; MG/100ML; MG/100ML
50 INJECTION, SOLUTION INTRAVENOUS CONTINUOUS
Status: DISCONTINUED | OUTPATIENT
Start: 2022-01-01 | End: 2022-01-01

## 2022-01-01 RX ORDER — HALOPERIDOL 5 MG/ML
4 INJECTION INTRAMUSCULAR ONCE
Status: COMPLETED | OUTPATIENT
Start: 2022-01-01 | End: 2022-01-01

## 2022-01-01 RX ORDER — LACTULOSE 20 G/30ML
20 SOLUTION ORAL ONCE
Status: DISCONTINUED | OUTPATIENT
Start: 2022-01-01 | End: 2022-01-01

## 2022-01-01 RX ORDER — FUROSEMIDE 10 MG/ML
80 INJECTION INTRAMUSCULAR; INTRAVENOUS ONCE
Status: COMPLETED | OUTPATIENT
Start: 2022-01-01 | End: 2022-01-01

## 2022-01-01 RX ORDER — MAGNESIUM SULFATE HEPTAHYDRATE 40 MG/ML
2 INJECTION, SOLUTION INTRAVENOUS ONCE
Status: COMPLETED | OUTPATIENT
Start: 2022-01-01 | End: 2022-01-01

## 2022-01-01 RX ORDER — POLYETHYLENE GLYCOL 3350 17 G/17G
17 POWDER, FOR SOLUTION ORAL DAILY
Refills: 0 | Status: DISCONTINUED | OUTPATIENT
Start: 2022-01-01 | End: 2022-01-01 | Stop reason: HOSPADM

## 2022-01-01 RX ORDER — SODIUM CHLORIDE 0.9 % (FLUSH) 0.9 %
10 SYRINGE (ML) INJECTION EVERY 12 HOURS SCHEDULED
Status: DISCONTINUED | OUTPATIENT
Start: 2022-01-01 | End: 2022-01-01 | Stop reason: HOSPADM

## 2022-01-01 RX ORDER — ONDANSETRON 4 MG/1
4 TABLET, FILM COATED ORAL EVERY 6 HOURS PRN
Status: DISCONTINUED | OUTPATIENT
Start: 2022-01-01 | End: 2022-01-01 | Stop reason: HOSPADM

## 2022-01-01 RX ORDER — FOLIC ACID 1 MG/1
1 TABLET ORAL DAILY
Status: DISCONTINUED | OUTPATIENT
Start: 2022-01-01 | End: 2022-01-01 | Stop reason: HOSPADM

## 2022-01-01 RX ORDER — LORAZEPAM 2 MG/ML
1 INJECTION INTRAMUSCULAR
Status: DISCONTINUED | OUTPATIENT
Start: 2022-01-01 | End: 2022-01-01

## 2022-01-01 RX ORDER — EPLERENONE 25 MG/1
25 TABLET, FILM COATED ORAL
Status: DISCONTINUED | OUTPATIENT
Start: 2022-01-01 | End: 2022-01-01

## 2022-01-01 RX ORDER — METOLAZONE 5 MG/1
5 TABLET ORAL DAILY PRN
COMMUNITY
End: 2022-01-01 | Stop reason: HOSPADM

## 2022-01-01 RX ORDER — EPINEPHRINE 0.1 MG/ML
SYRINGE (ML) INJECTION
Status: COMPLETED | OUTPATIENT
Start: 2022-01-01 | End: 2022-01-01

## 2022-01-01 RX ORDER — HALOPERIDOL 5 MG/ML
1 INJECTION INTRAMUSCULAR EVERY 6 HOURS PRN
Status: DISCONTINUED | OUTPATIENT
Start: 2022-01-01 | End: 2022-01-01

## 2022-01-01 RX ORDER — LIDOCAINE HYDROCHLORIDE 10 MG/ML
1 INJECTION, SOLUTION EPIDURAL; INFILTRATION; INTRACAUDAL; PERINEURAL ONCE
Status: COMPLETED | OUTPATIENT
Start: 2022-01-01 | End: 2022-01-01

## 2022-01-01 RX ORDER — PANTOPRAZOLE SODIUM 40 MG/10ML
40 INJECTION, POWDER, LYOPHILIZED, FOR SOLUTION INTRAVENOUS
Status: DISCONTINUED | OUTPATIENT
Start: 2022-01-01 | End: 2022-01-01

## 2022-01-01 RX ORDER — CHOLECALCIFEROL (VITAMIN D3) 125 MCG
5 CAPSULE ORAL NIGHTLY
Qty: 30 TABLET | Refills: 1 | Status: SHIPPED | OUTPATIENT
Start: 2022-01-01

## 2022-01-01 RX ORDER — FOLIC ACID 1 MG/1
1 TABLET ORAL DAILY
Status: DISCONTINUED | OUTPATIENT
Start: 2022-01-01 | End: 2022-01-01

## 2022-01-01 RX ORDER — LACTULOSE 20 G/30ML
20 SOLUTION ORAL DAILY
Status: DISCONTINUED | OUTPATIENT
Start: 2022-01-01 | End: 2022-01-01

## 2022-01-01 RX ORDER — BUMETANIDE 1 MG/1
3 TABLET ORAL DAILY
COMMUNITY
End: 2022-01-01 | Stop reason: HOSPADM

## 2022-01-01 RX ORDER — POTASSIUM CHLORIDE 7.45 MG/ML
10 INJECTION INTRAVENOUS
Status: DISPENSED | OUTPATIENT
Start: 2022-01-01 | End: 2022-01-01

## 2022-01-01 RX ORDER — POTASSIUM CHLORIDE 750 MG/1
40 CAPSULE, EXTENDED RELEASE ORAL 2 TIMES DAILY WITH MEALS
Status: COMPLETED | OUTPATIENT
Start: 2022-01-01 | End: 2022-01-01

## 2022-01-01 RX ORDER — POTASSIUM CHLORIDE 750 MG/1
40 CAPSULE, EXTENDED RELEASE ORAL
Status: COMPLETED | OUTPATIENT
Start: 2022-01-01 | End: 2022-01-01

## 2022-01-01 RX ORDER — FLUTICASONE PROPIONATE 50 MCG
2 SPRAY, SUSPENSION (ML) NASAL DAILY PRN
Status: DISCONTINUED | OUTPATIENT
Start: 2022-01-01 | End: 2022-01-01 | Stop reason: HOSPADM

## 2022-01-01 RX ORDER — DEXMEDETOMIDINE HYDROCHLORIDE 4 UG/ML
.2-1.5 INJECTION, SOLUTION INTRAVENOUS
Status: DISCONTINUED | OUTPATIENT
Start: 2022-01-01 | End: 2022-01-01

## 2022-01-01 RX ORDER — DIPHENHYDRAMINE HYDROCHLORIDE 50 MG/ML
12.5 INJECTION INTRAMUSCULAR; INTRAVENOUS ONCE
Status: COMPLETED | OUTPATIENT
Start: 2022-01-01 | End: 2022-01-01

## 2022-01-01 RX ORDER — DEXTROSE MONOHYDRATE 25 G/50ML
25 INJECTION, SOLUTION INTRAVENOUS
Status: DISCONTINUED | OUTPATIENT
Start: 2022-01-01 | End: 2022-01-01

## 2022-01-01 RX ORDER — MIDODRINE HYDROCHLORIDE 2.5 MG/1
5 TABLET ORAL
Status: DISCONTINUED | OUTPATIENT
Start: 2022-01-01 | End: 2022-01-01 | Stop reason: HOSPADM

## 2022-01-01 RX ORDER — CALCIUM CARBONATE 200(500)MG
1 TABLET,CHEWABLE ORAL 3 TIMES DAILY PRN
Status: DISCONTINUED | OUTPATIENT
Start: 2022-01-01 | End: 2022-01-01 | Stop reason: HOSPADM

## 2022-01-01 RX ORDER — LACTULOSE 20 G/30ML
60 SOLUTION ORAL
Status: DISCONTINUED | OUTPATIENT
Start: 2022-01-01 | End: 2022-01-01

## 2022-01-01 RX ORDER — POTASSIUM CHLORIDE 750 MG/1
40 CAPSULE, EXTENDED RELEASE ORAL DAILY
Status: DISCONTINUED | OUTPATIENT
Start: 2022-01-01 | End: 2022-01-01 | Stop reason: HOSPADM

## 2022-01-01 RX ORDER — FUROSEMIDE 80 MG
80 TABLET ORAL DAILY
Start: 2022-01-01

## 2022-01-01 RX ORDER — FENTANYL CITRATE 50 UG/ML
25 INJECTION, SOLUTION INTRAMUSCULAR; INTRAVENOUS ONCE
Status: COMPLETED | OUTPATIENT
Start: 2022-01-01 | End: 2022-01-01

## 2022-01-01 RX ORDER — PANTOPRAZOLE SODIUM 40 MG/1
40 TABLET, DELAYED RELEASE ORAL NIGHTLY
Status: DISCONTINUED | OUTPATIENT
Start: 2022-01-01 | End: 2022-01-01

## 2022-01-01 RX ORDER — BUMETANIDE 1 MG/1
1 TABLET ORAL DAILY
Status: DISCONTINUED | OUTPATIENT
Start: 2022-01-01 | End: 2022-01-01

## 2022-01-01 RX ORDER — HALOPERIDOL 2 MG/1
4 TABLET ORAL NIGHTLY
Status: DISCONTINUED | OUTPATIENT
Start: 2022-01-01 | End: 2022-01-01

## 2022-01-01 RX ORDER — QUETIAPINE FUMARATE 25 MG/1
50 TABLET, FILM COATED ORAL NIGHTLY
Status: DISCONTINUED | OUTPATIENT
Start: 2022-01-01 | End: 2022-01-01 | Stop reason: HOSPADM

## 2022-01-01 RX ORDER — NOREPINEPHRINE BIT/0.9 % NACL 8 MG/250ML
INFUSION BOTTLE (ML) INTRAVENOUS
Status: COMPLETED
Start: 2022-01-01 | End: 2022-01-01

## 2022-01-01 RX ORDER — LACTULOSE 20 G/30ML
30 SOLUTION ORAL 4 TIMES DAILY
Status: DISCONTINUED | OUTPATIENT
Start: 2022-01-01 | End: 2022-01-01

## 2022-01-01 RX ORDER — LITHIUM CARBONATE 150 MG/1
150 CAPSULE ORAL 2 TIMES DAILY WITH MEALS
Status: DISCONTINUED | OUTPATIENT
Start: 2022-01-01 | End: 2022-01-01

## 2022-01-01 RX ORDER — BUSPIRONE HYDROCHLORIDE 10 MG/1
10 TABLET ORAL ONCE
Status: COMPLETED | OUTPATIENT
Start: 2022-01-01 | End: 2022-01-01

## 2022-01-01 RX ORDER — POTASSIUM CHLORIDE 7.45 MG/ML
10 INJECTION INTRAVENOUS
Status: DISCONTINUED | OUTPATIENT
Start: 2022-01-01 | End: 2022-01-01 | Stop reason: SDUPTHER

## 2022-01-01 RX ORDER — LACTULOSE 10 G/15ML
300 SOLUTION ORAL ONCE
Status: COMPLETED | OUTPATIENT
Start: 2022-01-01 | End: 2022-01-01

## 2022-01-01 RX ORDER — METOLAZONE 5 MG/1
5 TABLET ORAL DAILY
Status: DISCONTINUED | OUTPATIENT
Start: 2022-01-01 | End: 2022-01-01 | Stop reason: HOSPADM

## 2022-01-01 RX ORDER — DULOXETIN HYDROCHLORIDE 30 MG/1
60 CAPSULE, DELAYED RELEASE ORAL NIGHTLY
Status: DISCONTINUED | OUTPATIENT
Start: 2022-01-01 | End: 2022-01-01

## 2022-01-01 RX ORDER — LACTULOSE 20 G/30ML
30 SOLUTION ORAL 2 TIMES DAILY
Qty: 450 ML | Refills: 1 | Status: SHIPPED | OUTPATIENT
Start: 2022-01-01

## 2022-01-01 RX ORDER — LACTULOSE 20 G/30ML
30 SOLUTION ORAL 2 TIMES DAILY
Status: DISCONTINUED | OUTPATIENT
Start: 2022-01-01 | End: 2022-01-01 | Stop reason: HOSPADM

## 2022-01-01 RX ORDER — NITROGLYCERIN 0.4 MG/1
0.4 TABLET SUBLINGUAL
COMMUNITY

## 2022-01-01 RX ORDER — QUETIAPINE FUMARATE 25 MG/1
50 TABLET, FILM COATED ORAL ONCE
Status: DISCONTINUED | OUTPATIENT
Start: 2022-01-01 | End: 2022-01-01

## 2022-01-01 RX ORDER — LACTULOSE 20 G/30ML
20 SOLUTION ORAL 3 TIMES DAILY
Status: DISCONTINUED | OUTPATIENT
Start: 2022-01-01 | End: 2022-01-01

## 2022-01-01 RX ORDER — EPLERENONE 50 MG/1
50 TABLET, FILM COATED ORAL DAILY
COMMUNITY
End: 2022-01-01 | Stop reason: HOSPADM

## 2022-01-01 RX ORDER — ENOXAPARIN SODIUM 100 MG/ML
30 INJECTION SUBCUTANEOUS
Status: DISCONTINUED | OUTPATIENT
Start: 2022-01-01 | End: 2022-01-01

## 2022-01-01 RX ORDER — QUETIAPINE FUMARATE 25 MG/1
50 TABLET, FILM COATED ORAL NIGHTLY
Status: DISCONTINUED | OUTPATIENT
Start: 2022-01-01 | End: 2022-01-01

## 2022-01-01 RX ORDER — POTASSIUM CHLORIDE 750 MG/1
40 CAPSULE, EXTENDED RELEASE ORAL
Status: DISCONTINUED | OUTPATIENT
Start: 2022-01-01 | End: 2022-01-01

## 2022-01-01 RX ORDER — AMIODARONE HYDROCHLORIDE 200 MG/1
200 TABLET ORAL EVERY EVENING
Status: DISCONTINUED | OUTPATIENT
Start: 2022-01-01 | End: 2022-01-01

## 2022-01-01 RX ORDER — FAMOTIDINE 20 MG/1
40 TABLET, FILM COATED ORAL DAILY
Status: DISCONTINUED | OUTPATIENT
Start: 2022-01-01 | End: 2022-01-01 | Stop reason: ALTCHOICE

## 2022-01-01 RX ORDER — ZINC GLUCONATE 50 MG
50 TABLET ORAL DAILY
COMMUNITY

## 2022-01-01 RX ORDER — SODIUM CHLORIDE 9 MG/ML
125 INJECTION, SOLUTION INTRAVENOUS CONTINUOUS
Status: DISCONTINUED | OUTPATIENT
Start: 2022-01-01 | End: 2022-01-01

## 2022-01-01 RX ORDER — FUROSEMIDE 80 MG
80 TABLET ORAL
Status: DISCONTINUED | OUTPATIENT
Start: 2022-01-01 | End: 2022-01-01 | Stop reason: HOSPADM

## 2022-01-01 RX ORDER — CHOLECALCIFEROL (VITAMIN D3) 125 MCG
5 CAPSULE ORAL NIGHTLY
COMMUNITY
End: 2022-01-01 | Stop reason: SDUPTHER

## 2022-01-01 RX ORDER — MELATONIN
5000 DAILY
Status: DISCONTINUED | OUTPATIENT
Start: 2022-01-01 | End: 2022-01-01 | Stop reason: HOSPADM

## 2022-01-01 RX ORDER — QUETIAPINE FUMARATE 25 MG/1
50 TABLET, FILM COATED ORAL ONCE
Status: COMPLETED | OUTPATIENT
Start: 2022-01-01 | End: 2022-01-01

## 2022-01-01 RX ORDER — LANOLIN ALCOHOL/MO/W.PET/CERES
4.5 CREAM (GRAM) TOPICAL NIGHTLY
Status: DISCONTINUED | OUTPATIENT
Start: 2022-01-01 | End: 2022-01-01

## 2022-01-01 RX ORDER — ACETAMINOPHEN 160 MG/5ML
650 SOLUTION ORAL EVERY 4 HOURS PRN
Status: DISCONTINUED | OUTPATIENT
Start: 2022-01-01 | End: 2022-01-01 | Stop reason: HOSPADM

## 2022-01-01 RX ORDER — LORAZEPAM 2 MG/ML
0.5 INJECTION INTRAMUSCULAR ONCE
Status: COMPLETED | OUTPATIENT
Start: 2022-01-01 | End: 2022-01-01

## 2022-01-01 RX ORDER — ENOXAPARIN SODIUM 100 MG/ML
30 INJECTION SUBCUTANEOUS DAILY
Status: DISCONTINUED | OUTPATIENT
Start: 2022-01-01 | End: 2022-01-01 | Stop reason: HOSPADM

## 2022-01-01 RX ORDER — FUROSEMIDE 80 MG
80 TABLET ORAL DAILY
Status: DISCONTINUED | OUTPATIENT
Start: 2022-01-01 | End: 2022-01-01

## 2022-01-01 RX ORDER — ACETAMINOPHEN 650 MG/1
650 SUPPOSITORY RECTAL EVERY 4 HOURS PRN
Status: DISCONTINUED | OUTPATIENT
Start: 2022-01-01 | End: 2022-01-01 | Stop reason: HOSPADM

## 2022-01-01 RX ORDER — SODIUM CHLORIDE 0.9 % (FLUSH) 0.9 %
10 SYRINGE (ML) INJECTION AS NEEDED
Status: DISCONTINUED | OUTPATIENT
Start: 2022-01-01 | End: 2022-01-01

## 2022-01-01 RX ORDER — DULOXETIN HYDROCHLORIDE 30 MG/1
60 CAPSULE, DELAYED RELEASE ORAL NIGHTLY
Status: DISCONTINUED | OUTPATIENT
Start: 2022-01-01 | End: 2022-01-01 | Stop reason: HOSPADM

## 2022-01-01 RX ORDER — ROPINIROLE 0.25 MG/1
0.5 TABLET, FILM COATED ORAL NIGHTLY
Status: DISCONTINUED | OUTPATIENT
Start: 2022-01-01 | End: 2022-01-01 | Stop reason: HOSPADM

## 2022-01-01 RX ORDER — ETOMIDATE 2 MG/ML
INJECTION INTRAVENOUS
Status: COMPLETED | OUTPATIENT
Start: 2022-01-01 | End: 2022-01-01

## 2022-01-01 RX ORDER — QUETIAPINE FUMARATE 25 MG/1
25 TABLET, FILM COATED ORAL ONCE
Status: COMPLETED | OUTPATIENT
Start: 2022-01-01 | End: 2022-01-01

## 2022-01-01 RX ORDER — MORPHINE SULFATE 1 MG/ML
1 INJECTION, SOLUTION INTRAVENOUS CONTINUOUS
Status: DISCONTINUED | OUTPATIENT
Start: 2022-01-01 | End: 2022-01-01

## 2022-01-01 RX ORDER — NITROGLYCERIN 0.4 MG/1
0.4 TABLET SUBLINGUAL
Status: DISCONTINUED | OUTPATIENT
Start: 2022-01-01 | End: 2022-01-01

## 2022-01-01 RX ORDER — LITHIUM CARBONATE 150 MG/1
150 CAPSULE ORAL 2 TIMES DAILY WITH MEALS
Status: DISCONTINUED | OUTPATIENT
Start: 2022-01-01 | End: 2022-01-01 | Stop reason: HOSPADM

## 2022-01-01 RX ORDER — POTASSIUM CHLORIDE 750 MG/1
10 CAPSULE, EXTENDED RELEASE ORAL DAILY
Status: DISCONTINUED | OUTPATIENT
Start: 2022-01-01 | End: 2022-01-01

## 2022-01-01 RX ORDER — METOLAZONE 5 MG/1
5 TABLET ORAL DAILY
Qty: 30 TABLET | Refills: 1 | Status: SHIPPED | OUTPATIENT
Start: 2022-01-01

## 2022-01-01 RX ORDER — CYANOCOBALAMIN 1000 UG/ML
1000 INJECTION, SOLUTION INTRAMUSCULAR; SUBCUTANEOUS
COMMUNITY
End: 2022-01-01 | Stop reason: HOSPADM

## 2022-01-01 RX ORDER — POTASSIUM CHLORIDE 750 MG/1
20 CAPSULE, EXTENDED RELEASE ORAL ONCE
Status: COMPLETED | OUTPATIENT
Start: 2022-01-01 | End: 2022-01-01

## 2022-01-01 RX ORDER — PANTOPRAZOLE SODIUM 40 MG/1
40 TABLET, DELAYED RELEASE ORAL NIGHTLY
COMMUNITY

## 2022-01-01 RX ORDER — HALOPERIDOL 5 MG/ML
5 INJECTION INTRAMUSCULAR ONCE
Status: DISCONTINUED | OUTPATIENT
Start: 2022-01-01 | End: 2022-01-01

## 2022-01-01 RX ORDER — MAGNESIUM SULFATE HEPTAHYDRATE 40 MG/ML
4 INJECTION, SOLUTION INTRAVENOUS AS NEEDED
Status: DISCONTINUED | OUTPATIENT
Start: 2022-01-01 | End: 2022-01-01

## 2022-01-01 RX ORDER — ACETAMINOPHEN 500 MG
500 TABLET ORAL EVERY 6 HOURS PRN
Status: DISCONTINUED | OUTPATIENT
Start: 2022-01-01 | End: 2022-01-01 | Stop reason: HOSPADM

## 2022-01-01 RX ORDER — POTASSIUM CHLORIDE 14.9 MG/ML
20 INJECTION INTRAVENOUS ONCE
Status: COMPLETED | OUTPATIENT
Start: 2022-01-01 | End: 2022-01-01

## 2022-01-01 RX ORDER — LORAZEPAM 2 MG/ML
1 INJECTION INTRAMUSCULAR EVERY 4 HOURS PRN
Status: DISCONTINUED | OUTPATIENT
Start: 2022-01-01 | End: 2022-01-01

## 2022-01-01 RX ORDER — IPRATROPIUM BROMIDE AND ALBUTEROL SULFATE 2.5; .5 MG/3ML; MG/3ML
3 SOLUTION RESPIRATORY (INHALATION) EVERY 6 HOURS PRN
Status: DISCONTINUED | OUTPATIENT
Start: 2022-01-01 | End: 2022-01-01

## 2022-01-01 RX ORDER — POTASSIUM CHLORIDE 750 MG/1
40 CAPSULE, EXTENDED RELEASE ORAL ONCE
Status: COMPLETED | OUTPATIENT
Start: 2022-01-01 | End: 2022-01-01

## 2022-01-01 RX ORDER — BUMETANIDE 1 MG/1
2 TABLET ORAL DAILY
Status: DISCONTINUED | OUTPATIENT
Start: 2022-01-01 | End: 2022-01-01

## 2022-01-01 RX ORDER — FAMOTIDINE 20 MG/1
20 TABLET, FILM COATED ORAL DAILY
COMMUNITY
End: 2022-01-01 | Stop reason: HOSPADM

## 2022-01-01 RX ORDER — GLYCOPYRROLATE 0.2 MG/ML
0.2 INJECTION INTRAMUSCULAR; INTRAVENOUS EVERY 4 HOURS PRN
Status: DISCONTINUED | OUTPATIENT
Start: 2022-01-01 | End: 2022-01-01 | Stop reason: HOSPADM

## 2022-01-01 RX ORDER — FLUTICASONE PROPIONATE 50 MCG
2 SPRAY, SUSPENSION (ML) NASAL DAILY PRN
COMMUNITY

## 2022-01-01 RX ORDER — HALOPERIDOL 5 MG/ML
2 INJECTION INTRAMUSCULAR ONCE
Status: COMPLETED | OUTPATIENT
Start: 2022-01-01 | End: 2022-01-01

## 2022-01-01 RX ORDER — POTASSIUM CHLORIDE 7.45 MG/ML
10 INJECTION INTRAVENOUS
Status: DISCONTINUED | OUTPATIENT
Start: 2022-01-01 | End: 2022-01-01

## 2022-01-01 RX ORDER — MORPHINE SULFATE 1 MG/ML
2 INJECTION, SOLUTION INTRAVENOUS
Status: DISCONTINUED | OUTPATIENT
Start: 2022-01-01 | End: 2022-01-01 | Stop reason: HOSPADM

## 2022-01-01 RX ORDER — ACETAMINOPHEN 325 MG/1
650 TABLET ORAL EVERY 4 HOURS PRN
Status: DISCONTINUED | OUTPATIENT
Start: 2022-01-01 | End: 2022-01-01 | Stop reason: HOSPADM

## 2022-01-01 RX ORDER — BUSPIRONE HYDROCHLORIDE 10 MG/1
10 TABLET ORAL 2 TIMES DAILY
Status: DISCONTINUED | OUTPATIENT
Start: 2022-01-01 | End: 2022-01-01

## 2022-01-01 RX ORDER — FUROSEMIDE 40 MG/1
40 TABLET ORAL DAILY
Status: DISCONTINUED | OUTPATIENT
Start: 2022-01-01 | End: 2022-01-01

## 2022-01-01 RX ORDER — POTASSIUM CHLORIDE 1500 MG/1
30 TABLET, FILM COATED, EXTENDED RELEASE ORAL DAILY
COMMUNITY
End: 2022-01-01 | Stop reason: HOSPADM

## 2022-01-01 RX ORDER — MULTIVIT WITH MINERALS/LUTEIN
1000 TABLET ORAL DAILY
COMMUNITY

## 2022-01-01 RX ORDER — LORAZEPAM 2 MG/ML
1 INJECTION INTRAMUSCULAR ONCE
Status: COMPLETED | OUTPATIENT
Start: 2022-01-01 | End: 2022-01-01

## 2022-01-01 RX ORDER — FAMOTIDINE 10 MG/ML
20 INJECTION, SOLUTION INTRAVENOUS EVERY 12 HOURS SCHEDULED
Status: DISCONTINUED | OUTPATIENT
Start: 2022-01-01 | End: 2022-01-01

## 2022-01-01 RX ORDER — FLUTICASONE PROPIONATE 50 MCG
2 SPRAY, SUSPENSION (ML) NASAL DAILY PRN
Status: DISCONTINUED | OUTPATIENT
Start: 2022-01-01 | End: 2022-01-01

## 2022-01-01 RX ORDER — LEVOTHYROXINE SODIUM 0.05 MG/1
50 TABLET ORAL
Status: DISCONTINUED | OUTPATIENT
Start: 2022-01-01 | End: 2022-01-01 | Stop reason: HOSPADM

## 2022-01-01 RX ORDER — PANTOPRAZOLE SODIUM 40 MG/1
40 TABLET, DELAYED RELEASE ORAL NIGHTLY
Status: DISCONTINUED | OUTPATIENT
Start: 2022-01-01 | End: 2022-01-01 | Stop reason: HOSPADM

## 2022-01-01 RX ORDER — SODIUM CHLORIDE 0.9 % (FLUSH) 0.9 %
10 SYRINGE (ML) INJECTION EVERY 12 HOURS SCHEDULED
Status: DISCONTINUED | OUTPATIENT
Start: 2022-01-01 | End: 2022-01-01

## 2022-01-01 RX ORDER — ACETAMINOPHEN 500 MG
500 TABLET ORAL EVERY 6 HOURS PRN
Status: DISCONTINUED | OUTPATIENT
Start: 2022-01-01 | End: 2022-01-01

## 2022-01-01 RX ORDER — LANOLIN ALCOHOL/MO/W.PET/CERES
6 CREAM (GRAM) TOPICAL NIGHTLY
Status: DISCONTINUED | OUTPATIENT
Start: 2022-01-01 | End: 2022-01-01 | Stop reason: HOSPADM

## 2022-01-01 RX ORDER — FUROSEMIDE 80 MG
80 TABLET ORAL DAILY
Status: DISCONTINUED | OUTPATIENT
Start: 2022-01-01 | End: 2022-01-01 | Stop reason: HOSPADM

## 2022-01-01 RX ORDER — POTASSIUM CHLORIDE 7.45 MG/ML
10 INJECTION INTRAVENOUS ONCE
Status: COMPLETED | OUTPATIENT
Start: 2022-01-01 | End: 2022-01-01

## 2022-01-01 RX ORDER — LACTULOSE 20 G/30ML
30 SOLUTION ORAL 3 TIMES DAILY
Status: DISCONTINUED | OUTPATIENT
Start: 2022-01-01 | End: 2022-01-01

## 2022-01-01 RX ORDER — INSULIN LISPRO 100 [IU]/ML
2-7 INJECTION, SOLUTION INTRAVENOUS; SUBCUTANEOUS EVERY 6 HOURS SCHEDULED
Status: DISCONTINUED | OUTPATIENT
Start: 2022-01-01 | End: 2022-01-01

## 2022-01-01 RX ORDER — QUETIAPINE FUMARATE 50 MG/1
50 TABLET, FILM COATED ORAL NIGHTLY
Status: ON HOLD
Start: 2022-01-01 | End: 2022-01-01

## 2022-01-01 RX ORDER — MAGNESIUM SULFATE HEPTAHYDRATE 40 MG/ML
2 INJECTION, SOLUTION INTRAVENOUS AS NEEDED
Status: DISCONTINUED | OUTPATIENT
Start: 2022-01-01 | End: 2022-01-01

## 2022-01-01 RX ORDER — LACTULOSE 20 G/30ML
20 SOLUTION ORAL 2 TIMES DAILY
Status: DISCONTINUED | OUTPATIENT
Start: 2022-01-01 | End: 2022-01-01

## 2022-01-01 RX ORDER — LACTULOSE 20 G/30ML
20 SOLUTION ORAL 2 TIMES DAILY
Status: DISCONTINUED | OUTPATIENT
Start: 2022-01-01 | End: 2022-01-01 | Stop reason: HOSPADM

## 2022-01-01 RX ORDER — PANTOPRAZOLE SODIUM 40 MG/1
40 TABLET, DELAYED RELEASE ORAL
Status: DISCONTINUED | OUTPATIENT
Start: 2022-01-01 | End: 2022-01-01 | Stop reason: HOSPADM

## 2022-01-01 RX ORDER — PROPOFOL 10 MG/ML
INJECTION, EMULSION INTRAVENOUS
Status: COMPLETED
Start: 2022-01-01 | End: 2022-01-01

## 2022-01-01 RX ORDER — CALCIUM CARBONATE 200(500)MG
1 TABLET,CHEWABLE ORAL 2 TIMES DAILY PRN
Status: DISCONTINUED | OUTPATIENT
Start: 2022-01-01 | End: 2022-01-01

## 2022-01-01 RX ORDER — SCOLOPAMINE TRANSDERMAL SYSTEM 1 MG/1
1 PATCH, EXTENDED RELEASE TRANSDERMAL
Status: DISCONTINUED | OUTPATIENT
Start: 2022-01-01 | End: 2022-01-01 | Stop reason: HOSPADM

## 2022-01-01 RX ORDER — DEXTROSE AND SODIUM CHLORIDE 5; .9 G/100ML; G/100ML
50 INJECTION, SOLUTION INTRAVENOUS CONTINUOUS
Status: DISCONTINUED | OUTPATIENT
Start: 2022-01-01 | End: 2022-01-01

## 2022-01-01 RX ORDER — ASPIRIN 81 MG/1
81 TABLET, CHEWABLE ORAL DAILY
COMMUNITY
End: 2022-01-01 | Stop reason: HOSPADM

## 2022-01-01 RX ORDER — SODIUM CHLORIDE 9 MG/ML
INJECTION, SOLUTION INTRAVENOUS
Status: COMPLETED | OUTPATIENT
Start: 2022-01-01 | End: 2022-01-01

## 2022-01-01 RX ORDER — ONDANSETRON 4 MG/1
4 TABLET, FILM COATED ORAL EVERY 6 HOURS PRN
Status: DISCONTINUED | OUTPATIENT
Start: 2022-01-01 | End: 2022-01-01

## 2022-01-01 RX ORDER — QUETIAPINE FUMARATE 100 MG/1
100 TABLET, FILM COATED ORAL NIGHTLY
COMMUNITY

## 2022-01-01 RX ORDER — PANTOPRAZOLE SODIUM 40 MG/10ML
40 INJECTION, POWDER, LYOPHILIZED, FOR SOLUTION INTRAVENOUS 2 TIMES DAILY
Status: DISCONTINUED | OUTPATIENT
Start: 2022-01-01 | End: 2022-01-01

## 2022-01-01 RX ORDER — MIDODRINE HYDROCHLORIDE 5 MG/1
5 TABLET ORAL
Start: 2022-01-01 | End: 2022-01-01 | Stop reason: HOSPADM

## 2022-01-01 RX ORDER — POTASSIUM CHLORIDE 7.45 MG/ML
10 INJECTION INTRAVENOUS
Status: COMPLETED | OUTPATIENT
Start: 2022-01-01 | End: 2022-01-01

## 2022-01-01 RX ORDER — ASPIRIN 81 MG/1
81 TABLET, CHEWABLE ORAL DAILY
Status: DISCONTINUED | OUTPATIENT
Start: 2022-01-01 | End: 2022-01-01

## 2022-01-01 RX ORDER — ONDANSETRON 2 MG/ML
4 INJECTION INTRAMUSCULAR; INTRAVENOUS EVERY 6 HOURS PRN
Status: DISCONTINUED | OUTPATIENT
Start: 2022-01-01 | End: 2022-01-01 | Stop reason: HOSPADM

## 2022-01-01 RX ORDER — CYCLOBENZAPRINE HCL 10 MG
5 TABLET ORAL 3 TIMES DAILY PRN
Status: DISCONTINUED | OUTPATIENT
Start: 2022-01-01 | End: 2022-01-01 | Stop reason: HOSPADM

## 2022-01-01 RX ORDER — LACTULOSE 20 G/30ML
20 SOLUTION ORAL 2 TIMES DAILY
Qty: 450 ML
Start: 2022-01-01 | End: 2022-01-01 | Stop reason: HOSPADM

## 2022-01-01 RX ORDER — LORAZEPAM 2 MG/ML
1 INJECTION INTRAMUSCULAR
Status: DISCONTINUED | OUTPATIENT
Start: 2022-01-01 | End: 2022-01-01 | Stop reason: HOSPADM

## 2022-01-01 RX ORDER — FUROSEMIDE 10 MG/ML
20 INJECTION INTRAMUSCULAR; INTRAVENOUS ONCE
Status: COMPLETED | OUTPATIENT
Start: 2022-01-01 | End: 2022-01-01

## 2022-01-01 RX ORDER — INSULIN LISPRO 100 [IU]/ML
2-7 INJECTION, SOLUTION INTRAVENOUS; SUBCUTANEOUS
Status: DISCONTINUED | OUTPATIENT
Start: 2022-01-01 | End: 2022-01-01

## 2022-01-01 RX ORDER — QUETIAPINE FUMARATE 100 MG/1
100 TABLET, FILM COATED ORAL NIGHTLY
Status: DISCONTINUED | OUTPATIENT
Start: 2022-01-01 | End: 2022-01-01

## 2022-01-01 RX ORDER — LANOLIN ALCOHOL/MO/W.PET/CERES
6 CREAM (GRAM) TOPICAL NIGHTLY
Status: DISCONTINUED | OUTPATIENT
Start: 2022-01-01 | End: 2022-01-01

## 2022-01-01 RX ORDER — POTASSIUM CHLORIDE 29.8 MG/ML
20 INJECTION INTRAVENOUS
Status: DISCONTINUED | OUTPATIENT
Start: 2022-01-01 | End: 2022-01-01

## 2022-01-01 RX ORDER — IPRATROPIUM BROMIDE AND ALBUTEROL SULFATE 2.5; .5 MG/3ML; MG/3ML
3 SOLUTION RESPIRATORY (INHALATION) EVERY 4 HOURS PRN
Status: DISCONTINUED | OUTPATIENT
Start: 2022-01-01 | End: 2022-01-01 | Stop reason: HOSPADM

## 2022-01-01 RX ORDER — NICOTINE POLACRILEX 4 MG
15 LOZENGE BUCCAL
Status: DISCONTINUED | OUTPATIENT
Start: 2022-01-01 | End: 2022-01-01

## 2022-01-01 RX ORDER — POLYETHYLENE GLYCOL 3350 17 G/17G
17 POWDER, FOR SOLUTION ORAL DAILY
Status: DISCONTINUED | OUTPATIENT
Start: 2022-01-01 | End: 2022-01-01

## 2022-01-01 RX ADMIN — Medication 10 ML: at 21:13

## 2022-01-01 RX ADMIN — PANTOPRAZOLE SODIUM 40 MG: 40 TABLET, DELAYED RELEASE ORAL at 20:25

## 2022-01-01 RX ADMIN — MIDODRINE HYDROCHLORIDE 5 MG: 2.5 TABLET ORAL at 17:03

## 2022-01-01 RX ADMIN — MIDODRINE HYDROCHLORIDE 5 MG: 2.5 TABLET ORAL at 08:54

## 2022-01-01 RX ADMIN — DEXMEDETOMIDINE HYDROCHLORIDE 1.5 MCG/KG/HR: 4 INJECTION, SOLUTION INTRAVENOUS at 10:24

## 2022-01-01 RX ADMIN — POTASSIUM CHLORIDE 10 MEQ: 7.46 INJECTION, SOLUTION INTRAVENOUS at 11:49

## 2022-01-01 RX ADMIN — DEXMEDETOMIDINE HYDROCHLORIDE 1.2 MCG/KG/HR: 4 INJECTION, SOLUTION INTRAVENOUS at 17:59

## 2022-01-01 RX ADMIN — SODIUM CHLORIDE, POTASSIUM CHLORIDE, SODIUM LACTATE AND CALCIUM CHLORIDE 75 ML/HR: 600; 310; 30; 20 INJECTION, SOLUTION INTRAVENOUS at 10:20

## 2022-01-01 RX ADMIN — QUETIAPINE FUMARATE 100 MG: 100 TABLET ORAL at 21:12

## 2022-01-01 RX ADMIN — EMPAGLIFLOZIN 10 MG: 10 TABLET, FILM COATED ORAL at 08:54

## 2022-01-01 RX ADMIN — LACTULOSE 30 G: 20 SOLUTION ORAL at 08:56

## 2022-01-01 RX ADMIN — LACTULOSE 20 G: 20 SOLUTION ORAL at 08:23

## 2022-01-01 RX ADMIN — LACTULOSE 20 G: 20 SOLUTION ORAL at 09:55

## 2022-01-01 RX ADMIN — LACTULOSE 30 G: 20 SOLUTION ORAL at 20:40

## 2022-01-01 RX ADMIN — IPRATROPIUM BROMIDE 0.5 MG: 0.5 SOLUTION RESPIRATORY (INHALATION) at 11:24

## 2022-01-01 RX ADMIN — Medication 10 ML: at 20:16

## 2022-01-01 RX ADMIN — Medication 10 ML: at 09:48

## 2022-01-01 RX ADMIN — QUETIAPINE FUMARATE 50 MG: 25 TABLET, FILM COATED ORAL at 20:38

## 2022-01-01 RX ADMIN — LITHIUM CARBONATE 150 MG: 150 CAPSULE, GELATIN COATED ORAL at 08:54

## 2022-01-01 RX ADMIN — SODIUM CHLORIDE, POTASSIUM CHLORIDE, SODIUM LACTATE AND CALCIUM CHLORIDE 75 ML/HR: 600; 310; 30; 20 INJECTION, SOLUTION INTRAVENOUS at 00:21

## 2022-01-01 RX ADMIN — FUROSEMIDE 40 MG: 10 INJECTION, SOLUTION INTRAVENOUS at 14:01

## 2022-01-01 RX ADMIN — RIFAXIMIN 550 MG: 550 TABLET ORAL at 15:25

## 2022-01-01 RX ADMIN — EMPAGLIFLOZIN 10 MG: 10 TABLET, FILM COATED ORAL at 09:42

## 2022-01-01 RX ADMIN — Medication 5000 UNITS: at 08:53

## 2022-01-01 RX ADMIN — DEXMEDETOMIDINE HYDROCHLORIDE 0.2 MCG/KG/HR: 4 INJECTION, SOLUTION INTRAVENOUS at 03:23

## 2022-01-01 RX ADMIN — LACTULOSE 30 G: 20 SOLUTION ORAL at 16:21

## 2022-01-01 RX ADMIN — LACTULOSE 30 G: 20 SOLUTION ORAL at 08:31

## 2022-01-01 RX ADMIN — ENOXAPARIN SODIUM 90 MG: 100 INJECTION SUBCUTANEOUS at 09:59

## 2022-01-01 RX ADMIN — PANTOPRAZOLE SODIUM 40 MG: 40 TABLET, DELAYED RELEASE ORAL at 20:49

## 2022-01-01 RX ADMIN — POTASSIUM CHLORIDE 40 MEQ: 10 CAPSULE, COATED, EXTENDED RELEASE ORAL at 09:14

## 2022-01-01 RX ADMIN — RIFAXIMIN 550 MG: 550 TABLET ORAL at 12:24

## 2022-01-01 RX ADMIN — LACTULOSE 60 G: 20 SOLUTION ORAL at 22:18

## 2022-01-01 RX ADMIN — PANTOPRAZOLE SODIUM 40 MG: 40 INJECTION, POWDER, FOR SOLUTION INTRAVENOUS at 06:30

## 2022-01-01 RX ADMIN — EPLERENONE 25 MG: 25 TABLET, FILM COATED ORAL at 09:03

## 2022-01-01 RX ADMIN — QUETIAPINE FUMARATE 50 MG: 25 TABLET, FILM COATED ORAL at 21:23

## 2022-01-01 RX ADMIN — DEXMEDETOMIDINE HYDROCHLORIDE 1.5 MCG/KG/HR: 4 INJECTION, SOLUTION INTRAVENOUS at 20:52

## 2022-01-01 RX ADMIN — AMIODARONE HYDROCHLORIDE 200 MG: 200 TABLET ORAL at 16:00

## 2022-01-01 RX ADMIN — POTASSIUM CHLORIDE 10 MEQ: 7.46 INJECTION, SOLUTION INTRAVENOUS at 14:08

## 2022-01-01 RX ADMIN — LACTULOSE 20 G: 10 SOLUTION ORAL at 16:45

## 2022-01-01 RX ADMIN — DULOXETINE HYDROCHLORIDE 60 MG: 30 CAPSULE, DELAYED RELEASE ORAL at 20:04

## 2022-01-01 RX ADMIN — LACTULOSE 20 G: 20 SOLUTION ORAL at 08:37

## 2022-01-01 RX ADMIN — ROPINIROLE HYDROCHLORIDE 0.5 MG: 0.25 TABLET, FILM COATED ORAL at 20:38

## 2022-01-01 RX ADMIN — LITHIUM CARBONATE 150 MG: 150 CAPSULE, GELATIN COATED ORAL at 08:41

## 2022-01-01 RX ADMIN — EMPAGLIFLOZIN 10 MG: 10 TABLET, FILM COATED ORAL at 08:38

## 2022-01-01 RX ADMIN — LACTULOSE 20 G: 20 SOLUTION ORAL at 09:03

## 2022-01-01 RX ADMIN — LACTULOSE 20 G: 10 SOLUTION ORAL at 12:23

## 2022-01-01 RX ADMIN — LORAZEPAM 1 MG: 2 INJECTION INTRAMUSCULAR; INTRAVENOUS at 20:22

## 2022-01-01 RX ADMIN — POLYETHYLENE GLYCOL 3350 17 G: 17 POWDER, FOR SOLUTION ORAL at 08:17

## 2022-01-01 RX ADMIN — MAGNESIUM GLUCONATE 500 MG ORAL TABLET 400 MG: 500 TABLET ORAL at 08:40

## 2022-01-01 RX ADMIN — WATER 1 G: 1000 INJECTION, SOLUTION INTRAVENOUS at 08:48

## 2022-01-01 RX ADMIN — Medication 10 ML: at 01:38

## 2022-01-01 RX ADMIN — DULOXETINE HYDROCHLORIDE 60 MG: 30 CAPSULE, DELAYED RELEASE ORAL at 21:31

## 2022-01-01 RX ADMIN — SODIUM CHLORIDE 5 MG: 9 INJECTION, SOLUTION INTRAMUSCULAR; INTRAVENOUS; SUBCUTANEOUS at 13:08

## 2022-01-01 RX ADMIN — MIDODRINE HYDROCHLORIDE 5 MG: 2.5 TABLET ORAL at 06:54

## 2022-01-01 RX ADMIN — Medication 6 MG: at 21:32

## 2022-01-01 RX ADMIN — Medication 10 ML: at 22:30

## 2022-01-01 RX ADMIN — ACETAMINOPHEN 650 MG: 325 TABLET ORAL at 20:58

## 2022-01-01 RX ADMIN — ACETAMINOPHEN 650 MG: 160 SOLUTION ORAL at 23:06

## 2022-01-01 RX ADMIN — FOLIC ACID 1 MG: 1 TABLET ORAL at 09:14

## 2022-01-01 RX ADMIN — PANTOPRAZOLE SODIUM 40 MG: 40 TABLET, DELAYED RELEASE ORAL at 21:19

## 2022-01-01 RX ADMIN — MORPHINE SULFATE 8 MG/HR: 10 INJECTION, SOLUTION INTRAMUSCULAR; INTRAVENOUS at 02:02

## 2022-01-01 RX ADMIN — LACTULOSE 60 G: 20 SOLUTION ORAL at 09:15

## 2022-01-01 RX ADMIN — ASPIRIN 81 MG: 81 TABLET, CHEWABLE ORAL at 11:55

## 2022-01-01 RX ADMIN — LACTULOSE 20 G: 10 SOLUTION ORAL at 10:25

## 2022-01-01 RX ADMIN — POLYETHYLENE GLYCOL 3350 17 G: 17 POWDER, FOR SOLUTION ORAL at 09:33

## 2022-01-01 RX ADMIN — LITHIUM CARBONATE 150 MG: 150 CAPSULE, GELATIN COATED ORAL at 08:53

## 2022-01-01 RX ADMIN — RIFAXIMIN 550 MG: 550 TABLET ORAL at 08:10

## 2022-01-01 RX ADMIN — PANTOPRAZOLE SODIUM 40 MG: 40 TABLET, DELAYED RELEASE ORAL at 21:18

## 2022-01-01 RX ADMIN — LITHIUM CARBONATE 150 MG: 150 CAPSULE, GELATIN COATED ORAL at 08:48

## 2022-01-01 RX ADMIN — SODIUM CHLORIDE, POTASSIUM CHLORIDE, SODIUM LACTATE AND CALCIUM CHLORIDE 500 ML: 600; 310; 30; 20 INJECTION, SOLUTION INTRAVENOUS at 15:16

## 2022-01-01 RX ADMIN — POTASSIUM CHLORIDE 20 MEQ: 29.8 INJECTION, SOLUTION INTRAVENOUS at 06:04

## 2022-01-01 RX ADMIN — LITHIUM CARBONATE 150 MG: 150 CAPSULE, GELATIN COATED ORAL at 08:24

## 2022-01-01 RX ADMIN — LACTULOSE 60 G: 20 SOLUTION ORAL at 08:58

## 2022-01-01 RX ADMIN — MIDODRINE HYDROCHLORIDE 5 MG: 2.5 TABLET ORAL at 12:51

## 2022-01-01 RX ADMIN — DULOXETINE HYDROCHLORIDE 60 MG: 30 CAPSULE, DELAYED RELEASE ORAL at 20:40

## 2022-01-01 RX ADMIN — FOLIC ACID 1 MG: 1 TABLET ORAL at 11:19

## 2022-01-01 RX ADMIN — GADOBENATE DIMEGLUMINE 16 ML: 529 INJECTION, SOLUTION INTRAVENOUS at 12:45

## 2022-01-01 RX ADMIN — POTASSIUM CHLORIDE 10 MEQ: 7.46 INJECTION, SOLUTION INTRAVENOUS at 10:27

## 2022-01-01 RX ADMIN — LACTULOSE 30 G: 20 SOLUTION ORAL at 11:05

## 2022-01-01 RX ADMIN — ENOXAPARIN SODIUM 30 MG: 100 INJECTION SUBCUTANEOUS at 08:56

## 2022-01-01 RX ADMIN — POTASSIUM CHLORIDE 10 MEQ: 7.46 INJECTION, SOLUTION INTRAVENOUS at 17:00

## 2022-01-01 RX ADMIN — MAGNESIUM SULFATE HEPTAHYDRATE 2 G: 2 INJECTION, SOLUTION INTRAVENOUS at 10:58

## 2022-01-01 RX ADMIN — DEXMEDETOMIDINE HYDROCHLORIDE 1.5 MCG/KG/HR: 4 INJECTION, SOLUTION INTRAVENOUS at 13:16

## 2022-01-01 RX ADMIN — MIDODRINE HYDROCHLORIDE 5 MG: 2.5 TABLET ORAL at 17:02

## 2022-01-01 RX ADMIN — DIPHENHYDRAMINE HYDROCHLORIDE 12.5 MG: 50 INJECTION, SOLUTION INTRAMUSCULAR; INTRAVENOUS at 19:54

## 2022-01-01 RX ADMIN — Medication 5000 UNITS: at 09:15

## 2022-01-01 RX ADMIN — WATER 1 G: 1000 INJECTION, SOLUTION INTRAVENOUS at 12:23

## 2022-01-01 RX ADMIN — PANTOPRAZOLE SODIUM 40 MG: 40 INJECTION, POWDER, FOR SOLUTION INTRAVENOUS at 08:04

## 2022-01-01 RX ADMIN — POTASSIUM CHLORIDE 10 MEQ: 7.46 INJECTION, SOLUTION INTRAVENOUS at 09:00

## 2022-01-01 RX ADMIN — LACTULOSE 60 G: 20 SOLUTION ORAL at 12:51

## 2022-01-01 RX ADMIN — MAGNESIUM GLUCONATE 500 MG ORAL TABLET 400 MG: 500 TABLET ORAL at 10:57

## 2022-01-01 RX ADMIN — CEFEPIME HYDROCHLORIDE 2 G: 2 INJECTION, POWDER, FOR SOLUTION INTRAVENOUS at 05:56

## 2022-01-01 RX ADMIN — Medication 6 MG: at 20:23

## 2022-01-01 RX ADMIN — PANTOPRAZOLE SODIUM 40 MG: 40 INJECTION, POWDER, FOR SOLUTION INTRAVENOUS at 07:03

## 2022-01-01 RX ADMIN — SODIUM BICARBONATE 150 MEQ: 84 INJECTION INTRAVENOUS at 11:55

## 2022-01-01 RX ADMIN — DULOXETINE HYDROCHLORIDE 60 MG: 30 CAPSULE, DELAYED RELEASE ORAL at 21:19

## 2022-01-01 RX ADMIN — MIDODRINE HYDROCHLORIDE 5 MG: 2.5 TABLET ORAL at 11:49

## 2022-01-01 RX ADMIN — INSULIN LISPRO 2 UNITS: 100 INJECTION, SOLUTION INTRAVENOUS; SUBCUTANEOUS at 13:09

## 2022-01-01 RX ADMIN — MIDODRINE HYDROCHLORIDE 5 MG: 2.5 TABLET ORAL at 05:24

## 2022-01-01 RX ADMIN — MIDODRINE HYDROCHLORIDE 5 MG: 2.5 TABLET ORAL at 11:21

## 2022-01-01 RX ADMIN — QUETIAPINE FUMARATE 50 MG: 25 TABLET, FILM COATED ORAL at 21:00

## 2022-01-01 RX ADMIN — MIDODRINE HYDROCHLORIDE 5 MG: 2.5 TABLET ORAL at 17:43

## 2022-01-01 RX ADMIN — DEXMEDETOMIDINE HYDROCHLORIDE 1.2 MCG/KG/HR: 4 INJECTION, SOLUTION INTRAVENOUS at 13:22

## 2022-01-01 RX ADMIN — LACTULOSE 60 G: 20 SOLUTION ORAL at 00:04

## 2022-01-01 RX ADMIN — THIAMINE HYDROCHLORIDE 300 MG: 100 INJECTION, SOLUTION INTRAMUSCULAR; INTRAVENOUS at 12:30

## 2022-01-01 RX ADMIN — FOLIC ACID 1 MG: 1 TABLET ORAL at 08:17

## 2022-01-01 RX ADMIN — SODIUM BICARBONATE 150 MEQ: 84 INJECTION, SOLUTION INTRAVENOUS at 11:05

## 2022-01-01 RX ADMIN — POTASSIUM CHLORIDE 40 MEQ: 10 CAPSULE, COATED, EXTENDED RELEASE ORAL at 08:39

## 2022-01-01 RX ADMIN — ENOXAPARIN SODIUM 30 MG: 100 INJECTION SUBCUTANEOUS at 08:40

## 2022-01-01 RX ADMIN — DULOXETINE HYDROCHLORIDE 60 MG: 30 CAPSULE, DELAYED RELEASE ORAL at 21:14

## 2022-01-01 RX ADMIN — FOLIC ACID 1 MG: 1 TABLET ORAL at 08:40

## 2022-01-01 RX ADMIN — MIDODRINE HYDROCHLORIDE 5 MG: 2.5 TABLET ORAL at 16:48

## 2022-01-01 RX ADMIN — QUETIAPINE FUMARATE 50 MG: 25 TABLET, FILM COATED ORAL at 20:00

## 2022-01-01 RX ADMIN — ENOXAPARIN SODIUM 30 MG: 100 INJECTION SUBCUTANEOUS at 08:57

## 2022-01-01 RX ADMIN — METOLAZONE 5 MG: 5 TABLET ORAL at 09:14

## 2022-01-01 RX ADMIN — CEFEPIME HYDROCHLORIDE 2 G: 2 INJECTION, POWDER, FOR SOLUTION INTRAVENOUS at 18:13

## 2022-01-01 RX ADMIN — QUETIAPINE FUMARATE 50 MG: 25 TABLET, FILM COATED ORAL at 21:07

## 2022-01-01 RX ADMIN — PANTOPRAZOLE SODIUM 40 MG: 40 INJECTION, POWDER, FOR SOLUTION INTRAVENOUS at 09:01

## 2022-01-01 RX ADMIN — QUETIAPINE FUMARATE 50 MG: 25 TABLET, FILM COATED ORAL at 21:35

## 2022-01-01 RX ADMIN — DEXMEDETOMIDINE HYDROCHLORIDE 0.9 MCG/KG/HR: 4 INJECTION, SOLUTION INTRAVENOUS at 05:42

## 2022-01-01 RX ADMIN — POTASSIUM CHLORIDE 10 MEQ: 7.46 INJECTION, SOLUTION INTRAVENOUS at 16:35

## 2022-01-01 RX ADMIN — Medication 0.02 MCG/KG/MIN: at 09:31

## 2022-01-01 RX ADMIN — LACTULOSE 30 G: 20 SOLUTION ORAL at 15:31

## 2022-01-01 RX ADMIN — DEXMEDETOMIDINE HYDROCHLORIDE 0.7 MCG/KG/HR: 4 INJECTION, SOLUTION INTRAVENOUS at 23:43

## 2022-01-01 RX ADMIN — LACTULOSE 60 G: 20 SOLUTION ORAL at 00:43

## 2022-01-01 RX ADMIN — MIDODRINE HYDROCHLORIDE 5 MG: 2.5 TABLET ORAL at 17:21

## 2022-01-01 RX ADMIN — CEFEPIME HYDROCHLORIDE 2 G: 2 INJECTION, POWDER, FOR SOLUTION INTRAVENOUS at 18:06

## 2022-01-01 RX ADMIN — MIDODRINE HYDROCHLORIDE 5 MG: 2.5 TABLET ORAL at 10:43

## 2022-01-01 RX ADMIN — MAGNESIUM GLUCONATE 500 MG ORAL TABLET 400 MG: 500 TABLET ORAL at 08:54

## 2022-01-01 RX ADMIN — LITHIUM CARBONATE 150 MG: 150 CAPSULE, GELATIN COATED ORAL at 17:16

## 2022-01-01 RX ADMIN — LITHIUM CARBONATE 150 MG: 150 CAPSULE, GELATIN COATED ORAL at 18:44

## 2022-01-01 RX ADMIN — Medication 5000 UNITS: at 08:05

## 2022-01-01 RX ADMIN — MIDODRINE HYDROCHLORIDE 5 MG: 2.5 TABLET ORAL at 10:34

## 2022-01-01 RX ADMIN — POTASSIUM CHLORIDE 10 MEQ: 10 CAPSULE, COATED, EXTENDED RELEASE ORAL at 08:54

## 2022-01-01 RX ADMIN — Medication 10 ML: at 08:43

## 2022-01-01 RX ADMIN — MAGNESIUM GLUCONATE 500 MG ORAL TABLET 400 MG: 500 TABLET ORAL at 08:53

## 2022-01-01 RX ADMIN — FUROSEMIDE 80 MG: 80 TABLET ORAL at 08:38

## 2022-01-01 RX ADMIN — QUETIAPINE FUMARATE 50 MG: 25 TABLET, FILM COATED ORAL at 21:19

## 2022-01-01 RX ADMIN — QUETIAPINE FUMARATE 50 MG: 25 TABLET, FILM COATED ORAL at 20:15

## 2022-01-01 RX ADMIN — RIFAXIMIN 550 MG: 550 TABLET ORAL at 20:44

## 2022-01-01 RX ADMIN — DULOXETINE HYDROCHLORIDE 60 MG: 30 CAPSULE, DELAYED RELEASE ORAL at 01:28

## 2022-01-01 RX ADMIN — CEFEPIME HYDROCHLORIDE 2 G: 2 INJECTION, POWDER, FOR SOLUTION INTRAVENOUS at 21:26

## 2022-01-01 RX ADMIN — MIDODRINE HYDROCHLORIDE 5 MG: 2.5 TABLET ORAL at 11:58

## 2022-01-01 RX ADMIN — PANTOPRAZOLE SODIUM 40 MG: 40 INJECTION, POWDER, FOR SOLUTION INTRAVENOUS at 08:48

## 2022-01-01 RX ADMIN — Medication 0.02 MCG/KG/MIN: at 11:44

## 2022-01-01 RX ADMIN — FOLIC ACID 1 MG: 1 TABLET ORAL at 08:54

## 2022-01-01 RX ADMIN — PANTOPRAZOLE SODIUM 40 MG: 40 TABLET, DELAYED RELEASE ORAL at 17:03

## 2022-01-01 RX ADMIN — INSULIN LISPRO 3 UNITS: 100 INJECTION, SOLUTION INTRAVENOUS; SUBCUTANEOUS at 05:36

## 2022-01-01 RX ADMIN — PANTOPRAZOLE SODIUM 40 MG: 40 INJECTION, POWDER, FOR SOLUTION INTRAVENOUS at 05:36

## 2022-01-01 RX ADMIN — ENOXAPARIN SODIUM 40 MG: 40 INJECTION SUBCUTANEOUS at 09:33

## 2022-01-01 RX ADMIN — ROPINIROLE HYDROCHLORIDE 0.5 MG: 0.25 TABLET, FILM COATED ORAL at 21:08

## 2022-01-01 RX ADMIN — Medication 6 MG: at 21:00

## 2022-01-01 RX ADMIN — FAMOTIDINE 40 MG: 20 TABLET, FILM COATED ORAL at 08:41

## 2022-01-01 RX ADMIN — POTASSIUM CHLORIDE 10 MEQ: 7.46 INJECTION, SOLUTION INTRAVENOUS at 13:34

## 2022-01-01 RX ADMIN — FUROSEMIDE 80 MG: 80 TABLET ORAL at 09:14

## 2022-01-01 RX ADMIN — DULOXETINE HYDROCHLORIDE 60 MG: 30 CAPSULE, DELAYED RELEASE ORAL at 20:38

## 2022-01-01 RX ADMIN — LEVOTHYROXINE SODIUM 50 MCG: 50 TABLET ORAL at 05:49

## 2022-01-01 RX ADMIN — THIAMINE HYDROCHLORIDE 300 MG: 100 INJECTION, SOLUTION INTRAMUSCULAR; INTRAVENOUS at 09:59

## 2022-01-01 RX ADMIN — Medication 10 ML: at 20:00

## 2022-01-01 RX ADMIN — PANTOPRAZOLE SODIUM 40 MG: 40 INJECTION, POWDER, FOR SOLUTION INTRAVENOUS at 13:05

## 2022-01-01 RX ADMIN — PANTOPRAZOLE SODIUM 40 MG: 40 INJECTION, POWDER, FOR SOLUTION INTRAVENOUS at 16:30

## 2022-01-01 RX ADMIN — RIFAXIMIN 550 MG: 550 TABLET ORAL at 10:22

## 2022-01-01 RX ADMIN — ENOXAPARIN SODIUM 30 MG: 100 INJECTION SUBCUTANEOUS at 08:41

## 2022-01-01 RX ADMIN — LITHIUM CARBONATE 150 MG: 150 CAPSULE, GELATIN COATED ORAL at 17:02

## 2022-01-01 RX ADMIN — RIFAXIMIN 550 MG: 550 TABLET ORAL at 20:51

## 2022-01-01 RX ADMIN — POTASSIUM CHLORIDE 40 MEQ: 10 CAPSULE, COATED, EXTENDED RELEASE ORAL at 18:12

## 2022-01-01 RX ADMIN — LACTULOSE 30 G: 20 SOLUTION ORAL at 09:13

## 2022-01-01 RX ADMIN — Medication 5000 UNITS: at 08:38

## 2022-01-01 RX ADMIN — SODIUM BICARBONATE 50 MEQ: 84 INJECTION INTRAVENOUS at 12:20

## 2022-01-01 RX ADMIN — LITHIUM CARBONATE 150 MG: 150 CAPSULE, GELATIN COATED ORAL at 20:58

## 2022-01-01 RX ADMIN — LORAZEPAM 1 MG: 2 INJECTION INTRAMUSCULAR; INTRAVENOUS at 08:41

## 2022-01-01 RX ADMIN — MAGNESIUM GLUCONATE 500 MG ORAL TABLET 400 MG: 500 TABLET ORAL at 09:14

## 2022-01-01 RX ADMIN — AMIODARONE HYDROCHLORIDE 200 MG: 200 TABLET ORAL at 16:29

## 2022-01-01 RX ADMIN — BUMETANIDE 2 MG: 1 TABLET ORAL at 09:40

## 2022-01-01 RX ADMIN — Medication 10 ML: at 11:08

## 2022-01-01 RX ADMIN — Medication 10 ML: at 20:01

## 2022-01-01 RX ADMIN — POTASSIUM CHLORIDE 10 MEQ: 10 CAPSULE, COATED, EXTENDED RELEASE ORAL at 08:32

## 2022-01-01 RX ADMIN — ENOXAPARIN SODIUM 30 MG: 100 INJECTION SUBCUTANEOUS at 08:17

## 2022-01-01 RX ADMIN — LACTULOSE 30 G: 20 SOLUTION ORAL at 09:15

## 2022-01-01 RX ADMIN — EMPAGLIFLOZIN 10 MG: 10 TABLET, FILM COATED ORAL at 15:03

## 2022-01-01 RX ADMIN — Medication 6 MG: at 20:38

## 2022-01-01 RX ADMIN — METOLAZONE 5 MG: 5 TABLET ORAL at 08:57

## 2022-01-01 RX ADMIN — EMPAGLIFLOZIN 10 MG: 10 TABLET, FILM COATED ORAL at 08:17

## 2022-01-01 RX ADMIN — WATER 1 G: 1000 INJECTION, SOLUTION INTRAVENOUS at 10:05

## 2022-01-01 RX ADMIN — POTASSIUM CHLORIDE 10 MEQ: 7.46 INJECTION, SOLUTION INTRAVENOUS at 03:40

## 2022-01-01 RX ADMIN — LACTULOSE 60 G: 20 SOLUTION ORAL at 15:47

## 2022-01-01 RX ADMIN — FOLIC ACID 1 MG: 1 TABLET ORAL at 08:53

## 2022-01-01 RX ADMIN — FUROSEMIDE 40 MG: 40 TABLET ORAL at 15:03

## 2022-01-01 RX ADMIN — LIDOCAINE HYDROCHLORIDE ANHYDROUS 1 ML: 10 INJECTION, SOLUTION INFILTRATION at 14:16

## 2022-01-01 RX ADMIN — LITHIUM CARBONATE 150 MG: 150 CAPSULE, GELATIN COATED ORAL at 17:08

## 2022-01-01 RX ADMIN — RIFAXIMIN 550 MG: 550 TABLET ORAL at 09:00

## 2022-01-01 RX ADMIN — LORAZEPAM 1 MG: 2 INJECTION INTRAMUSCULAR; INTRAVENOUS at 23:06

## 2022-01-01 RX ADMIN — FUROSEMIDE 80 MG: 10 INJECTION, SOLUTION INTRAVENOUS at 09:13

## 2022-01-01 RX ADMIN — PANTOPRAZOLE SODIUM 40 MG: 40 TABLET, DELAYED RELEASE ORAL at 01:37

## 2022-01-01 RX ADMIN — ENOXAPARIN SODIUM 40 MG: 40 INJECTION SUBCUTANEOUS at 08:55

## 2022-01-01 RX ADMIN — MIDODRINE HYDROCHLORIDE 5 MG: 2.5 TABLET ORAL at 17:01

## 2022-01-01 RX ADMIN — VANCOMYCIN HYDROCHLORIDE 1000 MG: 1 INJECTION, POWDER, LYOPHILIZED, FOR SOLUTION INTRAVENOUS at 10:38

## 2022-01-01 RX ADMIN — LACTULOSE 30 G: 20 SOLUTION ORAL at 08:40

## 2022-01-01 RX ADMIN — RIFAXIMIN 550 MG: 550 TABLET ORAL at 09:42

## 2022-01-01 RX ADMIN — LITHIUM CARBONATE 150 MG: 150 CAPSULE, GELATIN COATED ORAL at 18:06

## 2022-01-01 RX ADMIN — LACTULOSE 300 ML: 10 SOLUTION ORAL at 18:12

## 2022-01-01 RX ADMIN — LACTULOSE 20 G: 10 SOLUTION ORAL at 08:10

## 2022-01-01 RX ADMIN — PANTOPRAZOLE SODIUM 40 MG: 40 TABLET, DELAYED RELEASE ORAL at 20:38

## 2022-01-01 RX ADMIN — Medication 10 ML: at 09:43

## 2022-01-01 RX ADMIN — Medication 50 MEQ: at 12:20

## 2022-01-01 RX ADMIN — FUROSEMIDE 40 MG: 40 TABLET ORAL at 08:24

## 2022-01-01 RX ADMIN — POTASSIUM CHLORIDE 10 MEQ: 7.46 INJECTION, SOLUTION INTRAVENOUS at 16:45

## 2022-01-01 RX ADMIN — MIDODRINE HYDROCHLORIDE 5 MG: 2.5 TABLET ORAL at 12:10

## 2022-01-01 RX ADMIN — LITHIUM CARBONATE 150 MG: 150 CAPSULE, GELATIN COATED ORAL at 09:31

## 2022-01-01 RX ADMIN — Medication 5000 UNITS: at 08:33

## 2022-01-01 RX ADMIN — DULOXETINE HYDROCHLORIDE 60 MG: 30 CAPSULE, DELAYED RELEASE ORAL at 21:08

## 2022-01-01 RX ADMIN — SODIUM CHLORIDE 125 ML/HR: 9 INJECTION, SOLUTION INTRAVENOUS at 21:01

## 2022-01-01 RX ADMIN — LITHIUM CARBONATE 150 MG: 150 CAPSULE, GELATIN COATED ORAL at 08:05

## 2022-01-01 RX ADMIN — PROPOFOL 10 MCG/KG/MIN: 10 INJECTION, EMULSION INTRAVENOUS at 11:30

## 2022-01-01 RX ADMIN — POTASSIUM CHLORIDE 40 MEQ: 10 CAPSULE, COATED, EXTENDED RELEASE ORAL at 13:43

## 2022-01-01 RX ADMIN — EMPAGLIFLOZIN 10 MG: 10 TABLET, FILM COATED ORAL at 09:14

## 2022-01-01 RX ADMIN — LITHIUM CARBONATE 150 MG: 150 CAPSULE, GELATIN COATED ORAL at 17:43

## 2022-01-01 RX ADMIN — CEFEPIME HYDROCHLORIDE 2 G: 2 INJECTION, POWDER, FOR SOLUTION INTRAVENOUS at 11:09

## 2022-01-01 RX ADMIN — Medication 10 ML: at 09:04

## 2022-01-01 RX ADMIN — LACTULOSE 60 G: 20 SOLUTION ORAL at 03:18

## 2022-01-01 RX ADMIN — Medication 6 MG: at 20:40

## 2022-01-01 RX ADMIN — Medication 10 ML: at 20:52

## 2022-01-01 RX ADMIN — ROPINIROLE HYDROCHLORIDE 0.5 MG: 0.25 TABLET, FILM COATED ORAL at 22:24

## 2022-01-01 RX ADMIN — MIDODRINE HYDROCHLORIDE 5 MG: 2.5 TABLET ORAL at 11:01

## 2022-01-01 RX ADMIN — POTASSIUM CHLORIDE 10 MEQ: 7.46 INJECTION, SOLUTION INTRAVENOUS at 10:14

## 2022-01-01 RX ADMIN — POTASSIUM CHLORIDE 10 MEQ: 10 CAPSULE, COATED, EXTENDED RELEASE ORAL at 10:57

## 2022-01-01 RX ADMIN — ENOXAPARIN SODIUM 40 MG: 40 INJECTION SUBCUTANEOUS at 09:01

## 2022-01-01 RX ADMIN — LITHIUM CARBONATE 150 MG: 150 CAPSULE, GELATIN COATED ORAL at 17:03

## 2022-01-01 RX ADMIN — Medication 10 ML: at 10:27

## 2022-01-01 RX ADMIN — INSULIN LISPRO 2 UNITS: 100 INJECTION, SOLUTION INTRAVENOUS; SUBCUTANEOUS at 06:13

## 2022-01-01 RX ADMIN — DEXMEDETOMIDINE HYDROCHLORIDE 0.2 MCG/KG/HR: 4 INJECTION, SOLUTION INTRAVENOUS at 10:26

## 2022-01-01 RX ADMIN — POTASSIUM CHLORIDE 10 MEQ: 7.46 INJECTION, SOLUTION INTRAVENOUS at 15:44

## 2022-01-01 RX ADMIN — Medication 0.16 MCG/KG/MIN: at 12:58

## 2022-01-01 RX ADMIN — MIDODRINE HYDROCHLORIDE 5 MG: 2.5 TABLET ORAL at 11:10

## 2022-01-01 RX ADMIN — POTASSIUM CHLORIDE 10 MEQ: 7.46 INJECTION, SOLUTION INTRAVENOUS at 13:23

## 2022-01-01 RX ADMIN — LITHIUM CARBONATE 150 MG: 150 CAPSULE, GELATIN COATED ORAL at 09:14

## 2022-01-01 RX ADMIN — LACTULOSE 300 ML: 10 SOLUTION ORAL; RECTAL at 19:11

## 2022-01-01 RX ADMIN — Medication 6 MG: at 21:14

## 2022-01-01 RX ADMIN — PANTOPRAZOLE SODIUM 40 MG: 40 INJECTION, POWDER, FOR SOLUTION INTRAVENOUS at 20:51

## 2022-01-01 RX ADMIN — ENOXAPARIN SODIUM 30 MG: 100 INJECTION SUBCUTANEOUS at 09:13

## 2022-01-01 RX ADMIN — SODIUM CHLORIDE, POTASSIUM CHLORIDE, SODIUM LACTATE AND CALCIUM CHLORIDE 250 ML: 600; 310; 30; 20 INJECTION, SOLUTION INTRAVENOUS at 08:11

## 2022-01-01 RX ADMIN — PANTOPRAZOLE SODIUM 40 MG: 40 TABLET, DELAYED RELEASE ORAL at 17:43

## 2022-01-01 RX ADMIN — AMIODARONE HYDROCHLORIDE 200 MG: 200 TABLET ORAL at 16:35

## 2022-01-01 RX ADMIN — LACTULOSE 60 G: 20 SOLUTION ORAL at 18:13

## 2022-01-01 RX ADMIN — LEVOTHYROXINE SODIUM 50 MCG: 50 TABLET ORAL at 05:28

## 2022-01-01 RX ADMIN — POTASSIUM CHLORIDE 10 MEQ: 7.46 INJECTION, SOLUTION INTRAVENOUS at 14:32

## 2022-01-01 RX ADMIN — LACTULOSE 30 G: 20 SOLUTION ORAL at 08:14

## 2022-01-01 RX ADMIN — EMPAGLIFLOZIN 10 MG: 10 TABLET, FILM COATED ORAL at 08:32

## 2022-01-01 RX ADMIN — SODIUM CHLORIDE 125 ML/HR: 9 INJECTION, SOLUTION INTRAVENOUS at 01:38

## 2022-01-01 RX ADMIN — Medication 6 MG: at 21:19

## 2022-01-01 RX ADMIN — MIDODRINE HYDROCHLORIDE 5 MG: 2.5 TABLET ORAL at 08:32

## 2022-01-01 RX ADMIN — QUETIAPINE FUMARATE 50 MG: 25 TABLET, FILM COATED ORAL at 20:58

## 2022-01-01 RX ADMIN — DULOXETINE HYDROCHLORIDE 60 MG: 30 CAPSULE, DELAYED RELEASE ORAL at 20:58

## 2022-01-01 RX ADMIN — Medication 5000 UNITS: at 09:55

## 2022-01-01 RX ADMIN — LITHIUM CARBONATE 150 MG: 150 CAPSULE, GELATIN COATED ORAL at 01:27

## 2022-01-01 RX ADMIN — CYCLOBENZAPRINE 5 MG: 10 TABLET, FILM COATED ORAL at 06:00

## 2022-01-01 RX ADMIN — LORAZEPAM 0.5 MG: 2 INJECTION INTRAMUSCULAR; INTRAVENOUS at 16:14

## 2022-01-01 RX ADMIN — LITHIUM CARBONATE 150 MG: 150 CAPSULE, GELATIN COATED ORAL at 08:38

## 2022-01-01 RX ADMIN — MIDODRINE HYDROCHLORIDE 5 MG: 2.5 TABLET ORAL at 17:15

## 2022-01-01 RX ADMIN — FUROSEMIDE 80 MG: 80 TABLET ORAL at 08:57

## 2022-01-01 RX ADMIN — LACTULOSE 30 G: 20 SOLUTION ORAL at 16:48

## 2022-01-01 RX ADMIN — LACTULOSE 30 G: 20 SOLUTION ORAL at 18:14

## 2022-01-01 RX ADMIN — DULOXETINE HYDROCHLORIDE 60 MG: 30 CAPSULE, DELAYED RELEASE ORAL at 21:35

## 2022-01-01 RX ADMIN — LACTULOSE 60 G: 20 SOLUTION ORAL at 06:13

## 2022-01-01 RX ADMIN — AMIODARONE HYDROCHLORIDE 200 MG: 200 TABLET ORAL at 16:06

## 2022-01-01 RX ADMIN — EMPAGLIFLOZIN 10 MG: 10 TABLET, FILM COATED ORAL at 08:40

## 2022-01-01 RX ADMIN — Medication 10 ML: at 09:02

## 2022-01-01 RX ADMIN — POTASSIUM CHLORIDE 10 MEQ: 7.46 INJECTION, SOLUTION INTRAVENOUS at 10:57

## 2022-01-01 RX ADMIN — POTASSIUM CHLORIDE 10 MEQ: 7.46 INJECTION, SOLUTION INTRAVENOUS at 18:27

## 2022-01-01 RX ADMIN — ACETAMINOPHEN 500 MG: 500 TABLET, FILM COATED ORAL at 00:02

## 2022-01-01 RX ADMIN — LACTULOSE 30 G: 20 SOLUTION ORAL at 21:19

## 2022-01-01 RX ADMIN — QUETIAPINE FUMARATE 50 MG: 25 TABLET, FILM COATED ORAL at 19:08

## 2022-01-01 RX ADMIN — RIFAXIMIN 550 MG: 550 TABLET ORAL at 19:56

## 2022-01-01 RX ADMIN — PANTOPRAZOLE SODIUM 40 MG: 40 TABLET, DELAYED RELEASE ORAL at 17:18

## 2022-01-01 RX ADMIN — Medication 10 ML: at 21:35

## 2022-01-01 RX ADMIN — DEXTROSE AND SODIUM CHLORIDE 50 ML/HR: 5; 900 INJECTION, SOLUTION INTRAVENOUS at 09:59

## 2022-01-01 RX ADMIN — IPRATROPIUM BROMIDE AND ALBUTEROL SULFATE 3 ML: .5; 3 SOLUTION RESPIRATORY (INHALATION) at 19:04

## 2022-01-01 RX ADMIN — FAMOTIDINE 40 MG: 20 TABLET, FILM COATED ORAL at 08:17

## 2022-01-01 RX ADMIN — Medication 10 ML: at 09:55

## 2022-01-01 RX ADMIN — FOLIC ACID 1 MG: 1 TABLET ORAL at 09:06

## 2022-01-01 RX ADMIN — INSULIN LISPRO 2 UNITS: 100 INJECTION, SOLUTION INTRAVENOUS; SUBCUTANEOUS at 00:02

## 2022-01-01 RX ADMIN — LITHIUM CARBONATE 150 MG: 150 CAPSULE, GELATIN COATED ORAL at 09:15

## 2022-01-01 RX ADMIN — Medication 10 ML: at 20:10

## 2022-01-01 RX ADMIN — LACTULOSE 30 G: 20 SOLUTION ORAL at 20:23

## 2022-01-01 RX ADMIN — LACTULOSE 30 G: 20 SOLUTION ORAL at 11:01

## 2022-01-01 RX ADMIN — SODIUM CHLORIDE 999 ML/HR: 9 INJECTION, SOLUTION INTRAVENOUS at 10:16

## 2022-01-01 RX ADMIN — LACTULOSE 20 G: 10 SOLUTION ORAL at 08:32

## 2022-01-01 RX ADMIN — LITHIUM CARBONATE 150 MG: 150 CAPSULE, GELATIN COATED ORAL at 07:03

## 2022-01-01 RX ADMIN — POTASSIUM CHLORIDE 10 MEQ: 10 CAPSULE, COATED, EXTENDED RELEASE ORAL at 08:40

## 2022-01-01 RX ADMIN — Medication 10 ML: at 23:47

## 2022-01-01 RX ADMIN — LEVOTHYROXINE SODIUM 50 MCG: 50 TABLET ORAL at 06:54

## 2022-01-01 RX ADMIN — DEXMEDETOMIDINE HYDROCHLORIDE 0.2 MCG/KG/HR: 4 INJECTION, SOLUTION INTRAVENOUS at 21:58

## 2022-01-01 RX ADMIN — POTASSIUM CHLORIDE 10 MEQ: 7.46 INJECTION, SOLUTION INTRAVENOUS at 01:14

## 2022-01-01 RX ADMIN — POTASSIUM CHLORIDE 10 MEQ: 7.46 INJECTION, SOLUTION INTRAVENOUS at 01:46

## 2022-01-01 RX ADMIN — POTASSIUM CHLORIDE 10 MEQ: 10 CAPSULE, COATED, EXTENDED RELEASE ORAL at 08:58

## 2022-01-01 RX ADMIN — DEXMEDETOMIDINE HYDROCHLORIDE 1.5 MCG/KG/HR: 4 INJECTION, SOLUTION INTRAVENOUS at 03:12

## 2022-01-01 RX ADMIN — LITHIUM CARBONATE 150 MG: 150 CAPSULE, GELATIN COATED ORAL at 17:21

## 2022-01-01 RX ADMIN — QUETIAPINE FUMARATE 25 MG: 25 TABLET, FILM COATED ORAL at 10:25

## 2022-01-01 RX ADMIN — PANTOPRAZOLE SODIUM 40 MG: 40 TABLET, DELAYED RELEASE ORAL at 21:00

## 2022-01-01 RX ADMIN — LITHIUM CARBONATE 150 MG: 150 CAPSULE, GELATIN COATED ORAL at 08:31

## 2022-01-01 RX ADMIN — EMPAGLIFLOZIN 10 MG: 10 TABLET, FILM COATED ORAL at 08:53

## 2022-01-01 RX ADMIN — Medication 5000 UNITS: at 09:41

## 2022-01-01 RX ADMIN — PANTOPRAZOLE SODIUM 40 MG: 40 TABLET, DELAYED RELEASE ORAL at 05:01

## 2022-01-01 RX ADMIN — RIFAXIMIN 550 MG: 550 TABLET ORAL at 21:35

## 2022-01-01 RX ADMIN — MAGNESIUM GLUCONATE 500 MG ORAL TABLET 400 MG: 500 TABLET ORAL at 08:32

## 2022-01-01 RX ADMIN — MIDODRINE HYDROCHLORIDE 5 MG: 2.5 TABLET ORAL at 05:01

## 2022-01-01 RX ADMIN — DULOXETINE HYDROCHLORIDE 60 MG: 30 CAPSULE, DELAYED RELEASE ORAL at 21:07

## 2022-01-01 RX ADMIN — MIDODRINE HYDROCHLORIDE 5 MG: 2.5 TABLET ORAL at 05:02

## 2022-01-01 RX ADMIN — RIFAXIMIN 550 MG: 550 TABLET ORAL at 08:48

## 2022-01-01 RX ADMIN — Medication 5000 UNITS: at 09:31

## 2022-01-01 RX ADMIN — POTASSIUM CHLORIDE 10 MEQ: 7.46 INJECTION, SOLUTION INTRAVENOUS at 10:49

## 2022-01-01 RX ADMIN — POTASSIUM CHLORIDE 10 MEQ: 7.46 INJECTION, SOLUTION INTRAVENOUS at 02:42

## 2022-01-01 RX ADMIN — LACTULOSE 30 G: 20 SOLUTION ORAL at 01:32

## 2022-01-01 RX ADMIN — DULOXETINE HYDROCHLORIDE 60 MG: 30 CAPSULE, DELAYED RELEASE ORAL at 20:23

## 2022-01-01 RX ADMIN — EMPAGLIFLOZIN 10 MG: 10 TABLET, FILM COATED ORAL at 09:15

## 2022-01-01 RX ADMIN — LACTULOSE 60 G: 20 SOLUTION ORAL at 06:44

## 2022-01-01 RX ADMIN — Medication 10 ML: at 08:05

## 2022-01-01 RX ADMIN — DULOXETINE HYDROCHLORIDE 60 MG: 30 CAPSULE, DELAYED RELEASE ORAL at 20:07

## 2022-01-01 RX ADMIN — POLYETHYLENE GLYCOL 3350 17 G: 17 POWDER, FOR SOLUTION ORAL at 08:31

## 2022-01-01 RX ADMIN — CEFEPIME HYDROCHLORIDE 2 G: 2 INJECTION, POWDER, FOR SOLUTION INTRAVENOUS at 05:33

## 2022-01-01 RX ADMIN — POTASSIUM CHLORIDE 10 MEQ: 7.46 INJECTION, SOLUTION INTRAVENOUS at 17:04

## 2022-01-01 RX ADMIN — Medication 10 ML: at 09:00

## 2022-01-01 RX ADMIN — MIDODRINE HYDROCHLORIDE 5 MG: 2.5 TABLET ORAL at 17:05

## 2022-01-01 RX ADMIN — EMPAGLIFLOZIN 10 MG: 10 TABLET, FILM COATED ORAL at 10:57

## 2022-01-01 RX ADMIN — Medication 10 ML: at 21:37

## 2022-01-01 RX ADMIN — PANTOPRAZOLE SODIUM 40 MG: 40 INJECTION, POWDER, FOR SOLUTION INTRAVENOUS at 09:32

## 2022-01-01 RX ADMIN — POTASSIUM CHLORIDE 10 MEQ: 7.46 INJECTION, SOLUTION INTRAVENOUS at 14:47

## 2022-01-01 RX ADMIN — VANCOMYCIN HYDROCHLORIDE 1000 MG: 1 INJECTION, POWDER, LYOPHILIZED, FOR SOLUTION INTRAVENOUS at 11:25

## 2022-01-01 RX ADMIN — BUMETANIDE 2 MG: 1 TABLET ORAL at 15:03

## 2022-01-01 RX ADMIN — IPRATROPIUM BROMIDE 0.5 MG: 0.5 SOLUTION RESPIRATORY (INHALATION) at 14:42

## 2022-01-01 RX ADMIN — PANTOPRAZOLE SODIUM 40 MG: 40 TABLET, DELAYED RELEASE ORAL at 17:01

## 2022-01-01 RX ADMIN — RIFAXIMIN 550 MG: 550 TABLET ORAL at 09:15

## 2022-01-01 RX ADMIN — Medication 10 ML: at 08:23

## 2022-01-01 RX ADMIN — IPRATROPIUM BROMIDE 0.5 MG: 0.5 SOLUTION RESPIRATORY (INHALATION) at 06:31

## 2022-01-01 RX ADMIN — LACTULOSE 20 G: 20 SOLUTION ORAL at 09:56

## 2022-01-01 RX ADMIN — MIDODRINE HYDROCHLORIDE 5 MG: 2.5 TABLET ORAL at 11:39

## 2022-01-01 RX ADMIN — QUETIAPINE FUMARATE 100 MG: 100 TABLET ORAL at 20:10

## 2022-01-01 RX ADMIN — DULOXETINE HYDROCHLORIDE 60 MG: 30 CAPSULE, DELAYED RELEASE ORAL at 22:24

## 2022-01-01 RX ADMIN — PANTOPRAZOLE SODIUM 40 MG: 40 TABLET, DELAYED RELEASE ORAL at 18:12

## 2022-01-01 RX ADMIN — LITHIUM CARBONATE 150 MG: 150 CAPSULE, GELATIN COATED ORAL at 17:15

## 2022-01-01 RX ADMIN — LEVOTHYROXINE SODIUM 50 MCG: 50 TABLET ORAL at 06:05

## 2022-01-01 RX ADMIN — LITHIUM CARBONATE 150 MG: 150 CAPSULE, GELATIN COATED ORAL at 21:14

## 2022-01-01 RX ADMIN — MIDODRINE HYDROCHLORIDE 5 MG: 2.5 TABLET ORAL at 05:39

## 2022-01-01 RX ADMIN — FUROSEMIDE 80 MG: 80 TABLET ORAL at 17:17

## 2022-01-01 RX ADMIN — DEXMEDETOMIDINE HYDROCHLORIDE 1.4 MCG/KG/HR: 4 INJECTION, SOLUTION INTRAVENOUS at 01:01

## 2022-01-01 RX ADMIN — RIFAXIMIN 550 MG: 550 TABLET ORAL at 08:58

## 2022-01-01 RX ADMIN — Medication 0.14 MCG/KG/MIN: at 00:11

## 2022-01-01 RX ADMIN — RIFAXIMIN 550 MG: 550 TABLET ORAL at 09:39

## 2022-01-01 RX ADMIN — POTASSIUM CHLORIDE 10 MEQ: 7.46 INJECTION, SOLUTION INTRAVENOUS at 06:08

## 2022-01-01 RX ADMIN — DULOXETINE HYDROCHLORIDE 60 MG: 30 CAPSULE, DELAYED RELEASE ORAL at 20:50

## 2022-01-01 RX ADMIN — MIDODRINE HYDROCHLORIDE 5 MG: 2.5 TABLET ORAL at 06:25

## 2022-01-01 RX ADMIN — ENOXAPARIN SODIUM 30 MG: 100 INJECTION SUBCUTANEOUS at 08:52

## 2022-01-01 RX ADMIN — POTASSIUM CHLORIDE 10 MEQ: 7.46 INJECTION, SOLUTION INTRAVENOUS at 20:03

## 2022-01-01 RX ADMIN — QUETIAPINE FUMARATE 50 MG: 25 TABLET, FILM COATED ORAL at 20:23

## 2022-01-01 RX ADMIN — IPRATROPIUM BROMIDE 0.5 MG: 0.5 SOLUTION RESPIRATORY (INHALATION) at 09:40

## 2022-01-01 RX ADMIN — DEXMEDETOMIDINE HYDROCHLORIDE 0.6 MCG/KG/HR: 4 INJECTION, SOLUTION INTRAVENOUS at 17:18

## 2022-01-01 RX ADMIN — RIFAXIMIN 550 MG: 550 TABLET ORAL at 08:32

## 2022-01-01 RX ADMIN — LACTULOSE 20 G: 10 SOLUTION ORAL at 16:00

## 2022-01-01 RX ADMIN — RIFAXIMIN 550 MG: 550 TABLET ORAL at 08:35

## 2022-01-01 RX ADMIN — RIFAXIMIN 550 MG: 550 TABLET ORAL at 21:23

## 2022-01-01 RX ADMIN — LACTULOSE 20 G: 20 SOLUTION ORAL at 21:23

## 2022-01-01 RX ADMIN — LITHIUM CARBONATE 150 MG: 150 CAPSULE, GELATIN COATED ORAL at 17:01

## 2022-01-01 RX ADMIN — MIDODRINE HYDROCHLORIDE 5 MG: 2.5 TABLET ORAL at 12:57

## 2022-01-01 RX ADMIN — QUETIAPINE FUMARATE 50 MG: 25 TABLET, FILM COATED ORAL at 20:50

## 2022-01-01 RX ADMIN — MIDODRINE HYDROCHLORIDE 5 MG: 2.5 TABLET ORAL at 08:39

## 2022-01-01 RX ADMIN — RIFAXIMIN 550 MG: 550 TABLET ORAL at 21:12

## 2022-01-01 RX ADMIN — Medication 10 ML: at 08:24

## 2022-01-01 RX ADMIN — Medication 10 ML: at 19:57

## 2022-01-01 RX ADMIN — POTASSIUM CHLORIDE 20 MEQ: 14.9 INJECTION, SOLUTION INTRAVENOUS at 22:58

## 2022-01-01 RX ADMIN — ROPINIROLE HYDROCHLORIDE 0.5 MG: 0.25 TABLET, FILM COATED ORAL at 21:00

## 2022-01-01 RX ADMIN — FOLIC ACID 1 MG: 1 TABLET ORAL at 10:57

## 2022-01-01 RX ADMIN — FOLIC ACID 1 MG: 1 TABLET ORAL at 08:41

## 2022-01-01 RX ADMIN — ROPINIROLE HYDROCHLORIDE 0.5 MG: 0.25 TABLET, FILM COATED ORAL at 20:50

## 2022-01-01 RX ADMIN — PANTOPRAZOLE SODIUM 40 MG: 40 INJECTION, POWDER, FOR SOLUTION INTRAVENOUS at 20:13

## 2022-01-01 RX ADMIN — LACTULOSE 20 G: 20 SOLUTION ORAL at 21:12

## 2022-01-01 RX ADMIN — Medication 10 ML: at 08:37

## 2022-01-01 RX ADMIN — PANTOPRAZOLE SODIUM 40 MG: 40 INJECTION, POWDER, FOR SOLUTION INTRAVENOUS at 08:55

## 2022-01-01 RX ADMIN — LITHIUM CARBONATE 150 MG: 150 CAPSULE, GELATIN COATED ORAL at 08:40

## 2022-01-01 RX ADMIN — LACTULOSE 20 G: 20 SOLUTION ORAL at 21:35

## 2022-01-01 RX ADMIN — LACTULOSE 30 G: 20 SOLUTION ORAL at 20:50

## 2022-01-01 RX ADMIN — EMPAGLIFLOZIN 10 MG: 10 TABLET, FILM COATED ORAL at 09:03

## 2022-01-01 RX ADMIN — MIDODRINE HYDROCHLORIDE 5 MG: 2.5 TABLET ORAL at 11:37

## 2022-01-01 RX ADMIN — LACTULOSE 60 G: 20 SOLUTION ORAL at 20:50

## 2022-01-01 RX ADMIN — LITHIUM CARBONATE 150 MG: 150 CAPSULE, GELATIN COATED ORAL at 17:05

## 2022-01-01 RX ADMIN — POTASSIUM CHLORIDE 10 MEQ: 10 CAPSULE, COATED, EXTENDED RELEASE ORAL at 08:53

## 2022-01-01 RX ADMIN — PANTOPRAZOLE SODIUM 40 MG: 40 TABLET, DELAYED RELEASE ORAL at 22:24

## 2022-01-01 RX ADMIN — CEFEPIME HYDROCHLORIDE 2 G: 2 INJECTION, POWDER, FOR SOLUTION INTRAVENOUS at 05:46

## 2022-01-01 RX ADMIN — PANTOPRAZOLE SODIUM 40 MG: 40 INJECTION, POWDER, FOR SOLUTION INTRAVENOUS at 17:05

## 2022-01-01 RX ADMIN — POTASSIUM CHLORIDE 40 MEQ: 10 CAPSULE, COATED, EXTENDED RELEASE ORAL at 16:06

## 2022-01-01 RX ADMIN — POTASSIUM CHLORIDE 20 MEQ: 29.8 INJECTION, SOLUTION INTRAVENOUS at 08:32

## 2022-01-01 RX ADMIN — PANTOPRAZOLE SODIUM 40 MG: 40 INJECTION, POWDER, FOR SOLUTION INTRAVENOUS at 17:37

## 2022-01-01 RX ADMIN — RIFAXIMIN 550 MG: 550 TABLET ORAL at 20:07

## 2022-01-01 RX ADMIN — LACTULOSE 30 G: 20 SOLUTION ORAL at 20:37

## 2022-01-01 RX ADMIN — BUMETANIDE 2 MG: 1 TABLET ORAL at 09:02

## 2022-01-01 RX ADMIN — POTASSIUM CHLORIDE 10 MEQ: 7.46 INJECTION, SOLUTION INTRAVENOUS at 19:11

## 2022-01-01 RX ADMIN — LITHIUM CARBONATE 150 MG: 150 CAPSULE, GELATIN COATED ORAL at 17:25

## 2022-01-01 RX ADMIN — EMPAGLIFLOZIN 10 MG: 10 TABLET, FILM COATED ORAL at 08:35

## 2022-01-01 RX ADMIN — PROPOFOL 30 MCG/KG/MIN: 10 INJECTION, EMULSION INTRAVENOUS at 17:06

## 2022-01-01 RX ADMIN — ENOXAPARIN SODIUM 30 MG: 30 INJECTION SUBCUTANEOUS at 18:39

## 2022-01-01 RX ADMIN — PANTOPRAZOLE SODIUM 40 MG: 40 TABLET, DELAYED RELEASE ORAL at 21:08

## 2022-01-01 RX ADMIN — POTASSIUM CHLORIDE 40 MEQ: 10 CAPSULE, COATED, EXTENDED RELEASE ORAL at 08:59

## 2022-01-01 RX ADMIN — Medication 6 MG: at 22:24

## 2022-01-01 RX ADMIN — BUSPIRONE HYDROCHLORIDE 10 MG: 10 TABLET ORAL at 20:43

## 2022-01-01 RX ADMIN — LITHIUM CARBONATE 150 MG: 150 CAPSULE, GELATIN COATED ORAL at 08:17

## 2022-01-01 RX ADMIN — LACTULOSE 60 G: 20 SOLUTION ORAL at 23:52

## 2022-01-01 RX ADMIN — MIDODRINE HYDROCHLORIDE 5 MG: 2.5 TABLET ORAL at 05:06

## 2022-01-01 RX ADMIN — LACTULOSE 30 G: 20 SOLUTION ORAL at 17:10

## 2022-01-01 RX ADMIN — POTASSIUM CHLORIDE 10 MEQ: 7.46 INJECTION, SOLUTION INTRAVENOUS at 20:06

## 2022-01-01 RX ADMIN — LITHIUM CARBONATE 150 MG: 150 CAPSULE, GELATIN COATED ORAL at 10:57

## 2022-01-01 RX ADMIN — Medication 10 ML: at 21:08

## 2022-01-01 RX ADMIN — RIFAXIMIN 550 MG: 550 TABLET ORAL at 20:00

## 2022-01-01 RX ADMIN — DEXMEDETOMIDINE HYDROCHLORIDE 1.5 MCG/KG/HR: 4 INJECTION, SOLUTION INTRAVENOUS at 00:05

## 2022-01-01 RX ADMIN — LACTULOSE 30 G: 20 SOLUTION ORAL at 21:08

## 2022-01-01 RX ADMIN — POTASSIUM CHLORIDE 40 MEQ: 10 CAPSULE, COATED, EXTENDED RELEASE ORAL at 08:56

## 2022-01-01 RX ADMIN — Medication 10 ML: at 20:44

## 2022-01-01 RX ADMIN — LACTULOSE 20 G: 10 SOLUTION ORAL at 20:09

## 2022-01-01 RX ADMIN — Medication 6 MG: at 20:50

## 2022-01-01 RX ADMIN — LITHIUM CARBONATE 150 MG: 150 CAPSULE, GELATIN COATED ORAL at 08:30

## 2022-01-01 RX ADMIN — LITHIUM CARBONATE 150 MG: 150 CAPSULE, GELATIN COATED ORAL at 17:09

## 2022-01-01 RX ADMIN — POTASSIUM CHLORIDE 10 MEQ: 7.46 INJECTION, SOLUTION INTRAVENOUS at 14:33

## 2022-01-01 RX ADMIN — Medication 5000 UNITS: at 08:17

## 2022-01-01 RX ADMIN — PANTOPRAZOLE SODIUM 40 MG: 40 INJECTION, POWDER, FOR SOLUTION INTRAVENOUS at 17:14

## 2022-01-01 RX ADMIN — LACTULOSE 20 G: 10 SOLUTION ORAL at 16:06

## 2022-01-01 RX ADMIN — FUROSEMIDE 80 MG: 80 TABLET ORAL at 08:40

## 2022-01-01 RX ADMIN — MIDODRINE HYDROCHLORIDE 5 MG: 2.5 TABLET ORAL at 04:36

## 2022-01-01 RX ADMIN — LITHIUM CARBONATE 150 MG: 150 CAPSULE, GELATIN COATED ORAL at 08:58

## 2022-01-01 RX ADMIN — RIFAXIMIN 550 MG: 550 TABLET ORAL at 20:59

## 2022-01-01 RX ADMIN — FUROSEMIDE 80 MG: 80 TABLET ORAL at 10:57

## 2022-01-01 RX ADMIN — DEXMEDETOMIDINE HYDROCHLORIDE 0.2 MCG/KG/HR: 4 INJECTION, SOLUTION INTRAVENOUS at 07:59

## 2022-01-01 RX ADMIN — Medication 0.18 MCG/KG/MIN: at 12:39

## 2022-01-01 RX ADMIN — SODIUM CHLORIDE 125 ML/HR: 9 INJECTION, SOLUTION INTRAVENOUS at 07:19

## 2022-01-01 RX ADMIN — QUETIAPINE FUMARATE 100 MG: 100 TABLET ORAL at 20:00

## 2022-01-01 RX ADMIN — LACTULOSE 20 G: 10 SOLUTION ORAL at 17:05

## 2022-01-01 RX ADMIN — PANTOPRAZOLE SODIUM 40 MG: 40 TABLET, DELAYED RELEASE ORAL at 20:40

## 2022-01-01 RX ADMIN — POTASSIUM CHLORIDE 10 MEQ: 7.46 INJECTION, SOLUTION INTRAVENOUS at 17:35

## 2022-01-01 RX ADMIN — DULOXETINE HYDROCHLORIDE 60 MG: 30 CAPSULE, DELAYED RELEASE ORAL at 21:23

## 2022-01-01 RX ADMIN — LEVOTHYROXINE SODIUM 50 MCG: 50 TABLET ORAL at 05:02

## 2022-01-01 RX ADMIN — POLYETHYLENE GLYCOL 3350 17 G: 17 POWDER, FOR SOLUTION ORAL at 09:13

## 2022-01-01 RX ADMIN — PROPOFOL 25 MCG/KG/MIN: 10 INJECTION, EMULSION INTRAVENOUS at 03:18

## 2022-01-01 RX ADMIN — POTASSIUM CHLORIDE 10 MEQ: 7.46 INJECTION, SOLUTION INTRAVENOUS at 15:35

## 2022-01-01 RX ADMIN — RIFAXIMIN 550 MG: 550 TABLET ORAL at 08:53

## 2022-01-01 RX ADMIN — MIDODRINE HYDROCHLORIDE 5 MG: 2.5 TABLET ORAL at 17:25

## 2022-01-01 RX ADMIN — PANTOPRAZOLE SODIUM 40 MG: 40 TABLET, DELAYED RELEASE ORAL at 05:25

## 2022-01-01 RX ADMIN — POTASSIUM CHLORIDE 20 MEQ: 10 CAPSULE, COATED, EXTENDED RELEASE ORAL at 12:10

## 2022-01-01 RX ADMIN — ENOXAPARIN SODIUM 30 MG: 100 INJECTION SUBCUTANEOUS at 09:14

## 2022-01-01 RX ADMIN — ACETAMINOPHEN 650 MG: 325 TABLET ORAL at 04:29

## 2022-01-01 RX ADMIN — ETOMIDATE 20 MG: 2 INJECTION, SOLUTION INTRAVENOUS at 10:25

## 2022-01-01 RX ADMIN — QUETIAPINE FUMARATE 50 MG: 25 TABLET, FILM COATED ORAL at 21:08

## 2022-01-01 RX ADMIN — MAGNESIUM GLUCONATE 500 MG ORAL TABLET 400 MG: 500 TABLET ORAL at 09:15

## 2022-01-01 RX ADMIN — MIDODRINE HYDROCHLORIDE 5 MG: 2.5 TABLET ORAL at 17:16

## 2022-01-01 RX ADMIN — MIDODRINE HYDROCHLORIDE 5 MG: 2.5 TABLET ORAL at 05:25

## 2022-01-01 RX ADMIN — ENOXAPARIN SODIUM 30 MG: 100 INJECTION SUBCUTANEOUS at 08:31

## 2022-01-01 RX ADMIN — LITHIUM CARBONATE 150 MG: 150 CAPSULE, GELATIN COATED ORAL at 08:57

## 2022-01-01 RX ADMIN — FUROSEMIDE 80 MG: 80 TABLET ORAL at 08:54

## 2022-01-01 RX ADMIN — POTASSIUM CHLORIDE 40 MEQ: 10 CAPSULE, COATED, EXTENDED RELEASE ORAL at 08:17

## 2022-01-01 RX ADMIN — LACTULOSE 30 G: 20 SOLUTION ORAL at 22:22

## 2022-01-01 RX ADMIN — QUETIAPINE FUMARATE 50 MG: 25 TABLET, FILM COATED ORAL at 23:06

## 2022-01-01 RX ADMIN — EMPAGLIFLOZIN 10 MG: 10 TABLET, FILM COATED ORAL at 08:57

## 2022-01-01 RX ADMIN — LITHIUM CARBONATE 150 MG: 150 CAPSULE, GELATIN COATED ORAL at 17:14

## 2022-01-01 RX ADMIN — Medication 10 ML: at 21:33

## 2022-01-01 RX ADMIN — POLYETHYLENE GLYCOL 3350 17 G: 17 POWDER, FOR SOLUTION ORAL at 10:57

## 2022-01-01 RX ADMIN — MIDODRINE HYDROCHLORIDE 5 MG: 2.5 TABLET ORAL at 11:24

## 2022-01-01 RX ADMIN — ENOXAPARIN SODIUM 30 MG: 30 INJECTION SUBCUTANEOUS at 22:30

## 2022-01-01 RX ADMIN — LACTULOSE 20 G: 10 SOLUTION ORAL at 20:01

## 2022-01-01 RX ADMIN — MORPHINE SULFATE 8 MG/HR: 10 INJECTION, SOLUTION INTRAMUSCULAR; INTRAVENOUS at 22:17

## 2022-01-01 RX ADMIN — FENTANYL CITRATE 25 MCG: 50 INJECTION INTRAMUSCULAR; INTRAVENOUS at 16:02

## 2022-01-01 RX ADMIN — DEXMEDETOMIDINE HYDROCHLORIDE 1.5 MCG/KG/HR: 4 INJECTION, SOLUTION INTRAVENOUS at 09:39

## 2022-01-01 RX ADMIN — DULOXETINE HYDROCHLORIDE 60 MG: 30 CAPSULE, DELAYED RELEASE ORAL at 20:00

## 2022-01-01 RX ADMIN — PANTOPRAZOLE SODIUM 40 MG: 40 TABLET, DELAYED RELEASE ORAL at 05:39

## 2022-01-01 RX ADMIN — POLYETHYLENE GLYCOL 3350 17 G: 17 POWDER, FOR SOLUTION ORAL at 08:57

## 2022-01-01 RX ADMIN — MIDODRINE HYDROCHLORIDE 5 MG: 2.5 TABLET ORAL at 10:54

## 2022-01-01 RX ADMIN — CEFEPIME HYDROCHLORIDE 2 G: 2 INJECTION, POWDER, FOR SOLUTION INTRAVENOUS at 17:42

## 2022-01-01 RX ADMIN — MIDODRINE HYDROCHLORIDE 5 MG: 2.5 TABLET ORAL at 12:22

## 2022-01-01 RX ADMIN — Medication 10 ML: at 09:01

## 2022-01-01 RX ADMIN — MIDODRINE HYDROCHLORIDE 5 MG: 2.5 TABLET ORAL at 09:14

## 2022-01-01 RX ADMIN — DULOXETINE HYDROCHLORIDE 60 MG: 30 CAPSULE, DELAYED RELEASE ORAL at 21:00

## 2022-01-01 RX ADMIN — PANTOPRAZOLE SODIUM 40 MG: 40 INJECTION, POWDER, FOR SOLUTION INTRAVENOUS at 23:46

## 2022-01-01 RX ADMIN — MIDODRINE HYDROCHLORIDE 5 MG: 2.5 TABLET ORAL at 10:57

## 2022-01-01 RX ADMIN — Medication 6 MG: at 20:51

## 2022-01-01 RX ADMIN — IPRATROPIUM BROMIDE 0.5 MG: 0.5 SOLUTION RESPIRATORY (INHALATION) at 05:53

## 2022-01-01 RX ADMIN — DULOXETINE HYDROCHLORIDE 60 MG: 30 CAPSULE, DELAYED RELEASE ORAL at 20:10

## 2022-01-01 RX ADMIN — PANTOPRAZOLE SODIUM 40 MG: 40 TABLET, DELAYED RELEASE ORAL at 06:01

## 2022-01-01 RX ADMIN — POTASSIUM CHLORIDE 10 MEQ: 7.46 INJECTION, SOLUTION INTRAVENOUS at 03:57

## 2022-01-01 RX ADMIN — LACTULOSE 60 G: 20 SOLUTION ORAL at 20:08

## 2022-01-01 RX ADMIN — INSULIN LISPRO 2 UNITS: 100 INJECTION, SOLUTION INTRAVENOUS; SUBCUTANEOUS at 23:26

## 2022-01-01 RX ADMIN — LORAZEPAM 1 MG: 2 INJECTION INTRAMUSCULAR; INTRAVENOUS at 01:55

## 2022-01-01 RX ADMIN — POLYETHYLENE GLYCOL 3350 17 G: 17 POWDER, FOR SOLUTION ORAL at 08:54

## 2022-01-01 RX ADMIN — VANCOMYCIN HYDROCHLORIDE 1250 MG: 10 INJECTION, POWDER, LYOPHILIZED, FOR SOLUTION INTRAVENOUS at 11:55

## 2022-01-01 RX ADMIN — EPLERENONE 25 MG: 25 TABLET, FILM COATED ORAL at 15:03

## 2022-01-01 RX ADMIN — EPINEPHRINE 1 MG: 0.1 INJECTION INTRACARDIAC; INTRAVENOUS at 10:15

## 2022-01-01 RX ADMIN — MIDODRINE HYDROCHLORIDE 5 MG: 2.5 TABLET ORAL at 18:12

## 2022-01-01 RX ADMIN — LACTULOSE 20 G: 10 SOLUTION ORAL at 08:48

## 2022-01-01 RX ADMIN — POTASSIUM CHLORIDE 10 MEQ: 10 CAPSULE, COATED, EXTENDED RELEASE ORAL at 09:14

## 2022-01-01 RX ADMIN — Medication 6 MG: at 20:37

## 2022-01-01 RX ADMIN — LACTULOSE 20 G: 20 SOLUTION ORAL at 08:05

## 2022-01-01 RX ADMIN — CEFEPIME HYDROCHLORIDE 2 G: 2 INJECTION, POWDER, FOR SOLUTION INTRAVENOUS at 06:04

## 2022-01-01 RX ADMIN — LACTULOSE 30 G: 20 SOLUTION ORAL at 08:39

## 2022-01-01 RX ADMIN — IPRATROPIUM BROMIDE 0.5 MG: 0.5 SOLUTION RESPIRATORY (INHALATION) at 20:32

## 2022-01-01 RX ADMIN — THIAMINE HYDROCHLORIDE 300 MG: 100 INJECTION, SOLUTION INTRAMUSCULAR; INTRAVENOUS at 22:29

## 2022-01-01 RX ADMIN — LACTULOSE 30 G: 20 SOLUTION ORAL at 21:04

## 2022-01-01 RX ADMIN — POTASSIUM CHLORIDE 40 MEQ: 10 CAPSULE, COATED, EXTENDED RELEASE ORAL at 18:13

## 2022-01-01 RX ADMIN — Medication 10 ML: at 10:42

## 2022-01-01 RX ADMIN — Medication 10 ML: at 20:38

## 2022-01-01 RX ADMIN — RIFAXIMIN 550 MG: 550 TABLET ORAL at 09:28

## 2022-01-01 RX ADMIN — QUETIAPINE FUMARATE 50 MG: 25 TABLET, FILM COATED ORAL at 22:24

## 2022-01-01 RX ADMIN — MAGNESIUM GLUCONATE 500 MG ORAL TABLET 400 MG: 500 TABLET ORAL at 08:57

## 2022-01-01 RX ADMIN — LORAZEPAM 1 MG: 2 INJECTION INTRAMUSCULAR; INTRAVENOUS at 22:40

## 2022-01-01 RX ADMIN — LACTULOSE 30 G: 20 SOLUTION ORAL at 08:52

## 2022-01-01 RX ADMIN — FUROSEMIDE 40 MG: 40 TABLET ORAL at 08:35

## 2022-01-01 RX ADMIN — DEXMEDETOMIDINE HYDROCHLORIDE 0.6 MCG/KG/HR: 4 INJECTION, SOLUTION INTRAVENOUS at 14:15

## 2022-01-01 RX ADMIN — INSULIN LISPRO 2 UNITS: 100 INJECTION, SOLUTION INTRAVENOUS; SUBCUTANEOUS at 18:13

## 2022-01-01 RX ADMIN — DULOXETINE HYDROCHLORIDE 60 MG: 30 CAPSULE, DELAYED RELEASE ORAL at 20:15

## 2022-01-01 RX ADMIN — Medication 10 ML: at 08:48

## 2022-01-01 RX ADMIN — Medication 10 ML: at 21:23

## 2022-01-01 RX ADMIN — QUETIAPINE FUMARATE 50 MG: 25 TABLET, FILM COATED ORAL at 20:40

## 2022-01-01 RX ADMIN — LITHIUM CARBONATE 150 MG: 150 CAPSULE, GELATIN COATED ORAL at 17:18

## 2022-01-01 RX ADMIN — Medication 6 MG: at 21:08

## 2022-01-01 RX ADMIN — POTASSIUM CHLORIDE 40 MEQ: 10 CAPSULE, COATED, EXTENDED RELEASE ORAL at 01:41

## 2022-01-01 RX ADMIN — VANCOMYCIN HYDROCHLORIDE 1000 MG: 1 INJECTION, POWDER, LYOPHILIZED, FOR SOLUTION INTRAVENOUS at 13:46

## 2022-01-01 RX ADMIN — LITHIUM CARBONATE 150 MG: 150 CAPSULE, GELATIN COATED ORAL at 18:24

## 2022-01-01 RX ADMIN — DULOXETINE HYDROCHLORIDE 60 MG: 30 CAPSULE, DELAYED RELEASE ORAL at 20:37

## 2022-01-01 RX ADMIN — FOLIC ACID 1 MG: 1 TABLET ORAL at 08:32

## 2022-01-01 RX ADMIN — RIFAXIMIN 550 MG: 550 TABLET ORAL at 23:46

## 2022-01-01 RX ADMIN — HALOPERIDOL LACTATE 1 MG: 5 INJECTION, SOLUTION INTRAMUSCULAR at 23:02

## 2022-01-01 RX ADMIN — IPRATROPIUM BROMIDE 0.5 MG: 0.5 SOLUTION RESPIRATORY (INHALATION) at 13:01

## 2022-01-01 RX ADMIN — LACTULOSE 20 G: 20 SOLUTION ORAL at 20:00

## 2022-01-01 RX ADMIN — LEVOTHYROXINE SODIUM 50 MCG: 50 TABLET ORAL at 05:04

## 2022-01-01 RX ADMIN — PANTOPRAZOLE SODIUM 40 MG: 40 TABLET, DELAYED RELEASE ORAL at 05:48

## 2022-01-01 RX ADMIN — LITHIUM CARBONATE 150 MG: 150 CAPSULE, GELATIN COATED ORAL at 08:59

## 2022-01-01 RX ADMIN — LEVOTHYROXINE SODIUM 50 MCG: 50 TABLET ORAL at 05:08

## 2022-01-01 RX ADMIN — IPRATROPIUM BROMIDE 0.5 MG: 0.5 SOLUTION RESPIRATORY (INHALATION) at 06:33

## 2022-01-01 RX ADMIN — Medication 5000 UNITS: at 09:20

## 2022-01-01 RX ADMIN — POTASSIUM CHLORIDE 40 MEQ: 10 CAPSULE, COATED, EXTENDED RELEASE ORAL at 11:04

## 2022-01-01 RX ADMIN — LEVOTHYROXINE SODIUM 50 MCG: 50 TABLET ORAL at 10:59

## 2022-01-01 RX ADMIN — RIFAXIMIN 550 MG: 550 TABLET ORAL at 21:07

## 2022-01-01 RX ADMIN — POTASSIUM CHLORIDE 40 MEQ: 10 CAPSULE, COATED, EXTENDED RELEASE ORAL at 17:16

## 2022-01-01 RX ADMIN — Medication 10 ML: at 21:01

## 2022-01-01 RX ADMIN — SODIUM CHLORIDE, POTASSIUM CHLORIDE, SODIUM LACTATE AND CALCIUM CHLORIDE 75 ML/HR: 600; 310; 30; 20 INJECTION, SOLUTION INTRAVENOUS at 11:08

## 2022-01-01 RX ADMIN — Medication 10 ML: at 09:16

## 2022-01-01 RX ADMIN — ROPINIROLE HYDROCHLORIDE 0.5 MG: 0.25 TABLET, FILM COATED ORAL at 20:40

## 2022-01-01 RX ADMIN — Medication 5000 UNITS: at 08:24

## 2022-01-01 RX ADMIN — BUMETANIDE 2 MG: 1 TABLET ORAL at 09:15

## 2022-01-01 RX ADMIN — SODIUM CHLORIDE 1 G: 9 INJECTION, SOLUTION INTRAVENOUS at 11:46

## 2022-01-01 RX ADMIN — HALOPERIDOL LACTATE 2 MG: 5 INJECTION, SOLUTION INTRAMUSCULAR at 16:46

## 2022-01-01 RX ADMIN — FUROSEMIDE 20 MG: 10 INJECTION INTRAMUSCULAR; INTRAVENOUS at 18:33

## 2022-01-01 RX ADMIN — MIDODRINE HYDROCHLORIDE 5 MG: 2.5 TABLET ORAL at 05:48

## 2022-01-01 RX ADMIN — MIDODRINE HYDROCHLORIDE 5 MG: 2.5 TABLET ORAL at 08:40

## 2022-01-01 RX ADMIN — FUROSEMIDE 80 MG: 80 TABLET ORAL at 08:53

## 2022-01-01 RX ADMIN — AMIODARONE HYDROCHLORIDE 200 MG: 200 TABLET ORAL at 17:05

## 2022-01-01 RX ADMIN — MIDODRINE HYDROCHLORIDE 5 MG: 2.5 TABLET ORAL at 16:30

## 2022-01-01 RX ADMIN — LACTULOSE 60 G: 20 SOLUTION ORAL at 17:41

## 2022-01-01 RX ADMIN — MIDODRINE HYDROCHLORIDE 5 MG: 2.5 TABLET ORAL at 12:13

## 2022-01-01 RX ADMIN — POTASSIUM CHLORIDE 10 MEQ: 7.46 INJECTION, SOLUTION INTRAVENOUS at 15:21

## 2022-01-01 RX ADMIN — LITHIUM CARBONATE 150 MG: 150 CAPSULE, GELATIN COATED ORAL at 09:55

## 2022-01-01 RX ADMIN — EMPAGLIFLOZIN 10 MG: 10 TABLET, FILM COATED ORAL at 08:42

## 2022-01-01 RX ADMIN — Medication 10 ML: at 08:10

## 2022-01-01 RX ADMIN — MIDODRINE HYDROCHLORIDE 5 MG: 2.5 TABLET ORAL at 11:38

## 2022-01-01 RX ADMIN — POLYETHYLENE GLYCOL 3350 17 G: 17 POWDER, FOR SOLUTION ORAL at 08:40

## 2022-01-01 RX ADMIN — LORAZEPAM 1 MG: 2 INJECTION INTRAMUSCULAR; INTRAVENOUS at 16:34

## 2022-01-01 RX ADMIN — POLYETHYLENE GLYCOL 3350 17 G: 17 POWDER, FOR SOLUTION ORAL at 09:01

## 2022-01-01 RX ADMIN — LACTULOSE 20 G: 10 SOLUTION ORAL at 17:10

## 2022-01-01 RX ADMIN — METOLAZONE 5 MG: 5 TABLET ORAL at 08:31

## 2022-01-01 RX ADMIN — QUETIAPINE FUMARATE 50 MG: 25 TABLET, FILM COATED ORAL at 21:14

## 2022-01-01 RX ADMIN — PANTOPRAZOLE SODIUM 40 MG: 40 TABLET, DELAYED RELEASE ORAL at 05:24

## 2022-01-01 RX ADMIN — IPRATROPIUM BROMIDE 0.5 MG: 0.5 SOLUTION RESPIRATORY (INHALATION) at 10:14

## 2022-01-01 RX ADMIN — MIDODRINE HYDROCHLORIDE 5 MG: 2.5 TABLET ORAL at 06:00

## 2022-01-01 RX ADMIN — Medication 10 ML: at 20:04

## 2022-01-01 RX ADMIN — LACTULOSE 20 G: 20 SOLUTION ORAL at 09:14

## 2022-01-01 RX ADMIN — SODIUM BICARBONATE 50 MEQ: 84 INJECTION INTRAVENOUS at 11:04

## 2022-01-01 RX ADMIN — RIFAXIMIN 550 MG: 550 TABLET ORAL at 20:37

## 2022-01-01 RX ADMIN — IPRATROPIUM BROMIDE 0.5 MG: 0.5 SOLUTION RESPIRATORY (INHALATION) at 19:20

## 2022-01-01 RX ADMIN — MIDODRINE HYDROCHLORIDE 5 MG: 2.5 TABLET ORAL at 17:09

## 2022-01-01 RX ADMIN — GLYCOPYRROLATE 0.2 MG: 0.2 INJECTION INTRAMUSCULAR; INTRAVENOUS at 15:58

## 2022-01-01 RX ADMIN — RIFAXIMIN 550 MG: 550 TABLET ORAL at 20:10

## 2022-01-01 RX ADMIN — EMPAGLIFLOZIN 10 MG: 10 TABLET, FILM COATED ORAL at 08:24

## 2022-01-01 RX ADMIN — ROPINIROLE HYDROCHLORIDE 0.5 MG: 0.25 TABLET, FILM COATED ORAL at 20:23

## 2022-01-01 RX ADMIN — MORPHINE SULFATE 2 MG/HR: 10 INJECTION, SOLUTION INTRAMUSCULAR; INTRAVENOUS at 16:02

## 2022-01-01 RX ADMIN — LACTULOSE 30 G: 20 SOLUTION ORAL at 16:00

## 2022-01-01 RX ADMIN — POTASSIUM CHLORIDE 10 MEQ: 7.46 INJECTION, SOLUTION INTRAVENOUS at 18:33

## 2022-01-01 RX ADMIN — LACTULOSE 20 G: 20 SOLUTION ORAL at 21:13

## 2022-01-01 RX ADMIN — ENOXAPARIN SODIUM 30 MG: 100 INJECTION SUBCUTANEOUS at 08:30

## 2022-01-01 RX ADMIN — Medication 6 MG: at 01:29

## 2022-01-01 RX ADMIN — QUETIAPINE FUMARATE 50 MG: 25 TABLET, FILM COATED ORAL at 19:56

## 2022-01-01 RX ADMIN — INSULIN LISPRO 2 UNITS: 100 INJECTION, SOLUTION INTRAVENOUS; SUBCUTANEOUS at 18:05

## 2022-01-01 RX ADMIN — SODIUM BICARBONATE 150 MEQ: 84 INJECTION, SOLUTION INTRAVENOUS at 12:42

## 2022-01-01 RX ADMIN — Medication 6 MG: at 20:07

## 2022-01-01 RX ADMIN — RIFAXIMIN 550 MG: 550 TABLET ORAL at 09:55

## 2022-01-01 RX ADMIN — LACTULOSE 20 G: 20 SOLUTION ORAL at 08:36

## 2022-01-01 RX ADMIN — WATER 1 G: 1000 INJECTION, SOLUTION INTRAVENOUS at 08:32

## 2022-01-01 RX ADMIN — DULOXETINE HYDROCHLORIDE 60 MG: 30 CAPSULE, DELAYED RELEASE ORAL at 21:12

## 2022-01-01 RX ADMIN — LACTULOSE 20 G: 20 SOLUTION ORAL at 20:59

## 2022-01-01 RX ADMIN — LACTULOSE 45 G: 20 SOLUTION ORAL at 13:03

## 2022-01-01 RX ADMIN — HALOPERIDOL LACTATE 4 MG: 5 INJECTION, SOLUTION INTRAMUSCULAR at 18:12

## 2022-01-01 RX ADMIN — POTASSIUM CHLORIDE 10 MEQ: 7.46 INJECTION, SOLUTION INTRAVENOUS at 05:40

## 2022-01-01 RX ADMIN — FOLIC ACID 1 MG: 1 TABLET ORAL at 08:57

## 2022-01-01 RX ADMIN — POTASSIUM CHLORIDE 10 MEQ: 7.46 INJECTION, SOLUTION INTRAVENOUS at 06:44

## 2022-01-01 RX ADMIN — LACTULOSE 60 G: 20 SOLUTION ORAL at 19:48

## 2022-01-01 RX ADMIN — POTASSIUM CHLORIDE 10 MEQ: 7.46 INJECTION, SOLUTION INTRAVENOUS at 12:23

## 2022-01-01 RX ADMIN — PROPOFOL 5 MCG/KG/MIN: 10 INJECTION, EMULSION INTRAVENOUS at 10:55

## 2022-01-01 RX ADMIN — MIDODRINE HYDROCHLORIDE 5 MG: 2.5 TABLET ORAL at 08:30

## 2022-01-01 RX ADMIN — DEXMEDETOMIDINE HYDROCHLORIDE 1.5 MCG/KG/HR: 4 INJECTION, SOLUTION INTRAVENOUS at 06:13

## 2022-01-01 RX ADMIN — QUETIAPINE FUMARATE 50 MG: 25 TABLET, FILM COATED ORAL at 22:30

## 2022-01-01 RX ADMIN — Medication 10 ML: at 08:32

## 2022-01-01 RX ADMIN — MIDODRINE HYDROCHLORIDE 5 MG: 2.5 TABLET ORAL at 17:18

## 2022-01-01 RX ADMIN — MIDODRINE HYDROCHLORIDE 5 MG: 2.5 TABLET ORAL at 08:53

## 2022-01-01 RX ADMIN — Medication 5000 UNITS: at 08:41

## 2022-01-01 RX ADMIN — Medication 10 ML: at 20:08

## 2022-01-01 RX ADMIN — Medication 5000 UNITS: at 09:01

## 2022-01-01 RX ADMIN — LACTULOSE 60 G: 20 SOLUTION ORAL at 15:38

## 2022-01-01 RX ADMIN — LACTULOSE 60 G: 20 SOLUTION ORAL at 03:46

## 2022-01-01 RX ADMIN — POTASSIUM CHLORIDE 40 MEQ: 10 CAPSULE, COATED, EXTENDED RELEASE ORAL at 12:51

## 2022-01-01 RX ADMIN — SODIUM BICARBONATE 150 MEQ: 84 INJECTION, SOLUTION INTRAVENOUS at 10:54

## 2022-01-01 RX ADMIN — QUETIAPINE FUMARATE 50 MG: 25 TABLET, FILM COATED ORAL at 03:55

## 2022-01-01 RX ADMIN — LACTULOSE 30 G: 20 SOLUTION ORAL at 21:24

## 2022-01-01 RX ADMIN — MIDODRINE HYDROCHLORIDE 5 MG: 2.5 TABLET ORAL at 10:50

## 2022-01-01 RX ADMIN — POTASSIUM CHLORIDE 40 MEQ: 10 CAPSULE, COATED, EXTENDED RELEASE ORAL at 10:49

## 2022-01-01 RX ADMIN — LACTULOSE 20 G: 10 SOLUTION ORAL at 22:30

## 2022-01-01 RX ADMIN — METOLAZONE 5 MG: 5 TABLET ORAL at 08:39

## 2022-01-01 RX ADMIN — QUETIAPINE FUMARATE 50 MG: 25 TABLET, FILM COATED ORAL at 20:04

## 2022-01-01 RX ADMIN — FUROSEMIDE 80 MG: 80 TABLET ORAL at 17:25

## 2022-01-01 RX ADMIN — PANTOPRAZOLE SODIUM 40 MG: 40 INJECTION, POWDER, FOR SOLUTION INTRAVENOUS at 20:37

## 2022-01-01 RX ADMIN — IPRATROPIUM BROMIDE 0.5 MG: 0.5 SOLUTION RESPIRATORY (INHALATION) at 20:05

## 2022-01-01 RX ADMIN — FUROSEMIDE 80 MG: 80 TABLET ORAL at 08:32

## 2022-01-01 RX ADMIN — LACTULOSE 60 G: 20 SOLUTION ORAL at 09:33

## 2022-01-01 RX ADMIN — THIAMINE HYDROCHLORIDE 100 MG: 100 INJECTION, SOLUTION INTRAMUSCULAR; INTRAVENOUS at 10:42

## 2022-01-01 RX ADMIN — SODIUM BICARBONATE 150 MEQ: 84 INJECTION INTRAVENOUS at 19:36

## 2022-01-01 RX ADMIN — LORAZEPAM 1 MG: 2 INJECTION INTRAMUSCULAR; INTRAVENOUS at 22:06

## 2022-01-01 RX ADMIN — MIDODRINE HYDROCHLORIDE 5 MG: 2.5 TABLET ORAL at 17:08

## 2022-01-01 RX ADMIN — POTASSIUM CHLORIDE 10 MEQ: 7.46 INJECTION, SOLUTION INTRAVENOUS at 12:44

## 2022-04-18 PROBLEM — F31.78 BIPOLAR 1 DISORDER, MIXED, FULL REMISSION (HCC): Status: ACTIVE | Noted: 2022-01-01

## 2022-04-18 PROBLEM — N18.32 STAGE 3B CHRONIC KIDNEY DISEASE (HCC): Status: ACTIVE | Noted: 2022-01-01

## 2022-04-18 PROBLEM — R77.8 ELEVATED TROPONIN: Status: ACTIVE | Noted: 2022-01-01

## 2022-04-18 PROBLEM — I50.42 CHRONIC COMBINED SYSTOLIC AND DIASTOLIC CHF (CONGESTIVE HEART FAILURE) (HCC): Status: ACTIVE | Noted: 2022-01-01

## 2022-04-18 PROBLEM — E87.20 LACTIC ACIDOSIS: Status: ACTIVE | Noted: 2022-01-01

## 2022-04-18 PROBLEM — Z86.79 S/P ABLATION OF ATRIAL FIBRILLATION: Status: ACTIVE | Noted: 2022-01-01

## 2022-04-18 PROBLEM — R79.89 ABNORMAL BUN-TO-CREATININE RATIO: Status: ACTIVE | Noted: 2022-01-01

## 2022-04-18 PROBLEM — Z98.890 S/P ABLATION OF ATRIAL FIBRILLATION: Status: ACTIVE | Noted: 2022-01-01

## 2022-04-18 PROBLEM — K76.82 HEPATIC ENCEPHALOPATHY (HCC): Status: ACTIVE | Noted: 2022-01-01

## 2022-04-18 PROBLEM — E66.9 OBESITY (BMI 30-39.9): Status: ACTIVE | Noted: 2022-01-01

## 2022-04-18 PROBLEM — G93.41 ACUTE METABOLIC ENCEPHALOPATHY: Status: ACTIVE | Noted: 2022-01-01

## 2022-04-18 PROBLEM — E87.1 HYPONATREMIA: Status: ACTIVE | Noted: 2022-01-01

## 2022-04-21 PROBLEM — K92.2 GI BLEED: Status: ACTIVE | Noted: 2022-01-01

## 2022-05-01 NOTE — THERAPY TREATMENT NOTE
Acute Care - Physical Therapy Treatment Note  [facility]     Patient Name: Travis Patel  : 1945  MRN: [id number]  Today's Date: 2022      Visit Dx:     ICD-10-CM ICD-9-CM   1. Hepatic encephalopathy (HCC)  K72.90 572.2   2. Dysphagia, unspecified type  R13.10 787.20   3. Impaired mobility  Z74.09 799.89   4. Impaired mobility and ADLs  Z74.09 V49.89    Z78.9    5. Impaired cognition  R41.89 294.9     Patient Active Problem List   Diagnosis   • Subarachnoid hemorrhage (HCC)   • Chronic coronary artery disease   • Hyperlipidemia   • ICD (implantable cardioverter-defibrillator) in place   • Orthostatic hypotension   • Syncope   • Acute metabolic encephalopathy, due to hyperammonemia   • S/P ablation of atrial fibrillation   • Chronic combined systolic and diastolic CHF (congestive heart failure) (HCC)   • Elevated troponin, likely due to recent ablation   • Stage 3b chronic kidney disease (HCC)   • Bipolar 1 disorder, mixed, full remission (HCC)   • Abnormal BUN-to-creatinine ratio   • Hyponatremia   • Obesity (BMI 30-39.9)   • Lactic acidosis   • GI bleed     Past Medical History:   Diagnosis Date   • Bipolar 1 disorder (HCC)    • Diverticulitis    • Hernia of abdominal wall    • Kidney disease    • Myocardial infarction (HCC) 2017   • Sleep apnea    • Stroke (HCC) 2017     Past Surgical History:   Procedure Laterality Date   • COLOSTOMY  2001    x 3 months   • COLOSTOMY REVISION     • CORONARY ARTERY BYPASS GRAFT      /   • HERNIA REPAIR      abdominal hernia x 3; poor healing with multiple revisions   • PACEMAKER IMPLANTATION      w/ defibrillator   • ROTATOR CUFF REPAIR Right      PT Assessment (last 12 hours)     PT Evaluation and Treatment     Row Name 22 0835          Physical Therapy Time and Intention    Subjective Information complains of;dizziness  -NW     Document Type therapy note (daily note)  -NW     Mode of Treatment physical therapy  -NW     Row Name 22  "0835          General Information    Existing Precautions/Restrictions fall  -NW     Row Name 05/01/22 0835          Pain    Pretreatment Pain Rating 0/10 - no pain  -NW     Row Name 05/01/22 0835          Bed Mobility    Comment, (Bed Mobility) chair  -NW     Row Name 05/01/22 0835          Transfers    Sit-Stand [place] (Transfers) verbal cues;contact guard;standby assist  -NW     Stand-Sit [place] (Transfers) standby assist;contact guard  -NW     Row Name 05/01/22 0835          Sit-Stand Transfer    Assistive Device (Sit-Stand Transfers) walker, front-wheeled  -NW     Row Name 05/01/22 0835          Stand-Sit Transfer    Assistive Device (Stand-Sit Transfers) walker, front-wheeled  -NW     Row Name 05/01/22 0835          Gait/Stairs (Locomotion)    [place] Level (Gait) verbal cues;contact guard  -NW     Assistive Device (Gait) walker, front-wheeled  -NW     Distance in Feet (Gait) 25x3  -NW     Pattern (Gait) step-to  -NW     Deviations/Abnormal Patterns (Gait) dang decreased;stride length decreased  -NW     Comment, (Gait/Stairs) pt c/o dizziness during gait and required mult standing rests before pt saying \"lets turn around, sheila got to sit down\"   -NW     Row Name 05/01/22 0835          Safety Issues, Functional Mobility    Impairments Affecting Function (Mobility) cognition;balance;endurance/activity tolerance;strength  -NW     Cognitive Impairments, Mobility Safety/Performance awareness, need for assistance  -NW     Row Name             Wound 04/19/22 1200 Left lateral ankle Skin Tear    Wound - Properties Group Placement Date: 04/19/22  -SP Placement Time: 1200  -SP Side: Left  -SP Orientation: lateral  -SP Location: ankle  -SP Primary Wound Type: Skin tear  -SP     Retired Wound - Properties Group Placement Date: 04/19/22  -SP Placement Time: 1200  -SP Side: Left  -SP Orientation: lateral  -SP Location: ankle  -SP Primary Wound Type: Skin tear  -SP     Retired Wound - Properties Group Date " first assessed: 04/19/22  -SP Time first assessed: 1200  -SP Side: Left  -SP Location: ankle  -SP Primary Wound Type: Skin tear  -SP     Row Name             Wound 04/19/22 1700 Left distal arm Skin Tear    Wound - Properties Group Placement Date: 04/19/22  -SP Placement Time: 1700  -SP Side: Left  -SP Orientation: distal  -SP Location: arm  -SP Primary Wound Type: Skin tear  -SP     Retired Wound - Properties Group Placement Date: 04/19/22  -SP Placement Time: 1700  -SP Side: Left  -SP Orientation: distal  -SP Location: arm  -SP Primary Wound Type: Skin tear  -SP     Retired Wound - Properties Group Date first assessed: 04/19/22  -SP Time first assessed: 1700  -SP Side: Left  -SP Location: arm  -SP Primary Wound Type: Skin tear  -SP     Row Name 05/01/22 0835          Vital Signs    Post Systolic BP Rehab 111  -NW     Post Treatment Diastolic BP 74  -NW     Posttreatment Heart Rate (beats/min) 71  -NW     Post SpO2 (%) 100  -NW     O2 Delivery Post Treatment room air  -NW     Row Name 05/01/22 0835          Positioning and Restraints    Pre-Treatment Position sitting in chair/recliner  -NW     Post Treatment Position chair  -NW     In Chair reclined;call light within reach;encouraged to call for assist;exit alarm on;notified ns  -NW           User Key  (r) = Recorded By, (t) = Taken By, (c) = Cosigned By    Initials Name Provider Type    Aleshia Rosenthal PTA Physical Therapist Assistant    Zina Murray RN Registered Nurse                Physical Therapy Education                 Title: PT OT SLP Therapies (In Progress)     Topic: Physical Therapy (In Progress)     Point: Mobility training (In Progress)     Learning Progress Summary           Patient Acceptance, E, NR by KIM at 4/21/2022 0815    Comment: Benefits of activity, progression of PT POC                   Point: Home exercise program (Not Started)     Learner Progress:  Not documented in this visit.          Point: Body mechanics (Not Started)  "    Learner Progress:  Not documented in this visit.          Point: Precautions (Not Started)     Learner Progress:  Not documented in this visit.                      User Key     Initials Effective Dates Name Provider Type Discipline    KIM 08/02/16 -  Clarence Villarreal PT DPT Physical Therapist PT              PT Recommendation and Plan     Plan of Care Reviewed With: patient  Progress: no change  Outcome Evaluation: Pt up in chair. Nsg reports pt just up to BR shave face and brush teeth. sit-stand from chair cga/sba. Pt began amb and c/o dizziness had to stop mult time due to this pt able to amb total of 25'x3 before saying \"sheila got to sit down\" pt was able to get bk to chair safely. Monitored vitals stable /74. HR 71 and o2 100% nsg aware. left pt up in chair reclined w/ chair alarm. pt wouldl benefit from cont PT Upon d/c       Time Calculation:    PT Charges     Row Name 05/01/22 0925             Time Calculation    Start Time 0835  -NW      Stop Time 0854  -NW      Time Calculation (min) 19 min  -NW      PT Received On 05/01/22  -NW      PT Goal Re-Cert Due Date 05/01/22  -NW              Time Calculation- PT    Total Timed Code Minutes- PT 19 minute(s)  -NW              Timed Charges    55620 - Gait Training Minutes  19  -NW              Total Minutes    Timed Charges Total Minutes 19  -NW       Total Minutes 19  -NW            User Key  (r) = Recorded By, (t) = Taken By, (c) = Cosigned By    Initials Name Provider Type    NW Aleshia Baker PTA Physical Therapist Assistant              Therapy Charges for Today     Code Description Service Date Service Provider Modifiers Qty    03905623634 HC GAIT TRAINING EA 15 MIN 4/30/2022 Aleshia Baker PTA GP 2    45559195810 HC GAIT TRAINING EA 15 MIN 5/1/2022 Aleshia Baker PTA GP 1          PT G-Codes  Outcome Measure Options: AM-PAC 6 Clicks Daily Activity (OT)  AM-PAC 6 Clicks Score (PT): 15  AM-PAC 6 Clicks Score (OT): 14    Aleshia Baker, " PTA  5/1/2022

## 2022-05-01 NOTE — PROGRESS NOTES
[facility] Medicine Services  INPATIENT PROGRESS NOTE    Length of Stay: 13  Date of Admission: 4/18/2022  Primary Care Physician: Shant Hayes MD    Subjective     Chief Complaint:     Generalized weakness    HPI     The patient is sitting in his bedside chair looking at the window.  There is no evidence of confusion.  He was very conversational.  He continues work with physical therapy on a daily basis and noted to resolve weakness.  Rifaximin was discontinued and ammonia is 40 this morning.  If further reduction of ammonia is necessary, increased dose of lactulose would be preferred.  The patient is not having any loose stools with the current dosing.  SNF placement remains pending.  Sodium is stable at 130.  Creatinine is slightly improved to 1.49.  ALT and AST continue to downtrend.      Review of Systems     All pertinent negatives and positives are as above. All other systems have been reviewed and are negative unless otherwise stated.     Objective    Temp:  [97.8 °F (36.6 °C)-98.1 °F (36.7 °C)] 97.8 °F (36.6 °C)  Heart Rate:  [62-83] 65  Resp:  [16] 16  BP: ()/(54-71) 109/71    Lab Results (last 24 hours)     Procedure Component Value Units Date/Time    Ammonia [[id number]]  (Normal) Collected: 05/01/22 0518    Specimen: Blood Updated: 05/01/22 0551     Ammonia 40 umol/L     Comprehensive Metabolic Panel [[id number]]  (Abnormal) Collected: 05/01/22 0518    Specimen: Blood Updated: 05/01/22 0546     Glucose 109 mg/dL      BUN 25 mg/dL      Creatinine 1.49 mg/dL      Sodium 130 mmol/L      Potassium 3.7 mmol/L      Comment: Slight hemolysis detected by analyzer. Results may be affected.        Chloride 92 mmol/L      CO2 26.0 mmol/L      Calcium 8.9 mg/dL      Total Protein 6.1 g/dL      Albumin 3.50 g/dL      ALT (SGPT) 184 U/L      AST (SGOT) 66 U/L      Alkaline Phosphatase 164 U/L      Total Bilirubin 2.5 mg/dL      Globulin 2.6 gm/dL      A/G Ratio 1.3 g/dL       BUN/Creatinine Ratio 16.8     Anion Gap 12.0 mmol/L      eGFR 48.9 mL/min/1.73      Comment: National Kidney Foundation and American Society of Nephrology (ASN) Task Force recommended calculation based on the Chronic Kidney Disease Epidemiology Collaboration (CKD-EPI) equation refit without adjustment for race.       Narrative:      GFR Normal >60  Chronic Kidney Disease <60  Kidney Failure <15      CBC & Differential [[id number]]  (Abnormal) Collected: 05/01/22 0518    Specimen: Blood Updated: 05/01/22 0536    Narrative:      The following orders were created for panel order CBC & Differential.  Procedure                               Abnormality         Status                     ---------                               -----------         ------                     CBC Auto Differential[[id number]]        Abnormal            Final result                 Please view results for these tests on the individual orders.    CBC Auto Differential [[id number]]  (Abnormal) Collected: 05/01/22 0518    Specimen: Blood Updated: 05/01/22 0536     WBC 8.83 10*3/mm3      RBC 3.29 10*6/mm3      Hemoglobin 9.2 g/dL      Hematocrit 30.6 %      MCV 93.0 fL      MCH 28.0 pg      MCHC 30.1 g/dL      RDW 24.1 %      RDW-SD 82.7 fl      MPV 9.8 fL      Platelets 144 10*3/mm3      Neutrophil % 81.5 %      Lymphocyte % 9.4 %      Monocyte % 5.5 %      Eosinophil % 2.8 %      Basophil % 0.5 %      Immature Grans % 0.3 %      Neutrophils, Absolute 7.19 10*3/mm3      Lymphocytes, Absolute 0.83 10*3/mm3      Monocytes, Absolute 0.49 10*3/mm3      Eosinophils, Absolute 0.25 10*3/mm3      Basophils, Absolute 0.04 10*3/mm3      Immature Grans, Absolute 0.03 10*3/mm3      nRBC 0.0 /100 WBC           Imaging Results (Last 24 Hours)     ** No results found for the last 24 hours. **             Intake/Output Summary (Last 24 hours) at 5/1/2022 1403  Last data filed at 5/1/2022 1245  Gross per 24 hour   Intake 700 ml   Output 300 ml   Net 400 ml        Physical Exam  Constitutional:       General: He is not in acute distress.     Appearance: He is not toxic-appearing.      Comments: Sitting up in bedside chair.  On room air.  HENT:      Head: Normocephalic.      Mouth/Throat:      Pharynx: No oropharyngeal exudate.   Eyes:      Pupils: Pupils are equal, round, and reactive to light.   Cardiovascular:      Rate and Rhythm: Normal rate.   Pulmonary:      Effort: Pulmonary effort is normal. No respiratory distress.   Abdominal:      General: There is no distension.      Tenderness: There is no abdominal tenderness. There is no guarding or rebound.   Musculoskeletal:         General: Swelling present.      Cervical back: Neck supple.      Right lower leg: Edema present.      Left lower leg: Edema present.   Skin:     General: Skin is warm.      Capillary Refill: Capillary refill takes less than 2 seconds.   Neurological:      General: No focal deficit present.      Mental Status: He is alert.      Cranial Nerves: No cranial nerve deficit.      Motor: Weakness present.   Psychiatric:         Mood and Affect: Mood normal.     Results Review:  I have reviewed the labs, radiology results, and diagnostic studies since my last progress note and made treatment changes reflective of the results.   I have reviewed the current medications.    Assessment/Plan     Active Hospital Problems    Diagnosis    • **Acute metabolic encephalopathy, due to hyperammonemia    • GI bleed    • S/P ablation of atrial fibrillation    • Chronic combined systolic and diastolic CHF (congestive heart failure) ([facility])    • Elevated troponin, likely due to recent ablation    • Stage 3b chronic kidney disease ([facility])    • Bipolar 1 disorder, mixed, full remission ([facility])      Chronic lithium therapy     • Abnormal BUN-to-creatinine ratio    • Hyponatremia    • Obesity (BMI 30-39.9)    • Lactic acidosis    • Hyperlipidemia    • Chronic coronary artery disease      07/26/07:  Coronary artery disease  involving > 3 vessels with more than 50%         • ICD (implantable cardioverter-defibrillator) in place        PLAN:  Continue PT/OT  Continue lactulose  Repeat ammonia level and BMP in a.m.  SNF placement pending    Electronically signed by Jorge Luis Delgadillo DO, 05/01/22, 14:03 CDT.

## 2022-05-01 NOTE — PROGRESS NOTES
Nephrology ([facility]) Progress Note      Patient:  Travis Patel  YOB: 1945  Date of Service: 5/1/2022  MRN: [id number]   Acct: [id number]   Primary Care Physician: Shant Hayse MD  Advance Directive:   Code Status and Medical Interventions:   Ordered at: 04/18/22 1801     Level Of Support Discussed With:    Patient     Code Status (Patient has no pulse and is not breathing):    CPR (Attempt to Resuscitate)     Medical Interventions (Patient has pulse or is breathing):    Full Support     Admit Date: 4/18/2022       Hospital Day: 13  Referring Provider: No Known Provider      Patient personally seen and examined.  Complete chart including Consults, Notes, Operative Reports, Labs, Cardiology, and Radiology studies reviewed as able.    Chief complaint: Abnormal labs.    Subjective:  Travis Patel is a 76 y.o. male  whom we were consulted for HYPONATREMIA.  This problem was recently recognized on routine lab.  Patient denies any history of hyponatremia.  However he has history of chronic systolic CHF, atrial fibrillation, coronary artery disease, chronic kidney disease stage IIIb, hypertension and possible dementia.  Patient was recently hospitalized at [facility] where he was diagnosed with CHF, underwent ablation therapy for chronic atrial fibrillation.  Presented, brought in by family as he was confused/disoriented.  He was found to have very high ammonia level.  Hospital course markable for slow decline in serum sodium level.  Now her serum sodium was 125 mmol.  His serum creatinine remained stable around 1.6 mg.  Nephrology is requested for evaluation and treatment of hyponatremia.  At home patient usually takes Bumex 3 mg daily.  Bumex has been discontinued during this hospitalization.  Hospital course remarkable for initiation of loop diuretics, Lasix 40 mg every day.  His serum sodium has significantly improved.    This morning patient feels much  better.  He denies any shortness of breath but still has swelling of legs.      Allergies:  Sulfa antibiotics    Home Meds:  Medications Prior to Admission   Medication Sig Dispense Refill Last Dose   • atorvastatin (LIPITOR) 80 MG tablet Take 80 mg by mouth Every Night.   4/17/2022 at Unknown time   • bumetanide (BUMEX) 1 MG tablet Take 3 mg by mouth Daily.   Patient Taking Differently at Unknown time   • DULoxetine (CYMBALTA) 60 MG capsule Take 60 mg by mouth Every Night.   4/17/2022 at 2100   • folic acid (FOLVITE) 1 MG tablet Take 1 mg by mouth Daily.   4/17/2022 at Unknown time   • QUEtiapine (SEROquel) 100 MG tablet Take 200 mg by mouth Every Night.   Patient Taking Differently at Unknown time   • vitamin C (ASCORBIC ACID) 250 MG tablet Take 1,000 mg by mouth Daily.   4/17/2022 at Unknown time   • zinc gluconate 50 MG tablet Take 50 mg by mouth Daily.   4/17/2022 at Unknown time   • amiodarone (PACERONE) 200 MG tablet Take 200 mg by mouth Every Evening.      • apixaban (ELIQUIS) 5 MG tablet tablet Take 5 mg by mouth 2 (Two) Times a Day.      • aspirin 81 MG chewable tablet Chew 81 mg Daily.      • Cholecalciferol (vitamin D3) 125 MCG (5000 UT) tablet tablet Take 5,000 Units by mouth Daily.      • cyanocobalamin 1000 MCG/ML injection Inject 1,000 mcg into the appropriate muscle as directed by prescriber Every 30 (Thirty) Days.      • docusate sodium (COLACE) 100 MG capsule Take 1 capsule by mouth 2 (Two) Times a Day. 30 capsule 0    • empagliflozin (JARDIANCE) 25 MG tablet tablet Take 25 mg by mouth Daily.      • eplerenone (INSPRA) 50 MG tablet Take 50 mg by mouth Daily.      • famotidine (PEPCID) 20 MG tablet Take 20 mg by mouth Daily.      • fluticasone (FLONASE) 50 MCG/ACT nasal spray 2 sprays into the nostril(s) as directed by provider Daily As Needed for Rhinitis.      • lithium carbonate 150 MG capsule Take 150 mg by mouth 2 (Two) Times a Day With Meals.      • magnesium oxide (MAGOX) 400 (241.3 Mg) MG  tablet tablet Take 400 mg by mouth Daily.      • melatonin 5 MG tablet tablet Take 5 mg by mouth Every Night.      • metOLazone (ZAROXOLYN) 5 MG tablet Take 5 mg by mouth Daily As Needed.      • nitroglycerin (NITROSTAT) 0.4 MG SL tablet Place 0.4 mg under the tongue Every 5 (Five) Minutes As Needed for Chest Pain. Take no more than 3 doses in 15 minutes.   Unknown at Unknown time   • pantoprazole (PROTONIX) 40 MG EC tablet Take 40 mg by mouth Every Night.      • polyethylene glycol (MIRALAX) pack packet Take 17 g by mouth Daily. 30 each 0 Unknown at Unknown time   • potassium chloride ER (K-TAB) 20 MEQ tablet controlled-release ER tablet Take 30 mEq by mouth Daily.          Medicines:  Current Facility-Administered Medications   Medication Dose Route Frequency Provider Last Rate Last Admin   • acetaminophen (TYLENOL) tablet 650 mg  650 mg Oral Q4H PRN Noel Page MD   650 mg at 04/28/22 2058    Or   • acetaminophen (TYLENOL) 160 MG/5ML solution 650 mg  650 mg Oral Q4H PRN Noel Page MD   650 mg at 04/21/22 2306    Or   • acetaminophen (TYLENOL) suppository 650 mg  650 mg Rectal Q4H PRN Noel Page MD       • cholecalciferol (VITAMIN D3) tablet 5,000 Units  5,000 Units Oral Daily Noel Page MD   5,000 Units at 05/01/22 0824   • cyclobenzaprine (FLEXERIL) tablet 5 mg  5 mg Oral TID PRN Janusz Cordero MD   5 mg at 04/28/22 0600   • DULoxetine (CYMBALTA) DR capsule 60 mg  60 mg Oral Nightly Noel Page MD   60 mg at 04/30/22 2015   • empagliflozin (JARDIANCE) tablet 10 mg  10 mg Oral Daily Raffaele Phan MD   10 mg at 05/01/22 0824   • furosemide (LASIX) tablet 40 mg  40 mg Oral Daily Chauncey Reese MD   40 mg at 05/01/22 0824   • lactulose solution 20 g  20 g Oral Daily Raffaele Phan MD   20 g at 05/01/22 0823   • lithium carbonate capsule 150 mg  150 mg Oral BID With Meals Noel Page MD   150 mg at 05/01/22 0824   • midodrine (PROAMATINE)  tablet 5 mg  5 mg Oral TID AC Noel Page MD   5 mg at 05/01/22 1139   • ondansetron (ZOFRAN) tablet 4 mg  4 mg Oral Q6H PRN Noel Page MD        Or   • ondansetron (ZOFRAN) injection 4 mg  4 mg Intravenous Q6H PRN Noel Page MD       • pantoprazole (PROTONIX) EC tablet 40 mg  40 mg Oral BID AC Raffaele Phan MD   40 mg at 05/01/22 0548   • QUEtiapine (SEROquel) tablet 50 mg  50 mg Oral Nightly Raffaele Phan MD   50 mg at 04/30/22 2015   • sodium chloride 0.9 % flush 10 mL  10 mL Intravenous PRN Noel Page MD       • sodium chloride 0.9 % flush 10 mL  10 mL Intravenous Q12H Noel Page MD   10 mL at 05/01/22 0823       Past Medical History:  Past Medical History:   Diagnosis Date   • Bipolar 1 disorder (HCC)    • Diverticulitis    • Hernia of abdominal wall    • Kidney disease    • Myocardial infarction ([facility]) 02/2017   • Sleep apnea    • Stroke ([facility]) 02/2017       Past Surgical History:  Past Surgical History:   Procedure Laterality Date   • COLOSTOMY  2001    x 3 months   • COLOSTOMY REVISION     • CORONARY ARTERY BYPASS GRAFT      1991/1996   • HERNIA REPAIR      abdominal hernia x 3; poor healing with multiple revisions   • PACEMAKER IMPLANTATION  2017    w/ defibrillator   • ROTATOR CUFF REPAIR Right 2005       Family History  Family History   Problem Relation Age of Onset   • Heart disease Brother    • Diabetes Brother    • Sleep apnea Brother        Social History  Social History     Socioeconomic History   • Marital status: Legally    Tobacco Use   • Smoking status: Former Smoker     Packs/day: 1.00     Years: 40.00     Pack years: 40.00     Types: Cigarettes   • Smokeless tobacco: Never Used   Substance and Sexual Activity   • Alcohol use: No   • Drug use: No   • Sexual activity: Defer       Review of Systems:  History obtained from chart review and the patient  General ROS: No fever or chills  Respiratory ROS: No cough, shortness of breath,  wheezing  Cardiovascular ROS: No chest pain or palpitations  Gastrointestinal ROS: No abdominal pain or melena  Genito-Urinary ROS: No dysuria or hematuria  Psych ROS: No anxiety and depression  14 point ROS reviewed with the patient and negative except as noted above and in the HPI unless unable to obtain.    Objective:  Patient Vitals for the past 24 hrs:   BP Temp Temp src Pulse Resp SpO2 Weight   05/01/22 1111 109/71 97.8 °F (36.6 °C) Oral 65 16 99 % --   05/01/22 0728 109/65 97.8 °F (36.6 °C) Oral 70 16 94 % --   05/01/22 0425 111/68 97.8 °F (36.6 °C) Oral 63 16 95 % --   05/01/22 0000 (!) 85/66 98 °F (36.7 °C) Oral 66 16 95 % 87.3 kg (192 lb 6.4 oz)   04/30/22 1921 106/64 98.1 °F (36.7 °C) Oral 62 16 99 % --   04/30/22 1507 100/54 97.9 °F (36.6 °C) Oral 83 16 95 % --       Intake/Output Summary (Last 24 hours) at 5/1/2022 1236  Last data filed at 5/1/2022 0830  Gross per 24 hour   Intake 700 ml   Output 300 ml   Net 400 ml     General: awake/alert   HEENT: Normocephalic atraumatic head  Neck: Supple with no JVD or carotid bruits.  Chest:  clear to auscultation bilaterally without respiratory distress  CVS: regular rate and rhythm  Abdominal: soft, nontender, positive bowel sounds  Extremities: no cyanosis or edema  Skin: warm and dry without rash      Labs:  Results from last 7 days   Lab Units 05/01/22  0518 04/30/22  0602 04/29/22  0847   WBC 10*3/mm3 8.83 8.40 7.47   HEMOGLOBIN g/dL 9.2* 9.3* 9.5*   HEMATOCRIT % 30.6* 30.1* 31.4*   PLATELETS 10*3/mm3 144 129* 176         Results from last 7 days   Lab Units 05/01/22  0518 04/29/22  1430 04/29/22  0847 04/28/22  0500   SODIUM mmol/L 130* 130* 125* 129*   POTASSIUM mmol/L 3.7 4.0 4.1 3.5   CHLORIDE mmol/L 92* 93* 89* 89*   CO2 mmol/L 26.0 20.0* 26.0 27.0   BUN mg/dL 25* 29* 31* 30*   CREATININE mg/dL 1.49* 1.47* 1.65* 1.51*   CALCIUM mg/dL 8.9 8.7 9.3 8.9   EGFR mL/min/1.73 48.9* 49.7* 43.3* 48.2*   BILIRUBIN mg/dL 2.5*  --  3.0* 2.6*   ALK PHOS U/L 164*  --   187* 186*   ALT (SGPT) U/L 184*  --  298* 376*   AST (SGOT) U/L 66*  --  122* 186*   GLUCOSE mg/dL 109* 156* 146* 113*       Radiology:   Imaging Results (Last 72 Hours)     ** No results found for the last 72 hours. **          Culture:  No results found for: BLOODCX, URINECX, WOUNDCX, MRSACX, RESPCX, STOOLCX      Assessment   1.  HYPONATREMIA secondary to CHF.  2.  CHF/systolic/left ventricular ejection fraction 25%.  3.  Recent ablation for atrial fibrillation.  4.  Stage IIIb chronic kidney disease baseline.  5.  Bipolar disorder.  6.  History of coronary artery disease.    Plan:  1.  Increase Lasix  2.  P.o. water restriction.  3.  Monitor serum sodium      Chauncey Reese MD  5/1/2022  12:36 CDT

## 2022-05-01 NOTE — PLAN OF CARE
"Goal Outcome Evaluation:  Plan of Care Reviewed With: patient        Progress: no change  Outcome Evaluation: Pt up in chair. Nsg reports pt just up to BR shave face and brush teeth. sit-stand from chair cga/sba. Pt began amb and c/o dizziness had to stop mult time due to this pt able to amb total of 25'x3 before saying \"sheila got to sit down\" pt was able to get bk to chair safely. Monitored vitals stable /74. HR 71 and o2 100% nsg aware. left pt up in chair reclined w/ chair alarm. pt wouldl benefit from cont PT Upon d/c  "

## 2022-05-01 NOTE — NURSING NOTE
Spoke c chinyere Bryant to d/c PICC line. Unable to obtain PIV today, attempt X2. PICC still in place until PIV can be placed.  Update night shift RN

## 2022-05-01 NOTE — PLAN OF CARE
Goal Outcome Evaluation:           Progress: no change  Outcome Evaluation: Alert, oriented to person and place. Forgetful. NSR on tele. No c/o pain. Up in chair this shift, encouraged to elevate BLE. BLE swelling. Fluid restriction maintained. Up x1 standby to BR with walker. Room air with sats maintained in 90s. Pt very pleasant.

## 2022-05-01 NOTE — PLAN OF CARE
Goal Outcome Evaluation:               Patient rested well throughout the night.  Currently up in chair with feet elevated.  PICC removed at 0600 with 40cm catheter noted with the tip intact.  Peripheral IV access remains.  Voiding.  Ambulating to bathroom standby assistance with a walker - gait improving slightly compared to last few nights.  BLE pitting edema continues.  Denies pain.

## 2022-05-02 NOTE — PROGRESS NOTES
Nephrology ([facility]) Progress Note      Patient:  Travis Patel  YOB: 1945  Date of Service: 5/2/2022  MRN: [id number]   Acct: [id number]   Primary Care Physician: Shant Hayes MD  Advance Directive:   Code Status and Medical Interventions:   Ordered at: 04/18/22 1801     Level Of Support Discussed With:    Patient     Code Status (Patient has no pulse and is not breathing):    CPR (Attempt to Resuscitate)     Medical Interventions (Patient has pulse or is breathing):    Full Support     Admit Date: 4/18/2022       Hospital Day: 14  Referring Provider: No Known Provider      Patient personally seen and examined.  Complete chart including Consults, Notes, Operative Reports, Labs, Cardiology, and Radiology studies reviewed as able.        Subjective:  Travis Patel is a 76 y.o. male for whom we were consulted for evaluation and treatment of hyponatremia. Baseline CKD stage 3b, baseline creatinine 1.6.  Does not follow with nephrology. History of atrial fibrillation, systolic CHF, hypertension, bipolar disorder. Recently hospitalized at [facility] for CHF exacerbation and underwent ablation for chronic atrial fibrillation. Brought to ER on 4/18 by family for altered mental status. Ammonia level was elevated. Hospital course remarkable for initally holding home diuretics, on 4/26 Bumex and Eplerenone were resumed. Sodium trended lower to 125 on 4/29. Diuretics changed to lower dose Lasix and sodium leve lbegan to improve.    Today patient is awake and alert. No new overnight issues. Urine output nonoliguric    Allergies:  Sulfa antibiotics    Home Meds:  Medications Prior to Admission   Medication Sig Dispense Refill Last Dose   • atorvastatin (LIPITOR) 80 MG tablet Take 80 mg by mouth Every Night.   4/17/2022 at Unknown time   • bumetanide (BUMEX) 1 MG tablet Take 3 mg by mouth Daily.   Patient Taking Differently at Unknown time   • DULoxetine (CYMBALTA) 60 MG  capsule Take 60 mg by mouth Every Night.   4/17/2022 at 2100   • folic acid (FOLVITE) 1 MG tablet Take 1 mg by mouth Daily.   4/17/2022 at Unknown time   • QUEtiapine (SEROquel) 100 MG tablet Take 200 mg by mouth Every Night.   Patient Taking Differently at Unknown time   • vitamin C (ASCORBIC ACID) 250 MG tablet Take 1,000 mg by mouth Daily.   4/17/2022 at Unknown time   • zinc gluconate 50 MG tablet Take 50 mg by mouth Daily.   4/17/2022 at Unknown time   • amiodarone (PACERONE) 200 MG tablet Take 200 mg by mouth Every Evening.      • apixaban (ELIQUIS) 5 MG tablet tablet Take 5 mg by mouth 2 (Two) Times a Day.      • aspirin 81 MG chewable tablet Chew 81 mg Daily.      • Cholecalciferol (vitamin D3) 125 MCG (5000 UT) tablet tablet Take 5,000 Units by mouth Daily.      • cyanocobalamin 1000 MCG/ML injection Inject 1,000 mcg into the appropriate muscle as directed by prescriber Every 30 (Thirty) Days.      • docusate sodium (COLACE) 100 MG capsule Take 1 capsule by mouth 2 (Two) Times a Day. 30 capsule 0    • empagliflozin (JARDIANCE) 25 MG tablet tablet Take 25 mg by mouth Daily.      • eplerenone (INSPRA) 50 MG tablet Take 50 mg by mouth Daily.      • famotidine (PEPCID) 20 MG tablet Take 20 mg by mouth Daily.      • fluticasone (FLONASE) 50 MCG/ACT nasal spray 2 sprays into the nostril(s) as directed by provider Daily As Needed for Rhinitis.      • lithium carbonate 150 MG capsule Take 150 mg by mouth 2 (Two) Times a Day With Meals.      • magnesium oxide (MAGOX) 400 (241.3 Mg) MG tablet tablet Take 400 mg by mouth Daily.      • melatonin 5 MG tablet tablet Take 5 mg by mouth Every Night.      • metOLazone (ZAROXOLYN) 5 MG tablet Take 5 mg by mouth Daily As Needed.      • nitroglycerin (NITROSTAT) 0.4 MG SL tablet Place 0.4 mg under the tongue Every 5 (Five) Minutes As Needed for Chest Pain. Take no more than 3 doses in 15 minutes.   Unknown at Unknown time   • pantoprazole (PROTONIX) 40 MG EC tablet Take 40 mg  by mouth Every Night.      • polyethylene glycol (MIRALAX) pack packet Take 17 g by mouth Daily. 30 each 0 Unknown at Unknown time   • potassium chloride ER (K-TAB) 20 MEQ tablet controlled-release ER tablet Take 30 mEq by mouth Daily.          Medicines:  Current Facility-Administered Medications   Medication Dose Route Frequency Provider Last Rate Last Admin   • acetaminophen (TYLENOL) tablet 650 mg  650 mg Oral Q4H PRN Noel Page MD   650 mg at 04/28/22 2058    Or   • acetaminophen (TYLENOL) 160 MG/5ML solution 650 mg  650 mg Oral Q4H PRN Noel Page MD   650 mg at 04/21/22 2306    Or   • acetaminophen (TYLENOL) suppository 650 mg  650 mg Rectal Q4H PRN oNel Page MD       • cholecalciferol (VITAMIN D3) tablet 5,000 Units  5,000 Units Oral Daily oNel Page MD   5,000 Units at 05/02/22 0838   • cyclobenzaprine (FLEXERIL) tablet 5 mg  5 mg Oral TID PRN Janusz Cordero MD   5 mg at 04/28/22 0600   • DULoxetine (CYMBALTA) DR capsule 60 mg  60 mg Oral Nightly Noel Page MD   60 mg at 05/01/22 2004   • empagliflozin (JARDIANCE) tablet 10 mg  10 mg Oral Daily Raffaele Phan MD   10 mg at 05/02/22 0838   • furosemide (LASIX) tablet 80 mg  80 mg Oral Daily Chauncey Reese MD   80 mg at 05/02/22 0838   • lactulose solution 20 g  20 g Oral Daily Raffaele Phan MD   20 g at 05/02/22 0837   • lithium carbonate capsule 150 mg  150 mg Oral BID With Meals Noel Page MD   150 mg at 05/02/22 0838   • midodrine (PROAMATINE) tablet 5 mg  5 mg Oral TID AC Noel Page MD   5 mg at 05/02/22 0539   • ondansetron (ZOFRAN) tablet 4 mg  4 mg Oral Q6H PRN Noel Page MD        Or   • ondansetron (ZOFRAN) injection 4 mg  4 mg Intravenous Q6H PRN Noel Page MD       • pantoprazole (PROTONIX) EC tablet 40 mg  40 mg Oral BID AC Raffaele Phan MD   40 mg at 05/02/22 0539   • QUEtiapine (SEROquel) tablet 50 mg  50 mg Oral Nightly Sylvester  Raffaele SCRUGGS MD   50 mg at 05/01/22 2004   • sodium chloride 0.9 % flush 10 mL  10 mL Intravenous PRN Noel Page MD       • sodium chloride 0.9 % flush 10 mL  10 mL Intravenous Q12H Noel Page MD   10 mL at 05/02/22 0837       Past Medical History:  Past Medical History:   Diagnosis Date   • Bipolar 1 disorder (HCC)    • Diverticulitis    • Hernia of abdominal wall    • Kidney disease    • Myocardial infarction (HCC) 02/2017   • Sleep apnea    • Stroke (HCC) 02/2017       Past Surgical History:  Past Surgical History:   Procedure Laterality Date   • COLOSTOMY  2001    x 3 months   • COLOSTOMY REVISION     • CORONARY ARTERY BYPASS GRAFT      1991/1996   • HERNIA REPAIR      abdominal hernia x 3; poor healing with multiple revisions   • PACEMAKER IMPLANTATION  2017    w/ defibrillator   • ROTATOR CUFF REPAIR Right 2005       Family History  Family History   Problem Relation Age of Onset   • Heart disease Brother    • Diabetes Brother    • Sleep apnea Brother        Social History  Social History     Socioeconomic History   • Marital status: Legally    Tobacco Use   • Smoking status: Former Smoker     Packs/day: 1.00     Years: 40.00     Pack years: 40.00     Types: Cigarettes   • Smokeless tobacco: Never Used   Substance and Sexual Activity   • Alcohol use: No   • Drug use: No   • Sexual activity: Defer       Review of Systems:  History obtained from chart review and the patient  General ROS: No fever or chills  Respiratory ROS: No cough, shortness of breath, wheezing  Cardiovascular ROS: No chest pain or palpitations  Gastrointestinal ROS: No abdominal pain or melena  Genito-Urinary ROS: No dysuria or hematuria  Psych ROS: No anxiety and depression  14 point ROS reviewed with the patient and negative except as noted above and in the HPI unless unable to obtain.    Objective:  Patient Vitals for the past 24 hrs:   BP Temp Temp src Pulse Resp SpO2 Weight   05/02/22 0806 108/69 98.3 °F (36.8  °C) Oral 82 18 95 % --   05/02/22 0300 115/68 98.7 °F (37.1 °C) Axillary 103 16 96 % 86.5 kg (190 lb 12.8 oz)   05/02/22 0007 102/70 97.6 °F (36.4 °C) Oral 66 16 99 % --   05/01/22 2043 108/68 97.8 °F (36.6 °C) Oral 65 16 100 % --   05/01/22 1523 119/76 98.2 °F (36.8 °C) Oral 70 16 97 % --   05/01/22 1111 109/71 97.8 °F (36.6 °C) Oral 65 16 99 % --       Intake/Output Summary (Last 24 hours) at 5/2/2022 1000  Last data filed at 5/2/2022 0932  Gross per 24 hour   Intake 1200 ml   Output --   Net 1200 ml     General: awake/alert    Neck: supple, no JVD  Chest:  clear to auscultation bilaterally without respiratory distress  CVS: regular rate and rhythm  Abdominal: soft, nontender, positive bowel sounds  Extremities: no cyanosis or edema  Skin: warm and dry without rash      Labs:  Results from last 7 days   Lab Units 05/01/22  0518 04/30/22  0602 04/29/22  0847   WBC 10*3/mm3 8.83 8.40 7.47   HEMOGLOBIN g/dL 9.2* 9.3* 9.5*   HEMATOCRIT % 30.6* 30.1* 31.4*   PLATELETS 10*3/mm3 144 129* 176         Results from last 7 days   Lab Units 05/02/22  0730 05/01/22  0518 04/29/22  1430 04/29/22  0847 04/28/22  0500   SODIUM mmol/L 131* 130* 130* 125* 129*   POTASSIUM mmol/L 3.7 3.7 4.0 4.1 3.5   CHLORIDE mmol/L 94* 92* 93* 89* 89*   CO2 mmol/L 25.0 26.0 20.0* 26.0 27.0   BUN mg/dL 27* 25* 29* 31* 30*   CREATININE mg/dL 1.52* 1.49* 1.47* 1.65* 1.51*   CALCIUM mg/dL 9.5 8.9 8.7 9.3 8.9   EGFR mL/min/1.73 47.8* 48.9* 49.7* 43.3* 48.2*   BILIRUBIN mg/dL  --  2.5*  --  3.0* 2.6*   ALK PHOS U/L  --  164*  --  187* 186*   ALT (SGPT) U/L  --  184*  --  298* 376*   AST (SGOT) U/L  --  66*  --  122* 186*   GLUCOSE mg/dL 116* 109* 156* 146* 113*       Radiology:   Imaging Results (Last 72 Hours)     ** No results found for the last 72 hours. **          Culture:  No results found for: BLOODCX, URINECX, WOUNDCX, MRSACX, RESPCX, STOOLCX      Assessment   1.  Hyponatremia--improving  2.  Baseline chronic kidney disease stage 3b  3.  Chronic  systolic CHF  4.  Metabolic encephalopathy--resolved  5.  Bipolar disorder on Lithium  6.  Atrial fibrillation--s/p recent ablation at [place]    Plan:  1.  Continue Lasix for now, titrate dose and/or resume other prior home diuretics PRN for signs of worsening volume overload  2.  Renal function remains stable  3.  Monitor labs      Ciro Herrera, APRN  5/2/2022  10:00 CDT

## 2022-05-02 NOTE — THERAPY TREATMENT NOTE
Acute Care - Speech Language Pathology Treatment Note  [facility]     Patient Name: Travis Patel  : 1945  MRN: [id number]  Today's Date: 2022               Admit Date: 2022     SLP treatment completed. Pt alert and cooperative. Pt was oriented to person, place, and year. Pt completed simple two step commands with min-mod cues. Pt had no difficulty completing one step commands. Pt completed immediate recall tasks of related words. Pt was able to recall 3/3 related word with minimal cues. Pt required mod-max cues to recall unrelated word lists. Perseveration on previously recalled words observed.  Pt needed mod-max cues to complete divergent naming tasks. Pt required minimal cues to sustain attention to tasks. SLP will continue to follow.     Judie Francois, SLP Student  Tonya Hunter, CCC-SLP 2022 12:39 CDT    Visit Dx:    ICD-10-CM ICD-9-CM   1. Hepatic encephalopathy (HCC)  K72.90 572.2   2. Dysphagia, unspecified type  R13.10 787.20   3. Impaired mobility  Z74.09 799.89   4. Impaired mobility and ADLs  Z74.09 V49.89    Z78.9    5. Impaired cognition  R41.89 294.9     Patient Active Problem List   Diagnosis   • Subarachnoid hemorrhage (HCC)   • Chronic coronary artery disease   • Hyperlipidemia   • ICD (implantable cardioverter-defibrillator) in place   • Orthostatic hypotension   • Syncope   • Acute metabolic encephalopathy, due to hyperammonemia   • S/P ablation of atrial fibrillation   • Chronic combined systolic and diastolic CHF (congestive heart failure) (HCC)   • Elevated troponin, likely due to recent ablation   • Stage 3b chronic kidney disease (HCC)   • Bipolar 1 disorder, mixed, full remission (HCC)   • Abnormal BUN-to-creatinine ratio   • Hyponatremia   • Obesity (BMI 30-39.9)   • Lactic acidosis   • GI bleed     Past Medical History:   Diagnosis Date   • Bipolar 1 disorder (HCC)    • Diverticulitis    • Hernia of abdominal wall    • Kidney disease    • Myocardial infarction  ([facility]) 02/2017   • Sleep apnea    • Stroke ([facility]) 02/2017     Past Surgical History:   Procedure Laterality Date   • COLOSTOMY  2001    x 3 months   • COLOSTOMY REVISION     • CORONARY ARTERY BYPASS GRAFT      1991/1996   • HERNIA REPAIR      abdominal hernia x 3; poor healing with multiple revisions   • PACEMAKER IMPLANTATION  2017    w/ defibrillator   • ROTATOR CUFF REPAIR Right 2005       SLP Recommendation and Plan                                                   SLP EVALUATION (last 72 hours)     SLP SLC Evaluation     Row Name 05/02/22 0905                   Communication Assessment/Intervention    Document Type therapy note (daily note) (P)   -BW        Subjective Information no complaints (P)   -BW        Patient Observations alert;cooperative (P)   -BW        Patient Effort good (P)   -BW        Symptoms Noted During/After Treatment none (P)   -BW                  Pain    Additional Documentation Pain Scale: Numbers Pre/Post-Treatment (Group) (P)   -BW                  Pain Scale: Numbers Pre/Post-Treatment    Pretreatment Pain Rating 0/10 - no pain (P)   -BW        Posttreatment Pain Rating 0/10 - no pain (P)   -BW                  SLP Treatment Clinical Impressions    Daily Summary of Progress (SLP) progress toward functional goals is good (P)   -BW        Plan for Continued Treatment (SLP) continue treatment per plan of care (P)   -BW        Care Plan Review care plan/treatment goals reviewed (P)   -BW                  Communication Treatment Objective and Progress Goals (SLP)    Auditory Comprehension Treatment Objectives Auditory Comprehension Treatment Objectives (Group) (P)   -BW        Cognitive Linguistic Treatment Objectives Cognitive Linguistic Treatment Objectives (Group) (P)   -BW                  Auditory Comprehension Treatment Objectives    Follow Directions Selection follow directions, SLP goal 1 (P)   -BW                  Follow Directions Goal 2 (SLP)    Improve Ability to Follow  Directions Goal 1 (SLP) 2 step commands;3 step commands;80%;with minimal cues (75-90%) (P)   -BW        Time Frame (Follow Directions Goal 1, SLP) by discharge (P)   -BW        Barriers (Improve Ability to Follow Directions Goal 1, SLP) n/a (P)   -BW        Progress (Ability to Follow Directions Goal 1, SLP) 70%;with minimal cues (75-90%) (P)   -BW        Progress/Outcomes (Follow Directions Goal 1, SLP) continuing progress toward goal (P)   -BW                  Cognitive Linguistic Treatment Objectives    Attention Selection attention, SLP goal 1 (P)   -BW        Orientation Selection orientation, SLP goal 1 (P)   -BW        Memory Skills Selection memory skills, SLP goal 1 (P)   -BW        Organizational Skills Selection organizational skills, SLP goal 1 (P)   -BW                  Attention Goal 1 (SLP)    Improve Attention by Goal 1 (SLP) complete sustained attention task;80%;with minimal cues (75-90%) (P)   -BW        Time Frame (Attention Goal 1, SLP) by discharge (P)   -BW        Barriers (Attention Goal 1, SLP) n/a (P)   -BW        Progress (Attention Goal 1, SLP) 60%;with moderate cues (50-74%) (P)   -BW        Progress/Outcomes (Attention Goal 1, SLP) continuing progress toward goal (P)   -BW                  Orientation Goal 1 (SLP)    Improve Orientation Through Goal 1 (SLP) demonstrating orientation to day;demonstrating orientation to month;demonstrating orientation to year;80%;with minimal cues (75-90%) (P)   -BW        Time Frame (Orientation Goal 1, SLP) by discharge (P)   -BW        Barriers (Orientation Goal 1, SLP) none (P)   -BW        Progress (Orientation Goal 1, SLP) 60%;with moderate cues (50-74%) (P)   -BW        Progress/Outcomes (Orientation Goal 1, SLP) continuing progress toward goal (P)   -BW                  Memory Skills Goal 1 (SLP)    Improve Memory Skills Through Goal 1 (SLP) recalling related word lists immediately;recalling unrelated word lists immediately;80% (P)   -BW         Time Frame (Memory Skills Goal 1, SLP) by discharge (P)   -BW        Barriers (Memory Skills Goal 1, SLP) n/a (P)   -BW        Progress (Memory Skills Goal 1, SLP) 60%;with moderate cues (50-74%) (P)   -BW        Progress/Outcomes (Memory Skills Goal 1, SLP) continuing progress toward goal (P)   -BW                  Organizational Skills Goal 1 (SLP)    Improve Thought Organization Through Goal 1 (SLP) completing a divergent naming task;80%;with minimal cues (75-90%) (P)   -BW        Time Frame (Thought Organization Skills Goal 1, SLP) by discharge (P)   -BW        Barriers (Thought Organization Skills Goal 1, SLP) n/a (P)   -BW        Progress (Thought Organization Skills Goal 1, SLP) 50%;with moderate cues (50-74%) (P)   -BW        Progress/Outcomes (Thought Organization Skills Goal 1, SLP) continuing progress toward goal (P)   -BW              User Key  (r) = Recorded By, (t) = Taken By, (c) = Cosigned By    Initials Name Effective Dates    Judie Pro, Speech Therapy Student 01/06/22 -                    EDUCATION  The patient has been educated in the following areas:     Cognitive Impairment.           SLP GOALS     Row Name 05/02/22 0905             Follow Directions Goal 2 (SLP)    Improve Ability to Follow Directions Goal 1 (SLP) 2 step commands;3 step commands;80%;with minimal cues (75-90%) (P)   -BW      Time Frame (Follow Directions Goal 1, SLP) by discharge (P)   -BW      Barriers (Improve Ability to Follow Directions Goal 1, SLP) n/a (P)   -BW      Progress (Ability to Follow Directions Goal 1, SLP) 70%;with minimal cues (75-90%) (P)   -BW      Progress/Outcomes (Follow Directions Goal 1, SLP) continuing progress toward goal (P)   -BW              Attention Goal 1 (SLP)    Improve Attention by Goal 1 (SLP) complete sustained attention task;80%;with minimal cues (75-90%) (P)   -BW      Time Frame (Attention Goal 1, SLP) by discharge (P)   -BW      Barriers (Attention Goal 1, SLP) n/a (P)   -BW       Progress (Attention Goal 1, SLP) 60%;with moderate cues (50-74%) (P)   -BW      Progress/Outcomes (Attention Goal 1, SLP) continuing progress toward goal (P)   -BW              Orientation Goal 1 (SLP)    Improve Orientation Through Goal 1 (SLP) demonstrating orientation to day;demonstrating orientation to month;demonstrating orientation to year;80%;with minimal cues (75-90%) (P)   -BW      Time Frame (Orientation Goal 1, SLP) by discharge (P)   -BW      Barriers (Orientation Goal 1, SLP) none (P)   -BW      Progress (Orientation Goal 1, SLP) 60%;with moderate cues (50-74%) (P)   -BW      Progress/Outcomes (Orientation Goal 1, SLP) continuing progress toward goal (P)   -BW              Memory Skills Goal 1 (SLP)    Improve Memory Skills Through Goal 1 (SLP) recalling related word lists immediately;recalling unrelated word lists immediately;80% (P)   -BW      Time Frame (Memory Skills Goal 1, SLP) by discharge (P)   -BW      Barriers (Memory Skills Goal 1, SLP) n/a (P)   -BW      Progress (Memory Skills Goal 1, SLP) 60%;with moderate cues (50-74%) (P)   -BW      Progress/Outcomes (Memory Skills Goal 1, SLP) continuing progress toward goal (P)   -BW              Organizational Skills Goal 1 (SLP)    Improve Thought Organization Through Goal 1 (SLP) completing a divergent naming task;80%;with minimal cues (75-90%) (P)   -BW      Time Frame (Thought Organization Skills Goal 1, SLP) by discharge (P)   -BW      Barriers (Thought Organization Skills Goal 1, SLP) n/a (P)   -BW      Progress (Thought Organization Skills Goal 1, SLP) 50%;with moderate cues (50-74%) (P)   -BW      Progress/Outcomes (Thought Organization Skills Goal 1, SLP) continuing progress toward goal (P)   -BW            User Key  (r) = Recorded By, (t) = Taken By, (c) = Cosigned By    Initials Name Provider Type    Judie Pro, Speech Therapy Student SLP Student                        Time Calculation:      Time Calculation- SLP     Row Name  05/02/22 0943             Time Calculation- SLP    SLP Start Time 0905 (P)   -BW      SLP Stop Time 0943 (P)   -BW      SLP Time Calculation (min) 38 min (P)   -      SLP Received On 05/02/22  -TM              Untimed Charges    63093-VW Treatment/ST Modification Prosth Aug Alter  38  -TM              Total Minutes    Untimed Charges Total Minutes 38  -TM       Total Minutes 38  -TM            User Key  (r) = Recorded By, (t) = Taken By, (c) = Cosigned By    Initials Name Provider Type     Tonya Hunter CCC-SLP Speech and Language Pathologist     Judie Francois, Speech Therapy Student SLP Student                Therapy Charges for Today     Code Description Service Date Service Provider Modifiers Qty    [id number]  ST TREATMENT SPEECH 3 5/2/2022 Tonya Hunter CCC-LUCY GN 1                     BYRON Estes  5/2/2022

## 2022-05-02 NOTE — PLAN OF CARE
Goal Outcome Evaluation:               Pt resting well.  No complaints of pain.  BLE pitting edema still present but improving.  Voiding.
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
## 2022-05-02 NOTE — PLAN OF CARE
Goal Outcome Evaluation:   Discharge instructions and report given to carol at [facility]. 1800 Daughter    Jennifer at bedside with complaints. Charge nurse contacted. CN at bedside.

## 2022-05-02 NOTE — THERAPY RE-EVALUATION
Patient Name: Travis Patel  : 1945    MRN: [id number]                              Today's Date: 2022       Admit Date: 2022    Visit Dx:     ICD-10-CM ICD-9-CM   1. Hepatic encephalopathy (HCC)  K72.90 572.2   2. Dysphagia, unspecified type  R13.10 787.20   3. Impaired mobility  Z74.09 799.89   4. Impaired mobility and ADLs  Z74.09 V49.89    Z78.9    5. Impaired cognition  R41.89 294.9     Patient Active Problem List   Diagnosis   • Subarachnoid hemorrhage (HCC)   • Chronic coronary artery disease   • Hyperlipidemia   • ICD (implantable cardioverter-defibrillator) in place   • Orthostatic hypotension   • Syncope   • Acute metabolic encephalopathy, due to hyperammonemia   • S/P ablation of atrial fibrillation   • Chronic combined systolic and diastolic CHF (congestive heart failure) (HCC)   • Elevated troponin, likely due to recent ablation   • Stage 3b chronic kidney disease (HCC)   • Bipolar 1 disorder, mixed, full remission (HCC)   • Abnormal BUN-to-creatinine ratio   • Hyponatremia   • Obesity (BMI 30-39.9)   • Lactic acidosis   • GI bleed     Past Medical History:   Diagnosis Date   • Bipolar 1 disorder (HCC)    • Diverticulitis    • Hernia of abdominal wall    • Kidney disease    • Myocardial infarction (HCC) 2017   • Sleep apnea    • Stroke (HCC) 2017     Past Surgical History:   Procedure Laterality Date   • COLOSTOMY  2001    x 3 months   • COLOSTOMY REVISION     • CORONARY ARTERY BYPASS GRAFT      /   • HERNIA REPAIR      abdominal hernia x 3; poor healing with multiple revisions   • PACEMAKER IMPLANTATION      w/ defibrillator   • ROTATOR CUFF REPAIR Right       General Information     Row Name 22 0934          Physical Therapy Time and Intention    Document Type re-evaluation  -MS (r) JJ (t) MS (c)     Mode of Treatment physical therapy  -MS (r) JJ (t) MS (c)     Row Name 22 0934          General Information    Patient Profile Reviewed yes  -MS (r) JJ  (t) MS (c)     Existing Precautions/Restrictions fall  -MS (r) JJ (t) MS (c)     Barriers to Rehab medically complex;cognitive status  -MS (r) JJ (t) MS (c)     Row Name 05/02/22 0934          Living Environment    People in Home child(luan), adult  -MS (r) JJ (t) MS (c)     Row Name 05/02/22 0934          Cognition    Orientation Status (Cognition) oriented to;person;place;time;verbal cues/prompts needed for orientation;disoriented to;situation  -MS (r) JJ (t) MS (c)     Row Name 05/02/22 0934          Safety Issues, Functional Mobility    Safety Issues Affecting Function (Mobility) awareness of need for assistance;insight into deficits/self-awareness;judgment;problem-solving;sequencing abilities;positioning of assistive device  -MS (r) JJ (t) MS (c)     Impairments Affecting Function (Mobility) cognition;balance;endurance/activity tolerance;strength  -MS (r) JJ (t) MS (c)     Cognitive Impairments, Mobility Safety/Performance awareness, need for assistance  -MS (r) JJ (t) MS (c)           User Key  (r) = Recorded By, (t) = Taken By, (c) = Cosigned By    Initials Name Provider Type    Yessenia Snider, PT, DPT, NCS Physical Therapist    Jonathan Steele, RHONA Student PT Student               Mobility     Row Name 05/02/22 0934          Bed Mobility    Comment, (Bed Mobility) In chair upon arrival  -MS (r) JJ (t) MS (c)     Row Name 05/02/22 0934          Sit-Stand Transfer    Sit-Stand [place] (Transfers) standby assist  -MS (r) JJ (t) MS (c)     Assistive Device (Sit-Stand Transfers) walker, front-wheeled  -MS (r) JJ (t) MS (c)     Row Name 05/02/22 0934          Gait/Stairs (Locomotion)    [place] Level (Gait) contact guard  -MS (r) JJ (t) MS (c)     Assistive Device (Gait) walker, front-wheeled  -MS (r) JJ (t) MS (c)     Distance in Feet (Gait) 2x40'  -MS (r) JJ (t) MS (c)     Deviations/Abnormal Patterns (Gait) dang decreased;stride length decreased;gait speed decreased  -MS (r) JJ (t) MS (c)      Bilateral Gait Deviations forward flexed posture;heel strike decreased  -MS (r) JJ (t) MS (c)     Left Sided Gait Deviations foot drop/toe drag  -MS (r) JJ (t) MS (c)           User Key  (r) = Recorded By, (t) = Taken By, (c) = Cosigned By    Initials Name Provider Type    Yessenia Snider YURI, PT, DPT, NCS Physical Therapist    Jonathan Steele, PT Student PT Student               Obj/Interventions     Row Name 05/02/22 0934          Range of Motion Comprehensive    General Range of Motion bilateral lower extremity ROM WFL  -MS (r) JJ (t) MS (c)     Row Name 05/02/22 0934          Strength Comprehensive (MMT)    Comment, General Manual Muscle Testing (MMT) Assessment BLE Grossly 4/5. Exception bilateral hip flexor 3+/5  -MS (r) JJ (t) MS (c)     Row Name 05/02/22 0934          Balance    Balance Assessment sitting static balance;sitting dynamic balance;standing static balance;standing dynamic balance  -MS (r) JJ (t) MS (c)     Static Sitting Balance independent  -MS (r) JJ (t) MS (c)     Dynamic Sitting Balance supervision  -MS (r) JJ (t) MS (c)     Position, Sitting Balance sitting in chair  -MS (r) JJ (t) MS (c)     Static Standing Balance standby assist  -MS (r) JJ (t) MS (c)     Dynamic Standing Balance contact guard  -MS (r) JJ (t) MS (c)     Position/Device Used, Standing Balance walker, front-wheeled  -MS (r) JJ (t) MS (c)     Row Name 05/02/22 0934          Sensory Assessment (Somatosensory)    Sensory Assessment (Somatosensory) LE sensation intact  -MS (r) JJ (t) MS (c)           User Key  (r) = Recorded By, (t) = Taken By, (c) = Cosigned By    Initials Name Provider Type    Yessenia Snider R, PT, DPT, NCS Physical Therapist    Jonathan Steele, PT Student PT Student               Goals/Plan     Row Name 05/02/22 0934          Bed Mobility Goal 1 (PT)    Activity/Assistive Device (Bed Mobility Goal 1, PT) sit to supine/supine to sit  -MS (r) JJ (t) MS (c)     [place] Level/Cues Needed (Bed Mobility  Goal 1, PT) standby assist  -MS (r) JJ (t) MS (c)     Time Frame (Bed Mobility Goal 1, PT) long term goal (LTG);10 days  -MS (r) JJ (t) MS (c)     Progress/Outcomes (Bed Mobility Goal 1, PT) goal met  -MS (r) JJ (t) MS (c)     Row Name 05/02/22 0934          Transfer Goal 1 (PT)    Activity/Assistive Device (Transfer Goal 1, PT) sit-to-stand/stand-to-sit;bed-to-chair/chair-to-bed  -MS (r) JJ (t) MS (c)     [place] Level/Cues Needed (Transfer Goal 1, PT) standby assist  -MS (r) JJ (t) MS (c)     Time Frame (Transfer Goal 1, PT) long term goal (LTG);10 days  -MS (r) JJ (t) MS (c)     Progress/Outcome (Transfer Goal 1, PT) goal met  -MS (r) JJ (t) MS (c)     Row Name 05/02/22 0934          Gait Training Goal 1 (PT)    Activity/Assistive Device (Gait Training Goal 1, PT) gait (walking locomotion);decrease fall risk;improve balance and speed;increase endurance/gait distance;increase energy conservation  -MS (r) JJ (t) MS (c)     [place] Level (Gait Training Goal 1, PT) standby assist  -MS (r) JJ (t) MS (c)     Distance (Gait Training Goal 1, PT) 75'  -MS (r) JJ (t) MS (c)     Time Frame (Gait Training Goal 1, PT) long term goal (LTG);10 days  -MS (r) JJ (t) MS (c)     Progress/Outcome (Gait Training Goal 1, PT) goal ongoing;goal revised this date  -MS (r) JJ (t) MS (c)     Row Name 05/02/22 0934          Problem Specific Goal 1 (PT)    Problem Specific Goal 1 (PT) Pt will perform 5x STS with or without AD in less than 16 seconds  -MS (r) JJ (t) MS (c)     Time Frame (Problem Specific Goal 1, PT) long-term goal (LTG)  -MS (r) JJ (t) MS (c)     Progress/Outcome (Problem Specific Goal 1, PT) goal ongoing  -MS (r) JJ (t) MS (c)     Row Name 05/02/22 1451          Therapy Assessment/Plan (PT)    Planned Therapy Interventions (PT) balance training;bed mobility training;gait training;patient/family education;strengthening;transfer training  -MS (r) JJ (t) MS (c)           User Key  (r) = Recorded By, (t) = Taken By,  (c) = Cosigned By    Initials Name Provider Type    Yessenia Snider R, PT, DPT, NCS Physical Therapist    Jonathan Steele, PT Student PT Student               Clinical Impression     Row Name 05/02/22 0934          Pain    Pretreatment Pain Rating 0/10 - no pain  -MS (r) JJ (t) MS (c)     Posttreatment Pain Rating 0/10 - no pain  -MS (r) JJ (t) MS (c)     Row Name 05/02/22 0934          Plan of Care Review    Plan of Care Reviewed With patient  -MS (r) JJ (t) MS (c)     Progress improving  -MS (r) JJ (t) MS (c)     Outcome Evaluation PT re-eval completed. Pt alert and oriented to person, time, and place with verbal prompts. Pt denies any pain or dizziness this morning. Pt has made slight improvements in strength, and activity tolerance noted by increase in amb distance. Pt still has difficulty in muscular and cardiovascular endurance noted by need for rest breaks after 40' of amb with CGA and RW. As pt fatigues deviations are more pronounced noted foot drag on the LLE and bilateral poor foot clearance increasing risk of falls. Pt will benefit from continued skill care to increase independence with functional mobility, amb distance, and minimize fall risk. DC inpatient rehab vs SNF pending progress.  -MS (r) JJ (t) MS (c)     Row Name 05/02/22 0934          Therapy Assessment/Plan (PT)    Patient/Family Therapy Goals Statement (PT) Increase strength and endurance  -MS (r) JJ (t) MS (c)     Rehab Potential (PT) good, to achieve stated therapy goals  -MS (r) JJ (t) MS (c)     Criteria for Skilled Interventions Met (PT) yes;meets criteria  -MS (r) JJ (t) MS (c)     Therapy Frequency (PT) 2 times/day  -MS (r) JJ (t) MS (c)     Predicted Duration of Therapy Intervention (PT) Until DC  -MS (r) JJ (t) MS (c)     Row Name 05/02/22 0934          Vital Signs    O2 Delivery Pre Treatment room air  -MS (r) JJ (t) MS (c)     O2 Delivery Intra Treatment room air  -MS (r) JJ (t) MS (c)     O2 Delivery Post Treatment room air   -MS (r) JJ (t) MS (c)     Pre Patient Position Sitting  -MS (r) JJ (t) MS (c)     Intra Patient Position Standing  -MS (r) JJ (t) MS (c)     Post Patient Position Sitting  -MS (r) JJ (t) MS (c)     Row Name 05/02/22 0934          Positioning and Restraints    Pre-Treatment Position sitting in chair/recliner  -MS (r) JJ (t) MS (c)     Post Treatment Position chair  -MS (r) JJ (t) MS (c)     In Chair sitting;call light within reach;encouraged to call for assist;exit alarm on  -MS (r) JJ (t) MS (c)           User Key  (r) = Recorded By, (t) = Taken By, (c) = Cosigned By    Initials Name Provider Type    Yessenia Snider, PT, DPT, NCS Physical Therapist    Jonathan Steele, PT Student PT Student               Outcome Measures     Row Name 05/02/22 0934          How much help from another person do you currently need...    Turning from your back to your side while in flat bed without using bedrails? 4  -MS (r) JJ (t) MS (c)     Moving from lying on back to sitting on the side of a flat bed without bedrails? 4  -MS (r) JJ (t) MS (c)     Moving to and from a bed to a chair (including a wheelchair)? 4  -MS (r) JJ (t) MS (c)     Standing up from a chair using your arms (e.g., wheelchair, bedside chair)? 3  -MS (r) JJ (t) MS (c)     Climbing 3-5 steps with a railing? 2  -MS (r) JJ (t) MS (c)     To walk in hospital room? 3  -MS (r) JJ (t) MS (c)     AM-PAC 6 Clicks Score (PT) 20  -MS (r) JJ (t)     Highest level of mobility 6 --> Walked 10 steps or more  -MS (r) JJ (t)           User Key  (r) = Recorded By, (t) = Taken By, (c) = Cosigned By    Initials Name Provider Type    Yessenia Snider, PT, DPT, [place] Physical Therapist    Jonathan Steele, PT Student PT Student                             Physical Therapy Education                 Title: PT OT SLP Therapies (In Progress)     Topic: Physical Therapy (In Progress)     Point: Mobility training (In Progress)     Learning Progress Summary           Patient Acceptance,  E, NR by KIM at 4/21/2022 0815    Comment: Benefits of activity, progression of PT POC                   Point: Home exercise program (Not Started)     Learner Progress:  Not documented in this visit.          Point: Body mechanics (Not Started)     Learner Progress:  Not documented in this visit.          Point: Precautions (Not Started)     Learner Progress:  Not documented in this visit.                      User Key     Initials Effective Dates Name Provider Type Discipline    KIM 08/02/16 -  Clarence Villarreal, PT DPT Physical Therapist PT              PT Recommendation and Plan  Planned Therapy Interventions (PT): balance training, bed mobility training, gait training, patient/family education, strengthening, transfer training  Plan of Care Reviewed With: patient  Progress: improving  Outcome Evaluation: PT re-eval completed. Pt alert and oriented to person, time, and place with verbal prompts. Pt denies any pain or dizziness this morning. Pt has made slight improvements in strength, and activity tolerance noted by increase in amb distance. Pt still has difficulty in muscular and cardiovascular endurance noted by need for rest breaks after 40' of amb with CGA and RW. As pt fatigues deviations are more pronounced noted foot drag on the LLE and bilateral poor foot clearance increasing risk of falls. Pt will benefit from continued skill care to increase independence with functional mobility, amb distance, and minimize fall risk. DC inpatient rehab vs SNF pending progress.     Time Calculation:    PT Charges     Row Name 05/02/22 0933             Time Calculation    Start Time 0932  chart review 15 minutes total 35 min  -MS (r) JJ (t) MS (c)      Stop Time 0952  -MS (r) JJ (t) MS (c)      Time Calculation (min) 20 min  -MS (r) JJ (t)      PT Received On 05/02/22  -MS (r) JJ (t) MS (c)      PT Goal Re-Cert Due Date 05/12/22  -MS (r) JJ (t) MS (c)              Untimed Charges    PT Eval/Re-eval Minutes 35  -MS               Total Minutes    Untimed Charges Total Minutes 35  -MS       Total Minutes 35  -MS            User Key  (r) = Recorded By, (t) = Taken By, (c) = Cosigned By    Initials Name Provider Type    Yessenia Snider, PT, DPT, NCS Physical Therapist    Jonathan Steele, PT Student PT Student                  PT G-Codes  Outcome Measure Options: AM-PAC 6 Clicks Daily Activity (OT)  AM-PAC 6 Clicks Score (PT): 20  AM-PAC 6 Clicks Score (OT): 14    Jonathan Milian, RHONA Student  5/2/2022

## 2022-05-02 NOTE — PROGRESS NOTES
Palliative Care Daily Progress Note   Chief complaint: Chart review.     Code Status and Medical Interventions:   Ordered at: 04/18/22 1801     Level Of Support Discussed With:    Patient     Code Status (Patient has no pulse and is not breathing):    CPR (Attempt to Resuscitate)     Medical Interventions (Patient has pulse or is breathing):    Full Support     Subjective   Medical record reviewed. Events noted. Awaiting nursing facility placement per  notes.     Advance Care Planning     Goals of care: Ongoing.    The patient receives support from his daughter.  POA/Healthcare Surrogate - He has designated his daughter, Jennifer, as his POA.    Objective   Diagnostics: Reviewed      Intake/Output Summary (Last 24 hours) at 5/2/2022 1402  Last data filed at 5/2/2022 0932  Gross per 24 hour   Intake 960 ml   Output --   Net 960 ml     Current Facility-Administered Medications   Medication Dose Route Frequency Provider Last Rate Last Admin   • acetaminophen (TYLENOL) tablet 650 mg  650 mg Oral Q4H PRN Noel Page MD   650 mg at 04/28/22 2058    Or   • acetaminophen (TYLENOL) 160 MG/5ML solution 650 mg  650 mg Oral Q4H PRN Noel Page MD   650 mg at 04/21/22 2306    Or   • acetaminophen (TYLENOL) suppository 650 mg  650 mg Rectal Q4H PRN Noel Page MD       • cholecalciferol (VITAMIN D3) tablet 5,000 Units  5,000 Units Oral Daily Noel Page MD   5,000 Units at 05/02/22 0838   • cyclobenzaprine (FLEXERIL) tablet 5 mg  5 mg Oral TID PRN Janusz Cordero MD   5 mg at 04/28/22 0600   • DULoxetine (CYMBALTA) DR capsule 60 mg  60 mg Oral Nightly Noel Page MD   60 mg at 05/01/22 2004   • empagliflozin (JARDIANCE) tablet 10 mg  10 mg Oral Daily Raffaele Phan MD   10 mg at 05/02/22 0838   • furosemide (LASIX) tablet 80 mg  80 mg Oral Daily Chauncey Reese MD   80 mg at 05/02/22 0838   • lactulose solution 20 g  20 g Oral Daily Raffaele Phan MD   20 g at 05/02/22  0837   • lithium carbonate capsule 150 mg  150 mg Oral BID With Meals Noel Page MD   150 mg at 05/02/22 0838   • midodrine (PROAMATINE) tablet 5 mg  5 mg Oral TID AC Noel Page MD   5 mg at 05/02/22 1124   • ondansetron (ZOFRAN) tablet 4 mg  4 mg Oral Q6H PRN Noel Page MD        Or   • ondansetron (ZOFRAN) injection 4 mg  4 mg Intravenous Q6H PRN Noel Page MD       • pantoprazole (PROTONIX) EC tablet 40 mg  40 mg Oral BID AC Raffaele Phan MD   40 mg at 05/02/22 0539   • QUEtiapine (SEROquel) tablet 50 mg  50 mg Oral Nightly Raffaele Phan MD   50 mg at 05/01/22 2004   • sodium chloride 0.9 % flush 10 mL  10 mL Intravenous PRN Noel Page MD       • sodium chloride 0.9 % flush 10 mL  10 mL Intravenous Q12H Noel Page MD   10 mL at 05/02/22 0837        •  acetaminophen **OR** acetaminophen **OR** acetaminophen  •  cyclobenzaprine  •  ondansetron **OR** ondansetron  •  [COMPLETED] Insert peripheral IV **AND** sodium chloride    Patient status: Disease state: Controlled with current treatments.  Functional status: Palliative Performance Scale Score: Performance 50% based on the following measures: Ambulation: Mainly sit or lie down, Activity and Evidence of Disease: Unable to do any work, extensive evidence of disease, Self-Care: Considerable assistance required,  Intake: Normal or reduced, LOC: Full or confusion. Nutritional status: Albumin 3.90.Body mass index is 29.88 kg/m².     Impression/Problem List:  1.    Cerebral microvascular disease/vascular dementia  2.    Metabolic encephalopathy   3.    Agitation  4.    Anemia  5.    Chronic combined systolic and diastolic CHF  6.    Stage IIIb chronic kidney disease  7.    Bipolar disorder   8.    Hyponatremia  9.    Lactic acidosis  10.  Impaired mobility and activities of daily living  11.  Transaminitis  12.  Cardiomegaly  13.  Coronary artery disease (S/P CABG x2, multiple PCI's)  14.  Hepatic  cysts  15.  Calcified granuloma of lung  16.  Degenerative disc disease  17.  Hyperlipidemia  18.  S/P ablation of atrial fibrillation  19.  Obesity  20.  Abnormal CT of abdomen and pelvis (thickening of colon concerning for neoplasm)  21.  GI bleed  22.  Implantable cardioverter-defibrillator in place    Plans/Recommendations     1. Goals of care include CODE STATUS CPR with full support interventions.     2. Palliative care encounter  - Prognosis is guarded long-term secondary to cerebral microvascular disease/vascular dementia, metabolic encephalopathy, transaminitis, GI bleed, CHF, CKD CAD, bipolar disorder and other comorbidities listed above.  - Discussed medical priorities with Mr. Patel and his daughter/POA, Jennifer, multiple times throughout hospitalization including goals of care, treatment and discharge options.   - CODE STATUS as delineated above.   - Awaiting precert to SNF per  note.      3. Agitation/Restlessness  - Recommend to continue home medications including Cymbalta, Lithium and Seroquel.  - Seroquel has been titrated down to 50 mg per attending on 4/25/2022. If titrated up might consider repeating ECG to monitor for prolonged QT interval as last one on file was 4/18/2022.  - Continue to monitor effectiveness. Appears behaviors are more controlled on current regimen.    Thank you for allowing us to participate in patient's plan of care. Palliative Care Team will sign off. Please re-consult if needed.      Time spent: 20 minutes spent reviewing medical and medication records to perform chart review.    Suzy Zapata, APRN  5/2/2022

## 2022-05-02 NOTE — PLAN OF CARE
Goal Outcome Evaluation:  Plan of Care Reviewed With: (P) patient        Progress: (P) no change  Outcome Evaluation: (P) see note    SLP treatment completed. Pt alert and cooperative. Pt was oriented to person, place, and year. Pt completed simple two step commands with min-mod cues. Pt had no difficulty completing one step commands. Pt completed immediate recall tasks of related words. Pt was able to recall 3/3 related word with minimal cues. Pt required mod-max cues to recall unrelated word lists. Perseveration on previously recalled words observed.  Pt needed mod-max cues to complete divergent naming tasks. Pt required minimal cues to sustain attention to tasks. SLP will continue to follow.     Judie Francois, SLP Student

## 2022-05-02 NOTE — CASE MANAGEMENT/SOCIAL WORK
Continued Stay Note   [place]     Patient Name: Travis Patel  MRN: [id number]  Today's Date: 5/2/2022    Admit Date: 4/18/2022     Discharge Plan     Row Name 05/02/22 1124       Plan    Plan Comments Precert has been started at [facility]. Await insurance decision.               Discharge Codes    No documentation.               Expected Discharge Date and Time     Expected Discharge Date Expected Discharge Time    May 3, 2022             SELENA Dawn

## 2022-05-02 NOTE — CASE MANAGEMENT/SOCIAL WORK
Continued Stay Note   [place]     Patient Name: Travis Patel  MRN: [id number]  Today's Date: 5/2/2022    Admit Date: 4/18/2022     Discharge Plan     Row Name 05/02/22 1513       Plan    Plan Attempting to call daughter regarding IM medicare rights for discharge. Not able to leave voicemail, not set up for messages.  Copy of IM letter at patients bedside where he signed that he understood. MD also attempting to contact via phone for discussion of discharge.    Row Name 05/02/22 1786       Plan    Final Note Call report number is [phone]. Fax number for DC summary/scripts/covid result is [phone].    Row Name 05/02/22 8434       Plan    Final Note Precert is complete for [facility] and pt can dc today. Pt will need a new Covid test prior to dc. Informed pt of dc for today and that he would have to go by private car. Attempted to call dtr Cassi) [phone] but no answer and voicemail is not set up.    Row Name 05/02/22 1000       Plan    Final Discharge Disposition Code 03 - skilled nursing facility (SNF)               Discharge Codes    No documentation.               Expected Discharge Date and Time     Expected Discharge Date Expected Discharge Time    May 2, 2022             Georgia Horvath RN

## 2022-05-02 NOTE — CASE MANAGEMENT/SOCIAL WORK
Continued Stay Note  [facility]     Patient Name: Travis Patel  MRN: [id number]  Today's Date: 5/2/2022    Admit Date: 4/18/2022     Discharge Plan     Row Name 05/02/22 1445       Plan    Final Note Call report number is [phone]. Fax number for DC summary/scripts/covid result is [phone].    Row Name 05/02/22 1443       Plan    Final Note Precert is complete for [facility] and pt can dc today. Pt will need a new Covid test prior to dc. Informed pt of dc for today and that he would have to go by private car. Attempted to call dtr Cassi) [phone] but no answer and voicemail is not set up.    Row Name 05/02/22 1438       Plan    Final Discharge Disposition Code 03 - skilled nursing facility (SNF)               Discharge Codes    No documentation.               Expected Discharge Date and Time     Expected Discharge Date Expected Discharge Time    May 2, 2022             SELENA Dawn

## 2022-05-02 NOTE — PROGRESS NOTES
[facility] Medicine Services  INPATIENT PROGRESS NOTE    Length of Stay: 14  Date of Admission: 4/18/2022  Primary Care Physician: Shant Hayes MD    Subjective     Chief Complaint:     Generalized weakness    HPI     The patient is sitting in a chair at bedside.  He is alert and conversational.  He continues to work with physical therapy on a daily basis.  Ammonia level has increased to 45 this morning.  Because of the slight upward trend, lactulose will be increased to twice daily dosing.  The patient is medically stable, for transition to skilled facility for rehab and has been so for several days.  I have discussed placement with  and there is concern on the facilities part that the patient is not stable.  I have reassured her to inform the skilled nursing facility that the patient is absolutely stable for transfer and has been so for almost a week now.      Review of Systems     All pertinent negatives and positives are as above. All other systems have been reviewed and are negative unless otherwise stated.     Objective    Temp:  [97.6 °F (36.4 °C)-98.7 °F (37.1 °C)] 98 °F (36.7 °C)  Heart Rate:  [] 85  Resp:  [16-18] 16  BP: (102-119)/(62-76) 104/62    Lab Results (last 24 hours)     Procedure Component Value Units Date/Time    Basic Metabolic Panel [[id number]]  (Abnormal) Collected: 05/02/22 0730    Specimen: Blood Updated: 05/02/22 0805     Glucose 116 mg/dL      BUN 27 mg/dL      Creatinine 1.52 mg/dL      Sodium 131 mmol/L      Potassium 3.7 mmol/L      Chloride 94 mmol/L      CO2 25.0 mmol/L      Calcium 9.5 mg/dL      BUN/Creatinine Ratio 17.8     Anion Gap 12.0 mmol/L      eGFR 47.8 mL/min/1.73      Comment: National Kidney Foundation and American Society of Nephrology (ASN) Task Force recommended calculation based on the Chronic Kidney Disease Epidemiology Collaboration (CKD-EPI) equation refit without adjustment for race.       Narrative:       GFR Normal >60  Chronic Kidney Disease <60  Kidney Failure <15      Ammonia [[id number]]  (Normal) Collected: 05/02/22 0730    Specimen: Blood Updated: 05/02/22 0801     Ammonia 45 umol/L           Imaging Results (Last 24 Hours)     ** No results found for the last 24 hours. **             Intake/Output Summary (Last 24 hours) at 5/2/2022 1409  Last data filed at 5/2/2022 0932  Gross per 24 hour   Intake 960 ml   Output --   Net 960 ml       Physical Exam  Constitutional:       General: He is not in acute distress.     Appearance: He is not toxic-appearing.      Comments: Sitting up in bedside chair.  On room air.  HENT:      Head: Normocephalic.      Mouth/Throat:      Pharynx: No oropharyngeal exudate.   Eyes:      Pupils: Pupils are equal, round, and reactive to light.   Cardiovascular:      Rate and Rhythm: Normal rate.   Pulmonary:      Effort: Pulmonary effort is normal. No respiratory distress.   Abdominal:      General: There is no distension.      Tenderness: There is no abdominal tenderness. There is no guarding or rebound.   Musculoskeletal:         General: Swelling present.      Cervical back: Neck supple.      Right lower leg: Edema present.      Left lower leg: Edema present.   Skin:     General: Skin is warm.      Capillary Refill: Capillary refill takes less than 2 seconds.   Neurological:      General: No focal deficit present.      Mental Status: He is alert.      Cranial Nerves: No cranial nerve deficit.      Motor: Weakness present.   Psychiatric:         Mood and Affect: Mood normal.     Results Review:  I have reviewed the labs, radiology results, and diagnostic studies since my last progress note and made treatment changes reflective of the results.   I have reviewed the current medications.    Assessment/Plan     Active Hospital Problems    Diagnosis    • **Acute metabolic encephalopathy, due to hyperammonemia    • GI bleed    • S/P ablation of atrial fibrillation    • Chronic combined  systolic and diastolic CHF (congestive heart failure) (HCC)    • Elevated troponin, likely due to recent ablation    • Stage 3b chronic kidney disease (HCC)    • Bipolar 1 disorder, mixed, full remission (HCC)      Chronic lithium therapy     • Abnormal BUN-to-creatinine ratio    • Hyponatremia    • Obesity (BMI 30-39.9)    • Lactic acidosis    • Hyperlipidemia    • Chronic coronary artery disease      07/26/07:  Coronary artery disease involving > 3 vessels with more than 50%         • ICD (implantable cardioverter-defibrillator) in place        PLAN:  Continue PT/OT  Increase lactulose dosing to twice daily  SNF placement pending  Reassured  to inform SNF that the patient is absolutely medically stable for transition for rehab    Electronically signed by Jorge Luis Delgadillo DO, 05/02/22, 14:09 CDT.

## 2022-05-02 NOTE — PLAN OF CARE
Goal Outcome Evaluation:  Plan of Care Reviewed With: patient        Progress: improving  Outcome Evaluation: PT re-eval completed. Pt alert and oriented to person, time, and place with verbal prompts. Pt denies any pain or dizziness this morning. Pt has made slight improvements in strength, and activity tolerance noted by increase in amb distance. Pt still has difficulty in muscular and cardiovascular endurance noted by need for rest breaks after 40' of amb with CGA and RW. As pt fatigues deviations are more pronounced noted foot drag on the LLE and bilateral poor foot clearance increasing risk of falls. Pt will benefit from continued skill care to increase independence with functional mobility, amb distance, and minimize fall risk. DC inpatient rehab vs SNF pending progress.

## 2022-05-03 PROBLEM — G93.40 ENCEPHALOPATHY: Status: ACTIVE | Noted: 2022-01-01
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
## 2022-05-03 NOTE — PLAN OF CARE
Goal Outcome Evaluation:               Alert, oriented to self and place. VSS. NSR on tele. Daughter at bedside. Room air with sats maintained in 90s. Mepilex applied to skin tear on LUE. BLE/BUE swelling. MATTHEW harrington noted. R hand piv saline locked. Pt resting comfortably. Awaiting new orders. Call light in reach. Safety maintained.

## 2022-05-03 NOTE — ED NOTES
Pt in bed. No s/s distress noted. Denies any needs/pain/discomforts. Family at bedside. POC dicussed. Will cont monitoring. Advised to call any needs. Call light in reach.

## 2022-05-03 NOTE — ED NOTES
"Pt in bed. No s/s distress noted. Denies any needs/pain/discomfotrs. Pt repositioned more center than R side- asked if felt better, \"pt states \"well, not really\" but denies any discomfort. POC dicussed. Will cont monitoring. Advised to call any needs. Call light in reach.   "

## 2022-05-03 NOTE — ED NOTES
Pt in bed. No s/s distress noted. Denies any needs. Family at bedside. Pt repositioned in the bed. Tolerated well. POC dicussed. Will cont monitoring. Advised to call any needs. Call light in reach.

## 2022-05-03 NOTE — CONSULTS
Neurology Consult Note    Patient:  Travis Patel   YOB: 1945  MRN:  [id number]  Date of Admission:  5/3/2022  9:14 AM    Date: 5/3/2022    Referring Provider: Francisco Mei MD  Reason for Consultation: Confusiion      History of present illness:     This is a 76 y.o.  male.with H/O MI, Sleep apnea, Bipolar Disorder 1,  atrial fibrillation s/p ablation,chronic kidney disease, CAD s/p CABG x 2 in 2006 and PCI 2017, pacemaker/defibrillator implant,  hyperlipidemia, DM, GI bleed in June of 2021, former smoker (quit 2016) hypotension for which he was treated with midodrine, currently evaluated for increased confusion    There is a report that he did not receive his home meds after being discharge to SNF. Daughter is not here but in speaking with Dr Mei patient kept repeating the same question such as wondering where he was, how he got here and why and had no recall of being in the hospital yesterday    Head CT in ED did not reveal any acute changes    He was given lactulose in the ED    Patient had been recently admitted to [facility] from March 29 through April 16 2022 where he was hospitalized for shortness of breath, CHF was diuresed and underwent atrial fib ablation  and was discharged euvolemic and    Patient was admitted 4/18 here and found to have an elevated ammonia level diagnosed with hepatic encephalopathy and also with , acute on chronic CHF, GI bleed,  Hyponatremia. During that admission Neurology saw him. He had a sodium of 127, was anemic with hemoglobin 9.4 UDS + for tricyclic and NH3 of 82 but he had normal liver functions. He had become more agitated and required ICU admission. Lactulose was given.  He was discharged yesterday with ammonia of 45. Sodium of 131. BUN 27 and creat of 1.52.       Currently from a metabolic standpoint:  Hemoglobin is 8.6  BUN 28  Sodium 128  NH3 59  Past Medical History:   Diagnosis Date   • Bipolar 1 disorder (HCC)    • Diverticulitis    • Hernia  of abdominal wall    • Kidney disease    • Myocardial infarction (HCC) 02/2017   • Sleep apnea    • Stroke (HCC) 02/2017       Past Surgical History:   Procedure Laterality Date   • COLOSTOMY  2001    x 3 months   • COLOSTOMY REVISION     • CORONARY ARTERY BYPASS GRAFT      1991/1996   • HERNIA REPAIR      abdominal hernia x 3; poor healing with multiple revisions   • PACEMAKER IMPLANTATION  2017    w/ defibrillator   • ROTATOR CUFF REPAIR Right 2005       Prior to Admission medications    Medication Sig Start Date End Date Taking? Authorizing Provider   Cholecalciferol (vitamin D3) 125 MCG (5000 UT) tablet tablet Take 5,000 Units by mouth Daily.   Yes Scotty Peres MD   DULoxetine (CYMBALTA) 60 MG capsule Take 60 mg by mouth Every Night.   Yes Scotty Peres MD   empagliflozin (JARDIANCE) 10 MG tablet tablet Take 1 tablet by mouth Daily. 5/3/22  Yes Jorge Luis Delgadillo DO   fluticasone (FLONASE) 50 MCG/ACT nasal spray 2 sprays into the nostril(s) as directed by provider Daily As Needed for Rhinitis.   Yes Scotty Peres MD   folic acid (FOLVITE) 1 MG tablet Take 1 mg by mouth Daily.   Yes ProviderScotty MD   furosemide (LASIX) 80 MG tablet Take 1 tablet by mouth Daily. 5/3/22  Yes Jorge Luis Delgadillo DO   lactulose 20 GM/30ML solution solution Take 30 mL by mouth 2 (Two) Times a Day. 5/2/22  Yes Jorge Luis Delgadillo DO   lithium carbonate 150 MG capsule Take 150 mg by mouth 2 (Two) Times a Day With Meals.   Yes ProviderScotty MD   magnesium oxide (MAGOX) 400 (241.3 Mg) MG tablet tablet Take 400 mg by mouth Daily.   Yes Scotty Peres MD   melatonin 5 MG tablet tablet Take 5 mg by mouth Every Night.   Yes Scotty Peres MD   pantoprazole (PROTONIX) 40 MG EC tablet Take 40 mg by mouth Every Night.   Yes Scotty Peres MD   polyethylene glycol (MIRALAX) pack packet Take 17 g by mouth Daily. 9/21/18  Yes Angel Zheng MD   potassium chloride ER  (K-TAB) 20 MEQ tablet controlled-release ER tablet Take 30 mEq by mouth Daily.   Yes ProviderScotty MD   QUEtiapine (SEROquel) 50 MG tablet Take 1 tablet by mouth Every Night. 5/2/22  Yes Jorge Luis Delgadillo DO   vitamin C (ASCORBIC ACID) 250 MG tablet Take 1,000 mg by mouth Daily.   Yes Scotty Peres MD   zinc gluconate 50 MG tablet Take 50 mg by mouth Daily.   Yes Scotty Peres MD   midodrine (PROAMATINE) 5 MG tablet Take 1 tablet by mouth 3 (Three) Times a Day Before Meals. 5/2/22   Jorge Luis Delgadillo DO   nitroglycerin (NITROSTAT) 0.4 MG SL tablet Place 0.4 mg under the tongue Every 5 (Five) Minutes As Needed for Chest Pain. Take no more than 3 doses in 15 minutes.    ProviderScotty MD       Hospital scheduled medications:     Hospital PRN medications:      Allergies   Allergen Reactions   • Sulfa Antibiotics        Social History     Socioeconomic History   • Marital status: Legally    Tobacco Use   • Smoking status: Former Smoker     Packs/day: 1.00     Years: 40.00     Pack years: 40.00     Types: Cigarettes   • Smokeless tobacco: Never Used   Substance and Sexual Activity   • Alcohol use: No   • Drug use: No   • Sexual activity: Defer     Family History   Problem Relation Age of Onset   • Heart disease Brother    • Diabetes Brother    • Sleep apnea Brother        Review of Systems  A 14 point review of systems was reviewed and was negative except for pain in shoulders    Vital Signs   Temp:  [97.5 °F (36.4 °C)-98.2 °F (36.8 °C)] 97.5 °F (36.4 °C)  Heart Rate:  [65-74] 65  Resp:  [14-18] 18  BP: ()/(47-77) 96/57    General Exam:  Head:  Normal cephalic, atraumatic  HEENT:  Neck supple  Fundoscopic Exam:  No signs of disc edema  CVS:  Regular rate and rhythm.  No murmurs  Carotid Examination:  No bruits  Lungs:  Clear to auscultation  Abdomen:  Non-tender, Non-distended  Extremities:  No signs of peripheral edema  Skin:  No rashes    Neurologic Exam:    Mental  Status:    -Awake, Alert, Oriented to person, place year.  Knows he is in [facility] and that this is [facility]  He can name the name of the hospital. He smiles and is very friendly and cooperative  -Talking is minimal but he answers questions appropriately and does state he has no recall of recent admission.  -Follows simple  commands    CN II:  Visual fields full.  Pupils equally reactive to light  CN III, IV, VI:  Extraocular Muscles full with no signs of nystagmus  CN V:  Facial sensory is symmetric   CN VII:  Facial motor symmetric  CN VIII:  Gross hearing intact bilaterally  CN IX/X:  Palate elevates symmetrically  CN XI:  Shoulder shrug symmetric  CN XII:  Tongue is midline on protrusion    Motor: (strength out of 5:  1= minimal movement, 2 = movement in plane of gravity, 3 = movement against gravity, 4 = movement against some resistance, 5 = full strength)    -Right Upper Ext: Proximal: 5 Distal: 5  -Left Upper Ext: Proximal: 5 Distal: 5    -Right Lower Ext: Proximal: 5 Distal: 5  -Left Lower Ext: Proximal: 5 Distal: 5    DTR:  -Right   Bicep: 2+ Triceps: 2+ Brachioradialis: 2+   Patella: 2+ Ankle: 2+ Neg Babinski  -Left   Bicep: 2+ Triceps: 2+ Brachioradialis: 2+   Patella: 2+ Ankle: 2+ Neg Babinski    Sensory:  -Intact to light touch, pinprick, temperature, pain, and proprioception    Coordination:  -Finger to nose intact  -Heel to shin intact  -No ataxia    Gait  -No signs of ataxia  -ambulates unassisted      Results Review:  Lab Results (last 7 days)     Procedure Component Value Units Date/Time    COVID PRE-OP / PRE-PROCEDURE SCREENING ORDER (NO ISOLATION) - Swab, Nasal Cavity [[id number]]  (Normal) Collected: 05/03/22 1529    Specimen: Swab from Nasal Cavity Updated: 05/03/22 1609    Narrative:      The following orders were created for panel order COVID PRE-OP / PRE-PROCEDURE SCREENING ORDER (NO ISOLATION) - Swab, Nasal Cavity.  Procedure                               Abnormality         Status                      ---------                               -----------         ------                     COVID-19,[facility] IN-R[facility]...[[id number]]  Normal              Final result                 Please view results for these tests on the individual orders.    COVID-19,[facility] IN-HOUSE,Nasal Swab No Transport Media 3-4 HR TAT - Swab, Nasal Cavity [[id number]]  (Normal) Collected: 05/03/22 1529    Specimen: Swab from Nasal Cavity Updated: 05/03/22 1609     COVID19 Not Detected    Narrative:      Fact sheet for providers: https://www.fda.gov/media/938549/download     Fact sheet for patients: https://www.fda.gov/media/871661/download    Test performed by PCR.    Consider negative results in combination with clinical observations, patient history, and epidemiological information.    Lithium Level [[id number]]  (Normal) Collected: 05/03/22 0952    Specimen: Blood Updated: 05/03/22 1244     Lithium 0.7 mmol/L     POC Occult Blood Stool [[id number]]  (Abnormal) Collected: 05/03/22 1146    Specimen: Stool Updated: 05/03/22 1147     Fecal Occult Blood Positive     Lot Number \215\     Expiration Date \10/31/2022\     DEVELOPER LOT NUMBER \215\     DEVELOPER EXPIRATION DATE \215\     Positive Control Positive     Negative Control Negative    Ammonia [[id number]]  (Normal) Collected: 05/03/22 1042    Specimen: Blood Updated: 05/03/22 1120     Ammonia 59 umol/L     Protime-INR [[id number]]  (Abnormal) Collected: 05/03/22 1028    Specimen: Blood Updated: 05/03/22 1057     Protime 16.9 Seconds      INR 1.43    CBC & Differential [[id number]]  (Abnormal) Collected: 05/03/22 1028    Specimen: Blood Updated: 05/03/22 1050    Narrative:      The following orders were created for panel order CBC & Differential.  Procedure                               Abnormality         Status                     ---------                               -----------         ------                     CBC Auto Differential[[id number]]        Abnormal             Final result                 Please view results for these tests on the individual orders.    CBC Auto Differential [[id number]]  (Abnormal) Collected: 05/03/22 1028    Specimen: Blood Updated: 05/03/22 1050     WBC 9.60 10*3/mm3      RBC 3.16 10*6/mm3      Hemoglobin 8.6 g/dL      Hematocrit 28.7 %      MCV 90.8 fL      MCH 27.2 pg      MCHC 30.0 g/dL      RDW 23.3 %      RDW-SD 77.5 fl      MPV 9.9 fL      Platelets 157 10*3/mm3      Neutrophil % 88.8 %      Lymphocyte % 5.1 %      Monocyte % 5.3 %      Eosinophil % 0.1 %      Basophil % 0.2 %      Immature Grans % 0.5 %      Neutrophils, Absolute 8.52 10*3/mm3      Lymphocytes, Absolute 0.49 10*3/mm3      Monocytes, Absolute 0.51 10*3/mm3      Eosinophils, Absolute 0.01 10*3/mm3      Basophils, Absolute 0.02 10*3/mm3      Immature Grans, Absolute 0.05 10*3/mm3      nRBC 0.0 /100 WBC     Comprehensive Metabolic Panel [[id number]]  (Abnormal) Collected: 05/03/22 0952    Specimen: Blood Updated: 05/03/22 1037     Glucose 106 mg/dL      BUN 28 mg/dL      Creatinine 1.73 mg/dL      Sodium 128 mmol/L      Potassium 4.6 mmol/L      Chloride 90 mmol/L      CO2 20.0 mmol/L      Calcium 9.2 mg/dL      Total Protein 5.5 g/dL      Albumin 3.90 g/dL      ALT (SGPT) 114 U/L      AST (SGOT) 46 U/L      Alkaline Phosphatase 162 U/L      Total Bilirubin 2.6 mg/dL      Globulin 1.6 gm/dL      A/G Ratio 2.4 g/dL      BUN/Creatinine Ratio 16.2     Anion Gap 18.0 mmol/L      eGFR 40.9 mL/min/1.73      Comment: National Kidney Foundation and American Society of Nephrology (ASN) Task Force recommended calculation based on the Chronic Kidney Disease Epidemiology Collaboration (CKD-EPI) equation refit without adjustment for race.       Narrative:      GFR Normal >60  Chronic Kidney Disease <60  Kidney Failure <15            .  Imaging Results (Last 24 Hours)     Procedure Component Value Units Date/Time    CT Head Without Contrast [[id number]] Collected: 05/03/22 1306     Updated:  05/03/22 1312    Narrative:      EXAMINATION:   CT HEAD WO CONTRAST-  5/3/2022 1:06 PM CDT     HISTORY: CT BRAIN without contrast 5/3/2022 12:41 PM CDT     HISTORY: Patient fell     COMPARISON: April 20, 2022      DLP: 309 mGy cm     TECHNIQUE: Serial axial tomographic images of the brain were obtained  without the use of intravenous contrast.      FINDINGS:   The midline structures are nondisplaced. There is moderate cerebral and  cerebellar volume loss, with an associated increase in the prominence of  the ventricles and sulci. The basilar cisterns are normal in size and  configuration. There is no evidence of intracranial hemorrhage or  mass-effect. There is low attenuation in the periventricular white  matter, consistent with chronic ischemic change. There are no abnormal  extra-axial fluid collections. There is no evidence of tonsillar  herniation.      The included orbits and their contents are unremarkable. Mucous cyst is  present in the right sphenoid air cell. Ethmoid and frontal air cells  are unremarkable. Mastoid air cells are pneumatized.. The visualized  osseous structures and overlying soft tissues of the skull and face are  intact.        Impression:      Moderate cerebral and cerebellar volume loss with chronic microvascular  disease but no evidence of acute intracranial process.        This report was finalized on 05/03/2022 13:09 by Dr. Josep Pittman MD.              Impression  1. Episode of confusion that has since markedly improved. He did receive a dose of lactulose but if this confusion has been episodic we should consider subclinical seizures especially since he had no recall of getting here.   2. Strongly suspect there is an underlying cognitive issue such as dementia (previous conversation with daughter in his last admission indicated this) but with all the metabolic issues we are not able to definitively make this diagnosis   3. In addition he has untreated DIOGO which also will cause  cognitive issues that would improve if treated.   4. Anemia with fecal occult blood + on background of previous known GI bleed  5. Hyponatremia  6. H/O hyperammonemia but currently 59  7. CKD   8. Cerebral microvascular disease    Plan    EEG  · Consider MRI of brain ideally with and without (may get improved renal function by tomorrow and if not then get MRI without contrast) At least as of right now he seems he would be able to sit still for a MRI  · Avoid benadryl  · Avoid benzodiazepines  · Continue Seroquel    I discussed the patients findings and my recommendations with patient, nursing staff and Dr Sigifredo Guevara MD  05/03/22  16:13 CDT

## 2022-05-03 NOTE — ED PROVIDER NOTES
Subjective   Patient is a 76-year-old gentleman who was recently discharged from the hospital with diagnosed hepatic encephalopathy acute on chronic congestive heart failure among other diagnoses at the nursing home he was not given his home medications he is getting more confused and encephalopathic and came back to the ED.  The patient currently is awake alert but confused and somewhat agitated.  There is no history any trauma no history any falls.  He does have underlying psychiatric disorder and as per the EMS and the nursing home notes his current mental status is more or less at his baseline except for some increased confusion no history any falls      History provided by:  EMS personnel  History limited by:  Mental status change  Altered Mental Status  Presenting symptoms: behavior changes and confusion    Severity:  Moderate  Most recent episode:  Today  Episode history:  Unable to specify  Progression:  Waxing and waning  Chronicity:  Recurrent  Context: not taking medications as prescribed, nursing home resident, recent change in medication and recent illness    Context: not alcohol use, not dementia, not drug use and not head injury    Associated symptoms: agitation    Associated symptoms: no abdominal pain, no decreased appetite, no depression, no fever, no light-headedness, no rash, no seizures, no vomiting and no weakness        Review of Systems   Unable to perform ROS: Mental status change   Constitutional: Negative for decreased appetite and fever.   Gastrointestinal: Negative for abdominal pain and vomiting.   Skin: Negative for rash.   Neurological: Negative for seizures, weakness and light-headedness.   Psychiatric/Behavioral: Positive for agitation and confusion.       Past Medical History:   Diagnosis Date   • Bipolar 1 disorder (HCC)    • Diverticulitis    • Hernia of abdominal wall    • Kidney disease    • Myocardial infarction ([facility]) 02/2017   • Sleep apnea    • Stroke ([facility]) 02/2017        Allergies   Allergen Reactions   • Sulfa Antibiotics        Past Surgical History:   Procedure Laterality Date   • COLOSTOMY  2001    x 3 months   • COLOSTOMY REVISION     • CORONARY ARTERY BYPASS GRAFT      1991/1996   • HERNIA REPAIR      abdominal hernia x 3; poor healing with multiple revisions   • PACEMAKER IMPLANTATION  2017    w/ defibrillator   • ROTATOR CUFF REPAIR Right 2005       Family History   Problem Relation Age of Onset   • Heart disease Brother    • Diabetes Brother    • Sleep apnea Brother        Social History     Socioeconomic History   • Marital status: Legally    Tobacco Use   • Smoking status: Former Smoker     Packs/day: 1.00     Years: 40.00     Pack years: 40.00     Types: Cigarettes   • Smokeless tobacco: Never Used   Substance and Sexual Activity   • Alcohol use: No   • Drug use: No   • Sexual activity: Defer           Objective   Physical Exam  Vitals and nursing note reviewed. Exam conducted with a chaperone present.   Constitutional:       General: He is awake.      Appearance: Normal appearance. He is well-developed. He is not ill-appearing.   HENT:      Head: Normocephalic and atraumatic.   Eyes:      General: Lids are normal. No visual field deficit.     Conjunctiva/sclera: Conjunctivae normal.      Pupils: Pupils are equal, round, and reactive to light.   Cardiovascular:      Rate and Rhythm: Normal rate and regular rhythm.      Chest Wall: PMI is not displaced.      Pulses: Normal pulses.      Heart sounds: Normal heart sounds.   Pulmonary:      Effort: Pulmonary effort is normal.      Breath sounds: Normal breath sounds. No decreased breath sounds.   Abdominal:      General: Abdomen is flat. Bowel sounds are normal.      Palpations: Abdomen is soft.      Tenderness: There is no abdominal tenderness.      Comments: Facial bruising of the lower abdomen on the left side no guarding or rebound   Musculoskeletal:         General: Normal range of motion.      Cervical  back: Full passive range of motion without pain, normal range of motion and neck supple.   Skin:     General: Skin is warm and dry.      Capillary Refill: Capillary refill takes less than 2 seconds.   Neurological:      General: No focal deficit present.      Mental Status: He is alert. He is disoriented and confused.      GCS: GCS eye subscore is 4. GCS verbal subscore is 4. GCS motor subscore is 6.      Cranial Nerves: Cranial nerves are intact. No dysarthria.      Motor: Motor function is intact. No weakness, tremor or atrophy.      Deep Tendon Reflexes: Reflexes are normal and symmetric.      Comments: No significant asterixis   Psychiatric:         Behavior: Behavior is cooperative.         Procedures           ED Course  ED Course as of 05/03/22 1514   Tue May 03, 2022   1251 I have discussed this case with the patient's daughter and have quite a much better picture of what is going on so this gentleman was at [facility] recently because his congestive heart failure physicians are there there was no mention of liver disease at that time.  Then he was sent back home because of confusion was seen in our ER and then admitted for hepatic encephalopathy and GI bleeding the patient's daughter states that she has had scopes done at [facility] which were negative.  In our hospital he was given lactulose and had bloody stool every time he was given lactulose and then it resolves therefore nothing else was done so as far as his liver disease is concerned this something you the patient does not any history of alcohol use.  He has trace heme positive from below.  Hemodynamic stable still encephalopathic [TS]   1258 Case discussed lp discussed with the daughter  [TS]   8942 I have discussed this patient with the hospitalist who took care of him in the hospital [TS]   1434 The patient's daughter states that the patient was doing well and this is last couple of admissions and now he is confused encephalopathic and delirious which  he is he is got a slightly low sodium today with a low bicarbonate and anion gap of 18.  Hemoglobin has dropped also slightly.  The patient's daughter states that she does not want to be admitted to the hospitalist service the patient was supposed to see Dr. Gonzales midlevel she wants us to call them and see if they will put him on the hospital gentleman may require further testing and evaluation ammonia is not a good indicator hepatic encephalopathy [TS]   1503 Patient has a multiple lab abnormalities there were liver disease is definitely something new when you review the patient's prior charts but he is got history of bipolar disorder congestive heart failure renal insufficiency and all those are old.  He does have anion gap and low bicarbonate but that is probably because of renal insufficiency.  He is got a baseline low hemoglobin with trace heme positive stool recent history of GI bleeding.  Ammonia level is within normal limits at ammonia levels do not correlate well with hepatic encephalopathy.  Possibility of infiltrative disease giving rise to renal failure liver failure etc. cannot be ruled out i.e. hemochromatosis versus copper deposition disease etc. which will require further evaluation and testing.  As per the admitting physicians recommendations. [TS]   1508 The family does not want to be admitted to the hospital service I have discussed this case with Dr. Gonzales who graciously except the patient is admitted to his service for further evaluation [TS]      ED Course User Index  [TS] Francisco Mei MD                                                 [place]    Final diagnoses:   Encephalopathy   Altered mental status, unspecified altered mental status type   Hyponatremia   Chronic kidney disease, unspecified CKD stage   Chronic liver disease   Anemia, unspecified type   Gastrointestinal hemorrhage, unspecified gastrointestinal hemorrhage type   Increased anion gap metabolic acidosis       ED  Disposition  ED Disposition     ED Disposition   Decision to Admit    Condition   --    Comment   Level of Care: Telemetry [5]   Diagnosis: Encephalopathy [[id number]]   Admitting Physician: LEIGHANN CARLTON [[id number]]   Attending Physician: LEIGHANN CARLTON [[id number]]   Certification: I Certify That Inpatient Hospital Services Are Medically Necessary For Greater Than 2 Midnights               No follow-up provider specified.       Medication List      No changes were made to your prescriptions during this visit.          Francisoc Mei MD  05/03/22 1510

## 2022-05-03 NOTE — THERAPY DISCHARGE NOTE
Acute Care - Occupational Therapy Discharge Summary  [facility]     Patient Name: Travis Patel  : 1945  MRN: [id number]    Today's Date: 5/3/2022                 Admit Date: 2022        OT Recommendation and Plan    Visit Dx:    ICD-10-CM ICD-9-CM   1. Hepatic encephalopathy (HCC)  K72.90 572.2   2. Dysphagia, unspecified type  R13.10 787.20   3. Impaired mobility  Z74.09 799.89   4. Impaired mobility and ADLs  Z74.09 V49.89    Z78.9    5. Impaired cognition  R41.89 294.9                OT Rehab Goals     Row Name 22 0700             Dressing Goal 1 (OT)    Activity/Device (Dressing Goal 1, OT) lower body dressing  -TS      [place]/Cues Needed (Dressing Goal 1, OT) minimum assist (75% or more patient effort)  -TS      Time Frame (Dressing Goal 1, OT) long term goal (LTG);by discharge  -TS      Progress/Outcome (Dressing Goal 1, OT) goal not met  -TS              Toileting Goal 1 (OT)    Activity/Device (Toileting Goal 1, OT) toileting skills, all  -TS      [place] Level/Cues Needed (Toileting Goal 1, OT) supervision required  -TS      Time Frame (Toileting Goal 1, OT) long term goal (LTG);by discharge  -TS      Progress/Outcome (Toileting Goal 1, OT) goal not met  -TS              Executive Function Goal 1 (OT)    Activity (Executive Function Goal 1, OT) information processing tasks;organization/sequencing tasks;problem-solving/reasoning tasks;self-monitoring/self-correction  -TS      [place]/Cues/Accuracy (Executive Function Goal 1, OT) with minimum;verbal cues/redirection;with 90% accuracy  -TS      Time Frame (Executive Function Goal 1, OT) long term goal (LTG);10 days  -TS      Progress/Outcome (Executive Function Goal 1, OT) goal not met  -TS            User Key  (r) = Recorded By, (t) = Taken By, (c) = Cosigned By    Initials Name Provider Type Discipline    TS Loretta Barr COTA Occupational Therapist Assistant OT                    Timed Therapy Charges  Total  Units: 4    Charges  Total Units: 4    Procedure Name Documented Minutes Units Code    HC OT SELF CARE/MGMT/TRAIN EA 15 MIN 60  4    82595 (CPT®)               Documented Minutes  Total Minutes: 60    Therapy Provided Minutes    21727 - OT Self Care/Mgmt Minutes 60                    OT Discharge Summary  Anticipated Discharge Disposition (OT): skilled nursing facility  Reason for Discharge: Discharge from facility  Outcomes Achieved: Refer to plan of care for updates on goals achieved  Discharge Destination: SNF      GINNY Da Silva  5/3/2022

## 2022-05-03 NOTE — ED NOTES
Pt in bed. No s/s distress noted. Family at bedside. Pt requested to void. Assisted with urinal. Tolerated well. POC dicussed. Will cont monitoring. Advised to call any needs. Call light in reach.

## 2022-05-03 NOTE — ED NOTES
Pt's daughter arrives to bedside. Pt recognizes pt. POC discussed. Informed daughter that will inform MD. Verbalized understanding.

## 2022-05-03 NOTE — ED NOTES
"Pt in bed. No s/s distress noted. Denies pain/discomforts/needs. Asmt completed. Pt placed on continuous monitoring. Per EMS report nursing facility was unable to give pt his routine meds (lithium) due to not available tin the night box. States pt was admitted there form [facility] yesterday evening. Per EMS nursing home states pt is at baseline \"just more agitated.\" Pt is calm and following all commands. AAOxself and aware in hospital. Reoriented. Pt has no c/o. Multiple what appears as healing bruising to extremitates. EMS also states \"they said he fell this morning while they were trying to get him up, but denied injuries. Denied head injury. Pt has skin tear to yanci upper arms. L wrist, and yanci elbows. POC discussed. Verbalized understanding. Will cont monitoring. Call light in reach. Advised to call any needs.   "

## 2022-05-03 NOTE — ED NOTES
Pt in bed. Alert to verbal stimuli. No s/s distress noted. Denies any needs/pain/discomforts. Pt repositioned in bed. POC dicussed. Will cont monitoring. Advised to call any needs. Call light in reach.

## 2022-05-03 NOTE — ED NOTES
Pt in bed. No s/s distress noted. Denies any pain/discomforts. Family at bedside. Pt requested ice chips- given. POC dicussed. Will cont monitoring. Advised to call any needs. Call light in reach.

## 2022-05-03 NOTE — THERAPY DISCHARGE NOTE
Acute Care - Physical Therapy Discharge Summary  [facility]       Patient Name: Travis Patel  : 1945  MRN: [id number]    Today's Date: 5/3/2022                 Admit Date: 2022      PT Recommendation and Plan    Visit Dx:    ICD-10-CM ICD-9-CM   1. Hepatic encephalopathy (HCC)  K72.90 572.2   2. Dysphagia, unspecified type  R13.10 787.20   3. Impaired mobility  Z74.09 799.89   4. Impaired mobility and ADLs  Z74.09 V49.89    Z78.9    5. Impaired cognition  R41.89 294.9                PT Rehab Goals     Row Name 22             Bed Mobility Goal 1 (PT)    Activity/Assistive Device (Bed Mobility Goal 1, PT) sit to supine/supine to sit  -AB      [place] Level/Cues Needed (Bed Mobility Goal 1, PT) standby assist  -AB      Time Frame (Bed Mobility Goal 1, PT) long term goal (LTG);10 days  -AB      Progress/Outcomes (Bed Mobility Goal 1, PT) goal met  -AB              Transfer Goal 1 (PT)    Activity/Assistive Device (Transfer Goal 1, PT) sit-to-stand/stand-to-sit;bed-to-chair/chair-to-bed  -AB      [place] Level/Cues Needed (Transfer Goal 1, PT) standby assist  -AB      Time Frame (Transfer Goal 1, PT) long term goal (LTG);10 days  -AB      Progress/Outcome (Transfer Goal 1, PT) goal met  -AB              Gait Training Goal 1 (PT)    Activity/Assistive Device (Gait Training Goal 1, PT) gait (walking locomotion);decrease fall risk;improve balance and speed;increase endurance/gait distance;increase energy conservation  -AB      [place] Level (Gait Training Goal 1, PT) standby assist  -AB      Distance (Gait Training Goal 1, PT) 75'  -AB      Time Frame (Gait Training Goal 1, PT) long term goal (LTG);10 days  -AB      Progress/Outcome (Gait Training Goal 1, PT) goal not met  -AB              Problem Specific Goal 1 (PT)    Problem Specific Goal 1 (PT) Pt will perform 5x STS with or without AD in less than 16 seconds  -AB      Time Frame (Problem Specific Goal 1, PT) long-term goal  (LTG)  -AB      Progress/Outcome (Problem Specific Goal 1, PT) goal not met  -AB            User Key  (r) = Recorded By, (t) = Taken By, (c) = Cosigned By    Initials Name Provider Type Discipline    Amira Henao, PTA Physical Therapist Assistant PT                    PT Discharge Summary  Anticipated Discharge Disposition (PT): skilled nursing facility, inpatient rehabilitation facility  Reason for Discharge: Discharge from facility  Outcomes Achieved: Refer to plan of care for updates on goals achieved  Discharge Destination: SNF      Amira William, ESPERANZA   5/3/2022

## 2022-05-04 NOTE — THERAPY EVALUATION
Patient Name: Travis Patel  : 1945    MRN: [id number]                              Today's Date: 2022       Admit Date: 5/3/2022    Visit Dx:     ICD-10-CM ICD-9-CM   1. Encephalopathy  G93.40 348.30   2. Altered mental status, unspecified altered mental status type  R41.82 780.97   3. Hyponatremia  E87.1 276.1   4. Chronic kidney disease, unspecified CKD stage  N18.9 585.9   5. Chronic liver disease  K76.9 571.9   6. Anemia, unspecified type  D64.9 285.9   7. Gastrointestinal hemorrhage, unspecified gastrointestinal hemorrhage type  K92.2 578.9   8. Increased anion gap metabolic acidosis  E87.2 276.2   9. Impaired mobility  Z74.09 799.89     Patient Active Problem List   Diagnosis   • Subarachnoid hemorrhage (HCC)   • Chronic coronary artery disease   • Hyperlipidemia   • ICD (implantable cardioverter-defibrillator) in place   • Orthostatic hypotension   • Syncope   • Acute metabolic encephalopathy, due to hyperammonemia   • S/P ablation of atrial fibrillation   • Chronic combined systolic and diastolic CHF (congestive heart failure) ([facility])   • Elevated troponin, likely due to recent ablation   • Stage 3b chronic kidney disease (HCC)   • Bipolar 1 disorder, mixed, full remission ([facility])   • Abnormal BUN-to-creatinine ratio   • Hyponatremia   • Obesity (BMI 30-39.9)   • Lactic acidosis   • GI bleed   • Encephalopathy     Past Medical History:   Diagnosis Date   • Bipolar 1 disorder (HCC)    • Diverticulitis    • Hernia of abdominal wall    • Kidney disease    • Myocardial infarction (HCC) 2017   • Sleep apnea    • Stroke ([facility]) 2017     Past Surgical History:   Procedure Laterality Date   • COLOSTOMY  2001    x 3 months   • COLOSTOMY REVISION     • CORONARY ARTERY BYPASS GRAFT         • HERNIA REPAIR      abdominal hernia x 3; poor healing with multiple revisions   • PACEMAKER IMPLANTATION      w/ defibrillator   • ROTATOR CUFF REPAIR Right       General Information     Row Name  05/04/22 [facility]          Physical Therapy Time and Intention    Document Type evaluation;other (see comments)  CC: AMS Dx: cirrhosis Hx: MI, DIOGO, bipolar I, A-fib s/p ablation, CKD, CAD s/p CABG x2, T2DM, hepatic encephalopathy  -KIM (r) AS (t) IKM (c)     Mode of Treatment physical therapy  -KIM (r) AS (t) KIM (c)     Row Name 05/04/22 [facility]          General Information    Patient Profile Reviewed yes  -KIM (r) AS (t) KIM (c)     Prior Level of Function independent:;all household mobility;bed mobility;ADL's;using stairs;dependent:;driving  -KIM (r) AS (t) KIM (c)     Existing Precautions/Restrictions fall  -KIM (r) AS (t) KIM (c)     Barriers to Rehab medically complex;previous functional deficit  -KIM (r) AS (t) KIM (c)     Row Name 05/04/22 [facility]          Living Environment    People in Home facility resident  -KIM (r) AS (t) KIM (c)     Name(s) of People in Home [facility]  -KIM (r) AS (t) KIM (c)     [facility] Name 05/04/22 [facility]          Home Main Entrance    Number of Stairs, Main Entrance three  -KIM (r) AS (t) KIM (c)     Stair Railings, Main Entrance none  -KIM (r) AS (t) KIM (c)     Row Name 05/04/22 [facility]          Stairs Within Home, Primary    Number of Stairs, Within Home, Primary none  -KIM (r) AS (t) KIM (c)     Stair Railings, Within Home, Primary none  -KIM (r) AS (t) KIM (c)     [facility] Name 05/04/22 [facility]          Cognition    Orientation Status (Cognition) oriented x 4;other (see comments)  pt unable to state current month. Pt with some difficulty understanding questions and commands on eval.  -KIM (r) AS (t) KIM (c)     [facility] Name 05/04/22 [facility]          Safety Issues, Functional Mobility    Safety Issues Affecting Function (Mobility) ability to follow commands;awareness of need for assistance;insight into deficits/self-awareness;positioning of assistive device;problem-solving  -KIM (r) AS (t) KIM (c)     Impairments Affecting Function (Mobility) balance;endurance/activity tolerance;shortness of breath;sensation/sensory awareness  -KIM  (r) AS (t) KIM (c)           User Key  (r) = Recorded By, (t) = Taken By, (c) = Cosigned By    Initials Name Provider Type    Clarence Wayne, PT DPT Physical Therapist    AS Eleazar Ravi, PT Student PT Student               Mobility     Row Name 05/04/22 1315          Bed Mobility    Bed Mobility supine-sit  -KIM (r) AS (t) KIM (c)     Supine-Sit [place] (Bed Mobility) supervision  -KIM (r) AS (t) KIM (c)     Row Name 05/04/22 1315          Sit-Stand Transfer    Sit-Stand [place] (Transfers) contact guard  -KIM (r) AS (t) KIM (c)     Assistive Device (Sit-Stand Transfers) walker, front-wheeled  -KIM (r) AS (t) KIM (c)     Comment, (Sit-Stand Transfer) pt did not begin using RW upon standing without cueing  -KIM (r) AS (t) KIM (c)     Row Name 05/04/22 1315          Gait/Stairs (Locomotion)    [place] Level (Gait) contact guard  -KIM (r) AS (t) KIM (c)     Assistive Device (Gait) walker, front-wheeled  -KIM (r) AS (t) KIM (c)     Distance in Feet (Gait) 85 ft reporting SOA and fatigue. pt able to avoid obstacles, but unable to read 3 digit numbers on walls. He could read them one digit at a time.  -KIM     Deviations/Abnormal Patterns (Gait) base of support, wide;dang decreased;gait speed decreased;stride length decreased  -KIM (r) AS (t) KIM (c)     Bilateral Gait Deviations forward flexed posture  -KIM (r) AS (t) KIM (c)     [place] Level (Stairs) not tested  -KIM (r) AS (t) KIM (c)           User Key  (r) = Recorded By, (t) = Taken By, (c) = Cosigned By    Initials Name Provider Type    Clarence Wayne, PT DPT Physical Therapist    AS Eleazar Ravi, PT Student PT Student               Obj/Interventions     Row Name 05/04/22 1315          Range of Motion Comprehensive    General Range of Motion bilateral lower extremity ROM WFL  -KIM (r) AS (t) KIM (c)     [facility] Name 05/04/22 1315          Strength Comprehensive (MMT)    General Manual Muscle Testing (MMT) Assessment no strength deficits identified  -KIM (r) AS  "(t) KIM (c)     Comment, General Manual Muscle Testing (MMT) Assessment BLE MMT 5/5  -KIM (r) AS (t) KIM (c)     Row Name 05/04/22 1766          Balance    Balance Assessment sitting static balance;sitting dynamic balance;standing static balance;standing dynamic balance  -KIM (r) AS (t) KIM (c)     Static Sitting Balance standby assist  -KIM (r) AS (t) KIM (c)     Dynamic Sitting Balance standby assist  -KIM (r) AS (t) KIM (c)     Position, Sitting Balance unsupported;sitting edge of bed  -KIM (r) AS (t) KIM (c)     Static Standing Balance contact guard  -KIM (r) AS (t) KIM (c)     Dynamic Standing Balance contact guard  -KIM (r) AS (t) KIM (c)     Position/Device Used, Standing Balance unsupported;walker, front-wheeled  -KIM (r) AS (t) KIM (c)     Row Name 05/04/22 7052          Sensory Assessment (Somatosensory)    Sensory Assessment (Somatosensory) LE sensation intact;other (see comments)  pt reports that foot sensation is \"different\" than leg sensation and reports constant non-painful tingling in his feet  -KIM (r) AS (t) KIM (c)           User Key  (r) = Recorded By, (t) = Taken By, (c) = Cosigned By    Initials Name Provider Type    Clarence Wayen, PT DPT Physical Therapist    AS Eleazar Ravi, PT Student PT Student               Goals/Plan     Row Name 05/04/22 4380          Bed Mobility Goal 1 (PT)    Activity/Assistive Device (Bed Mobility Goal 1, PT) bridging;rolling to left;rolling to right;scooting;sit to supine/supine to sit  -KIM (r) AS (t) KIM (c)     [place] Level/Cues Needed (Bed Mobility Goal 1, PT) independent  -KIM (r) AS (t) KIM (c)     Time Frame (Bed Mobility Goal 1, PT) long term goal (LTG);10 days  -KIM (r) AS (t) KIM (c)     Progress/Outcomes (Bed Mobility Goal 1, PT) goal ongoing  -KIM (r) AS (t) KIM (c)     Row Name 05/04/22 1104          Transfer Goal 1 (PT)    Activity/Assistive Device (Transfer Goal 1, PT) sit-to-stand/stand-to-sit;bed-to-chair/chair-to-bed;walker, rolling  -KIM (r) AS (t) KIM (c)     " [place] Level/Cues Needed (Transfer Goal 1, PT) supervision required  -KIM (r) AS (t) KIM (c)     Time Frame (Transfer Goal 1, PT) long term goal (LTG);10 days  -KIM (r) AS (t) KIM (c)     Progress/Outcome (Transfer Goal 1, PT) goal ongoing  -KIM (r) AS (t) KIM (c)     Row Name 05/04/22 1315          Gait Training Goal 1 (PT)    Activity/Assistive Device (Gait Training Goal 1, PT) gait (walking locomotion);decrease fall risk;improve balance and speed;increase endurance/gait distance;turning, left;turning, right;walker, rolling  -KIM (r) AS (t) KIM (c)     [place] Level (Gait Training Goal 1, PT) supervision required  -KIM (r) AS (t) KIM (c)     Distance (Gait Training Goal 1, PT) pt will walk 75ft with 3 turns and dual tasking  -KIM (r) AS (t) KIM (c)     Time Frame (Gait Training Goal 1, PT) long term goal (LTG);10 days  -KIM (r) AS (t) KIM (c)     Progress/Outcome (Gait Training Goal 1, PT) goal ongoing  -KIM (r) AS (t) KIM (c)     Row Name 05/04/22 2595          Therapy Assessment/Plan (PT)    Planned Therapy Interventions (PT) balance training;bed mobility training;gait training;home exercise program;patient/family education;postural re-education;stair training;strengthening;transfer training  -KIM (r) AS (t) KIM (c)           User Key  (r) = Recorded By, (t) = Taken By, (c) = Cosigned By    Initials Name Provider Type    Clarence Wayne, PT DPT Physical Therapist    AS Eleazar Ravi, PT Student PT Student               Clinical Impression     Row Name 05/04/22 1315          Pain    Pretreatment Pain Rating 0/10 - no pain  -KIM (r) AS (t) KIM (c)     Posttreatment Pain Rating 0/10 - no pain  -KIM (r) AS (t) KIM (c)     Row Name 05/04/22 1315          Plan of Care Review    Plan of Care Reviewed With patient;daughter  -KIM (r) AS (t) KIM (c)     Progress no change  -KIM (r) AS (t) KIM (c)     Outcome Evaluation PT eval complete. Pt alert and oriented x4 but unable to name month of year. pt's daughter present for eval. Pt  reports no pain. Pt PLOF included independent bed mobility and household ambulation with some assistance from daughter for ADLs. Pt sat EOB with supervision. Pt stood and walked 85ft with CGA. once standing, pt did not use RW until cued. Pt was able to avoid obstacles in the hallway and converse while walking. Pt w/ difficulty turning RW in a narrow [place]. Pt reported fatigue and SOA for about 2 mins after sitting. Pt would benefit from skileld PT for increased activity tolerance and independence. Recommend d/c to SNF for further rehab.  -KIM (r) AS (t) KIM (c)     Row Name 05/04/22 1319          Therapy Assessment/Plan (PT)    Patient/Family Therapy Goals Statement (PT) get better at walking  -KIM (r) AS (t) KIM (c)     Rehab Potential (PT) good, to achieve stated therapy goals  -KIM (r) AS (t) KIM (c)     Criteria for Skilled Interventions Met (PT) yes;meets criteria;skilled treatment is necessary  -KIM (r) AS (t) KIM (c)     Therapy Frequency (PT) 2 times/day  -KIM (r) AS (t) KIM (c)     Predicted Duration of Therapy Intervention (PT) until d/c or goals met  -KIM (r) AS (t) KIM (c)     Row Name 05/04/22 131          Vital Signs    O2 Delivery Pre Treatment room air  -KIM (r) AS (t) KIM (c)     O2 Delivery Intra Treatment room air  -KIM (r) AS (t) KIM (c)     O2 Delivery Post Treatment room air  -KIM (r) AS (t) KIM (c)     Pre Patient Position Supine  -KIM (r) AS (t) KIM (c)     Intra Patient Position Standing  -KIM (r) AS (t) KIM (c)     Post Patient Position Sitting  -KIM (r) AS (t) KIM (c)     Row Name 05/04/22 1316          Positioning and Restraints    Pre-Treatment Position in bed  -KIM (r) AS (t) KIM (c)     Post Treatment Position chair  -KIM (r) AS (t) KIM (c)     In Chair sitting;call light within reach;encouraged to call for assist;with family/caregiver  -KIM (r) AS (t) KIM (c)           User Key  (r) = Recorded By, (t) = Taken By, (c) = Cosigned By    Initials Name Provider Type    Clarence Wayne, PT DPT Physical Therapist     AS Eleazar Ravi, PT Student PT Student               Outcome Measures     Row Name 05/04/22 1315          How much help from another person do you currently need...    Turning from your back to your side while in flat bed without using bedrails? 4  -KIM (r) AS (t) KIM (c)     Moving from lying on back to sitting on the side of a flat bed without bedrails? 4  -KIM (r) AS (t) KIM (c)     Moving to and from a bed to a chair (including a wheelchair)? 3  -KIM (r) AS (t) KIM (c)     Standing up from a chair using your arms (e.g., wheelchair, bedside chair)? 3  -KIM (r) AS (t) KIM (c)     Climbing 3-5 steps with a railing? 2  -KIM (r) AS (t) KIM (c)     To walk in hospital room? 3  -KIM (r) AS (t) KIM (c)     AM-PAC 6 Clicks Score (PT) 19  -KIM (r) AS (t)     Highest level of mobility 6 --> Walked 10 steps or more  -KIM (r) AS (t)     Row Name 05/04/22 1315          Functional Assessment    Outcome Measure Options AM-PAC 6 Clicks Basic Mobility (PT)  -KIM (r) AS (t) KIM (c)           User Key  (r) = Recorded By, (t) = Taken By, (c) = Cosigned By    Initials Name Provider Type    KIM Clarence Villarreal, PT DPT Physical Therapist    AS Eleazar Ravi, PT Student PT Student                             Physical Therapy Education                 Title: PT OT SLP Therapies (In Progress)     Topic: Physical Therapy (In Progress)     Point: Mobility training (Done)     Learning Progress Summary           Patient Acceptance, E, VU by AS at 5/4/2022 1315    Comment: Pt education on PT POC and goals.                   Point: Home exercise program (Not Started)     Learner Progress:  Not documented in this visit.          Point: Body mechanics (Not Started)     Learner Progress:  Not documented in this visit.          Point: Precautions (Not Started)     Learner Progress:  Not documented in this visit.                      User Key     Initials Effective Dates Name Provider Type Discipline    AS 02/28/22 -  Eleazar Ravi, PT Student PT Student PT               PT Recommendation and Plan  Planned Therapy Interventions (PT): balance training, bed mobility training, gait training, home exercise program, patient/family education, postural re-education, stair training, strengthening, transfer training  Plan of Care Reviewed With: patient, daughter  Progress: no change  Outcome Evaluation: PT eval complete. Pt alert and oriented x4 but unable to name month of year. pt's daughter present for eval. Pt reports no pain. Pt PLOF included independent bed mobility and household ambulation with some assistance from daughter for ADLs. Pt sat EOB with supervision. Pt stood and walked 85ft with CGA. once standing, pt did not use RW until cued. Pt was able to avoid obstacles in the hallway and converse while walking. Pt w/ difficulty turning RW in a narrow [place]. Pt reported fatigue and SOA for about 2 mins after sitting. Pt would benefit from skileld PT for increased activity tolerance and independence. Recommend d/c to SNF for further rehab.     Time Calculation:    PT Charges     Row Name 05/04/22 1315             Time Calculation    Start Time 1315  -KIM (r) AS (t) KIM (c)      Stop Time 1400  -KIM (r) AS (t) KIM (c)      Time Calculation (min) 45 min  -KIM (r) AS (t)      PT Received On 05/04/22  -KIM (r) AS (t) IKM (c)      PT Goal Re-Cert Due Date 05/14/22  -KIM (r) AS (t) KIM (c)            User Key  (r) = Recorded By, (t) = Taken By, (c) = Cosigned By    Initials Name Provider Type    Clarence Wayne, PT DPT Physical Therapist    AS Eleazar Ravi, PT Student PT Student                  PT G-Codes  Outcome Measure Options: AM-PAC 6 Clicks Basic Mobility (PT)  AM-PAC 6 Clicks Score (PT): 19    Eleazar Ravi, PT Student  5/4/2022

## 2022-05-04 NOTE — PROGRESS NOTES
Neurology Progress Note      Chief Complaint:    Encephalopathy    Subjective     Subjective:  Patient is lying in bed without complaint this morning.  He appears somewhat somnolent while talking to him, however, he is alert & oriented x3.  He provides reasonable subjective history.  MRI brain is pending.  EEG is completed, but not yet reviewed.      Past Medical History:   Diagnosis Date   • Bipolar 1 disorder (HCC)    • Diverticulitis    • Hernia of abdominal wall    • Kidney disease    • Myocardial infarction (Formerly [facility]) 02/2017   • Sleep apnea    • Stroke (Formerly [facility]) 02/2017     Past Surgical History:   Procedure Laterality Date   • COLOSTOMY  2001    x 3 months   • COLOSTOMY REVISION     • CORONARY ARTERY BYPASS GRAFT      1991/1996   • HERNIA REPAIR      abdominal hernia x 3; poor healing with multiple revisions   • PACEMAKER IMPLANTATION  2017    w/ defibrillator   • ROTATOR CUFF REPAIR Right 2005     Family History   Problem Relation Age of Onset   • Heart disease Brother    • Diabetes Brother    • Sleep apnea Brother      Social History     Tobacco Use   • Smoking status: Former Smoker     Packs/day: 1.00     Years: 40.00     Pack years: 40.00     Types: Cigarettes   • Smokeless tobacco: Never Used   Substance Use Topics   • Alcohol use: No   • Drug use: No       Medications:  Current Facility-Administered Medications   Medication Dose Route Frequency Provider Last Rate Last Admin   • acetaminophen (TYLENOL) tablet 500 mg  500 mg Oral Q6H PRN Aly Phan MD       • DULoxetine (CYMBALTA) DR capsule 60 mg  60 mg Oral Nightly Je Crow PA   60 mg at 05/03/22 2114   • empagliflozin (JARDIANCE) tablet 10 mg  10 mg Oral Daily Je Crow PA   10 mg at 05/04/22 0840   • fluticasone (FLONASE) 50 MCG/ACT nasal spray 2 spray  2 spray Nasal Daily PRN Je Crow PA       • folic acid (FOLVITE) tablet 1 mg  1 mg Oral Daily Je Crow PA   1 mg at 05/04/22 0840   • furosemide (LASIX) tablet 80 mg   "80 mg Oral Daily Je Crow PA   80 mg at 05/04/22 0840   • lactulose solution 30 g  30 g Oral TID Aly Phan MD   30 g at 05/04/22 0840   • lithium carbonate capsule 150 mg  150 mg Oral BID With Meals Je Crow PA   150 mg at 05/04/22 0840   • magnesium oxide (MAG-OX) tablet 400 mg  400 mg Oral Daily Je Crow PA   400 mg at 05/04/22 0840   • melatonin tablet 6 mg  6 mg Oral Nightly Je Crow PA   6 mg at 05/03/22 2114   • midodrine (PROAMATINE) tablet 5 mg  5 mg Oral TID AC Je Crow PA   5 mg at 05/04/22 0840   • ondansetron (ZOFRAN) injection 4 mg  4 mg Intravenous Q6H PRN Aly Phan MD       • pantoprazole (PROTONIX) EC tablet 40 mg  40 mg Oral Nightly Je Crow PA   40 mg at 05/03/22 2118   • polyethylene glycol (MIRALAX) packet 17 g  17 g Oral Daily Je Crow PA   17 g at 05/04/22 0840   • potassium chloride (MICRO-K) CR capsule 10 mEq  10 mEq Oral Daily Je Crow PA   10 mEq at 05/04/22 0840   • QUEtiapine (SEROquel) tablet 50 mg  50 mg Oral Nightly Je Crow PA   50 mg at 05/03/22 2114       Allergies:    Sulfa antibiotics    Review of Systems:   -A 14 point review of systems is completed and is negative.      Objective      Vital Signs  Temp:  [97.5 °F (36.4 °C)-98 °F (36.7 °C)] 98 °F (36.7 °C)  Heart Rate:  [62-71] 62  Resp:  [18-20] 18  BP: ()/(47-67) 99/64    Physical Exam:  General Exam:  Head:  Normal cephalic, atraumatic  HEENT:  Neck supple  Fundoscopic Exam:  No signs of disc edema  CVS:  Regular rate and rhythm.  No murmurs  Carotid Examination:  No bruits  Lungs:  Clear to auscultation  Abdomen:  Non-tender, Non-distended  Extremities:  No signs of peripheral edema  Skin:  No rashes     Neurologic Exam:     Mental Status:    -Awake, Alert, Oriented to person, place & year.  Knows he is in [place], [place], and states he is at \"[facility].\"    -Talking is minimal but he answers questions appropriately " and does state he has no recall of recent admission.  -He seems drowsy at times with sustained conversation.  -Follows simple commands     CN II:  Visual fields full.  Pupils equally reactive to light  CN III, IV, VI:  Extraocular Muscles full with no signs of nystagmus  CN V:  Facial sensory is symmetric   CN VII:  Facial motor symmetric  CN VIII:  Gross hearing intact bilaterally  CN IX/X:  Palate elevates symmetrically  CN XI:  Shoulder shrug symmetric  CN XII:  Tongue is midline on protrusion     Motor: (strength out of 5:  1= minimal movement, 2 = movement in plane of gravity, 3 = movement against gravity, 4 = movement against some resistance, 5 = full strength)     -Right Upper Ext: Proximal: 5 Distal: 5  -Left Upper Ext: Proximal: 5   Distal: 5     -Right Lower Ext: Proximal: 5 Distal: 5  -Left Lower Ext: Proximal: 5   Distal: 5     DTR:  -Right              Bicep: 2+         Triceps: 2+      Brachioradialis: 2+              Patella: 2+       Ankle: 2+         Neg Babinski  -Left              Bicep: 2+         Triceps: 2+      Brachioradialis: 2+              Patella: 2+       Ankle: 2+         Neg Babinski     Sensory:  -Intact to light touch, pinprick, temperature, pain, and proprioception     Coordination:  -Finger to nose intact  -Heel to shin intact  -No ataxia     Gait  -Not observed       Results Review:    I reviewed the patient's new clinical results and findings.    Lab Results (last 24 hours)     Procedure Component Value Units Date/Time    Urine Drug Screen - Urine, Clean Catch [[id number]]  (Abnormal) Collected: 05/03/22 2118    Specimen: Urine, Clean Catch Updated: 05/03/22 2200     THC, Screen, Urine Negative     Phencyclidine (PCP), Urine Negative     Cocaine Screen, Urine Negative     Methamphetamine, Ur Negative     Opiate Screen Negative     Amphetamine Screen, Urine Negative     Benzodiazepine Screen, Urine Negative     Tricyclic Antidepressants Screen Positive     Methadone Screen, Urine  Negative     Barbiturates Screen, Urine Negative     Oxycodone Screen, Urine Negative     Propoxyphene Screen Negative     Buprenorphine, Screen, Urine Negative    Narrative:      Cutoff For Drugs Screened:    Amphetamines               500 ng/ml  Barbiturates               200 ng/ml  Benzodiazepines            150 ng/ml  Cocaine                    150 ng/ml  Methadone                  200 ng/ml  Opiates                    100 ng/ml  Phencyclidine               25 ng/ml  THC                            50 ng/ml  Methamphetamine            500 ng/ml  Tricyclic Antidepressants  300 ng/ml  Oxycodone                  100 ng/ml  Propoxyphene               300 ng/ml  Buprenorphine               10 ng/ml    The normal value for all drugs tested is negative. This report includes unconfirmed screening results, with the cutoff values listed, to be used for medical treatment purposes only.  Unconfirmed results must not be used for non-medical purposes such as employment or legal testing.  Clinical consideration should be applied to any drug of abuse test, particularly when unconfirmed results are used.      TSH [[id number]]  (Abnormal) Collected: 05/03/22 0952    Specimen: Blood Updated: 05/03/22 1808     TSH 11.100 uIU/mL     COVID PRE-OP / PRE-PROCEDURE SCREENING ORDER (NO ISOLATION) - Swab, Nasal Cavity [[id number]]  (Normal) Collected: 05/03/22 1529    Specimen: Swab from Nasal Cavity Updated: 05/03/22 1609    Narrative:      The following orders were created for panel order COVID PRE-OP / PRE-PROCEDURE SCREENING ORDER (NO ISOLATION) - Swab, Nasal Cavity.  Procedure                               Abnormality         Status                     ---------                               -----------         ------                     COVID-19,[facility]...[[id number]]  Normal              Final result                 Please view results for these tests on the individual orders.    COVID-19,[facility] IN-HOUSE,Nasal Swab No  [employer] 3-4 HR TAT - Swab, Nasal Cavity [[id number]]  (Normal) Collected: 05/03/22 1529    Specimen: Swab from Nasal Cavity Updated: 05/03/22 1609     COVID19 Not Detected    Narrative:      Fact sheet for providers: https://www.fda.gov/media/492716/download     Fact sheet for patients: https://www.fda.gov/media/369245/download    Test performed by PCR.    Consider negative results in combination with clinical observations, patient history, and epidemiological information.    Lithium Level [[id number]]  (Normal) Collected: 05/03/22 0952    Specimen: Blood Updated: 05/03/22 1244     Lithium 0.7 mmol/L     POC Occult Blood Stool [[id number]]  (Abnormal) Collected: 05/03/22 1146    Specimen: Stool Updated: 05/03/22 1147     Fecal Occult Blood Positive     Lot Number \215\     Expiration Date \10/31/2022\     DEVELOPER LOT NUMBER \215\     DEVELOPER EXPIRATION DATE \215\     Positive Control Positive     Negative Control Negative    Ammonia [[id number]]  (Normal) Collected: 05/03/22 1042    Specimen: Blood Updated: 05/03/22 1120     Ammonia 59 umol/L     Protime-INR [[id number]]  (Abnormal) Collected: 05/03/22 1028    Specimen: Blood Updated: 05/03/22 1057     Protime 16.9 Seconds      INR 1.43    CBC & Differential [[id number]]  (Abnormal) Collected: 05/03/22 1028    Specimen: Blood Updated: 05/03/22 1050    Narrative:      The following orders were created for panel order CBC & Differential.  Procedure                               Abnormality         Status                     ---------                               -----------         ------                     CBC Auto Differential[[id number]]        Abnormal            Final result                 Please view results for these tests on the individual orders.    CBC Auto Differential [[id number]]  (Abnormal) Collected: 05/03/22 1028    Specimen: Blood Updated: 05/03/22 1050     WBC 9.60 10*3/mm3      RBC 3.16 10*6/mm3      Hemoglobin 8.6 g/dL      Hematocrit 28.7 %       MCV 90.8 fL      MCH 27.2 pg      MCHC 30.0 g/dL      RDW 23.3 %      RDW-SD 77.5 fl      MPV 9.9 fL      Platelets 157 10*3/mm3      Neutrophil % 88.8 %      Lymphocyte % 5.1 %      Monocyte % 5.3 %      Eosinophil % 0.1 %      Basophil % 0.2 %      Immature Grans % 0.5 %      Neutrophils, Absolute 8.52 10*3/mm3      Lymphocytes, Absolute 0.49 10*3/mm3      Monocytes, Absolute 0.51 10*3/mm3      Eosinophils, Absolute 0.01 10*3/mm3      Basophils, Absolute 0.02 10*3/mm3      Immature Grans, Absolute 0.05 10*3/mm3      nRBC 0.0 /100 WBC           Imaging Results (Last 24 Hours)     Procedure Component Value Units Date/Time    CT Head Without Contrast [[id number]] Collected: 05/03/22 1306     Updated: 05/03/22 1312    Narrative:      EXAMINATION:   CT HEAD WO CONTRAST-  5/3/2022 1:06 PM CDT     HISTORY: CT BRAIN without contrast 5/3/2022 12:41 PM CDT     HISTORY: Patient fell     COMPARISON: April 20, 2022      DLP: 309 mGy cm     TECHNIQUE: Serial axial tomographic images of the brain were obtained  without the use of intravenous contrast.      FINDINGS:   The midline structures are nondisplaced. There is moderate cerebral and  cerebellar volume loss, with an associated increase in the prominence of  the ventricles and sulci. The basilar cisterns are normal in size and  configuration. There is no evidence of intracranial hemorrhage or  mass-effect. There is low attenuation in the periventricular white  matter, consistent with chronic ischemic change. There are no abnormal  extra-axial fluid collections. There is no evidence of tonsillar  herniation.      The included orbits and their contents are unremarkable. Mucous cyst is  present in the right sphenoid air cell. Ethmoid and frontal air cells  are unremarkable. Mastoid air cells are pneumatized.. The visualized  osseous structures and overlying soft tissues of the skull and face are  intact.        Impression:      Moderate cerebral and cerebellar volume loss  with chronic microvascular  disease but no evidence of acute intracranial process.        This report was finalized on 05/03/2022 13:09 by Dr. Josep Pittman MD.          Assessment/Plan     Impression:  1.  Ongoing episodes of confusion superimposed metabolic issues, untreated obstructive sleep apnea and probable cognitive impairment at a baseline.  2.  Suspect underlying cognitive impairment at baseline  3.  Obstructive sleep apnea, untreated  4.  Anemia, multifactorial  5.  Hyponatremia  6.  CKD  7.  Trivial microvascular disease  8.  History of hyperammonemia      Plan:  1.  Await interpretation of EEG.  2.  MRI brain pending.  Patient does have AICD, however, had MRI of the abdomen in April 2022.  3.  Continue to avoid sedating and potentially cognitive-appearing medications such as diphenhydramine and benzodiazepines.  4.  Continue Seroquel as needed.          Grey Flannery PA-C  05/04/22  10:40 CDT

## 2022-05-04 NOTE — DISCHARGE PLACEMENT REQUEST
"    PABLO HEATH [phone]   Will only agree to private room   Travis Patel (76 y.o. Male)             Date of Birth   1945    Social Security Number       Address   [address] [place] [id number]    Home Phone   [phone]    MRN   [id number]       Catholic   Episcopal    Marital Status   Legally                             Admission Date   5/3/22    Admission Type   Emergency    Admitting Provider   Aly Phan MD    Attending Provider   Aly Phan MD    Department, Room/Bed   [facility] 3A, 352/1       Discharge Date       Discharge Disposition       Discharge Destination                               Attending Provider: Aly Phan MD    Allergies: Sulfa Antibiotics    Isolation: None   Infection: None   Code Status: CPR   Advance Care Planning Activity    Ht: 167.6 cm (66\")   Wt: 86.2 kg (190 lb)    Admission Cmt: None   Principal Problem: Encephalopathy [G93.40]                 Active Insurance as of 5/3/2022     Primary Coverage     Payor Plan Insurance Group Employer/Plan Group    HUMANA MEDICARE REPLACEMENT HUMANA MEDICARE REPLACEMENT [id number]     Payor Plan Address Payor Plan Phone Number Payor Plan Fax Number Effective Dates    [address] [address] [phone]  1/1/2014 - None Entered    Formerly [facility] [address]       Subscriber Name Subscriber Birth Date Member ID       TRAVIS PATEL 1945 [id number]                 Emergency Contacts      (Rel.) Home Phone Work Phone Mobile Phone    Jennifer Turcios (Daughter) -- -- [phone]               History & Physical      Je Crow PA at 05/03/22 2003     Attestation signed by Aly Phan MD at 05/04/22 0914    I have reviewed this documentation and agree.                          History and Physical      CHIEF COMPLAINT: Altered mental status    Reason for Admission: Altered mental status    History Obtained From: Patient, family and chart " review    HISTORY OF PRESENT ILLNESS:    Mr. Patel is 76-year-old gentleman recently discharged from the hospital under the care of [facility] hospitalist.  Past medical history MI, DIOGO, bipolar 1, atrial fibrillation status post ablation, CKD, CAD status post CABG x2 in 2006 and PCI 2017, pacemaker/defibrillator implant, congestive heart failure, HLD, DM, GI bleed June 2021, former smoker, hypotension, hyponatremia, elevated liver function test with hepatic encephalopathy and intermittent confusion.    He discharged to SNF and developed agitation and altered mental status and presented back to the emergency room for further evaluation.  He was recently at [facility] for management of CHF and decompensated liver disease.  His recent hospitalization was due to hepatic encephalopathy confusion and GI bleed.  He has had recent endoscopic evaluations negative at [facility].  During his recent hospitalization he was given lactulose and monitor his stool for concern of melena.  He did remain hemodynamically stable during his hospitalization.  There is a history of alcohol use.  Recent hospitalization here at [facility] did require ICU admission.  He was seen by neurology.  He was discharged yesterday to SNF.  We were contacted today for admission as patient's family declined readmission to hospitalist services.  In the emergency room department his ammonia level was noted to be normal.  He did have several lab abnormalities but nothing significant.  Sodium 128, AST 46, , bilirubin 2.6, creatinine 1.8, EGFR 41, WBC normal, hemoglobin 8.6.  He is admitted for further evaluation and stabilization due to his altered mental status and agitation at the SNF.  Of note he does take lithium which apparently was unavailable at the nursing home.  EMS report noted that patient has been calm and following commands.  Alert and oriented times self and aware of being in hospital.    Of note he is currently awake alert and  cooperative in no distress.  He denies any complaints.  He states he would like something to eat.  No family is currently present.      Past Medical History:    Past Medical History:   Diagnosis Date   • Bipolar 1 disorder (HCC)    • Diverticulitis    • Hernia of abdominal wall    • Kidney disease    • Myocardial infarction ([facility]) 02/2017   • Sleep apnea    • Stroke ([facility]) 02/2017       Past Surgical History:    Past Surgical History:   Procedure Laterality Date   • COLOSTOMY  2001    x 3 months   • COLOSTOMY REVISION     • CORONARY ARTERY BYPASS GRAFT      1991/1996   • HERNIA REPAIR      abdominal hernia x 3; poor healing with multiple revisions   • PACEMAKER IMPLANTATION  2017    w/ defibrillator   • ROTATOR CUFF REPAIR Right 2005       Medications Prior to Admission:    Medications Prior to Admission   Medication Sig Dispense Refill Last Dose   • Cholecalciferol (vitamin D3) 125 MCG (5000 UT) tablet tablet Take 5,000 Units by mouth Daily.   5/2/2022 at Unknown time   • DULoxetine (CYMBALTA) 60 MG capsule Take 60 mg by mouth Every Night.   5/2/2022 at Unknown time   • empagliflozin (JARDIANCE) 10 MG tablet tablet Take 1 tablet by mouth Daily. 30 tablet  5/2/2022 at Unknown time   • fluticasone (FLONASE) 50 MCG/ACT nasal spray 2 sprays into the nostril(s) as directed by provider Daily As Needed for Rhinitis.   5/2/2022 at Unknown time   • folic acid (FOLVITE) 1 MG tablet Take 1 mg by mouth Daily.   5/2/2022 at Unknown time   • furosemide (LASIX) 80 MG tablet Take 1 tablet by mouth Daily.   5/2/2022 at Unknown time   • lactulose 20 GM/30ML solution solution Take 30 mL by mouth 2 (Two) Times a Day. 450 mL  5/2/2022 at Unknown time   • lithium carbonate 150 MG capsule Take 150 mg by mouth 2 (Two) Times a Day With Meals.   5/2/2022 at Unknown time   • magnesium oxide (MAGOX) 400 (241.3 Mg) MG tablet tablet Take 400 mg by mouth Daily.   5/2/2022 at Unknown time   • melatonin 5 MG tablet tablet Take 5 mg by mouth Every  Night.   5/2/2022 at Unknown time   • pantoprazole (PROTONIX) 40 MG EC tablet Take 40 mg by mouth Every Night.   5/2/2022 at Unknown time   • polyethylene glycol (MIRALAX) pack packet Take 17 g by mouth Daily. 30 each 0 5/2/2022 at Unknown time   • potassium chloride ER (K-TAB) 20 MEQ tablet controlled-release ER tablet Take 30 mEq by mouth Daily.   5/2/2022 at Unknown time   • QUEtiapine (SEROquel) 50 MG tablet Take 1 tablet by mouth Every Night.   5/2/2022 at Unknown time   • vitamin C (ASCORBIC ACID) 250 MG tablet Take 1,000 mg by mouth Daily.   5/2/2022 at Unknown time   • zinc gluconate 50 MG tablet Take 50 mg by mouth Daily.   5/2/2022 at Unknown time   • midodrine (PROAMATINE) 5 MG tablet Take 1 tablet by mouth 3 (Three) Times a Day Before Meals.      • nitroglycerin (NITROSTAT) 0.4 MG SL tablet Place 0.4 mg under the tongue Every 5 (Five) Minutes As Needed for Chest Pain. Take no more than 3 doses in 15 minutes.   Unknown at Unknown time       Allergies:  Sulfa antibiotics    Social History:   Social History     Socioeconomic History   • Marital status: Legally    Tobacco Use   • Smoking status: Former Smoker     Packs/day: 1.00     Years: 40.00     Pack years: 40.00     Types: Cigarettes   • Smokeless tobacco: Never Used   Substance and Sexual Activity   • Alcohol use: No   • Drug use: No   • Sexual activity: Defer       Family History:   Family History   Problem Relation Age of Onset   • Heart disease Brother    • Diabetes Brother    • Sleep apnea Brother        REVIEW OF SYSTEMS:    CONSTITUTIONAL:  Negative for anorexia, chills, fevers, night sweats and weight loss, patient has been relatively medically stable until illness outlined in HPI  EYES:  negative for eye dryness, icterus and redness, no significant changes in vision  HEENT:   negative for dental problems, epistaxis or sinus congestion , no history of facial trauma or difficulty swallowing  RESPIRATORY:  negative for chest tightness,  cough, dyspnea on exertion, pneumonia or worse sputum production  CARDIOVASCULAR: No history of chest pain, dyspnea, exertional chest pressure/discomfort, irregular heart beat, or PND  GASTROINTESTINAL:  negative for abdominal pain, nausea or vomiting, no history of hematemesis, jaundice, melena or rectal bleeding  MUSCULOSKELETAL:  negative for muscle weakness, myalgias and neck pain, no significant arthralgias  NEUROLOGICAL:   negative for dizziness, headaches, seizures, speech problems, tremors and vertigo, mentation has been at baseline  INTEGUMENT: negative for pruritus, rash, skin color change and skin lesion(s)       Vital Signs   Temp:  [97.5 °F (36.4 °C)-98.2 °F (36.8 °C)] 97.5 °F (36.4 °C)  Heart Rate:  [65-74] 65  Resp:  [14-18] 18  BP: ()/(47-77) 96/57          Physical Exam:  Constitutional: The patient is oriented to person, place, and time. They appear well-developed.  Able to answer questions appropriately  HEENT: Grossly normal sitting up in bed  Head: Normocephalic and atraumatic.   Eyes: Pupils are equal, round, and reactive to light.  Extraocular muscles appear to be intact  Neck: Neck supple without masses or carotid bruit.  Cardiovascular: Regular rhythm and normal heart sounds.  No extra or lift rub or murmur appreciated  Pulmonary/Chest: Effort normal and breath sounds normal. CTAB, no appreciable rales or wheezes  Abdominal: Soft. Bowel sounds are normal. There is  no distension or tenderness appreciated. There is no rebound and no guarding.   Musculoskeletal: Normal range of motion. There is  no edema or tenderness.  No significant joint swelling  Neurological: Pt is alert and oriented to person, place, and time. They have normal reflexes. CN 2-12 appear grossly intact  Skin: Skin is warm and dry without significant rashes     Results Review:   I reviewed the patient's new imaging results and agree with the interpretation.    DATA:  Lab Results (last 24 hours)     Procedure Component  Value Units Date/Time    TSH [[id number]]  (Abnormal) Collected: 05/03/22 0952    Specimen: Blood Updated: 05/03/22 1808     TSH 11.100 uIU/mL     COVID PRE-OP / PRE-PROCEDURE SCREENING ORDER (NO ISOLATION) - Swab, Nasal Cavity [[id number]]  (Normal) Collected: 05/03/22 1529    Specimen: Swab from Nasal Cavity Updated: 05/03/22 1609    Narrative:      The following orders were created for panel order COVID PRE-OP / PRE-PROCEDURE SCREENING ORDER (NO ISOLATION) - Swab, Nasal Cavity.  Procedure                               Abnormality         Status                     ---------                               -----------         ------                     COVID-19,[facility] IN-RACH...[[id number]]  Normal              Final result                 Please view results for these tests on the individual orders.    COVID-19,[facility] IN-HOUSE,Nasal Swab No Transport Media 3-4 HR TAT - Swab, Nasal Cavity [[id number]]  (Normal) Collected: 05/03/22 1529    Specimen: Swab from Nasal Cavity Updated: 05/03/22 1609     COVID19 Not Detected    Narrative:      Fact sheet for providers: https://www.fda.gov/media/096386/download     Fact sheet for patients: https://www.fda.gov/media/402558/download    Test performed by PCR.    Consider negative results in combination with clinical observations, patient history, and epidemiological information.    Lithium Level [[id number]]  (Normal) Collected: 05/03/22 0952    Specimen: Blood Updated: 05/03/22 1244     Lithium 0.7 mmol/L     POC Occult Blood Stool [[id number]]  (Abnormal) Collected: 05/03/22 1146    Specimen: Stool Updated: 05/03/22 1147     Fecal Occult Blood Positive     Lot Number \215\     Expiration Date \10/31/2022\     DEVELOPER LOT NUMBER \215\     DEVELOPER EXPIRATION DATE \215\     Positive Control Positive     Negative Control Negative    Ammonia [[id number]]  (Normal) Collected: 05/03/22 1042    Specimen: Blood Updated: 05/03/22 1120     Ammonia 59 umol/L     Protime-INR [[id number]]   (Abnormal) Collected: 05/03/22 1028    Specimen: Blood Updated: 05/03/22 1057     Protime 16.9 Seconds      INR 1.43    CBC & Differential [[id number]]  (Abnormal) Collected: 05/03/22 1028    Specimen: Blood Updated: 05/03/22 1050    Narrative:      The following orders were created for panel order CBC & Differential.  Procedure                               Abnormality         Status                     ---------                               -----------         ------                     CBC Auto Differential[[id number]]        Abnormal            Final result                 Please view results for these tests on the individual orders.    CBC Auto Differential [[id number]]  (Abnormal) Collected: 05/03/22 1028    Specimen: Blood Updated: 05/03/22 1050     WBC 9.60 10*3/mm3      RBC 3.16 10*6/mm3      Hemoglobin 8.6 g/dL      Hematocrit 28.7 %      MCV 90.8 fL      MCH 27.2 pg      MCHC 30.0 g/dL      RDW 23.3 %      RDW-SD 77.5 fl      MPV 9.9 fL      Platelets 157 10*3/mm3      Neutrophil % 88.8 %      Lymphocyte % 5.1 %      Monocyte % 5.3 %      Eosinophil % 0.1 %      Basophil % 0.2 %      Immature Grans % 0.5 %      Neutrophils, Absolute 8.52 10*3/mm3      Lymphocytes, Absolute 0.49 10*3/mm3      Monocytes, Absolute 0.51 10*3/mm3      Eosinophils, Absolute 0.01 10*3/mm3      Basophils, Absolute 0.02 10*3/mm3      Immature Grans, Absolute 0.05 10*3/mm3      nRBC 0.0 /100 WBC     Comprehensive Metabolic Panel [[id number]]  (Abnormal) Collected: 05/03/22 0952    Specimen: Blood Updated: 05/03/22 1037     Glucose 106 mg/dL      BUN 28 mg/dL      Creatinine 1.73 mg/dL      Sodium 128 mmol/L      Potassium 4.6 mmol/L      Chloride 90 mmol/L      CO2 20.0 mmol/L      Calcium 9.2 mg/dL      Total Protein 5.5 g/dL      Albumin 3.90 g/dL      ALT (SGPT) 114 U/L      AST (SGOT) 46 U/L      Alkaline Phosphatase 162 U/L      Total Bilirubin 2.6 mg/dL      Globulin 1.6 gm/dL      A/G Ratio 2.4 g/dL      BUN/Creatinine Ratio  16.2     Anion Gap 18.0 mmol/L      eGFR 40.9 mL/min/1.73      Comment: National Kidney Foundation and American Society of Nephrology (ASN) Task Force recommended calculation based on the Chronic Kidney Disease Epidemiology Collaboration (CKD-EPI) equation refit without adjustment for race.       Narrative:      GFR Normal >60  Chronic Kidney Disease <60  Kidney Failure <15          Imaging Results (Last 24 Hours)     Procedure Component Value Units Date/Time    CT Head Without Contrast [[id number]] Collected: 05/03/22 1306     Updated: 05/03/22 1312    Narrative:      EXAMINATION:   CT HEAD WO CONTRAST-  5/3/2022 1:06 PM CDT     HISTORY: CT BRAIN without contrast 5/3/2022 12:41 PM CDT     HISTORY: Patient fell     COMPARISON: April 20, 2022      DLP: 309 mGy cm     TECHNIQUE: Serial axial tomographic images of the brain were obtained  without the use of intravenous contrast.      FINDINGS:   The midline structures are nondisplaced. There is moderate cerebral and  cerebellar volume loss, with an associated increase in the prominence of  the ventricles and sulci. The basilar cisterns are normal in size and  configuration. There is no evidence of intracranial hemorrhage or  mass-effect. There is low attenuation in the periventricular white  matter, consistent with chronic ischemic change. There are no abnormal  extra-axial fluid collections. There is no evidence of tonsillar  herniation.      The included orbits and their contents are unremarkable. Mucous cyst is  present in the right sphenoid air cell. Ethmoid and frontal air cells  are unremarkable. Mastoid air cells are pneumatized.. The visualized  osseous structures and overlying soft tissues of the skull and face are  intact.        Impression:      Moderate cerebral and cerebellar volume loss with chronic microvascular  disease but no evidence of acute intracranial process.        This report was finalized on 05/03/2022 13:09 by Dr. Josep Pittman MD.             ASSESSMENT AND PLAN:      Encephalopathy  Agitation and confusion--improved  Dementia  Obstructive sleep apnea  Anemia with heme positive stool  Coronary artery disease  Hyperlipidemia  Orthostatic hypotension with recent fall  Status post ablation of atrial fibrillation  Chronic combined systolic and diastolic CHF  Stage IIIb CKD  Hyponatremia  Recent GI bleed  Elevated liver function test    Patient has been admitted and stabilized  Orders for serial labs  IV fluid hydration  Neurologic monitoring  Formal neurology consultation rule out subclinical seizure activity  Avoid sedating medications  Continue with Seroquel  Continue course of care monitor closely for changes.    BERNARDO Hurley  20:04 CDT 5/3/2022      Electronically signed by Aly Phan MD at 05/04/22 0914         Current Facility-Administered Medications   Medication Dose Route Frequency Provider Last Rate Last Admin   • acetaminophen (TYLENOL) tablet 500 mg  500 mg Oral Q6H PRN Aly Phan MD       • DULoxetine (CYMBALTA) DR capsule 60 mg  60 mg Oral Nightly Je Crow PA   60 mg at 05/03/22 2114   • empagliflozin (JARDIANCE) tablet 10 mg  10 mg Oral Daily Je Crow PA   10 mg at 05/04/22 0840   • fluticasone (FLONASE) 50 MCG/ACT nasal spray 2 spray  2 spray Nasal Daily PRN Je Crow PA       • folic acid (FOLVITE) tablet 1 mg  1 mg Oral Daily Je Crow PA   1 mg at 05/04/22 0840   • furosemide (LASIX) tablet 80 mg  80 mg Oral Daily Je Crow PA   80 mg at 05/04/22 0840   • gadobenate dimeglumine (MULTIHANCE) injection 16 mL  16 mL Intravenous Once in imaging Aly Phan MD       • lactulose solution 30 g  30 g Oral TID Aly Phan MD   30 g at 05/04/22 0840   • lithium carbonate capsule 150 mg  150 mg Oral BID With Meals Je Crow PA   150 mg at 05/04/22 0840   • magnesium oxide (MAG-OX) tablet 400 mg  400 mg Oral Daily Je Crow PA   400 mg at 05/04/22 0840    • melatonin tablet 6 mg  6 mg Oral Nightly Je Crow PA   6 mg at 05/03/22 2114   • midodrine (PROAMATINE) tablet 5 mg  5 mg Oral TID AC Je Crow PA   5 mg at 05/04/22 1110   • ondansetron (ZOFRAN) injection 4 mg  4 mg Intravenous Q6H PRN Aly Phan MD       • pantoprazole (PROTONIX) EC tablet 40 mg  40 mg Oral Nightly Je Crow PA   40 mg at 05/03/22 2118   • polyethylene glycol (MIRALAX) packet 17 g  17 g Oral Daily Je Crow PA   17 g at 05/04/22 0840   • potassium chloride (MICRO-K) CR capsule 10 mEq  10 mEq Oral Daily Je Crow PA   10 mEq at 05/04/22 0840   • QUEtiapine (SEROquel) tablet 50 mg  50 mg Oral Nightly Je Crow PA   50 mg at 05/03/22 2114     Operative/Procedure Notes (last 24 hours)  Notes from 05/03/22 1352 through 05/04/22 1352   No notes of this type exist for this encounter.            Physician Progress Notes (last 24 hours)      Grey Flannery PA-C at 05/04/22 1040            Neurology Progress Note      Chief Complaint:    Encephalopathy    Subjective     Subjective:  Patient is lying in bed without complaint this morning.  He appears somewhat somnolent while talking to him, however, he is alert & oriented x3.  He provides reasonable subjective history.  MRI brain is pending.  EEG is completed, but not yet reviewed.      Past Medical History:   Diagnosis Date   • Bipolar 1 disorder (HCC)    • Diverticulitis    • Hernia of abdominal wall    • Kidney disease    • Myocardial infarction (HCC) 02/2017   • Sleep apnea    • Stroke (HCC) 02/2017     Past Surgical History:   Procedure Laterality Date   • COLOSTOMY  2001    x 3 months   • COLOSTOMY REVISION     • CORONARY ARTERY BYPASS GRAFT      1991/1996   • HERNIA REPAIR      abdominal hernia x 3; poor healing with multiple revisions   • PACEMAKER IMPLANTATION  2017    w/ defibrillator   • ROTATOR CUFF REPAIR Right 2005     Family History   Problem Relation Age of Onset   • Heart disease  Brother    • Diabetes Brother    • Sleep apnea Brother      Social History     Tobacco Use   • Smoking status: Former Smoker     Packs/day: 1.00     Years: 40.00     Pack years: 40.00     Types: Cigarettes   • Smokeless tobacco: Never Used   Substance Use Topics   • Alcohol use: No   • Drug use: No       Medications:  Current Facility-Administered Medications   Medication Dose Route Frequency Provider Last Rate Last Admin   • acetaminophen (TYLENOL) tablet 500 mg  500 mg Oral Q6H PRN Aly Phan MD       • DULoxetine (CYMBALTA) DR capsule 60 mg  60 mg Oral Nightly Je Crow PA   60 mg at 05/03/22 2114   • empagliflozin (JARDIANCE) tablet 10 mg  10 mg Oral Daily Je Crow PA   10 mg at 05/04/22 0840   • fluticasone (FLONASE) 50 MCG/ACT nasal spray 2 spray  2 spray Nasal Daily PRN Je Crow PA       • folic acid (FOLVITE) tablet 1 mg  1 mg Oral Daily Je Crow PA   1 mg at 05/04/22 0840   • furosemide (LASIX) tablet 80 mg  80 mg Oral Daily Je Crow PA   80 mg at 05/04/22 0840   • lactulose solution 30 g  30 g Oral TID Aly Phan MD   30 g at 05/04/22 0840   • lithium carbonate capsule 150 mg  150 mg Oral BID With Meals Je Crow PA   150 mg at 05/04/22 0840   • magnesium oxide (MAG-OX) tablet 400 mg  400 mg Oral Daily Je Crow PA   400 mg at 05/04/22 0840   • melatonin tablet 6 mg  6 mg Oral Nightly Je Crow PA   6 mg at 05/03/22 2114   • midodrine (PROAMATINE) tablet 5 mg  5 mg Oral TID AC Je Crow PA   5 mg at 05/04/22 0840   • ondansetron (ZOFRAN) injection 4 mg  4 mg Intravenous Q6H PRN Aly Phan MD       • pantoprazole (PROTONIX) EC tablet 40 mg  40 mg Oral Nightly Je Crow PA   40 mg at 05/03/22 2118   • polyethylene glycol (MIRALAX) packet 17 g  17 g Oral Daily Je Crow PA   17 g at 05/04/22 0840   • potassium chloride (MICRO-K) CR capsule 10 mEq  10 mEq Oral Daily Mignon, BERNARDO Mejias   10 mEq at  "05/04/22 0840   • QUEtiapine (SEROquel) tablet 50 mg  50 mg Oral Nightly Je Crow PA   50 mg at 05/03/22 2114       Allergies:    Sulfa antibiotics    Review of Systems:   -A 14 point review of systems is completed and is negative.      Objective      Vital Signs  Temp:  [97.5 °F (36.4 °C)-98 °F (36.7 °C)] 98 °F (36.7 °C)  Heart Rate:  [62-71] 62  Resp:  [18-20] 18  BP: ()/(47-67) 99/64    Physical Exam:  General Exam:  Head:  Normal cephalic, atraumatic  HEENT:  Neck supple  Fundoscopic Exam:  No signs of disc edema  CVS:  Regular rate and rhythm.  No murmurs  Carotid Examination:  No bruits  Lungs:  Clear to auscultation  Abdomen:  Non-tender, Non-distended  Extremities:  No signs of peripheral edema  Skin:  No rashes     Neurologic Exam:     Mental Status:    -Awake, Alert, Oriented to person, place & year.  Knows he is in [place], [place], and states he is at \"[facility].\"    -Talking is minimal but he answers questions appropriately and does state he has no recall of recent admission.  -He seems drowsy at times with sustained conversation.  -Follows simple commands     CN II:  Visual fields full.  Pupils equally reactive to light  CN III, IV, VI:  Extraocular Muscles full with no signs of nystagmus  CN V:  Facial sensory is symmetric   CN VII:  Facial motor symmetric  CN VIII:  Gross hearing intact bilaterally  CN IX/X:  Palate elevates symmetrically  CN XI:  Shoulder shrug symmetric  CN XII:  Tongue is midline on protrusion     Motor: (strength out of 5:  1= minimal movement, 2 = movement in plane of gravity, 3 = movement against gravity, 4 = movement against some resistance, 5 = full strength)     -Right Upper Ext: Proximal: 5 Distal: 5  -Left Upper Ext: Proximal: 5   Distal: 5     -Right Lower Ext: Proximal: 5 Distal: 5  -Left Lower Ext: Proximal: 5   Distal: 5     DTR:  -Right              Bicep: 2+         Triceps: 2+      Brachioradialis: 2+              Patella: 2+       Ankle: 2+  "        Neg Babinski  -Left              Bicep: 2+         Triceps: 2+      Brachioradialis: 2+              Patella: 2+       Ankle: 2+         Neg Babinski     Sensory:  -Intact to light touch, pinprick, temperature, pain, and proprioception     Coordination:  -Finger to nose intact  -Heel to shin intact  -No ataxia     Gait  -Not observed       Results Review:    I reviewed the patient's new clinical results and findings.    Lab Results (last 24 hours)     Procedure Component Value Units Date/Time    Urine Drug Screen - Urine, Clean Catch [[id number]]  (Abnormal) Collected: 05/03/22 2118    Specimen: Urine, Clean Catch Updated: 05/03/22 2200     THC, Screen, Urine Negative     Phencyclidine (PCP), Urine Negative     Cocaine Screen, Urine Negative     Methamphetamine, Ur Negative     Opiate Screen Negative     Amphetamine Screen, Urine Negative     Benzodiazepine Screen, Urine Negative     Tricyclic Antidepressants Screen Positive     Methadone Screen, Urine Negative     Barbiturates Screen, Urine Negative     Oxycodone Screen, Urine Negative     Propoxyphene Screen Negative     Buprenorphine, Screen, Urine Negative    Narrative:      Cutoff For Drugs Screened:    Amphetamines               500 ng/ml  Barbiturates               200 ng/ml  Benzodiazepines            150 ng/ml  Cocaine                    150 ng/ml  Methadone                  200 ng/ml  Opiates                    100 ng/ml  Phencyclidine               25 ng/ml  THC                            50 ng/ml  Methamphetamine            500 ng/ml  Tricyclic Antidepressants  300 ng/ml  Oxycodone                  100 ng/ml  Propoxyphene               300 ng/ml  Buprenorphine               10 ng/ml    The normal value for all drugs tested is negative. This report includes unconfirmed screening results, with the cutoff values listed, to be used for medical treatment purposes only.  Unconfirmed results must not be used for non-medical purposes such as employment or  legal testing.  Clinical consideration should be applied to any drug of abuse test, particularly when unconfirmed results are used.      TSH [[id number]]  (Abnormal) Collected: 05/03/22 0952    Specimen: Blood Updated: 05/03/22 1808     TSH 11.100 uIU/mL     COVID PRE-OP / PRE-PROCEDURE SCREENING ORDER (NO ISOLATION) - Swab, Nasal Cavity [[id number]]  (Normal) Collected: 05/03/22 1529    Specimen: Swab from Nasal Cavity Updated: 05/03/22 1609    Narrative:      The following orders were created for panel order COVID PRE-OP / PRE-PROCEDURE SCREENING ORDER (NO ISOLATION) - Swab, Nasal Cavity.  Procedure                               Abnormality         Status                     ---------                               -----------         ------                     COVID-19,[facility]...[[id number]]  Normal              Final result                 Please view results for these tests on the individual orders.    COVID-19,[facility] IN-HOUSE,Nasal Swab No Transport Media 3-4 HR TAT - Swab, Nasal Cavity [[id number]]  (Normal) Collected: 05/03/22 1529    Specimen: Swab from Nasal Cavity Updated: 05/03/22 1609     COVID19 Not Detected    Narrative:      Fact sheet for providers: https://www.fda.gov/media/770375/download     Fact sheet for patients: https://www.fda.gov/media/429676/download    Test performed by PCR.    Consider negative results in combination with clinical observations, patient history, and epidemiological information.    Lithium Level [[id number]]  (Normal) Collected: 05/03/22 0952    Specimen: Blood Updated: 05/03/22 1244     Lithium 0.7 mmol/L     POC Occult Blood Stool [[id number]]  (Abnormal) Collected: 05/03/22 1146    Specimen: Stool Updated: 05/03/22 1147     Fecal Occult Blood Positive     Lot Number \215\     Expiration Date \10/31/2022\     DEVELOPER LOT NUMBER \215\     DEVELOPER EXPIRATION DATE \215\     Positive Control Positive     Negative Control Negative    Ammonia [[id number]]  (Normal)  Collected: 05/03/22 1042    Specimen: Blood Updated: 05/03/22 1120     Ammonia 59 umol/L     Protime-INR [[id number]]  (Abnormal) Collected: 05/03/22 1028    Specimen: Blood Updated: 05/03/22 1057     Protime 16.9 Seconds      INR 1.43    CBC & Differential [[id number]]  (Abnormal) Collected: 05/03/22 1028    Specimen: Blood Updated: 05/03/22 1050    Narrative:      The following orders were created for panel order CBC & Differential.  Procedure                               Abnormality         Status                     ---------                               -----------         ------                     CBC Auto Differential[[id number]]        Abnormal            Final result                 Please view results for these tests on the individual orders.    CBC Auto Differential [[id number]]  (Abnormal) Collected: 05/03/22 1028    Specimen: Blood Updated: 05/03/22 1050     WBC 9.60 10*3/mm3      RBC 3.16 10*6/mm3      Hemoglobin 8.6 g/dL      Hematocrit 28.7 %      MCV 90.8 fL      MCH 27.2 pg      MCHC 30.0 g/dL      RDW 23.3 %      RDW-SD 77.5 fl      MPV 9.9 fL      Platelets 157 10*3/mm3      Neutrophil % 88.8 %      Lymphocyte % 5.1 %      Monocyte % 5.3 %      Eosinophil % 0.1 %      Basophil % 0.2 %      Immature Grans % 0.5 %      Neutrophils, Absolute 8.52 10*3/mm3      Lymphocytes, Absolute 0.49 10*3/mm3      Monocytes, Absolute 0.51 10*3/mm3      Eosinophils, Absolute 0.01 10*3/mm3      Basophils, Absolute 0.02 10*3/mm3      Immature Grans, Absolute 0.05 10*3/mm3      nRBC 0.0 /100 WBC           Imaging Results (Last 24 Hours)     Procedure Component Value Units Date/Time    CT Head Without Contrast [[id number]] Collected: 05/03/22 1306     Updated: 05/03/22 1312    Narrative:      EXAMINATION:   CT HEAD WO CONTRAST-  5/3/2022 1:06 PM CDT     HISTORY: CT BRAIN without contrast 5/3/2022 12:41 PM CDT     HISTORY: Patient fell     COMPARISON: April 20, 2022      DLP: 309 mGy cm     TECHNIQUE: Serial axial  tomographic images of the brain were obtained  without the use of intravenous contrast.      FINDINGS:   The midline structures are nondisplaced. There is moderate cerebral and  cerebellar volume loss, with an associated increase in the prominence of  the ventricles and sulci. The basilar cisterns are normal in size and  configuration. There is no evidence of intracranial hemorrhage or  mass-effect. There is low attenuation in the periventricular white  matter, consistent with chronic ischemic change. There are no abnormal  extra-axial fluid collections. There is no evidence of tonsillar  herniation.      The included orbits and their contents are unremarkable. Mucous cyst is  present in the right sphenoid air cell. Ethmoid and frontal air cells  are unremarkable. Mastoid air cells are pneumatized.. The visualized  osseous structures and overlying soft tissues of the skull and face are  intact.        Impression:      Moderate cerebral and cerebellar volume loss with chronic microvascular  disease but no evidence of acute intracranial process.        This report was finalized on 05/03/2022 13:09 by Dr. Josep Pittman MD.          Assessment/Plan     Impression:  1.  Ongoing episodes of confusion superimposed metabolic issues, untreated obstructive sleep apnea and probable cognitive impairment at a baseline.  2.  Suspect underlying cognitive impairment at baseline  3.  Obstructive sleep apnea, untreated  4.  Anemia, multifactorial  5.  Hyponatremia  6.  CKD  7.  Trivial microvascular disease  8.  History of hyperammonemia      Plan:  1.  Await interpretation of EEG.  2.  MRI brain pending.  Patient does have AICD, however, had MRI of the abdomen in April 2022.  3.  Continue to avoid sedating and potentially cognitive-appearing medications such as diphenhydramine and benzodiazepines.  4.  Continue Seroquel as needed.          Grey Flannery PA-C  05/04/22  10:40 CDT      Electronically signed by Grey Flannery,  CANDI at 05/04/22 1049     Je Crow PA at 05/04/22 0721     Attestation signed by Aly Phan MD at 05/04/22 0820    76-year-old male admitted for cirrhosis decompensated by hepatic encephalopathy.    We will increase lactulose.  GI consulted for possible paracentesis although seems like he has minimal ascites on bedside physical examination.  His laboratory studies are deranged but essentially at his baseline.  We will do right upper quadrant ultrasound to rule out acute hepatic process.  We will call daughter today for collateral information.    I have reviewed this documentation and agree.                      Daily Progress Note  Travis Patel  MRN: [id number] LOS: 1    Admit Date: 5/3/2022   5/4/2022 07:22 CDT          Chief Complaint:  Chief Complaint   Patient presents with   • Altered Mental Status   • Fall       Interval History:    Reviewed overnight events and nursing notes.   Status:  better than before  Pain: No report of pain    ROS:  10 point ROS obtained:   Gen: No fevers  HEENT: No migraine or visual change  Lung: No cough, hemopotysis or wheezing  Cv: No chest pain or pressure  Abd: No nausea or vomit, no change in bowel fct, no gi bleeding  Ext: No inc pain or swelling  Neuro: No seizure or syncope  Skin: No new rashes  Endo: No polyuria, polyphagia or poilydypsia  Psyc: No inc anxiety or depression  MS: No increase in joint pain or swelling reported    DIET:  Diet Regular    Medications:      DULoxetine, 60 mg, Oral, Nightly  empagliflozin, 10 mg, Oral, Daily  folic acid, 1 mg, Oral, Daily  furosemide, 80 mg, Oral, Daily  lactulose, 20 g, Oral, BID  lithium carbonate, 150 mg, Oral, BID With Meals  magnesium oxide, 400 mg, Oral, Daily  melatonin, 6 mg, Oral, Nightly  midodrine, 5 mg, Oral, TID AC  pantoprazole, 40 mg, Oral, Nightly  polyethylene glycol, 17 g, Oral, Daily  potassium chloride, 10 mEq, Oral, Daily  QUEtiapine, 50 mg, Oral, Nightly        Data:     Code Status:    Code Status and Medical Interventions:   Ordered at: 05/03/22 2026     Level Of Support Discussed With:    Patient     Code Status (Patient has no pulse and is not breathing):    CPR (Attempt to Resuscitate)     Medical Interventions (Patient has pulse or is breathing):    Full Support       Family History   Problem Relation Age of Onset   • Heart disease Brother    • Diabetes Brother    • Sleep apnea Brother      Social History     Socioeconomic History   • Marital status: Legally    Tobacco Use   • Smoking status: Former Smoker     Packs/day: 1.00     Years: 40.00     Pack years: 40.00     Types: Cigarettes   • Smokeless tobacco: Never Used   Substance and Sexual Activity   • Alcohol use: No   • Drug use: No   • Sexual activity: Defer       Labs:  Lab Results (last 72 hours)     Procedure Component Value Units Date/Time    Urine Drug Screen - Urine, Clean Catch [[id number]]  (Abnormal) Collected: 05/03/22 2118    Specimen: Urine, Clean Catch Updated: 05/03/22 2200     THC, Screen, Urine Negative     Phencyclidine (PCP), Urine Negative     Cocaine Screen, Urine Negative     Methamphetamine, Ur Negative     Opiate Screen Negative     Amphetamine Screen, Urine Negative     Benzodiazepine Screen, Urine Negative     Tricyclic Antidepressants Screen Positive     Methadone Screen, Urine Negative     Barbiturates Screen, Urine Negative     Oxycodone Screen, Urine Negative     Propoxyphene Screen Negative     Buprenorphine, Screen, Urine Negative    Narrative:      Cutoff For Drugs Screened:    Amphetamines               500 ng/ml  Barbiturates               200 ng/ml  Benzodiazepines            150 ng/ml  Cocaine                    150 ng/ml  Methadone                  200 ng/ml  Opiates                    100 ng/ml  Phencyclidine               25 ng/ml  THC                            50 ng/ml  Methamphetamine            500 ng/ml  Tricyclic Antidepressants  300 ng/ml  Oxycodone                  100  ng/ml  Propoxyphene               300 ng/ml  Buprenorphine               10 ng/ml    The normal value for all drugs tested is negative. This report includes unconfirmed screening results, with the cutoff values listed, to be used for medical treatment purposes only.  Unconfirmed results must not be used for non-medical purposes such as employment or legal testing.  Clinical consideration should be applied to any drug of abuse test, particularly when unconfirmed results are used.      TSH [[id number]]  (Abnormal) Collected: 05/03/22 0952    Specimen: Blood Updated: 05/03/22 1808     TSH 11.100 uIU/mL     COVID PRE-OP / PRE-PROCEDURE SCREENING ORDER (NO ISOLATION) - Swab, Nasal Cavity [[id number]]  (Normal) Collected: 05/03/22 1529    Specimen: Swab from Nasal Cavity Updated: 05/03/22 1609    Narrative:      The following orders were created for panel order COVID PRE-OP / PRE-PROCEDURE SCREENING ORDER (NO ISOLATION) - Swab, Nasal Cavity.  Procedure                               Abnormality         Status                     ---------                               -----------         ------                     COVID-19,[facility]...[[id number]]  Normal              Final result                 Please view results for these tests on the individual orders.    COVID-19,[facility] IN-HOUSE,Nasal Swab No Transport Media 3-4 HR TAT - Swab, Nasal Cavity [[id number]]  (Normal) Collected: 05/03/22 1529    Specimen: Swab from Nasal Cavity Updated: 05/03/22 1609     COVID19 Not Detected    Narrative:      Fact sheet for providers: https://www.fda.gov/media/923723/download     Fact sheet for patients: https://www.fda.gov/media/787180/download    Test performed by PCR.    Consider negative results in combination with clinical observations, patient history, and epidemiological information.    Lithium Level [[id number]]  (Normal) Collected: 05/03/22 0952    Specimen: Blood Updated: 05/03/22 1244     Lithium 0.7 mmol/L     POC Occult  Blood Stool [[id number]]  (Abnormal) Collected: 05/03/22 1146    Specimen: Stool Updated: 05/03/22 1147     Fecal Occult Blood Positive     Lot Number \215\     Expiration Date \10/31/2022\     DEVELOPER LOT NUMBER \215\     DEVELOPER EXPIRATION DATE \215\     Positive Control Positive     Negative Control Negative    Ammonia [[id number]]  (Normal) Collected: 05/03/22 1042    Specimen: Blood Updated: 05/03/22 1120     Ammonia 59 umol/L     Protime-INR [[id number]]  (Abnormal) Collected: 05/03/22 1028    Specimen: Blood Updated: 05/03/22 1057     Protime 16.9 Seconds      INR 1.43    CBC & Differential [[id number]]  (Abnormal) Collected: 05/03/22 1028    Specimen: Blood Updated: 05/03/22 1050    Narrative:      The following orders were created for panel order CBC & Differential.  Procedure                               Abnormality         Status                     ---------                               -----------         ------                     CBC Auto Differential[[id number]]        Abnormal            Final result                 Please view results for these tests on the individual orders.    CBC Auto Differential [[id number]]  (Abnormal) Collected: 05/03/22 1028    Specimen: Blood Updated: 05/03/22 1050     WBC 9.60 10*3/mm3      RBC 3.16 10*6/mm3      Hemoglobin 8.6 g/dL      Hematocrit 28.7 %      MCV 90.8 fL      MCH 27.2 pg      MCHC 30.0 g/dL      RDW 23.3 %      RDW-SD 77.5 fl      MPV 9.9 fL      Platelets 157 10*3/mm3      Neutrophil % 88.8 %      Lymphocyte % 5.1 %      Monocyte % 5.3 %      Eosinophil % 0.1 %      Basophil % 0.2 %      Immature Grans % 0.5 %      Neutrophils, Absolute 8.52 10*3/mm3      Lymphocytes, Absolute 0.49 10*3/mm3      Monocytes, Absolute 0.51 10*3/mm3      Eosinophils, Absolute 0.01 10*3/mm3      Basophils, Absolute 0.02 10*3/mm3      Immature Grans, Absolute 0.05 10*3/mm3      nRBC 0.0 /100 WBC     Comprehensive Metabolic Panel [[id number]]  (Abnormal) Collected: 05/03/22  "0952    Specimen: Blood Updated: 22 1037     Glucose 106 mg/dL      BUN 28 mg/dL      Creatinine 1.73 mg/dL      Sodium 128 mmol/L      Potassium 4.6 mmol/L      Chloride 90 mmol/L      CO2 20.0 mmol/L      Calcium 9.2 mg/dL      Total Protein 5.5 g/dL      Albumin 3.90 g/dL      ALT (SGPT) 114 U/L      AST (SGOT) 46 U/L      Alkaline Phosphatase 162 U/L      Total Bilirubin 2.6 mg/dL      Globulin 1.6 gm/dL      A/G Ratio 2.4 g/dL      BUN/Creatinine Ratio 16.2     Anion Gap 18.0 mmol/L      eGFR 40.9 mL/min/1.73      Comment: National Kidney Foundation and American Society of Nephrology (ASN) Task Force recommended calculation based on the Chronic Kidney Disease Epidemiology Collaboration (CKD-EPI) equation refit without adjustment for race.       Narrative:      GFR Normal >60  Chronic Kidney Disease <60  Kidney Failure <15            Objective:     Vitals: BP 99/64 (BP Location: Right arm, Patient Position: Lying)   Pulse 62   Temp 98 °F (36.7 °C) (Oral)   Resp 18   Ht 167.6 cm (66\")   Wt 86.2 kg (190 lb)   SpO2 93%   BMI 30.67 kg/m²      Intake/Output Summary (Last 24 hours) at 2022 0722  Last data filed at 2022 0413  Gross per 24 hour   Intake --   Output 700 ml   Net -700 ml    Temp (24hrs), Av.8 °F (36.6 °C), Min:97.5 °F (36.4 °C), Max:98.2 °F (36.8 °C)    Glucose:  No results found for: POCGLU  Physical Examination:   General appearance - alert, well appearing, and in no distress and oriented to person, place, and time  Mental status - alert, oriented to person, place, and time, normal mood, behavior, speech, dress, motor activity, and thought processes  Eyes - pupils equal and reactive, extraocular eye movements intact  Ears - bilateral TM's and external ear canals normal, hearing grossly normal bilaterally  Mouth - mucous membranes moist, pharynx normal without lesions  Neck - supple, no significant adenopathy  Lymphatics - no palpable lymphadenopathy, no hepatosplenomegaly  Chest " - clear to auscultation, no wheezes, rales or rhonchi, symmetric air entry, no tachypnea, retractions or cyanosis  Heart - normal rate, regular rhythm, normal S1, S2, no murmurs, rubs, clicks or gallops  Abdomen - soft, nontender, nondistended, no masses or organomegaly bowel sounds normal  Neurological - alert, oriented, normal speech, no focal findings or movement disorder noted  Musculoskeletal - no joint tenderness, deformity or swelling  Extremities - peripheral pulses normal, no pedal edema, no clubbing or cyanosis  Skin - normal coloration and turgor, no rashes, no suspicious skin lesions noted      Assessment and Plan:       Hospital Problem list:    Encephalopathy  Agitation and confusion--improved  Dementia  Obstructive sleep apnea  Anemia with heme positive stool  Coronary artery disease  Hyperlipidemia  Orthostatic hypotension with recent fall  Status post ablation of atrial fibrillation  Chronic combined systolic and diastolic CHF  Stage IIIb CKD  Hyponatremia  Recent GI bleed  Elevated liver function test    Treatment Plan:    Patient has been admitted and stabilized  Orders for serial labs  IV fluid hydration  Neurologic monitoring  Formal neurology consultation rule out subclinical seizure activity  Gastroenterology consultation  Initiate lactulose  Avoid sedating medications  Continue with Seroquel  Continue course of care monitor closely for changes.    Discharge planning: [facility] nursing facility  Reviewed treatment plans with the patient, nursing staff and family  30 minutes spent in face to face interaction and coordination of care.   Electronically signed by BERNARDO Hurley on 5/4/2022 at 07:22 CDT    Electronically signed by Aly Phan MD at 05/04/22 0820          Consult Notes (last 24 hours)      Kimberly Liz APRN at 05/04/22 0925      Consult Orders    1. Inpatient Gastroenterology Consult [[id number]] ordered by Je Crow PA at 05/04/22 0775                     [facility] GASTROENTEROLOGY              Initial Inpatient Consult Note  Travis Patel  1945    Referring Provider: No Known Provider    Admission: 5/3/2022  Consult date: 5/4/2022  Chief complaint: cirrhosis, altered mental status    Subjective     History of present illness:  Pt is a 76 year old male admitted with altered mental status. No known underlying liver disease. He states no hx of. He also states no hx of ETOH in his lifetime. He was admitted with sodium 128, AST 46, , Bili 2.6. WBC normal. Hgb 8.6. Platelets 157K.  He was put on Lactulose recent admission for encephalopathy and discharged to a SNF.     We have seen recent admission as well with consult 4/2022 for [facility] recent admission 4/18/22 at [facility]. He has had attempted colonoscopy with poor prep x3. He has hx of colostomy with reversal years ago. He is on Eliquis for AFib recent cardiac ablation during hospitalization at [facility] in April. Last admission here it was determined to remain conservative from a GI standpoint due to inability for patient to prep for colonoscopy. He did have abnormality at the cecum on CT scan that admission as well.     Past Medical History:  Past Medical History:   Diagnosis Date   • Bipolar 1 disorder (HCC)    • Diverticulitis    • Hernia of abdominal wall    • Kidney disease    • Myocardial infarction (HCC) 02/2017   • Sleep apnea    • Stroke (HCC) 02/2017       Past Surgical History:  Past Surgical History:   Procedure Laterality Date   • COLOSTOMY  2001    x 3 months   • COLOSTOMY REVISION     • CORONARY ARTERY BYPASS GRAFT      1991/1996   • HERNIA REPAIR      abdominal hernia x 3; poor healing with multiple revisions   • PACEMAKER IMPLANTATION  2017    w/ defibrillator   • ROTATOR CUFF REPAIR Right 2005       Social History:   Social History     Tobacco Use   • Smoking status: Former Smoker     Packs/day: 1.00     Years: 40.00     Pack years: 40.00     Types: Cigarettes   •  Smokeless tobacco: Never Used   Substance Use Topics   • Alcohol use: No        Family History:  Family History   Problem Relation Age of Onset   • Heart disease Brother    • Diabetes Brother    • Sleep apnea Brother        Home Meds:  Medications Prior to Admission   Medication Sig Dispense Refill Last Dose   • Cholecalciferol (vitamin D3) 125 MCG (5000 UT) tablet tablet Take 5,000 Units by mouth Daily.   5/2/2022 at Unknown time   • DULoxetine (CYMBALTA) 60 MG capsule Take 60 mg by mouth Every Night.   5/2/2022 at Unknown time   • empagliflozin (JARDIANCE) 10 MG tablet tablet Take 1 tablet by mouth Daily. 30 tablet  5/2/2022 at Unknown time   • fluticasone (FLONASE) 50 MCG/ACT nasal spray 2 sprays into the nostril(s) as directed by provider Daily As Needed for Rhinitis.   5/2/2022 at Unknown time   • folic acid (FOLVITE) 1 MG tablet Take 1 mg by mouth Daily.   5/2/2022 at Unknown time   • furosemide (LASIX) 80 MG tablet Take 1 tablet by mouth Daily.   5/2/2022 at Unknown time   • lactulose 20 GM/30ML solution solution Take 30 mL by mouth 2 (Two) Times a Day. 450 mL  5/2/2022 at Unknown time   • lithium carbonate 150 MG capsule Take 150 mg by mouth 2 (Two) Times a Day With Meals.   5/2/2022 at Unknown time   • magnesium oxide (MAGOX) 400 (241.3 Mg) MG tablet tablet Take 400 mg by mouth Daily.   5/2/2022 at Unknown time   • melatonin 5 MG tablet tablet Take 5 mg by mouth Every Night.   5/2/2022 at Unknown time   • pantoprazole (PROTONIX) 40 MG EC tablet Take 40 mg by mouth Every Night.   5/2/2022 at Unknown time   • polyethylene glycol (MIRALAX) pack packet Take 17 g by mouth Daily. 30 each 0 5/2/2022 at Unknown time   • potassium chloride ER (K-TAB) 20 MEQ tablet controlled-release ER tablet Take 30 mEq by mouth Daily.   5/2/2022 at Unknown time   • QUEtiapine (SEROquel) 50 MG tablet Take 1 tablet by mouth Every Night.   5/2/2022 at Unknown time   • vitamin C (ASCORBIC ACID) 250 MG tablet Take 1,000 mg by mouth  Daily.   5/2/2022 at Unknown time   • zinc gluconate 50 MG tablet Take 50 mg by mouth Daily.   5/2/2022 at Unknown time   • midodrine (PROAMATINE) 5 MG tablet Take 1 tablet by mouth 3 (Three) Times a Day Before Meals.      • nitroglycerin (NITROSTAT) 0.4 MG SL tablet Place 0.4 mg under the tongue Every 5 (Five) Minutes As Needed for Chest Pain. Take no more than 3 doses in 15 minutes.   Unknown at Unknown time       Current Meds:   Hospital Medications (active)       Dose Frequency Start End    DULoxetine (CYMBALTA) DR capsule 60 mg 60 mg Nightly 5/3/2022     Admin Instructions: Caution: Look alike/sound alike drug alert. Capsule may be opened and sprinkled on applesauce or apple juice. Do not crush or chew capsule.    Route: Oral    Cosign for Ordering: Accepted by Aly Phan MD on 5/4/2022  8:17 AM    empagliflozin (JARDIANCE) tablet 10 mg 10 mg Daily 5/4/2022     Route: Oral    Cosign for Ordering: Accepted by Aly Phan MD on 5/4/2022  8:17 AM    fluticasone (FLONASE) 50 MCG/ACT nasal spray 2 spray 2 spray Daily PRN 5/3/2022     Route: Nasal    Cosign for Ordering: Accepted by Aly Phan MD on 5/4/2022  8:17 AM    folic acid (FOLVITE) tablet 1 mg 1 mg Daily 5/4/2022     Route: Oral    Cosign for Ordering: Accepted by Aly Phan MD on 5/4/2022  8:17 AM    furosemide (LASIX) tablet 80 mg 80 mg Daily 5/4/2022     Route: Oral    Cosign for Ordering: Accepted by Aly Phan MD on 5/4/2022  8:17 AM    lactulose solution 30 g 30 g 3 Times Daily 5/4/2022     Route: Oral    lithium carbonate capsule 150 mg 150 mg 2 Times Daily With Meals 5/3/2022     Route: Oral    Cosign for Ordering: Accepted by Aly Phan MD on 5/4/2022  8:17 AM    magnesium oxide (MAG-OX) tablet 400 mg 400 mg Daily 5/4/2022     Route: Oral    Cosign for Ordering: Accepted by Aly Phan MD on 5/4/2022  8:17 AM    melatonin tablet 6 mg 6 mg Nightly 5/3/2022     Route: Oral    Cosign for  Ordering: Accepted by Aly Phan MD on 5/4/2022  8:17 AM    midodrine (PROAMATINE) tablet 5 mg 5 mg 3 Times Daily Before Meals 5/4/2022     Route: Oral    Cosign for Ordering: Accepted by Aly Phan MD on 5/4/2022  8:17 AM    pantoprazole (PROTONIX) EC tablet 40 mg 40 mg Nightly 5/3/2022     Admin Instructions: Swallow whole; do not crush, split, or chew.    Route: Oral    Cosign for Ordering: Accepted by Aly Phan MD on 5/4/2022  8:17 AM    polyethylene glycol (MIRALAX) packet 17 g 17 g Daily 5/4/2022     Admin Instructions: Use 4-8 ounces of water, tea, or juice for each 17 gram dose.    Route: Oral    Cosign for Ordering: Accepted by Aly Phan MD on 5/4/2022  8:17 AM    potassium chloride (MICRO-K) CR capsule 10 mEq 10 mEq Daily 5/4/2022     Route: Oral    Cosign for Ordering: Accepted by Aly Phan MD on 5/4/2022  8:17 AM    QUEtiapine (SEROquel) tablet 50 mg 50 mg Nightly 5/3/2022     Admin Instructions: Caution: Look alike/sound alike drug alert    Route: Oral    Cosign for Ordering: Accepted by Aly Phan MD on 5/4/2022  8:17 AM          Allergies:  Allergies   Allergen Reactions   • Sulfa Antibiotics        Review of Systems  Review of Systems   Constitutional: Negative for chills, diaphoresis and fever.   Respiratory: Negative for cough, choking, chest tightness and shortness of breath.    Cardiovascular: Negative for chest pain and palpitations.   Gastrointestinal: Negative for abdominal pain, nausea and vomiting.   Musculoskeletal: Negative for joint swelling.   Skin: Negative for color change and rash.   Neurological: Negative for headaches.   Psychiatric/Behavioral: Negative for confusion.        Objective     Vital Signs  Temp:  [97.5 °F (36.4 °C)-98 °F (36.7 °C)] 98 °F (36.7 °C)  Heart Rate:  [62-74] 62  Resp:  [18-20] 18  BP: ()/(47-77) 99/64  Body mass index is 30.67 kg/m².    Physical Exam:  General Appearance:    Alert, cooperative, in  no acute distress   Head:    Normocephalic, without obvious abnormality, atraumatic   Eyes:            Lids and lashes normal, conjunctivae and sclerae normal, no icterus, conjunctival pallor   Throat:   No oral lesions, no thrush, oral mucosa moist, posterior oropharynx clear   Neck:   No adenopathy, supple, trachea midline, no thyromegaly, no   carotid bruit, no JVD   Lungs:     Clear to auscultation,respirations regular, even and            unlabored    Heart:    Regular rhythm and normal rate, normal S1 and S2,           no   murmur   Chest Wall:    No abnormalities observed   Abdomen:     Normal bowel sounds, no masses, no organomegaly,     soft nontender, nondistended, no guarding, no rebound      tenderness   Rectal:     Deferred   Extremities:   no edema, no cyanosis   Skin:   No open lesions, bruising or rash   Lymph nodes:   No palpable cervical adenopathy   Psychiatric:  Judgment and insight: normal   Orientation to person place and time: normal   Mood and affect: normal     Results Review:  Results from last 7 days   Lab Units 05/03/22  1028 05/01/22  0518 04/30/22  0602   WBC 10*3/mm3 9.60 8.83 8.40   HEMOGLOBIN g/dL 8.6* 9.2* 9.3*   HEMATOCRIT % 28.7* 30.6* 30.1*   PLATELETS 10*3/mm3 157 144 129*       Results from last 7 days   Lab Units 05/03/22  0952 05/02/22  0730 05/01/22  0518 04/29/22  1430 04/29/22  0847   SODIUM mmol/L 128* 131* 130*   < > 125*   POTASSIUM mmol/L 4.6 3.7 3.7   < > 4.1   CHLORIDE mmol/L 90* 94* 92*   < > 89*   CO2 mmol/L 20.0* 25.0 26.0   < > 26.0   BUN mg/dL 28* 27* 25*   < > 31*   CREATININE mg/dL 1.73* 1.52* 1.49*   < > 1.65*   CALCIUM mg/dL 9.2 9.5 8.9   < > 9.3   BILIRUBIN mg/dL 2.6*  --  2.5*  --  3.0*   ALK PHOS U/L 162*  --  164*  --  187*   ALT (SGPT) U/L 114*  --  184*  --  298*   AST (SGOT) U/L 46*  --  66*  --  122*   GLUCOSE mg/dL 106* 116* 109*   < > 146*    < > = values in this interval not displayed.       Results from last 7 days   Lab Units 05/03/22  102    INR  1.43*        Radiology Review:  Imaging Results (Last 72 Hours)     Procedure Component Value Units Date/Time    CT Head Without Contrast [[id number]] Collected: 05/03/22 1306     Updated: 05/03/22 1312    Narrative:      EXAMINATION:   CT HEAD WO CONTRAST-  5/3/2022 1:06 PM CDT     HISTORY: CT BRAIN without contrast 5/3/2022 12:41 PM CDT     HISTORY: Patient fell     COMPARISON: April 20, 2022      DLP: 309 mGy cm     TECHNIQUE: Serial axial tomographic images of the brain were obtained  without the use of intravenous contrast.      FINDINGS:   The midline structures are nondisplaced. There is moderate cerebral and  cerebellar volume loss, with an associated increase in the prominence of  the ventricles and sulci. The basilar cisterns are normal in size and  configuration. There is no evidence of intracranial hemorrhage or  mass-effect. There is low attenuation in the periventricular white  matter, consistent with chronic ischemic change. There are no abnormal  extra-axial fluid collections. There is no evidence of tonsillar  herniation.      The included orbits and their contents are unremarkable. Mucous cyst is  present in the right sphenoid air cell. Ethmoid and frontal air cells  are unremarkable. Mastoid air cells are pneumatized.. The visualized  osseous structures and overlying soft tissues of the skull and face are  intact.        Impression:      Moderate cerebral and cerebellar volume loss with chronic microvascular  disease but no evidence of acute intracranial process.        This report was finalized on 05/03/2022 13:09 by Dr. Josep Pittman MD.          Assessment/Plan         Encephalopathy    Chronic coronary artery disease    Hyperlipidemia    Orthostatic hypotension    S/P ablation of atrial fibrillation    Chronic combined systolic and diastolic CHF (congestive heart failure) (HCC)    Stage 3b chronic kidney disease (HCC)    Hyponatremia    1. Dementia with agitation and confusion/altered  mental status  2. Hyponatremia  3. Elevated liver function tests    US liver pending CT does not show cirrhosis, no known hx for liver disease that I can find. Will get labs to rule out.  He has underlying dementia per records. Today he is awake and answers my questions appropriately.     EMR Dragon/transcription disclaimer: Much of this encounter note is electronic transcription/translation of spoken language to printed text. The electronic translation of spoken language may be erroneous, or at times, nonsensical words or phrases may be inadvertently transcribed. Although I have reviewed the note for such errors, some may still exist.  VIRGINIA Zafar  [facility]  05/04/22  09:26 CDT              Electronically signed by Kimberly Liz APRN at 05/04/22 1117     Chantel Fuller MD at 05/03/22 1613      Consult Orders    1. Inpatient Neurology Consult General [[id number]] ordered by Francisco Mei MD at 05/03/22 1513                   Neurology Consult Note    Patient:  Travis Patel   YOB: 1945  MRN:  [id number]  Date of Admission:  5/3/2022  9:14 AM    Date: 5/3/2022    Referring Provider: Francisco Mei MD  Reason for Consultation: Confusiion      History of present illness:     This is a 76 y.o.  male.with H/O MI, Sleep apnea, Bipolar Disorder 1,  atrial fibrillation s/p ablation,chronic kidney disease, CAD s/p CABG x 2 in 2006 and PCI 2017, pacemaker/defibrillator implant,  hyperlipidemia, DM, GI bleed in June of 2021, former smoker (quit 2016) hypotension for which he was treated with midodrine, currently evaluated for increased confusion    There is a report that he did not receive his home meds after being discharge to SNF. Daughter is not here but in speaking with Dr Mei patient kept repeating the same question such as wondering where he was, how he got here and why and had no recall of being in the hospital yesterday    Head  CT in ED did not reveal any acute changes    He was given lactulose in the ED    Patient had been recently admitted to [place] from March 29 through April 16 2022 where he was hospitalized for shortness of breath, CHF was diuresed and underwent atrial fib ablation  and was discharged euvolemic and    Patient was admitted 4/18 here and found to have an elevated ammonia level diagnosed with hepatic encephalopathy and also with , acute on chronic CHF, GI bleed,  Hyponatremia. During that admission Neurology saw him. He had a sodium of 127, was anemic with hemoglobin 9.4 UDS + for tricyclic and NH3 of 82 but he had normal liver functions. He had become more agitated and required ICU admission. Lactulose was given.  He was discharged yesterday with ammonia of 45. Sodium of 131. BUN 27 and creat of 1.52.       Currently from a metabolic standpoint:  Hemoglobin is 8.6  BUN 28  Sodium 128  NH3 59  Past Medical History:   Diagnosis Date   • Bipolar 1 disorder (HCC)    • Diverticulitis    • Hernia of abdominal wall    • Kidney disease    • Myocardial infarction (HCC) 02/2017   • Sleep apnea    • Stroke (HCC) 02/2017       Past Surgical History:   Procedure Laterality Date   • COLOSTOMY  2001    x 3 months   • COLOSTOMY REVISION     • CORONARY ARTERY BYPASS GRAFT      1991/1996   • HERNIA REPAIR      abdominal hernia x 3; poor healing with multiple revisions   • PACEMAKER IMPLANTATION  2017    w/ defibrillator   • ROTATOR CUFF REPAIR Right 2005       Prior to Admission medications    Medication Sig Start Date End Date Taking? Authorizing Provider   Cholecalciferol (vitamin D3) 125 MCG (5000 UT) tablet tablet Take 5,000 Units by mouth Daily.   Yes ProviderScotty MD   DULoxetine (CYMBALTA) 60 MG capsule Take 60 mg by mouth Every Night.   Yes ProviderScotty MD   empagliflozin (JARDIANCE) 10 MG tablet tablet Take 1 tablet by mouth Daily. 5/3/22  Yes Jorge Luis Delgadillo,    fluticasone (FLONASE) 50 MCG/ACT nasal  spray 2 sprays into the nostril(s) as directed by provider Daily As Needed for Rhinitis.   Yes Scotty Peres MD   folic acid (FOLVITE) 1 MG tablet Take 1 mg by mouth Daily.   Yes Scotty Peres MD   furosemide (LASIX) 80 MG tablet Take 1 tablet by mouth Daily. 5/3/22  Yes Jorge Luis Delgadillo DO   lactulose 20 GM/30ML solution solution Take 30 mL by mouth 2 (Two) Times a Day. 5/2/22  Yes Jorge Luis Delgadillo DO   lithium carbonate 150 MG capsule Take 150 mg by mouth 2 (Two) Times a Day With Meals.   Yes Scotty Peres MD   magnesium oxide (MAGOX) 400 (241.3 Mg) MG tablet tablet Take 400 mg by mouth Daily.   Yes Scotty Peres MD   melatonin 5 MG tablet tablet Take 5 mg by mouth Every Night.   Yes Scotty Peres MD   pantoprazole (PROTONIX) 40 MG EC tablet Take 40 mg by mouth Every Night.   Yes Scotty Peres MD   polyethylene glycol (MIRALAX) pack packet Take 17 g by mouth Daily. 9/21/18  Yes Angel Zheng MD   potassium chloride ER (K-TAB) 20 MEQ tablet controlled-release ER tablet Take 30 mEq by mouth Daily.   Yes Scotty Peres MD   QUEtiapine (SEROquel) 50 MG tablet Take 1 tablet by mouth Every Night. 5/2/22  Yes Jorge Luis Delgadillo DO   vitamin C (ASCORBIC ACID) 250 MG tablet Take 1,000 mg by mouth Daily.   Yes Scotty Peres MD   zinc gluconate 50 MG tablet Take 50 mg by mouth Daily.   Yes Scotty Peres MD   midodrine (PROAMATINE) 5 MG tablet Take 1 tablet by mouth 3 (Three) Times a Day Before Meals. 5/2/22   Jorge Luis Delgadillo DO   nitroglycerin (NITROSTAT) 0.4 MG SL tablet Place 0.4 mg under the tongue Every 5 (Five) Minutes As Needed for Chest Pain. Take no more than 3 doses in 15 minutes.    Scotty Peres MD       Hospital scheduled medications:     Hospital PRN medications:      Allergies   Allergen Reactions   • Sulfa Antibiotics        Social History     Socioeconomic History   • Marital status: Legally     Tobacco Use   • Smoking status: Former Smoker     Packs/day: 1.00     Years: 40.00     Pack years: 40.00     Types: Cigarettes   • Smokeless tobacco: Never Used   Substance and Sexual Activity   • Alcohol use: No   • Drug use: No   • Sexual activity: Defer     Family History   Problem Relation Age of Onset   • Heart disease Brother    • Diabetes Brother    • Sleep apnea Brother        Review of Systems  A 14 point review of systems was reviewed and was negative except for pain in shoulders    Vital Signs   Temp:  [97.5 °F (36.4 °C)-98.2 °F (36.8 °C)] 97.5 °F (36.4 °C)  Heart Rate:  [65-74] 65  Resp:  [14-18] 18  BP: ()/(47-77) 96/57    General Exam:  Head:  Normal cephalic, atraumatic  HEENT:  Neck supple  Fundoscopic Exam:  No signs of disc edema  CVS:  Regular rate and rhythm.  No murmurs  Carotid Examination:  No bruits  Lungs:  Clear to auscultation  Abdomen:  Non-tender, Non-distended  Extremities:  No signs of peripheral edema  Skin:  No rashes    Neurologic Exam:    Mental Status:    -Awake, Alert, Oriented to person, place year.  Knows he is in [facility] and that this is [facility]  He can name the name of the hospital. He smiles and is very friendly and cooperative  -Talking is minimal but he answers questions appropriately and does state he has no recall of recent admission.  -Follows simple  commands    CN II:  Visual fields full.  Pupils equally reactive to light  CN III, IV, VI:  Extraocular Muscles full with no signs of nystagmus  CN V:  Facial sensory is symmetric   CN VII:  Facial motor symmetric  CN VIII:  Gross hearing intact bilaterally  CN IX/X:  Palate elevates symmetrically  CN XI:  Shoulder shrug symmetric  CN XII:  Tongue is midline on protrusion    Motor: (strength out of 5:  1= minimal movement, 2 = movement in plane of gravity, 3 = movement against gravity, 4 = movement against some resistance, 5 = full strength)    -Right Upper Ext: Proximal: 5 Distal: 5  -Left Upper Ext:  Proximal: 5 Distal: 5    -Right Lower Ext: Proximal: 5 Distal: 5  -Left Lower Ext: Proximal: 5 Distal: 5    DTR:  -Right   Bicep: 2+ Triceps: 2+ Brachioradialis: 2+   Patella: 2+ Ankle: 2+ Neg Babinski  -Left   Bicep: 2+ Triceps: 2+ Brachioradialis: 2+   Patella: 2+ Ankle: 2+ Neg Babinski    Sensory:  -Intact to light touch, pinprick, temperature, pain, and proprioception    Coordination:  -Finger to nose intact  -Heel to shin intact  -No ataxia    Gait  -No signs of ataxia  -ambulates unassisted      Results Review:  Lab Results (last 7 days)     Procedure Component Value Units Date/Time    COVID PRE-OP / PRE-PROCEDURE SCREENING ORDER (NO ISOLATION) - Swab, Nasal Cavity [[id number]]  (Normal) Collected: 05/03/22 1529    Specimen: Swab from Nasal Cavity Updated: 05/03/22 1609    Narrative:      The following orders were created for panel order COVID PRE-OP / PRE-PROCEDURE SCREENING ORDER (NO ISOLATION) - Swab, Nasal Cavity.  Procedure                               Abnormality         Status                     ---------                               -----------         ------                     COVID-19,[facility] IN-RACH...[[id number]]  Normal              Final result                 Please view results for these tests on the individual orders.    COVID-19,[facility] IN-HOUSE,Nasal Swab No Transport Media 3-4 HR TAT - Swab, Nasal Cavity [[id number]]  (Normal) Collected: 05/03/22 1529    Specimen: Swab from Nasal Cavity Updated: 05/03/22 1609     COVID19 Not Detected    Narrative:      Fact sheet for providers: https://www.fda.gov/media/567011/download     Fact sheet for patients: https://www.fda.gov/media/963536/download    Test performed by PCR.    Consider negative results in combination with clinical observations, patient history, and epidemiological information.    Lithium Level [[id number]]  (Normal) Collected: 05/03/22 0952    Specimen: Blood Updated: 05/03/22 1244     Lithium 0.7 mmol/L     POC Occult Blood Stool  [[id number]]  (Abnormal) Collected: 05/03/22 1146    Specimen: Stool Updated: 05/03/22 1147     Fecal Occult Blood Positive     Lot Number \215\     Expiration Date \10/31/2022\     DEVELOPER LOT NUMBER \215\     DEVELOPER EXPIRATION DATE \215\     Positive Control Positive     Negative Control Negative    Ammonia [[id number]]  (Normal) Collected: 05/03/22 1042    Specimen: Blood Updated: 05/03/22 1120     Ammonia 59 umol/L     Protime-INR [[id number]]  (Abnormal) Collected: 05/03/22 1028    Specimen: Blood Updated: 05/03/22 1057     Protime 16.9 Seconds      INR 1.43    CBC & Differential [[id number]]  (Abnormal) Collected: 05/03/22 1028    Specimen: Blood Updated: 05/03/22 1050    Narrative:      The following orders were created for panel order CBC & Differential.  Procedure                               Abnormality         Status                     ---------                               -----------         ------                     CBC Auto Differential[[id number]]        Abnormal            Final result                 Please view results for these tests on the individual orders.    CBC Auto Differential [[id number]]  (Abnormal) Collected: 05/03/22 1028    Specimen: Blood Updated: 05/03/22 1050     WBC 9.60 10*3/mm3      RBC 3.16 10*6/mm3      Hemoglobin 8.6 g/dL      Hematocrit 28.7 %      MCV 90.8 fL      MCH 27.2 pg      MCHC 30.0 g/dL      RDW 23.3 %      RDW-SD 77.5 fl      MPV 9.9 fL      Platelets 157 10*3/mm3      Neutrophil % 88.8 %      Lymphocyte % 5.1 %      Monocyte % 5.3 %      Eosinophil % 0.1 %      Basophil % 0.2 %      Immature Grans % 0.5 %      Neutrophils, Absolute 8.52 10*3/mm3      Lymphocytes, Absolute 0.49 10*3/mm3      Monocytes, Absolute 0.51 10*3/mm3      Eosinophils, Absolute 0.01 10*3/mm3      Basophils, Absolute 0.02 10*3/mm3      Immature Grans, Absolute 0.05 10*3/mm3      nRBC 0.0 /100 WBC     Comprehensive Metabolic Panel [[id number]]  (Abnormal) Collected: 05/03/22 0956     Specimen: Blood Updated: 05/03/22 1037     Glucose 106 mg/dL      BUN 28 mg/dL      Creatinine 1.73 mg/dL      Sodium 128 mmol/L      Potassium 4.6 mmol/L      Chloride 90 mmol/L      CO2 20.0 mmol/L      Calcium 9.2 mg/dL      Total Protein 5.5 g/dL      Albumin 3.90 g/dL      ALT (SGPT) 114 U/L      AST (SGOT) 46 U/L      Alkaline Phosphatase 162 U/L      Total Bilirubin 2.6 mg/dL      Globulin 1.6 gm/dL      A/G Ratio 2.4 g/dL      BUN/Creatinine Ratio 16.2     Anion Gap 18.0 mmol/L      eGFR 40.9 mL/min/1.73      Comment: National Kidney Foundation and American Society of Nephrology (ASN) Task Force recommended calculation based on the Chronic Kidney Disease Epidemiology Collaboration (CKD-EPI) equation refit without adjustment for race.       Narrative:      GFR Normal >60  Chronic Kidney Disease <60  Kidney Failure <15            .  Imaging Results (Last 24 Hours)     Procedure Component Value Units Date/Time    CT Head Without Contrast [[id number]] Collected: 05/03/22 1306     Updated: 05/03/22 1312    Narrative:      EXAMINATION:   CT HEAD WO CONTRAST-  5/3/2022 1:06 PM CDT     HISTORY: CT BRAIN without contrast 5/3/2022 12:41 PM CDT     HISTORY: Patient fell     COMPARISON: April 20, 2022      DLP: 309 mGy cm     TECHNIQUE: Serial axial tomographic images of the brain were obtained  without the use of intravenous contrast.      FINDINGS:   The midline structures are nondisplaced. There is moderate cerebral and  cerebellar volume loss, with an associated increase in the prominence of  the ventricles and sulci. The basilar cisterns are normal in size and  configuration. There is no evidence of intracranial hemorrhage or  mass-effect. There is low attenuation in the periventricular white  matter, consistent with chronic ischemic change. There are no abnormal  extra-axial fluid collections. There is no evidence of tonsillar  herniation.      The included orbits and their contents are unremarkable. Mucous cyst  is  present in the right sphenoid air cell. Ethmoid and frontal air cells  are unremarkable. Mastoid air cells are pneumatized.. The visualized  osseous structures and overlying soft tissues of the skull and face are  intact.        Impression:      Moderate cerebral and cerebellar volume loss with chronic microvascular  disease but no evidence of acute intracranial process.        This report was finalized on 05/03/2022 13:09 by Dr. Josep Pittman MD.              Impression  1. Episode of confusion that has since markedly improved. He did receive a dose of lactulose but if this confusion has been episodic we should consider subclinical seizures especially since he had no recall of getting here.   2. Strongly suspect there is an underlying cognitive issue such as dementia (previous conversation with daughter in his last admission indicated this) but with all the metabolic issues we are not able to definitively make this diagnosis   3. In addition he has untreated DIOGO which also will cause cognitive issues that would improve if treated.   4. Anemia with fecal occult blood + on background of previous known GI bleed  5. Hyponatremia  6. H/O hyperammonemia but currently 61  7. CKD   8. Cerebral microvascular disease    Plan    EEG  · Consider MRI of brain ideally with and without (may get improved renal function by tomorrow and if not then get MRI without contrast) At least as of right now he seems he would be able to sit still for a MRI  · Avoid benadryl  · Avoid benzodiazepines  · Continue Seroquel    I discussed the patients findings and my recommendations with patient, nursing staff and Dr Sigifredo MEDEL. Edson Guevara MD  05/03/22  16:13 CDT      Electronically signed by Chantel Fuller MD at 05/03/22 4288       Nutrition Notes (last 24 hours)  Notes from 05/03/22 1352 through 05/04/22 1352   No notes exist for this encounter.         Physical Therapy Notes (last 24 hours)  Notes from 05/03/22 1352  through 05/04/22 1352   No notes exist for this encounter.         Occupational Therapy Notes (last 24 hours)  Notes from 05/03/22 1352 through 05/04/22 1352   No notes exist for this encounter.         Speech Language Pathology Notes (last 24 hours)  Notes from 05/03/22 1352 through 05/04/22 1352   No notes exist for this encounter.         Respiratory Therapy Notes (last 24 hours)  Notes from 05/03/22 1352 through 05/04/22 1352   No notes exist for this encounter.

## 2022-05-04 NOTE — NURSING NOTE
Pt was placed NPO for US of live. Attempted to contact US for update x 5 over the past several hours, unsuccessful. Resumed pt's diet for the night. Will place NPO at midnight to attempt in the morning.

## 2022-05-04 NOTE — PLAN OF CARE
Goal Outcome Evaluation:  Plan of Care Reviewed With: patient, daughter        Progress: no change  Outcome Evaluation: PT eval complete. Pt alert and oriented x4 but unable to name month of year. pt's daughter present for eval. Pt reports no pain. Pt PLOF included independent bed mobility and household ambulation with some assistance from daughter for ADLs. Pt sat EOB with supervision. Pt stood and walked 85ft with CGA. once standing, pt did not use RW until cued. Pt was able to avoid obstacles in the hallway and converse while walking. Pt w/ difficulty turning RW in a narrow [place]. Pt reported fatigue and SOA for about 2 mins after sitting. Pt would benefit from skileld PT for increased activity tolerance and independence. Recommend d/c to SNF for further rehab.

## 2022-05-04 NOTE — H&P
History and Physical      CHIEF COMPLAINT: Altered mental status    Reason for Admission: Altered mental status    History Obtained From: Patient, family and chart review    HISTORY OF PRESENT ILLNESS:    Mr. Patel is 76-year-old gentleman [facility]  Past medical history MI, DIOGO, bipolar 1, atrial fibrillation status post ablation, CKD, CAD status post CABG x2 in 2006 and PCI 2017, pacemaker/defibrillator implant, congestive heart failure, HLD, DM, GI bleed June 2021, former smoker, hypotension, hyponatremia, elevated liver function test with hepatic encephalopathy and intermittent confusion.    He discharged to SNF and developed agitation and altered mental status and presented back to the emergency room for further evaluation.  He was recently at [facility] for management of CHF and decompensated liver disease.  His recent hospitalization was due to hepatic encephalopathy confusion and GI bleed.  He has had recent endoscopic evaluations negative at [facility].  During his recent hospitalization he was given lactulose and monitor his stool for concern of melena.  He did remain hemodynamically stable during his hospitalization.  There is a history of alcohol use.  Recent hospitalization here at [facility] did require ICU admission.  He was seen by neurology.  He was discharged yesterday to SNF.  We were contacted today for admission as patient's family declined readmission to hospitalist services.  In the emergency room department his ammonia level was noted to be normal.  He did have several lab abnormalities but nothing significant.  Sodium 128, AST 46, , bilirubin 2.6, creatinine 1.8, EGFR 41, WBC normal, hemoglobin 8.6.  He is admitted for further evaluation and stabilization due to his altered mental status and agitation at the SNF.  Of note he does take lithium which apparently was unavailable at the nursing home.  EMS  report noted that patient has been calm and following commands.  Alert and oriented times self and aware of being in hospital.    Of note he is currently awake alert and cooperative in no distress.  He denies any complaints.  He states he would like something to eat.  No family is currently present.      Past Medical History:    Past Medical History:   Diagnosis Date   • Bipolar 1 disorder (HCC)    • Diverticulitis    • Hernia of abdominal wall    • Kidney disease    • Myocardial infarction ([facility]) 02/2017   • Sleep apnea    • Stroke ([facility]) 02/2017       Past Surgical History:    Past Surgical History:   Procedure Laterality Date   • COLOSTOMY  2001    x 3 months   • COLOSTOMY REVISION     • CORONARY ARTERY BYPASS GRAFT      1991/1996   • HERNIA REPAIR      abdominal hernia x 3; poor healing with multiple revisions   • PACEMAKER IMPLANTATION  2017    w/ defibrillator   • ROTATOR CUFF REPAIR Right 2005       Medications Prior to Admission:    Medications Prior to Admission   Medication Sig Dispense Refill Last Dose   • Cholecalciferol (vitamin D3) 125 MCG (5000 UT) tablet tablet Take 5,000 Units by mouth Daily.   5/2/2022 at Unknown time   • DULoxetine (CYMBALTA) 60 MG capsule Take 60 mg by mouth Every Night.   5/2/2022 at Unknown time   • empagliflozin (JARDIANCE) 10 MG tablet tablet Take 1 tablet by mouth Daily. 30 tablet  5/2/2022 at Unknown time   • fluticasone (FLONASE) 50 MCG/ACT nasal spray 2 sprays into the nostril(s) as directed by provider Daily As Needed for Rhinitis.   5/2/2022 at Unknown time   • folic acid (FOLVITE) 1 MG tablet Take 1 mg by mouth Daily.   5/2/2022 at Unknown time   • furosemide (LASIX) 80 MG tablet Take 1 tablet by mouth Daily.   5/2/2022 at Unknown time   • lactulose 20 GM/30ML solution solution Take 30 mL by mouth 2 (Two) Times a Day. 450 mL  5/2/2022 at Unknown time   • lithium carbonate 150 MG capsule Take 150 mg by mouth 2 (Two) Times a Day With Meals.   5/2/2022 at Unknown time   •  magnesium oxide (MAGOX) 400 (241.3 Mg) MG tablet tablet Take 400 mg by mouth Daily.   5/2/2022 at Unknown time   • melatonin 5 MG tablet tablet Take 5 mg by mouth Every Night.   5/2/2022 at Unknown time   • pantoprazole (PROTONIX) 40 MG EC tablet Take 40 mg by mouth Every Night.   5/2/2022 at Unknown time   • polyethylene glycol (MIRALAX) pack packet Take 17 g by mouth Daily. 30 each 0 5/2/2022 at Unknown time   • potassium chloride ER (K-TAB) 20 MEQ tablet controlled-release ER tablet Take 30 mEq by mouth Daily.   5/2/2022 at Unknown time   • QUEtiapine (SEROquel) 50 MG tablet Take 1 tablet by mouth Every Night.   5/2/2022 at Unknown time   • vitamin C (ASCORBIC ACID) 250 MG tablet Take 1,000 mg by mouth Daily.   5/2/2022 at Unknown time   • zinc gluconate 50 MG tablet Take 50 mg by mouth Daily.   5/2/2022 at Unknown time   • midodrine (PROAMATINE) 5 MG tablet Take 1 tablet by mouth 3 (Three) Times a Day Before Meals.      • nitroglycerin (NITROSTAT) 0.4 MG SL tablet Place 0.4 mg under the tongue Every 5 (Five) Minutes As Needed for Chest Pain. Take no more than 3 doses in 15 minutes.   Unknown at Unknown time       Allergies:  Sulfa antibiotics    Social History:   Social History     Socioeconomic History   • Marital status: Legally    Tobacco Use   • Smoking status: Former Smoker     Packs/day: 1.00     Years: 40.00     Pack years: 40.00     Types: Cigarettes   • Smokeless tobacco: Never Used   Substance and Sexual Activity   • Alcohol use: No   • Drug use: No   • Sexual activity: Defer       Family History:   Family History   Problem Relation Age of Onset   • Heart disease Brother    • Diabetes Brother    • Sleep apnea Brother        REVIEW OF SYSTEMS:    CONSTITUTIONAL:  Negative for anorexia, chills, fevers, night sweats and weight loss, patient has been relatively medically stable until illness outlined in HPI  EYES:  negative for eye dryness, icterus and redness, no significant changes in  vision  HEENT:   negative for dental problems, epistaxis or sinus congestion , no history of facial trauma or difficulty swallowing  RESPIRATORY:  negative for chest tightness, cough, dyspnea on exertion, pneumonia or worse sputum production  CARDIOVASCULAR: No history of chest pain, dyspnea, exertional chest pressure/discomfort, irregular heart beat, or PND  GASTROINTESTINAL:  negative for abdominal pain, nausea or vomiting, no history of hematemesis, jaundice, melena or rectal bleeding  MUSCULOSKELETAL:  negative for muscle weakness, myalgias and neck pain, no significant arthralgias  NEUROLOGICAL:   negative for dizziness, headaches, seizures, speech problems, tremors and vertigo, mentation has been at baseline  INTEGUMENT: negative for pruritus, rash, skin color change and skin lesion(s)       Vital Signs   Temp:  [97.5 °F (36.4 °C)-98.2 °F (36.8 °C)] 97.5 °F (36.4 °C)  Heart Rate:  [65-74] 65  Resp:  [14-18] 18  BP: ()/(47-77) 96/57          Physical Exam:  Constitutional: The patient is oriented to person, place, and time. They appear well-developed.  Able to answer questions appropriately  HEENT: Grossly normal sitting up in bed  Head: Normocephalic and atraumatic.   Eyes: Pupils are equal, round, and reactive to light.  Extraocular muscles appear to be intact  Neck: Neck supple without masses or carotid bruit.  Cardiovascular: Regular rhythm and normal heart sounds.  No extra or lift rub or murmur appreciated  Pulmonary/Chest: Effort normal and breath sounds normal. CTAB, no appreciable rales or wheezes  Abdominal: Soft. Bowel sounds are normal. There is  no distension or tenderness appreciated. There is no rebound and no guarding.   Musculoskeletal: Normal range of motion. There is  no edema or tenderness.  No significant joint swelling  Neurological: Pt is alert and oriented to person, place, and time. They have normal reflexes. CN 2-12 appear grossly intact  Skin: Skin is warm and dry without  significant rashes     Results Review:   I reviewed the patient's new imaging results and agree with the interpretation.    DATA:  Lab Results (last 24 hours)     Procedure Component Value Units Date/Time    TSH [[id number]]  (Abnormal) Collected: 05/03/22 0952    Specimen: Blood Updated: 05/03/22 1808     TSH 11.100 uIU/mL     COVID PRE-OP / PRE-PROCEDURE SCREENING ORDER (NO ISOLATION) - Swab, Nasal Cavity [[id number]]  (Normal) Collected: 05/03/22 1529    Specimen: Swab from Nasal Cavity Updated: 05/03/22 1609    Narrative:      The following orders were created for panel order COVID PRE-OP / PRE-PROCEDURE SCREENING ORDER (NO ISOLATION) - Swab, Nasal Cavity.  Procedure                               Abnormality         Status                     ---------                               -----------         ------                     COVID-19,[facility] IN-R[facility]...[[id number]]  Normal              Final result                 Please view results for these tests on the individual orders.    COVID-19,[facility] IN-HOUSE,Nasal Swab No Transport Media 3-4 HR TAT - Swab, Nasal Cavity [[id number]]  (Normal) Collected: 05/03/22 1529    Specimen: Swab from Nasal Cavity Updated: 05/03/22 1609     COVID19 Not Detected    Narrative:      Fact sheet for providers: https://www.fda.gov/media/939361/download     Fact sheet for patients: https://www.fda.gov/media/559423/download    Test performed by PCR.    Consider negative results in combination with clinical observations, patient history, and epidemiological information.    Lithium Level [[id number]]  (Normal) Collected: 05/03/22 0952    Specimen: Blood Updated: 05/03/22 1244     Lithium 0.7 mmol/L     POC Occult Blood Stool [[id number]]  (Abnormal) Collected: 05/03/22 1146    Specimen: Stool Updated: 05/03/22 1147     Fecal Occult Blood Positive     Lot Number \215\     Expiration Date \10/31/2022\     DEVELOPER LOT NUMBER \215\     DEVELOPER EXPIRATION DATE \215\     Positive Control  Positive     Negative Control Negative    Ammonia [[id number]]  (Normal) Collected: 05/03/22 1042    Specimen: Blood Updated: 05/03/22 1120     Ammonia 59 umol/L     Protime-INR [[id number]]  (Abnormal) Collected: 05/03/22 1028    Specimen: Blood Updated: 05/03/22 1057     Protime 16.9 Seconds      INR 1.43    CBC & Differential [[id number]]  (Abnormal) Collected: 05/03/22 1028    Specimen: Blood Updated: 05/03/22 1050    Narrative:      The following orders were created for panel order CBC & Differential.  Procedure                               Abnormality         Status                     ---------                               -----------         ------                     CBC Auto Differential[[id number]]        Abnormal            Final result                 Please view results for these tests on the individual orders.    CBC Auto Differential [[id number]]  (Abnormal) Collected: 05/03/22 1028    Specimen: Blood Updated: 05/03/22 1050     WBC 9.60 10*3/mm3      RBC 3.16 10*6/mm3      Hemoglobin 8.6 g/dL      Hematocrit 28.7 %      MCV 90.8 fL      MCH 27.2 pg      MCHC 30.0 g/dL      RDW 23.3 %      RDW-SD 77.5 fl      MPV 9.9 fL      Platelets 157 10*3/mm3      Neutrophil % 88.8 %      Lymphocyte % 5.1 %      Monocyte % 5.3 %      Eosinophil % 0.1 %      Basophil % 0.2 %      Immature Grans % 0.5 %      Neutrophils, Absolute 8.52 10*3/mm3      Lymphocytes, Absolute 0.49 10*3/mm3      Monocytes, Absolute 0.51 10*3/mm3      Eosinophils, Absolute 0.01 10*3/mm3      Basophils, Absolute 0.02 10*3/mm3      Immature Grans, Absolute 0.05 10*3/mm3      nRBC 0.0 /100 WBC     Comprehensive Metabolic Panel [[id number]]  (Abnormal) Collected: 05/03/22 0952    Specimen: Blood Updated: 05/03/22 1037     Glucose 106 mg/dL      BUN 28 mg/dL      Creatinine 1.73 mg/dL      Sodium 128 mmol/L      Potassium 4.6 mmol/L      Chloride 90 mmol/L      CO2 20.0 mmol/L      Calcium 9.2 mg/dL      Total Protein 5.5 g/dL      Albumin 3.90  g/dL      ALT (SGPT) 114 U/L      AST (SGOT) 46 U/L      Alkaline Phosphatase 162 U/L      Total Bilirubin 2.6 mg/dL      Globulin 1.6 gm/dL      A/G Ratio 2.4 g/dL      BUN/Creatinine Ratio 16.2     Anion Gap 18.0 mmol/L      eGFR 40.9 mL/min/1.73      Comment: National Kidney Foundation and American Society of Nephrology (ASN) Task Force recommended calculation based on the Chronic Kidney Disease Epidemiology Collaboration (CKD-EPI) equation refit without adjustment for race.       Narrative:      GFR Normal >60  Chronic Kidney Disease <60  Kidney Failure <15          Imaging Results (Last 24 Hours)     Procedure Component Value Units Date/Time    CT Head Without Contrast [[id number]] Collected: 05/03/22 1306     Updated: 05/03/22 1312    Narrative:      EXAMINATION:   CT HEAD WO CONTRAST-  5/3/2022 1:06 PM CDT     HISTORY: CT BRAIN without contrast 5/3/2022 12:41 PM CDT     HISTORY: Patient fell     COMPARISON: April 20, 2022      DLP: 309 mGy cm     TECHNIQUE: Serial axial tomographic images of the brain were obtained  without the use of intravenous contrast.      FINDINGS:   The midline structures are nondisplaced. There is moderate cerebral and  cerebellar volume loss, with an associated increase in the prominence of  the ventricles and sulci. The basilar cisterns are normal in size and  configuration. There is no evidence of intracranial hemorrhage or  mass-effect. There is low attenuation in the periventricular white  matter, consistent with chronic ischemic change. There are no abnormal  extra-axial fluid collections. There is no evidence of tonsillar  herniation.      The included orbits and their contents are unremarkable. Mucous cyst is  present in the right sphenoid air cell. Ethmoid and frontal air cells  are unremarkable. Mastoid air cells are pneumatized.. The visualized  osseous structures and overlying soft tissues of the skull and face are  intact.        Impression:      Moderate cerebral and  cerebellar volume loss with chronic microvascular  disease but no evidence of acute intracranial process.        This report was finalized on 05/03/2022 13:09 by Dr. Josep Pittman MD.            ASSESSMENT AND PLAN:      Encephalopathy  Agitation and confusion--improved  Dementia  Obstructive sleep apnea  Anemia with heme positive stool  Coronary artery disease  Hyperlipidemia  Orthostatic hypotension with recent fall  Status post ablation of atrial fibrillation  Chronic combined systolic and diastolic CHF  Stage IIIb CKD  Hyponatremia  Recent GI bleed  Elevated liver function test    Patient has been admitted and stabilized  Orders for serial labs  IV fluid hydration  Neurologic monitoring  Formal neurology consultation rule out subclinical seizure activity  Avoid sedating medications  Continue with Seroquel  Continue course of care monitor closely for changes.    BERNARDO Hurley  20:04 CDT 5/3/2022

## 2022-05-04 NOTE — PLAN OF CARE
Goal Outcome Evaluation:  Patient is alert and oriented to person, place, and month/year. Unsure why he is in the hospital again but aware he recently left. Reorientation and treatment plan discussed with patient. Skin tears to bilateral upper extremities dressings dry and intact. Fall precautions with bed alarm in place. Hypotensive. One assist to stand. Using urinal.

## 2022-05-04 NOTE — CASE MANAGEMENT/SOCIAL WORK
Discharge Planning Assessment  [facility]     Patient Name: Travis Patel  MRN: [id number]  Today's Date: 5/4/2022    Admit Date: 5/3/2022     Discharge Needs Assessment     Row Name 05/04/22 1245       Living Environment    People in Home facility resident    Name(s) of People in Home [facility]- less than 1 day-  lives alone usually    Current Living Arrangements home    Primary Care Provided by self;child(luan)    Provides Primary Care For no one, unable/limited ability to care for self    Caregiving Concerns altered mental status. elevated liver enzymes    Family Caregiver if Needed child(luan), adult    Family Caregiver Names Jennifer Turcios  brenda    Quality of Family Relationships helpful;involved;supportive    Able to Return to Prior Arrangements no       Transition Planning    Patient/Family Anticipates Transition to inpatient rehabilitation facility    Transportation Anticipated family or friend will provide       Discharge Needs Assessment    Readmission Within the Last 30 Days no previous admission in last 30 days    Current Outpatient/Agency/Support Group inpatient rehabilitation facility    Equipment Currently Used at Home none    Concerns to be Addressed no discharge needs identified    Equipment Needed After Discharge none    Outpatient/Agency/Support Group Needs skilled nursing facility    Discharge Facility/Level of Care Needs nursing facility, skilled    Provided Post Acute Provider List? Yes    Post Acute Provider List Inpatient Rehab;Nursing Home    Provided Post Acute Provider Quality & Resource List? Yes    Post Acute Provider Quality and Resource List Nursing Home    Delivered To Support Person    Support Person Jennifer Turcios, brenda    Method of Delivery Telephone    Patient's Choice of Community Agency(s) SNF in [place], [place], or [place] with private room/ male. Daughter lives in [place], [place]    Discharge Coordination/Progress CM spoke with daughter, Jennifer  Requesting referrals to Facilities in [facility] but wants private room only.  There is no bed hold at [facility] and will not go there because there are no private rooms. SHANNON updated and will follow with referrals. Pending bed offer. Precert will be required when patient medically stable and ready for dc. Dara with [facility] notified of request for private room and confirmed that there was no bed hold.               Discharge Plan    No documentation.               Continued Care and Services - Admitted Since 5/3/2022    Coordination has not been started for this encounter.     Selected Continued Care - Prior Encounters Includes selections from prior encounters from 2/2/2022 to 5/4/2022    Discharged on 5/2/2022 Admission date: 4/18/2022 - Discharge disposition: Skilled Nursing Facility (DC - External)    Destination     Service Provider Selected Services Address Phone Fax Patient Preferred    [facility]  Skilled Nursing [address], [facility] [address] [phone] --                    Expected Discharge Date and Time     Expected Discharge Date Expected Discharge Time    May 6, 2022          Demographic Summary    No documentation.                Functional Status    No documentation.                Psychosocial    No documentation.                Abuse/Neglect    No documentation.                Legal    No documentation.                Substance Abuse    No documentation.                Patient Forms    No documentation.                   Georgia Horvath, TOBI

## 2022-05-04 NOTE — THERAPY DISCHARGE NOTE
Acute Care - Speech Language Pathology Discharge Summary  Psychiatric       Patient Name: Travis Patel  : 1945  MRN: [id number]    Today's Date: 2022                   Admit Date: 2022      SLP Recommendation and Plan    Visit Dx:    ICD-10-CM ICD-9-CM   1. Hepatic encephalopathy (HCC)  K72.90 572.2   2. Dysphagia, unspecified type  R13.10 787.20   3. Impaired mobility  Z74.09 799.89   4. Impaired mobility and ADLs  Z74.09 V49.89    Z78.9    5. Impaired cognition  R41.89 294.9                SLP GOALS     Row Name 22 1100 22 0905          Oral Nutrition/Hydration Goal 1 (SLP)    Oral Nutrition/Hydration Goal 1, SLP Pt will tolerate LRD w/o any overt s/s of aspiration.  -MM --     Time Frame (Oral Nutrition/Hydration Goal 1, SLP) by discharge  -MM --     Barriers (Oral Nutrition/Hydration Goal 1, SLP) Fatigue, cognition  -MM --     Progress/Outcomes (Oral Nutrition/Hydration Goal 1, SLP) discharged from facility;goal partially met  -MM --            Follow Directions Goal 2 (SLP)    Improve Ability to Follow Directions Goal 1 (SLP) 2 step commands;3 step commands;80%;with minimal cues (75-90%)  -MM 2 step commands;3 step commands;80%;with minimal cues (75-90%) (P)   -BW     Time Frame (Follow Directions Goal 1, SLP) by discharge  -MM by discharge (P)   -BW     Barriers (Improve Ability to Follow Directions Goal 1, SLP) n/a  -MM n/a (P)   -BW     Progress (Ability to Follow Directions Goal 1, SLP) 70%;with minimal cues (75-90%)  -MM 70%;with minimal cues (75-90%) (P)   -BW     Progress/Outcomes (Follow Directions Goal 1, SLP) discharged from facility;goal partially met  -MM continuing progress toward goal (P)   -BW            Attention Goal 1 (SLP)    Improve Attention by Goal 1 (SLP) complete sustained attention task;80%;with minimal cues (75-90%)  -MM complete sustained attention task;80%;with minimal cues (75-90%) (P)   -BW     Time Frame (Attention Goal 1, SLP) by discharge  -MM by  discharge (P)   -BW     Barriers (Attention Goal 1, SLP) n/a  -MM n/a (P)   -BW     Progress (Attention Goal 1, SLP) 60%;with moderate cues (50-74%)  -MM 60%;with moderate cues (50-74%) (P)   -BW     Progress/Outcomes (Attention Goal 1, SLP) discharged from facility;goal partially met  -MM continuing progress toward goal (P)   -BW            Orientation Goal 1 (SLP)    Improve Orientation Through Goal 1 (SLP) demonstrating orientation to day;demonstrating orientation to month;demonstrating orientation to year;80%;with minimal cues (75-90%)  -MM demonstrating orientation to day;demonstrating orientation to month;demonstrating orientation to year;80%;with minimal cues (75-90%) (P)   -BW     Time Frame (Orientation Goal 1, SLP) by discharge  -MM by discharge (P)   -BW     Barriers (Orientation Goal 1, SLP) none  -MM none (P)   -BW     Progress (Orientation Goal 1, SLP) 60%;with moderate cues (50-74%)  -MM 60%;with moderate cues (50-74%) (P)   -BW     Progress/Outcomes (Orientation Goal 1, SLP) discharged from facility;goal partially met  -MM continuing progress toward goal (P)   -BW            Memory Skills Goal 1 (SLP)    Improve Memory Skills Through Goal 1 (SLP) recalling related word lists immediately;recalling unrelated word lists immediately;80%  -MM recalling related word lists immediately;recalling unrelated word lists immediately;80% (P)   -BW     Time Frame (Memory Skills Goal 1, SLP) by discharge  -MM by discharge (P)   -BW     Barriers (Memory Skills Goal 1, SLP) n/a  -MM n/a (P)   -BW     Progress (Memory Skills Goal 1, SLP) 60%;with moderate cues (50-74%)  -MM 60%;with moderate cues (50-74%) (P)   -BW     Progress/Outcomes (Memory Skills Goal 1, SLP) discharged from facility;goal partially met  -MM continuing progress toward goal (P)   -BW            Organizational Skills Goal 1 (SLP)    Improve Thought Organization Through Goal 1 (SLP) completing a divergent naming task;80%;with minimal cues (75-90%)   -MM completing a divergent naming task;80%;with minimal cues (75-90%) (P)   -BW     Time Frame (Thought Organization Skills Goal 1, SLP) by discharge  -MM by discharge (P)   -BW     Barriers (Thought Organization Skills Goal 1, SLP) n/a  -MM n/a (P)   -BW     Progress (Thought Organization Skills Goal 1, SLP) 50%;with moderate cues (50-74%)  -MM 50%;with moderate cues (50-74%) (P)   -BW     Progress/Outcomes (Thought Organization Skills Goal 1, SLP) discharged from facility;goal partially met  -MM continuing progress toward goal (P)   -BW           User Key  (r) = Recorded By, (t) = Taken By, (c) = Cosigned By    Initials Name Provider Type    Yessenia Arriaga MS CCC-SLP Speech and Language Pathologist    Judie Pro, Speech Therapy Student SLP Student                        SLP Discharge Summary  Anticipated Discharge Disposition (SLP): home  Reason for Discharge: discharge from this facility  Progress Toward Achieving Short/long Term Goals: goals partially met within established timelines  Discharge Destination: [facility]      Yessenia Ordonez MS CCC-SLP  5/4/2022

## 2022-05-04 NOTE — CONSULTS
[facility] GASTROENTEROLOGY              Initial Inpatient Consult Note  Travis Patel  1945    Referring Provider: No Known Provider    Admission: 5/3/2022  Consult date: 5/4/2022  Chief complaint: elevated liver function tests, altered mental status    Subjective     History of present illness:  Pt is a 76 year old male admitted with altered mental status. No known underlying liver disease. He states no hx of. He also states no hx of ETOH in his lifetime. He was admitted with sodium 128, AST 46, , Bili 2.6. WBC normal. Hgb 8.6. Platelets 157K.  He was put on Lactulose recent admission for encephalopathy and discharged to a SNF.     We have seen recent admission as well with consult 4/2022 for [facility] recent admission 4/18/22 at [facility]. He has had attempted colonoscopy with poor prep x3. He has hx of colostomy with reversal years ago. He is on Eliquis for AFib recent cardiac ablation during hospitalization at [facility] in April. Last admission here it was determined to remain conservative from a GI standpoint due to inability for patient to prep for colonoscopy. He did have abnormality at the cecum on CT scan that admission as well.     Past Medical History:  Past Medical History:   Diagnosis Date   • Bipolar 1 disorder (HCC)    • Diverticulitis    • Hernia of abdominal wall    • Kidney disease    • Myocardial infarction (HCC) 02/2017   • Sleep apnea    • Stroke (HCC) 02/2017       Past Surgical History:  Past Surgical History:   Procedure Laterality Date   • COLOSTOMY  2001    x 3 months   • COLOSTOMY REVISION     • CORONARY ARTERY BYPASS GRAFT      1991/1996   • HERNIA REPAIR      abdominal hernia x 3; poor healing with multiple revisions   • PACEMAKER IMPLANTATION  2017    w/ defibrillator   • ROTATOR CUFF REPAIR Right 2005       Social History:   Social History     Tobacco Use   • Smoking status: Former Smoker     Packs/day: 1.00     Years: 40.00     Pack years: 40.00     Types:  Cigarettes   • Smokeless tobacco: Never Used   Substance Use Topics   • Alcohol use: No        Family History:  Family History   Problem Relation Age of Onset   • Heart disease Brother    • Diabetes Brother    • Sleep apnea Brother        Home Meds:  Medications Prior to Admission   Medication Sig Dispense Refill Last Dose   • Cholecalciferol (vitamin D3) 125 MCG (5000 UT) tablet tablet Take 5,000 Units by mouth Daily.   5/2/2022 at Unknown time   • DULoxetine (CYMBALTA) 60 MG capsule Take 60 mg by mouth Every Night.   5/2/2022 at Unknown time   • empagliflozin (JARDIANCE) 10 MG tablet tablet Take 1 tablet by mouth Daily. 30 tablet  5/2/2022 at Unknown time   • fluticasone (FLONASE) 50 MCG/ACT nasal spray 2 sprays into the nostril(s) as directed by provider Daily As Needed for Rhinitis.   5/2/2022 at Unknown time   • folic acid (FOLVITE) 1 MG tablet Take 1 mg by mouth Daily.   5/2/2022 at Unknown time   • furosemide (LASIX) 80 MG tablet Take 1 tablet by mouth Daily.   5/2/2022 at Unknown time   • lactulose 20 GM/30ML solution solution Take 30 mL by mouth 2 (Two) Times a Day. 450 mL  5/2/2022 at Unknown time   • lithium carbonate 150 MG capsule Take 150 mg by mouth 2 (Two) Times a Day With Meals.   5/2/2022 at Unknown time   • magnesium oxide (MAGOX) 400 (241.3 Mg) MG tablet tablet Take 400 mg by mouth Daily.   5/2/2022 at Unknown time   • melatonin 5 MG tablet tablet Take 5 mg by mouth Every Night.   5/2/2022 at Unknown time   • pantoprazole (PROTONIX) 40 MG EC tablet Take 40 mg by mouth Every Night.   5/2/2022 at Unknown time   • polyethylene glycol (MIRALAX) pack packet Take 17 g by mouth Daily. 30 each 0 5/2/2022 at Unknown time   • potassium chloride ER (K-TAB) 20 MEQ tablet controlled-release ER tablet Take 30 mEq by mouth Daily.   5/2/2022 at Unknown time   • QUEtiapine (SEROquel) 50 MG tablet Take 1 tablet by mouth Every Night.   5/2/2022 at Unknown time   • vitamin C (ASCORBIC ACID) 250 MG tablet Take 1,000  mg by mouth Daily.   5/2/2022 at Unknown time   • zinc gluconate 50 MG tablet Take 50 mg by mouth Daily.   5/2/2022 at Unknown time   • midodrine (PROAMATINE) 5 MG tablet Take 1 tablet by mouth 3 (Three) Times a Day Before Meals.      • nitroglycerin (NITROSTAT) 0.4 MG SL tablet Place 0.4 mg under the tongue Every 5 (Five) Minutes As Needed for Chest Pain. Take no more than 3 doses in 15 minutes.   Unknown at Unknown time       Current Meds:   Hospital Medications (active)       Dose Frequency Start End    DULoxetine (CYMBALTA) DR capsule 60 mg 60 mg Nightly 5/3/2022     Admin Instructions: Caution: Look alike/sound alike drug alert. Capsule may be opened and sprinkled on applesauce or apple juice. Do not crush or chew capsule.    Route: Oral    Cosign for Ordering: Accepted by Aly Phan MD on 5/4/2022  8:17 AM    empagliflozin (JARDIANCE) tablet 10 mg 10 mg Daily 5/4/2022     Route: Oral    Cosign for Ordering: Accepted by Aly Phan MD on 5/4/2022  8:17 AM    fluticasone (FLONASE) 50 MCG/ACT nasal spray 2 spray 2 spray Daily PRN 5/3/2022     Route: Nasal    Cosign for Ordering: Accepted by Aly Phan MD on 5/4/2022  8:17 AM    folic acid (FOLVITE) tablet 1 mg 1 mg Daily 5/4/2022     Route: Oral    Cosign for Ordering: Accepted by Aly Phan MD on 5/4/2022  8:17 AM    furosemide (LASIX) tablet 80 mg 80 mg Daily 5/4/2022     Route: Oral    Cosign for Ordering: Accepted by Aly Phan MD on 5/4/2022  8:17 AM    lactulose solution 30 g 30 g 3 Times Daily 5/4/2022     Route: Oral    lithium carbonate capsule 150 mg 150 mg 2 Times Daily With Meals 5/3/2022     Route: Oral    Cosign for Ordering: Accepted by Aly Phan MD on 5/4/2022  8:17 AM    magnesium oxide (MAG-OX) tablet 400 mg 400 mg Daily 5/4/2022     Route: Oral    Cosign for Ordering: Accepted by Aly Phan MD on 5/4/2022  8:17 AM    melatonin tablet 6 mg 6 mg Nightly 5/3/2022     Route: Oral     Cosign for Ordering: Accepted by Aly Phan MD on 5/4/2022  8:17 AM    midodrine (PROAMATINE) tablet 5 mg 5 mg 3 Times Daily Before Meals 5/4/2022     Route: Oral    Cosign for Ordering: Accepted by Aly Phan MD on 5/4/2022  8:17 AM    pantoprazole (PROTONIX) EC tablet 40 mg 40 mg Nightly 5/3/2022     Admin Instructions: Swallow whole; do not crush, split, or chew.    Route: Oral    Cosign for Ordering: Accepted by Aly Phan MD on 5/4/2022  8:17 AM    polyethylene glycol (MIRALAX) packet 17 g 17 g Daily 5/4/2022     Admin Instructions: Use 4-8 ounces of water, tea, or juice for each 17 gram dose.    Route: Oral    Cosign for Ordering: Accepted by Aly Phan MD on 5/4/2022  8:17 AM    potassium chloride (MICRO-K) CR capsule 10 mEq 10 mEq Daily 5/4/2022     Route: Oral    Cosign for Ordering: Accepted by Aly Phan MD on 5/4/2022  8:17 AM    QUEtiapine (SEROquel) tablet 50 mg 50 mg Nightly 5/3/2022     Admin Instructions: Caution: Look alike/sound alike drug alert    Route: Oral    Cosign for Ordering: Accepted by Aly Phan MD on 5/4/2022  8:17 AM          Allergies:  Allergies   Allergen Reactions   • Sulfa Antibiotics        Review of Systems  Review of Systems   Constitutional: Negative for chills, diaphoresis and fever.   Respiratory: Negative for cough, choking, chest tightness and shortness of breath.    Cardiovascular: Negative for chest pain and palpitations.   Gastrointestinal: Negative for abdominal pain, nausea and vomiting.   Musculoskeletal: Negative for joint swelling.   Skin: Negative for color change and rash.   Neurological: Negative for headaches.   Psychiatric/Behavioral: Negative for confusion.        Objective     Vital Signs  Temp:  [97.5 °F (36.4 °C)-98 °F (36.7 °C)] 98 °F (36.7 °C)  Heart Rate:  [62-74] 62  Resp:  [18-20] 18  BP: ()/(47-77) 99/64  Body mass index is 30.67 kg/m².    Physical Exam:  General Appearance:    Alert,  cooperative, in no acute distress   Head:    Normocephalic, without obvious abnormality, atraumatic   Eyes:            Lids and lashes normal, conjunctivae and sclerae normal, no icterus, conjunctival pallor   Throat:   No oral lesions, no thrush, oral mucosa moist, posterior oropharynx clear   Neck:   No adenopathy, supple, trachea midline, no thyromegaly, no   carotid bruit, no JVD   Lungs:     Clear to auscultation,respirations regular, even and            unlabored    Heart:    Regular rhythm and normal rate, normal S1 and S2,           no   murmur   Chest Wall:    No abnormalities observed   Abdomen:     Normal bowel sounds, no masses, no organomegaly,     soft nontender, nondistended, no guarding, no rebound      tenderness   Rectal:     Deferred   Extremities:   no edema, no cyanosis   Skin:   No open lesions, bruising or rash   Lymph nodes:   No palpable cervical adenopathy   Psychiatric:  Judgment and insight: normal   Orientation to person place and time: normal   Mood and affect: normal     Results Review:  Results from last 7 days   Lab Units 05/03/22  1028 05/01/22  0518 04/30/22  0602   WBC 10*3/mm3 9.60 8.83 8.40   HEMOGLOBIN g/dL 8.6* 9.2* 9.3*   HEMATOCRIT % 28.7* 30.6* 30.1*   PLATELETS 10*3/mm3 157 144 129*       Results from last 7 days   Lab Units 05/03/22  0952 05/02/22  0730 05/01/22  0518 04/29/22  1430 04/29/22  0847   SODIUM mmol/L 128* 131* 130*   < > 125*   POTASSIUM mmol/L 4.6 3.7 3.7   < > 4.1   CHLORIDE mmol/L 90* 94* 92*   < > 89*   CO2 mmol/L 20.0* 25.0 26.0   < > 26.0   BUN mg/dL 28* 27* 25*   < > 31*   CREATININE mg/dL 1.73* 1.52* 1.49*   < > 1.65*   CALCIUM mg/dL 9.2 9.5 8.9   < > 9.3   BILIRUBIN mg/dL 2.6*  --  2.5*  --  3.0*   ALK PHOS U/L 162*  --  164*  --  187*   ALT (SGPT) U/L 114*  --  184*  --  298*   AST (SGOT) U/L 46*  --  66*  --  122*   GLUCOSE mg/dL 106* 116* 109*   < > 146*    < > = values in this interval not displayed.       Results from last 7 days   Lab Units  05/03/22  1028   INR  1.43*        Radiology Review:  Imaging Results (Last 72 Hours)     Procedure Component Value Units Date/Time    CT Head Without Contrast [[id number]] Collected: 05/03/22 1306     Updated: 05/03/22 1312    Narrative:      EXAMINATION:   CT HEAD WO CONTRAST-  5/3/2022 1:06 PM CDT     HISTORY: CT BRAIN without contrast 5/3/2022 12:41 PM CDT     HISTORY: Patient fell     COMPARISON: April 20, 2022      DLP: 309 mGy cm     TECHNIQUE: Serial axial tomographic images of the brain were obtained  without the use of intravenous contrast.      FINDINGS:   The midline structures are nondisplaced. There is moderate cerebral and  cerebellar volume loss, with an associated increase in the prominence of  the ventricles and sulci. The basilar cisterns are normal in size and  configuration. There is no evidence of intracranial hemorrhage or  mass-effect. There is low attenuation in the periventricular white  matter, consistent with chronic ischemic change. There are no abnormal  extra-axial fluid collections. There is no evidence of tonsillar  herniation.      The included orbits and their contents are unremarkable. Mucous cyst is  present in the right sphenoid air cell. Ethmoid and frontal air cells  are unremarkable. Mastoid air cells are pneumatized.. The visualized  osseous structures and overlying soft tissues of the skull and face are  intact.        Impression:      Moderate cerebral and cerebellar volume loss with chronic microvascular  disease but no evidence of acute intracranial process.        This report was finalized on 05/03/2022 13:09 by Dr. Josep Pittman MD.          Assessment/Plan         Encephalopathy    Chronic coronary artery disease    Hyperlipidemia    Orthostatic hypotension    S/P ablation of atrial fibrillation    Chronic combined systolic and diastolic CHF (congestive heart failure) ([facility])    Stage 3b chronic kidney disease (HCC)    Hyponatremia    1. Dementia with agitation and  confusion/altered mental status  2. Hyponatremia  3. Elevated liver function tests    US liver pending CT does not show cirrhosis, no known hx for liver disease that I can find. Will get labs to rule out.  He has underlying dementia per records. Today he is awake and answers my questions appropriately.     EMR Dragon/transcription disclaimer: Much of this encounter note is electronic transcription/translation of spoken language to printed text. The electronic translation of spoken language may be erroneous, or at times, nonsensical words or phrases may be inadvertently transcribed. Although I have reviewed the note for such errors, some may still exist.  VIRGINIA Zafar  [facility] Gastroenterology  05/04/22  09:26 CDT

## 2022-05-04 NOTE — PLAN OF CARE
Goal Outcome Evaluation:           Progress: no change  Outcome Evaluation: Pt alert, disoriented to situation. NSR on tele. Room air with sats maintained mid 90s. No c/o pain this shift. Voiding with urinal. Incontinent of bowel this shift x1. Currently NPO for US of liver. Daughter at bedside. Call light in reach. Safety maintained.

## 2022-05-04 NOTE — PROGRESS NOTES
Daily Progress Note  Travis Patel  MRN: [id number] LOS: 1    Admit Date: 5/3/2022   5/4/2022 07:22 CDT          Chief Complaint:  Chief Complaint   Patient presents with   • Altered Mental Status   • Fall       Interval History:    Reviewed overnight events and nursing notes.   Status:  better than before  Pain: No report of pain    ROS:  10 point ROS obtained:   Gen: No fevers  HEENT: No migraine or visual change  Lung: No cough, hemopotysis or wheezing  Cv: No chest pain or pressure  Abd: No nausea or vomit, no change in bowel fct, no gi bleeding  Ext: No inc pain or swelling  Neuro: No seizure or syncope  Skin: No new rashes  Endo: No polyuria, polyphagia or poilydypsia  Psyc: No inc anxiety or depression  MS: No increase in joint pain or swelling reported    DIET:  Diet Regular    Medications:      DULoxetine, 60 mg, Oral, Nightly  empagliflozin, 10 mg, Oral, Daily  folic acid, 1 mg, Oral, Daily  furosemide, 80 mg, Oral, Daily  lactulose, 20 g, Oral, BID  lithium carbonate, 150 mg, Oral, BID With Meals  magnesium oxide, 400 mg, Oral, Daily  melatonin, 6 mg, Oral, Nightly  midodrine, 5 mg, Oral, TID AC  pantoprazole, 40 mg, Oral, Nightly  polyethylene glycol, 17 g, Oral, Daily  potassium chloride, 10 mEq, Oral, Daily  QUEtiapine, 50 mg, Oral, Nightly        Data:     Code Status:   Code Status and Medical Interventions:   Ordered at: 05/03/22 2026     Level Of Support Discussed With:    Patient     Code Status (Patient has no pulse and is not breathing):    CPR (Attempt to Resuscitate)     Medical Interventions (Patient has pulse or is breathing):    Full Support       Family History   Problem Relation Age of Onset   • Heart disease Brother    • Diabetes Brother    • Sleep apnea Brother      Social History     Socioeconomic History   • Marital status: Legally    Tobacco Use   • Smoking status: Former Smoker     Packs/day: 1.00     Years: 40.00     Pack years: 40.00     Types: Cigarettes   •  Smokeless tobacco: Never Used   Substance and Sexual Activity   • Alcohol use: No   • Drug use: No   • Sexual activity: Defer       Labs:  Lab Results (last 72 hours)     Procedure Component Value Units Date/Time    Urine Drug Screen - Urine, Clean Catch [[id number]]  (Abnormal) Collected: 05/03/22 2118    Specimen: Urine, Clean Catch Updated: 05/03/22 2200     THC, Screen, Urine Negative     Phencyclidine (PCP), Urine Negative     Cocaine Screen, Urine Negative     Methamphetamine, Ur Negative     Opiate Screen Negative     Amphetamine Screen, Urine Negative     Benzodiazepine Screen, Urine Negative     Tricyclic Antidepressants Screen Positive     Methadone Screen, Urine Negative     Barbiturates Screen, Urine Negative     Oxycodone Screen, Urine Negative     Propoxyphene Screen Negative     Buprenorphine, Screen, Urine Negative    Narrative:      Cutoff For Drugs Screened:    Amphetamines               500 ng/ml  Barbiturates               200 ng/ml  Benzodiazepines            150 ng/ml  Cocaine                    150 ng/ml  Methadone                  200 ng/ml  Opiates                    100 ng/ml  Phencyclidine               25 ng/ml  THC                            50 ng/ml  Methamphetamine            500 ng/ml  Tricyclic Antidepressants  300 ng/ml  Oxycodone                  100 ng/ml  Propoxyphene               300 ng/ml  Buprenorphine               10 ng/ml    The normal value for all drugs tested is negative. This report includes unconfirmed screening results, with the cutoff values listed, to be used for medical treatment purposes only.  Unconfirmed results must not be used for non-medical purposes such as employment or legal testing.  Clinical consideration should be applied to any drug of abuse test, particularly when unconfirmed results are used.      TSH [[id number]]  (Abnormal) Collected: 05/03/22 0952    Specimen: Blood Updated: 05/03/22 1808     TSH 11.100 uIU/mL     COVID PRE-OP / PRE-PROCEDURE  SCREENING ORDER (NO ISOLATION) - Swab, Nasal Cavity [[id number]]  (Normal) Collected: 05/03/22 1529    Specimen: Swab from Nasal Cavity Updated: 05/03/22 1609    Narrative:      The following orders were created for panel order COVID PRE-OP / PRE-PROCEDURE SCREENING ORDER (NO ISOLATION) - Swab, Nasal Cavity.  Procedure                               Abnormality         Status                     ---------                               -----------         ------                     COVID-19,[facility] IN-RACH...[[id number]]  Normal              Final result                 Please view results for these tests on the individual orders.    COVID-19,[facility] IN-HOUSE,Nasal Swab No Transport Media 3-4 HR TAT - Swab, Nasal Cavity [[id number]]  (Normal) Collected: 05/03/22 1529    Specimen: Swab from Nasal Cavity Updated: 05/03/22 1609     COVID19 Not Detected    Narrative:      Fact sheet for providers: https://www.fda.gov/media/542794/download     Fact sheet for patients: https://www.fda.gov/media/854520/download    Test performed by PCR.    Consider negative results in combination with clinical observations, patient history, and epidemiological information.    Lithium Level [[id number]]  (Normal) Collected: 05/03/22 0952    Specimen: Blood Updated: 05/03/22 1244     Lithium 0.7 mmol/L     POC Occult Blood Stool [[id number]]  (Abnormal) Collected: 05/03/22 1146    Specimen: Stool Updated: 05/03/22 1147     Fecal Occult Blood Positive     Lot Number \215\     Expiration Date \10/31/2022\     DEVELOPER LOT NUMBER \215\     DEVELOPER EXPIRATION DATE \215\     Positive Control Positive     Negative Control Negative    Ammonia [[id number]]  (Normal) Collected: 05/03/22 1042    Specimen: Blood Updated: 05/03/22 1120     Ammonia 59 umol/L     Protime-INR [[id number]]  (Abnormal) Collected: 05/03/22 1028    Specimen: Blood Updated: 05/03/22 1057     Protime 16.9 Seconds      INR 1.43    CBC & Differential [[id number]]  (Abnormal) Collected:  05/03/22 1028    Specimen: Blood Updated: 05/03/22 1050    Narrative:      The following orders were created for panel order CBC & Differential.  Procedure                               Abnormality         Status                     ---------                               -----------         ------                     CBC Auto Differential[[id number]]        Abnormal            Final result                 Please view results for these tests on the individual orders.    CBC Auto Differential [[id number]]  (Abnormal) Collected: 05/03/22 1028    Specimen: Blood Updated: 05/03/22 1050     WBC 9.60 10*3/mm3      RBC 3.16 10*6/mm3      Hemoglobin 8.6 g/dL      Hematocrit 28.7 %      MCV 90.8 fL      MCH 27.2 pg      MCHC 30.0 g/dL      RDW 23.3 %      RDW-SD 77.5 fl      MPV 9.9 fL      Platelets 157 10*3/mm3      Neutrophil % 88.8 %      Lymphocyte % 5.1 %      Monocyte % 5.3 %      Eosinophil % 0.1 %      Basophil % 0.2 %      Immature Grans % 0.5 %      Neutrophils, Absolute 8.52 10*3/mm3      Lymphocytes, Absolute 0.49 10*3/mm3      Monocytes, Absolute 0.51 10*3/mm3      Eosinophils, Absolute 0.01 10*3/mm3      Basophils, Absolute 0.02 10*3/mm3      Immature Grans, Absolute 0.05 10*3/mm3      nRBC 0.0 /100 WBC     Comprehensive Metabolic Panel [[id number]]  (Abnormal) Collected: 05/03/22 0952    Specimen: Blood Updated: 05/03/22 1037     Glucose 106 mg/dL      BUN 28 mg/dL      Creatinine 1.73 mg/dL      Sodium 128 mmol/L      Potassium 4.6 mmol/L      Chloride 90 mmol/L      CO2 20.0 mmol/L      Calcium 9.2 mg/dL      Total Protein 5.5 g/dL      Albumin 3.90 g/dL      ALT (SGPT) 114 U/L      AST (SGOT) 46 U/L      Alkaline Phosphatase 162 U/L      Total Bilirubin 2.6 mg/dL      Globulin 1.6 gm/dL      A/G Ratio 2.4 g/dL      BUN/Creatinine Ratio 16.2     Anion Gap 18.0 mmol/L      eGFR 40.9 mL/min/1.73      Comment: National Kidney Foundation and American Society of Nephrology (ASN) Task Force recommended calculation  "based on the Chronic Kidney Disease Epidemiology Collaboration (CKD-EPI) equation refit without adjustment for race.       Narrative:      GFR Normal >60  Chronic Kidney Disease <60  Kidney Failure <15            Objective:     Vitals: BP 99/64 (BP Location: Right arm, Patient Position: Lying)   Pulse 62   Temp 98 °F (36.7 °C) (Oral)   Resp 18   Ht 167.6 cm (66\")   Wt 86.2 kg (190 lb)   SpO2 93%   BMI 30.67 kg/m²      Intake/Output Summary (Last 24 hours) at 2022 0722  Last data filed at 2022 0413  Gross per 24 hour   Intake --   Output 700 ml   Net -700 ml    Temp (24hrs), Av.8 °F (36.6 °C), Min:97.5 °F (36.4 °C), Max:98.2 °F (36.8 °C)    Glucose:  No results found for: POCGLU  Physical Examination:   General appearance - alert, well appearing, and in no distress and oriented to person, place, and time  Mental status - alert, oriented to person, place, and time, normal mood, behavior, speech, dress, motor activity, and thought processes  Eyes - pupils equal and reactive, extraocular eye movements intact  Ears - bilateral TM's and external ear canals normal, hearing grossly normal bilaterally  Mouth - mucous membranes moist, pharynx normal without lesions  Neck - supple, no significant adenopathy  Lymphatics - no palpable lymphadenopathy, no hepatosplenomegaly  Chest - clear to auscultation, no wheezes, rales or rhonchi, symmetric air entry, no tachypnea, retractions or cyanosis  Heart - normal rate, regular rhythm, normal S1, S2, no murmurs, rubs, clicks or gallops  Abdomen - soft, nontender, nondistended, no masses or organomegaly bowel sounds normal  Neurological - alert, oriented, normal speech, no focal findings or movement disorder noted  Musculoskeletal - no joint tenderness, deformity or swelling  Extremities - peripheral pulses normal, no pedal edema, no clubbing or cyanosis  Skin - normal coloration and turgor, no rashes, no suspicious skin lesions noted      Assessment and Plan: "       Hospital Problem list:    Encephalopathy  Agitation and confusion--improved  Dementia  Obstructive sleep apnea  Anemia with heme positive stool  Coronary artery disease  Hyperlipidemia  Orthostatic hypotension with recent fall  Status post ablation of atrial fibrillation  Chronic combined systolic and diastolic CHF  Stage IIIb CKD  Hyponatremia  Recent GI bleed  Elevated liver function test    Treatment Plan:    Patient has been admitted and stabilized  Orders for serial labs  IV fluid hydration  Neurologic monitoring  Formal neurology consultation rule out subclinical seizure activity  Gastroenterology consultation  Initiate lactulose  Avoid sedating medications  Continue with Seroquel  Continue course of care monitor closely for changes.    Discharge planning: Skilled nursing facility  Reviewed treatment plans with the patient, nursing staff and family  30 minutes spent in face to face interaction and coordination of care.   Electronically signed by BERNARDO Hurley on 5/4/2022 at 07:22 CDT

## 2022-05-04 NOTE — PLAN OF CARE
Goal Outcome Evaluation:  Plan of Care Reviewed With: patient, daughter        Progress: no change  Outcome Evaluation: OT eval completed. Pt awake and alert in fowlers, oriented x4 but unable to state current month. He demos improvement in GMC, command following, visual search, attention and balance compared to previous OT re-eval completed on Saturday. Naming of words improved 2/2. Mild deficits remain in these areas but is compensating well and needing less cues for task completion. Navigates RW well in rodriguez around objects without physical assist, CGA provided for safety. Performed oral hygiene with set up and S, no difficulty with opening containers and sequencing task, improvement from Saturday. Able to locate clock in room and tell time, reads room number in rodriguez when promped. OT indicated to address executive functioning, coordination, and balance to increase independence and decrease fall risk.R ecommend d/c SNF.

## 2022-05-04 NOTE — THERAPY EVALUATION
Patient Name: Travis Patel  : 1945    MRN: [id number]                              Today's Date: 2022       Admit Date: 5/3/2022    Visit Dx:     ICD-10-CM ICD-9-CM   1. Encephalopathy  G93.40 348.30   2. Altered mental status, unspecified altered mental status type  R41.82 780.97   3. Hyponatremia  E87.1 276.1   4. Chronic kidney disease, unspecified CKD stage  N18.9 585.9   5. Chronic liver disease  K76.9 571.9   6. Anemia, unspecified type  D64.9 285.9   7. Gastrointestinal hemorrhage, unspecified gastrointestinal hemorrhage type  K92.2 578.9   8. Increased anion gap metabolic acidosis  E87.2 276.2   9. Impaired mobility  Z74.09 799.89   10. Decreased activities of daily living (ADL)  Z78.9 V49.89     Patient Active Problem List   Diagnosis   • Subarachnoid hemorrhage (HCC)   • Chronic coronary artery disease   • Hyperlipidemia   • ICD (implantable cardioverter-defibrillator) in place   • Orthostatic hypotension   • Syncope   • Acute metabolic encephalopathy, due to hyperammonemia   • S/P ablation of atrial fibrillation   • Chronic combined systolic and diastolic CHF (congestive heart failure) (HCC)   • Elevated troponin, likely due to recent ablation   • Stage 3b chronic kidney disease (HCC)   • Bipolar 1 disorder, mixed, full remission (HCC)   • Abnormal BUN-to-creatinine ratio   • Hyponatremia   • Obesity (BMI 30-39.9)   • Lactic acidosis   • GI bleed   • Encephalopathy     Past Medical History:   Diagnosis Date   • Bipolar 1 disorder (HCC)    • Diverticulitis    • Hernia of abdominal wall    • Kidney disease    • Myocardial infarction (HCC) 2017   • Sleep apnea    • Stroke (HCC) 2017     Past Surgical History:   Procedure Laterality Date   • COLOSTOMY  2001    x 3 months   • COLOSTOMY REVISION     • CORONARY ARTERY BYPASS GRAFT      /   • HERNIA REPAIR      abdominal hernia x 3; poor healing with multiple revisions   • PACEMAKER IMPLANTATION      w/ defibrillator   • ROTATOR  CUFF REPAIR Right 2005      General Information     Row Name 05/04/22 1316          OT Time and Intention    Document Type evaluation  -     Mode of Treatment occupational therapy  -     Row Name 05/04/22 1316          General Information    Patient Profile Reviewed yes  -MW     Prior Level of Function --  independent prior to March 2022 admission  -MW     Existing Precautions/Restrictions fall  -MW     Barriers to Rehab medically complex;previous functional deficit;cognitive status  -     Row Name 05/04/22 1316          Occupational Profile    Reason for Services/Referral (Occupational Profile) Presented from facility with AMS; cirrhosis, elevated liver enzymes; recent admission for encephalopathy  -     Row Name 05/04/22 1316          Living Environment    People in Home facility resident  dc to SNF after recent admission, been out about 24 hrs  -     Row Name 05/04/22 1316          Cognition    Orientation Status (Cognition) oriented x 4  unable to state current month  -     Row Name 05/04/22 1316          Safety Issues, Functional Mobility    Safety Issues Affecting Function (Mobility) awareness of need for assistance;insight into deficits/self-awareness;problem-solving  -     Impairments Affecting Function (Mobility) balance;endurance/activity tolerance;shortness of breath;cognition  -MW     Cognitive Impairments, Mobility Safety/Performance attention;awareness, need for assistance;insight into deficits/self-awareness;judgment;problem-solving/reasoning  -           User Key  (r) = Recorded By, (t) = Taken By, (c) = Cosigned By    Initials Name Provider Type    MW Sunni Nagel, OTR/L Occupational Therapist                 Mobility/ADL's     Row Name 05/04/22 1316          Bed Mobility    Bed Mobility supine-sit  -MW     Supine-Sit [place] (Bed Mobility) supervision  -     Row Name 05/04/22 1316          Transfers    Transfers sit-stand transfer  -     Sit-Stand [place]  (Transfers) contact guard  -[facility] Name 05/04/22 1316          Sit-Stand Transfer    Assistive Device (Sit-Stand Transfers) walker, front-wheeled  -[facility] Name 05/04/22 1316          Functional Mobility    Functional Mobility- Ind. Level contact guard assist  -     Functional Mobility- Device walker, front-wheeled  -MW     Functional Mobility- Comment amb down rodriguez and back to room, increased SOA/fatigue with activity  -MW     Row Name 05/04/22 1316          Activities of Daily Living    BADL Assessment/Intervention grooming  -MW     Row Name 05/04/22 1316          Grooming Assessment/Training    [place] Level (Grooming) oral care regimen;set up;supervision  -     Position (Grooming) supported sitting  -           User Key  (r) = Recorded By, (t) = Taken By, (c) = Cosigned By    Initials Name Provider Type    Sunni Sky, OTR/L Occupational Therapist               Obj/Interventions     [facility] Name 05/04/22 1316          Vision Assessment/Intervention    Vision Assessment Comment decreased automatic visual search with need for increased time for processing of cues, able to read clock across room and room number when in rodriguez  -[facility] 05/04/22 1316          Range of Motion Comprehensive    Comment, General Range of Motion B shoulder impairments ~30% flexion, distally WFL BUE  -MW     Row Name 05/04/22 1316          Strength Comprehensive (MMT)    Comment, General Manual Muscle Testing (MMT) Assessment B shoulders 3+/5 within available range, distally 4-/5  -MW     Row Name 05/04/22 1316          Motor Skills    Motor Skills coordination  -     Coordination fine motor deficit;gross motor deficit;upper extremity;bilateral  increased processing time required for performance of tasks, cues to attend and follow through with prompted task; FTN with mild impairement BUE but improvement from previous admission  -MW     Row Name 05/04/22 1316          Balance    Balance Assessment sitting  static balance;sitting dynamic balance;standing static balance;standing dynamic balance  -MW     Static Sitting Balance standby assist  -MW     Dynamic Sitting Balance standby assist  -MW     Position, Sitting Balance sitting edge of bed  -MW     Static Standing Balance contact guard  -MW     Dynamic Standing Balance contact guard  -MW     Position/Device Used, Standing Balance unsupported;walker, front-wheeled  -MW           User Key  (r) = Recorded By, (t) = Taken By, (c) = Cosigned By    Initials Name Provider Type    Sunni Sky, OTR/L Occupational Therapist               Goals/Plan     Row Name 05/04/22 1316          Transfer Goal 1 (OT)    Activity/Assistive Device (Transfer Goal 1, OT) sit-to-stand/stand-to-sit;bed-to-chair/chair-to-bed;toilet;shower chair  -MW     [place] Level/Cues Needed (Transfer Goal 1, OT) supervision required  -MW     Time Frame (Transfer Goal 1, OT) long term goal (LTG);10 days  -MW     Progress/Outcome (Transfer Goal 1, OT) goal ongoing  -[facility] Name 05/04/22 1316          Bathing Goal 1 (OT)    Activity/Device (Bathing Goal 1, OT) bathing skills, all;shower chair  -MW     [place] Level/Cues Needed (Bathing Goal 1, OT) supervision required  -MW     Time Frame (Bathing Goal 1, OT) long term goal (LTG);10 days  -MW     Progress/Outcomes (Bathing Goal 1, OT) goal ongoing  -     Row Name 05/04/22 1316          Therapy Assessment/Plan (OT)    Planned Therapy Interventions (OT) activity tolerance training;BADL retraining;cognitive/visual perception retraining;IADL retraining;functional balance retraining;neuromuscular control/coordination retraining;occupation/activity based interventions;patient/caregiver education/training;strengthening exercise;transfer/mobility retraining  -           User Key  (r) = Recorded By, (t) = Taken By, (c) = Cosigned By    Initials Name Provider Type    Sunni Sky, OTR/L Occupational Therapist               Clinical  Impression     Row Name 05/04/22 1316          Pain Assessment    Pretreatment Pain Rating 0/10 - no pain  -MW     Posttreatment Pain Rating 0/10 - no pain  -     Row Name 05/04/22 1316          Plan of Care Review    Plan of Care Reviewed With patient;daughter  -     Progress no change  -     Outcome Evaluation OT eval completed. Pt awake and alert in fowlers, oriented x4 but unable to state current month. He demos improvement in GMC, command following, visual search, attention and balance compared to previous OT re-eval completed on Saturday. Naming of words improved 2/2. Mild deficits remain in these areas but is compensating well and needing less cues for task completion. Navigates RW well in rodriguez around objects without physical assist, CGA provided for safety. Performed oral hygiene with set up and S, no difficulty with opening containers and sequencing task, improvement from Saturday. Able to locate clock in room and tell time, reads room number in rodriguez when promped. OT indicated to address executive functioning, coordination, and balance to increase independence and decrease fall risk.R ecommend d/c SNF.  -     Row Name 05/04/22 1316          Therapy Assessment/Plan (OT)    Rehab Potential (OT) good, to achieve stated therapy goals  -     Criteria for Skilled Therapeutic Interventions Met (OT) yes;skilled treatment is necessary  -     Therapy Frequency (OT) 5 times/wk  -     Predicted Duration of Therapy Intervention (OT) 10 days  -     Row Name 05/04/22 1316          Therapy Plan Review/Discharge Plan (OT)    Anticipated Discharge Disposition (OT) skilled nursing facility  -     Row Name 05/04/22 1316          Positioning and Restraints    Pre-Treatment Position in bed  -MW     Post Treatment Position chair  -MW     In Chair sitting;call light within reach;encouraged to call for assist;with family/caregiver  -           User Key  (r) = Recorded By, (t) = Taken By, (c) = Cosigned By     Initials Name Provider Type    MW Sunni Nagel, OTR/L Occupational Therapist               05/04/22 1316   Executive Function Goal 1 (OT)   Progress/Outcome (Executive Function Goal 1, OT) goal ongoing   [place]/Cues/Accuracy (Executive Function Goal 1, OT) with minimum;verbal cues/redirection;with 90% accuracy   Activity (Executive Function Goal 1, OT) information processing tasks;initiation for tasks;insight/awareness of deficits;organization/sequencing tasks;planning/decision-making tasks;problem-solving/reasoning tasks;self-monitoring/self-correction   Time Frame (Executive Function Goal 1, OT) long term goal (LTG);10 days          Outcome Measures     Row Name 05/04/22 1316          How much help from another is currently needed...    Putting on and taking off regular lower body clothing? 2  -MW     Bathing (including washing, rinsing, and drying) 2  -MW     Toileting (which includes using toilet bed pan or urinal) 3  -MW     Putting on and taking off regular upper body clothing 3  -MW     Taking care of personal grooming (such as brushing teeth) 3  -MW     Eating meals 3  -MW     AM-PAC 6 Clicks Score (OT) 16  -MW     Row Name 05/04/22 1315          How much help from another person do you currently need...    Turning from your back to your side while in flat bed without using bedrails? 4  -KIM (r) AS (t) KIM (c)     Moving from lying on back to sitting on the side of a flat bed without bedrails? 4  -KIM (r) AS (t) KIM (c)     Moving to and from a bed to a chair (including a wheelchair)? 3  -KIM (r) AS (t) KIM (c)     Standing up from a chair using your arms (e.g., wheelchair, bedside chair)? 3  -KIM (r) AS (t) KIM (c)     Climbing 3-5 steps with a railing? 2  -KIM (r) AS (t) KIM (c)     To walk in hospital room? 3  -KIM (r) AS (t) KIM (c)     AM-PAC 6 Clicks Score (PT) 19  -KIM (r) AS (t)     Highest level of mobility 6 --> Walked 10 steps or more  -KIM (r) AS (t)     Row Name 05/04/22 1316 05/04/22 1315        Functional Assessment    Outcome Measure Options AM-PAC 6 Clicks Daily Activity (OT)  - AM-PAC 6 Clicks Basic Mobility (PT)  -KIM (r) AS (t) KIM (c)          User Key  (r) = Recorded By, (t) = Taken By, (c) = Cosigned By    Initials Name Provider Type    Clarence Wayne, PT DPT Physical Therapist    Sunni Sky, OTR/L Occupational Therapist    AS Eleazar Ravi, PT Student PT Student                Occupational Therapy Education                 Title: PT OT SLP Therapies (In Progress)     Topic: Occupational Therapy (In Progress)     Point: ADL training (In Progress)     Description:   Instruct learner(s) on proper safety adaptation and remediation techniques during self care or transfers.   Instruct in proper use of assistive devices.              Learning Progress Summary           Patient Acceptance, E,D, NR by  at 5/4/2022 1555   Family Acceptance, E,D, NR by  at 5/4/2022 1555                   Point: Home exercise program (In Progress)     Description:   Instruct learner(s) on appropriate technique for monitoring, assisting and/or progressing therapeutic exercises/activities.              Learning Progress Summary           Patient Acceptance, E,D, NR by  at 5/4/2022 1555   Family Acceptance, E,D, NR by  at 5/4/2022 1555                               User Key     Initials Effective Dates Name Provider Type Discipline     08/28/18 -  Sunni Nagel, OTR/L Occupational Therapist OT              OT Recommendation and Plan  Planned Therapy Interventions (OT): activity tolerance training, BADL retraining, cognitive/visual perception retraining, IADL retraining, functional balance retraining, neuromuscular control/coordination retraining, occupation/activity based interventions, patient/caregiver education/training, strengthening exercise, transfer/mobility retraining  Therapy Frequency (OT): 5 times/wk  Plan of Care Review  Plan of Care Reviewed With: patient, daughter  Progress: no  change  Outcome Evaluation: OT eval completed. Pt awake and alert in fowlers, oriented x4 but unable to state current month. He demos improvement in GMC, command following, visual search, attention and balance compared to previous OT re-eval completed on Saturday. Naming of words improved 2/2. Mild deficits remain in these areas but is compensating well and needing less cues for task completion. Navigates RW well in rodriguez around objects without physical assist, CGA provided for safety. Performed oral hygiene with set up and S, no difficulty with opening containers and sequencing task, improvement from Saturday. Able to locate clock in room and tell time, reads room number in rodriguez when promped. OT indicated to address executive functioning, coordination, and balance to increase independence and decrease fall risk.R ecommend d/c SNF.     Time Calculation:    Time Calculation- OT     Row Name 05/04/22 1555             Time Calculation- OT    OT Start Time 1315  +10 min chart revoew  -MW      OT Stop Time 1400  -MW      OT Time Calculation (min) 45 min  -MW      OT Received On 05/04/22  -MW      OT Goal Re-Cert Due Date 05/14/22  -MW            User Key  (r) = Recorded By, (t) = Taken By, (c) = Cosigned By    Initials Name Provider Type     Sunni Nagel, OTR/L Occupational Therapist              Therapy Charges for Today     Code Description Service Date Service Provider Modifiers Qty    [id number]  OT EVAL LOW COMPLEXITY 4 5/4/2022 Sunni Nagel OTR/L GO 1               NASIMA Kauffman/KAVYA  5/4/2022

## 2022-05-05 NOTE — THERAPY TREATMENT NOTE
Acute Care - Occupational Therapy Treatment Note  [facility]     Patient Name: Travis Patel  : 1945  MRN: [id number]  Today's Date: 2022             Admit Date: 5/3/2022       ICD-10-CM ICD-9-CM   1. Encephalopathy  G93.40 348.30   2. Altered mental status, unspecified altered mental status type  R41.82 780.97   3. Hyponatremia  E87.1 276.1   4. Chronic kidney disease, unspecified CKD stage  N18.9 585.9   5. Chronic liver disease  K76.9 571.9   6. Anemia, unspecified type  D64.9 285.9   7. Gastrointestinal hemorrhage, unspecified gastrointestinal hemorrhage type  K92.2 578.9   8. Increased anion gap metabolic acidosis  E87.2 276.2   9. Impaired mobility  Z74.09 799.89   10. Decreased activities of daily living (ADL)  Z78.9 V49.89     Patient Active Problem List   Diagnosis   • Subarachnoid hemorrhage (HCC)   • Chronic coronary artery disease   • Hyperlipidemia   • ICD (implantable cardioverter-defibrillator) in place   • Orthostatic hypotension   • Syncope   • Acute metabolic encephalopathy, due to hyperammonemia   • S/P ablation of atrial fibrillation   • Chronic combined systolic and diastolic CHF (congestive heart failure) ([facility])   • Elevated troponin, likely due to recent ablation   • Stage 3b chronic kidney disease (HCC)   • Bipolar 1 disorder, mixed, full remission (HCC)   • Abnormal BUN-to-creatinine ratio   • Hyponatremia   • Obesity (BMI 30-39.9)   • Lactic acidosis   • GI bleed   • Encephalopathy     Past Medical History:   Diagnosis Date   • Bipolar 1 disorder (HCC)    • Diverticulitis    • Hernia of abdominal wall    • Kidney disease    • Myocardial infarction (HCC) 2017   • Sleep apnea    • Stroke (HCC) 2017     Past Surgical History:   Procedure Laterality Date   • COLOSTOMY  2001    x 3 months   • COLOSTOMY REVISION     • CORONARY ARTERY BYPASS GRAFT         • HERNIA REPAIR      abdominal hernia x 3; poor healing with multiple revisions   • PACEMAKER IMPLANTATION       w/ defibrillator   • ROTATOR CUFF REPAIR Right 2005         OT ASSESSMENT FLOWSHEET (last 12 hours)     OT Evaluation and Treatment     Row Name 05/05/22 1117 05/05/22 1020                OT Time and Intention    Subjective Information complains of;fatigue  -MW --       Document Type therapy note (daily note)  -MW --       Mode of Treatment occupational therapy  -MW --       Session Not Performed -- patient unavailable for treatment  -MW       Comment, Session Not Performed -- imaging  -MW                General Information    Existing Precautions/Restrictions fall  -MW --                Pain Assessment    Pretreatment Pain Rating 0/10 - no pain  -MW --       Posttreatment Pain Rating 0/10 - no pain  -MW --                Cognition    Personal Safety Interventions fall prevention program maintained;gait belt;muscle strengthening facilitated;nonskid shoes/slippers when out of bed;supervised activity  -MW --                Activities of Daily Living    BADL Assessment/Intervention lower body dressing;bathing  -MW --                Bathing Assessment/Intervention    [place] Level (Bathing) upper extremities;chest/trunk;proximal lower extremities;perineal area;supervision;distal lower extremities/feet;maximum assist (25% patient effort)  -MW --       Assistive Devices (Bathing) grab bar, tub/shower;hand-held shower spray hose;shower chair  -MW --       Position (Bathing) supported sitting;supported standing  -MW --                Lower Body Dressing Assessment/Training    Webb Level (Lower Body Dressing) don;doff;socks;pants/bottoms;moderate assist (50% patient effort)  -MW --       Position (Lower Body Dressing) supported sitting;supported standing;unsupported standing  -MW --       Comment, (Lower Body Dressing) difficulty threading feet into socks and pants but is able to pull up in standing from knee level unsupported  -MW --                Bed Mobility    Comment, (Bed Mobility) up in chair  -MW --                 Functional Mobility    Functional Mobility- Ind. Level contact guard assist  -MW --       Functional Mobility- Device walker, front-wheeled  -MW --       Functional Mobility- Comment to shower and back to chair  -MW --                Transfer Assessment/Treatment    Transfers sit-stand transfer;stand-sit transfer;shower transfer  -MW --                Transfers    Sit-Stand [place] (Transfers) contact guard  -MW --       Stand-Sit [place] (Transfers) contact guard;verbal cues  -MW --       [place] Level (Shower Transfer) contact guard;minimum assist (75% patient effort);verbal cues  -MW --       Assistive Device (Shower Transfer) shower chair;grab bar, tub/shower  -MW --                Stand-Sit Transfer    Assistive Device (Stand-Sit Transfers) walker, front-wheeled  -MW --       Comment, (Stand-Sit Transfer) increased time for processing  -MW --                Shower Transfer    Type (Shower Transfer) stand pivot/stand step;sit-stand;stand-sit  -MW --                Plan of Care Review    Plan of Care Reviewed With patient;daughter  -MW --       Progress improving  -MW --       Outcome Evaluation OT txt completed. Fxl mobility within room to BR with CGA. Increased time for transfers but demos good self awareness for need for slowed movements to decrease fall risk. Shower with set up and S, asks for needed items and demos good problem solving around balance and strength deficits. Stood unsupported at times for tasks without LOB. ModA for LB dressing, diffiuclty getting down to feet for tasks. Demos increased fatigue and mild SOA after full bathing and dressing routine. Making improvements overall towards increased independence. Recommend d/c SNF.  -MW --                Positioning and Restraints    Pre-Treatment Position sitting in chair/recliner  -MW --       Post Treatment Position chair  -MW --       In Chair sitting;call light within reach;encouraged to call for assist;with  family/caregiver  - --                Therapy Plan Review/Discharge Plan (OT)    Anticipated Discharge Disposition (OT) [facility] nursing [facility]  - --             User Key  (r) = Recorded By, (t) = Taken By, (c) = Cosigned By    Initials Name Effective Dates     Sunni Nagel, OTR/L 08/28/18 -                  Occupational Therapy Education                 Title: PT OT SLP Therapies (In Progress)     Topic: Occupational Therapy (In Progress)     Point: ADL training (In Progress)     Description:   Instruct learner(s) on proper safety adaptation and remediation techniques during self care or transfers.   Instruct in proper use of assistive devices.              Learning Progress Summary           Patient Acceptance, E,D, NR by  at 5/4/2022 1555   Family Acceptance, E,D, NR by  at 5/4/2022 1555                   Point: Home exercise program (In Progress)     Description:   Instruct learner(s) on appropriate technique for monitoring, assisting and/or progressing therapeutic exercises/activities.              Learning Progress Summary           Patient Acceptance, E,D, NR by  at 5/4/2022 1555   Family Acceptance, E,D, NR by  at 5/4/2022 1555                               User Key     Initials Effective Dates Name Provider Type Discipline     08/28/18 -  Sunni Nagel, OTR/L Occupational Therapist OT                  OT Recommendation and Plan  Planned Therapy Interventions (OT): activity tolerance training, BADL retraining, cognitive/visual perception retraining, IADL retraining, functional balance retraining, neuromuscular control/coordination retraining, occupation/activity based interventions, patient/caregiver education/training, strengthening exercise, transfer/mobility retraining  Therapy Frequency (OT): 5 times/wk  Plan of Care Review  Plan of Care Reviewed With: patient, daughter  Progress: improving  Outcome Evaluation: OT txt completed. Fxl mobility within room to BR with CGA. Increased  time for transfers but demos good self awareness for need for slowed movements to decrease fall risk. Shower with set up and S, asks for needed items and demos good problem solving around balance and strength deficits. Stood unsupported at times for tasks without LOB. ModA for LB dressing, diffiuclty getting down to feet for tasks. Demos increased fatigue and mild SOA after full bathing and dressing routine. Making improvements overall towards increased independence. Recommend d/c SNF.  Plan of Care Reviewed With: patient, daughter  Outcome Evaluation: OT txt completed. Fxl mobility within room to BR with CGA. Increased time for transfers but demos good self awareness for need for slowed movements to decrease fall risk. Shower with set up and S, asks for needed items and demos good problem solving around balance and strength deficits. Stood unsupported at times for tasks without LOB. ModA for LB dressing, diffiuclty getting down to feet for tasks. Demos increased fatigue and mild SOA after full bathing and dressing routine. Making improvements overall towards increased independence. Recommend d/c SNF.        Time Calculation:    Time Calculation- OT     Row Name 05/05/22 1514 05/05/22 1459          Time Calculation- OT    OT Start Time 1109  -MW --     OT Stop Time 1202  -MW --     OT Time Calculation (min) 53 min  -MW --     Total Timed Code Minutes- OT 53 minute(s)  -MW --     OT Received On 05/05/22  -MW --            Timed Charges    04389 - Gait Training Minutes  -- 18  -NW            Total Minutes    Timed Charges Total Minutes -- 18  -NW      Total Minutes -- 18  -NW           User Key  (r) = Recorded By, (t) = Taken By, (c) = Cosigned By    Initials Name Provider Type    NW Aleshia Baker, PTA Physical Therapist Assistant    MW Sunni Nagel K, OTR/L Occupational Therapist              Therapy Charges for Today     Code Description Service Date Service Provider Modifiers Qty    68681772468  OT EVAL LOW  COMPLEXITY 4 5/4/2022 Sunni Nagel OTR/L GO 1    97003151299 HC OT SELF CARE/MGMT/TRAIN EA 15 MIN 5/5/2022 Sunni Nagel OTR/L GO 4               NASIMA Kauffman/KAVYA  5/5/2022

## 2022-05-05 NOTE — CASE MANAGEMENT/SOCIAL WORK
Continued Stay Note   [place]     Patient Name: Travis Patel  MRN: [id number]  Today's Date: 5/5/2022    Admit Date: 5/3/2022     Discharge Plan     Row Name 05/05/22 0957       Plan    Plan [facility]- upon ins. precert approval    Patient/Family in Agreement with Plan yes    Plan Comments Pt has private bed opening at [facility] and wife accepted.  They are starting ins. precert request from ins. Will follow and inform upon completion.               Discharge Codes    No documentation.               Expected Discharge Date and Time     Expected Discharge Date Expected Discharge Time    May 9, 2022             APRIL Callaway

## 2022-05-05 NOTE — PLAN OF CARE
Goal Outcome Evaluation:            A&Ox3.  NPO since midnight.  US of liver and elastography parenchyma today.  No C/O pain.  SCD.  Tele in place. NS.  RA.  Skin tears and bruising.  Up to RR.  Bed alarm on.  Resting between care.  Will continue to monitor.       Term

## 2022-05-05 NOTE — THERAPY TREATMENT NOTE
Acute Care - Physical Therapy Treatment Note  [facility]     Patient Name: Travis Patel  : 1945  MRN: [id number]  Today's Date: 2022      Visit Dx:     ICD-10-CM ICD-9-CM   1. Encephalopathy  G93.40 348.30   2. Altered mental status, unspecified altered mental status type  R41.82 780.97   3. Hyponatremia  E87.1 276.1   4. Chronic kidney disease, unspecified CKD stage  N18.9 585.9   5. Chronic liver disease  K76.9 571.9   6. Anemia, unspecified type  D64.9 285.9   7. Gastrointestinal hemorrhage, unspecified gastrointestinal hemorrhage type  K92.2 578.9   8. Increased anion gap metabolic acidosis  E87.2 276.2   9. Impaired mobility  Z74.09 799.89   10. Decreased activities of daily living (ADL)  Z78.9 V49.89     Patient Active Problem List   Diagnosis   • Subarachnoid hemorrhage (HCC)   • Chronic coronary artery disease   • Hyperlipidemia   • ICD (implantable cardioverter-defibrillator) in place   • Orthostatic hypotension   • Syncope   • Acute metabolic encephalopathy, due to hyperammonemia   • S/P ablation of atrial fibrillation   • Chronic combined systolic and diastolic CHF (congestive heart failure) (HCC)   • Elevated troponin, likely due to recent ablation   • Stage 3b chronic kidney disease (HCC)   • Bipolar 1 disorder, mixed, full remission (HCC)   • Abnormal BUN-to-creatinine ratio   • Hyponatremia   • Obesity (BMI 30-39.9)   • Lactic acidosis   • GI bleed   • Encephalopathy     Past Medical History:   Diagnosis Date   • Bipolar 1 disorder (HCC)    • Diverticulitis    • Hernia of abdominal wall    • Kidney disease    • Myocardial infarction (HCC) 2017   • Sleep apnea    • Stroke (HCC) 2017     Past Surgical History:   Procedure Laterality Date   • COLOSTOMY  2001    x 3 months   • COLOSTOMY REVISION     • CORONARY ARTERY BYPASS GRAFT         • HERNIA REPAIR      abdominal hernia x 3; poor healing with multiple revisions   • PACEMAKER IMPLANTATION      w/ defibrillator   •  ROTATOR CUFF REPAIR Right 2005     PT Assessment (last 12 hours)     PT Evaluation and Treatment     Row Name 05/05/22 1422 05/05/22 1124       Physical Therapy Time and Intention    Subjective Information complains of;weakness  -NW --    Document Type therapy note (daily note)  -NW --    Mode of Treatment physical therapy  -NW --    Session Not Performed -- other (see comments)  -NW    Comment, Session Not Performed -- w/ OT will ck bk this pm  -NW    Row Name 05/05/22 1422          General Information    Existing Precautions/Restrictions fall  -NW     Row Name 05/05/22 1422          Pain    Pretreatment Pain Rating 0/10 - no pain  -NW     Row Name 05/05/22 1422          Transfers    Sit-Stand Bartholomew (Transfers) verbal cues;contact guard  -NW     Row Name 05/05/22 1422          Sit-Stand Transfer    Assistive Device (Sit-Stand Transfers) walker, front-wheeled  -NW     [facility] Name 05/05/22 1422          Gait/Stairs (Locomotion)    Bartholomew Level (Gait) verbal cues;contact guard  -NW     Assistive Device (Gait) walker, front-wheeled  -NW     Distance in Feet (Gait) 50x2  -NW     Deviations/Abnormal Patterns (Gait) dang decreased;stride length decreased  -NW     Comment, (Gait/Stairs) pt fatigued easily required standing rest before able to turn and amb back to room  -NW     Row Name 05/05/22 1422          Safety Issues, Functional Mobility    Safety Issues Affecting Function (Mobility) safety precaution awareness  -NW     Impairments Affecting Function (Mobility) balance;endurance/activity tolerance;strength  -[facility] Name 05/05/22 1422          Positioning and Restraints    Pre-Treatment Position sitting in chair/recliner  -NW     Post Treatment Position chair  -NW     In Chair reclined;call light within reach;encouraged to call for assist;with family/caregiver  -NW           User Key  (r) = Recorded By, (t) = Taken By, (c) = Cosigned By    Initials Name Provider Type    NW Aleshia Baker, PTA  Physical Therapist Assistant                Physical Therapy Education                 Title: PT OT SLP Therapies (In Progress)     Topic: Physical Therapy (In Progress)     Point: Mobility training (Done)     Learning Progress Summary           Patient Acceptance, E, VU by AS at 5/4/2022 5915    Comment: Pt education on PT POC and goals.                   Point: Home exercise program (Not Started)     Learner Progress:  Not documented in this visit.          Point: Body mechanics (Not Started)     Learner Progress:  Not documented in this visit.          Point: Precautions (Not Started)     Learner Progress:  Not documented in this visit.                      User Key     Initials Effective Dates Name Provider Type Discipline    AS 02/28/22 -  Eleazar Ravi, PT Student PT Student PT              PT Recommendation and Plan     Plan of Care Reviewed With: patient  Progress: improving  Outcome Evaluation: Pt up in chair. sit-stand cga/min x1. Pt amb 50'x2 cga rwx standing rest pt fatigues quickly cues for safety. Pt would benefit from cont PT upon d/c.       Time Calculation:    PT Charges     Row Name 05/05/22 1459             Time Calculation    Start Time 1422  -NW      Stop Time 1440  -NW      Time Calculation (min) 18 min  -NW      PT Received On 05/05/22  -NW      PT Goal Re-Cert Due Date 05/14/22  -NW              Time Calculation- PT    Total Timed Code Minutes- PT 18 minute(s)  -NW              Timed Charges    26056 - Gait Training Minutes  18  -NW              Total Minutes    Timed Charges Total Minutes 18  -NW       Total Minutes 18  -NW            User Key  (r) = Recorded By, (t) = Taken By, (c) = Cosigned By    Initials Name Provider Type    NW Aleshia Baker PTA Physical Therapist Assistant              Therapy Charges for Today     Code Description Service Date Service Provider Modifiers Qty    45276937520 HC GAIT TRAINING EA 15 MIN 5/5/2022 Aleshia Baker PTA GP 1          PT G-Codes  Outcome  Measure Options: AM-PAC 6 Clicks Daily Activity (OT)  AM-PAC 6 Clicks Score (PT): 19  AM-PAC 6 Clicks Score (OT): 16    Aleshia Baker, PTA  5/5/2022

## 2022-05-05 NOTE — PROGRESS NOTES
[facility]  Inpatient Progress Note  Travis Patel  1945 5/5/2022  Reason for Follow Up:  Elevated liver function tests, altered mental status    Subjective     Subjective:   Pt is awake and alert daughter is at bedside. I have discussed all tests and results from a GI related standpoint this far. I actually know the daughter well and we discussed events and history. She is aware of previous CT findings and does not want to consider colonoscopy at this time. She is aware of all possible findings to include a colon cancer. She does not desire further testing of this. He is not having any active GI bleeding. No fever chills or sweats. No abdominal pain. No hx of known liver disease. He does have a hx of CHF and fluid overload recently.     Current Facility-Administered Medications:   •  acetaminophen (TYLENOL) tablet 500 mg, 500 mg, Oral, Q6H PRN, Aly Phan MD  •  DULoxetine (CYMBALTA) DR capsule 60 mg, 60 mg, Oral, Nightly, Je Crow PA, 60 mg at 05/04/22 2119  •  empagliflozin (JARDIANCE) tablet 10 mg, 10 mg, Oral, Daily, Je Crow PA, 10 mg at 05/04/22 0840  •  Enoxaparin Sodium (LOVENOX) syringe 30 mg, 30 mg, Subcutaneous, Daily, Aly Phan MD, 30 mg at 05/05/22 0830  •  fluticasone (FLONASE) 50 MCG/ACT nasal spray 2 spray, 2 spray, Nasal, Daily PRN, Je Crow PA  •  folic acid (FOLVITE) tablet 1 mg, 1 mg, Oral, Daily, Je Crow PA, 1 mg at 05/04/22 0840  •  furosemide (LASIX) tablet 80 mg, 80 mg, Oral, Daily, Je Crow PA, 80 mg at 05/04/22 0840  •  lactulose solution 30 g, 30 g, Oral, TID, Aly Phan MD, 30 g at 05/04/22 2119  •  levothyroxine (SYNTHROID, LEVOTHROID) tablet 50 mcg, 50 mcg, Oral, Q AM, Aly Phan MD  •  lithium carbonate capsule 150 mg, 150 mg, Oral, BID With Meals, Je Crow PA, 150 mg at 05/04/22 1709  •  magnesium oxide (MAG-OX) tablet 400 mg, 400 mg, Oral, Daily, Je Crow PA,  400 mg at 05/04/22 0840  •  melatonin tablet 6 mg, 6 mg, Oral, Nightly, Je Crow PA, 6 mg at 05/04/22 2119  •  midodrine (PROAMATINE) tablet 5 mg, 5 mg, Oral, TID AC, Je Crow PA, 5 mg at 05/04/22 1709  •  ondansetron (ZOFRAN) injection 4 mg, 4 mg, Intravenous, Q6H PRN, Aly Phan MD  •  pantoprazole (PROTONIX) EC tablet 40 mg, 40 mg, Oral, Nightly, Je Crow PA, 40 mg at 05/04/22 2119  •  polyethylene glycol (MIRALAX) packet 17 g, 17 g, Oral, Daily, Je Crow PA, 17 g at 05/04/22 0840  •  potassium chloride (MICRO-K) CR capsule 10 mEq, 10 mEq, Oral, Daily, Je Crow PA, 10 mEq at 05/04/22 0840  •  QUEtiapine (SEROquel) tablet 50 mg, 50 mg, Oral, Nightly, Je Crow PA, 50 mg at 05/04/22 2119    Review of Systems:    Review of Systems   Constitutional: Negative for activity change, appetite change, chills, diaphoresis, fatigue, fever and unexpected weight change.   HENT: Negative for ear pain, hearing loss, mouth sores, sore throat, trouble swallowing and voice change.    Eyes: Negative.    Respiratory: Negative for cough, choking, shortness of breath and wheezing.    Cardiovascular: Negative for chest pain and palpitations.   Gastrointestinal: Negative for abdominal pain, blood in stool, constipation, diarrhea, nausea and vomiting.   Endocrine: Negative for cold intolerance and heat intolerance.   Genitourinary: Negative for decreased urine volume, dysuria, frequency, hematuria and urgency.   Musculoskeletal: Negative for back pain, gait problem and myalgias.   Skin: Negative for color change, pallor and rash.   Allergic/Immunologic: Negative for food allergies and immunocompromised state.   Neurological: Negative for dizziness, tremors, seizures, syncope, weakness, light-headedness, numbness and headaches.   Hematological: Negative for adenopathy. Does not bruise/bleed easily.   Psychiatric/Behavioral: Negative for agitation and confusion. The patient is not  nervous/anxious.    All other systems reviewed and are negative.       Objective     Vital Signs  Temp:  [97.4 °F (36.3 °C)-97.8 °F (36.6 °C)] 97.4 °F (36.3 °C)  Heart Rate:  [61-63] 62  Resp:  [18] 18  BP: ()/(60-82) 120/82  Body mass index is 30.67 kg/m².    Intake/Output Summary (Last 24 hours) at 5/5/2022 0938  Last data filed at 5/4/2022 2130  Gross per 24 hour   Intake 400 ml   Output 1625 ml   Net -1225 ml     No intake/output data recorded.       Physical Exam:   General: patient awake, alert and cooperative, pale, ill appearing,  no acute distress, up in chair   Eyes: Normal lids and lashes, no scleral icterus   Neck: supple, no JVD   Skin: warm and dry   Cardiovascular: regular rhythm and rate, no murmurs auscultated   Pulm: clear to auscultation bilaterally, regular and unlabored   Abdomen: soft, nontender, nondistended; normal bowel sounds   Psychiatric: Normal mood and behavior; converses appropriately     Results Review:    I have reviewed all of the patients current test results    Results from last 7 days   Lab Units 05/05/22  0712 05/04/22  1036 05/03/22  1028   WBC 10*3/mm3 5.57 5.20 9.60   HEMOGLOBIN g/dL 8.8* 8.8* 8.6*   HEMATOCRIT % 30.1* 29.9* 28.7*   PLATELETS 10*3/mm3 136* 142 157       Results from last 7 days   Lab Units 05/05/22  0712 05/04/22  1528 05/04/22  1036 05/03/22  0952   SODIUM mmol/L 131*  --  128* 128*   POTASSIUM mmol/L 3.8  --  3.8 4.6   CHLORIDE mmol/L 95*  --  91* 90*   CO2 mmol/L 25.0  --  25.0 20.0*   BUN mg/dL 24*  --  29* 28*   CREATININE mg/dL 1.79*  --  1.84* 1.73*   CALCIUM mg/dL 9.4  --  9.6 9.2   BILIRUBIN mg/dL 2.0* 2.6* 2.7* 2.6*   ALK PHOS U/L 158*  --  143* 162*   ALT (SGPT) U/L 79*  --  89* 114*   AST (SGOT) U/L 39  --  43* 46*   GLUCOSE mg/dL 121*  --  126* 106*       Results from last 7 days   Lab Units 05/04/22  1527 05/03/22  1028   INR  1.41* 1.43*       No results found for: LIPASE    Radiology:    Imaging Results (Last 24 Hours)     Procedure  Component Value Units Date/Time    MRI Brain With & Without Contrast [[id number]] Collected: 05/04/22 1651     Updated: 05/04/22 1656    Narrative:      HISTORY: Mental status change     MRI BRAIN: Multiplanar imaging the brain performed pre and IV contrast     COMPARISON: None     FINDINGS: There is no abnormal diffusion restriction. There is mild  generalized volume loss. Partially empty appearing sella. Corpus  callosum intact. Cerebellar tonsils position above the foramen magnum.  Hyperintense FLAIR signal changes the periventricular white matter  regions favoring chronic small vessel ischemia. There are no  intracranial blood products. No abnormal extra-axial fluid collection.  There is no abnormal intracranial enhancement.     Limited assessment of the orbits and base of skull is unremarkable. Mild  chronic mucosal thickening of the paranasal sinuses. Normal flow voids  of the distal internal carotid and basilar arteries.          Impression:      1. No acute signs of ischemia, hemorrhage, mass. No abnormal  intracranial enhancement.  2. Mild generalized volume loss.  3. Nonenhancing hyperintense FLAIR signal changes favoring chronic small  vessel ischemia.     This report was finalized on 05/04/2022 16:53 by Dr. Kimberly Doll MD.            Assessment/Plan     Patient Active Problem List   Diagnosis Code   • Subarachnoid hemorrhage ([facility]) I60.9   • Chronic coronary artery disease I25.10   • Hyperlipidemia E78.5   • ICD (implantable cardioverter-defibrillator) in place Z95.810   • Orthostatic hypotension I95.1   • Syncope R55   • Acute metabolic encephalopathy, due to hyperammonemia G93.41   • S/P ablation of atrial fibrillation Z98.890, Z86.79   • Chronic combined systolic and diastolic CHF (congestive heart failure) ([facility]) I50.42   • Elevated troponin, likely due to recent ablation R77.8   • Stage 3b chronic kidney disease ([facility]) N18.32   • Bipolar 1 disorder, mixed, full remission ([facility]) F31.78   • Abnormal  BUN-to-creatinine ratio R79.89   • Hyponatremia E87.1   • Obesity (BMI 30-39.9) E66.9   • Lactic acidosis E87.2   • GI bleed K92.2   • Encephalopathy G93.40       1. Elevated liver function tests  2. Intermittent confusion with hx of dementia  3. Anemia with heme positive stools most recent workup at [facility] with incomplete colonoscopy    Patient did not desire repeat Endoscopy and Colonoscopy with most recent evaluation at [facility] with admission there  Labs for underlying liver disease incomplete hepatitis panel negative, ammonia normal. Alpha 1 slightly elevated, ceruloplasmin normal.   US with elastography pending  As noted in HPI    EMR Dragon/transcription disclaimer: Much of this encounter note is electronic transcription/translation of spoken language to printed text. The electronic translation of spoken language may be erroneous, or at times, nonsensical words or phrases may be inadvertently transcribed. Although I have reviewed the note for such errors, some may still exist.    Kimberly Liz, [place]  05/05/22  09:38 CDT

## 2022-05-05 NOTE — PLAN OF CARE
Goal Outcome Evaluation:  Plan of Care Reviewed With: patient, daughter        Progress: no change  Outcome Evaluation: Pt alert alert to person, place and year. Continue on telemetry and room air. Pt had US this am. Denies pain. Daughter at bedside. Pt sitting in chair. Assist when out of bed.

## 2022-05-05 NOTE — PLAN OF CARE
Goal Outcome Evaluation:  Plan of Care Reviewed With: patient, daughter        Progress: improving  Outcome Evaluation: OT txt completed. Fxl mobility within room to BR with CGA. Increased time for transfers but demos good self awareness for need for slowed movements to decrease fall risk. Shower with set up and S, asks for needed items and demos good problem solving around balance and strength deficits. Stood unsupported at times for tasks without LOB. ModA for LB dressing, diffiuclty getting down to feet for tasks. Demos increased fatigue and mild SOA after full bathing and dressing routine. Making improvements overall towards increased independence. Recommend d/c SNF.

## 2022-05-05 NOTE — PROGRESS NOTES
"    Daily Progress Note  Travis Patel  MRN: [id number] LOS: 2    Admit Date: 5/3/2022   2022 07:34 CDT    Subjective:         Interval History:    Reviewed overnight events and nursing notes.     Did well overnight.  Has no complaints this morning.  Still has some cognitive slowing but otherwise normal.    ROS:  Review of Systems   Constitutional: Negative for chills and fever.   Respiratory: Negative for cough, chest tightness, shortness of breath and wheezing.    Cardiovascular: Negative for chest pain, palpitations and leg swelling.   Gastrointestinal: Negative for abdominal pain, diarrhea, nausea and vomiting.       DIET:  NPO Diet NPO Type: Sips with Meds    Medications:      DULoxetine, 60 mg, Oral, Nightly  empagliflozin, 10 mg, Oral, Daily  enoxaparin, 30 mg, Subcutaneous, Daily  folic acid, 1 mg, Oral, Daily  furosemide, 80 mg, Oral, Daily  lactulose, 30 g, Oral, TID  lithium carbonate, 150 mg, Oral, BID With Meals  magnesium oxide, 400 mg, Oral, Daily  melatonin, 6 mg, Oral, Nightly  midodrine, 5 mg, Oral, TID AC  pantoprazole, 40 mg, Oral, Nightly  polyethylene glycol, 17 g, Oral, Daily  potassium chloride, 10 mEq, Oral, Daily  QUEtiapine, 50 mg, Oral, Nightly          Objective:     Vitals: /63 (BP Location: Right arm, Patient Position: Lying)   Pulse 63   Temp 97.4 °F (36.3 °C) (Oral)   Resp 18   Ht 167.6 cm (66\")   Wt 86.2 kg (190 lb)   SpO2 94%   BMI 30.67 kg/m²    Intake/Output Summary (Last 24 hours) at 2022 0734  Last data filed at 2022 2130  Gross per 24 hour   Intake 400 ml   Output 1625 ml   Net -1225 ml    Temp (24hrs), Av.6 °F (36.4 °C), Min:97.4 °F (36.3 °C), Max:97.8 °F (36.6 °C)    Glucose:  No results found for: POCGLU  Physical Examination:   Physical Exam  Constitutional:       General: He is not in acute distress.     Comments: Appears chronically ill.   Cardiovascular:      Rate and Rhythm: Normal rate and regular rhythm.      Pulses: Normal pulses.     "  Heart sounds: Murmur heard.   Pulmonary:      Effort: Pulmonary effort is normal.      Breath sounds: Normal breath sounds. No wheezing, rhonchi or rales.   Abdominal:      General: Abdomen is flat. Bowel sounds are normal.      Palpations: Abdomen is soft.      Tenderness: There is no abdominal tenderness.   Neurological:      Mental Status: He is alert.      Comments: Alert and oriented x3 but slow to answer questions.         Labs:  Lab Results (last 24 hours)     Procedure Component Value Units Date/Time    Vitamin D 25 Hydroxy [[id number]]  (Normal) Collected: 05/04/22 1527    Specimen: Blood Updated: 05/05/22 0207     25 Hydroxy, Vitamin D 60.2 ng/ml     Narrative:      Reference Range for Total Vitamin D 25(OH)     Deficiency <20.0 ng/mL   Insufficiency 21-29 ng/mL   Sufficiency  ng/mL  Toxicity >100 ng/ml    Results may be falsely increased if patient taking Biotin.      Hepatitis Panel, Acute [[id number]]  (Normal) Collected: 05/04/22 1527    Specimen: Blood Updated: 05/05/22 0151     Hepatitis B Surface Ag Non-Reactive     Hep A IgM Non-Reactive     Hep B C IgM Non-Reactive     Hepatitis C Ab Non-Reactive    Narrative:      Results may be falsely decreased if patient taking Biotin.     Alpha - 1 - Antitrypsin [[id number]]  (Abnormal) Collected: 05/04/22 1036    Specimen: Blood Updated: 05/04/22 1958     ALPHA -1 ANTITRYPSIN 216 mg/dL     Ceruloplasmin [[id number]]  (Normal) Collected: 05/04/22 1036    Specimen: Blood Updated: 05/04/22 1954     Ceruloplasmin 30 mg/dL     Bilirubin, Total & Direct [[id number]]  (Abnormal) Collected: 05/04/22 1528    Specimen: Blood Updated: 05/04/22 1555     Total Bilirubin 2.6 mg/dL      Bilirubin, Direct 1.5 mg/dL      Bilirubin, Indirect 1.1 mg/dL     Protime-INR [[id number]]  (Abnormal) Collected: 05/04/22 1527    Specimen: Blood Updated: 05/04/22 1551     Protime 16.7 Seconds      INR 1.41    BRUNO Comprehensive Panel [[id number]] Collected: 05/04/22 1527    Specimen:  Blood Updated: 05/04/22 1532    Anti-Smooth Muscle Antibody Titer [[id number]] Collected: 05/04/22 1527    Specimen: Blood Updated: 05/04/22 1532    Mitochondrial Antibodies, M2 [[id number]] Collected: 05/04/22 1527    Specimen: Blood Updated: 05/04/22 1532    Ferritin [[id number]]  (Normal) Collected: 05/04/22 1036    Specimen: Blood Updated: 05/04/22 1147     Ferritin 140.30 ng/mL     Narrative:      Results may be falsely decreased if patient taking Biotin.      Iron Profile [[id number]]  (Abnormal) Collected: 05/04/22 1036    Specimen: Blood Updated: 05/04/22 1147     Iron 22 mcg/dL      Iron Saturation 5 %      Transferrin 314 mg/dL      TIBC 468 mcg/dL     Ammonia [[id number]]  (Normal) Collected: 05/04/22 1036    Specimen: Blood Updated: 05/04/22 1107     Ammonia 34 umol/L     Comprehensive Metabolic Panel [[id number]]  (Abnormal) Collected: 05/04/22 1036    Specimen: Blood Updated: 05/04/22 1105     Glucose 126 mg/dL      BUN 29 mg/dL      Creatinine 1.84 mg/dL      Sodium 128 mmol/L      Potassium 3.8 mmol/L      Chloride 91 mmol/L      CO2 25.0 mmol/L      Calcium 9.6 mg/dL      Total Protein 6.0 g/dL      Albumin 3.50 g/dL      ALT (SGPT) 89 U/L      AST (SGOT) 43 U/L      Alkaline Phosphatase 143 U/L      Total Bilirubin 2.7 mg/dL      Globulin 2.5 gm/dL      A/G Ratio 1.4 g/dL      BUN/Creatinine Ratio 15.8     Anion Gap 12.0 mmol/L      eGFR 38.0 mL/min/1.73      Comment: National Kidney Foundation and American Society of Nephrology (ASN) Task Force recommended calculation based on the Chronic Kidney Disease Epidemiology Collaboration (CKD-EPI) equation refit without adjustment for race.       Narrative:      GFR Normal >60  Chronic Kidney Disease <60  Kidney Failure <15      CBC & Differential [[id number]]  (Abnormal) Collected: 05/04/22 1036    Specimen: Blood Updated: 05/04/22 1047    Narrative:      The following orders were created for panel order CBC & Differential.  Procedure                                Abnormality         Status                     ---------                               -----------         ------                     CBC Auto Differential[[id number]]        Abnormal            Final result                 Please view results for these tests on the individual orders.    CBC Auto Differential [[id number]]  (Abnormal) Collected: 05/04/22 1036    Specimen: Blood Updated: 05/04/22 1047     WBC 5.20 10*3/mm3      RBC 3.26 10*6/mm3      Hemoglobin 8.8 g/dL      Hematocrit 29.9 %      MCV 91.7 fL      MCH 27.0 pg      MCHC 29.4 g/dL      RDW 23.0 %      RDW-SD 78.3 fl      MPV 10.4 fL      Platelets 142 10*3/mm3      Neutrophil % 78.7 %      Lymphocyte % 11.3 %      Monocyte % 6.5 %      Eosinophil % 2.5 %      Basophil % 0.6 %      Immature Grans % 0.4 %      Neutrophils, Absolute 4.09 10*3/mm3      Lymphocytes, Absolute 0.59 10*3/mm3      Monocytes, Absolute 0.34 10*3/mm3      Eosinophils, Absolute 0.13 10*3/mm3      Basophils, Absolute 0.03 10*3/mm3      Immature Grans, Absolute 0.02 10*3/mm3      nRBC 0.0 /100 WBC            Imaging:  EEG    Result Date: 5/4/2022  EEG Report Requesting Physician: Chantel Guevara MD Reading Physician:  Chantel Guevara M.D. Study Date: 5/4/2022 Read Date: 5/4/2022 Clinical History:  This is a 76 year old male with episodic spells of confusion Medications: DULoxetine empagliflozin fluticasone folic acid furosemide lactated ringers bolus lactulose solution lithium carbonate magnesium oxide melatonin midodrine pantoprazole potassium chloride QUEtiapine thiamine Report:  This is an 18 channel EEG recording performed with the [employer] 10-20 lead placement system using the [employer] EEG system  The background activity is best seen over the occipital leads and ranges between 6 to 6.5 Hz.  Patient had no recorded eye opening.documentation  Sleep Architecture:  Drowsiness was demonstrated by a loss of the background waking activities. Onset of stage I sleep was  demonstrated by gradual disappearance of background waking rhythms with gradual symmetric mixed frequency 4-7 Hz slowing. Patient snored frequently during the EEG Photic Stimulation:  Produced no abnormal responses Hyperventilation: Was not performed Asymmetries:  None Epileptiform Activities:  None Interpretation:  This is an abnormal EEG recording secondary to slowing of the background rhythm.  This could be consistent with a metabolic or medication related encephalopathy.  No definitive seizure activity was noted. There was frequent snoring and arousals that my be seen with an underlying sleep disordered breathing such as obstructive sleep apnea. Clinical correlation is recommended. Chantel Guevara MD     CT Head Without Contrast    Result Date: 5/3/2022  EXAMINATION:   CT HEAD WO CONTRAST-  5/3/2022 1:06 PM CDT  HISTORY: CT BRAIN without contrast 5/3/2022 12:41 PM CDT  HISTORY: Patient fell  COMPARISON: April 20, 2022  DLP: 309 mGy cm  TECHNIQUE: Serial axial tomographic images of the brain were obtained without the use of intravenous contrast.  FINDINGS: The midline structures are nondisplaced. There is moderate cerebral and cerebellar volume loss, with an associated increase in the prominence of the ventricles and sulci. The basilar cisterns are normal in size and configuration. There is no evidence of intracranial hemorrhage or mass-effect. There is low attenuation in the periventricular white matter, consistent with chronic ischemic change. There are no abnormal extra-axial fluid collections. There is no evidence of tonsillar herniation.  The included orbits and their contents are unremarkable. Mucous cyst is present in the right sphenoid air cell. Ethmoid and frontal air cells are unremarkable. Mastoid air cells are pneumatized.. The visualized osseous structures and overlying soft tissues of the skull and face are intact.      Impression: Moderate cerebral and cerebellar volume loss with chronic  microvascular disease but no evidence of acute intracranial process.   This report was finalized on 05/03/2022 13:09 by Dr. Josep Pittman MD.    MRI Brain With & Without Contrast    Result Date: 5/4/2022  HISTORY: Mental status change  MRI BRAIN: Multiplanar imaging the brain performed pre and IV contrast  COMPARISON: None  FINDINGS: There is no abnormal diffusion restriction. There is mild generalized volume loss. Partially empty appearing sella. Corpus callosum intact. Cerebellar tonsils position above the foramen magnum. Hyperintense FLAIR signal changes the periventricular white matter regions favoring chronic small vessel ischemia. There are no intracranial blood products. No abnormal extra-axial fluid collection. There is no abnormal intracranial enhancement.  Limited assessment of the orbits and base of skull is unremarkable. Mild chronic mucosal thickening of the paranasal sinuses. Normal flow voids of the distal internal carotid and basilar arteries.       Impression: 1. No acute signs of ischemia, hemorrhage, mass. No abnormal intracranial enhancement. 2. Mild generalized volume loss. 3. Nonenhancing hyperintense FLAIR signal changes favoring chronic small vessel ischemia.  This report was finalized on 05/04/2022 16:53 by Dr. Kimberly Doll MD.         Assessment and Plan:     Primary Problem:  Encephalopathy    Hospital Problem list:    Encephalopathy    Chronic coronary artery disease    Hyperlipidemia    Orthostatic hypotension    S/P ablation of atrial fibrillation    Chronic combined systolic and diastolic CHF (congestive heart failure) (HCC)    Stage 3b chronic kidney disease (HCC)    Hyponatremia      Assessment: 76-year-old male with complex medical history who presents with acute on chronic encephalopathy.    Plan:    Acute on chronic encephalopathy  Currently the etiology of this is unclear.  Most likely etiologies include liver disease (under investigation, this was the presumed diagnosis at  discharge of last hospital admission) or dementia.  Dementia probably fits best given his waxing waning mental status and issues with situational changes.    Possible liver disease  Liver enzyme elevation could be related to congestive hepatopathy from advanced heart failure.  Regarding FibroScan today which will further evaluate possible liver disease    Chronic anemia  Hemoglobin is very similar to how it has been over the past month or so.  We will obtain reticulocyte count, LDH, and haptoglobin to rule evaluate marrow response and possible hemolysis with hyperbilirubinemia.     Compensated combined heart failure, systolic and diastolic  Fairly euvolemic at this point but he has advanced heart failure with EF of 25% and advanced diastolic dysfunction with increased atrial pressure.  He also has RV dysfunction and mitral valve regurgitation.    Will discuss at length this afternoon with the patient's daughter (Jennifer Turcios).    Discharge planning:   Nursing Home    Reviewed treatment plans with the patient and/or family.     Code Status:   Code Status and Medical Interventions:   Ordered at: 05/03/22 2026     Level Of Support Discussed With:    Patient     Code Status (Patient has no pulse and is not breathing):    CPR (Attempt to Resuscitate)     Medical Interventions (Patient has pulse or is breathing):    Full Support       Electronically signed by Aly Phan MD on 5/5/2022 at 07:34 CDT

## 2022-05-05 NOTE — PLAN OF CARE
Goal Outcome Evaluation:  Plan of Care Reviewed With: patient        Progress: improving  Outcome Evaluation: Pt up in chair. sit-stand cga/min x1. Pt amb 50'x2 cga rwx standing rest pt fatigues quickly cues for safety. Pt would benefit from cont PT upon d/c.

## 2022-05-06 NOTE — THERAPY TREATMENT NOTE
Acute Care - Occupational Therapy Treatment Note  [facility]     Patient Name: Travis Patel  : 1945  MRN: [id number]  Today's Date: 2022             Admit Date: 5/3/2022       ICD-10-CM ICD-9-CM   1. Encephalopathy  G93.40 348.30   2. Altered mental status, unspecified altered mental status type  R41.82 780.97   3. Hyponatremia  E87.1 276.1   4. Chronic kidney disease, unspecified CKD stage  N18.9 585.9   5. Chronic liver disease  K76.9 571.9   6. Anemia, unspecified type  D64.9 285.9   7. Gastrointestinal hemorrhage, unspecified gastrointestinal hemorrhage type  K92.2 578.9   8. Increased anion gap metabolic acidosis  E87.2 276.2   9. Impaired mobility  Z74.09 799.89   10. Decreased activities of daily living (ADL)  Z78.9 V49.89     Patient Active Problem List   Diagnosis   • Subarachnoid hemorrhage (HCC)   • Chronic coronary artery disease   • Hyperlipidemia   • ICD (implantable cardioverter-defibrillator) in place   • Orthostatic hypotension   • Syncope   • Acute metabolic encephalopathy, due to hyperammonemia   • S/P ablation of atrial fibrillation   • Chronic combined systolic and diastolic CHF (congestive heart failure) ([facility])   • Elevated troponin, likely due to recent ablation   • Stage 3b chronic kidney disease (HCC)   • Bipolar 1 disorder, mixed, full remission (HCC)   • Abnormal BUN-to-creatinine ratio   • Hyponatremia   • Obesity (BMI 30-39.9)   • Lactic acidosis   • GI bleed   • Encephalopathy     Past Medical History:   Diagnosis Date   • Bipolar 1 disorder (HCC)    • Diverticulitis    • Hernia of abdominal wall    • Kidney disease    • Myocardial infarction (HCC) 2017   • Sleep apnea    • Stroke (HCC) 2017     Past Surgical History:   Procedure Laterality Date   • COLOSTOMY  2001    x 3 months   • COLOSTOMY REVISION     • CORONARY ARTERY BYPASS GRAFT         • HERNIA REPAIR      abdominal hernia x 3; poor healing with multiple revisions   • PACEMAKER IMPLANTATION       w/ defibrillator   • ROTATOR CUFF REPAIR Right 2005         OT ASSESSMENT FLOWSHEET (last 12 hours)     OT Evaluation and Treatment     Row Name 05/06/22 1059                   OT Time and Intention    Subjective Information complains of;fatigue  -        Document Type therapy note (daily note)  -        Mode of Treatment occupational therapy  -                  General Information    Existing Precautions/Restrictions fall  -MW                  Pain Assessment    Pretreatment Pain Rating 0/10 - no pain  -MW        Posttreatment Pain Rating 0/10 - no pain  -                  Activities of Daily Living    BADL Assessment/Intervention grooming  -                  Grooming Assessment/Training    [place] Level (Grooming) oral care regimen;contact guard assist  -        Position (Grooming) sink side;supported standing  -        Comment, (Grooming) unilateral support at sink for standing balance, no difficulty with sequencing or performance of task  -                  Bed Mobility    Comment, (Bed Mobility) up in chair  -                  Functional Mobility    Functional Mobility- Ind. Level contact guard assist  -        Functional Mobility- Comment amb to and from BR, navigates around obstacles and opens doors without difficulty  -                  Transfer Assessment/Treatment    Transfers sit-stand transfer;stand-sit transfer  -                  Transfers    Sit-Stand [place] (Transfers) contact guard  -MW        Stand-Sit [place] (Transfers) contact guard  -                  Plan of Care Review    Plan of Care Reviewed With patient  -        Progress improving  -        Outcome Evaluation OT txt completed. Up in chair, c/o fatigue. Up to BR with CGA, navigates walker in tight space and around objects well, opens doors. Stood sink side with unilateral UE support at CGA. Pt demos no difficulty with sequencing of task. Toilets at CGA for unsupported standing. Performed  reaching task placing 10 objects of varying sizes and weights into over the sink shelving with no LOB. Cont OT POC. Recommend d/c SNF.  -MW                  Positioning and Restraints    Pre-Treatment Position sitting in chair/recliner  -MW        Post Treatment Position chair  -MW        In Chair sitting;call light within reach;encouraged to call for assist;legs elevated  -MW                  Therapy Plan Review/Discharge Plan (OT)    Anticipated Discharge Disposition (OT) skilled nursing facility  -              User Key  (r) = Recorded By, (t) = Taken By, (c) = Cosigned By    Initials Name Effective Dates     Sunni Nagel, OTR/L 08/28/18 -                  Occupational Therapy Education                 Title: PT OT SLP Therapies (In Progress)     Topic: Occupational Therapy (In Progress)     Point: ADL training (In Progress)     Description:   Instruct learner(s) on proper safety adaptation and remediation techniques during self care or transfers.   Instruct in proper use of assistive devices.              Learning Progress Summary           Patient Acceptance, E,D, NR by  at 5/4/2022 1555   Family Acceptance, E,D, NR by  at 5/4/2022 1555                   Point: Home exercise program (In Progress)     Description:   Instruct learner(s) on appropriate technique for monitoring, assisting and/or progressing therapeutic exercises/activities.              Learning Progress Summary           Patient Acceptance, E,D, NR by  at 5/4/2022 1555   Family Acceptance, E,D, NR by  at 5/4/2022 1555                               User Key     Initials Effective Dates Name Provider Type Discipline     08/28/18 -  Sunni Nagel, OTR/L Occupational Therapist OT                  OT Recommendation and Plan  Planned Therapy Interventions (OT): activity tolerance training, BADL retraining, cognitive/visual perception retraining, IADL retraining, functional balance retraining, neuromuscular control/coordination  retraining, occupation/activity based interventions, patient/caregiver education/training, strengthening exercise, transfer/mobility retraining  Therapy Frequency (OT): 5 times/wk  Plan of Care Review  Plan of Care Reviewed With: patient  Progress: improving  Outcome Evaluation: OT txt completed. Up in chair, c/o fatigue. Up to BR with CGA, navigates walker in tight space and around objects well, opens doors. Stood sink side with unilateral UE support at CGA. Pt demos no difficulty with sequencing of task. Toilets at CGA for unsupported standing. Performed reaching task placing 10 objects of varying sizes and weights into over the sink shelving with no LOB. Cont OT POC. Recommend d/c SNF.  Plan of Care Reviewed With: patient  Outcome Evaluation: OT txt completed. Up in chair, c/o fatigue. Up to BR with CGA, navigates walker in tight space and around objects well, opens doors. Stood sink side with unilateral UE support at CGA. Pt demos no difficulty with sequencing of task. Toilets at CGA for unsupported standing. Performed reaching task placing 10 objects of varying sizes and weights into over the sink shelving with no LOB. Cont OT POC. Recommend d/c SNF.        Time Calculation:    Time Calculation- OT     Row Name 05/06/22 1158             Time Calculation- OT    OT Start Time 1050  -MW      OT Stop Time 1115  -MW      OT Time Calculation (min) 25 min  -MW      Total Timed Code Minutes- OT 25 minute(s)  -MW      OT Received On 05/06/22  -MW            User Key  (r) = Recorded By, (t) = Taken By, (c) = Cosigned By    Initials Name Provider Type     Sunni Nagel OTR/L Occupational Therapist              Therapy Charges for Today     Code Description Service Date Service Provider Modifiers Qty    [id number]  OT SELF CARE/MGMT/TRAIN EA 15 MIN 5/5/2022 Sunni Nagel OTR/L GO 4    [id number] HC OT SELF CARE/MGMT/TRAIN EA 15 MIN 5/6/2022 Sunni Nagel OTR/L GO 2               Sunni Nagel  OTR/L  5/6/2022

## 2022-05-06 NOTE — PLAN OF CARE
Goal Outcome Evaluation:            A&Ox3. Confused.  Tele in place NS.  Bed alarm on.  Up to RR.  SCD.  No C/O pain.  Resting between care.  RA. BM this morning. Tremors at times.  Will continue to monitor.

## 2022-05-06 NOTE — PROGRESS NOTES
"    Daily Progress Note  Travis Patel  MRN: [id number] LOS: 3    Admit Date: 5/3/2022   2022 07:46 CDT    Subjective:         Interval History:    Reviewed overnight events and nursing notes.     Doing well this morning.  Was up moving around, going to the bathroom.  No significant confusion overnight.    ROS:  Review of Systems   Constitutional: Negative for chills and fever.   Respiratory: Negative for cough, chest tightness, shortness of breath and wheezing.    Cardiovascular: Negative for chest pain, palpitations and leg swelling.   Gastrointestinal: Negative for abdominal pain, blood in stool, diarrhea, nausea and vomiting.       DIET:  Diet Regular; Low Fat, Low Sodium    Medications:      DULoxetine, 60 mg, Oral, Nightly  empagliflozin, 10 mg, Oral, Daily  enoxaparin, 30 mg, Subcutaneous, Daily  folic acid, 1 mg, Oral, Daily  furosemide, 40 mg, Intravenous, Once  furosemide, 80 mg, Intravenous, Once  [START ON 2022] furosemide, 80 mg, Oral, Daily  lactulose, 30 g, Oral, TID  levothyroxine, 50 mcg, Oral, Q AM  lithium carbonate, 150 mg, Oral, BID With Meals  magnesium oxide, 400 mg, Oral, Daily  melatonin, 6 mg, Oral, Nightly  midodrine, 5 mg, Oral, TID AC  pantoprazole, 40 mg, Oral, Nightly  polyethylene glycol, 17 g, Oral, Daily  potassium chloride, 10 mEq, Oral, Daily  QUEtiapine, 50 mg, Oral, Nightly  rOPINIRole, 0.5 mg, Oral, Nightly          Objective:     Vitals: /63 (BP Location: Right arm, Patient Position: Lying)   Pulse 61   Temp 97.8 °F (36.6 °C) (Oral)   Resp 18   Ht 167.6 cm (66\")   Wt 86.2 kg (190 lb)   SpO2 98%   BMI 30.67 kg/m²    Intake/Output Summary (Last 24 hours) at 2022 0746  Last data filed at 2022 0400  Gross per 24 hour   Intake 860 ml   Output 550 ml   Net 310 ml    Temp (24hrs), Av.9 °F (36.6 °C), Min:97.6 °F (36.4 °C), Max:98.2 °F (36.8 °C)    Glucose:  No results found for: POCGLU  Physical Examination:   Physical Exam  Constitutional:       " General: He is not in acute distress.     Appearance: Normal appearance. He is normal weight. He is not ill-appearing.   Cardiovascular:      Rate and Rhythm: Normal rate and regular rhythm.      Pulses: Normal pulses.      Heart sounds: Normal heart sounds. No murmur heard.  Pulmonary:      Effort: Pulmonary effort is normal.      Breath sounds: Normal breath sounds. No wheezing, rhonchi or rales.   Abdominal:      General: Abdomen is flat. Bowel sounds are normal.      Palpations: Abdomen is soft.      Tenderness: There is no abdominal tenderness.   Musculoskeletal:      Comments: 1+ bilateral lower extremity edema   Neurological:      Mental Status: He is alert.         Labs:  Lab Results (last 24 hours)     Procedure Component Value Units Date/Time    Comprehensive Metabolic Panel [[id number]]  (Abnormal) Collected: 05/06/22 0537    Specimen: Blood Updated: 05/06/22 0606     Glucose 119 mg/dL      BUN 18 mg/dL      Creatinine 1.50 mg/dL      Sodium 129 mmol/L      Potassium 3.6 mmol/L      Chloride 94 mmol/L      CO2 24.0 mmol/L      Calcium 9.0 mg/dL      Total Protein 6.0 g/dL      Albumin 3.60 g/dL      ALT (SGPT) 67 U/L      AST (SGOT) 34 U/L      Alkaline Phosphatase 169 U/L      Total Bilirubin 2.0 mg/dL      Globulin 2.4 gm/dL      A/G Ratio 1.5 g/dL      BUN/Creatinine Ratio 12.0     Anion Gap 11.0 mmol/L      eGFR 48.6 mL/min/1.73      Comment: National Kidney Foundation and American Society of Nephrology (ASN) Task Force recommended calculation based on the Chronic Kidney Disease Epidemiology Collaboration (CKD-EPI) equation refit without adjustment for race.       Narrative:      GFR Normal >60  Chronic Kidney Disease <60  Kidney Failure <15      CBC & Differential [[id number]]  (Abnormal) Collected: 05/06/22 0536    Specimen: Blood Updated: 05/06/22 0547    Narrative:      The following orders were created for panel order CBC & Differential.  Procedure                               Abnormality          Status                     ---------                               -----------         ------                     CBC Auto Differential[[id number]]        Abnormal            Final result                 Please view results for these tests on the individual orders.    CBC Auto Differential [[id number]]  (Abnormal) Collected: 05/06/22 0536    Specimen: Blood Updated: 05/06/22 0547     WBC 7.65 10*3/mm3      RBC 3.50 10*6/mm3      Hemoglobin 9.4 g/dL      Hematocrit 32.2 %      MCV 92.0 fL      MCH 26.9 pg      MCHC 29.2 g/dL      RDW 22.5 %      RDW-SD 76.4 fl      MPV 9.9 fL      Platelets 145 10*3/mm3      Neutrophil % 83.7 %      Lymphocyte % 8.1 %      Monocyte % 5.5 %      Eosinophil % 1.8 %      Basophil % 0.4 %      Immature Grans % 0.5 %      Neutrophils, Absolute 6.40 10*3/mm3      Lymphocytes, Absolute 0.62 10*3/mm3      Monocytes, Absolute 0.42 10*3/mm3      Eosinophils, Absolute 0.14 10*3/mm3      Basophils, Absolute 0.03 10*3/mm3      Immature Grans, Absolute 0.04 10*3/mm3      nRBC 0.0 /100 WBC     Haptoglobin [[id number]]  (Normal) Collected: 05/05/22 0750    Specimen: Blood Updated: 05/05/22 2000     Haptoglobin 127 mg/dL     BRUNO Comprehensive Panel [[id number]] Collected: 05/04/22 1527    Specimen: Blood Updated: 05/05/22 1413     Anti-DNA (DS) Ab Qn <1 IU/mL      Comment:                                    Negative      <5                                     Equivocal  5 - 9                                     Positive      >9        RNP Antibodies <0.2 AI      Joshua Antibodies <0.2 AI      Antiscleroderma-70 Antibodies <0.2 AI      DARYL SSA (RO) Ab <0.2 AI      DARYL SSB (LA) Ab <0.2 AI      Antichromatin Antibodies <0.2 AI      KIM-1 IgG <0.2 AI      Anti-Centromere B Antibodies <0.2 AI      See below: Comment     Comment: Autoantibody                       Disease Association  ------------------------------------------------------------                          Condition                   Frequency  ---------------------   ------------------------   ---------  Antinuclear Antibody,    SLE, mixed connective  Direct (BRUNO-D)           tissue diseases  ---------------------   ------------------------   ---------  dsDNA                    SLE                        40 - 60%  ---------------------   ------------------------   ---------  Chromatin                Drug induced SLE                90%                           SLE                        48 - 97%  ---------------------   ------------------------   ---------  SSA (Ro)                 SLE                        25 - 35%                           Sjogren's Syndrome         40 - 70%                            Lupus                 100%  ---------------------   ------------------------   ---------  SSB (La)                 SLE                             10%                           Sjogren's Syndrome              30%  ---------------------   -----------------------    ---------  Sm (anti-Smith)          SLE                        15 - 30%  ---------------------   -----------------------    ---------  RNP                      Mixed Connective Tissue                           Disease                         95%  (U1 nRNP,                SLE                        30 - 50%  anti-ribonucleoprotein)  Polymyositis and/or                           Dermatomyositis                 20%  ---------------------   ------------------------   ---------  Scl-70 (antiDNA          Scleroderma (diffuse)      20 - 35%  topoisomerase)           Crest                           13%  ---------------------   ------------------------   ---------  Sommer-1                     Polymyositis and/or                           Dermatomyositis            20 - 40%  ---------------------   ------------------------   ---------  Centromere B             Scleroderma - Crest                           variant                         80%       Narrative:      Performed at:  01 - [facility]  [address]  [id number]  : Vinny Fraser PhD, Phone:  [phone]    Mitochondrial Antibodies, M2 [[id number]] Collected: 05/04/22 1527    Specimen: Blood Updated: 05/05/22 1313     Mitochondrial Ab <20.0 Units      Comment:                                 Negative    0.0 - 20.0                                  Equivocal  20.1 - 24.9                                  Positive         >24.9  Mitochondrial (M2) Antibodies are found in 90-96% of  patients with primary biliary cirrhosis.       Narrative:      Performed at:  [address]  [id number]  : Vinny Fraser PhD, Phone:  [phone]    Anti-Smooth Muscle Antibody Titer [[id number]] Collected: 05/04/22 1527    Specimen: Blood Updated: 05/05/22 1313     Smooth Muscle Ab 6 Units      Comment:                  Negative                     0 - 19                   Weak positive               20 - 30                   Moderate to strong positive     >30   Actin Antibodies are found in 52-85% of patients with   autoimmune hepatitis or chronic active hepatitis and   in 22% of patients with primary biliary cirrhosis.       Narrative:      Performed at:  [address]  [id number]  : Vinny Fraser PhD, Phone:  [phone]    Manual Differential [[id number]]  (Abnormal) Collected: 05/05/22 0712    Specimen: Blood Updated: 05/05/22 0811     Neutrophil % 81.0 %      Lymphocyte % 15.0 %      Monocyte % 4.0 %      Neutrophils Absolute 4.51 10*3/mm3      Lymphocytes Absolute 0.84 10*3/mm3      Monocytes Absolute 0.22 10*3/mm3      nRBC 1.0 /100 WBC      Anisocytosis Slight/1+     Crenated RBC's Slight/1+     Poikilocytes Mod/2+     Polychromasia Mod/2+     Schistocytes Slight/1+     WBC Morphology Normal     Platelet Estimate Decreased     Giant Platelets Slight/1+    CBC & Differential [[id number]]  (Abnormal) Collected: 05/05/22 0712     Specimen: Blood Updated: 05/05/22 0811    Narrative:      The following orders were created for panel order CBC & Differential.  Procedure                               Abnormality         Status                     ---------                               -----------         ------                     CBC Auto Differential[[id number]]        Abnormal            Final result                 Please view results for these tests on the individual orders.    CBC Auto Differential [[id number]]  (Abnormal) Collected: 05/05/22 0712    Specimen: Blood Updated: 05/05/22 0811     WBC 5.57 10*3/mm3      RBC 3.25 10*6/mm3      Hemoglobin 8.8 g/dL      Hematocrit 30.1 %      MCV 92.6 fL      MCH 27.1 pg      MCHC 29.2 g/dL      RDW 22.5 %      RDW-SD 77.1 fl      MPV 9.9 fL      Platelets 136 10*3/mm3     Comprehensive Metabolic Panel [[id number]]  (Abnormal) Collected: 05/05/22 0712    Specimen: Blood Updated: 05/05/22 0800     Glucose 121 mg/dL      BUN 24 mg/dL      Creatinine 1.79 mg/dL      Sodium 131 mmol/L      Potassium 3.8 mmol/L      Chloride 95 mmol/L      CO2 25.0 mmol/L      Calcium 9.4 mg/dL      Total Protein 5.6 g/dL      Albumin 3.50 g/dL      ALT (SGPT) 79 U/L      AST (SGOT) 39 U/L      Alkaline Phosphatase 158 U/L      Total Bilirubin 2.0 mg/dL      Globulin 2.1 gm/dL      A/G Ratio 1.7 g/dL      BUN/Creatinine Ratio 13.4     Anion Gap 11.0 mmol/L      eGFR 39.3 mL/min/1.73      Comment: National Kidney Foundation and American Society of Nephrology (ASN) Task Force recommended calculation based on the Chronic Kidney Disease Epidemiology Collaboration (CKD-EPI) equation refit without adjustment for race.       Narrative:      GFR Normal >60  Chronic Kidney Disease <60  Kidney Failure <15      Lactate Dehydrogenase [[id number]]  (Abnormal) Collected: 05/05/22 0712    Specimen: Blood Updated: 05/05/22 0800      U/L            Imaging:  EEG    Result Date: 5/4/2022  EEG Report Requesting Physician: Chantel AGUILAR  Edson Guevara MD Reading Physician:  Chantel Guevara M.D. Study Date: 5/4/2022 Read Date: 5/4/2022 Clinical History:  This is a 76 year old male with episodic spells of confusion Medications: DULoxetine empagliflozin fluticasone folic acid furosemide lactated ringers bolus lactulose solution lithium carbonate magnesium oxide melatonin midodrine pantoprazole potassium chloride QUEtiapine thiamine Report:  This is an 18 channel EEG recording performed with the International 10-20 lead placement system using the FoneSense EEG system  The background activity is best seen over the occipital leads and ranges between 6 to 6.5 Hz.  Patient had no recorded eye opening.documentation  Sleep Architecture:  Drowsiness was demonstrated by a loss of the background waking activities. Onset of stage I sleep was demonstrated by gradual disappearance of background waking rhythms with gradual symmetric mixed frequency 4-7 Hz slowing. Patient snored frequently during the EEG Photic Stimulation:  Produced no abnormal responses Hyperventilation: Was not performed Asymmetries:  None Epileptiform Activities:  None Interpretation:  This is an abnormal EEG recording secondary to slowing of the background rhythm.  This could be consistent with a metabolic or medication related encephalopathy.  No definitive seizure activity was noted. There was frequent snoring and arousals that my be seen with an underlying sleep disordered breathing such as obstructive sleep apnea. Clinical correlation is recommended. Chantel Guevara MD     MRI Brain With & Without Contrast    Result Date: 5/4/2022  HISTORY: Mental status change  MRI BRAIN: Multiplanar imaging the brain performed pre and IV contrast  COMPARISON: None  FINDINGS: There is no abnormal diffusion restriction. There is mild generalized volume loss. Partially empty appearing sella. Corpus callosum intact. Cerebellar tonsils position above the foramen magnum. Hyperintense FLAIR signal  changes the periventricular white matter regions favoring chronic small vessel ischemia. There are no intracranial blood products. No abnormal extra-axial fluid collection. There is no abnormal intracranial enhancement.  Limited assessment of the orbits and base of skull is unremarkable. Mild chronic mucosal thickening of the paranasal sinuses. Normal flow voids of the distal internal carotid and basilar arteries.       Impression: 1. No acute signs of ischemia, hemorrhage, mass. No abnormal intracranial enhancement. 2. Mild generalized volume loss. 3. Nonenhancing hyperintense FLAIR signal changes favoring chronic small vessel ischemia.  This report was finalized on 05/04/2022 16:53 by Dr. Kimberly Doll MD.    US Elastography Parenchyma    Result Date: 5/5/2022  EXAMINATION: US ELASTOGRAPHY PARENCHYMA-  5/5/2022 9:48 AM CDT  HISTORY: elevated liver functions; G93.40-Encephalopathy, unspecified; R41.82-Altered mental status, unspecified; E87.1-Vvir-wkqljpfiha and hyponatremia; N18.9-Chronic kidney disease, unspecified; K76.9-Liver disease, unspecified; D64.9-Anemia, unspecified; K92.2-Gastrointestinal hemorrhage, unspecified; E87.2-Acidosis; Z74.09-Other reduced mobility; Z78.9-Other specified health status  Coarse hepatic echogenicity. Hepatic cysts measure up to 40 mm. Gallstones are present. No gallbladder wall thickening or bile duct dilation. CBD = 3-4 mm.  Small amount of perihepatic fluid.  10 shear wave measurements were acquired using a right intercostal approach. Measurements indicate a median of 1  m/s (range, 1.67 -2.04 m/s). Median velocity = 1.845 m/s. The IQR/median value is 0.144452 m/s.  Summary: 1. Gallstones with no bile duct dilation. Coarse appearance of the liver. Small amount of fluid around the liver. 2. Median liver shear wave speed = 1.845 m/s. 3. Metavir score = F3.  Based on a recently published consensus document (Euceda et all Radiology 2015), liver shear wave speeds (in adults with  hepatitis C) less than 1.5 m/s suggest no clinically significant fibrosis (</= F2). Measurements greater than 2.2 m/s suggest significant fibrosis (F3/F4).  Note, ultrasound shear wave speed measurements may also be affected by the presence of hepatic congestion, steatosis, and inflammation. Shear wave speed measurements should be interpreted in conjunction with other available clinical data, and they should not be considered interchangeable with MRI-derived stiffness measurements at this time.  This report was finalized on 05/05/2022 11:46 by Dr. Dawood Wynn MD.         Assessment and Plan:     Primary Problem:  Encephalopathy    Hospital Problem list:    Encephalopathy    Chronic coronary artery disease    Hyperlipidemia    Orthostatic hypotension    S/P ablation of atrial fibrillation    Chronic combined systolic and diastolic CHF (congestive heart failure) (HCC)    Stage 3b chronic kidney disease (HCC)    Hyponatremia      Assessment: 76-year-old male with complex medical history who presents with acute on chronic encephalopathy.     Plan:    Discussed care at length over the phone with Ms. Turcios (POA).  I explained to her that he is very chronically ill and we should probably prioritize quality of life at this time.  She was very receptive to this conversation and we will continue further talks in clinic.     Acute on chronic encephalopathy  Currently the etiology of this is unclear.  Most likely etiologies include liver disease (under investigation, this was the presumed diagnosis at discharge of last hospital admission) or dementia.  I believe this is most likely dementia, continue Seroquel.     Possible liver disease  Liver enzyme elevation could be related to congestive hepatopathy from advanced heart failure.    Fibroscan showed moderate fibrosis.  Continue lactulose.     Chronic anemia  Hemoglobin is very similar to how it has been over the past month or so.  We will obtain reticulocyte count, LDH,  and haptoglobin to rule evaluate marrow response and possible hemolysis with hyperbilirubinemia.      Severe combined heart failure, systolic and diastolic  Has advanced heart failure with EF of 25% and advanced diastolic dysfunction with increased atrial pressure.  He also has RV dysfunction and mitral valve regurgitation.  Going to slightly increase diuretics today and return to his normal Lasix tomorrow.    Anticipate that he will be here through the weekend with possible discharge to rehab early next week.  I discussed this with the family who is agreeable to this.    Discharge planning:   Skilled nursing facility    Reviewed treatment plans with the patient and/or family.     Code Status:   Code Status and Medical Interventions:   Ordered at: 05/03/22 2026     Level Of Support Discussed With:    Patient     Code Status (Patient has no pulse and is not breathing):    CPR (Attempt to Resuscitate)     Medical Interventions (Patient has pulse or is breathing):    Full Support       Electronically signed by Aly Phan MD on 5/6/2022 at 07:46 CDT

## 2022-05-06 NOTE — THERAPY TREATMENT NOTE
Acute Care - Physical Therapy Treatment Note  [facility]     Patient Name: Travis Patel  : 1945  MRN: [id number]  Today's Date: 2022      Visit Dx:     ICD-10-CM ICD-9-CM   1. Encephalopathy  G93.40 348.30   2. Altered mental status, unspecified altered mental status type  R41.82 780.97   3. Hyponatremia  E87.1 276.1   4. Chronic kidney disease, unspecified CKD stage  N18.9 585.9   5. Chronic liver disease  K76.9 571.9   6. Anemia, unspecified type  D64.9 285.9   7. Gastrointestinal hemorrhage, unspecified gastrointestinal hemorrhage type  K92.2 578.9   8. Increased anion gap metabolic acidosis  E87.2 276.2   9. Impaired mobility  Z74.09 799.89   10. Decreased activities of daily living (ADL)  Z78.9 V49.89     Patient Active Problem List   Diagnosis   • Subarachnoid hemorrhage (HCC)   • Chronic coronary artery disease   • Hyperlipidemia   • ICD (implantable cardioverter-defibrillator) in place   • Orthostatic hypotension   • Syncope   • Acute metabolic encephalopathy, due to hyperammonemia   • S/P ablation of atrial fibrillation   • Chronic combined systolic and diastolic CHF (congestive heart failure) (HCC)   • Elevated troponin, likely due to recent ablation   • Stage 3b chronic kidney disease (HCC)   • Bipolar 1 disorder, mixed, full remission (HCC)   • Abnormal BUN-to-creatinine ratio   • Hyponatremia   • Obesity (BMI 30-39.9)   • Lactic acidosis   • GI bleed   • Encephalopathy     Past Medical History:   Diagnosis Date   • Bipolar 1 disorder (HCC)    • Diverticulitis    • Hernia of abdominal wall    • Kidney disease    • Myocardial infarction (HCC) 2017   • Sleep apnea    • Stroke (HCC) 2017     Past Surgical History:   Procedure Laterality Date   • COLOSTOMY  2001    x 3 months   • COLOSTOMY REVISION     • CORONARY ARTERY BYPASS GRAFT         • HERNIA REPAIR      abdominal hernia x 3; poor healing with multiple revisions   • PACEMAKER IMPLANTATION      w/ defibrillator   •  ROTATOR CUFF REPAIR Right 2005     PT Assessment (last 12 hours)     PT Evaluation and Treatment     Row Name 05/06/22 1439 05/06/22 1112       Physical Therapy Time and Intention    Subjective Information complains of;weakness  -NW --    Document Type therapy note (daily note)  -NW --    Mode of Treatment physical therapy  -NW --    Session Not Performed -- other (see comments)  -NW    Comment, Session Not Performed -- w OT will ck bk  -NW    Comment pt worn out this pm has been up/down to bathroom mult times due to taking lactulose.  -NW --    Row Name 05/06/22 1439          General Information    Existing Precautions/Restrictions fall  -NW     Row Name 05/06/22 1439          Pain    Pretreatment Pain Rating 0/10 - no pain  -NW     Row Name 05/06/22 1439          Bed Mobility    Comment, (Bed Mobility) chair  -     Row Name 05/06/22 1439          Transfers    Sit-Stand [place] (Transfers) verbal cues;minimum assist (75% patient effort)  -     Stand-Sit [place] (Transfers) verbal cues;minimum assist (75% patient effort)  -     Row Name 05/06/22 1439          Sit-Stand Transfer    Assistive Device (Sit-Stand Transfers) walker, front-wheeled  -NW     Row Name 05/06/22 1439          Stand-Sit Transfer    Assistive Device (Stand-Sit Transfers) walker, front-wheeled  -NW     Row Name 05/06/22 1439          Gait/Stairs (Locomotion)    [place] Level (Gait) verbal cues;contact guard  -     Assistive Device (Gait) walker, front-wheeled  -     Distance in Feet (Gait) 15x2  -NW     Deviations/Abnormal Patterns (Gait) dang decreased;stride length decreased  -NW     Comment, (Gait/Stairs) pt amb to BR and clean up then back to chair  -NW     Row Name 05/06/22 1439          Safety Issues, Functional Mobility    Impairments Affecting Function (Mobility) balance;endurance/activity tolerance;strength  -     Row Name 05/06/22 1439          Motor Skills    Therapeutic Exercise aerobic  -     Row Name  05/06/22 1439          Aerobic Exercise    Comment, Aerobic Exercise (Therapeutic Exercise) AROM BLEs x15 AROM BUEs x10  -NW     Row Name 05/06/22 1439          Positioning and Restraints    Pre-Treatment Position sitting in chair/recliner  -NW     Post Treatment Position chair  -NW     In Chair reclined;call light within reach;encouraged to call for assist  -NW           User Key  (r) = Recorded By, (t) = Taken By, (c) = Cosigned By    Initials Name Provider Type    NW Aleshia Baker PTA Physical Therapist Assistant                Physical Therapy Education                 Title: PT OT SLP Therapies (In Progress)     Topic: Physical Therapy (In Progress)     Point: Mobility training (Done)     Learning Progress Summary           Patient Acceptance, E, VU by AS at 5/4/2022 1315    Comment: Pt education on PT POC and goals.                   Point: Home exercise program (Not Started)     Learner Progress:  Not documented in this visit.          Point: Body mechanics (Not Started)     Learner Progress:  Not documented in this visit.          Point: Precautions (Not Started)     Learner Progress:  Not documented in this visit.                      User Key     Initials Effective Dates Name Provider Type Discipline    AS 02/28/22 -  Eleazar Ravi, PT Student PT Student PT              PT Recommendation and Plan     Plan of Care Reviewed With: patient  Progress: no change  Outcome Evaluation: Pt worn out this pm has been up mult times to bathroom due to taking lactulose. Pt amb 15'x2 rwx min x1 able to stand and sink and wash hands/face w/ cues. Pt tolerated AROM BLEs and BUEs. Pt will need cont PT upon d/c       Time Calculation:    PT Charges     Row Name 05/06/22 1510             Time Calculation    Start Time 1439  -NW      Stop Time 1504  -NW      Time Calculation (min) 25 min  -NW      PT Received On 05/06/22  -NW      PT Goal Re-Cert Due Date 05/14/22  -NW              Time Calculation- PT    Total Timed Code  Minutes- PT 25 minute(s)  -NW              Timed Charges    63976 - Gait Training Minutes  15  -NW      00670 - PT Therapeutic Activity Minutes 10  -NW              Total Minutes    Timed Charges Total Minutes 25  -NW       Total Minutes 25  -NW            User Key  (r) = Recorded By, (t) = Taken By, (c) = Cosigned By    Initials Name Provider Type    NW Aleshia Baker PTA Physical Therapist Assistant              Therapy Charges for Today     Code Description Service Date Service Provider Modifiers Qty    15248898957 HC GAIT TRAINING EA 15 MIN 5/5/2022 Aleshia Baker, ESPERANZA GP 1    37964117654 HC GAIT TRAINING EA 15 MIN 5/6/2022 Aleshia Baker, ESPERANZA GP 1    05622165705 HC PT THERAPEUTIC ACT EA 15 MIN 5/6/2022 Aleshia Baker, ESPERANZA GP 1          PT G-Codes  Outcome Measure Options: AM-PAC 6 Clicks Daily Activity (OT)  AM-PAC 6 Clicks Score (PT): 19  AM-PAC 6 Clicks Score (OT): 16    Aleshia Baker PTA  5/6/2022

## 2022-05-06 NOTE — CASE MANAGEMENT/SOCIAL WORK
Continued Stay Note   [facility]     Patient Name: Travis Patel  MRN: [id number]  Today's Date: 5/6/2022    Admit Date: 5/3/2022     Discharge Plan     Row Name 05/06/22 1112       Plan    Plan SNF    Patient/Family in Agreement with Plan yes    Plan Comments Since pt not leaving until first of next week he lost private room at [facility]. They will review again on Monday to see if another bed opened or if one of their sister facilities gets opening. Will follow.               Discharge Codes    No documentation.               Expected Discharge Date and Time     Expected Discharge Date Expected Discharge Time    May 9, 2022             APRIL Callaawy

## 2022-05-06 NOTE — PLAN OF CARE
Goal Outcome Evaluation:  Plan of Care Reviewed With: patient        Progress: improving  Outcome Evaluation: OT txt completed. Up in chair, c/o fatigue. Up to BR with CGA, navigates walker in tight space and around objects well, opens doors. Stood sink side with unilateral UE support at CGA. Pt demos no difficulty with sequencing of task. Toilets at CGA for unsupported standing. Performed reaching task placing 10 objects of varying sizes and weights into over the sink shelving with no LOB. Cont OT POC. Recommend d/c SNF.

## 2022-05-06 NOTE — PROGRESS NOTES
[facility] Gastroenterology  Inpatient Progress Note  Travis Patel  1945 5/6/2022  Reason for Follow Up:  Confusion, anemia    Subjective     Subjective:   Pt is awake and alert has been answering my questions appropriately. Long discussion with family and they do not desire further testing in regarding to a colonoscopy aware of CT findings previously mentioned. No abdominal pain. US with elastography did show Metavir score of F3. Gallstones noted. Feel likely elevation of liver enzymes may be related to congestion. No GI related symptoms.     Current Facility-Administered Medications:   •  acetaminophen (TYLENOL) tablet 500 mg, 500 mg, Oral, Q6H PRN, Aly Phan MD  •  calcium carbonate (TUMS) chewable tablet 500 mg (200 mg elemental), 1 tablet, Oral, TID PRN, Aly Phan MD  •  DULoxetine (CYMBALTA) DR capsule 60 mg, 60 mg, Oral, Nightly, Je Crow PA, 60 mg at 05/05/22 2038  •  empagliflozin (JARDIANCE) tablet 10 mg, 10 mg, Oral, Daily, Je Crow PA, 10 mg at 05/06/22 0914  •  Enoxaparin Sodium (LOVENOX) syringe 30 mg, 30 mg, Subcutaneous, Daily, Aly Phan MD, 30 mg at 05/06/22 0914  •  fluticasone (FLONASE) 50 MCG/ACT nasal spray 2 spray, 2 spray, Nasal, Daily PRN, Je Crow PA  •  folic acid (FOLVITE) tablet 1 mg, 1 mg, Oral, Daily, Je Crow PA, 1 mg at 05/06/22 0914  •  [START ON 5/7/2022] furosemide (LASIX) tablet 80 mg, 80 mg, Oral, Daily, Aly Phan MD  •  lactulose solution 30 g, 30 g, Oral, TID, Aly Phan MD, 30 g at 05/06/22 0915  •  levothyroxine (SYNTHROID, LEVOTHROID) tablet 50 mcg, 50 mcg, Oral, Q AM, Aly Phan MD, 50 mcg at 05/06/22 0504  •  lithium carbonate capsule 150 mg, 150 mg, Oral, BID With Meals, Je Crow PA, 150 mg at 05/06/22 0914  •  magnesium oxide (MAG-OX) tablet 400 mg, 400 mg, Oral, Daily, Je Crow PA, 400 mg at 05/06/22 0914  •  melatonin tablet 6 mg, 6 mg, Oral,  Nightly, Je Crow PA, 6 mg at 05/05/22 2038  •  midodrine (PROAMATINE) tablet 5 mg, 5 mg, Oral, TID AC, Je Crow PA, 5 mg at 05/06/22 1138  •  ondansetron (ZOFRAN) injection 4 mg, 4 mg, Intravenous, Q6H PRN, Aly Phan MD  •  pantoprazole (PROTONIX) EC tablet 40 mg, 40 mg, Oral, Nightly, Je Crow PA, 40 mg at 05/05/22 2038  •  polyethylene glycol (MIRALAX) packet 17 g, 17 g, Oral, Daily, Je Crow PA, 17 g at 05/05/22 1057  •  potassium chloride (MICRO-K) CR capsule 10 mEq, 10 mEq, Oral, Daily, Je Crow PA, 10 mEq at 05/06/22 0914  •  QUEtiapine (SEROquel) tablet 50 mg, 50 mg, Oral, Nightly, Je Crow PA, 50 mg at 05/05/22 2038  •  rOPINIRole (REQUIP) tablet 0.5 mg, 0.5 mg, Oral, Nightly, Aly Phan MD, 0.5 mg at 05/05/22 2038    Review of Systems:    Review of Systems   Constitutional: Positive for fatigue. Negative for chills, diaphoresis and fever.   Respiratory: Negative for cough, choking, chest tightness and shortness of breath.    Cardiovascular: Negative for chest pain and palpitations.   Gastrointestinal: Negative for abdominal pain, nausea and vomiting.   Musculoskeletal: Negative for joint swelling.   Skin: Negative for color change and rash.   Neurological: Positive for weakness. Negative for headaches.   Psychiatric/Behavioral: Negative for confusion.        Objective     Vital Signs  Temp:  [97.8 °F (36.6 °C)-98.5 °F (36.9 °C)] 98.5 °F (36.9 °C)  Heart Rate:  [61-72] 72  Resp:  [16-18] 18  BP: (101-111)/(55-63) 111/55  Body mass index is 30.67 kg/m².    Intake/Output Summary (Last 24 hours) at 5/6/2022 1601  Last data filed at 5/6/2022 0900  Gross per 24 hour   Intake 860 ml   Output 250 ml   Net 610 ml     I/O this shift:  In: 360 [P.O.:360]  Out: -        Physical Exam:   General: patient awake, alert and cooperative, no acute distress   Eyes: Normal lids and lashes, no scleral icterus   Neck: supple, no JVD   Skin: warm and  dry   Cardiovascular: regular rhythm and rate, no murmurs auscultated   Pulm: clear to auscultation bilaterally, regular and unlabored   Abdomen: soft, nontender, nondistended; normal bowel sounds   Psychiatric: Normal mood and behavior; converses appropriately     Results Review:    I have reviewed all of the patients current test results    Results from last 7 days   Lab Units 05/06/22  0536 05/05/22  0712 05/04/22  1036   WBC 10*3/mm3 7.65 5.57 5.20   HEMOGLOBIN g/dL 9.4* 8.8* 8.8*   HEMATOCRIT % 32.2* 30.1* 29.9*   PLATELETS 10*3/mm3 145 136* 142       Results from last 7 days   Lab Units 05/06/22  0537 05/05/22  0712 05/04/22  1528 05/04/22  1036   SODIUM mmol/L 129* 131*  --  128*   POTASSIUM mmol/L 3.6 3.8  --  3.8   CHLORIDE mmol/L 94* 95*  --  91*   CO2 mmol/L 24.0 25.0  --  25.0   BUN mg/dL 18 24*  --  29*   CREATININE mg/dL 1.50* 1.79*  --  1.84*   CALCIUM mg/dL 9.0 9.4  --  9.6   BILIRUBIN mg/dL 2.0* 2.0* 2.6* 2.7*   ALK PHOS U/L 169* 158*  --  143*   ALT (SGPT) U/L 67* 79*  --  89*   AST (SGOT) U/L 34 39  --  43*   GLUCOSE mg/dL 119* 121*  --  126*       Results from last 7 days   Lab Units 05/04/22  1527 05/03/22  1028   INR  1.41* 1.43*       No results found for: LIPASE    Radiology:    Imaging Results (Last 24 Hours)     ** No results found for the last 24 hours. **            Assessment/Plan     Patient Active Problem List   Diagnosis Code   • Subarachnoid hemorrhage (HCC) I60.9   • Chronic coronary artery disease I25.10   • Hyperlipidemia E78.5   • ICD (implantable cardioverter-defibrillator) in place Z95.810   • Orthostatic hypotension I95.1   • Syncope R55   • Acute metabolic encephalopathy, due to hyperammonemia G93.41   • S/P ablation of atrial fibrillation Z98.890, Z86.79   • Chronic combined systolic and diastolic CHF (congestive heart failure) (HCC) I50.42   • Elevated troponin, likely due to recent ablation R77.8   • Stage 3b chronic kidney disease (HCC) N18.32   • Bipolar 1 disorder,  mixed, full remission (HCC) F31.78   • Abnormal BUN-to-creatinine ratio R79.89   • Hyponatremia E87.1   • Obesity (BMI 30-39.9) E66.9   • Lactic acidosis E87.2   • GI bleed K92.2   • Encephalopathy G93.40       1. Elevated liver function tests  2. Anemia with heme positive stool    As noted above; nothing to add from a GI standpoint. Will sign off. Please call us as needed.     EMR Dragon/transcription disclaimer: Much of this encounter note is electronic transcription/translation of spoken language to printed text. The electronic translation of spoken language may be erroneous, or at times, nonsensical words or phrases may be inadvertently transcribed. Although I have reviewed the note for such errors, some may still exist.    Kimberly Liz, [place]  05/06/22  16:01 CDT

## 2022-05-06 NOTE — PLAN OF CARE
Goal Outcome Evaluation:      Patient is alert and oriented x2. VSS. Has no complaints of pain. Skin is intact just scattered bruising. Call light is within reach. Moves all extremities with mild difficulty and is unsteady on his feet without the walker. Tolerating regular diet well. Patient voids in the bathroom. Last BM was 5/6/22 patient has had several d/t lactulose. Safety maintained, no needs at this time. Will continue to monitor.       Progress: improving

## 2022-05-06 NOTE — PLAN OF CARE
Goal Outcome Evaluation:  Plan of Care Reviewed With: patient        Progress: no change  Outcome Evaluation: Pt worn out this pm has been up mult times to bathroom due to taking lactulose. Pt amb 15'x2 rwx min x1 able to stand and sink and wash hands/face w/ cues. Pt tolerated AROM BLEs and BUEs. Pt will need cont PT upon d/c

## 2022-05-07 NOTE — PLAN OF CARE
Goal Outcome Evaluation:  Plan of Care Reviewed With: patient        Progress: no change  Outcome Evaluation: Pt sitting up in chair. continue to be confused at times.assist when out of bed. continue to remind pt to change positions. Continue to monitor.

## 2022-05-07 NOTE — THERAPY TREATMENT NOTE
Acute Care - Occupational Therapy Treatment Note  [facility]     Patient Name: Travis Patel  : 1945  MRN: [id number]  Today's Date: 2022             Admit Date: 5/3/2022       ICD-10-CM ICD-9-CM   1. Encephalopathy  G93.40 348.30   2. Altered mental status, unspecified altered mental status type  R41.82 780.97   3. Hyponatremia  E87.1 276.1   4. Chronic kidney disease, unspecified CKD stage  N18.9 585.9   5. Chronic liver disease  K76.9 571.9   6. Anemia, unspecified type  D64.9 285.9   7. Gastrointestinal hemorrhage, unspecified gastrointestinal hemorrhage type  K92.2 578.9   8. Increased anion gap metabolic acidosis  E87.2 276.2   9. Impaired mobility  Z74.09 799.89   10. Decreased activities of daily living (ADL)  Z78.9 V49.89     Patient Active Problem List   Diagnosis   • Subarachnoid hemorrhage (HCC)   • Chronic coronary artery disease   • Hyperlipidemia   • ICD (implantable cardioverter-defibrillator) in place   • Orthostatic hypotension   • Syncope   • Acute metabolic encephalopathy, due to hyperammonemia   • S/P ablation of atrial fibrillation   • Chronic combined systolic and diastolic CHF (congestive heart failure) ([facility])   • Elevated troponin, likely due to recent ablation   • Stage 3b chronic kidney disease (HCC)   • Bipolar 1 disorder, mixed, full remission (HCC)   • Abnormal BUN-to-creatinine ratio   • Hyponatremia   • Obesity (BMI 30-39.9)   • Lactic acidosis   • GI bleed   • Encephalopathy     Past Medical History:   Diagnosis Date   • Bipolar 1 disorder (HCC)    • Diverticulitis    • Hernia of abdominal wall    • Kidney disease    • Myocardial infarction (HCC) 2017   • Sleep apnea    • Stroke (HCC) 2017     Past Surgical History:   Procedure Laterality Date   • COLOSTOMY  2001    x 3 months   • COLOSTOMY REVISION     • CORONARY ARTERY BYPASS GRAFT         • HERNIA REPAIR      abdominal hernia x 3; poor healing with multiple revisions   • PACEMAKER IMPLANTATION       w/ defibrillator   • ROTATOR CUFF REPAIR Right 2005         OT ASSESSMENT FLOWSHEET (last 12 hours)     OT Evaluation and Treatment     Row Name 05/07/22 0851                   OT Time and Intention    Subjective Information no complaints  -TS        Document Type therapy note (daily note)  -TS        Mode of Treatment occupational therapy  -TS        Patient Effort good  -TS                  General Information    Existing Precautions/Restrictions fall  -TS                  Cognition    Personal Safety Interventions fall prevention program maintained;gait belt;nonskid shoes/slippers when out of bed  -TS                  Activities of Daily Living    BADL Assessment/Intervention bathing;upper body dressing;lower body dressing;toileting;grooming  -TS                  Bathing Assessment/Intervention    [place] Level (Bathing) upper extremities;chest/trunk;proximal lower extremities;perineal area;set up;supervision  -TS        Assistive Devices (Bathing) hand-held shower spray hose;grab bar, tub/shower;shower chair  -TS        Position (Bathing) supported sitting;supported standing  -TS                  Upper Body Dressing Assessment/Training    [place] Level (Upper Body Dressing) don;pull-over garment;set up  -TS        Position (Upper Body Dressing) unsupported sitting  -TS                  Lower Body Dressing Assessment/Training    [place] Level (Lower Body Dressing) don;pants/bottoms;shoes/slippers;set up;minimum assist (75% patient effort);moderate assist (50% patient effort)  -TS        Position (Lower Body Dressing) unsupported sitting;supported standing  -TS                  Grooming Assessment/Training    [place] Level (Grooming) hair care, combing/brushing;standby assist;oral care regimen;shave face;supervision  -TS        Position (Grooming) sink side;unsupported sitting;supported standing  -TS                  Toileting Assessment/Training    [place] Level (Toileting) toileting  skills;standby assist  -TS        Assistive Devices (Toileting) commode;grab bar/safety frame  -TS        Position (Toileting) supported standing  -TS                  Functional Mobility    Functional Mobility- Ind. Level contact guard assist  -TS        Functional Mobility- Comment HHA in room, in BR  -TS                  Transfer Assessment/Treatment    Transfers sit-stand transfer;stand-sit transfer;shower transfer  -TS                  Transfers    Sit-Stand [place] (Transfers) set up;standby assist;contact guard  -TS        Stand-Sit [place] (Transfers) contact guard  -TS        [place] Level (Shower Transfer) stand by assist  -TS        Assistive Device (Shower Transfer) shower chair;grab bar, tub/shower  -TS                  Shower Transfer    Type (Shower Transfer) sit-stand;stand-sit  -TS                  Plan of Care Review    Plan of Care Reviewed With patient  -TS        Progress improving  -TS        Outcome Evaluation Pt completed grooming tasks while seated at side sink. Pt ambulated with HHA and CGA. Pt set up for UB dressing and min A for LB dressing. Pt transfers with SBA. Pt completed TB bathing with SBA with set up and shower chair. Pt would benefit from acute rehab at discharge. Continue OT POC  -TS                  Positioning and Restraints    Pre-Treatment Position sitting in chair/recliner  -TS        Post Treatment Position chair  -TS        In Chair reclined;call light within reach;encouraged to call for assist;exit alarm on  -TS              User Key  (r) = Recorded By, (t) = Taken By, (c) = Cosigned By    Initials Name Effective Dates    TS Loretta Barr, GROSS 06/16/21 -                  Occupational Therapy Education                 Title: PT OT SLP Therapies (In Progress)     Topic: Occupational Therapy (In Progress)     Point: ADL training (In Progress)     Description:   Instruct learner(s) on proper safety adaptation and remediation techniques during self care  or transfers.   Instruct in proper use of assistive devices.              Learning Progress Summary           Patient Acceptance, E,D, NR by  at 5/4/2022 1555   Family Acceptance, E,D, NR by  at 5/4/2022 1555                   Point: Home exercise program (In Progress)     Description:   Instruct learner(s) on appropriate technique for monitoring, assisting and/or progressing therapeutic exercises/activities.              Learning Progress Summary           Patient Acceptance, E,D, NR by  at 5/4/2022 1555   Family Acceptance, E,D, NR by  at 5/4/2022 1555                               User Key     Initials Effective Dates Name Provider Type Discipline     08/28/18 -  Sunni Nagel, OTR/L Occupational Therapist OT                  OT Recommendation and Plan     Plan of Care Review  Plan of Care Reviewed With: patient  Progress: improving  Outcome Evaluation: Pt completed grooming tasks while seated at side sink. Pt ambulated with HHA and CGA. Pt set up for UB dressing and min A for LB dressing. Pt transfers with SBA. Pt completed TB bathing with SBA with set up and shower chair. Pt would benefit from acute rehab at discharge. Continue OT POC  Plan of Care Reviewed With: patient  Outcome Evaluation: Pt completed grooming tasks while seated at side sink. Pt ambulated with HHA and CGA. Pt set up for UB dressing and min A for LB dressing. Pt transfers with SBA. Pt completed TB bathing with SBA with set up and shower chair. Pt would benefit from acute rehab at discharge. Continue OT POC        Time Calculation:    Time Calculation- OT     Row Name 05/07/22 1404 05/07/22 0906          Time Calculation- OT    OT Start Time 0851  -TS --     OT Stop Time 1000  -TS --     OT Time Calculation (min) 69 min  -TS --     Total Timed Code Minutes- OT 69 minute(s)  -TS --     OT Received On 05/07/22  -TS --            Timed Charges    15266 - Gait Training Minutes  -- 15  -AE     40524 - OT Self Care/Mgmt Minutes 69  -TS  --            Total Minutes    Timed Charges Total Minutes 69  -TS 15  -AE      Total Minutes 69  -TS 15  -AE           User Key  (r) = Recorded By, (t) = Taken By, (c) = Cosigned By    Initials Name Provider Type    AE Adamaris Broussard, PTA Physical Therapist Assistant    Loretta Wright COTA Occupational Therapist Assistant              Therapy Charges for Today     Code Description Service Date Service Provider Modifiers Qty    75731178257 HC OT SELF CARE/MGMT/TRAIN EA 15 MIN 5/7/2022 Loretta Barr COTA GO 5               Loretta BHATTI. GINNY Barr  5/7/2022

## 2022-05-07 NOTE — THERAPY TREATMENT NOTE
Acute Care - Physical Therapy Treatment Note  [facility]     Patient Name: Travis Patel  : 1945  MRN: [id number]  Today's Date: 2022      Visit Dx:     ICD-10-CM ICD-9-CM   1. Encephalopathy  G93.40 348.30   2. Altered mental status, unspecified altered mental status type  R41.82 780.97   3. Hyponatremia  E87.1 276.1   4. Chronic kidney disease, unspecified CKD stage  N18.9 585.9   5. Chronic liver disease  K76.9 571.9   6. Anemia, unspecified type  D64.9 285.9   7. Gastrointestinal hemorrhage, unspecified gastrointestinal hemorrhage type  K92.2 578.9   8. Increased anion gap metabolic acidosis  E87.2 276.2   9. Impaired mobility  Z74.09 799.89   10. Decreased activities of daily living (ADL)  Z78.9 V49.89     Patient Active Problem List   Diagnosis   • Subarachnoid hemorrhage (HCC)   • Chronic coronary artery disease   • Hyperlipidemia   • ICD (implantable cardioverter-defibrillator) in place   • Orthostatic hypotension   • Syncope   • Acute metabolic encephalopathy, due to hyperammonemia   • S/P ablation of atrial fibrillation   • Chronic combined systolic and diastolic CHF (congestive heart failure) (HCC)   • Elevated troponin, likely due to recent ablation   • Stage 3b chronic kidney disease (HCC)   • Bipolar 1 disorder, mixed, full remission (HCC)   • Abnormal BUN-to-creatinine ratio   • Hyponatremia   • Obesity (BMI 30-39.9)   • Lactic acidosis   • GI bleed   • Encephalopathy     Past Medical History:   Diagnosis Date   • Bipolar 1 disorder (HCC)    • Diverticulitis    • Hernia of abdominal wall    • Kidney disease    • Myocardial infarction (HCC) 2017   • Sleep apnea    • Stroke (HCC) 2017     Past Surgical History:   Procedure Laterality Date   • COLOSTOMY  2001    x 3 months   • COLOSTOMY REVISION     • CORONARY ARTERY BYPASS GRAFT         • HERNIA REPAIR      abdominal hernia x 3; poor healing with multiple revisions   • PACEMAKER IMPLANTATION      w/ defibrillator   •  ROTATOR CUFF REPAIR Right 2005     PT Assessment (last 12 hours)     PT Evaluation and Treatment     Row Name 05/07/22 0834          Physical Therapy Time and Intention    Subjective Information no complaints  -AE     Document Type therapy note (daily note)  -AE     Mode of Treatment physical therapy  -AE     Row Name 05/07/22 0834          General Information    Existing Precautions/Restrictions fall  -AE     Row Name 05/07/22 0834          Pain    Pretreatment Pain Rating 0/10 - no pain  -AE     Posttreatment Pain Rating 0/10 - no pain  -AE     Row Name 05/07/22 0834          Bed Mobility    Supine-Sit [place] (Bed Mobility) standby assist  -AE     Assistive Device (Bed Mobility) bed rails  -AE     Row Name 05/07/22 0834          Transfers    Sit-Stand [place] (Transfers) contact guard;verbal cues  -AE     Stand-Sit [place] (Transfers) contact guard;verbal cues  -AE     Row Name 05/07/22 0834          Gait/Stairs (Locomotion)    [place] Level (Gait) contact guard;minimum assist (75% patient effort)  -AE     Assistive Device (Gait) --  no a/d  -AE     Distance in Feet (Gait) 50  x 50 x 1 standing rest  -AE     Deviations/Abnormal Patterns (Gait) dang decreased  -AE     Bilateral Gait Deviations forward flexed posture  -AE     Row Name 05/07/22 0834          Balance    Static Standing Balance standby assist  -AE     Row Name 05/07/22 0834          Hip (Therapeutic Exercise)    Hip (Therapeutic Exercise) AROM (active range of motion)  -AE     Hip AROM (Therapeutic Exercise) flexion;20 repititions  -AE     Row Name 05/07/22 0834          Knee (Therapeutic Exercise)    Knee (Therapeutic Exercise) AROM (active range of motion)  -AE     Knee AROM (Therapeutic Exercise) LAQ (long arc quad);20 repititions  -AE     Row Name 05/07/22 0834          Ankle (Therapeutic Exercise)    Ankle (Therapeutic Exercise) AROM (active range of motion)  -AE     Ankle AROM (Therapeutic Exercise) 20  repititions;plantarflexion;dorsiflexion  -AE     Row Name 05/07/22 0834          Vital Signs    O2 Delivery Pre Treatment room air  -AE     O2 Delivery Intra Treatment room air  -AE     Post SpO2 (%) 90  -AE     O2 Delivery Post Treatment room air  -AE     Row Name 05/07/22 0834          Positioning and Restraints    Pre-Treatment Position in bed  -AE     Post Treatment Position chair  -AE     In Chair sitting;call light within reach  -AE           User Key  (r) = Recorded By, (t) = Taken By, (c) = Cosigned By    Initials Name Provider Type    AE Adamaris Broussard, PTA Physical Therapist Assistant                Physical Therapy Education                 Title: PT OT SLP Therapies (In Progress)     Topic: Physical Therapy (In Progress)     Point: Mobility training (Done)     Learning Progress Summary           Patient Acceptance, E, VU by AS at 5/4/2022 1315    Comment: Pt education on PT POC and goals.                   Point: Home exercise program (Not Started)     Learner Progress:  Not documented in this visit.          Point: Body mechanics (Not Started)     Learner Progress:  Not documented in this visit.          Point: Precautions (Not Started)     Learner Progress:  Not documented in this visit.                      User Key     Initials Effective Dates Name Provider Type Discipline    AS 02/28/22 -  Eleazar Ravi, PT Student PT Student PT              PT Recommendation and Plan     Plan of Care Reviewed With: patient  Progress: improving  Outcome Evaluation: Pt was SBA supine to sit and cga to stand. Pt walked cga-min x 1 50ft x 1 standing rest plus 50ft without RW. Pt completed AROM ex's in sitting. Pt would benefit from continued PT.       Time Calculation:    PT Charges     Row Name 05/07/22 0906             Time Calculation    Start Time 0834  -AE      Stop Time 0857  -AE      Time Calculation (min) 23 min  -AE      PT Received On 05/07/22  -AE      PT Goal Re-Cert Due Date 05/14/22  -AE              Time  Calculation- PT    Total Timed Code Minutes- PT 23 minute(s)  -AE              Timed Charges    90153 - PT Therapeutic Exercise Minutes 8  -AE      66782 - Gait Training Minutes  15  -AE              Total Minutes    Timed Charges Total Minutes 23  -AE       Total Minutes 23  -AE            User Key  (r) = Recorded By, (t) = Taken By, (c) = Cosigned By    Initials Name Provider Type    AE Adamaris Broussard PTA Physical Therapist Assistant              Therapy Charges for Today     Code Description Service Date Service Provider Modifiers Qty    15335710644 HC GAIT TRAINING EA 15 MIN 5/7/2022 Adamaris Broussard PTA GP 1    85348506198 HC PT THER PROC EA 15 MIN 5/7/2022 Adamaris Broussard PTA GP 1          PT G-Codes  Outcome Measure Options: AM-PAC 6 Clicks Daily Activity (OT)  AM-PAC 6 Clicks Score (PT): 17  AM-PAC 6 Clicks Score (OT): 16    Adamaris Broussard PTA  5/7/2022

## 2022-05-07 NOTE — PLAN OF CARE
Goal Outcome Evaluation:  Plan of Care Reviewed With: patient        Progress: improving  Outcome Evaluation: Pt completed grooming tasks while seated at side sink. Pt ambulated with HHA and CGA. Pt set up for UB dressing and min A for LB dressing. Pt transfers with SBA. Pt completed TB bathing with SBA with set up and shower chair. Pt would benefit from acute rehab at discharge. Continue OT POC

## 2022-05-07 NOTE — PLAN OF CARE
Goal Outcome Evaluation:  Alert with significant confusion, RA, denies pain/ discomfort -VSS,no fever/cough/chill reported -Tele in place- NSR. All personal items within reach- Bed alarm on. Assist x1 with care and ambulation.  Rest is encouraged between care. Tremors at times.  Will continue to monitor

## 2022-05-07 NOTE — PLAN OF CARE
Goal Outcome Evaluation:  Plan of Care Reviewed With: patient        Progress: improving  Outcome Evaluation: Pt was SBA supine to sit and cga to stand. Pt walked cga-min x 1 50ft x 1 standing rest plus 50ft without RW. Pt completed AROM ex's in sitting. Pt would benefit from continued PT.

## 2022-05-07 NOTE — PROGRESS NOTES
"Progress Note  Travis Patel  5/7/2022 10:15 CDT  Subjective:   Admit Date:   5/3/2022      CC/ADMIT DX:       Interval History:   Reviewed overnight events and nursing notes. He has no new complaints. He has no CP or SOA. He is eating.   I have reviewed all labs/diagnostics from the last 24hrs.       ROS:   I have done a 10 point ROS and all are negative, except what is mentioned in the HPI.    Diet Regular; Low Fat, Low Sodium    Medications:      DULoxetine, 60 mg, Oral, Nightly  empagliflozin, 10 mg, Oral, Daily  enoxaparin, 30 mg, Subcutaneous, Daily  folic acid, 1 mg, Oral, Daily  furosemide, 80 mg, Oral, Daily  lactulose, 30 g, Oral, TID  levothyroxine, 50 mcg, Oral, Q AM  lithium carbonate, 150 mg, Oral, BID With Meals  magnesium oxide, 400 mg, Oral, Daily  melatonin, 6 mg, Oral, Nightly  midodrine, 5 mg, Oral, TID AC  pantoprazole, 40 mg, Oral, Nightly  polyethylene glycol, 17 g, Oral, Daily  potassium chloride, 10 mEq, Oral, Daily  QUEtiapine, 50 mg, Oral, Nightly  rOPINIRole, 0.5 mg, Oral, Nightly            Objective:   Vitals: BP 94/62 (BP Location: Right arm, Patient Position: Lying)   Pulse 59   Temp 97.8 °F (36.6 °C) (Oral)   Resp 16   Ht 167.6 cm (66\")   Wt 86.2 kg (190 lb)   SpO2 98%   BMI 30.67 kg/m²    Intake/Output Summary (Last 24 hours) at 5/7/2022 1015  Last data filed at 5/7/2022 0327  Gross per 24 hour   Intake 720 ml   Output 1100 ml   Net -380 ml     General appearance: alert and cooperative with exam  Lungs: clear to auscultation bilaterally  Heart: RRR  Abdomen: soft, non-tender; bowel sounds normal; no masses,  no organomegaly  Extremities: extremities normal, atraumatic, no cyanosis or edema  Neurologic:  No obvious focal neurologic deficits.    Assessment and Plan:     Encephalopathy    Chronic coronary artery disease    Hyperlipidemia    Orthostatic hypotension    S/P ablation of atrial fibrillation    Chronic combined systolic and diastolic CHF (congestive heart failure) " (HCC)    Stage 3b chronic kidney disease (HCC)    Hyponatremia      Plan:  1.  Continue present medication(s)   2.  Labs are stable  3.  Continue supportive care  4.  Plan for d/c next week to Rehab      Discharge planning:   Rehab    Reviewed treatment plans with the patient and/or family.             Electronically signed by John Ford MD on 5/7/2022 at 10:15 CDT

## 2022-05-08 NOTE — PROGRESS NOTES
"Progress Note  Travis Patel  5/8/2022 10:09 CDT  Subjective:   Admit Date:   5/3/2022      CC/ADMIT DX:       Interval History:   Reviewed overnight events and nursing notes. He has no complaints. He is eating well. No CP or SOA.  I have reviewed all labs/diagnostics from the last 24hrs.       ROS:   I have done a 10 point ROS and all are negative, except what is mentioned in the HPI.    Diet Regular; Low Fat, Low Sodium    Medications:      DULoxetine, 60 mg, Oral, Nightly  empagliflozin, 10 mg, Oral, Daily  enoxaparin, 30 mg, Subcutaneous, Daily  folic acid, 1 mg, Oral, Daily  furosemide, 80 mg, Oral, Daily  lactulose, 30 g, Oral, TID  levothyroxine, 50 mcg, Oral, Q AM  lithium carbonate, 150 mg, Oral, BID With Meals  magnesium oxide, 400 mg, Oral, Daily  melatonin, 6 mg, Oral, Nightly  midodrine, 5 mg, Oral, TID AC  pantoprazole, 40 mg, Oral, Nightly  polyethylene glycol, 17 g, Oral, Daily  potassium chloride, 10 mEq, Oral, Daily  potassium chloride, 40 mEq, Oral, Once  QUEtiapine, 50 mg, Oral, Nightly  rOPINIRole, 0.5 mg, Oral, Nightly            Objective:   Vitals: /68 (BP Location: Left arm, Patient Position: Sitting)   Pulse 61   Temp 98 °F (36.7 °C) (Oral)   Resp 16   Ht 167.6 cm (66\")   Wt 86.2 kg (190 lb)   SpO2 100%   BMI 30.67 kg/m²      Intake/Output Summary (Last 24 hours) at 5/8/2022 1009  Last data filed at 5/8/2022 0821  Gross per 24 hour   Intake 440 ml   Output 1050 ml   Net -610 ml     General appearance: alert and cooperative with exam, up in chair  Lungs: clear to auscultation bilaterally  Heart: RRR  Abdomen: soft, non-tender; bowel sounds normal; no masses,  no organomegaly  Extremities: extremities normal, atraumatic, no cyanosis or edema  Neurologic:  No obvious focal neurologic deficits.    Assessment and Plan:     Encephalopathy    Chronic coronary artery disease    Hyperlipidemia    Orthostatic hypotension    S/P ablation of atrial fibrillation    Chronic combined " systolic and diastolic CHF (congestive heart failure) (HCC)    Stage 3b chronic kidney disease (HCC)    Hyponatremia    Hypokalemia    Plan:  1.  Continue present medication(s)   2.  Replace K +  3.  Continue supportive care  4.  Recheck labs in am  5.  Plan for d/c next week to Rehab      Discharge planning:   Rehab    Reviewed treatment plans with the patient and/or family.             Electronically signed by John Ford MD on 5/8/2022 at 10:09 CDT

## 2022-05-08 NOTE — PLAN OF CARE
Goal Outcome Evaluation:     Alert with confusion, RA, no pain/ discomfort reported -VSS-Tele in place- NSR. All personal items within reach- Bed alarm on. Assist x1 with care and ambulation-  Will continue to monitor

## 2022-05-08 NOTE — PLAN OF CARE
Goal Outcome Evaluation:  Plan of Care Reviewed With: patient        Progress: improving  Outcome Evaluation: Pt alert to person and place, denies pain walks to bathroom with walker and assistance. Chair alarm in place. Remind him to change positions every 2 hrs. Continue to monitor.

## 2022-05-09 NOTE — PLAN OF CARE
"Goal Outcome Evaluation:  Plan of Care Reviewed With: patient        Progress: no change  Outcome Evaluation: A&OX2, disoriented to time and situation this shift. VSS. No c/o pain or discomfort this shift. Voiding w/o difficulty. Assist x1 and walker to ambulate, pt fatigues rather quickly and needs extra time to rest in between steps. Pt also requiring cues at times for ADL's, standing/sitting/drinking/taking meds, etc. pt identifies he is needing cues and states \"i just forgot\". On room air. Up in chair most of shift. Lactulose given. Lovnenox and SCD's for VTE prevention. NSR on tele. All alarms set, call light in reach. Safety maintianed and continue to monitor.  "

## 2022-05-09 NOTE — PROGRESS NOTES
"    Daily Progress Note  Travis Patel  MRN: [id number] LOS: 0    Admit Date: 5/3/2022   2022 07:00 CDT    Subjective:         Interval History:    Reviewed overnight events and nursing notes.     Doing well.  No complaints this morning.    ROS:  Review of Systems   Constitutional: Negative for chills and fever.   Respiratory: Positive for shortness of breath. Negative for cough, chest tightness and wheezing.    Cardiovascular: Negative for chest pain, palpitations and leg swelling.   Gastrointestinal: Negative for abdominal pain, diarrhea, nausea and vomiting.       DIET:  Diet Regular; Low Fat, Low Sodium    Medications:      DULoxetine, 60 mg, Oral, Nightly  empagliflozin, 10 mg, Oral, Daily  enoxaparin, 30 mg, Subcutaneous, Daily  folic acid, 1 mg, Oral, Daily  furosemide, 80 mg, Oral, Daily  lactulose, 30 g, Oral, TID  levothyroxine, 50 mcg, Oral, Q AM  lithium carbonate, 150 mg, Oral, BID With Meals  magnesium oxide, 400 mg, Oral, Daily  melatonin, 6 mg, Oral, Nightly  midodrine, 5 mg, Oral, TID AC  pantoprazole, 40 mg, Oral, Nightly  polyethylene glycol, 17 g, Oral, Daily  potassium chloride, 10 mEq, Oral, Daily  QUEtiapine, 50 mg, Oral, Nightly  rOPINIRole, 0.5 mg, Oral, Nightly          Objective:     Vitals: /83 (BP Location: Left arm, Patient Position: Lying)   Pulse 56   Temp 98 °F (36.7 °C) (Oral)   Resp 18   Ht 167.6 cm (66\")   Wt 86.2 kg (190 lb)   SpO2 97%   BMI 30.67 kg/m²    Intake/Output Summary (Last 24 hours) at 2022 0700  Last data filed at 2022 2330  Gross per 24 hour   Intake 440 ml   Output 525 ml   Net -85 ml    Temp (24hrs), Av.2 °F (36.8 °C), Min:98 °F (36.7 °C), Max:98.4 °F (36.9 °C)    Glucose:  Lab Results   Component Value Date    POCGLU 122 2022     Physical Examination:   Physical Exam  Constitutional:       General: He is not in acute distress.     Appearance: Normal appearance. He is normal weight.      Comments: Chronically ill-appearing "   Cardiovascular:      Rate and Rhythm: Normal rate and regular rhythm.      Pulses: Normal pulses.      Heart sounds: Normal heart sounds. No murmur heard.  Pulmonary:      Effort: Pulmonary effort is normal.      Breath sounds: Normal breath sounds. No wheezing, rhonchi or rales.   Abdominal:      General: Abdomen is flat. Bowel sounds are normal.      Palpations: Abdomen is soft.      Tenderness: There is no abdominal tenderness.   Musculoskeletal:      Right lower leg: Edema present.      Left lower leg: Edema present.      Comments: 1+ bilateral lower extremity edema   Neurological:      Mental Status: He is alert.         Labs:  Lab Results (last 24 hours)     Procedure Component Value Units Date/Time    CBC & Differential [[id number]]  (Abnormal) Collected: 05/09/22 0625    Specimen: Blood Updated: 05/09/22 0649    Narrative:      The following orders were created for panel order CBC & Differential.  Procedure                               Abnormality         Status                     ---------                               -----------         ------                     CBC Auto Differential[[id number]]        Abnormal            Final result                 Please view results for these tests on the individual orders.    CBC Auto Differential [[id number]]  (Abnormal) Collected: 05/09/22 0625    Specimen: Blood Updated: 05/09/22 0649     WBC 5.03 10*3/mm3      RBC 3.61 10*6/mm3      Hemoglobin 9.8 g/dL      Hematocrit 33.0 %      MCV 91.4 fL      MCH 27.1 pg      MCHC 29.7 g/dL      RDW 22.1 %      RDW-SD 74.0 fl      MPV 10.7 fL      Platelets 155 10*3/mm3      Neutrophil % 71.9 %      Lymphocyte % 15.9 %      Monocyte % 7.6 %      Eosinophil % 3.2 %      Basophil % 0.8 %      Immature Grans % 0.6 %      Neutrophils, Absolute 3.62 10*3/mm3      Lymphocytes, Absolute 0.80 10*3/mm3      Monocytes, Absolute 0.38 10*3/mm3      Eosinophils, Absolute 0.16 10*3/mm3      Basophils, Absolute 0.04 10*3/mm3      Immature  Grans, Absolute 0.03 10*3/mm3      nRBC 0.0 /100 WBC     Comprehensive Metabolic Panel [[id number]] Collected: 05/09/22 0625    Specimen: Blood Updated: 05/09/22 0644    POC Glucose Once [[id number]]  (Normal) Collected: 05/08/22 0851    Specimen: Blood Updated: 05/08/22 0902     Glucose 122 mg/dL      Comment: : [id number] Saleem Jhaveri ID: [id number]       Comprehensive Metabolic Panel [[id number]]  (Abnormal) Collected: 05/08/22 0706    Specimen: Blood Updated: 05/08/22 0742     Glucose 121 mg/dL      BUN 18 mg/dL      Creatinine 1.43 mg/dL      Sodium 131 mmol/L      Potassium 3.2 mmol/L      Chloride 96 mmol/L      CO2 24.0 mmol/L      Calcium 9.3 mg/dL      Total Protein 6.0 g/dL      Albumin 3.70 g/dL      ALT (SGPT) 49 U/L      AST (SGOT) 37 U/L      Alkaline Phosphatase 176 U/L      Total Bilirubin 1.6 mg/dL      Globulin 2.3 gm/dL      A/G Ratio 1.6 g/dL      BUN/Creatinine Ratio 12.6     Anion Gap 11.0 mmol/L      eGFR 51.4 mL/min/1.73      Comment: National Kidney Foundation and American Society of Nephrology (ASN) Task Force recommended calculation based on the Chronic Kidney Disease Epidemiology Collaboration (CKD-EPI) equation refit without adjustment for race.       Narrative:      GFR Normal >60  Chronic Kidney Disease <60  Kidney Failure <15      CBC & Differential [[id number]]  (Abnormal) Collected: 05/08/22 0706    Specimen: Blood Updated: 05/08/22 0719    Narrative:      The following orders were created for panel order CBC & Differential.  Procedure                               Abnormality         Status                     ---------                               -----------         ------                     CBC Auto Differential[[id number]]        Abnormal            Final result                 Please view results for these tests on the individual orders.    CBC Auto Differential [[id number]]  (Abnormal) Collected: 05/08/22 0706    Specimen: Blood Updated: 05/08/22 0719     WBC 5.80  10*3/mm3      RBC 3.34 10*6/mm3      Hemoglobin 8.9 g/dL      Hematocrit 30.1 %      MCV 90.1 fL      MCH 26.6 pg      MCHC 29.6 g/dL      RDW 21.8 %      RDW-SD 73.2 fl      MPV 9.8 fL      Platelets 149 10*3/mm3      Neutrophil % 77.5 %      Lymphocyte % 11.9 %      Monocyte % 6.2 %      Eosinophil % 3.4 %      Basophil % 0.7 %      Immature Grans % 0.3 %      Neutrophils, Absolute 4.49 10*3/mm3      Lymphocytes, Absolute 0.69 10*3/mm3      Monocytes, Absolute 0.36 10*3/mm3      Eosinophils, Absolute 0.20 10*3/mm3      Basophils, Absolute 0.04 10*3/mm3      Immature Grans, Absolute 0.02 10*3/mm3      nRBC 0.0 /100 WBC            Imaging:  No radiology results from the last 24 hrs       Assessment and Plan:     Primary Problem:  Encephalopathy    Hospital Problem list:    Encephalopathy    Chronic coronary artery disease    Hyperlipidemia    Orthostatic hypotension    S/P ablation of atrial fibrillation    Chronic combined systolic and diastolic CHF (congestive heart failure) (HCC)    Stage 3b chronic kidney disease (HCC)    Hyponatremia    Assessment: 76-year-old male with complex medical history who presents with acute on chronic encephalopathy.     Plan:     Medically appropriate for discharge.     Acute on chronic encephalopathy  Currently the etiology of this is unclear favoring dementia syndrome at this time.  We will continue Seroquel to help with behavioral abnormalities.  Less likely hepatic encephalopathy but we will continue lactulose as risk is low.      Severe combined heart failure, systolic and diastolic  Has advanced heart failure with EF of 25% and advanced diastolic dysfunction with increased atrial pressure.  He also has RV dysfunction and mitral valve regurgitation.    Adding metolazone for home diuresis.    Cirrhosis  Fairly compensated.  I believe encephalopathy is more related to dementia than hepatic encephalopathy.  We will continue lactulose.  I believe most of his liver enzyme elevation  is due to congestive hepatopathy from heart failure.    Will discuss with Ms. Turcios today.    Discharge planning:   Skilled nursing facility    Reviewed treatment plans with the patient and/or family.     Code Status:   Code Status and Medical Interventions:   Ordered at: 05/03/22 2026     Level Of Support Discussed With:    Patient     Code Status (Patient has no pulse and is not breathing):    CPR (Attempt to Resuscitate)     Medical Interventions (Patient has pulse or is breathing):    Full Support       Electronically signed by Aly Phan MD on 5/9/2022 at 07:00 CDT

## 2022-05-09 NOTE — THERAPY TREATMENT NOTE
Acute Care - Physical Therapy Treatment Note  [facility]     Patient Name: Travis Patel  : 1945  MRN: [id number]  Today's Date: 2022      Visit Dx:     ICD-10-CM ICD-9-CM   1. Encephalopathy  G93.40 348.30   2. Altered mental status, unspecified altered mental status type  R41.82 780.97   3. Hyponatremia  E87.1 276.1   4. Chronic kidney disease, unspecified CKD stage  N18.9 585.9   5. Chronic liver disease  K76.9 571.9   6. Anemia, unspecified type  D64.9 285.9   7. Gastrointestinal hemorrhage, unspecified gastrointestinal hemorrhage type  K92.2 578.9   8. Increased anion gap metabolic acidosis  E87.2 276.2   9. Impaired mobility  Z74.09 799.89   10. Decreased activities of daily living (ADL)  Z78.9 V49.89     Patient Active Problem List   Diagnosis   • Subarachnoid hemorrhage (HCC)   • Chronic coronary artery disease   • Hyperlipidemia   • ICD (implantable cardioverter-defibrillator) in place   • Orthostatic hypotension   • Syncope   • Acute metabolic encephalopathy, due to hyperammonemia   • S/P ablation of atrial fibrillation   • Chronic combined systolic and diastolic CHF (congestive heart failure) (HCC)   • Elevated troponin, likely due to recent ablation   • Stage 3b chronic kidney disease (HCC)   • Bipolar 1 disorder, mixed, full remission (HCC)   • Abnormal BUN-to-creatinine ratio   • Hyponatremia   • Obesity (BMI 30-39.9)   • Lactic acidosis   • GI bleed   • Encephalopathy     Past Medical History:   Diagnosis Date   • Bipolar 1 disorder (HCC)    • Diverticulitis    • Hernia of abdominal wall    • Kidney disease    • Myocardial infarction (HCC) 2017   • Sleep apnea    • Stroke (HCC) 2017     Past Surgical History:   Procedure Laterality Date   • COLOSTOMY  2001    x 3 months   • COLOSTOMY REVISION     • CORONARY ARTERY BYPASS GRAFT         • HERNIA REPAIR      abdominal hernia x 3; poor healing with multiple revisions   • PACEMAKER IMPLANTATION      w/ defibrillator   •  ROTATOR CUFF REPAIR Right 2005     PT Assessment (last 12 hours)     PT Evaluation and Treatment     [facility] Name 05/09/22 1046          Physical Therapy Time and Intention    Subjective Information complains of;weakness  -NW     Document Type therapy note (daily note)  -NW     Mode of Treatment physical therapy  -[facility] Name 05/09/22 1046          General Information    Existing Precautions/Restrictions fall  -NW     Row Name 05/09/22 1046          Pain    Pretreatment Pain Rating 0/10 - no pain  -[facility] Name 05/09/22 1046          Bed Mobility    Comment, (Bed Mobility) chair  -[facility] Name 05/09/22 1046          Transfers    Sit-Stand [place] (Transfers) verbal cues;contact guard  -     Stand-Sit [place] (Transfers) standby assist  -[facility] Name 05/09/22 1046          Gait/Stairs (Locomotion)    [place] Level (Gait) verbal cues;contact guard  -     Assistive Device (Gait) --  HH  -NW     Distance in Feet (Gait) 40x2  standing rest x1  -NW     Deviations/Abnormal Patterns (Gait) dang decreased;stride length decreased  -NW     Comment, (Gait/Stairs) pt fatigues quickly required standing rest break before able to amb back to room  -[facility] Name 05/09/22 1046          Safety Issues, Functional Mobility    Safety Issues Affecting Function (Mobility) safety precaution awareness  -     Impairments Affecting Function (Mobility) balance;strength;endurance/activity tolerance  -     Cognitive Impairments, Mobility Safety/Performance awareness, need for assistance  -[facility] Name 05/09/22 1046          Motor Skills    Therapeutic Exercise aerobic  -[facility] Name 05/09/22 1046          Aerobic Exercise    Comment, Aerobic Exercise (Therapeutic Exercise) AROM BLEs x15 w/ rest breaks and cues  -[facility] Name 05/09/22 1046          Positioning and Restraints    Pre-Treatment Position sitting in chair/recliner  -NW     Post Treatment Position chair  -NW     In Chair reclined;call light  within reach;encouraged to call for assist;notified nsg;exit alarm on  -NW           User Key  (r) = Recorded By, (t) = Taken By, (c) = Cosigned By    Initials Name Provider Type    NW Aleshia Baker PTA Physical Therapist Assistant                Physical Therapy Education                 Title: PT OT SLP Therapies (In Progress)     Topic: Physical Therapy (In Progress)     Point: Mobility training (Done)     Learning Progress Summary           Patient Acceptance, E, VU by AS at 5/4/2022 1315    Comment: Pt education on PT POC and goals.                   Point: Home exercise program (Not Started)     Learner Progress:  Not documented in this visit.          Point: Body mechanics (Not Started)     Learner Progress:  Not documented in this visit.          Point: Precautions (Not Started)     Learner Progress:  Not documented in this visit.                      User Key     Initials Effective Dates Name Provider Type Discipline    AS 02/28/22 -  Eleazar Ravi, PT Student PT Student PT              PT Recommendation and Plan     Plan of Care Reviewed With: patient  Progress: improving  Outcome Evaluation: Pt up in chair. Pt reports wanting to amb w/o rwx. sit-stand min x1. Pt amb 40' HH cga/min x1 standing rest due to decreased endurance/fatigue then amb 40' back to room. Pt tolerated AROM BLEs x15 w/ rest breaks as well. Pt will need cont PT upon d/c       Time Calculation:    PT Charges     Row Name 05/09/22 1115             Time Calculation    Start Time 1046  -NW      Stop Time 1113  -NW      Time Calculation (min) 27 min  -NW      PT Received On 05/09/22  -NW      PT Goal Re-Cert Due Date 05/14/22  -NW              Time Calculation- PT    Total Timed Code Minutes- PT 27 minute(s)  -NW              Timed Charges    78019 - PT Therapeutic Exercise Minutes 12  -NW      22270 - Gait Training Minutes  15  -NW              Total Minutes    Timed Charges Total Minutes 27  -NW       Total Minutes 27  -NW            User  Key  (r) = Recorded By, (t) = Taken By, (c) = Cosigned By    Initials Name Provider Type    NW Aleshia Baker PTA Physical Therapist Assistant              Therapy Charges for Today     Code Description Service Date Service Provider Modifiers Qty    65817537770 HC PT THER PROC EA 15 MIN 5/9/2022 Aleshia Baker, ESPERANZA GP 1    24880957732 HC GAIT TRAINING EA 15 MIN 5/9/2022 Aleshia Baker PTA GP 1          PT G-Codes  Outcome Measure Options: AM-PAC 6 Clicks Daily Activity (OT)  AM-PAC 6 Clicks Score (PT): 18  AM-PAC 6 Clicks Score (OT): 16    Aleshia Baker PTA  5/9/2022

## 2022-05-09 NOTE — PLAN OF CARE
Goal Outcome Evaluation:  Plan of Care Reviewed With: patient        Progress: improving  Outcome Evaluation: Pt up in chair. Pt reports wanting to amb w/o rwx. sit-stand min x1. Pt amb 40' HH cga/min x1 standing rest due to decreased endurance/fatigue then amb 40' back to room. Pt tolerated AROM BLEs x15 w/ rest breaks as well. Pt will need cont PT upon d/c

## 2022-05-09 NOTE — THERAPY TREATMENT NOTE
Acute Care - Physical Therapy Treatment Note  [facility]     Patient Name: Travis Patel  : 1945  MRN: [id number]  Today's Date: 2022      Visit Dx:     ICD-10-CM ICD-9-CM   1. Encephalopathy  G93.40 348.30   2. Altered mental status, unspecified altered mental status type  R41.82 780.97   3. Hyponatremia  E87.1 276.1   4. Chronic kidney disease, unspecified CKD stage  N18.9 585.9   5. Chronic liver disease  K76.9 571.9   6. Anemia, unspecified type  D64.9 285.9   7. Gastrointestinal hemorrhage, unspecified gastrointestinal hemorrhage type  K92.2 578.9   8. Increased anion gap metabolic acidosis  E87.2 276.2   9. Impaired mobility  Z74.09 799.89   10. Decreased activities of daily living (ADL)  Z78.9 V49.89     Patient Active Problem List   Diagnosis   • Subarachnoid hemorrhage (HCC)   • Chronic coronary artery disease   • Hyperlipidemia   • ICD (implantable cardioverter-defibrillator) in place   • Orthostatic hypotension   • Syncope   • Acute metabolic encephalopathy, due to hyperammonemia   • S/P ablation of atrial fibrillation   • Chronic combined systolic and diastolic CHF (congestive heart failure) (HCC)   • Elevated troponin, likely due to recent ablation   • Stage 3b chronic kidney disease (HCC)   • Bipolar 1 disorder, mixed, full remission (HCC)   • Abnormal BUN-to-creatinine ratio   • Hyponatremia   • Obesity (BMI 30-39.9)   • Lactic acidosis   • GI bleed   • Encephalopathy     Past Medical History:   Diagnosis Date   • Bipolar 1 disorder (HCC)    • Diverticulitis    • Hernia of abdominal wall    • Kidney disease    • Myocardial infarction (HCC) 2017   • Sleep apnea    • Stroke (HCC) 2017     Past Surgical History:   Procedure Laterality Date   • COLOSTOMY  2001    x 3 months   • COLOSTOMY REVISION     • CORONARY ARTERY BYPASS GRAFT         • HERNIA REPAIR      abdominal hernia x 3; poor healing with multiple revisions   • PACEMAKER IMPLANTATION      w/ defibrillator   •  ROTATOR CUFF REPAIR Right 2005     PT Assessment (last 12 hours)     PT Evaluation and Treatment     Row Name 05/09/22 1515 05/09/22 1046       Physical Therapy Time and Intention    Subjective Information complains of;weakness;fatigue  pt seems discouraged this afternoon wanting to go home  -NW complains of;weakness  -NW    Document Type therapy note (daily note)  -NW therapy note (daily note)  -NW    Mode of Treatment physical therapy  -NW physical therapy  -NW    Comment rx time 3210-0973  -NW --    Row Name 05/09/22 1515 05/09/22 1046       General Information    Existing Precautions/Restrictions fall  -NW fall  -NW    Row Name 05/09/22 1515 05/09/22 1046       Pain    Pretreatment Pain Rating 0/10 - no pain  -NW 0/10 - no pain  -NW    Row Name 05/09/22 1515 05/09/22 1046       Bed Mobility    Comment, (Bed Mobility) chair  -NW chair  -NW    Row Name 05/09/22 1515 05/09/22 1046       Transfers    Sit-Stand [place] (Transfers) verbal cues;contact guard  -NW verbal cues;contact guard  -NW    Stand-Sit [place] (Transfers) standby assist  -NW standby assist  -NW    Row Name 05/09/22 1515 05/09/22 1046       Gait/Stairs (Locomotion)    [place] Level (Gait) verbal cues;contact guard  -NW verbal cues;contact guard  -NW    Assistive Device (Gait) --  HH  -NW --  HH  -NW    Distance in Feet (Gait) 50x2  standing rest due to decreased endurance before amb bk to room  -NW 40x2  standing rest x1  -NW    Deviations/Abnormal Patterns (Gait) dang decreased;stride length decreased  -NW dang decreased;stride length decreased  -NW    Comment, (Gait/Stairs) -- pt fatigues quickly required standing rest break before able to amb back to room  -NW    Row Name 05/09/22 1515 05/09/22 1046       Safety Issues, Functional Mobility    Safety Issues Affecting Function (Mobility) -- safety precaution awareness  -NW    Impairments Affecting Function (Mobility) balance;strength;endurance/activity tolerance  -NW  balance;strength;endurance/activity tolerance  -NW    Cognitive Impairments, Mobility Safety/Performance -- awareness, need for assistance  -NW    Row Name 05/09/22 1046          Motor Skills    Therapeutic Exercise aerobic  -NW     Row Name 05/09/22 1046          Aerobic Exercise    Comment, Aerobic Exercise (Therapeutic Exercise) AROM BLEs x15 w/ rest breaks and cues  -NW     Row Name             [REMOVED] Wound 04/19/22 1200 Left lateral ankle Skin Tear    Wound - Properties Group Placement Date: 04/19/22  -SP Placement Time: 1200  -SP Side: Left  -SP Orientation: lateral  -SP Location: ankle  -SP Primary Wound Type: Skin tear  -SP Removal Date: 05/09/22  -BS Removal Time: 1306  -BS     Retired Wound - Properties Group Placement Date: 04/19/22  -SP Placement Time: 1200  -SP Side: Left  -SP Orientation: lateral  -SP Location: ankle  -SP Primary Wound Type: Skin tear  -SP Removal Date: 05/09/22  -BS Removal Time: 1306  -BS     Retired Wound - Properties Group Date first assessed: 04/19/22  -SP Time first assessed: 1200  -SP Side: Left  -SP Location: ankle  -SP Primary Wound Type: Skin tear  -SP Resolution Date: 05/09/22  -BS Resolution Time: 1306  -BS     Row Name             [REMOVED] Wound 04/19/22 1700 Left distal arm Skin Tear    Wound - Properties Group Placement Date: 04/19/22  -SP Placement Time: 1700  -SP Side: Left  -SP Orientation: distal  -SP Location: arm  -SP Primary Wound Type: Skin tear  -SP Removal Date: 05/09/22  -BS Removal Time: 1307  -BS     Retired Wound - Properties Group Placement Date: 04/19/22  -SP Placement Time: 1700  -SP Side: Left  -SP Orientation: distal  -SP Location: arm  -SP Primary Wound Type: Skin tear  -SP Removal Date: 05/09/22  -BS Removal Time: 1307  -BS     Retired Wound - Properties Group Date first assessed: 04/19/22  -SP Time first assessed: 1700  -SP Side: Left  -SP Location: arm  -SP Primary Wound Type: Skin tear  -SP Resolution Date: 05/09/22  -BS Resolution Time: 1307   -BS     Row Name 05/09/22 1515 05/09/22 1046       Positioning and Restraints    Pre-Treatment Position sitting in chair/recliner  -NW sitting in chair/recliner  -NW    Post Treatment Position chair  -NW chair  -NW    In Chair reclined;call light within reach;encouraged to call for assist  -NW reclined;call light within reach;encouraged to call for assist;notified nsg;exit alarm on  -NW          User Key  (r) = Recorded By, (t) = Taken By, (c) = Cosigned By    Initials Name Provider Type    NW Aleshia Baker, PTA Physical Therapist Assistant    Zaida Howard, RN Registered Nurse    Zina Murray RN Registered Nurse                Physical Therapy Education                 Title: PT OT SLP Therapies (In Progress)     Topic: Physical Therapy (In Progress)     Point: Mobility training (Done)     Learning Progress Summary           Patient Acceptance, E, VU by AS at 5/4/2022 1315    Comment: Pt education on PT POC and goals.                   Point: Home exercise program (Not Started)     Learner Progress:  Not documented in this visit.          Point: Body mechanics (Not Started)     Learner Progress:  Not documented in this visit.          Point: Precautions (Not Started)     Learner Progress:  Not documented in this visit.                      User Key     Initials Effective Dates Name Provider Type Discipline    AS 02/28/22 -  Eleazar Ravi, PT Student PT Student PT              PT Recommendation and Plan     Plan of Care Reviewed With: patient  Progress: improving  Outcome Evaluation: Pt up in chair. Pt reports wanting to amb w/o rwx. sit-stand min x1. Pt amb 40' HH cga/min x1 standing rest due to decreased endurance/fatigue then amb 40' back to room. Pt tolerated AROM BLEs x15 w/ rest breaks as well. Pt will need cont PT upon d/c       Time Calculation:    PT Charges     Row Name 05/09/22 1524 05/09/22 1115          Time Calculation    Start Time 1510  -NW 1046  -NW     Stop Time 1525 -NW 1113   -NW     Time Calculation (min) 15 min  -NW 27 min  -NW     PT Received On -- 05/09/22  -NW     PT Goal Re-Cert Due Date -- 05/14/22  -NW            Time Calculation- PT    Total Timed Code Minutes- PT 15 minute(s)  -NW 27 minute(s)  -NW            Timed Charges    67618 - PT Therapeutic Exercise Minutes -- 12  -NW     53499 - Gait Training Minutes  15  -NW 15  -NW            Total Minutes    Timed Charges Total Minutes 15  -NW 27  -NW      Total Minutes 15  -NW 27  -NW           User Key  (r) = Recorded By, (t) = Taken By, (c) = Cosigned By    Initials Name Provider Type    NW Aleshia Baker PTA Physical Therapist Assistant              Therapy Charges for Today     Code Description Service Date Service Provider Modifiers Qty    42642579794 HC PT THER PROC EA 15 MIN 5/9/2022 Aleshia Baker, PTA GP 1    73643951137 HC GAIT TRAINING EA 15 MIN 5/9/2022 Aleshia Baker, ESPERANZA GP 1    28153304207 HC GAIT TRAINING EA 15 MIN 5/9/2022 Aleshia Baker, ESPERANZA GP 1          PT G-Codes  Outcome Measure Options: AM-PAC 6 Clicks Daily Activity (OT)  AM-PAC 6 Clicks Score (PT): 18  AM-PAC 6 Clicks Score (OT): 16    Aleshia Baker PTA  5/9/2022

## 2022-05-09 NOTE — PLAN OF CARE
Problem: Adult Inpatient Plan of Care  Goal: Plan of Care Review  Recent Flowsheet Documentation  Taken 5/9/2022 1445 by Jorge Helton, OTR/L  Progress: no change  Plan of Care Reviewed With: patient  Outcome Evaluation: OT tx completed. Pt reports no pain. Pt up in bathroom with nsg when OT entered the room. pt is SBA for toileting task but misses the commode when urinating and is unaware of this. pt was SBA for t/f. pt was supervision with RW for functional mobility BR to chair. Pt was supervision with set up and verbal cues for washing face and brusing teeth. Pt at times unaware that he is spilling water down the front of himself with brushing teeth. Pt was max A to comb hair. with decreased processing and coordination of task noted. Pt reports it was because he was trying to do it without a mirror. Pt with decreased awareness of deficits and need for assist. Pt is very pleasant and agreeable to therapy though. Continue OT POC.

## 2022-05-09 NOTE — THERAPY TREATMENT NOTE
Patient Name: Travis Patel  : 1945    MRN: [id number]                              Today's Date: 2022       Admit Date: 5/3/2022    Visit Dx:     ICD-10-CM ICD-9-CM   1. Encephalopathy  G93.40 348.30   2. Altered mental status, unspecified altered mental status type  R41.82 780.97   3. Hyponatremia  E87.1 276.1   4. Chronic kidney disease, unspecified CKD stage  N18.9 585.9   5. Chronic liver disease  K76.9 571.9   6. Anemia, unspecified type  D64.9 285.9   7. Gastrointestinal hemorrhage, unspecified gastrointestinal hemorrhage type  K92.2 578.9   8. Increased anion gap metabolic acidosis  E87.2 276.2   9. Impaired mobility  Z74.09 799.89   10. Decreased activities of daily living (ADL)  Z78.9 V49.89     Patient Active Problem List   Diagnosis   • Subarachnoid hemorrhage (HCC)   • Chronic coronary artery disease   • Hyperlipidemia   • ICD (implantable cardioverter-defibrillator) in place   • Orthostatic hypotension   • Syncope   • Acute metabolic encephalopathy, due to hyperammonemia   • S/P ablation of atrial fibrillation   • Chronic combined systolic and diastolic CHF (congestive heart failure) (HCC)   • Elevated troponin, likely due to recent ablation   • Stage 3b chronic kidney disease (HCC)   • Bipolar 1 disorder, mixed, full remission (HCC)   • Abnormal BUN-to-creatinine ratio   • Hyponatremia   • Obesity (BMI 30-39.9)   • Lactic acidosis   • GI bleed   • Encephalopathy     Past Medical History:   Diagnosis Date   • Bipolar 1 disorder (HCC)    • Diverticulitis    • Hernia of abdominal wall    • Kidney disease    • Myocardial infarction (HCC) 2017   • Sleep apnea    • Stroke (HCC) 2017     Past Surgical History:   Procedure Laterality Date   • COLOSTOMY  2001    x 3 months   • COLOSTOMY REVISION     • CORONARY ARTERY BYPASS GRAFT      /   • HERNIA REPAIR      abdominal hernia x 3; poor healing with multiple revisions   • PACEMAKER IMPLANTATION      w/ defibrillator   • ROTATOR  CUFF REPAIR Right 2005      General Information     Row Name 05/09/22 1445          OT Time and Intention    Document Type therapy note (daily note)  -     Mode of Treatment occupational therapy  -     Row Name 05/09/22 1445          General Information    Patient Profile Reviewed yes  -MM     Existing Precautions/Restrictions fall  -MM     Barriers to Rehab medically complex;previous functional deficit;cognitive status  -     Row Name 05/09/22 1445          Cognition    Orientation Status (Cognition) oriented to;person  -[facility] Name 05/09/22 1445          Safety Issues, Functional Mobility    Safety Issues Affecting Function (Mobility) ability to follow commands;at risk behavior observed;awareness of need for assistance;insight into deficits/self-awareness;judgment;problem-solving;safety precaution awareness;safety precautions follow-through/compliance;sequencing abilities  -MM     Impairments Affecting Function (Mobility) balance;strength;endurance/activity tolerance;cognition  -MM     Cognitive Impairments, Mobility Safety/Performance attention;awareness, need for assistance;insight into deficits/self-awareness;judgment;problem-solving/reasoning;safety precaution awareness;safety precaution follow-through;sequencing abilities  -           User Key  (r) = Recorded By, (t) = Taken By, (c) = Cosigned By    Initials Name Provider Type    MM Jorge Helton, OTR/L Occupational Therapist                 Mobility/ADL's     Row Name 05/09/22 1445          Bed Mobility    Comment, (Bed Mobility) up in chair  -[facility] Name 05/09/22 1445          Transfers    Transfers stand-sit transfer  -     Stand-Sit [place] (Transfers) standby assist;verbal cues  -[facility] Name 05/09/22 1445          Stand-Sit Transfer    Assistive Device (Stand-Sit Transfers) walker, front-wheeled  -     Row Name 05/09/22 1445          Functional Mobility    Functional Mobility- Ind. Level supervision required;verbal cues  required  -MM     Functional Mobility- Device walker, front-wheeled  -MM     Functional Mobility- Comment BR to chair  -MM     Row Name 05/09/22 1445          Activities of Daily Living    BADL Assessment/Intervention grooming;toileting  -MM     [facility] Name 05/09/22 1445          Grooming Assessment/Training    [place] Level (Grooming) oral care regimen;supervision;verbal cues;hair care, combing/brushing;maximum assist (25% patient effort);set up  -MM     Position (Grooming) supported sitting  -MM     Row Name 05/09/22 [facility]          Toileting Assessment/Training    [place] Level (Toileting) toileting skills;standby assist  -MM     Assistive Devices (Toileting) other (see comments)  RW  -MM     Position (Toileting) supported standing  -MM     Comment, (Toileting) standing at toilet with RN when OT entered the room  -MM           User Key  (r) = Recorded By, (t) = Taken By, (c) = Cosigned By    Initials Name Provider Type    MM Jorge Helton, OTR/L Occupational Therapist               Obj/Interventions     Row Name 05/09/22 [facility]          Balance    Static Sitting Balance independent  -MM     Dynamic Sitting Balance standby assist  -MM     Position, Sitting Balance supported;sitting in chair  -MM     Static Standing Balance standby assist;verbal cues  -MM     Dynamic Standing Balance standby assist;verbal cues  -MM     Position/Device Used, Standing Balance unsupported;walker, front-wheeled  -MM           User Key  (r) = Recorded By, (t) = Taken By, (c) = Cosigned By    Initials Name Provider Type    MM Jorge Helton, OTR/L Occupational Therapist               Goals/Plan    No documentation.                Clinical Impression     Row Name 05/09/22 [facility]          Pain Assessment    Pretreatment Pain Rating 0/10 - no pain  -MM     Posttreatment Pain Rating 0/10 - no pain  -MM     [facility] Name 05/09/22 [facility]          Plan of Care Review    Plan of Care Reviewed With patient  -MM     Progress no change  -MM      Outcome Evaluation OT tx completed. Pt reports no pain. Pt up in bathroom with nsg when OT entered the room. pt is SBA for toileting task but misses the commode when urinating and is unaware of this. pt was SBA for t/f. pt was supervision with RW for functional mobility BR to chair. Pt was supervision with set up and verbal cues for washing face and brusing teeth. Pt at times unaware that he is spilling water down the front of himself with brushing teeth. Pt was max A to comb hair. with decreased processing and coordination of task noted. Pt reports it was because he was trying to do it without a mirror. Pt with decreased awareness of deficits and need for assist. Pt is very pleasant and agreeable to therapy though. Continue OT POC.  -MM     Row Name 05/09/22 1445          Therapy Plan Review/Discharge Plan (OT)    Anticipated Discharge Disposition (OT) skilled nursing facility  -     Row Name 05/09/22 1445          Positioning and Restraints    Pre-Treatment Position bathroom  -MM     Post Treatment Position chair  -MM     In Chair sitting;call light within reach;encouraged to call for assist;notified nsg  -MM           User Key  (r) = Recorded By, (t) = Taken By, (c) = Cosigned By    Initials Name Provider Type    MM Jorge Helton, OTR/L Occupational Therapist               Outcome Measures     Row Name 05/09/22 1445          How much help from another is currently needed...    Putting on and taking off regular lower body clothing? 2  -MM     Bathing (including washing, rinsing, and drying) 2  -MM     Toileting (which includes using toilet bed pan or urinal) 3  -MM     Putting on and taking off regular upper body clothing 3  -MM     Taking care of personal grooming (such as brushing teeth) 3  -MM     Eating meals 3  -MM     AM-PAC 6 Clicks Score (OT) 16  -MM     Row Name 05/09/22 1445          Functional Assessment    Outcome Measure Options AM-PAC 6 Clicks Daily Activity (OT)  -MM           User Key  (r) =  Recorded By, (t) = Taken By, (c) = Cosigned By    Initials Name Provider Type    MM Jorge Helton E, OTR/L Occupational Therapist                Occupational Therapy Education                 Title: PT OT SLP Therapies (In Progress)     Topic: Occupational Therapy (In Progress)     Point: ADL training (In Progress)     Description:   Instruct learner(s) on proper safety adaptation and remediation techniques during self care or transfers.   Instruct in proper use of assistive devices.              Learning Progress Summary           Patient Acceptance, E, NR by  at 5/9/2022 1655    Comment: benefits of activity, awareness of deficits and need for assist.    Acceptance, E,D, NR by  at 5/4/2022 1555   Family Acceptance, E,D, NR by  at 5/4/2022 1555                   Point: Home exercise program (In Progress)     Description:   Instruct learner(s) on appropriate technique for monitoring, assisting and/or progressing therapeutic exercises/activities.              Learning Progress Summary           Patient Acceptance, E,D, NR by  at 5/4/2022 1555   Family Acceptance, E,D, NR by  at 5/4/2022 1555                   Point: Precautions (In Progress)     Description:   Instruct learner(s) on prescribed precautions during self-care and functional transfers.              Learning Progress Summary           Patient Acceptance, E, NR by  at 5/9/2022 1655    Comment: benefits of activity, awareness of deficits and need for assist.                   Point: Body mechanics (In Progress)     Description:   Instruct learner(s) on proper positioning and spine alignment during self-care, functional mobility activities and/or exercises.              Learning Progress Summary           Patient Acceptance, E, NR by  at 5/9/2022 1655    Comment: benefits of activity, awareness of deficits and need for assist.                               User Key     Initials Effective Dates Name Provider Type Discipline    PRIYA 06/16/21 -   Jorge Helton, OTR/L Occupational Therapist OT     08/28/18 -  Sunni Nagel, OTR/L Occupational Therapist OT              OT Recommendation and Plan     Plan of Care Review  Plan of Care Reviewed With: patient  Progress: no change  Outcome Evaluation: OT tx completed. Pt reports no pain. Pt up in bathroom with nsg when OT entered the room. pt is SBA for toileting task but misses the commode when urinating and is unaware of this. pt was SBA for t/f. pt was supervision with RW for functional mobility BR to chair. Pt was supervision with set up and verbal cues for washing face and brusing teeth. Pt at times unaware that he is spilling water down the front of himself with brushing teeth. Pt was max A to comb hair. with decreased processing and coordination of task noted. Pt reports it was because he was trying to do it without a mirror. Pt with decreased awareness of deficits and need for assist. Pt is very pleasant and agreeable to therapy though. Continue OT POC.     Time Calculation:    Time Calculation- OT     Row Name 05/09/22 1524 05/09/22 1445 05/09/22 1115       Time Calculation- OT    OT Start Time -- 1445  -MM --    OT Stop Time -- 1508  -MM --    OT Time Calculation (min) -- 23 min  -MM --    Total Timed Code Minutes- OT -- 23 minute(s)  -MM --    OT Received On -- 05/09/22  -MM --       Timed Charges    19784 - Gait Training Minutes  15  -NW -- 15  -NW    95411 - OT Self Care/Mgmt Minutes -- 23  -MM --       Total Minutes    Timed Charges Total Minutes 15  -NW 23  -MM 15  -NW     Total Minutes 15  -NW 23  -MM 15  -NW          User Key  (r) = Recorded By, (t) = Taken By, (c) = Cosigned By    Initials Name Provider Type    NW Aleshia Baker, PTA Physical Therapist Assistant    MM Jorge Helton, OTR/L Occupational Therapist              Therapy Charges for Today     Code Description Service Date Service Provider Modifiers Qty    12795748386 HC OT SELF CARE/MGMT/TRAIN EA 15 MIN 5/9/2022 Danie  Jorge CLEANING, OTR/L GO 2               Jorge Helton, OTR/L  5/9/2022

## 2022-05-09 NOTE — PLAN OF CARE
Goal Outcome Evaluation:      No shortness breath reported, RA, no pain/ discomfort reported -VSS-Tele in place- NSR. All personal items within reach- Bed alarm on. Assist x1 with care and ambulation to the bathroom -  Will continue to monitor

## 2022-05-09 NOTE — CASE MANAGEMENT/SOCIAL WORK
No private rooms in [place] facilities.  Dtr saying she will not accept a semi private room.  Pt lost private room from [facility] last week.  Left message for both [place] and [facility] to see if they have one available at this time. Will follow.     [facility] has a private room open but they denied last referral. Told her to look again as pt has done better. Will follow for answer.

## 2022-05-10 NOTE — PLAN OF CARE
Goal Outcome Evaluation:  Plan of Care Reviewed With: patient            Patient alert but pleasantly confused. Oriented to person and time only. Denies pain. Skin yellowish colored. VSS. RA. 1 x person assist to BR. Voids per urinal. Continent to bowel and bladder. Eating and drinking well. Safety and fall precaution maintained. Continue to monitor.

## 2022-05-10 NOTE — CASE MANAGEMENT/SOCIAL WORK
Continued Stay Note  [facility]     Patient Name: Travis Patel  MRN: [id number]  Today's Date: 5/10/2022    Admit Date: 5/3/2022     Discharge Plan     Row Name 05/10/22 1051       Plan    Plan Comments Spoke with Isadora at [facility], [phone] and they do have one private room, provided referral to Isadora. Called and informed daughter that referrals have been provided to [facility] and [facility]. Will update once bed offer is made. Patient will need a precert as well for nursing home.    Row Name 05/10/22 8308       Plan    Plan SNF    Plan Comments Spoke with patient's daughter Jennifer Turcios, [phone], and informed that [facility] could not [facility] a bed. Informed daughter that patient is ready for discharge and that patient will need to either discharge to semi-private room at SNF or discharge home. Daughter verbalized understanding and asked to check with [facility], [facility], and [facility] to see if they have private room. [facility], only has semi-private room, spoke with Ashlie at [facility], [phone] and they do have private room, provided referral to Ashlie. Awaiting return call from [facility]. Daughter did state if none of these facilities have private room then she would go with semi-private. Awaiting bed offer from [facility]. Will continue to update.               Discharge Codes    No documentation.               Expected Discharge Date and Time     Expected Discharge Date Expected Discharge Time    May 9, 2022             Dalila Machado RN

## 2022-05-10 NOTE — CASE MANAGEMENT/SOCIAL WORK
No bed will be offered from [facility].  Dalila Machado- Director will be involved as well as family not considering semi private room in facility so family will need to take pt home. Will follow.

## 2022-05-10 NOTE — THERAPY TREATMENT NOTE
Acute Care - Physical Therapy Treatment Note  [facility]     Patient Name: Travis Patel  : 1945  MRN: [id number]  Today's Date: 5/10/2022      Visit Dx:     ICD-10-CM ICD-9-CM   1. Encephalopathy  G93.40 348.30   2. Altered mental status, unspecified altered mental status type  R41.82 780.97   3. Hyponatremia  E87.1 276.1   4. Chronic kidney disease, unspecified CKD stage  N18.9 585.9   5. Chronic liver disease  K76.9 571.9   6. Anemia, unspecified type  D64.9 285.9   7. Gastrointestinal hemorrhage, unspecified gastrointestinal hemorrhage type  K92.2 578.9   8. Increased anion gap metabolic acidosis  E87.2 276.2   9. Impaired mobility  Z74.09 799.89   10. Decreased activities of daily living (ADL)  Z78.9 V49.89     Patient Active Problem List   Diagnosis   • Subarachnoid hemorrhage (HCC)   • Chronic coronary artery disease   • Hyperlipidemia   • ICD (implantable cardioverter-defibrillator) in place   • Orthostatic hypotension   • Syncope   • Acute metabolic encephalopathy, due to hyperammonemia   • S/P ablation of atrial fibrillation   • Chronic combined systolic and diastolic CHF (congestive heart failure) (HCC)   • Elevated troponin, likely due to recent ablation   • Stage 3b chronic kidney disease (HCC)   • Bipolar 1 disorder, mixed, full remission (HCC)   • Abnormal BUN-to-creatinine ratio   • Hyponatremia   • Obesity (BMI 30-39.9)   • Lactic acidosis   • GI bleed   • Encephalopathy     Past Medical History:   Diagnosis Date   • Bipolar 1 disorder (HCC)    • Diverticulitis    • Hernia of abdominal wall    • Kidney disease    • Myocardial infarction (HCC) 2017   • Sleep apnea    • Stroke (HCC) 2017     Past Surgical History:   Procedure Laterality Date   • COLOSTOMY  2001    x 3 months   • COLOSTOMY REVISION     • CORONARY ARTERY BYPASS GRAFT         • HERNIA REPAIR      abdominal hernia x 3; poor healing with multiple revisions   • PACEMAKER IMPLANTATION      w/ defibrillator   •  ROTATOR CUFF REPAIR Right 2005     PT Assessment (last 12 hours)     PT Evaluation and Treatment     Row Name 05/10/22 1448 05/10/22 1110       Physical Therapy Time and Intention    Subjective Information complains of;weakness;fatigue  -NW --  -NW    Document Type therapy note (daily note)  -NW --  -NW    Mode of Treatment physical therapy  -NW --  -NW    Comment, Session Not Performed -- pt reports just btb and not feeling well request to ck bk this pm  -    Row Name 05/10/22 1448          General Information    Existing Precautions/Restrictions fall  -NW     Limitations/Impairments hearing  -     Row Name 05/10/22 1448          Bed Mobility    Comment, (Bed Mobility) chair  -     Row Name 05/10/22 1448          Transfers    Transfers toilet transfer  -NW     Sit-Stand [place] (Transfers) verbal cues;contact guard  -     Stand-Sit [place] (Transfers) verbal cues;standby assist  -     [place] Level (Toilet Transfer) verbal cues;standby assist;contact guard  -[facility] Name 05/10/22 1448          Sit-Stand Transfer    Assistive Device (Sit-Stand Transfers) walker, front-wheeled  -     Row Name 05/10/22 1448          Stand-Sit Transfer    Assistive Device (Stand-Sit Transfers) walker, front-wheeled  -     Row Name 05/10/22 1448          Gait/Stairs (Locomotion)    [place] Level (Gait) verbal cues  -     Assistive Device (Gait) walker, front-wheeled  -     Distance in Feet (Gait) 45x2  -NW     Deviations/Abnormal Patterns (Gait) dang decreased  -NW     Bilateral Gait Deviations heel strike decreased  -NW     Comment, (Gait/Stairs) pt reports feeling weak this afternoon and wanted to amb w/ rwx vs no AD. slow guarded gait. pt fatigues quickly requires standing rest break before able to return back to room   -     Row Name 05/10/22 1448          Safety Issues, Functional Mobility    Impairments Affecting Function (Mobility) endurance/activity tolerance;strength  -[facility]  Name 05/10/22 1448          Positioning and Restraints    Pre-Treatment Position sitting in chair/recliner  -NW     Post Treatment Position chair  -NW     In Chair reclined;call light within reach;encouraged to call for assist;exit alarm on  -NW           User Key  (r) = Recorded By, (t) = Taken By, (c) = Cosigned By    Initials Name Provider Type    NW Aleshia Baker, PTA Physical Therapist Assistant                Physical Therapy Education                 Title: PT OT SLP Therapies (In Progress)     Topic: Physical Therapy (In Progress)     Point: Mobility training (Done)     Learning Progress Summary           Patient Acceptance, E, VU by AS at 5/4/2022 1315    Comment: Pt education on PT POC and goals.                   Point: Home exercise program (Not Started)     Learner Progress:  Not documented in this visit.          Point: Body mechanics (Not Started)     Learner Progress:  Not documented in this visit.          Point: Precautions (Not Started)     Learner Progress:  Not documented in this visit.                      User Key     Initials Effective Dates Name Provider Type Discipline    AS 02/28/22 -  Eleazar Ravi, PT Student PT Student PT              PT Recommendation and Plan     Plan of Care Reviewed With: patient  Progress: no change  Outcome Evaluation: Pt up in chair. sit-stand cga. Pt request to amb w/ rwx due to feel weak and fatigue this pm. Pt amb 45' rwx cga cues for safety pt requires standing rest due to decreased endurance/fatigues easily before amb bk to room. Pt will need cont PT upon d/c for strength, endurance and mobility.       Time Calculation:    PT Charges     Row Name 05/10/22 1506             Time Calculation    Start Time 1448  -NW      Stop Time 1506  -NW      Time Calculation (min) 18 min  -NW      PT Received On 05/10/22  -NW      PT Goal Re-Cert Due Date 05/14/22  -NW              Time Calculation- PT    Total Timed Code Minutes- PT 18 minute(s)  -NW              Timed  Charges    07894 - Gait Training Minutes  18  -NW              Total Minutes    Timed Charges Total Minutes 18  -NW       Total Minutes 18  -NW            User Key  (r) = Recorded By, (t) = Taken By, (c) = Cosigned By    Initials Name Provider Type    Aleshia Rosenthal PTA Physical Therapist Assistant              Therapy Charges for Today     Code Description Service Date Service Provider Modifiers Qty    00877887413 HC PT THER PROC EA 15 MIN 5/9/2022 Aleshia Baker, PTA GP 1    11796459913 HC GAIT TRAINING EA 15 MIN 5/9/2022 lAeshia Baker, PTA GP 1    63888187296 HC GAIT TRAINING EA 15 MIN 5/9/2022 Aleshia Baker, PTA GP 1    50197594315 HC GAIT TRAINING EA 15 MIN 5/10/2022 Aleshia Baker, ESPERANZA GP 1          PT G-Codes  Outcome Measure Options: AM-PAC 6 Clicks Daily Activity (OT)  AM-PAC 6 Clicks Score (PT): 18  AM-PAC 6 Clicks Score (OT): 16    Aleshia Baker PTA  5/10/2022

## 2022-05-10 NOTE — PROGRESS NOTES
"    Daily Progress Note  Travis Patel  MRN: [id number] LOS: 0    Admit Date: 5/3/2022   5/10/2022 07:45 CDT    Subjective:         Interval History:    Reviewed overnight events and nursing notes.     Well this morning.  Sitting up in chair.  No issues per patient.  At baseline.    ROS:  Review of Systems   Constitutional: Negative for chills and fever.   Respiratory: Positive for shortness of breath. Negative for cough, chest tightness and wheezing.    Cardiovascular: Negative for chest pain, palpitations and leg swelling.   Gastrointestinal: Negative for abdominal pain, diarrhea, nausea and vomiting.       DIET:  Diet Regular; Low Fat, Low Sodium    Medications:      DULoxetine, 60 mg, Oral, Nightly  empagliflozin, 10 mg, Oral, Daily  enoxaparin, 30 mg, Subcutaneous, Daily  folic acid, 1 mg, Oral, Daily  furosemide, 80 mg, Oral, BID  lactulose, 30 g, Oral, BID  levothyroxine, 50 mcg, Oral, Q AM  lithium carbonate, 150 mg, Oral, BID With Meals  magnesium oxide, 400 mg, Oral, Daily  melatonin, 6 mg, Oral, Nightly  metOLazone, 5 mg, Oral, Daily  midodrine, 5 mg, Oral, TID AC  pantoprazole, 40 mg, Oral, Nightly  polyethylene glycol, 17 g, Oral, Daily  potassium chloride, 10 mEq, Oral, Daily  potassium chloride, 40 mEq, Oral, BID With Meals  QUEtiapine, 50 mg, Oral, Nightly  rOPINIRole, 0.5 mg, Oral, Nightly          Objective:     Vitals: /62 (BP Location: Right arm, Patient Position: Lying)   Pulse 58   Temp 97.6 °F (36.4 °C) (Oral)   Resp 16   Ht 167.6 cm (66\")   Wt 86.2 kg (190 lb)   SpO2 98%   BMI 30.67 kg/m²    Intake/Output Summary (Last 24 hours) at 5/10/2022 0745  Last data filed at 2022 1801  Gross per 24 hour   Intake 1020 ml   Output --   Net 1020 ml    Temp (24hrs), Av.8 °F (36.6 °C), Min:97.6 °F (36.4 °C), Max:98.1 °F (36.7 °C)    Glucose:  Lab Results   Component Value Date    POCGLU 122 2022     Physical Examination:   Physical Exam  Constitutional:       General: He is " not in acute distress.     Appearance: Normal appearance. He is normal weight.      Comments: Chronically ill-appearing   Cardiovascular:      Rate and Rhythm: Normal rate and regular rhythm.      Pulses: Normal pulses.      Heart sounds: Normal heart sounds. No murmur heard.  Pulmonary:      Effort: Pulmonary effort is normal.      Breath sounds: Normal breath sounds. No wheezing, rhonchi or rales.   Abdominal:      General: Abdomen is flat. Bowel sounds are normal.      Palpations: Abdomen is soft.      Tenderness: There is no abdominal tenderness.   Musculoskeletal:      Right lower leg: Edema present.      Left lower leg: Edema present.      Comments: 1+ bilateral lower extremity edema, slightly more than yesterday.   Neurological:      Mental Status: He is alert.         Labs:  Lab Results (last 24 hours)     Procedure Component Value Units Date/Time    Comprehensive Metabolic Panel [[id number]]  (Abnormal) Collected: 05/10/22 0523    Specimen: Blood Updated: 05/10/22 0623     Glucose 114 mg/dL      BUN 21 mg/dL      Creatinine 1.66 mg/dL      Sodium 130 mmol/L      Potassium 2.9 mmol/L      Chloride 94 mmol/L      CO2 23.0 mmol/L      Calcium 9.1 mg/dL      Total Protein 5.8 g/dL      Albumin 3.70 g/dL      ALT (SGPT) 38 U/L      AST (SGOT) 32 U/L      Alkaline Phosphatase 168 U/L      Total Bilirubin 1.4 mg/dL      Globulin 2.1 gm/dL      A/G Ratio 1.8 g/dL      BUN/Creatinine Ratio 12.7     Anion Gap 13.0 mmol/L      eGFR 43.0 mL/min/1.73      Comment: National Kidney Foundation and American Society of Nephrology (ASN) Task Force recommended calculation based on the Chronic Kidney Disease Epidemiology Collaboration (CKD-EPI) equation refit without adjustment for race.       Narrative:      GFR Normal >60  Chronic Kidney Disease <60  Kidney Failure <15      CBC & Differential [[id number]]  (Abnormal) Collected: 05/10/22 0523    Specimen: Blood Updated: 05/10/22 0603    Narrative:      The following orders were  created for panel order CBC & Differential.  Procedure                               Abnormality         Status                     ---------                               -----------         ------                     CBC Auto Differential[[id number]]        Abnormal            Final result                 Please view results for these tests on the individual orders.    CBC Auto Differential [[id number]]  (Abnormal) Collected: 05/10/22 0523    Specimen: Blood Updated: 05/10/22 0603     WBC 4.47 10*3/mm3      RBC 3.57 10*6/mm3      Hemoglobin 9.4 g/dL      Hematocrit 31.8 %      MCV 89.1 fL      MCH 26.3 pg      MCHC 29.6 g/dL      RDW 21.4 %      RDW-SD 70.5 fl      MPV 9.9 fL      Platelets 160 10*3/mm3      Neutrophil % 67.6 %      Lymphocyte % 19.2 %      Monocyte % 6.7 %      Eosinophil % 4.9 %      Basophil % 0.9 %      Immature Grans % 0.7 %      Neutrophils, Absolute 3.02 10*3/mm3      Lymphocytes, Absolute 0.86 10*3/mm3      Monocytes, Absolute 0.30 10*3/mm3      Eosinophils, Absolute 0.22 10*3/mm3      Basophils, Absolute 0.04 10*3/mm3      Immature Grans, Absolute 0.03 10*3/mm3      nRBC 0.0 /100 WBC            Imaging:  No radiology results from the last 24 hrs       Assessment and Plan:     Primary Problem:  Encephalopathy    Hospital Problem list:    Encephalopathy    Chronic coronary artery disease    Hyperlipidemia    Orthostatic hypotension    S/P ablation of atrial fibrillation    Chronic combined systolic and diastolic CHF (congestive heart failure) (HCC)    Stage 3b chronic kidney disease (HCC)    Hyponatremia      Assessment: 76-year-old male with complex medical history who presents with acute on chronic encephalopathy.     Plan:     Medically appropriate for discharge.     Acute on chronic encephalopathy  Currently the etiology of this is unclear favoring dementia syndrome at this time.  We will continue Seroquel to help with behavioral abnormalities.  Less likely hepatic encephalopathy but we  will continue lactulose as risk is low.      Severe combined heart failure, systolic and diastolic  Has advanced heart failure with EF of 25% and advanced diastolic dysfunction with increased atrial pressure.  He also has RV dysfunction and mitral valve regurgitation.      Will double Lasix and add metolazone for home diuresis.  Goal of this is to reduce risk of hospital readmission.     Cirrhosis  Fairly compensated.  I believe encephalopathy is more related to dementia than hepatic encephalopathy.  We will continue lactulose.  I believe most of his liver enzyme elevation is due to congestive hepatopathy from heart failure.    Spoke with Jennifer Turcios (POA) in detail about this.  She is very much in agreeable to placement in skilled nursing facility and prognosis.    Discharge planning:   Skilled nursing facility    Reviewed treatment plans with the patient and/or family.     Code Status:   Code Status and Medical Interventions:   Ordered at: 05/03/22 2026     Level Of Support Discussed With:    Patient     Code Status (Patient has no pulse and is not breathing):    CPR (Attempt to Resuscitate)     Medical Interventions (Patient has pulse or is breathing):    Full Support       Electronically signed by Aly Phan MD on 5/10/2022 at 07:45 CDT

## 2022-05-10 NOTE — CASE MANAGEMENT/SOCIAL WORK
Continued Stay Note   [facility]     Patient Name: Travis Patel  MRN: [id number]  Today's Date: 5/10/2022    Admit Date: 5/3/2022     Discharge Plan     Row Name 05/10/22 0938       Plan    Plan SNF    Plan Comments Spoke with patient's daughter Jennifer Turcios, [phone], and informed that [facility] could not offer a bed. Informed daughter that patient is ready for discharge and that patient will need to either discharge to semi-private room at SNF or discharge home. Daughter verbalized understanding and asked to check with [facility], [facility], and [facility] to see if they have private room. [facility], only has semi-private room, spoke with Ashlie at [facility], [phone] and they do have private room, provided referral to Ashlie. Awaiting return call from [facility]. Daughter did state if none of these facilities have private room then she would go with semi-private. Awaiting bed offer from [facility]. Will continue to update.               Discharge Codes    No documentation.               Expected Discharge Date and Time     Expected Discharge Date Expected Discharge Time    May 9, 2022             Dalila Machado RN

## 2022-05-10 NOTE — PLAN OF CARE
Goal Outcome Evaluation:  Plan of Care Reviewed With: patient        Progress: no change  Outcome Evaluation: Pt up in chair. sit-stand cga. Pt request to amb w/ rwx due to feel weak and fatigue this pm. Pt amb 45' rwx cga cues for safety pt requires standing rest due to decreased endurance/fatigues easily before amb bk to room. Pt will need cont PT upon d/c for strength, endurance and mobility.

## 2022-05-11 NOTE — THERAPY TREATMENT NOTE
Acute Care - Physical Therapy Treatment Note  [facility]     Patient Name: Travis Patel  : 1945  MRN: [id number]  Today's Date: 2022      Visit Dx:     ICD-10-CM ICD-9-CM   1. Encephalopathy  G93.40 348.30   2. Altered mental status, unspecified altered mental status type  R41.82 780.97   3. Hyponatremia  E87.1 276.1   4. Chronic kidney disease, unspecified CKD stage  N18.9 585.9   5. Chronic liver disease  K76.9 571.9   6. Anemia, unspecified type  D64.9 285.9   7. Gastrointestinal hemorrhage, unspecified gastrointestinal hemorrhage type  K92.2 578.9   8. Increased anion gap metabolic acidosis  E87.2 276.2   9. Impaired mobility  Z74.09 799.89   10. Decreased activities of daily living (ADL)  Z78.9 V49.89     Patient Active Problem List   Diagnosis   • Subarachnoid hemorrhage (HCC)   • Chronic coronary artery disease   • Hyperlipidemia   • ICD (implantable cardioverter-defibrillator) in place   • Orthostatic hypotension   • Syncope   • Acute metabolic encephalopathy, due to hyperammonemia   • S/P ablation of atrial fibrillation   • Chronic combined systolic and diastolic CHF (congestive heart failure) (HCC)   • Elevated troponin, likely due to recent ablation   • Stage 3b chronic kidney disease (HCC)   • Bipolar 1 disorder, mixed, full remission (HCC)   • Abnormal BUN-to-creatinine ratio   • Hyponatremia   • Obesity (BMI 30-39.9)   • Lactic acidosis   • GI bleed   • Encephalopathy     Past Medical History:   Diagnosis Date   • Bipolar 1 disorder (HCC)    • Diverticulitis    • Hernia of abdominal wall    • Kidney disease    • Myocardial infarction (HCC) 2017   • Sleep apnea    • Stroke (HCC) 2017     Past Surgical History:   Procedure Laterality Date   • COLOSTOMY  2001    x 3 months   • COLOSTOMY REVISION     • CORONARY ARTERY BYPASS GRAFT         • HERNIA REPAIR      abdominal hernia x 3; poor healing with multiple revisions   • PACEMAKER IMPLANTATION      w/ defibrillator   •  ROTATOR CUFF REPAIR Right 2005     PT Assessment (last 12 hours)     PT Evaluation and Treatment     Row Name 05/11/22 [facility] 05/11/22 1150       Physical Therapy Time and Intention    Subjective Information no complaints  -KJ no complaints  -KJ    Document Type therapy note (daily note)  -KJ therapy note (daily note)  -KJ    Mode of Treatment physical therapy  -KJ physical therapy  -KJ    Patient Effort good  -KJ good  -KJ    Row Name 05/11/22 [facility] 05/11/22 1150       General Information    Existing Precautions/Restrictions fall  -KJ fall  -KJ    Limitations/Impairments hearing  -KJ hearing  -KJ    Row Name 05/11/22 [facility] 05/11/22 1150       Pain    Pretreatment Pain Rating 0/10 - no pain  -KJ 0/10 - no pain  -KJ    Posttreatment Pain Rating 0/10 - no pain  -KJ 0/10 - no pain  -KJ    Row Name 05/11/22 [facility] 05/11/22 1150       Cognition    Affect/Mental Status (Cognition) flat/blunted affect  -KJ flat/blunted affect  -KJ    Row Name 05/11/22 1355          Transfers    Sit-Stand Royal (Transfers) verbal cues;supervision  -KJ     Stand-Sit Royal (Transfers) contact guard  -KJ     Royal Level (Toilet Transfer) contact guard  -KJ     Row Name 05/11/22 1355          Stand-Sit Transfer    Assistive Device (Stand-Sit Transfers) walker, front-wheeled  -KJ     Row Name 05/11/22 1355          Toilet Transfer    Type (Toilet Transfer) stand pivot/stand step  -KJ     Row Name 05/11/22 [facility] 05/11/22 1150       Gait/Stairs (Locomotion)    Royal Level (Gait) verbal cues;contact guard;minimum assist (75% patient effort)  -KJ verbal cues;contact guard  -KJ    Assistive Device (Gait) walker, front-wheeled  -KJ walker, front-wheeled  -KJ    Distance in Feet (Gait) 40' x 2  -KJ 40' x 2  -KJ    Deviations/Abnormal Patterns (Gait) gait speed decreased;stride length decreased  -KJ gait speed decreased;stride length decreased  -KJ    Comment, (Gait/Stairs) increased time for processing.  -KJ --    Row Name 05/11/22  1355 05/11/22 1150       Positioning and Restraints    Pre-Treatment Position sitting in chair/recliner  -KJ sitting in chair/recliner  -KJ    Post Treatment Position bed  -KJ chair  -KJ    In Bed call light within reach;exit alarm on  -KJ --    In Chair -- exit alarm on;call light within reach  -KJ          User Key  (r) = Recorded By, (t) = Taken By, (c) = Cosigned By    Initials Name Provider Type    Suha Gupta, PTA Physical Therapist Assistant                Physical Therapy Education                 Title: PT OT SLP Therapies (In Progress)     Topic: Physical Therapy (In Progress)     Point: Mobility training (Done)     Learning Progress Summary           Patient Acceptance, E, VU by AS at 5/4/2022 1315    Comment: Pt education on PT POC and goals.                   Point: Home exercise program (Not Started)     Learner Progress:  Not documented in this visit.          Point: Body mechanics (Not Started)     Learner Progress:  Not documented in this visit.          Point: Precautions (Not Started)     Learner Progress:  Not documented in this visit.                      User Key     Initials Effective Dates Name Provider Type Discipline    AS 02/28/22 -  Eleazar Ravi, PT Student PT Student PT              PT Recommendation and Plan         Outcome Measures     Row Name 05/11/22 1127             How much help from another is currently needed...    Putting on and taking off regular lower body clothing? 3  -AC      Bathing (including washing, rinsing, and drying) 3  -AC      Toileting (which includes using toilet bed pan or urinal) 4  -AC      Putting on and taking off regular upper body clothing 4  -AC      Taking care of personal grooming (such as brushing teeth) 4  -AC      Eating meals 4  -AC      AM-PAC 6 Clicks Score (OT) 22  -AC            User Key  (r) = Recorded By, (t) = Taken By, (c) = Cosigned By    Initials Name Provider Type    Channing Barker, OTR/L, CNT Occupational Therapist                  Time Calculation:    PT Charges     Row Name 05/11/22 1422 05/11/22 1358          Time Calculation    Start Time 1355  -KJ 1150  -KJ     Stop Time 1422  -KJ 1205  -KJ     Time Calculation (min) 27 min  -KJ 15 min  -KJ     PT Received On -- 05/11/22  -KJ     PT Goal Re-Cert Due Date -- 05/14/22  -KJ            Time Calculation- PT    Total Timed Code Minutes- PT 27 minute(s)  -KJ 15 minute(s)  -KJ           User Key  (r) = Recorded By, (t) = Taken By, (c) = Cosigned By    Initials Name Provider Type    Suha Gupta PTA Physical Therapist Assistant              Therapy Charges for Today     Code Description Service Date Service Provider Modifiers Qty    18433698099 HC GAIT TRAINING EA 15 MIN 5/11/2022 Suha Denson PTA GP 1    62943021601 HC PT THER PROC EA 15 MIN 5/11/2022 Suha Denson PTA GP 1          PT G-Codes  Outcome Measure Options: AM-PAC 6 Clicks Daily Activity (OT)  AM-PAC 6 Clicks Score (PT): 18  AM-PAC 6 Clicks Score (OT): 22    Suha Denson PTA  5/11/2022

## 2022-05-11 NOTE — CASE MANAGEMENT/SOCIAL WORK
Continued Stay Note  [facility]     Patient Name: Travis Patel  MRN: [id number]  Today's Date: 5/11/2022    Admit Date: 5/3/2022     Discharge Plan     Row Name 05/11/22 1236       Plan    Plan SNF    Plan Comments [facility], no longer has bed available. [facility] is unable to offer a bed. Called and updated patient's daughter, Jennifer Turcios, and she is in agreement to go to a semi-private bed. She prefers patient to go to [facility]. Called and informed Dara with [facility] and they no longer have a bed available but [facility] does have a room available. Informed patient's daughter and she is in agreement with referral to [facility]. Referral provided to Dara with [facility]. Awaiting bed offer.               Discharge Codes    No documentation.               Expected Discharge Date and Time     Expected Discharge Date Expected Discharge Time    May 12, 2022             Dalila Machado RN

## 2022-05-11 NOTE — PROGRESS NOTES
"    Daily Progress Note  Travis Patel  MRN: [id number] LOS: 0    Admit Date: 5/3/2022   2022 07:06 CDT    Subjective:         Interval History:    Reviewed overnight events and nursing notes.     Doing well.  No complaints this morning.  Feels like his breathing and edema are improved.    ROS:  Review of Systems   Constitutional: Negative for chills, fatigue and fever.   Respiratory: Negative for cough and shortness of breath.    Cardiovascular: Positive for leg swelling.   Gastrointestinal: Positive for abdominal distention. Negative for abdominal pain, constipation, diarrhea and vomiting.       DIET:  Diet Regular; Low Fat, Low Sodium    Medications:      DULoxetine, 60 mg, Oral, Nightly  empagliflozin, 10 mg, Oral, Daily  enoxaparin, 30 mg, Subcutaneous, Daily  folic acid, 1 mg, Oral, Daily  furosemide, 80 mg, Oral, BID  lactulose, 30 g, Oral, BID  levothyroxine, 50 mcg, Oral, Q AM  lithium carbonate, 150 mg, Oral, BID With Meals  magnesium oxide, 400 mg, Oral, Daily  melatonin, 6 mg, Oral, Nightly  metOLazone, 5 mg, Oral, Daily  midodrine, 5 mg, Oral, TID AC  pantoprazole, 40 mg, Oral, Nightly  polyethylene glycol, 17 g, Oral, Daily  potassium chloride, 10 mEq, Oral, Daily  QUEtiapine, 50 mg, Oral, Nightly  rOPINIRole, 0.5 mg, Oral, Nightly          Objective:     Vitals: BP 96/64 (BP Location: Right arm, Patient Position: Lying)   Pulse 59   Temp 97.6 °F (36.4 °C) (Oral)   Resp 16   Ht 167.6 cm (66\")   Wt 86.2 kg (190 lb)   SpO2 100%   BMI 30.67 kg/m²    Intake/Output Summary (Last 24 hours) at 2022 0706  Last data filed at 5/10/2022 1719  Gross per 24 hour   Intake 480 ml   Output 1225 ml   Net -745 ml    Temp (24hrs), Av.7 °F (36.5 °C), Min:97.6 °F (36.4 °C), Max:97.8 °F (36.6 °C)    Glucose:  Lab Results   Component Value Date    POCGLU 122 2022     Physical Examination:   Physical Exam  Constitutional:       Appearance: He is normal weight.      Comments: Chronically " ill-appearing   Cardiovascular:      Rate and Rhythm: Normal rate and regular rhythm.      Pulses: Normal pulses.      Heart sounds: Murmur heard.   Pulmonary:      Effort: Pulmonary effort is normal. No respiratory distress.      Breath sounds: Rales present. No wheezing.   Abdominal:      General: Abdomen is flat. Bowel sounds are normal.      Palpations: Abdomen is soft.      Tenderness: There is no abdominal tenderness.   Musculoskeletal:      Right lower leg: Edema present.      Left lower leg: Edema present.      Comments: 1+ bilateral lower extremity edema   Neurological:      Mental Status: He is alert.         Labs:  Lab Results (last 24 hours)     ** No results found for the last 24 hours. **           Imaging:  No radiology results from the last 24 hrs       Assessment and Plan:     Primary Problem:  Encephalopathy    Hospital Problem list:    Encephalopathy    Chronic coronary artery disease    Hyperlipidemia    Orthostatic hypotension    S/P ablation of atrial fibrillation    Chronic combined systolic and diastolic CHF (congestive heart failure) (HCC)    Stage 3b chronic kidney disease (HCC)    Hyponatremia      Assessment: 76-year-old male with complex medical history who presents with acute on chronic encephalopathy.       Plan:     Medically appropriate for discharge.  Appears to be close to placement.     Acute on chronic encephalopathy  Currently the etiology of this is unclear favoring dementia syndrome at this time.  We will continue Seroquel to help with behavioral abnormalities.  Spoke with Jennifer Turcios at length that we will try to control symptoms at this time.      Severe combined heart failure, systolic and diastolic  Has advanced heart failure with EF of 25% and advanced diastolic dysfunction with increased atrial pressure.  He also has RV dysfunction and mitral valve regurgitation.    Continue current increased diuretic dosing.    Cirrhosis  Fairly compensated.  I believe  encephalopathy is more related to dementia than hepatic encephalopathy.  We will continue lactulose.  I believe most of his liver enzyme elevation is due to congestive hepatopathy from heart failure.    Discharge planning:   Skilled nursing facility    Reviewed treatment plans with the patient and/or family.     Code Status:   Code Status and Medical Interventions:   Ordered at: 05/03/22 2026     Level Of Support Discussed With:    Patient     Code Status (Patient has no pulse and is not breathing):    CPR (Attempt to Resuscitate)     Medical Interventions (Patient has pulse or is breathing):    Full Support       Electronically signed by Aly Phan MD on 5/11/2022 at 07:06 CDT

## 2022-05-11 NOTE — PLAN OF CARE
Goal Outcome Evaluation:  Plan of Care Reviewed With: patient        Progress: no change  Outcome Evaluation: OT tx completed.  Pt completed light ADL routine in BR including toileting and grooming.  Pt attempted to sit on commode without lowering toilet seat down, requiring verbal cues.  Pt otherwise demonstrated appropriate sequencing for ADL tasks.  Pt performed all transfers and ADL with SBA-CGA.  OT recommends either SNF placement or home with 24/7 supervision at discharge.  OT will continue to treat.

## 2022-05-11 NOTE — PLAN OF CARE
Goal Outcome Evaluation:  Plan of Care Reviewed With: patient     Patient is alert and oriented x2-3. VSS. Has no complaints of pain. Skin is intact other than scattered bruising. Call light is within reach. Moves all extremities well. Tolerating regular diet well. Patient voids in bathroom. Last BM was 5/10/22. Safety maintained, no needs at this time. Will continue to monitor.       Progress: improving

## 2022-05-11 NOTE — THERAPY TREATMENT NOTE
Acute Care - Occupational Therapy Treatment Note  [facility]     Patient Name: Travis Patel  : 1945  MRN: [id number]  Today's Date: 2022             Admit Date: 5/3/2022       ICD-10-CM ICD-9-CM   1. Encephalopathy  G93.40 348.30   2. Altered mental status, unspecified altered mental status type  R41.82 780.97   3. Hyponatremia  E87.1 276.1   4. Chronic kidney disease, unspecified CKD stage  N18.9 585.9   5. Chronic liver disease  K76.9 571.9   6. Anemia, unspecified type  D64.9 285.9   7. Gastrointestinal hemorrhage, unspecified gastrointestinal hemorrhage type  K92.2 578.9   8. Increased anion gap metabolic acidosis  E87.2 276.2   9. Impaired mobility  Z74.09 799.89   10. Decreased activities of daily living (ADL)  Z78.9 V49.89     Patient Active Problem List   Diagnosis   • Subarachnoid hemorrhage (HCC)   • Chronic coronary artery disease   • Hyperlipidemia   • ICD (implantable cardioverter-defibrillator) in place   • Orthostatic hypotension   • Syncope   • Acute metabolic encephalopathy, due to hyperammonemia   • S/P ablation of atrial fibrillation   • Chronic combined systolic and diastolic CHF (congestive heart failure) ([facility])   • Elevated troponin, likely due to recent ablation   • Stage 3b chronic kidney disease (HCC)   • Bipolar 1 disorder, mixed, full remission (HCC)   • Abnormal BUN-to-creatinine ratio   • Hyponatremia   • Obesity (BMI 30-39.9)   • Lactic acidosis   • GI bleed   • Encephalopathy     Past Medical History:   Diagnosis Date   • Bipolar 1 disorder (HCC)    • Diverticulitis    • Hernia of abdominal wall    • Kidney disease    • Myocardial infarction (HCC) 2017   • Sleep apnea    • Stroke (HCC) 2017     Past Surgical History:   Procedure Laterality Date   • COLOSTOMY  2001    x 3 months   • COLOSTOMY REVISION     • CORONARY ARTERY BYPASS GRAFT         • HERNIA REPAIR      abdominal hernia x 3; poor healing with multiple revisions   • PACEMAKER IMPLANTATION       w/ defibrillator   • ROTATOR CUFF REPAIR Right 2005         OT ASSESSMENT FLOWSHEET (last 12 hours)     OT Evaluation and Treatment     Row Name 05/11/22 1127                   OT Time and Intention    Subjective Information no complaints  -AC        Document Type therapy note (daily note)  -        Mode of Treatment occupational therapy  -                  General Information    Existing Precautions/Restrictions fall  -AC                  Pain Assessment    Pretreatment Pain Rating 0/10 - no pain  -AC        Posttreatment Pain Rating 0/10 - no pain  -AC                  Cognition    Affect/Mental Status (Cognition) flat/blunted affect  -AC        Personal Safety Interventions elopement precautions initiated;fall prevention program maintained;gait belt;muscle strengthening facilitated;nonskid shoes/slippers when out of bed;supervised activity  -                  Activities of Daily Living    BADL Assessment/Intervention toileting;grooming  -                  Grooming Assessment/Training    Weber Level (Grooming) oral care regimen;wash face, hands;standby assist  -AC        Position (Grooming) sink side  -AC                  Toileting Assessment/Training    Weber Level (Toileting) adjust/manage clothing;contact guard assist;perform perineal hygiene;standby assist  -AC        Assistive Devices (Toileting) commode  -AC        Position (Toileting) unsupported sitting  -AC                  Functional Mobility    Functional Mobility- Ind. Level contact guard assist  -        Functional Mobility- Device walker, front-wheeled  -        Functional Mobility- Comment amb around in room without walker, then used rw to amb in hallway  -                  Transfer Assessment/Treatment    Transfers sit-stand transfer;stand-sit transfer;toilet transfer  -                  Transfers    Sit-Stand Weber (Transfers) standby assist  -        Stand-Sit Weber (Transfers) standby assist  -AC         [place] Level (Toilet Transfer) contact guard  -AC        Assistive Device (Toilet Transfer) commode  -AC                  Toilet Transfer    Type (Toilet Transfer) sit-stand;stand-sit  -AC                  Plan of Care Review    Plan of Care Reviewed With patient  -AC        Progress no change  -AC        Outcome Evaluation OT tx completed.  Pt completed light ADL routine in BR including toileting and grooming.  Pt attempted to sit on commode without lowering toilet seat down, requiring verbal cues.  Pt otherwise demonstrated appropriate sequencing for ADL tasks.  Pt performed all transfers and ADL with SBA-CGA.  OT recommends either SNF placement or home with 24/7 supervision at discharge.  OT will continue to treat.  -AC                  Positioning and Restraints    Pre-Treatment Position sitting in chair/recliner  -AC        Post Treatment Position chair  -AC        In Chair reclined;call light within reach;encouraged to call for assist;exit alarm on;legs elevated  -AC              User Key  (r) = Recorded By, (t) = Taken By, (c) = Cosigned By    Initials Name Effective Dates    Channing Barker, OTR/L, CNT 04/09/19 -                  Occupational Therapy Education                 Title: PT OT SLP Therapies (In Progress)     Topic: Occupational Therapy (In Progress)     Point: ADL training (In Progress)     Description:   Instruct learner(s) on proper safety adaptation and remediation techniques during self care or transfers.   Instruct in proper use of assistive devices.              Learning Progress Summary           Patient Acceptance, E,TB, VU by AC at 5/11/2022 1153    Comment: safe transfers, problem solving    Acceptance, E, NR by MM at 5/9/2022 1655    Comment: benefits of activity, awareness of deficits and need for assist.    Acceptance, E,D, NR by MW at 5/4/2022 1555   Family Acceptance, E,D, NR by MW at 5/4/2022 1555                   Point: Home exercise program (In Progress)      Description:   Instruct learner(s) on appropriate technique for monitoring, assisting and/or progressing therapeutic exercises/activities.              Learning Progress Summary           Patient Acceptance, E,D, NR by MW at 5/4/2022 1555   Family Acceptance, E,D, NR by MW at 5/4/2022 1555                   Point: Precautions (In Progress)     Description:   Instruct learner(s) on prescribed precautions during self-care and functional transfers.              Learning Progress Summary           Patient Acceptance, E, NR by MM at 5/9/2022 1655    Comment: benefits of activity, awareness of deficits and need for assist.                   Point: Body mechanics (In Progress)     Description:   Instruct learner(s) on proper positioning and spine alignment during self-care, functional mobility activities and/or exercises.              Learning Progress Summary           Patient Acceptance, E, NR by MM at 5/9/2022 1655    Comment: benefits of activity, awareness of deficits and need for assist.                               User Key     Initials Effective Dates Name Provider Type Discipline     04/09/19 -  Channing Miller, OTR/L, CNT Occupational Therapist OT     06/16/21 -  Jorge Helton, OTR/L Occupational Therapist OT     08/28/18 -  Sunni Nagel, OTR/L Occupational Therapist OT                  OT Recommendation and Plan     Plan of Care Review  Plan of Care Reviewed With: patient  Progress: no change  Outcome Evaluation: OT tx completed.  Pt completed light ADL routine in  including toileting and grooming.  Pt attempted to sit on commode without lowering toilet seat down, requiring verbal cues.  Pt otherwise demonstrated appropriate sequencing for ADL tasks.  Pt performed all transfers and ADL with SBA-CGA.  OT recommends either SNF placement or home with 24/7 supervision at discharge.  OT will continue to treat.  Plan of Care Reviewed With: patient  Outcome Evaluation: OT tx completed.  Pt completed  light ADL routine in BR including toileting and grooming.  Pt attempted to sit on commode without lowering toilet seat down, requiring verbal cues.  Pt otherwise demonstrated appropriate sequencing for ADL tasks.  Pt performed all transfers and ADL with SBA-CGA.  OT recommends either SNF placement or home with 24/7 supervision at discharge.  OT will continue to treat.     Outcome Measures     Row Name 05/11/22 1127             How much help from another is currently needed...    Putting on and taking off regular lower body clothing? 3  -AC      Bathing (including washing, rinsing, and drying) 3  -AC      Toileting (which includes using toilet bed pan or urinal) 4  -AC      Putting on and taking off regular upper body clothing 4  -AC      Taking care of personal grooming (such as brushing teeth) 4  -AC      Eating meals 4  -AC      AM-PAC 6 Clicks Score (OT) 22  -            User Key  (r) = Recorded By, (t) = Taken By, (c) = Cosigned By    Initials Name Provider Type    Channing Barker OTR/L, MYESHA Occupational Therapist                Time Calculation:    Time Calculation- OT     Row Name 05/11/22 1155             Time Calculation- OT    OT Start Time 1127  -AC      OT Stop Time 1155  -      OT Time Calculation (min) 28 min  -      Total Timed Code Minutes- OT 28 minute(s)  -      OT Received On 05/11/22  -            User Key  (r) = Recorded By, (t) = Taken By, (c) = Cosigned By    Initials Name Provider Type    Channing Barker OTR/L, MYESHA Occupational Therapist              Therapy Charges for Today     Code Description Service Date Service Provider Modifiers Qty    82555257045  OT SELF CARE/MGMT/TRAIN EA 15 MIN 5/11/2022 Channing Miller OTR/LMYESHA GO 2               Channing HBATTI. NASIMA Miller/L, CNT  5/11/2022

## 2022-05-11 NOTE — PLAN OF CARE
Goal Outcome Evaluation:  Plan of Care Reviewed With: patient      Progress: no change  Outcome Evaluation: Ntn assessment completed. Ora intake 75% of four meals. Low fat/low sodium diet. Boost Plus added daily with lunch. Con to follow for plan of care.

## 2022-05-12 NOTE — THERAPY TREATMENT NOTE
Acute Care - Physical Therapy Treatment Note  [facility]     Patient Name: Travis Patel  : 1945  MRN: [id number]  Today's Date: 2022      Visit Dx:     ICD-10-CM ICD-9-CM   1. Encephalopathy  G93.40 348.30   2. Altered mental status, unspecified altered mental status type  R41.82 780.97   3. Hyponatremia  E87.1 276.1   4. Chronic kidney disease, unspecified CKD stage  N18.9 585.9   5. Chronic liver disease  K76.9 571.9   6. Anemia, unspecified type  D64.9 285.9   7. Gastrointestinal hemorrhage, unspecified gastrointestinal hemorrhage type  K92.2 578.9   8. Increased anion gap metabolic acidosis  E87.2 276.2   9. Impaired mobility  Z74.09 799.89   10. Decreased activities of daily living (ADL)  Z78.9 V49.89     Patient Active Problem List   Diagnosis   • Subarachnoid hemorrhage (HCC)   • Chronic coronary artery disease   • Hyperlipidemia   • ICD (implantable cardioverter-defibrillator) in place   • Orthostatic hypotension   • Syncope   • Acute metabolic encephalopathy, due to hyperammonemia   • S/P ablation of atrial fibrillation   • Chronic combined systolic and diastolic CHF (congestive heart failure) (HCC)   • Elevated troponin, likely due to recent ablation   • Stage 3b chronic kidney disease (HCC)   • Bipolar 1 disorder, mixed, full remission (HCC)   • Abnormal BUN-to-creatinine ratio   • Hyponatremia   • Obesity (BMI 30-39.9)   • Lactic acidosis   • GI bleed   • Encephalopathy     Past Medical History:   Diagnosis Date   • Bipolar 1 disorder (HCC)    • Diverticulitis    • Hernia of abdominal wall    • Kidney disease    • Myocardial infarction (HCC) 2017   • Sleep apnea    • Stroke (HCC) 2017     Past Surgical History:   Procedure Laterality Date   • COLOSTOMY  2001    x 3 months   • COLOSTOMY REVISION     • CORONARY ARTERY BYPASS GRAFT         • HERNIA REPAIR      abdominal hernia x 3; poor healing with multiple revisions   • PACEMAKER IMPLANTATION      w/ defibrillator   •  ROTATOR CUFF REPAIR Right 2005     PT Assessment (last 12 hours)     PT Evaluation and Treatment     Row Name 05/12/22 1215 05/12/22 0855       Physical Therapy Time and Intention    Subjective Information no complaints  -KJ no complaints  -KJ    Document Type therapy note (daily note)  -KJ therapy note (daily note)  -KJ    Mode of Treatment physical therapy  -KJ physical therapy  -KJ    Patient Effort good  -KJ good  -KJ    Row Name 05/12/22 1215 05/12/22 0855       General Information    Existing Precautions/Restrictions fall  -KJ fall  -KJ    Row Name 05/12/22 1215 05/12/22 0855       Pain    Pretreatment Pain Rating 0/10 - no pain  -KJ 0/10 - no pain  -KJ    Posttreatment Pain Rating 0/10 - no pain  -KJ 0/10 - no pain  -KJ    Row Name 05/12/22 0855          Bed Mobility    Comment, (Bed Mobility) in chair  -KJ     Row Name 05/12/22 1215 05/12/22 0855       Transfers    Sit-Stand [place] (Transfers) contact guard;verbal cues  -KJ contact guard;verbal cues  -KJ    Stand-Sit [place] (Transfers) verbal cues;contact guard  -KJ verbal cues;contact guard  -KJ    Row Name 05/12/22 1215 05/12/22 0855       Sit-Stand Transfer    Assistive Device (Sit-Stand Transfers) walker, front-wheeled  -KJ walker, front-wheeled  -KJ    Row Name 05/12/22 1215 05/12/22 0855       Stand-Sit Transfer    Assistive Device (Stand-Sit Transfers) walker, front-wheeled  -KJ walker, front-wheeled  -KJ    Row Name 05/12/22 1215 05/12/22 0855       Gait/Stairs (Locomotion)    [place] Level (Gait) verbal cues;contact guard  -KJ verbal cues;contact guard  -KJ    Assistive Device (Gait) walker, front-wheeled  -KJ walker, front-wheeled  -KJ    Distance in Feet (Gait) 50' x 2  -KJ 40' x 2  -KJ    Deviations/Abnormal Patterns (Gait) gait speed decreased;stride length decreased  -KJ gait speed decreased;stride length decreased  -KJ    Comment, (Gait/Stairs) -- increased time for tasks, fatigues quickly  -KJ    Row Name 05/12/22 0855           Aerobic Exercise    Comment, Aerobic Exercise (Therapeutic Exercise) AROM BLE's x 15 reps  -KJ     Row Name 05/12/22 1215 05/12/22 0855       Positioning and Restraints    Pre-Treatment Position sitting in chair/recliner  -KJ sitting in chair/recliner  -KJ    Post Treatment Position chair  -KJ chair  -KJ    In Bed exit alarm on;call light within reach  -KJ call light within reach  -KJ          User Key  (r) = Recorded By, (t) = Taken By, (c) = Cosigned By    Initials Name Provider Type    Suha Gupta, PTA Physical Therapist Assistant                Physical Therapy Education                 Title: PT OT SLP Therapies (Resolved)     Topic: Physical Therapy (Resolved)     Point: Mobility training (Resolved)     Learning Progress Summary           Patient Acceptance, E, VU by AS at 5/4/2022 1315    Comment: Pt education on PT POC and goals.                   Point: Home exercise program (Resolved)     Learner Progress:  Not documented in this visit.          Point: Body mechanics (Resolved)     Learner Progress:  Not documented in this visit.          Point: Precautions (Resolved)     Learner Progress:  Not documented in this visit.                      User Key     Initials Effective Dates Name Provider Type Discipline    AS 02/28/22 -  Eleazar Ravi, PT Student PT Student PT              PT Recommendation and Plan     Plan of Care Reviewed With: patient  Progress: improving  Outcome Evaluation: PT tx completed. Pt has no c/o. S transfers and walking 40' x 2 with r wx CG. Fatigues quickly, increased time for tasks. Benefit from cont'd PT.   Outcome Measures     Row Name 05/11/22 1127             How much help from another is currently needed...    Putting on and taking off regular lower body clothing? 3  -AC      Bathing (including washing, rinsing, and drying) 3  -AC      Toileting (which includes using toilet bed pan or urinal) 4  -AC      Putting on and taking off regular upper body clothing 4  -AC       Taking care of personal grooming (such as brushing teeth) 4  -AC      Eating meals 4  -AC      AM-PAC 6 Clicks Score (OT) 22  -AC            User Key  (r) = Recorded By, (t) = Taken By, (c) = Cosigned By    Initials Name Provider Type    AC Channing Miller, OTR/L, CNT Occupational Therapist                 Time Calculation:    PT Charges     Row Name 05/12/22 1233 05/12/22 0924          Time Calculation    Start Time 1215  -KJ 0855  -KJ     Stop Time 1230  -KJ 0920  -KJ     Time Calculation (min) 15 min  -KJ 25 min  -KJ     PT Received On -- 05/12/22  -KJ     PT Goal Re-Cert Due Date -- 05/14/22  -KJ            Time Calculation- PT    Total Timed Code Minutes- PT 15 minute(s)  -KJ 25 minute(s)  -KJ           User Key  (r) = Recorded By, (t) = Taken By, (c) = Cosigned By    Initials Name Provider Type    Suha Gupta, PTA Physical Therapist Assistant              Therapy Charges for Today     Code Description Service Date Service Provider Modifiers Qty    50310198578 HC GAIT TRAINING EA 15 MIN 5/11/2022 Suha Denson, PTA GP 1    50981274424 HC PT THER PROC EA 15 MIN 5/11/2022 Suha Denson, PTA GP 1    57359648037 HC GAIT TRAINING EA 15 MIN 5/11/2022 Suha Denson, PTA GP 1    45063134222 HC GAIT TRAINING EA 15 MIN 5/12/2022 Suha Denson, PTA GP 1    77727506966 HC PT THER PROC EA 15 MIN 5/12/2022 Suha Denson, PTA GP 1    73552561753 HC GAIT TRAINING EA 15 MIN 5/12/2022 Suha Denson, PTA GP 1          PT G-Codes  Outcome Measure Options: AM-PAC 6 Clicks Daily Activity (OT)  AM-PAC 6 Clicks Score (PT): 18  AM-PAC 6 Clicks Score (OT): 22    Suha Denson PTA  5/12/2022

## 2022-05-12 NOTE — THERAPY DISCHARGE NOTE
Acute Care - Occupational Therapy Discharge Summary  [facility]     Patient Name: Travis Patel  : 1945  MRN: [id number]    Today's Date: 2022                 Admit Date: 5/3/2022        OT Recommendation and Plan    Visit Dx:    ICD-10-CM ICD-9-CM   1. Encephalopathy  G93.40 348.30   2. Altered mental status, unspecified altered mental status type  R41.82 780.97   3. Hyponatremia  E87.1 276.1   4. Chronic kidney disease, unspecified CKD stage  N18.9 585.9   5. Chronic liver disease  K76.9 571.9   6. Anemia, unspecified type  D64.9 285.9   7. Gastrointestinal hemorrhage, unspecified gastrointestinal hemorrhage type  K92.2 578.9   8. Increased anion gap metabolic acidosis  E87.2 276.2   9. Impaired mobility  Z74.09 799.89   10. Decreased activities of daily living (ADL)  Z78.9 V49.89                OT Rehab Goals     Row Name 22 1400             Transfer Goal 1 (OT)    Activity/Assistive Device (Transfer Goal 1, OT) sit-to-stand/stand-to-sit;bed-to-chair/chair-to-bed;toilet;shower chair  -AC      [place] Level/Cues Needed (Transfer Goal 1, OT) supervision required  -AC      Time Frame (Transfer Goal 1, OT) long term goal (LTG);10 days  -AC      Progress/Outcome (Transfer Goal 1, OT) goal not met  -AC              Bathing Goal 1 (OT)    Activity/Device (Bathing Goal 1, OT) bathing skills, all;shower chair  -AC      [place] Level/Cues Needed (Bathing Goal 1, OT) supervision required  -AC      Time Frame (Bathing Goal 1, OT) long term goal (LTG);10 days  -AC      Progress/Outcomes (Bathing Goal 1, OT) goal not met  -AC              Executive Function Goal 1 (OT)    Activity (Executive Function Goal 1, OT) information processing tasks;initiation for tasks;insight/awareness of deficits;organization/sequencing tasks;planning/decision-making tasks;problem-solving/reasoning tasks;self-monitoring/self-correction  -AC      [place]/Cues/Accuracy (Executive Function Goal 1, OT) with  minimum;verbal cues/redirection;with 90% accuracy  -AC      Time Frame (Executive Function Goal 1, OT) long term goal (LTG);10 days  -AC      Progress/Outcome (Executive Function Goal 1, OT) goal not met  -AC            User Key  (r) = Recorded By, (t) = Taken By, (c) = Cosigned By    Initials Name Provider Type Discipline    Channing Barker OTR/L, MYESHA Occupational Therapist OT                 Outcome Measures     Row Name 05/11/22 1127             How much help from another is currently needed...    Putting on and taking off regular lower body clothing? 3  -AC      Bathing (including washing, rinsing, and drying) 3  -AC      Toileting (which includes using toilet bed pan or urinal) 4  -AC      Putting on and taking off regular upper body clothing 4  -AC      Taking care of personal grooming (such as brushing teeth) 4  -AC      Eating meals 4  -AC      AM-PAC 6 Clicks Score (OT) 22  -AC            User Key  (r) = Recorded By, (t) = Taken By, (c) = Cosigned By    Initials Name Provider Type    Channing Barker OTR/L, MYESHA Occupational Therapist                Timed Therapy Charges  Total Units: 2    Charges  Total Units: 2    Procedure Name Documented Minutes Units Code    HC OT SELF CARE/MGMT/TRAIN EA 15 MIN 23  2    22959 (CPT®)               Documented Minutes  Total Minutes: 23    Therapy Provided Minutes    24299 - OT Self Care/Mgmt Minutes 23                Therapy Charges for Today     Code Description Service Date Service Provider Modifiers Qty    90247972867 HC OT SELF CARE/MGMT/TRAIN EA 15 MIN 5/11/2022 Channing Miller OTR/LMYESHA GO 2          OT Discharge Summary  Anticipated Discharge Disposition (OT): home with home health  Reason for Discharge: Discharge from facility  Outcomes Achieved: Refer to plan of care for updates on goals achieved  Discharge Destination: Home with home health      NASIMA Benjamin/L, CNT  5/12/2022

## 2022-05-12 NOTE — PLAN OF CARE
Goal Outcome Evaluation:  Plan of Care Reviewed With: patient, caregiver, daughter        Progress: improving

## 2022-05-12 NOTE — PLAN OF CARE
Goal Outcome Evaluation:               Patient resting fair throughout the night and continues to deny pain.  Mild edema to BLE continues.  Voiding per urinal and/or bathroom with standby assist and walker.  NSR/SB with 1st degree block per telemetry monitor.  SCDs for VTE.  Continued to be disoriented to situation and time intermittently.

## 2022-05-12 NOTE — THERAPY TREATMENT NOTE
Acute Care - Physical Therapy Treatment Note  [facility]     Patient Name: Travis Patel  : 1945  MRN: [id number]  Today's Date: 2022      Visit Dx:     ICD-10-CM ICD-9-CM   1. Encephalopathy  G93.40 348.30   2. Altered mental status, unspecified altered mental status type  R41.82 780.97   3. Hyponatremia  E87.1 276.1   4. Chronic kidney disease, unspecified CKD stage  N18.9 585.9   5. Chronic liver disease  K76.9 571.9   6. Anemia, unspecified type  D64.9 285.9   7. Gastrointestinal hemorrhage, unspecified gastrointestinal hemorrhage type  K92.2 578.9   8. Increased anion gap metabolic acidosis  E87.2 276.2   9. Impaired mobility  Z74.09 799.89   10. Decreased activities of daily living (ADL)  Z78.9 V49.89     Patient Active Problem List   Diagnosis   • Subarachnoid hemorrhage (HCC)   • Chronic coronary artery disease   • Hyperlipidemia   • ICD (implantable cardioverter-defibrillator) in place   • Orthostatic hypotension   • Syncope   • Acute metabolic encephalopathy, due to hyperammonemia   • S/P ablation of atrial fibrillation   • Chronic combined systolic and diastolic CHF (congestive heart failure) (HCC)   • Elevated troponin, likely due to recent ablation   • Stage 3b chronic kidney disease (HCC)   • Bipolar 1 disorder, mixed, full remission (HCC)   • Abnormal BUN-to-creatinine ratio   • Hyponatremia   • Obesity (BMI 30-39.9)   • Lactic acidosis   • GI bleed   • Encephalopathy     Past Medical History:   Diagnosis Date   • Bipolar 1 disorder (HCC)    • Diverticulitis    • Hernia of abdominal wall    • Kidney disease    • Myocardial infarction (HCC) 2017   • Sleep apnea    • Stroke (HCC) 2017     Past Surgical History:   Procedure Laterality Date   • COLOSTOMY  2001    x 3 months   • COLOSTOMY REVISION     • CORONARY ARTERY BYPASS GRAFT         • HERNIA REPAIR      abdominal hernia x 3; poor healing with multiple revisions   • PACEMAKER IMPLANTATION      w/ defibrillator   •  ROTATOR CUFF REPAIR Right 2005     PT Assessment (last 12 hours)     PT Evaluation and Treatment     Row Name 05/12/22 0855          Physical Therapy Time and Intention    Subjective Information no complaints  -KJ     Document Type therapy note (daily note)  -KJ     Mode of Treatment physical therapy  -KJ     Patient Effort good  -KJ     Row Name 05/12/22 0855          General Information    Existing Precautions/Restrictions fall  -KJ     Row Name 05/12/22 0855          Pain    Pretreatment Pain Rating 0/10 - no pain  -KJ     Posttreatment Pain Rating 0/10 - no pain  -KJ     Row Name 05/12/22 0855          Bed Mobility    Comment, (Bed Mobility) in chair  -KJ     Row Name 05/12/22 0855          Transfers    Sit-Stand [place] (Transfers) contact guard;verbal cues  -KJ     Stand-Sit [place] (Transfers) verbal cues;contact guard  -KJ     Row Name 05/12/22 0855          Sit-Stand Transfer    Assistive Device (Sit-Stand Transfers) walker, front-wheeled  -KJ     Row Name 05/12/22 0855          Stand-Sit Transfer    Assistive Device (Stand-Sit Transfers) walker, front-wheeled  -KJ     Row Name 05/12/22 0855          Gait/Stairs (Locomotion)    [place] Level (Gait) verbal cues;contact guard  -KJ     Assistive Device (Gait) walker, front-wheeled  -KJ     Distance in Feet (Gait) 40' x 2  -KJ     Deviations/Abnormal Patterns (Gait) gait speed decreased;stride length decreased  -KJ     Comment, (Gait/Stairs) increased time for tasks, fatigues quickly  -KJ     Row Name 05/12/22 0855          Aerobic Exercise    Comment, Aerobic Exercise (Therapeutic Exercise) AROM BLE's x 15 reps  -KJ     Row Name 05/12/22 0855          Positioning and Restraints    Pre-Treatment Position sitting in chair/recliner  -KJ     Post Treatment Position chair  -KJ     In Bed call light within reach  -KJ           User Key  (r) = Recorded By, (t) = Taken By, (c) = Cosigned By    Initials Name Provider Type    KJ Suha Denson, PTA  Physical Therapist Assistant                Physical Therapy Education                 Title: PT OT SLP Therapies (In Progress)     Topic: Physical Therapy (In Progress)     Point: Mobility training (Done)     Learning Progress Summary           Patient Acceptance, E, VU by AS at 5/4/2022 8916    Comment: Pt education on PT POC and goals.                   Point: Home exercise program (Not Started)     Learner Progress:  Not documented in this visit.          Point: Body mechanics (Not Started)     Learner Progress:  Not documented in this visit.          Point: Precautions (Not Started)     Learner Progress:  Not documented in this visit.                      User Key     Initials Effective Dates Name Provider Type Discipline    AS 02/28/22 -  Eleazar Ravi, PT Student PT Student PT              PT Recommendation and Plan     Plan of Care Reviewed With: patient  Progress: improving  Outcome Evaluation: PT tx completed. Pt has no c/o. S transfers and walking 40' x 2 with r wx CG. Fatigues quickly, increased time for tasks. Benefit from cont'd PT.   Outcome Measures     Row Name 05/11/22 1127             How much help from another is currently needed...    Putting on and taking off regular lower body clothing? 3  -AC      Bathing (including washing, rinsing, and drying) 3  -AC      Toileting (which includes using toilet bed pan or urinal) 4  -AC      Putting on and taking off regular upper body clothing 4  -AC      Taking care of personal grooming (such as brushing teeth) 4  -AC      Eating meals 4  -AC      AM-PAC 6 Clicks Score (OT) 22  -AC            User Key  (r) = Recorded By, (t) = Taken By, (c) = Cosigned By    Initials Name Provider Type    AC Channing Miller, OTR/L, CNT Occupational Therapist                 Time Calculation:    PT Charges     Row Name 05/12/22 0924             Time Calculation    Start Time 0855  -KJ      Stop Time 0920  -KJ      Time Calculation (min) 25 min  -KJ      PT Received On  05/12/22  -KJ      PT Goal Re-Cert Due Date 05/14/22  -KJ              Time Calculation- PT    Total Timed Code Minutes- PT 25 minute(s)  -KJ            User Key  (r) = Recorded By, (t) = Taken By, (c) = Cosigned By    Initials Name Provider Type    Suha Gupta, ESPERANZA Physical Therapist Assistant              Therapy Charges for Today     Code Description Service Date Service Provider Modifiers Qty    70286700782 HC GAIT TRAINING EA 15 MIN 5/11/2022 Suha Denson, PTA GP 1    94530585481 HC PT THER PROC EA 15 MIN 5/11/2022 Suha Denson, PTA GP 1    27306927429 HC GAIT TRAINING EA 15 MIN 5/12/2022 Suha Denson, PTA GP 1    20410483609 HC PT THER PROC EA 15 MIN 5/12/2022 Suha Denson, PTA GP 1          PT G-Codes  Outcome Measure Options: AM-PAC 6 Clicks Daily Activity (OT)  AM-PAC 6 Clicks Score (PT): 18  AM-PAC 6 Clicks Score (OT): 22    Suha Denson PTA  5/12/2022

## 2022-05-12 NOTE — THERAPY TREATMENT NOTE
Acute Care - Physical Therapy Treatment Note  [facility]     Patient Name: Travis Patel  : 1945  MRN: [id number]  Today's Date: 2022      Visit Dx:     ICD-10-CM ICD-9-CM   1. Encephalopathy  G93.40 348.30   2. Altered mental status, unspecified altered mental status type  R41.82 780.97   3. Hyponatremia  E87.1 276.1   4. Chronic kidney disease, unspecified CKD stage  N18.9 585.9   5. Chronic liver disease  K76.9 571.9   6. Anemia, unspecified type  D64.9 285.9   7. Gastrointestinal hemorrhage, unspecified gastrointestinal hemorrhage type  K92.2 578.9   8. Increased anion gap metabolic acidosis  E87.2 276.2   9. Impaired mobility  Z74.09 799.89   10. Decreased activities of daily living (ADL)  Z78.9 V49.89     Patient Active Problem List   Diagnosis   • Subarachnoid hemorrhage (HCC)   • Chronic coronary artery disease   • Hyperlipidemia   • ICD (implantable cardioverter-defibrillator) in place   • Orthostatic hypotension   • Syncope   • Acute metabolic encephalopathy, due to hyperammonemia   • S/P ablation of atrial fibrillation   • Chronic combined systolic and diastolic CHF (congestive heart failure) (HCC)   • Elevated troponin, likely due to recent ablation   • Stage 3b chronic kidney disease (HCC)   • Bipolar 1 disorder, mixed, full remission (HCC)   • Abnormal BUN-to-creatinine ratio   • Hyponatremia   • Obesity (BMI 30-39.9)   • Lactic acidosis   • GI bleed   • Encephalopathy     Past Medical History:   Diagnosis Date   • Bipolar 1 disorder (HCC)    • Diverticulitis    • Hernia of abdominal wall    • Kidney disease    • Myocardial infarction (HCC) 2017   • Sleep apnea    • Stroke (HCC) 2017     Past Surgical History:   Procedure Laterality Date   • COLOSTOMY  2001    x 3 months   • COLOSTOMY REVISION     • CORONARY ARTERY BYPASS GRAFT         • HERNIA REPAIR      abdominal hernia x 3; poor healing with multiple revisions   • PACEMAKER IMPLANTATION      w/ defibrillator   •  ROTATOR CUFF REPAIR Right 2005     PT Assessment (last 12 hours)     PT Evaluation and Treatment     Row Name 05/12/22 0855          Physical Therapy Time and Intention    Subjective Information no complaints  -KJ     Document Type therapy note (daily note)  -KJ     Mode of Treatment physical therapy  -KJ     Patient Effort good  -KJ     Row Name 05/12/22 0855          General Information    Existing Precautions/Restrictions fall  -KJ     Row Name 05/12/22 0855          Pain    Pretreatment Pain Rating 0/10 - no pain  -KJ     Posttreatment Pain Rating 0/10 - no pain  -KJ     Row Name 05/12/22 0855          Bed Mobility    Comment, (Bed Mobility) in chair  -KJ     Row Name 05/12/22 0855          Transfers    Sit-Stand Davis (Transfers) contact guard;verbal cues  -KJ     Stand-Sit Davis (Transfers) verbal cues;contact guard  -KJ     Row Name 05/12/22 0855          Sit-Stand Transfer    Assistive Device (Sit-Stand Transfers) walker, front-wheeled  -KJ     Row Name 05/12/22 0855          Stand-Sit Transfer    Assistive Device (Stand-Sit Transfers) walker, front-wheeled  -KJ     Row Name 05/12/22 0855          Gait/Stairs (Locomotion)    Davis Level (Gait) verbal cues;contact guard  -KJ     Assistive Device (Gait) walker, front-wheeled  -KJ     Distance in Feet (Gait) 40' x 2  -KJ     Deviations/Abnormal Patterns (Gait) gait speed decreased;stride length decreased  -KJ     Comment, (Gait/Stairs) increased time for tasks, fatigues quickly  -KJ     Row Name 05/12/22 0855          Aerobic Exercise    Comment, Aerobic Exercise (Therapeutic Exercise) AROM BLE's x 15 reps  -KJ     Row Name 05/12/22 0855          Positioning and Restraints    Pre-Treatment Position sitting in chair/recliner  -KJ     Post Treatment Position chair  -KJ     In Bed call light within reach  -KJ           User Key  (r) = Recorded By, (t) = Taken By, (c) = Cosigned By    Initials Name Provider Type    KJ Suha Denson, PTA  Physical Therapist Assistant                Physical Therapy Education                 Title: PT OT SLP Therapies (In Progress)     Topic: Physical Therapy (In Progress)     Point: Mobility training (Done)     Learning Progress Summary           Patient Acceptance, E, VU by AS at 5/4/2022 7128    Comment: Pt education on PT POC and goals.                   Point: Home exercise program (Not Started)     Learner Progress:  Not documented in this visit.          Point: Body mechanics (Not Started)     Learner Progress:  Not documented in this visit.          Point: Precautions (Not Started)     Learner Progress:  Not documented in this visit.                      User Key     Initials Effective Dates Name Provider Type Discipline    AS 02/28/22 -  Eleazar Ravi, PT Student PT Student PT              PT Recommendation and Plan     Plan of Care Reviewed With: patient  Progress: improving  Outcome Evaluation: PT tx completed. Pt has no c/o. S transfers and walking 40' x 2 with r wx CG. Fatigues quickly, increased time for tasks. Benefit from cont'd PT.   Outcome Measures     Row Name 05/11/22 1127             How much help from another is currently needed...    Putting on and taking off regular lower body clothing? 3  -AC      Bathing (including washing, rinsing, and drying) 3  -AC      Toileting (which includes using toilet bed pan or urinal) 4  -AC      Putting on and taking off regular upper body clothing 4  -AC      Taking care of personal grooming (such as brushing teeth) 4  -AC      Eating meals 4  -AC      AM-PAC 6 Clicks Score (OT) 22  -AC            User Key  (r) = Recorded By, (t) = Taken By, (c) = Cosigned By    Initials Name Provider Type    AC Channing Miller, OTR/L, CNT Occupational Therapist                 Time Calculation:    PT Charges     Row Name 05/12/22 0924             Time Calculation    Start Time 0855  -KJ      Stop Time 0920  -KJ      Time Calculation (min) 25 min  -KJ      PT Received On  05/12/22  -KJ      PT Goal Re-Cert Due Date 05/14/22  -KJ              Time Calculation- PT    Total Timed Code Minutes- PT 25 minute(s)  -KJ            User Key  (r) = Recorded By, (t) = Taken By, (c) = Cosigned By    Initials Name Provider Type    Suha Gupta, ESPERANZA Physical Therapist Assistant              Therapy Charges for Today     Code Description Service Date Service Provider Modifiers Qty    74659936700 HC GAIT TRAINING EA 15 MIN 5/11/2022 Suha Denson, PTA GP 1    00887220687 HC PT THER PROC EA 15 MIN 5/11/2022 Suha Denson, PTA GP 1    16527835839 HC GAIT TRAINING EA 15 MIN 5/11/2022 Suha Denson, PTA GP 1    54494353955 HC GAIT TRAINING EA 15 MIN 5/12/2022 Suha Denson, PTA GP 1    25168055958 HC PT THER PROC EA 15 MIN 5/12/2022 Suha Denson, PTA GP 1          PT G-Codes  Outcome Measure Options: AM-PAC 6 Clicks Daily Activity (OT)  AM-PAC 6 Clicks Score (PT): 18  AM-PAC 6 Clicks Score (OT): 22    Suha Denson PTA  5/12/2022

## 2022-05-12 NOTE — DISCHARGE SUMMARY
Hospital Discharge Summary    Travis Patel  :  1945  MRN:  [id number]    Admit date:  5/3/2022  Discharge date:  May 12, 2022    Admitting Physician:    Aly Phan MD    Discharge Diagnoses:      Encephalopathy    Chronic coronary artery disease    Hyperlipidemia    Orthostatic hypotension    S/P ablation of atrial fibrillation    Chronic combined systolic and diastolic CHF (congestive heart failure) (HCC)    Stage 3b chronic kidney disease (HCC)    Hyponatremia      Hospital Course:   The patient was admitted for the above surgical/medical indication.    Mr. Patel is a 76-year-old gentleman established with Dr. Coronado for general Medical care needs.  Recently hospitalized under the care of hospitalist [facility]  found to have elevated ammonia level diagnosed with hepatic encephalopathy as well as acute on chronic CHF, GI bleed and hyponatremia.  He was also admitted to [facility]  through  of this year.  He was hospitalized for shortness of breath, CHF was diuresed and underwent atrial fibrillation ablation..    He is a  76 y.o.  male.with H/O MI, Sleep apnea, Bipolar Disorder 1,  atrial fibrillation s/p ablation,chronic kidney disease, CAD s/p CABG x 2 in  and PCI , pacemaker/defibrillator implant,  hyperlipidemia, DM, GI bleed in 2021, former smoker (quit ) hypotension for which he was treated with midodrine. He Presented back to the emergency room, family requested Dr. Phan to take over his medical care.  He was admitted with altered mental status.  On admission sodium 128, AST 46, , bilirubin 2.6, hemoglobin 8.6  There is a report that he did not receive his home medications after being discharged to the SNF.  He was given lactulose in the emergency department and admitted for further evaluation.      Please see admission H&P for further details concerning the admission.  The patient was seen daily and progress noted via daily  updates in the progress notes.  The patient improved throughout her stay.  They reached maximum medical improvement and were considered stable for discharge home.  They understand the importance of follow-up concerning any abnormal lab values/x-rays.  All questions were answered to the best of my ability prior to their discharge home.  Discharge Medications:         Discharge Medications      New Medications      Instructions Start Date   metOLazone 5 MG tablet  Commonly known as: ZAROXOLYN   5 mg, Oral, Daily      potassium chloride 10 MEQ CR capsule  Commonly known as: MICRO-K  Replaces: potassium chloride ER 20 MEQ tablet controlled-release ER tablet   40 mEq, Oral, Daily         Changes to Medications      Instructions Start Date   lactulose 20 GM/30ML solution solution  What changed: how much to take   30 g, Oral, 2 Times Daily         Continue These Medications      Instructions Start Date   DULoxetine 60 MG capsule  Commonly known as: CYMBALTA   60 mg, Oral, Nightly      empagliflozin 10 MG tablet tablet  Commonly known as: JARDIANCE   10 mg, Oral, Daily      fluticasone 50 MCG/ACT nasal spray  Commonly known as: FLONASE   2 sprays, Nasal, Daily PRN      folic acid 1 MG tablet  Commonly known as: FOLVITE   1 mg, Oral, Daily      furosemide 80 MG tablet  Commonly known as: LASIX   80 mg, Oral, Daily      lithium carbonate 150 MG capsule   150 mg, Oral, 2 Times Daily With Meals      magnesium oxide 400 (241.3 Mg) MG tablet tablet  Commonly known as: MAGOX   400 mg, Oral, Daily      melatonin 5 MG tablet tablet   5 mg, Oral, Nightly      nitroglycerin 0.4 MG SL tablet  Commonly known as: NITROSTAT   0.4 mg, Sublingual, Every 5 Minutes PRN, Take no more than 3 doses in 15 minutes.      pantoprazole 40 MG EC tablet  Commonly known as: PROTONIX   40 mg, Oral, Nightly      polyethylene glycol pack packet  Commonly known as: MIRALAX   17 g, Oral, Daily      QUEtiapine 50 MG tablet  Commonly known as: SEROquel   50  mg, Oral, Nightly      vitamin C 250 MG tablet  Commonly known as: ASCORBIC ACID   1,000 mg, Oral, Daily      vitamin D3 125 MCG (5000 UT) tablet tablet   5,000 Units, Oral, Daily      zinc gluconate 50 MG tablet   50 mg, Oral, Daily         Stop These Medications    midodrine 5 MG tablet  Commonly known as: PROAMATINE     potassium chloride ER 20 MEQ tablet controlled-release ER tablet  Commonly known as: K-TAB  Replaced by: potassium chloride 10 MEQ CR capsule            Consults:   Gastroenterology  Neurology    Physical therapy    Significant Diagnostic Studies:    EKG:   Diagnostic studies  Extensive laboratory testing    Treatments:   Medication adjustment and supportive care    Disposition:   Stable but guarded    Follow up with Dr. Aly Phan within 1 week of discharge per Medicare TCM requirements.    Signed:  BERNARDO Hurley   5/12/2022, 07:51 CDT

## 2022-05-12 NOTE — CASE MANAGEMENT/SOCIAL WORK
Continued Stay Note   [facility]     Patient Name: Travis Patel  MRN: [id number]  Today's Date: 5/12/2022    Admit Date: 5/3/2022     Discharge Plan     Row Name 05/12/22 1024       Plan    Plan [facility]     Patient/Family in Agreement with Plan yes    Provided Post Acute Provider List? Yes    Post Acute Provider List Home Health    Provided Post Acute Provider Quality & Resource List? Yes    Post Acute Provider Quality and Resource List Home Health    Delivered To Support Person    Method of Delivery Telephone    Final Discharge Disposition Code 06 - home with home health care    Final Note Pt is being discharged home today. Spoke withJennifer Turcios Daughter   [phone], they do not want semi private room available in [place] and request going home with  [facility].  Informed Dodie from [facility] of d/c status and new orders [phone].               Discharge Codes    No documentation.               Expected Discharge Date and Time     Expected Discharge Date Expected Discharge Time    May 12, 2022             APRIL Callaway

## 2022-05-12 NOTE — NURSING NOTE
Patient has discharge order in. IV removed with dressing applied. Tele removed. Discharge papers reviewed. All questions answered. Discharge form signed by this RN and patient. All belongings and supplies with patient.

## 2022-05-12 NOTE — DISCHARGE PLACEMENT REQUEST
"PABLO HEATH [phone]    Travis Patel (76 y.o. Male)             Date of Birth   1945    Social Security Number       Address   [address] [place] [address]    Home Phone   [phone]    MRN   [id number]       [facility]   [facility]    Marital Status   Legally                             Admission Date   5/3/22    Admission Type   Emergency    Admitting Provider   Aly Phan MD    Attending Provider   Aly Phan MD    Department, Room/Bed   [address], 352/1       Discharge Date       Discharge Disposition   Home or Self Care    Discharge Destination                               Attending Provider: Aly Phan MD    Allergies: Sulfa Antibiotics    Isolation: None   Infection: None   Code Status: CPR   Advance Care Planning Activity    Ht: 167.6 cm (66\")   Wt: 86.2 kg (190 lb)    Admission Cmt: None   Principal Problem: Encephalopathy [G93.40]                 Active Insurance as of 5/3/2022     Primary Coverage     Payor Plan Insurance Group Employer/Plan Group    HUMANA MEDICARE REPLACEMENT HUMANA MEDICARE REPLACEMENT [id number]     Payor Plan Address Payor Plan Phone Number Payor Plan Fax Number Effective Dates    [address] [address] [phone]  1/1/2014 - None Entered    [facility] [id number]       Subscriber Name Subscriber Birth Date Member ID       TRAVIS PATEL 1945 [id number]                 Emergency Contacts      (Rel.) Home Phone Work Phone Mobile Phone    Jennifer Turcios (Daughter) -- -- [phone]        Ambulatory Referral to Home Health [[id number]] (Order [id number])  Order  Date: 5/12/2022 Department: [address] Ordering/Authorizing: Aly Phan MD                             Order History  Outpatient  Date/Time Action Taken User Additional Information   05/12/22 1004 Sign Joann Horvath RN Ordering Mode: Verbal with readback       Order Details    Frequency Duration Priority " Order Class   None None Routine Internal Referral       Start Date/Time    Start Date   05/12/22       Order Information    Order Date Service Start Date Start Time   05/12/22 Medicine 05/12/22            Reference Links    Associated Reports External References   View Encounter Current Health Referral Information       Order Questions    Question Answer   Face to Face Visit Date: 5/12/2022   Follow-up provider for Plan of Care? I will be treating the patient on an ongoing basis.  Please send me the Plan of Care for signature.   Follow-up provider: LEIGHANN CARLTON   Reason/Clinical Findings s/p hospitalization for AMS/ liver disease/ weakness   Describe mobility limitations that make leaving home difficult: difficult to leave home r/t illness, not driving, AMS, Must have assist x1 and DME   Nursing/Therapeutic Services Requested Physical Therapy   Frequency: 1 Week 1                      Verbal Order Info    Action Created on Order Mode Entered by Responsible Provider Signed by Signed on   Ordering 05/12/22 1004 Verbal with readback Joann Horvath, RN Leighann Carlton MD       Source Order Set / Preference List    Preference List   AMB [facility] REFERRALS [[id number]]             Clinical Indications     ICD-10-CM ICD-9-CM   Encephalopathy  - Primary     G93.40 348.30   Increased anion gap metabolic acidosis     E87.2 276.2   Impaired mobility     Z74.09 799.89   Decreased activities of daily living (ADL)     Z78.9 V49.89                             Reprint Order Requisition    Ambulatory Referral to Home Health (Order #[id number]) on 5/12/22         Encounter    View Encounter                Order Provider Info        Office phone Pager E-mail   Ordering User Joann Horvath RN -- -- --   Authorizing Provider Leighann Carlton [phone] -- --   Attending Provider Leighann Carlton [phone] -- --   Entered By Joann Horvath, TOBI -- -- --   Ordering Provider Leighann Carlton [phone] -- --        Tracking Reports    Cosign Tracking Order Transmittal Tracking     Authorized by:  Aly Phan MD  (NPI: [id number])                Lab Component SmartPhrase Guide    Ambulatory Referral to Home Health (Order #[id number]) on 5/12/22                  History & Physical      Mignon, BERNARDO Mejias at 05/03/22 2003     Attestation signed by Aly Phan MD at 05/04/22 0914    I have reviewed this documentation and agree.                          History and Physical      CHIEF COMPLAINT: Altered mental status    Reason for Admission: Altered mental status    History Obtained From: Patient, family and chart review    HISTORY OF PRESENT ILLNESS:    Mr. Patel is 76-year-old gentleman recently [facility]  Past medical history MI, DIOGO, bipolar 1, atrial fibrillation status post ablation, CKD, CAD status post CABG x2 in 2006 and PCI 2017, pacemaker/defibrillator implant, congestive heart failure, HLD, DM, GI bleed June 2021, former smoker, hypotension, hyponatremia, elevated liver function test with hepatic encephalopathy and intermittent confusion.    He discharged to SNF and developed agitation and altered mental status and presented back to the emergency room for further evaluation.  He was recently at [facility] for management of CHF and decompensated liver disease.  His recent hospitalization was due to hepatic encephalopathy confusion and GI bleed.  He has had recent endoscopic evaluations negative at [facility].  During his recent hospitalization he was given lactulose and monitor his stool for concern of melena.  He did remain hemodynamically stable during his hospitalization.  There is a history of alcohol use.  Recent hospitalization here at [facility] did require ICU admission.  He was seen by neurology.  He was discharged yesterday to SNF.  We were contacted today for admission as patient's family declined readmission to hospitalist  services.  In the emergency room department his ammonia level was noted to be normal.  He did have several lab abnormalities but nothing significant.  Sodium 128, AST 46, , bilirubin 2.6, creatinine 1.8, EGFR 41, WBC normal, hemoglobin 8.6.  He is admitted for further evaluation and stabilization due to his altered mental status and agitation at the SNF.  Of note he does take lithium which apparently was unavailable at the nursing home.  EMS report noted that patient has been calm and following commands.  Alert and oriented times self and aware of being in hospital.    Of note he is currently awake alert and cooperative in no distress.  He denies any complaints.  He states he would like something to eat.  No family is currently present.      Past Medical History:    Past Medical History:   Diagnosis Date   • Bipolar 1 disorder (HCC)    • Diverticulitis    • Hernia of abdominal wall    • Kidney disease    • Myocardial infarction ([facility]) 02/2017   • Sleep apnea    • Stroke ([facility]) 02/2017       Past Surgical History:    Past Surgical History:   Procedure Laterality Date   • COLOSTOMY  2001    x 3 months   • COLOSTOMY REVISION     • CORONARY ARTERY BYPASS GRAFT      1991/1996   • HERNIA REPAIR      abdominal hernia x 3; poor healing with multiple revisions   • PACEMAKER IMPLANTATION  2017    w/ defibrillator   • ROTATOR CUFF REPAIR Right 2005       Medications Prior to Admission:    Medications Prior to Admission   Medication Sig Dispense Refill Last Dose   • Cholecalciferol (vitamin D3) 125 MCG (5000 UT) tablet tablet Take 5,000 Units by mouth Daily.   5/2/2022 at Unknown time   • DULoxetine (CYMBALTA) 60 MG capsule Take 60 mg by mouth Every Night.   5/2/2022 at Unknown time   • empagliflozin (JARDIANCE) 10 MG tablet tablet Take 1 tablet by mouth Daily. 30 tablet  5/2/2022 at Unknown time   • fluticasone (FLONASE) 50 MCG/ACT nasal spray 2 sprays into the nostril(s) as directed by provider Daily As Needed for  Rhinitis.   5/2/2022 at Unknown time   • folic acid (FOLVITE) 1 MG tablet Take 1 mg by mouth Daily.   5/2/2022 at Unknown time   • furosemide (LASIX) 80 MG tablet Take 1 tablet by mouth Daily.   5/2/2022 at Unknown time   • lactulose 20 GM/30ML solution solution Take 30 mL by mouth 2 (Two) Times a Day. 450 mL  5/2/2022 at Unknown time   • lithium carbonate 150 MG capsule Take 150 mg by mouth 2 (Two) Times a Day With Meals.   5/2/2022 at Unknown time   • magnesium oxide (MAGOX) 400 (241.3 Mg) MG tablet tablet Take 400 mg by mouth Daily.   5/2/2022 at Unknown time   • melatonin 5 MG tablet tablet Take 5 mg by mouth Every Night.   5/2/2022 at Unknown time   • pantoprazole (PROTONIX) 40 MG EC tablet Take 40 mg by mouth Every Night.   5/2/2022 at Unknown time   • polyethylene glycol (MIRALAX) pack packet Take 17 g by mouth Daily. 30 each 0 5/2/2022 at Unknown time   • potassium chloride ER (K-TAB) 20 MEQ tablet controlled-release ER tablet Take 30 mEq by mouth Daily.   5/2/2022 at Unknown time   • QUEtiapine (SEROquel) 50 MG tablet Take 1 tablet by mouth Every Night.   5/2/2022 at Unknown time   • vitamin C (ASCORBIC ACID) 250 MG tablet Take 1,000 mg by mouth Daily.   5/2/2022 at Unknown time   • zinc gluconate 50 MG tablet Take 50 mg by mouth Daily.   5/2/2022 at Unknown time   • midodrine (PROAMATINE) 5 MG tablet Take 1 tablet by mouth 3 (Three) Times a Day Before Meals.      • nitroglycerin (NITROSTAT) 0.4 MG SL tablet Place 0.4 mg under the tongue Every 5 (Five) Minutes As Needed for Chest Pain. Take no more than 3 doses in 15 minutes.   Unknown at Unknown time       Allergies:  Sulfa antibiotics    Social History:   Social History     Socioeconomic History   • Marital status: Legally    Tobacco Use   • Smoking status: Former Smoker     Packs/day: 1.00     Years: 40.00     Pack years: 40.00     Types: Cigarettes   • Smokeless tobacco: Never Used   Substance and Sexual Activity   • Alcohol use: No   • Drug  use: No   • Sexual activity: Defer       Family History:   Family History   Problem Relation Age of Onset   • Heart disease Brother    • Diabetes Brother    • Sleep apnea Brother        REVIEW OF SYSTEMS:    CONSTITUTIONAL:  Negative for anorexia, chills, fevers, night sweats and weight loss, patient has been relatively medically stable until illness outlined in HPI  EYES:  negative for eye dryness, icterus and redness, no significant changes in vision  HEENT:   negative for dental problems, epistaxis or sinus congestion , no history of facial trauma or difficulty swallowing  RESPIRATORY:  negative for chest tightness, cough, dyspnea on exertion, pneumonia or worse sputum production  CARDIOVASCULAR: No history of chest pain, dyspnea, exertional chest pressure/discomfort, irregular heart beat, or PND  GASTROINTESTINAL:  negative for abdominal pain, nausea or vomiting, no history of hematemesis, jaundice, melena or rectal bleeding  MUSCULOSKELETAL:  negative for muscle weakness, myalgias and neck pain, no significant arthralgias  NEUROLOGICAL:   negative for dizziness, headaches, seizures, speech problems, tremors and vertigo, mentation has been at baseline  INTEGUMENT: negative for pruritus, rash, skin color change and skin lesion(s)       Vital Signs   Temp:  [97.5 °F (36.4 °C)-98.2 °F (36.8 °C)] 97.5 °F (36.4 °C)  Heart Rate:  [65-74] 65  Resp:  [14-18] 18  BP: ()/(47-77) 96/57          Physical Exam:  Constitutional: The patient is oriented to person, place, and time. They appear well-developed.  Able to answer questions appropriately  HEENT: Grossly normal sitting up in bed  Head: Normocephalic and atraumatic.   Eyes: Pupils are equal, round, and reactive to light.  Extraocular muscles appear to be intact  Neck: Neck supple without masses or carotid bruit.  Cardiovascular: Regular rhythm and normal heart sounds.  No extra or lift rub or murmur appreciated  Pulmonary/Chest: Effort normal and breath sounds  normal. CTAB, no appreciable rales or wheezes  Abdominal: Soft. Bowel sounds are normal. There is  no distension or tenderness appreciated. There is no rebound and no guarding.   Musculoskeletal: Normal range of motion. There is  no edema or tenderness.  No significant joint swelling  Neurological: Pt is alert and oriented to person, place, and time. They have normal reflexes. CN 2-12 appear grossly intact  Skin: Skin is warm and dry without significant rashes     Results Review:   I reviewed the patient's new imaging results and agree with the interpretation.    DATA:  Lab Results (last 24 hours)     Procedure Component Value Units Date/Time    TSH [[id number]]  (Abnormal) Collected: 05/03/22 0952    Specimen: Blood Updated: 05/03/22 1808     TSH 11.100 uIU/mL     COVID PRE-OP / PRE-PROCEDURE SCREENING ORDER (NO ISOLATION) - Swab, Nasal Cavity [[id number]]  (Normal) Collected: 05/03/22 1529    Specimen: Swab from Nasal Cavity Updated: 05/03/22 1609    Narrative:      The following orders were created for panel order COVID PRE-OP / PRE-PROCEDURE SCREENING ORDER (NO ISOLATION) - Swab, Nasal Cavity.  Procedure                               Abnormality         Status                     ---------                               -----------         ------                     COVID-19,[facility] IN-RACH...[[id number]]  Normal              Final result                 Please view results for these tests on the individual orders.    COVID-19,[facility] IN-HOUSE,Nasal Swab No Transport Media 3-4 HR TAT - Swab, Nasal Cavity [[id number]]  (Normal) Collected: 05/03/22 1529    Specimen: Swab from Nasal Cavity Updated: 05/03/22 1609     COVID19 Not Detected    Narrative:      Fact sheet for providers: https://www.fda.gov/media/850373/download     Fact sheet for patients: https://www.fda.gov/media/102610/download    Test performed by PCR.    Consider negative results in combination with clinical observations, patient history, and  epidemiological information.    Lithium Level [[id number]]  (Normal) Collected: 05/03/22 0952    Specimen: Blood Updated: 05/03/22 1244     Lithium 0.7 mmol/L     POC Occult Blood Stool [[id number]]  (Abnormal) Collected: 05/03/22 1146    Specimen: Stool Updated: 05/03/22 1147     Fecal Occult Blood Positive     Lot Number \215\     Expiration Date \10/31/2022\     DEVELOPER LOT NUMBER \215\     DEVELOPER EXPIRATION DATE \215\     Positive Control Positive     Negative Control Negative    Ammonia [[id number]]  (Normal) Collected: 05/03/22 1042    Specimen: Blood Updated: 05/03/22 1120     Ammonia 59 umol/L     Protime-INR [[id number]]  (Abnormal) Collected: 05/03/22 1028    Specimen: Blood Updated: 05/03/22 1057     Protime 16.9 Seconds      INR 1.43    CBC & Differential [[id number]]  (Abnormal) Collected: 05/03/22 1028    Specimen: Blood Updated: 05/03/22 1050    Narrative:      The following orders were created for panel order CBC & Differential.  Procedure                               Abnormality         Status                     ---------                               -----------         ------                     CBC Auto Differential[[id number]]        Abnormal            Final result                 Please view results for these tests on the individual orders.    CBC Auto Differential [[id number]]  (Abnormal) Collected: 05/03/22 1028    Specimen: Blood Updated: 05/03/22 1050     WBC 9.60 10*3/mm3      RBC 3.16 10*6/mm3      Hemoglobin 8.6 g/dL      Hematocrit 28.7 %      MCV 90.8 fL      MCH 27.2 pg      MCHC 30.0 g/dL      RDW 23.3 %      RDW-SD 77.5 fl      MPV 9.9 fL      Platelets 157 10*3/mm3      Neutrophil % 88.8 %      Lymphocyte % 5.1 %      Monocyte % 5.3 %      Eosinophil % 0.1 %      Basophil % 0.2 %      Immature Grans % 0.5 %      Neutrophils, Absolute 8.52 10*3/mm3      Lymphocytes, Absolute 0.49 10*3/mm3      Monocytes, Absolute 0.51 10*3/mm3      Eosinophils, Absolute 0.01 10*3/mm3       Basophils, Absolute 0.02 10*3/mm3      Immature Grans, Absolute 0.05 10*3/mm3      nRBC 0.0 /100 WBC     Comprehensive Metabolic Panel [[id number]]  (Abnormal) Collected: 05/03/22 0952    Specimen: Blood Updated: 05/03/22 1037     Glucose 106 mg/dL      BUN 28 mg/dL      Creatinine 1.73 mg/dL      Sodium 128 mmol/L      Potassium 4.6 mmol/L      Chloride 90 mmol/L      CO2 20.0 mmol/L      Calcium 9.2 mg/dL      Total Protein 5.5 g/dL      Albumin 3.90 g/dL      ALT (SGPT) 114 U/L      AST (SGOT) 46 U/L      Alkaline Phosphatase 162 U/L      Total Bilirubin 2.6 mg/dL      Globulin 1.6 gm/dL      A/G Ratio 2.4 g/dL      BUN/Creatinine Ratio 16.2     Anion Gap 18.0 mmol/L      eGFR 40.9 mL/min/1.73      Comment: National Kidney Foundation and American Society of Nephrology (ASN) Task Force recommended calculation based on the Chronic Kidney Disease Epidemiology Collaboration (CKD-EPI) equation refit without adjustment for race.       Narrative:      GFR Normal >60  Chronic Kidney Disease <60  Kidney Failure <15          Imaging Results (Last 24 Hours)     Procedure Component Value Units Date/Time    CT Head Without Contrast [[id number]] Collected: 05/03/22 1306     Updated: 05/03/22 1312    Narrative:      EXAMINATION:   CT HEAD WO CONTRAST-  5/3/2022 1:06 PM CDT     HISTORY: CT BRAIN without contrast 5/3/2022 12:41 PM CDT     HISTORY: Patient fell     COMPARISON: April 20, 2022      DLP: 309 mGy cm     TECHNIQUE: Serial axial tomographic images of the brain were obtained  without the use of intravenous contrast.      FINDINGS:   The midline structures are nondisplaced. There is moderate cerebral and  cerebellar volume loss, with an associated increase in the prominence of  the ventricles and sulci. The basilar cisterns are normal in size and  configuration. There is no evidence of intracranial hemorrhage or  mass-effect. There is low attenuation in the periventricular white  matter, consistent with chronic ischemic  change. There are no abnormal  extra-axial fluid collections. There is no evidence of tonsillar  herniation.      The included orbits and their contents are unremarkable. Mucous cyst is  present in the right sphenoid air cell. Ethmoid and frontal air cells  are unremarkable. Mastoid air cells are pneumatized.. The visualized  osseous structures and overlying soft tissues of the skull and face are  intact.        Impression:      Moderate cerebral and cerebellar volume loss with chronic microvascular  disease but no evidence of acute intracranial process.        This report was finalized on 05/03/2022 13:09 by Dr. Josep Pittman MD.            ASSESSMENT AND PLAN:      Encephalopathy  Agitation and confusion--improved  Dementia  Obstructive sleep apnea  Anemia with heme positive stool  Coronary artery disease  Hyperlipidemia  Orthostatic hypotension with recent fall  Status post ablation of atrial fibrillation  Chronic combined systolic and diastolic CHF  Stage IIIb CKD  Hyponatremia  Recent GI bleed  Elevated liver function test    Patient has been admitted and stabilized  Orders for serial labs  IV fluid hydration  Neurologic monitoring  Formal neurology consultation rule out subclinical seizure activity  Avoid sedating medications  Continue with Seroquel  Continue course of care monitor closely for changes.    BERNARDO Hurley  20:04 CDT 5/3/2022      Electronically signed by Aly Phan MD at 05/04/22 0914          Discharge Summary      Je Crow PA at 05/12/22 0720     Attestation signed by Aly Phan MD at 05/12/22 0814    Mr. Patel was admitted for acute encephalopathy that was determined to be likely secondary to dementia with atypical features in the setting of severe chronic illnesses.  On hospital admission he had several laboratory derangements and was volume overloaded.  Both of these improved with diuresis and he was transitioned to oral diuretic regimen.  We had conversations  regarding overall care with the patient and Jennifer Turcios.  We explained again how chronically ill the patient is and how goals for care will mostly be prioritized at symptom control getting on the hospital.  We will discuss this further at his hospital follow-up appointment.  Required basic metabolic panel within 1 week to monitor his renal function and hypokalemia.    I have reviewed this documentation and agree.                      Hospital Discharge Summary    Travis Patel  :  1945  MRN:  [id number]    Admit date:  5/3/2022  Discharge date:  May 12, 2022    Admitting Physician:    Aly Phan MD    Discharge Diagnoses:      Encephalopathy    Chronic coronary artery disease    Hyperlipidemia    Orthostatic hypotension    S/P ablation of atrial fibrillation    Chronic combined systolic and diastolic CHF (congestive heart failure) (HCC)    Stage 3b chronic kidney disease (HCC)    Hyponatremia      Hospital Course:   The patient was admitted for the above surgical/medical indication.    Mr. Patel is a 76-year-old gentleman established with Dr. Coronado for general Medical care needs.  Recently hospitalized under the care of hospitalist locally  found to have elevated ammonia level diagnosed with hepatic encephalopathy as well as acute on chronic CHF, GI bleed and hyponatremia.  He was also admitted to [facility]  through  of this year.  He was hospitalized for shortness of breath, CHF was diuresed and underwent atrial fibrillation ablation..    He is a  76 y.o.  male.with H/O MI, Sleep apnea, Bipolar Disorder 1,  atrial fibrillation s/p ablation,chronic kidney disease, CAD s/p CABG x 2 in  and PCI , pacemaker/defibrillator implant,  hyperlipidemia, DM, GI bleed in 2021, former smoker (quit ) hypotension for which he was treated with midodrine. He Presented back to the emergency room, family requested Dr. Phan to take over his medical care.   He was admitted with altered mental status.  On admission sodium 128, AST 46, , bilirubin 2.6, hemoglobin 8.6  There is a report that he did not receive his home medications after being discharged to the SNF.  He was given lactulose in the emergency department and admitted for further evaluation.      Please see admission H&P for further details concerning the admission.  The patient was seen daily and progress noted via daily updates in the progress notes.  The patient improved throughout her stay.  They reached maximum medical improvement and were considered stable for discharge home.  They understand the importance of follow-up concerning any abnormal lab values/x-rays.  All questions were answered to the best of my ability prior to their discharge home.  Discharge Medications:         Discharge Medications      New Medications      Instructions Start Date   metOLazone 5 MG tablet  Commonly known as: ZAROXOLYN   5 mg, Oral, Daily      potassium chloride 10 MEQ CR capsule  Commonly known as: MICRO-K  Replaces: potassium chloride ER 20 MEQ tablet controlled-release ER tablet   40 mEq, Oral, Daily         Changes to Medications      Instructions Start Date   lactulose 20 GM/30ML solution solution  What changed: how much to take   30 g, Oral, 2 Times Daily         Continue These Medications      Instructions Start Date   DULoxetine 60 MG capsule  Commonly known as: CYMBALTA   60 mg, Oral, Nightly      empagliflozin 10 MG tablet tablet  Commonly known as: JARDIANCE   10 mg, Oral, Daily      fluticasone 50 MCG/ACT nasal spray  Commonly known as: FLONASE   2 sprays, Nasal, Daily PRN      folic acid 1 MG tablet  Commonly known as: FOLVITE   1 mg, Oral, Daily      furosemide 80 MG tablet  Commonly known as: LASIX   80 mg, Oral, Daily      lithium carbonate 150 MG capsule   150 mg, Oral, 2 Times Daily With Meals      magnesium oxide 400 (241.3 Mg) MG tablet tablet  Commonly known as: MAGOX   400 mg, Oral, Daily       melatonin 5 MG tablet tablet   5 mg, Oral, Nightly      nitroglycerin 0.4 MG SL tablet  Commonly known as: NITROSTAT   0.4 mg, Sublingual, Every 5 Minutes PRN, Take no more than 3 doses in 15 minutes.      pantoprazole 40 MG EC tablet  Commonly known as: PROTONIX   40 mg, Oral, Nightly      polyethylene glycol pack packet  Commonly known as: MIRALAX   17 g, Oral, Daily      QUEtiapine 50 MG tablet  Commonly known as: SEROquel   50 mg, Oral, Nightly      vitamin C 250 MG tablet  Commonly known as: ASCORBIC ACID   1,000 mg, Oral, Daily      vitamin D3 125 MCG (5000 UT) tablet tablet   5,000 Units, Oral, Daily      zinc gluconate 50 MG tablet   50 mg, Oral, Daily         Stop These Medications    midodrine 5 MG tablet  Commonly known as: PROAMATINE     potassium chloride ER 20 MEQ tablet controlled-release ER tablet  Commonly known as: K-TAB  Replaced by: potassium chloride 10 MEQ CR capsule            Consults:   Gastroenterology  Neurology    Physical therapy    Significant Diagnostic Studies:    EKG:   Diagnostic studies  Extensive laboratory testing    Treatments:   Medication adjustment and supportive care    Disposition:   Stable but guarded    Follow up with Dr. Aly Phan within 1 week of discharge per Medicare TCM requirements.    Signed:  BERNARDO Hurley   5/12/2022, 07:51 CDT    Electronically signed by Aly Phan MD at 05/12/22 0866

## 2022-05-12 NOTE — PLAN OF CARE
Goal Outcome Evaluation:  Plan of Care Reviewed With: patient        Progress: improving  Outcome Evaluation: PT tx completed. Pt has no c/o. S transfers and walking 40' x 2 with r wx CG. Fatigues quickly, increased time for tasks. Benefit from cont'd PT.

## 2022-05-13 NOTE — THERAPY DISCHARGE NOTE
Acute Care - Physical Therapy Discharge Summary  [facility]       Patient Name: Travis Patel  : 1945  MRN: [id number]    Today's Date: 2022                 Admit Date: 5/3/2022      PT Recommendation and Plan    Visit Dx:    ICD-10-CM ICD-9-CM   1. Encephalopathy  G93.40 348.30   2. Altered mental status, unspecified altered mental status type  R41.82 780.97   3. Hyponatremia  E87.1 276.1   4. Chronic kidney disease, unspecified CKD stage  N18.9 585.9   5. Chronic liver disease  K76.9 571.9   6. Anemia, unspecified type  D64.9 285.9   7. Gastrointestinal hemorrhage, unspecified gastrointestinal hemorrhage type  K92.2 578.9   8. Increased anion gap metabolic acidosis  E87.2 276.2   9. Impaired mobility  Z74.09 799.89   10. Decreased activities of daily living (ADL)  Z78.9 V49.89        Outcome Measures     Row Name 22 1127             How much help from another is currently needed...    Putting on and taking off regular lower body clothing? 3  -AC      Bathing (including washing, rinsing, and drying) 3  -AC      Toileting (which includes using toilet bed pan or urinal) 4  -AC      Putting on and taking off regular upper body clothing 4  -AC      Taking care of personal grooming (such as brushing teeth) 4  -AC      Eating meals 4  -AC      AM-PAC 6 Clicks Score (OT) 22  -AC            User Key  (r) = Recorded By, (t) = Taken By, (c) = Cosigned By    Initials Name Provider Type    AC Channing Miller, OTR/L, CNT Occupational Therapist                     PT Rehab Goals     Row Name 22 0826             Bed Mobility Goal 1 (PT)    Activity/Assistive Device (Bed Mobility Goal 1, PT) bridging;rolling to left;rolling to right;scooting;sit to supine/supine to sit  -BREANN      [place] Level/Cues Needed (Bed Mobility Goal 1, PT) independent  -BREANN      Time Frame (Bed Mobility Goal 1, PT) long term goal (LTG);10 days  -BREANN      Progress/Outcomes (Bed Mobility Goal 1, PT) goal not met  -BREANN               Transfer Goal 1 (PT)    Activity/Assistive Device (Transfer Goal 1, PT) sit-to-stand/stand-to-sit;bed-to-chair/chair-to-bed;walker, rolling  -BREANN      [place] Level/Cues Needed (Transfer Goal 1, PT) supervision required  -BREANN      Time Frame (Transfer Goal 1, PT) long term goal (LTG);10 days  -BREANN      Progress/Outcome (Transfer Goal 1, PT) goal not met  -BREANN              Gait Training Goal 1 (PT)    Activity/Assistive Device (Gait Training Goal 1, PT) gait (walking locomotion);decrease fall risk;improve balance and speed;increase endurance/gait distance;turning, left;turning, right;walker, rolling  -BREANN      [place] Level (Gait Training Goal 1, PT) supervision required  -BREANN      Distance (Gait Training Goal 1, PT) pt will walk 75ft with 3 turns and dual tasking  -BREANN      Time Frame (Gait Training Goal 1, PT) long term goal (LTG);10 days  -BREANN      Progress/Outcome (Gait Training Goal 1, PT) goal not met  -BREANN            User Key  (r) = Recorded By, (t) = Taken By, (c) = Cosigned By    Initials Name Provider Type Discipline    Trever Burgos, PTA Physical Therapist Assistant PT                    PT Discharge Summary  Anticipated Discharge Disposition (PT): home with home health  Reason for Discharge: Discharge from facility  Outcomes Achieved: Refer to plan of care for updates on goals achieved  Discharge Destination: Home with home health      Trever Segura PTA   5/13/2022

## 2022-05-13 NOTE — OUTREACH NOTE
Prep Survey    Flowsheet Row Responses   [facility] facility patient discharged from? [place]   Is LACE score < 7 ? No   Emergency Room discharge w/ pulse ox? No   Eligibility Readm Mgmt   Discharge diagnosis Encephalopathy   Does the patient have one of the following disease processes/diagnoses(primary or secondary)? Other   Does the patient have Home health ordered? Yes   What is the Home health agency?  [facility]   Is there a DME ordered? No   Prep survey completed? Yes          MONTANA MUNOZ - Registered Nurse

## 2022-05-16 PROBLEM — E87.6 HYPOKALEMIA: Status: ACTIVE | Noted: 2022-01-01

## 2022-05-17 PROBLEM — G93.41 ACUTE METABOLIC ENCEPHALOPATHY: Status: ACTIVE | Noted: 2022-01-01

## 2022-05-17 NOTE — OUTREACH NOTE
Medical Week 1 Survey    Flowsheet Row Responses   [facility] facility patient discharged from? [place]   Does the patient have one of the following disease processes/diagnoses(primary or secondary)? Other   Week 1 attempt successful? No   Revoke Readmitted          KIRBY FLORES - Registered Nurse

## 2022-05-19 PROBLEM — I46.9 PEA (PULSELESS ELECTRICAL ACTIVITY) (HCC): Status: ACTIVE | Noted: 2022-01-01

## 2022-05-19 PROBLEM — K76.9 LIVER DISEASE: Status: ACTIVE | Noted: 2022-01-01

## 2022-05-19 PROBLEM — N18.9 ACUTE KIDNEY INJURY SUPERIMPOSED ON CHRONIC KIDNEY DISEASE (HCC): Status: ACTIVE | Noted: 2022-01-01

## 2022-05-19 PROBLEM — N17.9 ACUTE KIDNEY INJURY SUPERIMPOSED ON CHRONIC KIDNEY DISEASE (HCC): Status: ACTIVE | Noted: 2022-01-01

## 2022-05-19 PROBLEM — D68.9 COAGULOPATHY (HCC): Status: ACTIVE | Noted: 2022-01-01

## 2022-05-19 PROBLEM — J96.01 ACUTE RESPIRATORY FAILURE WITH HYPOXIA (HCC): Status: ACTIVE | Noted: 2022-01-01

## 2022-05-27 LAB — GLUTATHIONE RBC-MCNC: 209 UG/ML (ref 176–323)

## 2022-09-03 LAB
QT INTERVAL: 348 MS
QTC INTERVAL: 383 MS